# Patient Record
Sex: MALE | Race: WHITE | NOT HISPANIC OR LATINO | Employment: UNEMPLOYED | URBAN - METROPOLITAN AREA
[De-identification: names, ages, dates, MRNs, and addresses within clinical notes are randomized per-mention and may not be internally consistent; named-entity substitution may affect disease eponyms.]

---

## 2021-08-26 ENCOUNTER — OFFICE VISIT (OUTPATIENT)
Dept: FAMILY MEDICINE | Facility: CLINIC | Age: 56
End: 2021-08-26
Payer: COMMERCIAL

## 2021-08-26 ENCOUNTER — ANCILLARY PROCEDURE (OUTPATIENT)
Dept: GENERAL RADIOLOGY | Facility: CLINIC | Age: 56
End: 2021-08-26
Attending: STUDENT IN AN ORGANIZED HEALTH CARE EDUCATION/TRAINING PROGRAM
Payer: COMMERCIAL

## 2021-08-26 VITALS
DIASTOLIC BLOOD PRESSURE: 74 MMHG | HEIGHT: 69 IN | WEIGHT: 136.3 LBS | HEART RATE: 85 BPM | BODY MASS INDEX: 20.19 KG/M2 | SYSTOLIC BLOOD PRESSURE: 125 MMHG | OXYGEN SATURATION: 98 %

## 2021-08-26 DIAGNOSIS — F32.1 CURRENT MODERATE EPISODE OF MAJOR DEPRESSIVE DISORDER WITHOUT PRIOR EPISODE (H): ICD-10-CM

## 2021-08-26 DIAGNOSIS — L97.522 SKIN ULCER OF TOE OF LEFT FOOT WITH FAT LAYER EXPOSED (H): Primary | ICD-10-CM

## 2021-08-26 DIAGNOSIS — E11.9 TYPE 2 DIABETES MELLITUS WITHOUT COMPLICATION, WITH LONG-TERM CURRENT USE OF INSULIN (H): ICD-10-CM

## 2021-08-26 DIAGNOSIS — E11.42 DIABETIC POLYNEUROPATHY ASSOCIATED WITH TYPE 2 DIABETES MELLITUS (H): ICD-10-CM

## 2021-08-26 DIAGNOSIS — Z79.4 TYPE 2 DIABETES MELLITUS WITHOUT COMPLICATION, WITH LONG-TERM CURRENT USE OF INSULIN (H): ICD-10-CM

## 2021-08-26 DIAGNOSIS — L97.522 SKIN ULCER OF TOE OF LEFT FOOT WITH FAT LAYER EXPOSED (H): ICD-10-CM

## 2021-08-26 LAB
ALBUMIN SERPL-MCNC: 3.6 G/DL (ref 3.5–5)
ALP SERPL-CCNC: 98 U/L (ref 45–120)
ALT SERPL W P-5'-P-CCNC: 16 U/L (ref 0–45)
ANION GAP SERPL CALCULATED.3IONS-SCNC: 10 MMOL/L (ref 5–18)
AST SERPL W P-5'-P-CCNC: 15 U/L (ref 0–40)
BILIRUB SERPL-MCNC: 0.3 MG/DL (ref 0–1)
BUN SERPL-MCNC: 15 MG/DL (ref 8–22)
CALCIUM SERPL-MCNC: 8.9 MG/DL (ref 8.5–10.5)
CHLORIDE BLD-SCNC: 106 MMOL/L (ref 98–107)
CO2 SERPL-SCNC: 24 MMOL/L (ref 22–31)
CREAT SERPL-MCNC: 1.01 MG/DL (ref 0.7–1.3)
ERYTHROCYTE [DISTWIDTH] IN BLOOD BY AUTOMATED COUNT: 13.5 % (ref 10–15)
GFR SERPL CREATININE-BSD FRML MDRD: 82 ML/MIN/1.73M2
GLUCOSE BLD-MCNC: 189 MG/DL (ref 70–125)
HBA1C MFR BLD: 7.4 % (ref 0–5.6)
HCT VFR BLD AUTO: 28 % (ref 40–53)
HGB BLD-MCNC: 9.9 G/DL (ref 13.3–17.7)
MCH RBC QN AUTO: 30.3 PG (ref 26.5–33)
MCHC RBC AUTO-ENTMCNC: 35.4 G/DL (ref 31.5–36.5)
MCV RBC AUTO: 86 FL (ref 78–100)
PLATELET # BLD AUTO: 293 10E3/UL (ref 150–450)
POTASSIUM BLD-SCNC: 4 MMOL/L (ref 3.5–5)
PROT SERPL-MCNC: 6.6 G/DL (ref 6–8)
RBC # BLD AUTO: 3.27 10E6/UL (ref 4.4–5.9)
SODIUM SERPL-SCNC: 140 MMOL/L (ref 136–145)
WBC # BLD AUTO: 6.8 10E3/UL (ref 4–11)

## 2021-08-26 PROCEDURE — 85027 COMPLETE CBC AUTOMATED: CPT | Performed by: STUDENT IN AN ORGANIZED HEALTH CARE EDUCATION/TRAINING PROGRAM

## 2021-08-26 PROCEDURE — 80053 COMPREHEN METABOLIC PANEL: CPT | Performed by: STUDENT IN AN ORGANIZED HEALTH CARE EDUCATION/TRAINING PROGRAM

## 2021-08-26 PROCEDURE — 73660 X-RAY EXAM OF TOE(S): CPT | Mod: TC | Performed by: RADIOLOGY

## 2021-08-26 PROCEDURE — 99204 OFFICE O/P NEW MOD 45 MIN: CPT | Performed by: STUDENT IN AN ORGANIZED HEALTH CARE EDUCATION/TRAINING PROGRAM

## 2021-08-26 PROCEDURE — 36415 COLL VENOUS BLD VENIPUNCTURE: CPT | Performed by: STUDENT IN AN ORGANIZED HEALTH CARE EDUCATION/TRAINING PROGRAM

## 2021-08-26 PROCEDURE — 83036 HEMOGLOBIN GLYCOSYLATED A1C: CPT | Performed by: STUDENT IN AN ORGANIZED HEALTH CARE EDUCATION/TRAINING PROGRAM

## 2021-08-26 RX ORDER — SULFAMETHOXAZOLE/TRIMETHOPRIM 800-160 MG
1 TABLET ORAL 2 TIMES DAILY
Qty: 20 TABLET | Refills: 0 | Status: SHIPPED | OUTPATIENT
Start: 2021-08-26 | End: 2021-09-05

## 2021-08-26 RX ORDER — GLUCOSAMINE HCL/CHONDROITIN SU 500-400 MG
CAPSULE ORAL
Qty: 100 EACH | Refills: 3 | Status: SHIPPED | OUTPATIENT
Start: 2021-08-26

## 2021-08-26 RX ORDER — LANCETS
EACH MISCELLANEOUS
Qty: 100 EACH | Refills: 6 | Status: SHIPPED | OUTPATIENT
Start: 2021-08-26

## 2021-08-26 RX ORDER — INSULIN LISPRO 100 [IU]/ML
3 INJECTION, SOLUTION INTRAVENOUS; SUBCUTANEOUS
Qty: 3 ML | Refills: 1 | Status: SHIPPED | OUTPATIENT
Start: 2021-08-26 | End: 2023-01-18

## 2021-08-26 RX ORDER — CITALOPRAM HYDROBROMIDE 10 MG/1
10 TABLET ORAL DAILY
Qty: 30 TABLET | Refills: 1 | Status: ON HOLD | OUTPATIENT
Start: 2021-08-26 | End: 2023-02-17

## 2021-08-26 RX ORDER — GABAPENTIN 300 MG/1
300 CAPSULE ORAL 3 TIMES DAILY
Qty: 90 CAPSULE | Refills: 1 | Status: ON HOLD | OUTPATIENT
Start: 2021-08-26 | End: 2023-02-21

## 2021-08-26 ASSESSMENT — MIFFLIN-ST. JEOR: SCORE: 1420.75

## 2021-08-26 NOTE — PROGRESS NOTES
Assessment and Plan     56-year-old male with likely past medical history of type 2 diabetes on insulin, major depression, diabetic polyneuropathy who presents to establish care after prolonged [facility].  Complex as patient does not know which medications he was on her dosing and has not seen a physician in a significant amount of time.  Obtaining some labs to find baseline today but also will start him off on initial insulin dosing with Metformin.  Will give him some gabapentin to treat likely diabetic polyneuropathy in both his feet.  Check some associated labs including his kidney function with a CMP and CBC.  Wound on patient's left fourth toe today.  Will obtain an x-ray as an initial evaluation for osteomyelitis.  Also started him on Bactrim.  Likely does have major depressive disorder and will start on citalopram but discussed that he needs to return as soon as possible to further investigate this and confirm diagnosis.  Also will need follow-up on many of his other problems.  Nurse to schedule him as soon as possible with me.    1. Type 2 diabetes mellitus without complication, with long-term current use of insulin (H)  - metFORMIN (GLUCOPHAGE) 500 MG tablet; Take 1 tablet (500 mg) by mouth 2 times daily (with meals)  Dispense: 60 tablet; Refill: 1  - insulin glargine (LANTUS SOLOSTAR) 100 UNIT/ML pen; Inject 10 Units Subcutaneous At Bedtime  Dispense: 3 mL; Refill: 1  - insulin lispro (HUMALOG KWIKPEN) 100 UNIT/ML (1 unit dial) KWIKPEN; Inject 3 Units Subcutaneous 3 times daily (before meals)  Dispense: 3 mL; Refill: 1  - blood glucose monitoring (NO BRAND SPECIFIED) meter device kit; Use to test blood sugar 4 time daily or as directed. Preferred blood glucose meter OR supplies to accompany: Blood Glucose Monitor Brands: per insurance.  Dispense: 1 kit; Refill: 0  - blood glucose (NO BRAND SPECIFIED) test strip; Use to test blood sugar 4 times daily or as directed. To accompany: Blood Glucose Monitor  "Brands: per insurance.  Dispense: 100 strip; Refill: 6  - thin (NO BRAND SPECIFIED) lancets; Use with lanceting device. To accompany: Blood Glucose Monitor Brands: per insurance.  Dispense: 100 each; Refill: 6  - alcohol swab prep pads; Use to swab area of injection/ella as directed.  Dispense: 100 each; Refill: 3  - Hemoglobin A1c; Future  - Comprehensive metabolic panel (BMP + Alb, Alk Phos, ALT, AST, Total. Bili, TP); Future  - CBC with platelets; Future  - Hemoglobin A1c  - Comprehensive metabolic panel (BMP + Alb, Alk Phos, ALT, AST, Total. Bili, TP)  - CBC with platelets    2. Current moderate episode of major depressive disorder without prior episode (H)  - citalopram (CELEXA) 10 MG tablet; Take 1 tablet (10 mg) by mouth daily  Dispense: 30 tablet; Refill: 1    3. Skin ulcer of toe of left foot with fat layer exposed (H)  - XR Toe Left G/E 2 Views; Future  - sulfamethoxazole-trimethoprim (BACTRIM DS) 800-160 MG tablet; Take 1 tablet by mouth 2 times daily for 10 days  Dispense: 20 tablet; Refill: 0    4. Diabetic polyneuropathy associated with type 2 diabetes mellitus (H)  - gabapentin (NEURONTIN) 300 MG capsule; Take 1 capsule (300 mg) by mouth 3 times daily  Dispense: 90 capsule; Refill: 1    Follow up: ASAP  Options for treatment and follow-up care were reviewed with the patient and/or guardian. Abdulaziz Rausch and/or guardian engaged in the decision making process and verbalized understanding of the options discussed and agreed with the final plan.    Dr. Daryn Walton         HPI:   Abdulaziz Rausch is a 56 year old  male who presents for:    Chief Complaint   Patient presents with     Establish Care     Diabetes     feet      Depression      Diagnosed with diabetes 10-15 years ago. Up in [place]. Been prescribed medicine medicine but hasn't been using it. She just got out of assisted and sister has carline telling him he needs to see a doctor. Wife left him and son  and has been \"wild\" in the last few " "years. He was on medicine in the senior living including insulin and metformin.  Was apparently also receiving an antidepressant as well as gabapentin for neuropathic feet pain.  He cannot tell me the doses of any of these medications and does not know the name of some.    Today he is trying to establish care with myself and requesting that I restart many of his medications.  He states he is trying to be healthier on the request of his sister who stated he should be seen.  He does have a few concerns today including wound on his left fourth toe that he would like me to look at.  Tells me he was on antibiotics for this in senior living.  Also would like me to look at his left eye which he thinks may had something go in it as he was painting today and started to become irritated about 2 hours ago.         PMHX:   There is no problem list on file for this patient.      No current outpatient medications on file.       Social History     Tobacco Use     Smoking status: Not on file   Substance Use Topics     Alcohol use: Not on file     Drug use: Not on file       Social History     Social History Narrative     Not on file       No Known Allergies    No results found for this or any previous visit (from the past 24 hour(s)).         Review of Systems:    ROS: 10 point ROS neg other than the symptoms noted above in the HPI.         Physical Exam:     Vitals:    08/26/21 1426   BP: 125/74   Pulse: 85   SpO2: 98%   Weight: 61.8 kg (136 lb 4.8 oz)   Height: 1.74 m (5' 8.5\")     Body mass index is 20.42 kg/m .    General appearance: Alert, cooperative, no distress, appears stated age  Head: Normocephalic, atraumatic, without obvious abnormality  Eyes: Pupils equal round, reactive.  Red conjunctiva red, no foreign object seen  Nose: Nares normal, no drainage.  Throat: Lips, mucosa, tongue normal mucosa pink and moist  Neck: Supple, symmetric, trachea midline, no adenopathy.  No thyroid enlargement, tenderness or nodules.    Lungs: Clear to " auscultation bilaterally, no wheezing or crackles present.  Respirations unlabored  Heart: Regular rate and rhythm, normal S1 and S2, no murmur, rub or gallop.  Extremities: 4th toe ulcer on lateral aspect to adipose tissue

## 2021-08-27 NOTE — RESULT ENCOUNTER NOTE
I called the patient but no answer. Left message explaining recent clinic results and next steps. Advised to call clinic or send [employer] message with questions.    Dr. Daryn Walton

## 2021-08-27 NOTE — RESULT ENCOUNTER NOTE
I called the patient but no answer. Left message explaining recent clinic results and next steps. Advised to call clinic or send [employer] message with questions.    Dr. Daryn Walton

## 2021-08-31 DIAGNOSIS — Z79.4 TYPE 2 DIABETES MELLITUS WITHOUT COMPLICATION, WITH LONG-TERM CURRENT USE OF INSULIN (H): Primary | ICD-10-CM

## 2021-08-31 DIAGNOSIS — E11.9 TYPE 2 DIABETES MELLITUS WITHOUT COMPLICATION, WITH LONG-TERM CURRENT USE OF INSULIN (H): Primary | ICD-10-CM

## 2021-08-31 NOTE — TELEPHONE ENCOUNTER
Pharmacy called stating that they received a prescription for insulin but no pen needles. According to the pharmacist I have set up what he thought would be appropriate since he is new to this, 31g x 8mm size needles. Please review and advise.

## 2022-01-05 ENCOUNTER — TELEPHONE (OUTPATIENT)
Dept: NURSING | Facility: CLINIC | Age: 57
End: 2022-01-05
Payer: COMMERCIAL

## 2022-01-05 NOTE — TELEPHONE ENCOUNTER
Patient calling to be tested for COVID when he returns from [place] on 1/10/2021.    RN referred patient to [facility] to schedule appointment for testing.  Patient states he is able to make appointment. No further questions.    Kristy Riley RN  01/05/22 3:44 PM  [facility] Nurse Advisor     COVID-19 testing at [facility] is by appointment only. You'll need to schedule a time to get tested. If you have symptoms (signs) of COVID, please log in to [facility] to complete an e-visit (virtual visit). This is the first step to getting tested.    If you don't have COVID symptoms and want to get tested, you should also log in to [facility] for an e-visit. This includes people who:    have had close contact with a COVID-positive person    want to be tested before or after travel    have taken part in high-risk activities    have a school testing mandate, or     were told to get tested by their care team or the health department.     A [facility] e-visit is the fastest way for you to be seen by our care team. Please choose  Next available provider  to complete an e-visit. When you choose this option, the average response time is less than one hour.  After the e-visit, you'll be able to self-schedule your test at one of our testing locations. To learn more about our testing locations or for other details, please visit our COVID-19 Resource Hub.    [facility] is also the fastest way to get your test results. You'll get your results in [facility] within 3 days. If you don't use [facility], you'll get your results in the mail in 7 to 10 days. If your test is positive and you don't view your result in [facility] within 1 business day, you'll get a phone call with your result. A positive result means that you have COVID-19.    If you have an upcoming procedure at [facility], you'll need to be tested for COVID. The test needs to happen 2 to 4 days before your procedure. If you have an upcoming procedure, we will contact you to  schedule a COVID test.    If you don't have a [facility] account, please call [phone] to set up a virtual visit. You can also find community testing sites in [place] at [place]get[place][email or web address]/testing-locations. If you live in [place], please visit [email or web address]/community-[email or web address].

## 2023-01-18 ENCOUNTER — HOSPITAL ENCOUNTER (INPATIENT)
Facility: HOSPITAL | Age: 58
LOS: 4 days | Discharge: HOME | End: 2023-01-22
Attending: STUDENT IN AN ORGANIZED HEALTH CARE EDUCATION/TRAINING PROGRAM | Admitting: INTERNAL MEDICINE
Payer: COMMERCIAL

## 2023-01-18 ENCOUNTER — OFFICE VISIT (OUTPATIENT)
Dept: FAMILY MEDICINE | Facility: CLINIC | Age: 58
End: 2023-01-18
Payer: COMMERCIAL

## 2023-01-18 ENCOUNTER — APPOINTMENT (OUTPATIENT)
Dept: MRI IMAGING | Facility: HOSPITAL | Age: 58
End: 2023-01-18
Payer: COMMERCIAL

## 2023-01-18 ENCOUNTER — APPOINTMENT (OUTPATIENT)
Dept: ULTRASOUND IMAGING | Facility: HOSPITAL | Age: 58
End: 2023-01-18
Payer: COMMERCIAL

## 2023-01-18 ENCOUNTER — APPOINTMENT (OUTPATIENT)
Dept: RADIOLOGY | Facility: HOSPITAL | Age: 58
End: 2023-01-18
Attending: EMERGENCY MEDICINE
Payer: COMMERCIAL

## 2023-01-18 VITALS
WEIGHT: 137.57 LBS | TEMPERATURE: 97.7 F | BODY MASS INDEX: 19.69 KG/M2 | SYSTOLIC BLOOD PRESSURE: 130 MMHG | OXYGEN SATURATION: 99 % | HEIGHT: 70 IN | DIASTOLIC BLOOD PRESSURE: 70 MMHG | HEART RATE: 87 BPM

## 2023-01-18 DIAGNOSIS — E11.65 TYPE 2 DIABETES MELLITUS WITH HYPERGLYCEMIA, WITH LONG-TERM CURRENT USE OF INSULIN (H): ICD-10-CM

## 2023-01-18 DIAGNOSIS — E11.621 DIABETIC ULCER OF TOE OF RIGHT FOOT ASSOCIATED WITH TYPE 2 DIABETES MELLITUS, WITH FAT LAYER EXPOSED (H): Primary | ICD-10-CM

## 2023-01-18 DIAGNOSIS — R73.9 HYPERGLYCEMIA: ICD-10-CM

## 2023-01-18 DIAGNOSIS — L97.512 DIABETIC ULCER OF TOE OF RIGHT FOOT ASSOCIATED WITH TYPE 2 DIABETES MELLITUS, WITH FAT LAYER EXPOSED (H): Primary | ICD-10-CM

## 2023-01-18 DIAGNOSIS — Z79.4 TYPE 2 DIABETES MELLITUS WITHOUT COMPLICATION, WITH LONG-TERM CURRENT USE OF INSULIN (H): ICD-10-CM

## 2023-01-18 DIAGNOSIS — E11.621 DIABETIC ULCER OF RIGHT MIDFOOT ASSOCIATED WITH TYPE 2 DIABETES MELLITUS, UNSPECIFIED ULCER STAGE (H): ICD-10-CM

## 2023-01-18 DIAGNOSIS — L08.9 FOOT INFECTION: ICD-10-CM

## 2023-01-18 DIAGNOSIS — E11.9 TYPE 2 DIABETES MELLITUS WITHOUT COMPLICATION, WITH LONG-TERM CURRENT USE OF INSULIN (H): ICD-10-CM

## 2023-01-18 DIAGNOSIS — I10 HYPERTENSION, UNSPECIFIED TYPE: ICD-10-CM

## 2023-01-18 DIAGNOSIS — L97.419 DIABETIC ULCER OF RIGHT MIDFOOT ASSOCIATED WITH TYPE 2 DIABETES MELLITUS, UNSPECIFIED ULCER STAGE (H): ICD-10-CM

## 2023-01-18 DIAGNOSIS — T14.8XXA WOUND INFECTION: Primary | ICD-10-CM

## 2023-01-18 DIAGNOSIS — L08.9 WOUND INFECTION: Primary | ICD-10-CM

## 2023-01-18 DIAGNOSIS — Z79.4 TYPE 2 DIABETES MELLITUS WITH HYPERGLYCEMIA, WITH LONG-TERM CURRENT USE OF INSULIN (H): ICD-10-CM

## 2023-01-18 PROBLEM — E87.6 HYPOKALEMIA: Status: ACTIVE | Noted: 2023-01-18

## 2023-01-18 PROBLEM — E87.1 HYPONATREMIA: Status: ACTIVE | Noted: 2023-01-18

## 2023-01-18 LAB
ANION GAP SERPL CALCULATED.3IONS-SCNC: 7 MMOL/L (ref 7–15)
ANION GAP SERPL CALCULATED.3IONS-SCNC: 9 MMOL/L (ref 7–15)
B-OH-BUTYR SERPL-MCNC: <0.2 MMOL/L
BASOPHILS # BLD AUTO: 0 10E3/UL (ref 0–0.2)
BASOPHILS NFR BLD AUTO: 0 %
BUN SERPL-MCNC: 25.6 MG/DL (ref 8–23)
BUN SERPL-MCNC: 27.9 MG/DL (ref 8–23)
CALCIUM SERPL-MCNC: 8.4 MG/DL (ref 8.6–10)
CALCIUM SERPL-MCNC: 9.5 MG/DL (ref 8.6–10)
CHLORIDE SERPL-SCNC: 100 MMOL/L (ref 98–107)
CHLORIDE SERPL-SCNC: 91 MMOL/L (ref 98–107)
CREAT SERPL-MCNC: 1.02 MG/DL (ref 0.67–1.17)
CREAT SERPL-MCNC: 1.07 MG/DL (ref 0.67–1.17)
DEPRECATED HCO3 PLAS-SCNC: 23 MMOL/L (ref 22–29)
DEPRECATED HCO3 PLAS-SCNC: 28 MMOL/L (ref 22–29)
EOSINOPHIL # BLD AUTO: 0.2 10E3/UL (ref 0–0.7)
EOSINOPHIL NFR BLD AUTO: 3 %
ERYTHROCYTE [DISTWIDTH] IN BLOOD BY AUTOMATED COUNT: 12.2 % (ref 10–15)
ERYTHROCYTE [SEDIMENTATION RATE] IN BLOOD BY WESTERGREN METHOD: 79 MM/HR (ref 0–15)
GFR SERPL CREATININE-BSD FRML MDRD: 80 ML/MIN/1.73M2
GFR SERPL CREATININE-BSD FRML MDRD: 85 ML/MIN/1.73M2
GLUCOSE BLDC GLUCOMTR-MCNC: 521 MG/DL (ref 70–99)
GLUCOSE BLDC GLUCOMTR-MCNC: 542 MG/DL (ref 70–99)
GLUCOSE SERPL-MCNC: 514 MG/DL (ref 70–99)
GLUCOSE SERPL-MCNC: 792 MG/DL (ref 70–99)
HBA1C MFR BLD: 13.7 %
HCT VFR BLD AUTO: 31.9 % (ref 40–53)
HGB BLD-MCNC: 10.7 G/DL (ref 13.3–17.7)
HOLD SPECIMEN: NORMAL
IMM GRANULOCYTES # BLD: 0 10E3/UL
IMM GRANULOCYTES NFR BLD: 0 %
IRON SERPL-MCNC: 77 UG/DL (ref 61–157)
LACTATE SERPL-SCNC: 1.5 MMOL/L (ref 0.7–2)
LYMPHOCYTES # BLD AUTO: 1.3 10E3/UL (ref 0.8–5.3)
LYMPHOCYTES NFR BLD AUTO: 20 %
MAGNESIUM SERPL-MCNC: 1.9 MG/DL (ref 1.7–2.3)
MCH RBC QN AUTO: 28.8 PG (ref 26.5–33)
MCHC RBC AUTO-ENTMCNC: 33.5 G/DL (ref 31.5–36.5)
MCV RBC AUTO: 86 FL (ref 78–100)
MONOCYTES # BLD AUTO: 0.4 10E3/UL (ref 0–1.3)
MONOCYTES NFR BLD AUTO: 7 %
NEUTROPHILS # BLD AUTO: 4.6 10E3/UL (ref 1.6–8.3)
NEUTROPHILS NFR BLD AUTO: 70 %
NRBC # BLD AUTO: 0 10E3/UL
NRBC BLD AUTO-RTO: 0 /100
PLATELET # BLD AUTO: 363 10E3/UL (ref 150–450)
POTASSIUM SERPL-SCNC: 4.3 MMOL/L (ref 3.4–5.3)
POTASSIUM SERPL-SCNC: 5 MMOL/L (ref 3.4–5.3)
RBC # BLD AUTO: 3.71 10E6/UL (ref 4.4–5.9)
SODIUM SERPL-SCNC: 126 MMOL/L (ref 136–145)
SODIUM SERPL-SCNC: 132 MMOL/L (ref 136–145)
WBC # BLD AUTO: 6.5 10E3/UL (ref 4–11)

## 2023-01-18 PROCEDURE — 36415 COLL VENOUS BLD VENIPUNCTURE: CPT | Performed by: INTERNAL MEDICINE

## 2023-01-18 PROCEDURE — 93925 LOWER EXTREMITY STUDY: CPT

## 2023-01-18 PROCEDURE — 73720 MRI LWR EXTREMITY W/O&W/DYE: CPT | Mod: RT

## 2023-01-18 PROCEDURE — 82962 GLUCOSE BLOOD TEST: CPT

## 2023-01-18 PROCEDURE — 99214 OFFICE O/P EST MOD 30 MIN: CPT | Performed by: STUDENT IN AN ORGANIZED HEALTH CARE EDUCATION/TRAINING PROGRAM

## 2023-01-18 PROCEDURE — 82306 VITAMIN D 25 HYDROXY: CPT | Performed by: INTERNAL MEDICINE

## 2023-01-18 PROCEDURE — 99223 1ST HOSP IP/OBS HIGH 75: CPT | Performed by: INTERNAL MEDICINE

## 2023-01-18 PROCEDURE — 80048 BASIC METABOLIC PNL TOTAL CA: CPT | Performed by: EMERGENCY MEDICINE

## 2023-01-18 PROCEDURE — 82607 VITAMIN B-12: CPT | Performed by: INTERNAL MEDICINE

## 2023-01-18 PROCEDURE — 87040 BLOOD CULTURE FOR BACTERIA: CPT | Performed by: INTERNAL MEDICINE

## 2023-01-18 PROCEDURE — 85652 RBC SED RATE AUTOMATED: CPT

## 2023-01-18 PROCEDURE — 120N000001 HC R&B MED SURG/OB

## 2023-01-18 PROCEDURE — 73630 X-RAY EXAM OF FOOT: CPT | Mod: LT

## 2023-01-18 PROCEDURE — 85025 COMPLETE CBC W/AUTO DIFF WBC: CPT | Performed by: EMERGENCY MEDICINE

## 2023-01-18 PROCEDURE — 82010 KETONE BODYS QUAN: CPT | Performed by: INTERNAL MEDICINE

## 2023-01-18 PROCEDURE — 96365 THER/PROPH/DIAG IV INF INIT: CPT

## 2023-01-18 PROCEDURE — 93922 UPR/L XTREMITY ART 2 LEVELS: CPT

## 2023-01-18 PROCEDURE — 250N000011 HC RX IP 250 OP 636: Performed by: EMERGENCY MEDICINE

## 2023-01-18 PROCEDURE — 250N000011 HC RX IP 250 OP 636: Performed by: INTERNAL MEDICINE

## 2023-01-18 PROCEDURE — 83540 ASSAY OF IRON: CPT | Performed by: INTERNAL MEDICINE

## 2023-01-18 PROCEDURE — 36415 COLL VENOUS BLD VENIPUNCTURE: CPT | Performed by: EMERGENCY MEDICINE

## 2023-01-18 PROCEDURE — 80061 LIPID PANEL: CPT | Performed by: INTERNAL MEDICINE

## 2023-01-18 PROCEDURE — 258N000003 HC RX IP 258 OP 636: Performed by: STUDENT IN AN ORGANIZED HEALTH CARE EDUCATION/TRAINING PROGRAM

## 2023-01-18 PROCEDURE — 250N000013 HC RX MED GY IP 250 OP 250 PS 637: Performed by: INTERNAL MEDICINE

## 2023-01-18 PROCEDURE — 99285 EMERGENCY DEPT VISIT HI MDM: CPT | Mod: 25

## 2023-01-18 PROCEDURE — 83605 ASSAY OF LACTIC ACID: CPT | Performed by: EMERGENCY MEDICINE

## 2023-01-18 PROCEDURE — 96368 THER/DIAG CONCURRENT INF: CPT

## 2023-01-18 PROCEDURE — 258N000003 HC RX IP 258 OP 636

## 2023-01-18 PROCEDURE — 83735 ASSAY OF MAGNESIUM: CPT | Performed by: EMERGENCY MEDICINE

## 2023-01-18 PROCEDURE — 80048 BASIC METABOLIC PNL TOTAL CA: CPT | Performed by: INTERNAL MEDICINE

## 2023-01-18 PROCEDURE — A9585 GADOBUTROL INJECTION: HCPCS | Performed by: INTERNAL MEDICINE

## 2023-01-18 PROCEDURE — 255N000002 HC RX 255 OP 636: Performed by: INTERNAL MEDICINE

## 2023-01-18 PROCEDURE — 250N000012 HC RX MED GY IP 250 OP 636 PS 637: Performed by: INTERNAL MEDICINE

## 2023-01-18 PROCEDURE — 258N000003 HC RX IP 258 OP 636: Performed by: INTERNAL MEDICINE

## 2023-01-18 PROCEDURE — 83036 HEMOGLOBIN GLYCOSYLATED A1C: CPT | Performed by: INTERNAL MEDICINE

## 2023-01-18 RX ORDER — PIPERACILLIN SODIUM, TAZOBACTAM SODIUM 3; .375 G/15ML; G/15ML
3.38 INJECTION, POWDER, LYOPHILIZED, FOR SOLUTION INTRAVENOUS EVERY 8 HOURS
Status: DISCONTINUED | OUTPATIENT
Start: 2023-01-18 | End: 2023-01-22

## 2023-01-18 RX ORDER — ONDANSETRON 2 MG/ML
4 INJECTION INTRAMUSCULAR; INTRAVENOUS EVERY 6 HOURS PRN
Status: DISCONTINUED | OUTPATIENT
Start: 2023-01-18 | End: 2023-01-22 | Stop reason: HOSPADM

## 2023-01-18 RX ORDER — VANCOMYCIN HYDROCHLORIDE 1 G/200ML
1000 INJECTION, SOLUTION INTRAVENOUS ONCE
Status: COMPLETED | OUTPATIENT
Start: 2023-01-18 | End: 2023-01-18

## 2023-01-18 RX ORDER — MAGNESIUM OXIDE 400 MG/1
400 TABLET ORAL 2 TIMES DAILY
Status: COMPLETED | OUTPATIENT
Start: 2023-01-18 | End: 2023-01-20

## 2023-01-18 RX ORDER — AMOXICILLIN 250 MG
1 CAPSULE ORAL 2 TIMES DAILY PRN
Status: DISCONTINUED | OUTPATIENT
Start: 2023-01-18 | End: 2023-01-22 | Stop reason: HOSPADM

## 2023-01-18 RX ORDER — SODIUM CHLORIDE AND POTASSIUM CHLORIDE 150; 900 MG/100ML; MG/100ML
INJECTION, SOLUTION INTRAVENOUS CONTINUOUS
Status: DISPENSED | OUTPATIENT
Start: 2023-01-18 | End: 2023-01-19

## 2023-01-18 RX ORDER — PROCHLORPERAZINE 25 MG
25 SUPPOSITORY, RECTAL RECTAL EVERY 12 HOURS PRN
Status: DISCONTINUED | OUTPATIENT
Start: 2023-01-18 | End: 2023-01-22 | Stop reason: HOSPADM

## 2023-01-18 RX ORDER — PIPERACILLIN SODIUM, TAZOBACTAM SODIUM 3; .375 G/15ML; G/15ML
3.38 INJECTION, POWDER, LYOPHILIZED, FOR SOLUTION INTRAVENOUS ONCE
Status: COMPLETED | OUTPATIENT
Start: 2023-01-18 | End: 2023-01-18

## 2023-01-18 RX ORDER — GABAPENTIN 300 MG/1
300 CAPSULE ORAL 3 TIMES DAILY
Status: DISCONTINUED | OUTPATIENT
Start: 2023-01-18 | End: 2023-01-22 | Stop reason: HOSPADM

## 2023-01-18 RX ORDER — ACETAMINOPHEN 650 MG/1
650 SUPPOSITORY RECTAL EVERY 6 HOURS PRN
Status: DISCONTINUED | OUTPATIENT
Start: 2023-01-18 | End: 2023-01-22 | Stop reason: HOSPADM

## 2023-01-18 RX ORDER — INSULIN LISPRO 100 [IU]/ML
3 INJECTION, SOLUTION INTRAVENOUS; SUBCUTANEOUS
Qty: 3 ML | Refills: 1 | Status: SHIPPED | OUTPATIENT
Start: 2023-01-18 | End: 2023-02-13

## 2023-01-18 RX ORDER — AMOXICILLIN 250 MG
2 CAPSULE ORAL 2 TIMES DAILY PRN
Status: DISCONTINUED | OUTPATIENT
Start: 2023-01-18 | End: 2023-01-22 | Stop reason: HOSPADM

## 2023-01-18 RX ORDER — LISINOPRIL 5 MG/1
10 TABLET ORAL DAILY
Status: DISCONTINUED | OUTPATIENT
Start: 2023-01-18 | End: 2023-01-22 | Stop reason: HOSPADM

## 2023-01-18 RX ORDER — CEFAZOLIN SODIUM 1 G/50ML
1250 SOLUTION INTRAVENOUS EVERY 24 HOURS
Status: COMPLETED | OUTPATIENT
Start: 2023-01-19 | End: 2023-01-21

## 2023-01-18 RX ORDER — ONDANSETRON 4 MG/1
4 TABLET, ORALLY DISINTEGRATING ORAL EVERY 6 HOURS PRN
Status: DISCONTINUED | OUTPATIENT
Start: 2023-01-18 | End: 2023-01-22 | Stop reason: HOSPADM

## 2023-01-18 RX ORDER — LIDOCAINE 40 MG/G
CREAM TOPICAL
Status: DISCONTINUED | OUTPATIENT
Start: 2023-01-18 | End: 2023-01-22 | Stop reason: HOSPADM

## 2023-01-18 RX ORDER — DEXTROSE MONOHYDRATE 25 G/50ML
25-50 INJECTION, SOLUTION INTRAVENOUS
Status: DISCONTINUED | OUTPATIENT
Start: 2023-01-18 | End: 2023-01-22 | Stop reason: HOSPADM

## 2023-01-18 RX ORDER — NICOTINE POLACRILEX 4 MG
15-30 LOZENGE BUCCAL
Status: DISCONTINUED | OUTPATIENT
Start: 2023-01-18 | End: 2023-01-22 | Stop reason: HOSPADM

## 2023-01-18 RX ORDER — ACETAMINOPHEN 325 MG/1
650 TABLET ORAL EVERY 6 HOURS PRN
Status: DISCONTINUED | OUTPATIENT
Start: 2023-01-18 | End: 2023-01-22 | Stop reason: HOSPADM

## 2023-01-18 RX ORDER — GADOBUTROL 604.72 MG/ML
6 INJECTION INTRAVENOUS ONCE
Status: COMPLETED | OUTPATIENT
Start: 2023-01-18 | End: 2023-01-18

## 2023-01-18 RX ORDER — PROCHLORPERAZINE MALEATE 10 MG
10 TABLET ORAL EVERY 6 HOURS PRN
Status: DISCONTINUED | OUTPATIENT
Start: 2023-01-18 | End: 2023-01-22 | Stop reason: HOSPADM

## 2023-01-18 RX ORDER — INSULIN GLARGINE 100 [IU]/ML
10 INJECTION, SOLUTION SUBCUTANEOUS AT BEDTIME
Qty: 3 ML | Refills: 1 | Status: SHIPPED | OUTPATIENT
Start: 2023-01-18 | End: 2023-02-13

## 2023-01-18 RX ADMIN — GADOBUTROL 6 ML: 604.72 INJECTION INTRAVENOUS at 23:09

## 2023-01-18 RX ADMIN — SODIUM CHLORIDE 1000 ML: 9 INJECTION, SOLUTION INTRAVENOUS at 19:18

## 2023-01-18 RX ADMIN — VANCOMYCIN HYDROCHLORIDE 250 MG: 1 INJECTION, POWDER, LYOPHILIZED, FOR SOLUTION INTRAVENOUS at 20:52

## 2023-01-18 RX ADMIN — INSULIN GLARGINE 10 UNITS: 100 INJECTION, SOLUTION SUBCUTANEOUS at 19:20

## 2023-01-18 RX ADMIN — VANCOMYCIN HYDROCHLORIDE 1000 MG: 1 INJECTION, SOLUTION INTRAVENOUS at 15:04

## 2023-01-18 RX ADMIN — PIPERACILLIN AND TAZOBACTAM 3.38 G: 3; .375 INJECTION, POWDER, LYOPHILIZED, FOR SOLUTION INTRAVENOUS at 15:14

## 2023-01-18 RX ADMIN — SODIUM CHLORIDE 1000 ML: 9 INJECTION, SOLUTION INTRAVENOUS at 18:02

## 2023-01-18 RX ADMIN — MAGNESIUM OXIDE TAB 400 MG (241.3 MG ELEMENTAL MG) 400 MG: 400 (241.3 MG) TAB at 19:39

## 2023-01-18 RX ADMIN — GABAPENTIN 300 MG: 300 CAPSULE ORAL at 19:38

## 2023-01-18 RX ADMIN — POTASSIUM CHLORIDE AND SODIUM CHLORIDE: 900; 150 INJECTION, SOLUTION INTRAVENOUS at 20:53

## 2023-01-18 ASSESSMENT — PATIENT HEALTH QUESTIONNAIRE - PHQ9
SUM OF ALL RESPONSES TO PHQ QUESTIONS 1-9: 14
10. IF YOU CHECKED OFF ANY PROBLEMS, HOW DIFFICULT HAVE THESE PROBLEMS MADE IT FOR YOU TO DO YOUR WORK, TAKE CARE OF THINGS AT HOME, OR GET ALONG WITH OTHER PEOPLE: NOT DIFFICULT AT ALL
SUM OF ALL RESPONSES TO PHQ QUESTIONS 1-9: 14

## 2023-01-18 ASSESSMENT — ACTIVITIES OF DAILY LIVING (ADL)
ADLS_ACUITY_SCORE: 35

## 2023-01-18 ASSESSMENT — PAIN SCALES - GENERAL: PAINLEVEL: NO PAIN (0)

## 2023-01-18 NOTE — ED NOTES
PIT Note  Patient is a pleasant 57-year-old male who comes in today for evaluation of 2 and half weeks of worsening foot wounds.  He has some erythema and black toes on the right side and just some erythema on the left side.  He has poor sensation at baseline.  He has a known diabetic.  He has been having some chills at home but otherwise doing okay.  Hemodynamically stable on arrival.  Based on his feet wounds however I think he will likely need admission to the hospital for IV antibiotics and may end up needing significant imaging to evaluate for possible osteomyelitis as he appears to have some necrotic toes.                     Viridiana Martinez MD  01/18/23 1301       Viridiana Martinez MD  01/18/23 1303

## 2023-01-18 NOTE — H&P
[facility]    History and Physical - Hospitalist Service       Date of Admission:  1/18/2023    Assessment & Plan      Abdulaziz Rausch is a 57 year old male with PMH of poorly controlled diabetes, HTN, admitted on 1/18/2023.     #Diabetic foot ulcers patient is diabetic polyneuropathy.  X-ray bilateral feet negative for fractures, degenerative changes osteomyelitis.  ED provider contacted podiatry, who recommended for MRI and CHIQUITA.  The resting right ankle-brachial index measures 1.17. The resting left ankle-brachial index measures 1.20.  Both within normal limits.  Feet MRI performed, results pending.  -Follow-up on results  -Podiatry consult    #Poorly controlled diabetes mellitus type 2.  A1c 13.7.   on admission, with AG 7, lactic acid 1.5, negative serum ketones.  Today his PCP prescribed Lantus 10 units nightly, NovoLog 3 units 3 times daily with meals, and Glucophage 500 mg twice daily.  Patient admits to being diagnosed with diabetes nearly 15 years ago.  He does not take insulin or oral meds, for unknown reason.  He denies financial difficulty to afford medications.  -S/p 2 L NS bolus in ED  -Vancomycin/Zosyn started in ED, will continue  -Follow-up on blood cultures, which were obtained in ED, after dose of Zosyn given  -Continue with IVF  -Lantus 10 units at bedtime  -SSI NovoLog, prandial 5 units 3 times daily  -Holding Glucophage for now  -Diabetes educator consult  -Diabetic diet    Pseudohyponatremia of hyperglycemia.  Sodium correcting to normal, given BG on admission 792.  S/p IVF.  Abdomen glycemic control.  Monitor sodium.    Essential hypertension, started on 10 mg of lisinopril.  Monitor BP.    MDD, on Celexa.       Diet: Moderate Consistent Carb (60 g CHO per Meal) Diet    DVT Prophylaxis: Pneumatic Compression Devices  Fischer Catheter: Not present  Lines: None     Cardiac Monitoring: None  Code Status: Full Code      Clinically Significant Risk Factors Present on  Admission         # Hyponatremia: Lowest Na = 126 mmol/L in last 2 days, will monitor as appropriate                 Disposition Plan Anticipate hospitalization for more than 2 midnights, plan to return to previous living arrangements     Expected Discharge Date: 01/20/2023                  Emmie Jarvis MD  Hospitalist Service  [facility]  Securely message with [employer] (more info)  Text page via [employer] Paging/Directory   _____________________________________________________________________    Chief Complaint   Bilateral toes wounds    History is obtained from the patient, electronic health record and emergency department physician    History of Present Illness   Abdulaziz Rausch is a 57 year old male with PMH of uncontrolled diabetes, diabetic polyneuropathy, HTN, presented to ED for evaluation of 2.5 weeks of worsening foot wounds.  He was evaluated at [facility] ED 2 weeks ago, was diagnosed with frostbite.  2 erythema worsen, and developed black toes on the right.  He was seen by his PCP today, and was referred to ED for evaluation of osteomyelitis.  Patient denies fever, chills, purulent drainage from wounds, chest pain, cough, abdominal pain, nausea, dysuria.      Past Medical History    No past medical history on file.    Past Surgical History   No past surgical history on file.    Prior to Admission Medications   Prior to Admission Medications   Prescriptions Last Dose Informant Patient Reported? Taking?   alcohol swab prep pads Unknown  No Yes   Sig: Use to swab area of injection/ella as directed.   blood glucose (NO BRAND SPECIFIED) test strip Unknown  No Yes   Sig: Use to test blood sugar 4 times daily or as directed. To accompany: Blood Glucose Monitor Brands: per insurance.   blood glucose monitoring (NO BRAND SPECIFIED) meter device kit Unknown  No Yes   Sig: Use to test blood sugar 4 time daily or as directed. Preferred blood glucose meter OR supplies to accompany:  Blood Glucose Monitor Brands: per insurance.   citalopram (CELEXA) 10 MG tablet   No No   Sig: Take 1 tablet (10 mg) by mouth daily   Patient not taking: Reported on 1/18/2023   gabapentin (NEURONTIN) 300 MG capsule 1/18/2023  No Yes   Sig: Take 1 capsule (300 mg) by mouth 3 times daily   insulin glargine (LANTUS SOLOSTAR) 100 UNIT/ML pen 1/17/2023  No Yes   Sig: Inject 10 Units Subcutaneous At Bedtime   insulin lispro (HUMALOG KWIKPEN) 100 UNIT/ML (1 unit dial) KWIKPEN 1/18/2023  No Yes   Sig: Inject 3 Units Subcutaneous 3 times daily (before meals)   insulin pen needle (31G X 8 MM) 31G X 8 MM miscellaneous Unknown  No Yes   Sig: Use 1 pen needles daily or as directed.   metFORMIN (GLUCOPHAGE) 500 MG tablet 1/18/2023  No Yes   Sig: Take 1 tablet (500 mg) by mouth 2 times daily (with meals)   thin (NO BRAND SPECIFIED) lancets Unknown  No Yes   Sig: Use with lanceting device. To accompany: Blood Glucose Monitor Brands: per insurance.      Facility-Administered Medications: None        Review of Systems    The 10 point Review of Systems is negative other than noted in the HPI or here.     Social History   I have reviewed this patient's social history and updated it with pertinent information if needed.  Social History     Tobacco Use     Smoking status: Never     Smokeless tobacco: Never     Family History     Unable to obtain due to: Patient not aware of family medical history    Allergies   No Known Allergies     Physical Exam   Vital Signs: Temp: 97.2  F (36.2  C) Temp src: Oral BP: (!) 142/63 Pulse: 88   Resp: 18 SpO2: 98 % O2 Device: None (Room air)    Weight: 135 lbs 0 oz    General: Alert and oriented x 3. Not in obvious distress.  HEENT: NC, AT. Neck- supple, No JVP elevation, lymphadenopathy or thyromegaly. Trachea-central.  Chest: Clear to auscultation bilaterally.  Heart: S1S2 regular. No M/R/G.  Abdomen: Soft. NT, ND. No organomegaly. Bowel sounds- active.  Back: No spine tenderness. No CVA  tenderness.  Extremities: Bilateral feet edema, wounds with black eschar on right second and third toes, some wounds on left second and fourth toes.  Pedal pulses palpable on the left, dopplerable on the right.  Neuro: Cranial nerves 1-12 grossly normal. No focal neurological deficit    Medical Decision Making       75 MINUTES SPENT BY ME on the date of service doing chart review, history, exam, documentation & further activities per the note.      Data     I have personally reviewed the following data over the past 24 hrs:    6.5  \   10.7 (L)   / 363     132 (L) 100 25.6 (H) /  514 (HH)   4.3 23 1.02 \       TSH: N/A T4: N/A A1C: 13.7 (H)       Procal: N/A CRP: N/A Lactic Acid: 1.5         Imaging results reviewed over the past 24 hrs:   Recent Results (from the past 24 hour(s))   Foot XR, G/E 3 views, left    Narrative    EXAM: XR FOOT LEFT G/E 3 VIEWS  LOCATION: [facility]  DATE/TIME: 1/18/2023 1:57 PM    INDICATION: Foot pain. Wound.  COMPARISON: None.      Impression    IMPRESSION: No fracture. No degenerative changes. No osteomyelitis.   Foot  XR, G/E 3 views, right    Narrative    EXAM: XR FOOT RIGHT G/E 3 VIEWS  LOCATION: [facility]  DATE/TIME: 1/18/2023 1:57 PM    INDICATION: Foot pain. Soft tissue wound.  COMPARISON: None.      Impression    IMPRESSION: No fracture. No degenerative changes. No osteomyelitis.   US Lower Extremity Arterial Duplex Bilateral    Narrative    [facility]    DATE: 1/18/2023 8:51 PM    EXAM: RESTING ANKLE-BRACHIAL INDICES (ABIs) AND DUPLEX ARTERIAL ULTRASOUND OF THE LOWER EXTREMITIES BILATERALLY    INDICATION: Diabetic foot ulcers.    COMPARISON: None.    TECHNIQUE: Duplex imaging is performed utilizing gray-scale, two-dimensional images, and color-flow imaging. Doppler waveform analysis and spectral Doppler imaging is also performed.    CHIQUITA FINDINGS:   RIGHT (mmHg)  Ankle (PT): >254; Index NC  Ankle (DP): 230;  Index 1.17    LEFT (mmHg)  Brachial: 197  Ankle (PT): >254; Index NC  Ankle (DP): 237; Index 1.20    The resting right ankle-brachial index measures 1.17. The resting left ankle-brachial index measures 1.20.    DUPLEX ARTERIAL ULTRASOUND FINDINGS:   RIGHT (cm/s)  EIA: 141 T  CFA: 126 T  PFA: 90 B  SFA-Proximal: 162 T  SFA-Mid: 168 T  SFA-Distal: 177 T  Popliteal: 107 B  PTA: 140 M  FRANCESCO: 87 B  DPA: 99 M  (M=monophasic, B=biphasic, T=triphasic)    LEFT (cm/s)  EIA: 147 B  CFA: 86 B  PFA: 65 B  SFA-Proximal: 168 B  SFA-Mid: 155 T  SFA-Distal: 103 B  Popliteal: 133 B  PTA: 112 M  FRANCESCO: 89 B  DPA: 77 B  (M=monophasic, B=biphasic, T=triphasic)    Right lower extremity: Multiphasic waveforms throughout the right lower extremity with the exception of the dorsalis pedis artery which is monophasic.    Left lower extremity: Multiphasic waveforms throughout the left lower extremity.      Impression    IMPRESSION:  1. RIGHT LOWER EXTREMITY: Noncompressible arteries of the right lower extremity suggestive of calcific atherosclerotic involvement. The calculated ankle-brachial index is, however, within normal limits. Multiphasic waveforms throughout the right lower   extremity with the exception of the dorsalis pedis artery which is monophasic.    2. LEFT LOWER EXTREMITY: Noncompressible arteries of the left lower extremity suggestive of calcific atherosclerotic involvement. The calculated ankle-brachial index is, however, within normal limits. Multiphasic waveforms throughout the left lower   extremity.   US CHIQUITA Doppler No Exercise 1-2 Levels Bilateral    Narrative    [facility]    DATE: 1/18/2023 8:51 PM    EXAM: RESTING ANKLE-BRACHIAL INDICES (ABIs) AND DUPLEX ARTERIAL ULTRASOUND OF THE LOWER EXTREMITIES BILATERALLY    INDICATION: Diabetic foot ulcers.    COMPARISON: None.    TECHNIQUE: Duplex imaging is performed utilizing gray-scale, two-dimensional images, and color-flow imaging. Doppler waveform analysis  and spectral Doppler imaging is also performed.    CHIQUITA FINDINGS:   RIGHT (mmHg)  Ankle (PT): >254; Index NC  Ankle (DP): 230; Index 1.17    LEFT (mmHg)  Brachial: 197  Ankle (PT): >254; Index NC  Ankle (DP): 237; Index 1.20    The resting right ankle-brachial index measures 1.17. The resting left ankle-brachial index measures 1.20.    DUPLEX ARTERIAL ULTRASOUND FINDINGS:   RIGHT (cm/s)  EIA: 141 T  CFA: 126 T  PFA: 90 B  SFA-Proximal: 162 T  SFA-Mid: 168 T  SFA-Distal: 177 T  Popliteal: 107 B  PTA: 140 M  FRANCESCO: 87 B  DPA: 99 M  (M=monophasic, B=biphasic, T=triphasic)    LEFT (cm/s)  EIA: 147 B  CFA: 86 B  PFA: 65 B  SFA-Proximal: 168 B  SFA-Mid: 155 T  SFA-Distal: 103 B  Popliteal: 133 B  PTA: 112 M  FRANCESCO: 89 B  DPA: 77 B  (M=monophasic, B=biphasic, T=triphasic)    Right lower extremity: Multiphasic waveforms throughout the right lower extremity with the exception of the dorsalis pedis artery which is monophasic.    Left lower extremity: Multiphasic waveforms throughout the left lower extremity.      Impression    IMPRESSION:  1. RIGHT LOWER EXTREMITY: Noncompressible arteries of the right lower extremity suggestive of calcific atherosclerotic involvement. The calculated ankle-brachial index is, however, within normal limits. Multiphasic waveforms throughout the right lower   extremity with the exception of the dorsalis pedis artery which is monophasic.    2. LEFT LOWER EXTREMITY: Noncompressible arteries of the left lower extremity suggestive of calcific atherosclerotic involvement. The calculated ankle-brachial index is, however, within normal limits. Multiphasic waveforms throughout the left lower   extremity.

## 2023-01-18 NOTE — PROGRESS NOTES
Assessment and Plan     57-year-old male with past medical history of uncontrolled type 2 diabetes.  Patient has not been taking any of his medications and has been having high blood sugars.  He presents today to talk about toes that are red swollen with some black tissue.  He was evaluated in the ER for this 2 weeks ago and they felt it was a frostbite injury.  I think this is unlikely as he has not had any cold exposure.  Much more likely related to vascular issues from his out-of-control type 2 diabetes.  I am concerned about the potential for osteomyelitis.  He did have an x-ray in the ER 2 weeks ago that did not show this but would recommend an MRI for definitive diagnosis.  He may also need a short admission to get his diabetes under control given his blood sugars into the 600s.  He is vitally stable which is reassuring.  Called and discussed this with ER physician for signout.  Patient will go to [facility] by private vehicle.  I did place an order for his various medicines that he try to start him on a year ago in anticipation of his discharge.  Discussed with him that I would want to see him after couple of days from the ER or admission if he does get admitted.    1. Type 2 diabetes mellitus with hyperglycemia, with long-term current use of insulin (H)  2. Diabetic ulcer of toe of right foot associated with type 2 diabetes mellitus, with fat layer exposed (H)    3. Type 2 diabetes mellitus without complication, with long-term current use of insulin (H)  - insulin glargine (LANTUS SOLOSTAR) 100 UNIT/ML pen; Inject 10 Units Subcutaneous At Bedtime  Dispense: 3 mL; Refill: 1  - insulin lispro (HUMALOG KWIKPEN) 100 UNIT/ML (1 unit dial) KWIKPEN; Inject 3 Units Subcutaneous 3 times daily (before meals)  Dispense: 3 mL; Refill: 1  - metFORMIN (GLUCOPHAGE) 500 MG tablet; Take 1 tablet (500 mg) by mouth 2 times daily (with meals)  Dispense: 60 tablet; Refill: 1    Follow up: after ER/hosiptal  Options for  "treatment and follow-up care were reviewed with the patient and/or guardian. Abdulaziz Rausch and/or guardian engaged in the decision making process and verbalized understanding of the options discussed and agreed with the final plan.    Dr. Daryn Walton         HPI:   Abdulaziz Rausch is a 57 year old  male who presents for:    Chief Complaint   Patient presents with     Frostbite     Diabetes     Medication Request     Nees refills on all meds      Patient is here due to black-red swollen toes that have been going on for a couple of weeks.  He was seen in the ER 2 weeks ago through [facility] and they thought he had frostbite.  However patient has not had any cold exposure.  He is a type II diabetic and his blood sugars were in the 600s at that time.  He has not been taking any of his medications and I prescribed him when I last saw him approximately a year ago.  Asking about if he has felt sick lately he tells me he has an mitis had some subjective fevers though he has not actually checked his temp.  Here his vitals are stable with no fever.  Patient has known diabetic neuropathy.         PMHX:     Patient Active Problem List   Diagnosis     Type 2 diabetes mellitus without complication, with long-term current use of insulin (H)     Current moderate episode of major depressive disorder without prior episode (H)       Social History     Tobacco Use     Smoking status: Never     Smokeless tobacco: Never       Social History     Social History Narrative     Not on file       No Known Allergies    No results found for this or any previous visit (from the past 24 hour(s)).         Review of Systems:    ROS: 10 point ROS neg other than the symptoms noted above in the HPI.         Physical Exam:     Vitals:    01/18/23 0805   BP: 130/70   Pulse: 87   Temp: 97.7  F (36.5  C)   SpO2: 99%   Weight: 62.4 kg (137 lb 9.1 oz)   Height: 1.778 m (5' 10\")     Body mass index is 19.74 kg/m .    General appearance: Alert, " cooperative, no distress, appears stated age  Head: Normocephalic, atraumatic, without obvious abnormality  Eyes: Pupils equal round, reactive.  Conjunctiva clear.  Nose: Nares normal, no drainage.  Throat: Lips, mucosa, tongue normal mucosa pink and moist  Extremities: Several red swollen toes with a black necrotic appearing ulcer on his right second toe.

## 2023-01-18 NOTE — ED TRIAGE NOTES
DM pt with chills and wounds for past 2 wks. to Rt foot 2/3/4th toes and Lt foot 2nd/3rd toe. Pt reports no pain-numbness to bilat feet

## 2023-01-18 NOTE — PHARMACY-VANCOMYCIN DOSING SERVICE
"Pharmacy Vancomycin Initial Note  Date of Service 2023  Patient's  1965  57 year old, male    Indication: Skin and Soft Tissue Infection    Current estimated CrCl = CrCl cannot be calculated (Patient's most recent lab result is older than the maximum 30 days allowed.).    Creatinine for last 3 days  No results found for requested labs within last 72 hours.    Recent Vancomycin Level(s) for last 3 days  No results found for requested labs within last 72 hours.      Vancomycin IV Administrations (past 72 hours)      No vancomycin orders with administrations in past 72 hours.                Nephrotoxins and other renal medications (From now, onward)    Start     Dose/Rate Route Frequency Ordered Stop    23 1330  piperacillin-tazobactam (ZOSYN) 3.375 g vial to attach to  mL bag        Note to Pharmacy: For [facility], [facility] and [facility]: For Zosyn-naive patients, use the \"Zosyn initial dose + extended infusion\" order panel.    3.375 g  over 30 Minutes Intravenous ONCE 23 1302      23 1330  vancomycin (VANCOCIN) 1000 mg in dextrose 5% 200 mL PREMIX         1,000 mg  200 mL/hr over 1 Hours Intravenous ONCE 23 1306            Contrast Orders - past 72 hours (72h ago, onward)    None                Plan:  1. Start vancomycin  1000 mg IV once.   2. Please consult Pharmacy for further dosing.    Wilfrido Lara [facility]  "

## 2023-01-18 NOTE — PHARMACY-ADMISSION MEDICATION HISTORY
Pharmacy Note - Admission Medication History    Pertinent Provider Information:      ______________________________________________________________________    Prior To Admission (PTA) med list completed and updated in EMR.       PTA Med List   Medication Sig Last Dose     alcohol swab prep pads Use to swab area of injection/ella as directed. Unknown     blood glucose (NO BRAND SPECIFIED) test strip Use to test blood sugar 4 times daily or as directed. To accompany: Blood Glucose Monitor Brands: per insurance. Unknown     blood glucose monitoring (NO BRAND SPECIFIED) meter device kit Use to test blood sugar 4 time daily or as directed. Preferred blood glucose meter OR supplies to accompany: Blood Glucose Monitor Brands: per insurance. Unknown     gabapentin (NEURONTIN) 300 MG capsule Take 1 capsule (300 mg) by mouth 3 times daily 1/18/2023     insulin glargine (LANTUS SOLOSTAR) 100 UNIT/ML pen Inject 10 Units Subcutaneous At Bedtime 1/17/2023     insulin lispro (HUMALOG KWIKPEN) 100 UNIT/ML (1 unit dial) KWIKPEN Inject 3 Units Subcutaneous 3 times daily (before meals) 1/18/2023     insulin pen needle (31G X 8 MM) 31G X 8 MM miscellaneous Use 1 pen needles daily or as directed. Unknown     metFORMIN (GLUCOPHAGE) 500 MG tablet Take 1 tablet (500 mg) by mouth 2 times daily (with meals) 1/18/2023     thin (NO BRAND SPECIFIED) lancets Use with lanceting device. To accompany: Blood Glucose Monitor Brands: per insurance. Unknown       Information source(s): Patient and CareEverywhere/[facility]  Method of interview communication: in-person    Summary of Changes to PTA Med List  New:   Discontinued:   Changed:     Patient was asked about OTC/herbal products specifically.  PTA med list reflects this.    In the past week, patient estimated taking medication this percent of the time:  greater than 90%.    Medication Affordability:  Not including over the counter (OTC) medications, was there a time in the past 12 months when  you did not take your medications as prescribed because of cost?: No    Allergies were reviewed, assessed, and updated with the patient.      Patient did not bring any medications to the hospital and can't retrieve from home. No multi-dose medications are available for use during hospital stay.     The information provided in this note is only as accurate as the sources available at the time of the update(s).    Thank you for the opportunity to participate in the care of this patient.    Mounika Cain, PharmD. 1/18/2023 3:29 PM

## 2023-01-18 NOTE — ED PROVIDER NOTES
"Emergency Department Midlevel Supervisory Note     I personally saw the patient and performed a substantive portion of the visit including all aspects of the medical decision making.    ED Course:  2:27 PM Chris Albright MD, [facility] Resident staffed patient with me. I agree with their assessment and plan of management, and I will see the patient.  2:38 PM  I met with the patient to introduce myself, gather additional history, perform my initial exam, and discuss the plan.   4:10 PM Call placed to admitting service.  5:10 PM I spoke with the hospitalist, Dr. Jarvis. We discussed the patient's case and they agree to admit the patient.    Brief HPI:     Abdulaziz Rausch is a 57 year old male who presents for evaluation of a right foot wound.    Patient saw his PCP earlier today for evaluation of ulcers to the tips of his second and third digits on the right foot. These have been present for at least 2-3 weeks now. He was seen at [facility] with initial concern for frost bite given appearance of his feet. He then followed up with his PCP earlier today who recommended he come to the ER for further evaluation and likely admission.     Patient reports that he does have history of type 2 diabetes and that he has previously been on lantus and sliding scale correction short acting insulin along with 500 mg Metformin daily. He has not been taking any of these medications for at least the last 8 months now.    Brief Physical Exam: BP (!) 169/81 (BP Location: Left arm)   Pulse 89   Temp 97.2  F (36.2  C) (Oral)   Resp 18   Ht 1.753 m (5' 9\")   Wt 61.2 kg (135 lb)   SpO2 99%   BMI 19.94 kg/m    Constitutional:  Alert, in no acute distress  EYES: Conjunctivae clear  HENT:  Atraumatic, normocephalic  Respiratory:  Respirations even, unlabored, in no acute respiratory distress  Cardiovascular:  Regular rate and rhythm. Palpable DP pulses  Musculoskeletal:  Necrotic appearing wounds to toes " with some surrounding erythema, see picture below  Neurologic:  Alert & oriented, chronic decreased sensation to his feet  Psych: Normal mood and affect                       MDM:  58 y/o M who presents with foot wounds. H/o DM, hasn't been taking his medications. Is hyperglycemic but without findings of DKA, given fluids, some insulin ordered. He has necrotic appearing wounds to bilateral toes. Concern for diabetic foot wounds (no clear h/o of cold exposure?), dx would also include osteomyelitis. Does not appear septic. Antibiotics given. Xrays without acute findings.  Podiatry consulted - recommended MRI and ABIs which have been ordered. Admitted to hospitalist.        1. Wound infection    2. Hyperglycemia      Medical Decision Making    History:    Supplemental history from: Documented in chart, if applicable    External Record(s) reviewed: Documented in chart, if applicable.    Work Up:    Chart documentation includes differential considered and any EKGs or imaging independently interpreted by provider, where specified.    In additional to work up documented, I considered the following work up: Documented in chart, if applicable.    External consultation:    Discussion of management with another provider: Documented in chart, if applicable and Hospitalist and Other: podiatry    Complicating factors:    Care impacted by chronic illness: Diabetes    Care affected by social determinants of health: Medication Noncompliance    Disposition considerations: Admit.    Labs and Imaging:  Results for orders placed or performed during the hospital encounter of 01/18/23   Foot  XR, G/E 3 views, right    Impression    IMPRESSION: No fracture. No degenerative changes. No osteomyelitis.   Foot XR, G/E 3 views, left    Impression    IMPRESSION: No fracture. No degenerative changes. No osteomyelitis.   US CHIQUITA Doppler No Exercise 1-2 Levels Bilateral    Impression    IMPRESSION:  1. RIGHT LOWER EXTREMITY: Noncompressible arteries  of the right lower extremity suggestive of calcific atherosclerotic involvement. The calculated ankle-brachial index is, however, within normal limits. Multiphasic waveforms throughout the right lower   extremity with the exception of the dorsalis pedis artery which is monophasic.    2. LEFT LOWER EXTREMITY: Noncompressible arteries of the left lower extremity suggestive of calcific atherosclerotic involvement. The calculated ankle-brachial index is, however, within normal limits. Multiphasic waveforms throughout the left lower   extremity.   US Lower Extremity Arterial Duplex Bilateral    Impression    IMPRESSION:  1. RIGHT LOWER EXTREMITY: Noncompressible arteries of the right lower extremity suggestive of calcific atherosclerotic involvement. The calculated ankle-brachial index is, however, within normal limits. Multiphasic waveforms throughout the right lower   extremity with the exception of the dorsalis pedis artery which is monophasic.    2. LEFT LOWER EXTREMITY: Noncompressible arteries of the left lower extremity suggestive of calcific atherosclerotic involvement. The calculated ankle-brachial index is, however, within normal limits. Multiphasic waveforms throughout the left lower   extremity.   Basic metabolic panel   Result Value Ref Range    Sodium 126 (L) 136 - 145 mmol/L    Potassium 5.0 3.4 - 5.3 mmol/L    Chloride 91 (L) 98 - 107 mmol/L    Carbon Dioxide (CO2) 28 22 - 29 mmol/L    Anion Gap 7 7 - 15 mmol/L    Urea Nitrogen 27.9 (H) 8.0 - 23.0 mg/dL    Creatinine 1.07 0.67 - 1.17 mg/dL    Calcium 9.5 8.6 - 10.0 mg/dL    Glucose 792 (HH) 70 - 99 mg/dL    GFR Estimate 80 >60 mL/min/1.73m2   Lactic acid whole blood   Result Value Ref Range    Lactic Acid 1.5 0.7 - 2.0 mmol/L   Result Value Ref Range    Magnesium 1.9 1.7 - 2.3 mg/dL   CBC with platelets and differential   Result Value Ref Range    WBC Count 6.5 4.0 - 11.0 10e3/uL    RBC Count 3.71 (L) 4.40 - 5.90 10e6/uL    Hemoglobin 10.7 (L) 13.3 - 17.7  g/dL    Hematocrit 31.9 (L) 40.0 - 53.0 %    MCV 86 78 - 100 fL    MCH 28.8 26.5 - 33.0 pg    MCHC 33.5 31.5 - 36.5 g/dL    RDW 12.2 10.0 - 15.0 %    Platelet Count 363 150 - 450 10e3/uL    % Neutrophils 70 %    % Lymphocytes 20 %    % Monocytes 7 %    % Eosinophils 3 %    % Basophils 0 %    % Immature Granulocytes 0 %    NRBCs per 100 WBC 0 <1 /100    Absolute Neutrophils 4.6 1.6 - 8.3 10e3/uL    Absolute Lymphocytes 1.3 0.8 - 5.3 10e3/uL    Absolute Monocytes 0.4 0.0 - 1.3 10e3/uL    Absolute Eosinophils 0.2 0.0 - 0.7 10e3/uL    Absolute Basophils 0.0 0.0 - 0.2 10e3/uL    Absolute Immature Granulocytes 0.0 <=0.4 10e3/uL    Absolute NRBCs 0.0 10e3/uL   Erythrocyte sedimentation rate auto   Result Value Ref Range    Erythrocyte Sedimentation Rate 79 (H) 0 - 15 mm/hr   Extra Blood Culture Bottle   Result Value Ref Range    Hold Specimen [facility]    Basic metabolic panel   Result Value Ref Range    Sodium 132 (L) 136 - 145 mmol/L    Potassium 4.3 3.4 - 5.3 mmol/L    Chloride 100 98 - 107 mmol/L    Carbon Dioxide (CO2) 23 22 - 29 mmol/L    Anion Gap 9 7 - 15 mmol/L    Urea Nitrogen 25.6 (H) 8.0 - 23.0 mg/dL    Creatinine 1.02 0.67 - 1.17 mg/dL    Calcium 8.4 (L) 8.6 - 10.0 mg/dL    Glucose 514 (HH) 70 - 99 mg/dL    GFR Estimate 85 >60 mL/min/1.73m2   Result Value Ref Range    Hemoglobin A1C 13.7 (H) <5.7 %   Ketone Beta-Hydroxybutyrate Quantitative   Result Value Ref Range    Ketone (Beta-Hydroxybutyrate) Quantitative <0.20 <=0.30 mmol/L   Glucose by meter   Result Value Ref Range    GLUCOSE BY METER POCT 542 (HH) 70 - 99 mg/dL   Result Value Ref Range    Iron 77 61 - 157 ug/dL   Glucose by meter   Result Value Ref Range    GLUCOSE BY METER POCT 521 (HH) 70 - 99 mg/dL     I have reviewed the relevant laboratory and radiology studies    Procedures:  I was present for the key portions of this procedure: none      I, Andrae Real, am serving as a scribe to document services personally performed by Shani Burk MD based on  my observations and the provider's statements to me.  I, Shani Burk MD, attest that Andrae Real is acting in a scribe capacity, has observed my performance of the services and has documented them in accordance with my direction.      Shani Burk MD  [facility] EMERGENCY DEPARTMENT  [address] [place] [id number]  [phone]     Shani Burk MD  01/18/23 4043

## 2023-01-18 NOTE — ED NOTES
Expected Patient Referral to ED  9:20 AM    Referring Clinic/Provider:  [facility]    Reason for referral/Clinical facts:  58 yo with hx of DM2 uncontrolled - not taking his insulin or metformin, foot ulcer (right second toe), went to ER at [facility] 2 weeks ago and reportedly diagnosed with frost bite however per PCP no cold exposure, PCP thinks diabetic ulcer with infection and possible osteomyelitis and feels he needs MRI and likely admission for IV antibiotics. Saw burn clinic through [facility] yesterday and they placed him on doxycycline (not sure if taking).      Recommendations provided:  Send to ED for further evaluation    Caller was informed that this institution does possess the capabilities and/or resources to provide for patient and should be transferred to our facility.    Discussed that if direct admit is sought and any hurdles are encountered, this ED would be happy to see the patient and evaluate.    Informed caller that recommendations provided are recommendations based only on the facts provided and that they responsible to accept or reject the advice, or to seek a formal in person consultation as needed and that this ED will see/treat patient should they arrive.      Devi Fraser MD  [facility] EMERGENCY DEPARTMENT  [address] [place] [id number]  [phone]       Devi Fraser MD  01/18/23 6256

## 2023-01-18 NOTE — ED PROVIDER NOTES
EMERGENCY DEPARTMENT ENCOUNTER      NAME: Abdulaziz Rausch  AGE: 57 year old male  YOB: 1965  MRN: [id number]  EVALUATION DATE & TIME: 1/18/2023  2:03 PM    PCP: Daryn Pittman    ED PROVIDER: Chris Albright MD    Chief Complaint   Patient presents with     Wound Check     FINAL IMPRESSION:  No diagnosis found.    ED COURSE & MEDICAL DECISION MAKING:    Pertinent Labs & Imaging studies reviewed. (See chart for details)  57 year old male presents to the Emergency Department for wound check.  1435 I met patient for initial hx gathering and physical exam.  ESR ordered  1549 Called for critical lab value of . Will order 5u insulin w/ meals and give a bolus of NS. No DKA on BMP.   1550 Spoke w/ Podiatry regarding pt case. Will order MRI of the feet as well as CHIQUITA per their recs.      1610 Pt was signed out to Dr. Cavanaughum will take over care at this point.    MEDICATIONS GIVEN IN THE EMERGENCY:  Medications   piperacillin-tazobactam (ZOSYN) 3.375 g vial to attach to  mL bag (has no administration in time range)   vancomycin (VANCOCIN) 1000 mg in dextrose 5% 200 mL PREMIX (has no administration in time range)       NEW PRESCRIPTIONS STARTED AT TODAY'S ER VISIT  New Prescriptions    No medications on file     =================================================================    HPI    Patient information was obtained from: patient      Abdulaziz Rausch is a 57 year old male with a pertinent history of DM II who presents to this ED for wound check. Patient saw his PCP earlier today for evaluation of ulcers to the tips of his second and third digits on the R foot. These have been present for at least 2-3 weeks now. He was seen at [facility] w/ initial concern for frost bite given appearance of his feet. He then followed up w/ his PCP earlier today who recommended he come to the ER for further evaluation and likely admission. Patient reports that he does have hx of type 2 diabetes and that  he has previously been on lantus and sliding scale correction short acting insulin along w/ 500 mg Metformin daily. He has not been taking any of these medications for at least the last 8 months now. At baseline patient has diabetic neuropathy and cannot feel below his ankles. He has been seen for diabetic ulcers in the past and had these debrided but has never had any digits amputated. On arrival here he does have black ulcers over the tips the of the second and third toes on the R with surrounding redness. He also has developing ulcers on the same toes on the L, though these appear less severe and do not have surrounding redness. Pt denies any associated chills, fevers, cough, cold, vision changes, chest pain, SOB, abdominal pain, changes in bowel or bladder, or new leg swelling.     REVIEW OF SYSTEMS   Review of Systems   Skin: Positive for wounds, skin changes  Eyes: negative  Ears/Nose/Throat: negative  Respiratory: No shortness of breath, dyspnea on exertion, cough, or hemoptysis  Cardiovascular: negative  Gastrointestinal: negative  Genitourinary: negative  Musculoskeletal: negative  Neurologic: Positive for numbness in bilateral feet.  Psychiatric: negative  Hematologic/Lymphatic/Immunologic: negative  Endocrine: negative    PAST MEDICAL HISTORY:  No past medical history on file.    PAST SURGICAL HISTORY:  No past surgical history on file.    CURRENT MEDICATIONS:    alcohol swab prep pads  blood glucose (NO BRAND SPECIFIED) test strip  blood glucose monitoring (NO BRAND SPECIFIED) meter device kit  citalopram (CELEXA) 10 MG tablet  gabapentin (NEURONTIN) 300 MG capsule  insulin glargine (LANTUS SOLOSTAR) 100 UNIT/ML pen  insulin lispro (HUMALOG KWIKPEN) 100 UNIT/ML (1 unit dial) KWIKPEN  insulin pen needle (31G X 8 MM) 31G X 8 MM miscellaneous  metFORMIN (GLUCOPHAGE) 500 MG tablet  thin (NO BRAND SPECIFIED) lancets    ALLERGIES:  No Known Allergies    FAMILY HISTORY:  No family history on file.    SOCIAL  "HISTORY:   Social History     Socioeconomic History     Marital status:    Tobacco Use     Smoking status: Never     Smokeless tobacco: Never     VITALS:  BP (!) 142/63   Pulse 88   Temp 97.2  F (36.2  C) (Oral)   Resp 18   Ht 1.753 m (5' 9\")   Wt 61.2 kg (135 lb)   SpO2 98%   BMI 19.94 kg/m      PHYSICAL EXAM    Constitutional: Well developed, Well nourished, NAD, GCS 15   HENT: Normocephalic, Atraumatic, Bilateral external ears normal, Oropharynx normal, mucous membranes moist  Eyes: PERRL, EOMI, Conjunctiva normal, No discharge.   Respiratory: Normal breath sounds, No respiratory distress, No wheezing, Speaks full sentences easily.  Cardiovascular: Normal heart rate, Regular rhythm, No murmurs, No rubs, No gallops. Chest wall nontender.    GI: No excessive obesity. Bowel sounds normal, Soft, No tenderness, No masses, No flank tenderness. No rebound or guarding.   Musculoskeletal: 2+ DP pulses.  No cyanosis, No clubbing. Good range of motion in all major joints.   Foot exam: R foot w/ blackened ulcerations over the distal tips of the second and third digit. Erythema and edema surrounding this area and tracking to the dorsum of the foot. Pt is able to move all digits w/ full ROM. 3+ cap refill. Sensation is absent below the ankle bilaterally. L foot w/ similar though less severe findings and no erythema/edema to the dorsum. See ED Triage not for photos of these areas.  Neurologic: Alert & oriented x 3, Normal motor function, Normal sensory function, No focal deficits noted.   Psychiatric: Affect normal, Judgment normal, Mood normal. Cooperative.      LAB:  All pertinent labs reviewed and interpreted.  Results for orders placed or performed during the hospital encounter of 01/18/23   Foot  XR, G/E 3 views, right    Impression    IMPRESSION: No fracture. No degenerative changes. No osteomyelitis.   Foot XR, G/E 3 views, left    Impression    IMPRESSION: No fracture. No degenerative changes. No " osteomyelitis.     RADIOLOGY:  Reviewed all pertinent imaging. Please see official radiology report.  Foot  XR, G/E 3 views, right   Final Result   IMPRESSION: No fracture. No degenerative changes. No osteomyelitis.      Foot XR, G/E 3 views, left   Final Result   IMPRESSION: No fracture. No degenerative changes. No osteomyelitis.        Chris Albright MD  [facility] EMERGENCY DEPARTMENT  [address] [place] [id number]  [phone]     Chris Albright MD  01/18/23 3202

## 2023-01-19 ENCOUNTER — ANESTHESIA EVENT (OUTPATIENT)
Dept: SURGERY | Facility: HOSPITAL | Age: 58
End: 2023-01-19
Payer: COMMERCIAL

## 2023-01-19 LAB
ANION GAP SERPL CALCULATED.3IONS-SCNC: 6 MMOL/L (ref 7–15)
BASOPHILS # BLD AUTO: 0 10E3/UL (ref 0–0.2)
BASOPHILS NFR BLD AUTO: 0 %
BUN SERPL-MCNC: 22.8 MG/DL (ref 8–23)
CALCIUM SERPL-MCNC: 8.3 MG/DL (ref 8.6–10)
CHLORIDE SERPL-SCNC: 108 MMOL/L (ref 98–107)
CHOLEST SERPL-MCNC: 145 MG/DL
CREAT SERPL-MCNC: 1.11 MG/DL (ref 0.67–1.17)
DEPRECATED CALCIDIOL+CALCIFEROL SERPL-MC: 14 UG/L (ref 20–75)
DEPRECATED HCO3 PLAS-SCNC: 22 MMOL/L (ref 22–29)
EOSINOPHIL # BLD AUTO: 0.4 10E3/UL (ref 0–0.7)
EOSINOPHIL NFR BLD AUTO: 4 %
ERYTHROCYTE [DISTWIDTH] IN BLOOD BY AUTOMATED COUNT: 12.6 % (ref 10–15)
FOLATE SERPL-MCNC: 6.9 NG/ML (ref 4.6–34.8)
GFR SERPL CREATININE-BSD FRML MDRD: 76 ML/MIN/1.73M2
GLUCOSE BLDC GLUCOMTR-MCNC: 256 MG/DL (ref 70–99)
GLUCOSE BLDC GLUCOMTR-MCNC: 264 MG/DL (ref 70–99)
GLUCOSE BLDC GLUCOMTR-MCNC: 265 MG/DL (ref 70–99)
GLUCOSE BLDC GLUCOMTR-MCNC: 285 MG/DL (ref 70–99)
GLUCOSE BLDC GLUCOMTR-MCNC: 327 MG/DL (ref 70–99)
GLUCOSE BLDC GLUCOMTR-MCNC: 351 MG/DL (ref 70–99)
GLUCOSE BLDC GLUCOMTR-MCNC: 354 MG/DL (ref 70–99)
GLUCOSE SERPL-MCNC: 310 MG/DL (ref 70–99)
HCT VFR BLD AUTO: 28.2 % (ref 40–53)
HDLC SERPL-MCNC: 37 MG/DL
HGB BLD-MCNC: 9.7 G/DL (ref 13.3–17.7)
IMM GRANULOCYTES # BLD: 0.1 10E3/UL
IMM GRANULOCYTES NFR BLD: 1 %
LDLC SERPL CALC-MCNC: 63 MG/DL
LYMPHOCYTES # BLD AUTO: 1.9 10E3/UL (ref 0.8–5.3)
LYMPHOCYTES NFR BLD AUTO: 20 %
MAGNESIUM SERPL-MCNC: 1.9 MG/DL (ref 1.7–2.3)
MCH RBC QN AUTO: 29.3 PG (ref 26.5–33)
MCHC RBC AUTO-ENTMCNC: 34.4 G/DL (ref 31.5–36.5)
MCV RBC AUTO: 85 FL (ref 78–100)
MONOCYTES # BLD AUTO: 0.6 10E3/UL (ref 0–1.3)
MONOCYTES NFR BLD AUTO: 7 %
NEUTROPHILS # BLD AUTO: 6.6 10E3/UL (ref 1.6–8.3)
NEUTROPHILS NFR BLD AUTO: 68 %
NONHDLC SERPL-MCNC: 108 MG/DL
NRBC # BLD AUTO: 0 10E3/UL
NRBC BLD AUTO-RTO: 0 /100
PLATELET # BLD AUTO: 355 10E3/UL (ref 150–450)
POTASSIUM SERPL-SCNC: 4.7 MMOL/L (ref 3.4–5.3)
RBC # BLD AUTO: 3.31 10E6/UL (ref 4.4–5.9)
SODIUM SERPL-SCNC: 136 MMOL/L (ref 136–145)
TRIGL SERPL-MCNC: 226 MG/DL
VIT B12 SERPL-MCNC: 884 PG/ML (ref 232–1245)
WBC # BLD AUTO: 9.5 10E3/UL (ref 4–11)

## 2023-01-19 PROCEDURE — 83735 ASSAY OF MAGNESIUM: CPT | Performed by: INTERNAL MEDICINE

## 2023-01-19 PROCEDURE — 250N000013 HC RX MED GY IP 250 OP 250 PS 637: Performed by: INTERNAL MEDICINE

## 2023-01-19 PROCEDURE — 80048 BASIC METABOLIC PNL TOTAL CA: CPT | Performed by: INTERNAL MEDICINE

## 2023-01-19 PROCEDURE — 85025 COMPLETE CBC W/AUTO DIFF WBC: CPT | Performed by: INTERNAL MEDICINE

## 2023-01-19 PROCEDURE — 250N000012 HC RX MED GY IP 250 OP 636 PS 637

## 2023-01-19 PROCEDURE — 120N000001 HC R&B MED SURG/OB

## 2023-01-19 PROCEDURE — 36415 COLL VENOUS BLD VENIPUNCTURE: CPT | Performed by: INTERNAL MEDICINE

## 2023-01-19 PROCEDURE — 250N000011 HC RX IP 250 OP 636: Performed by: INTERNAL MEDICINE

## 2023-01-19 PROCEDURE — 99223 1ST HOSP IP/OBS HIGH 75: CPT | Performed by: PODIATRIST

## 2023-01-19 PROCEDURE — 82746 ASSAY OF FOLIC ACID SERUM: CPT | Performed by: INTERNAL MEDICINE

## 2023-01-19 PROCEDURE — G0463 HOSPITAL OUTPT CLINIC VISIT: HCPCS

## 2023-01-19 PROCEDURE — 82962 GLUCOSE BLOOD TEST: CPT

## 2023-01-19 PROCEDURE — 99232 SBSQ HOSP IP/OBS MODERATE 35: CPT | Performed by: INTERNAL MEDICINE

## 2023-01-19 PROCEDURE — 250N000012 HC RX MED GY IP 250 OP 636 PS 637: Performed by: INTERNAL MEDICINE

## 2023-01-19 PROCEDURE — 258N000003 HC RX IP 258 OP 636: Performed by: INTERNAL MEDICINE

## 2023-01-19 RX ADMIN — POTASSIUM CHLORIDE AND SODIUM CHLORIDE: 900; 150 INJECTION, SOLUTION INTRAVENOUS at 14:12

## 2023-01-19 RX ADMIN — GABAPENTIN 300 MG: 300 CAPSULE ORAL at 15:30

## 2023-01-19 RX ADMIN — POTASSIUM CHLORIDE AND SODIUM CHLORIDE: 900; 150 INJECTION, SOLUTION INTRAVENOUS at 06:52

## 2023-01-19 RX ADMIN — INSULIN ASPART 5 UNITS: 100 INJECTION, SOLUTION INTRAVENOUS; SUBCUTANEOUS at 12:53

## 2023-01-19 RX ADMIN — VANCOMYCIN HYDROCHLORIDE 1250 MG: 5 INJECTION, POWDER, LYOPHILIZED, FOR SOLUTION INTRAVENOUS at 16:03

## 2023-01-19 RX ADMIN — LISINOPRIL 10 MG: 5 TABLET ORAL at 09:08

## 2023-01-19 RX ADMIN — MAGNESIUM OXIDE TAB 400 MG (241.3 MG ELEMENTAL MG) 400 MG: 400 (241.3 MG) TAB at 22:25

## 2023-01-19 RX ADMIN — PIPERACILLIN AND TAZOBACTAM 3.38 G: 3; .375 INJECTION, POWDER, LYOPHILIZED, FOR SOLUTION INTRAVENOUS at 15:29

## 2023-01-19 RX ADMIN — PIPERACILLIN AND TAZOBACTAM 3.38 G: 3; .375 INJECTION, POWDER, LYOPHILIZED, FOR SOLUTION INTRAVENOUS at 09:06

## 2023-01-19 RX ADMIN — MAGNESIUM OXIDE TAB 400 MG (241.3 MG ELEMENTAL MG) 400 MG: 400 (241.3 MG) TAB at 09:08

## 2023-01-19 RX ADMIN — INSULIN ASPART 2 UNITS: 100 INJECTION, SOLUTION INTRAVENOUS; SUBCUTANEOUS at 12:53

## 2023-01-19 RX ADMIN — PIPERACILLIN AND TAZOBACTAM 3.38 G: 3; .375 INJECTION, POWDER, LYOPHILIZED, FOR SOLUTION INTRAVENOUS at 23:58

## 2023-01-19 RX ADMIN — INSULIN ASPART 5 UNITS: 100 INJECTION, SOLUTION INTRAVENOUS; SUBCUTANEOUS at 17:26

## 2023-01-19 RX ADMIN — PIPERACILLIN AND TAZOBACTAM 3.38 G: 3; .375 INJECTION, POWDER, LYOPHILIZED, FOR SOLUTION INTRAVENOUS at 00:06

## 2023-01-19 RX ADMIN — GABAPENTIN 300 MG: 300 CAPSULE ORAL at 09:08

## 2023-01-19 RX ADMIN — INSULIN ASPART 2 UNITS: 100 INJECTION, SOLUTION INTRAVENOUS; SUBCUTANEOUS at 09:09

## 2023-01-19 RX ADMIN — INSULIN ASPART 2 UNITS: 100 INJECTION, SOLUTION INTRAVENOUS; SUBCUTANEOUS at 17:26

## 2023-01-19 RX ADMIN — INSULIN ASPART 5 UNITS: 100 INJECTION, SOLUTION INTRAVENOUS; SUBCUTANEOUS at 09:09

## 2023-01-19 RX ADMIN — INSULIN GLARGINE 10 UNITS: 100 INJECTION, SOLUTION SUBCUTANEOUS at 22:41

## 2023-01-19 RX ADMIN — GABAPENTIN 300 MG: 300 CAPSULE ORAL at 22:25

## 2023-01-19 ASSESSMENT — ACTIVITIES OF DAILY LIVING (ADL)
ADLS_ACUITY_SCORE: 35
DRESSING/BATHING_DIFFICULTY: NO
ADLS_ACUITY_SCORE: 35
TOILETING_ISSUES: NO
DIFFICULTY_COMMUNICATING: NO
HEARING_DIFFICULTY_OR_DEAF: NO
ADLS_ACUITY_SCORE: 35
DIFFICULTY_EATING/SWALLOWING: NO
FALL_HISTORY_WITHIN_LAST_SIX_MONTHS: NO
WEAR_GLASSES_OR_BLIND: NO
CHANGE_IN_FUNCTIONAL_STATUS_SINCE_ONSET_OF_CURRENT_ILLNESS/INJURY: NO
DOING_ERRANDS_INDEPENDENTLY_DIFFICULTY: NO
CONCENTRATING,_REMEMBERING_OR_MAKING_DECISIONS_DIFFICULTY: NO
ADLS_ACUITY_SCORE: 35
ADLS_ACUITY_SCORE: 18
ADLS_ACUITY_SCORE: 18
WALKING_OR_CLIMBING_STAIRS_DIFFICULTY: NO
ADLS_ACUITY_SCORE: 35

## 2023-01-19 NOTE — H&P (VIEW-ONLY)
FOOT AND ANKLE SURGERY/PODIATRY CONSULT NOTE         ASSESSMENT:   Diabetic ulcer digits 2 and 3 right foot      TREATMENT:  Recommend debridement with irrigation of the affected digits to promote wound healing.  Surgery will be scheduled for tomorrow 1/20/2023.  Patient n.p.o. at midnight.  I informed the patient that the procedure will be performed under local anesthesia with IV sedation.  He is aware that amputation of the affected toes is a possibility if wounds do not heal.      HPI: I was asked to see Abdulaziz Rausch today to evaluate infected digits of the right foot.  The patient is a 57-year-old poorly controlled diabetic with hypertension.  He was admitted through the ED on 1/18/2023.  He noticed wounds on the right foot were worsening over the past 2 and half weeks.  [facility] was [facility]  The patient was subsequently evaluated by his PCP who recommended the patient be referred to ED for evaluation of possible osteomyelitis.  X-rays and MRIs were negative for osteomyelitis.  ABIs and toe pressures were normal and adequate for wound healing.  The patient has no pain secondary to neuropathy.      No past medical history on file.    Social History     Socioeconomic History     Marital status:      Spouse name: Not on file     Number of children: Not on file     Years of education: Not on file     Highest education level: Not on file   Occupational History     Not on file   Tobacco Use     Smoking status: Never     Smokeless tobacco: Never   Substance and Sexual Activity     Alcohol use: Not on file     Drug use: Not on file     Sexual activity: Not on file   Other Topics Concern     Not on file   Social History Narrative     Not on file     Social Determinants of Health     Financial Resource Strain: Not on file   Food Insecurity: Not on file   Transportation Needs: Not on file   Physical Activity: Not on file   Stress: Not on file   Social Connections: Not on file    Intimate Partner Violence: Not on file   Housing Stability: Not on file        No Known Allergies       Current Facility-Administered Medications:      0.9% sodium chloride + KCl 20 mEq/L infusion, , Intravenous, Continuous, Emmie Jarvis MD, Last Rate: 150 mL/hr at 01/19/23 0652, New Bag at 01/19/23 0652     acetaminophen (TYLENOL) tablet 650 mg, 650 mg, Oral, Q6H PRN **OR** acetaminophen (TYLENOL) Suppository 650 mg, 650 mg, Rectal, Q6H PRN, Emmie Jarvis MD     glucose gel 15-30 g, 15-30 g, Oral, Q15 Min PRN **OR** dextrose 50 % injection 25-50 mL, 25-50 mL, Intravenous, Q15 Min PRN **OR** glucagon injection 1 mg, 1 mg, Subcutaneous, Q15 Min PRN, Emmie Jarvis MD     gabapentin (NEURONTIN) capsule 300 mg, 300 mg, Oral, TID, Emmie Jarvis MD, 300 mg at 01/18/23 1938     insulin aspart (NovoLOG) injection (RAPID ACTING), 5 Units, Subcutaneous, TID w/meals, Chris Albright MD     insulin aspart (NovoLOG) injection (RAPID ACTING), 1-3 Units, Subcutaneous, TID AC, Emmie Jarvis MD     insulin aspart (NovoLOG) injection (RAPID ACTING), 1-3 Units, Subcutaneous, At Bedtime, Emmie Jarvis MD     insulin glargine (LANTUS PEN) injection 10 Units, 10 Units, Subcutaneous, Q24H, Emmie Jarvis MD, 10 Units at 01/18/23 1920     lidocaine (LMX4) cream, , Topical, Q1H PRN, Emmie Jarvis MD     lidocaine 1 % 0.1-1 mL, 0.1-1 mL, Other, Q1H PRN, Emmie Jarvis MD     lisinopril (ZESTRIL) tablet 10 mg, 10 mg, Oral, Daily, Emmie Jarvis MD     magnesium oxide (MAG-OX) tablet 400 mg, 400 mg, Oral, BID, Emmie Jarvis MD, 400 mg at 01/18/23 1939     melatonin tablet 1 mg, 1 mg, Oral, At Bedtime PRN, Emmie Jarvis MD     ondansetron (ZOFRAN ODT) ODT tab 4 mg, 4 mg, Oral, Q6H PRN **OR** ondansetron (ZOFRAN) injection 4 mg, 4 mg, Intravenous, Q6H PRN, Emmie Jarvis MD     piperacillin-tazobactam (ZOSYN) 3.375 g vial to attach to  mL bag, 3.375 g,  Intravenous, Q8H, Emmie Jarvis MD, 3.375 g at 01/19/23 0006     prochlorperazine (COMPAZINE) injection 10 mg, 10 mg, Intravenous, Q6H PRN **OR** prochlorperazine (COMPAZINE) tablet 10 mg, 10 mg, Oral, Q6H PRN **OR** prochlorperazine (COMPAZINE) suppository 25 mg, 25 mg, Rectal, Q12H PRN, Emmie Jarvis MD     senna-docusate (SENOKOT-S/PERICOLACE) 8.6-50 MG per tablet 1 tablet, 1 tablet, Oral, BID PRN **OR** senna-docusate (SENOKOT-S/PERICOLACE) 8.6-50 MG per tablet 2 tablet, 2 tablet, Oral, BID PRN, Emmie Jarvis MD     sodium chloride (PF) 0.9% PF flush 3 mL, 3 mL, Intracatheter, Q8H, Emmie Jarvis MD     sodium chloride (PF) 0.9% PF flush 3 mL, 3 mL, Intracatheter, q1 min prn, Emmie Jarvis MD     vancomycin (VANCOCIN) 1,250 mg in sodium chloride 0.9 % 250 mL intermittent infusion, 1,250 mg, Intravenous, Q24H, Emmie Jarvis MD    Current Outpatient Medications:      alcohol swab prep pads, Use to swab area of injection/ella as directed., Disp: 100 each, Rfl: 3     blood glucose (NO BRAND SPECIFIED) test strip, Use to test blood sugar 4 times daily or as directed. To accompany: Blood Glucose Monitor Brands: per insurance., Disp: 100 strip, Rfl: 6     blood glucose monitoring (NO BRAND SPECIFIED) meter device kit, Use to test blood sugar 4 time daily or as directed. Preferred blood glucose meter OR supplies to accompany: Blood Glucose Monitor Brands: per insurance., Disp: 1 kit, Rfl: 0     gabapentin (NEURONTIN) 300 MG capsule, Take 1 capsule (300 mg) by mouth 3 times daily, Disp: 90 capsule, Rfl: 1     insulin glargine (LANTUS SOLOSTAR) 100 UNIT/ML pen, Inject 10 Units Subcutaneous At Bedtime, Disp: 3 mL, Rfl: 1     insulin lispro (HUMALOG KWIKPEN) 100 UNIT/ML (1 unit dial) KWIKPEN, Inject 3 Units Subcutaneous 3 times daily (before meals), Disp: 3 mL, Rfl: 1     insulin pen needle (31G X 8 MM) 31G X 8 MM miscellaneous, Use 1 pen needles daily or as directed., Disp: 100 each,  "Rfl: 0     metFORMIN (GLUCOPHAGE) 500 MG tablet, Take 1 tablet (500 mg) by mouth 2 times daily (with meals), Disp: 60 tablet, Rfl: 1     thin (NO BRAND SPECIFIED) lancets, Use with lanceting device. To accompany: Blood Glucose Monitor Brands: per insurance., Disp: 100 each, Rfl: 6     citalopram (CELEXA) 10 MG tablet, Take 1 tablet (10 mg) by mouth daily (Patient not taking: Reported on 1/18/2023), Disp: 30 tablet, Rfl: 1     No family history on file.     Social History     Socioeconomic History     Marital status:      Spouse name: Not on file     Number of children: Not on file     Years of education: Not on file     Highest education level: Not on file   Occupational History     Not on file   Tobacco Use     Smoking status: Never     Smokeless tobacco: Never   Substance and Sexual Activity     Alcohol use: Not on file     Drug use: Not on file     Sexual activity: Not on file   Other Topics Concern     Not on file   Social History Narrative     Not on file     Social Determinants of Health     Financial Resource Strain: Not on file   Food Insecurity: Not on file   Transportation Needs: Not on file   Physical Activity: Not on file   Stress: Not on file   Social Connections: Not on file   Intimate Partner Violence: Not on file   Housing Stability: Not on file        Review of Systems - Patient denies fever, chills, rash, wound, stiffness, limping, numbness, weakness, heart burn, blood in stool, chest pain with activity, calf pain when walking, shortness of breath with activity, chronic cough, easy bleeding/bruising, swelling of ankles, excessive thirst, fatigue, depression, anxiety.  Patient admits to infected digits right foot.      OBJECTIVE:  Appearance: alert, well appearing, and in no distress and oriented to person, place, and time.    /65 (BP Location: Left arm)   Pulse 87   Temp 98.2  F (36.8  C) (Oral)   Resp 17   Ht 1.753 m (5' 9\")   Wt 61.2 kg (135 lb)   SpO2 99%   BMI 19.94 kg/m   "     Body mass index is 19.94 kg/m .     General appearance: Patient is alert and fully cooperative with history & exam.  No sign of distress is noted during the visit.  Psychiatric: Affect is pleasant & appropriate.  Patient appears motivated to improve health.  Respiratory: Breathing is regular & unlabored while sitting.  HEENT: Hearing is intact to spoken word.  Speech is clear.  No gross evidence of visual impairment that would impact ambulation.    Vascular: Dorsalis pedis and posterior tibial pulses are non-palpable. There is no pedal hair growth bilaterally.  CFT < 3 sec from anterior tibial surface to distal digits bilaterally. There is no appreciable edema noted.  Dermatologic: Black eschar noted distal plantar aspect digits 2 and 3 right foot.  Mild drainage noted second toe right foot.  No cellulitis noted.  No purulence noted.  Wounds do not probe deep to bone.  No odor.  Small necrotic wound second third toe left foot which appeared to be some stable and superficial.  Turgor and texture are within normal limits. No coloration or temperature changes. No other primary or secondary lesions noted.  Neurologic: All epicritic and proprioceptive sensations are grossly diminished bilaterally.    Musculoskeletal: All active and passive ankle, subtalar, midtarsal, and 1st MPJ range of motion are grossly intact without pain or crepitus, with the exception of none. Manual muscle strength is within normal limits bilaterally. All dorsiflexors, plantarflexors, invertors, evertors are intact bilaterally.  No tenderness present to digits 2 and 3 bilateral feet on palpation.  No tenderness to digits 2 and 3 bilateral feet with range of motion. Calf is soft/non-tender without warmth/induration    Imaging:       [unfilled]     MR Foot Left w/o & w Contrast    Result Date: 1/18/2023  EXAM: MR FOOT LEFT W/O and W CONTRAST LOCATION: [facility] DATE/TIME: 1/18/2023 11:36 PM INDICATION: 56 yo m w  [place]  II, med non compliance here w  bilateral diabetic foot ulcers to 2nd 3rd digits COMPARISON: None. TECHNIQUE: Routine. Additional postgadolinium T1 sequences were obtained. IV CONTRAST: 6ml Gadavist FINDINGS: JOINTS AND BONES: -No fracture, bone contusion, or findings suggestive osteomyelitis. Normal articular cartilage. No effusion. There are some mild degenerative changes seen most marked in the first MTP joint and scattered in the mid foot. TENDONS: -No tendon tear, tendinopathy, or tenosynovitis. LIGAMENTS: -Lisfranc ligament: Intact. No subluxation. MUSCLES AND SOFT TISSUES: -No muscle atrophy or edema. No soft tissue mass or fluid collection.     IMPRESSION: 1.  There is some loss of detail given discrepancy in the fat saturation technique involving some of the toes given the peripheral location. 2.  By history patient has foot ulcers. 3.  No findings seen to suggest osteomyelitis, myositis, or abscess.    MR Foot Right w/o & w Contrast    Result Date: 1/19/2023  EXAM: MR FOOT RIGHT W/O and W CONTRAST LOCATION: [facility] DATE/TIME: 1/18/2023 11:36 PM INDICATION: 56 yo male w  DM II and med non compliance here w  diabetic foot ulcers to 2nd 3rd digits of bilateral feet COMPARISON: None. TECHNIQUE: Routine. Additional postgadolinium T1 sequences were obtained. IV CONTRAST: 6ml Gadavist FINDINGS: JOINTS AND BONES: -No fracture or bone contusion. Normal articular cartilage. No effusion. There is abnormal signal seen which involves the second toe mid and distal phalanges and the third toe distal phalanx which would be worrisome for changes of osteomyelitis. No andrews  destructive changes are seen in the bony skeleton. TENDONS: -No tendon tear, tendinopathy, or tenosynovitis. LIGAMENTS: -Lisfranc ligament: Intact. No subluxation. MUSCLES AND SOFT TISSUES: -By history there are ulcers seen involving the second third toes. There is some edema seen involving the dorsal skin of the foot      IMPRESSION: 1.  There is some loss of detail on this study secondary to inhomogeneous fat saturation given the peripheral location of the toes. 2.  There is abnormal signal seen with no andrews destructive changes involving the second toe middle and distal phalanx and the third toe distal phalanx. Given patient's history changes can be seen with osteomyelitis. 3.  No evidence for an abscess.    US Lower Extremity Arterial Duplex Bilateral    Result Date: 1/18/2023  [facility] H[facility] DATE: 1/18/2023 8:51 PM EXAM: RESTING ANKLE-BRACHIAL INDICES (ABIs) AND DUPLEX ARTERIAL ULTRASOUND OF THE LOWER EXTREMITIES BILATERALLY INDICATION: Diabetic foot ulcers. COMPARISON: None. TECHNIQUE: Duplex imaging is performed utilizing gray-scale, two-dimensional images, and color-flow imaging. Doppler waveform analysis and spectral Doppler imaging is also performed. CHIQUITA FINDINGS: RIGHT (mmHg) Ankle (PT): >254; Index NC Ankle (DP): 230; Index 1.17 LEFT (mmHg) Brachial: 197 Ankle (PT): >254; Index NC Ankle (DP): 237; Index 1.20 The resting right ankle-brachial index measures 1.17. The resting left ankle-brachial index measures 1.20. DUPLEX ARTERIAL ULTRASOUND FINDINGS: RIGHT (cm/s) EIA: 141 T CFA: 126 T PFA: 90 B SFA-Proximal: 162 T SFA-Mid: 168 T SFA-Distal: 177 T Popliteal: 107 B PTA: 140 M FRANCESCO: 87 B DPA: 99 M (M=monophasic, B=biphasic, T=triphasic) LEFT (cm/s) EIA: 147 B CFA: 86 B PFA: 65 B SFA-Proximal: 168 B SFA-Mid: 155 T SFA-Distal: 103 B Popliteal: 133 B PTA: 112 M FRANCESCO: 89 B DPA: 77 B (M=monophasic, B=biphasic, T=triphasic) Right lower extremity: Multiphasic waveforms throughout the right lower extremity with the exception of the dorsalis pedis artery which is monophasic. Left lower extremity: Multiphasic waveforms throughout the left lower extremity.     IMPRESSION: 1. RIGHT LOWER EXTREMITY: Noncompressible arteries of the right lower extremity suggestive of calcific atherosclerotic involvement. The calculated  ankle-brachial index is, however, within normal limits. Multiphasic waveforms throughout the right lower extremity with the exception of the dorsalis pedis artery which is monophasic. 2. LEFT LOWER EXTREMITY: Noncompressible arteries of the left lower extremity suggestive of calcific atherosclerotic involvement. The calculated ankle-brachial index is, however, within normal limits. Multiphasic waveforms throughout the left lower extremity.    US CHIQUITA Doppler No Exercise 1-2 Levels Bilateral    Result Date: 1/18/2023  [facility] HOS DATE: 1/18/2023 8:51 PM EXAM: RESTING ANKLE-BRACHIAL INDICES (ABIs) AND DUPLEX ARTERIAL ULTRASOUND OF THE LOWER EXTREMITIES BILATERALLY INDICATION: Diabetic foot ulcers. COMPARISON: None. TECHNIQUE: Duplex imaging is performed utilizing gray-scale, two-dimensional images, and color-flow imaging. Doppler waveform analysis and spectral Doppler imaging is also performed. CHIQUITA FINDINGS: RIGHT (mmHg) Ankle (PT): >254; Index NC Ankle (DP): 230; Index 1.17 LEFT (mmHg) Brachial: 197 Ankle (PT): >254; Index NC Ankle (DP): 237; Index 1.20 The resting right ankle-brachial index measures 1.17. The resting left ankle-brachial index measures 1.20. DUPLEX ARTERIAL ULTRASOUND FINDINGS: RIGHT (cm/s) EIA: 141 T CFA: 126 T PFA: 90 B SFA-Proximal: 162 T SFA-Mid: 168 T SFA-Distal: 177 T Popliteal: 107 B PTA: 140 M FRANCESCO: 87 B DPA: 99 M (M=monophasic, B=biphasic, T=triphasic) LEFT (cm/s) EIA: 147 B CFA: 86 B PFA: 65 B SFA-Proximal: 168 B SFA-Mid: 155 T SFA-Distal: 103 B Popliteal: 133 B PTA: 112 M FRANCESCO: 89 B DPA: 77 B (M=monophasic, B=biphasic, T=triphasic) Right lower extremity: Multiphasic waveforms throughout the right lower extremity with the exception of the dorsalis pedis artery which is monophasic. Left lower extremity: Multiphasic waveforms throughout the left lower extremity.     IMPRESSION: 1. RIGHT LOWER EXTREMITY: Noncompressible arteries of the right lower extremity suggestive of  calcific atherosclerotic involvement. The calculated ankle-brachial index is, however, within normal limits. Multiphasic waveforms throughout the right lower extremity with the exception of the dorsalis pedis artery which is monophasic. 2. LEFT LOWER EXTREMITY: Noncompressible arteries of the left lower extremity suggestive of calcific atherosclerotic involvement. The calculated ankle-brachial index is, however, within normal limits. Multiphasic waveforms throughout the left lower extremity.    Foot XR, G/E 3 views, left    Result Date: 1/18/2023  EXAM: XR FOOT LEFT G/E 3 VIEWS LOCATION: [facility] DATE/TIME: 1/18/2023 1:57 PM INDICATION: Foot pain. Wound. COMPARISON: None.     IMPRESSION: No fracture. No degenerative changes. No osteomyelitis.    Foot  XR, G/E 3 views, right    Result Date: 1/18/2023  EXAM: XR FOOT RIGHT G/E 3 VIEWS LOCATION: [facility] DATE/TIME: 1/18/2023 1:57 PM INDICATION: Foot pain. Soft tissue wound. COMPARISON: None.     IMPRESSION: No fracture. No degenerative changes. No osteomyelitis.     MR Foot Left w/o & w Contrast    Result Date: 1/18/2023  EXAM: MR FOOT LEFT W/O and W CONTRAST LOCATION: [facility] DATE/TIME: 1/18/2023 11:36 PM INDICATION: 58 yo m w  DM II, med non compliance here w  bilateral diabetic foot ulcers to 2nd 3rd digits COMPARISON: None. TECHNIQUE: Routine. Additional postgadolinium T1 sequences were obtained. IV CONTRAST: 6ml Gadavist FINDINGS: JOINTS AND BONES: -No fracture, bone contusion, or findings suggestive osteomyelitis. Normal articular cartilage. No effusion. There are some mild degenerative changes seen most marked in the first MTP joint and scattered in the mid foot. TENDONS: -No tendon tear, tendinopathy, or tenosynovitis. LIGAMENTS: -Lisfranc ligament: Intact. No subluxation. MUSCLES AND SOFT TISSUES: -No muscle atrophy or edema. No soft tissue mass or fluid collection.      IMPRESSION: 1.  There is some loss of detail given discrepancy in the fat saturation technique involving some of the toes given the peripheral location. 2.  By history patient has foot ulcers. 3.  No findings seen to suggest osteomyelitis, myositis, or abscess.    MR Foot Right w/o & w Contrast    Result Date: 1/19/2023  EXAM: MR FOOT RIGHT W/O and W CONTRAST LOCATION: [facility] DATE/TIME: 1/18/2023 11:36 PM INDICATION: 56 yo male w  DM II and med non compliance here w  diabetic foot ulcers to 2nd 3rd digits of bilateral feet COMPARISON: None. TECHNIQUE: Routine. Additional postgadolinium T1 sequences were obtained. IV CONTRAST: 6ml Gadavist FINDINGS: JOINTS AND BONES: -No fracture or bone contusion. Normal articular cartilage. No effusion. There is abnormal signal seen which involves the second toe mid and distal phalanges and the third toe distal phalanx which would be worrisome for changes of osteomyelitis. No andrews  destructive changes are seen in the bony skeleton. TENDONS: -No tendon tear, tendinopathy, or tenosynovitis. LIGAMENTS: -Lisfranc ligament: Intact. No subluxation. MUSCLES AND SOFT TISSUES: -By history there are ulcers seen involving the second third toes. There is some edema seen involving the dorsal skin of the foot     IMPRESSION: 1.  There is some loss of detail on this study secondary to inhomogeneous fat saturation given the peripheral location of the toes. 2.  There is abnormal signal seen with no andrews destructive changes involving the second toe middle and distal phalanx and the third toe distal phalanx. Given patient's history changes can be seen with osteomyelitis. 3.  No evidence for an abscess.    US Lower Extremity Arterial Duplex Bilateral    Result Date: 1/18/2023  [facility] HOS DATE: 1/18/2023 8:51 PM EXAM: RESTING ANKLE-BRACHIAL INDICES (ABIs) AND DUPLEX ARTERIAL ULTRASOUND OF THE LOWER EXTREMITIES BILATERALLY INDICATION: Diabetic foot  ulcers. COMPARISON: None. TECHNIQUE: Duplex imaging is performed utilizing gray-scale, two-dimensional images, and color-flow imaging. Doppler waveform analysis and spectral Doppler imaging is also performed. CHIQUITA FINDINGS: RIGHT (mmHg) Ankle (PT): >254; Index NC Ankle (DP): 230; Index 1.17 LEFT (mmHg) Brachial: 197 Ankle (PT): >254; Index NC Ankle (DP): 237; Index 1.20 The resting right ankle-brachial index measures 1.17. The resting left ankle-brachial index measures 1.20. DUPLEX ARTERIAL ULTRASOUND FINDINGS: RIGHT (cm/s) EIA: 141 T CFA: 126 T PFA: 90 B SFA-Proximal: 162 T SFA-Mid: 168 T SFA-Distal: 177 T Popliteal: 107 B PTA: 140 M FRANCESCO: 87 B DPA: 99 M (M=monophasic, B=biphasic, T=triphasic) LEFT (cm/s) EIA: 147 B CFA: 86 B PFA: 65 B SFA-Proximal: 168 B SFA-Mid: 155 T SFA-Distal: 103 B Popliteal: 133 B PTA: 112 M FRANCESCO: 89 B DPA: 77 B (M=monophasic, B=biphasic, T=triphasic) Right lower extremity: Multiphasic waveforms throughout the right lower extremity with the exception of the dorsalis pedis artery which is monophasic. Left lower extremity: Multiphasic waveforms throughout the left lower extremity.     IMPRESSION: 1. RIGHT LOWER EXTREMITY: Noncompressible arteries of the right lower extremity suggestive of calcific atherosclerotic involvement. The calculated ankle-brachial index is, however, within normal limits. Multiphasic waveforms throughout the right lower extremity with the exception of the dorsalis pedis artery which is monophasic. 2. LEFT LOWER EXTREMITY: Noncompressible arteries of the left lower extremity suggestive of calcific atherosclerotic involvement. The calculated ankle-brachial index is, however, within normal limits. Multiphasic waveforms throughout the left lower extremity.    US CHIQUITA Doppler No Exercise 1-2 Levels Bilateral    Result Date: 1/18/2023  [facility] HOS DATE: 1/18/2023 8:51 PM EXAM: RESTING ANKLE-BRACHIAL INDICES (ABIs) AND DUPLEX ARTERIAL ULTRASOUND OF THE LOWER  EXTREMITIES BILATERALLY INDICATION: Diabetic foot ulcers. COMPARISON: None. TECHNIQUE: Duplex imaging is performed utilizing gray-scale, two-dimensional images, and color-flow imaging. Doppler waveform analysis and spectral Doppler imaging is also performed. CHIQUITA FINDINGS: RIGHT (mmHg) Ankle (PT): >254; Index NC Ankle (DP): 230; Index 1.17 LEFT (mmHg) Brachial: 197 Ankle (PT): >254; Index NC Ankle (DP): 237; Index 1.20 The resting right ankle-brachial index measures 1.17. The resting left ankle-brachial index measures 1.20. DUPLEX ARTERIAL ULTRASOUND FINDINGS: RIGHT (cm/s) EIA: 141 T CFA: 126 T PFA: 90 B SFA-Proximal: 162 T SFA-Mid: 168 T SFA-Distal: 177 T Popliteal: 107 B PTA: 140 M FRANCESCO: 87 B DPA: 99 M (M=monophasic, B=biphasic, T=triphasic) LEFT (cm/s) EIA: 147 B CFA: 86 B PFA: 65 B SFA-Proximal: 168 B SFA-Mid: 155 T SFA-Distal: 103 B Popliteal: 133 B PTA: 112 M FRANCESCO: 89 B DPA: 77 B (M=monophasic, B=biphasic, T=triphasic) Right lower extremity: Multiphasic waveforms throughout the right lower extremity with the exception of the dorsalis pedis artery which is monophasic. Left lower extremity: Multiphasic waveforms throughout the left lower extremity.     IMPRESSION: 1. RIGHT LOWER EXTREMITY: Noncompressible arteries of the right lower extremity suggestive of calcific atherosclerotic involvement. The calculated ankle-brachial index is, however, within normal limits. Multiphasic waveforms throughout the right lower extremity with the exception of the dorsalis pedis artery which is monophasic. 2. LEFT LOWER EXTREMITY: Noncompressible arteries of the left lower extremity suggestive of calcific atherosclerotic involvement. The calculated ankle-brachial index is, however, within normal limits. Multiphasic waveforms throughout the left lower extremity.    Foot XR, G/E 3 views, left    Result Date: 1/18/2023  EXAM: XR FOOT LEFT G/E 3 VIEWS LOCATION: [facility] DATE/TIME: 1/18/2023 1:57 PM INDICATION:  Foot pain. Wound. COMPARISON: None.     IMPRESSION: No fracture. No degenerative changes. No osteomyelitis.    Foot  XR, G/E 3 views, right    Result Date: 1/18/2023  EXAM: XR FOOT RIGHT G/E 3 VIEWS LOCATION: [facility] DATE/TIME: 1/18/2023 1:57 PM INDICATION: Foot pain. Soft tissue wound. COMPARISON: None.     IMPRESSION: No fracture. No degenerative changes. No osteomyelitis.         Jf Awad DPM  [facility] Ankle Surgery/Podiatry

## 2023-01-19 NOTE — PROGRESS NOTES
Care Management Chart Review    Length of Stay (days): 1    Expected Discharge Date: 01/20/2023       Concerns to be Addressed:   Podiatry notes indicate plan for debridement with irrigation of the affected digits (toes) to promote wound healing.  Surgery will be scheduled for tomorrow 1/20/2023.  Patient n.p.o. at midnight. Surgeon informed the patient that amputation of the affected toes is a possibility if wounds do not heal. Currently receiving IV Vancomycin, IV Zosyn and IV fluid support.   Patient plan of care discussed at interdisciplinary rounds: Yes    Anticipated Discharge Disposition:  Discharge goal is pending response to treatment, medical needs and mobility closer to discharge.      Anticipated Discharge Services:  To be determined by destination, patient/family preferences, medical needs and mobility status closer to the time of discharge.  Anticipated Discharge DME:  Per therapy (if indicated).    Patient/family educated on Medicare website which has current facility and service quality ratings:   NA  Education Provided on the Discharge Plan:   Per team  Patient/Family in Agreement with the Plan:   Yes    Referrals Placed by CM/SW:   None  Private pay costs discussed: Not applicable at this time.     Additional Information:  Chart reviewed. Patient lives in a house and appears to be independent at baseline. He has a history of non-compliance with his medications. CM will continue to monitor progression of care, review team recommendations and provide discharge planning assist as needed.      Carmen Oliver RN

## 2023-01-19 NOTE — CONSULTS
[facility]  WOC Nurse Inpatient Assessment     Consulted for: RT FOOT - podiatry taking patient to surgery 1/20    Patient History (according to provider note(s):      Abdulaziz Rausch is a 57 year old male with PMH of poorly controlled diabetes, HTN, admitted on 1/18/2023.      #Diabetic foot ulcers patient is diabetic polyneuropathy.  X-ray bilateral feet negative for fractures, degenerative changes osteomyelitis.  ED provider contacted podiatry, who recommended for MRI and CHIQUITA.  The resting right ankle-brachial index measures 1.17. The resting left ankle-brachial index measures 1.20.  Both within normal limits.  Feet MRI performed, results pending.  -Follow-up on results  -Podiatry consult    Areas Assessed:      Areas visualized during today's visit: Focused: and Lower extremities     Wound Location:  Right foot 2nd 3rd and 4th toe with wounds most concerning draining purulent drainage is the second toe on the medial side     Date of last photo 1/19  Wound History: chronic diabetic foot ulcers with neuropathy  Wound Base: 98/2 % dry eschar and subcutaneous tissue exposed medially     Palpation of the wound bed: firm      Drainage: moderate     Description of drainage: cloudy and tan     Measurements (length x width x depth, in cm)   Second toe 1.5  x  2 x 0.5 cm fluctuant eschar   3rd toe 1x0.6 x utd stable eschar   4h toe 0.cm x 0.3cm x utd stable eschar      Periwound skin: hyperkeratosis      Color: pale      Temperature: normal   Odor: none  Pain: unable to assess due to  sleeping, none    Treatment Plan:     Toe wounds: Daily    Cleanse wounds and in between toes with normal saline and  Apply betadine to black scabs   weave gauze between toes   Do not wrap     Orders: Written    RECOMMEND PRIMARY TEAM ORDER: None, at this time  Education provided: plan of care  Discussed plan of care with: Nurse  WOC nurse follow-up plan: weekly  Notify WOC if wound(s) deteriorate.  Nursing to  notify the Provider(s) and re-consult the C Nurse if new skin concern.    DATA:     Current support surface: Standard  ED cart  Containment of urine/stool: Continent of bladder and Continent of bowel  BMI: Body mass index is 19.94 kg/m .   Active diet order: Orders Placed This Encounter      Moderate Consistent Carb (60 g CHO per Meal) Diet      NPO per Anesthesia Guidelines for Procedure/Surgery Except for: Meds     Output: I/O last 3 completed shifts:  In: 3161 [P.O.:60; I.V.:3101]  Out: 1200 [Urine:1200]     Labs: Recent Labs   Lab 01/19/23  0652 01/18/23  1448   HGB 9.7* 10.7*   WBC 9.5 6.5   A1C  --  13.7*     Pressure injury risk assessment:   Sensory Perception: 3-->slightly limited  Moisture: 4-->rarely moist  Activity: 3-->walks occasionally  Mobility: 3-->slightly limited  Nutrition: 3-->adequate  Friction and Shear: 3-->no apparent problem  Lisandro Score: 19    Ness Munoz RN BS CWOCN

## 2023-01-19 NOTE — PHARMACY-VANCOMYCIN DOSING SERVICE
"Pharmacy Vancomycin Initial Note  Date of Service 2023  Patient's  1965  57 year old, male    Indication: Skin and Soft Tissue Infection    Current estimated CrCl = Estimated Creatinine Clearance: 64.3 mL/min (based on SCr of 1.07 mg/dL).    Creatinine for last 3 days  2023:  2:48 PM Creatinine 1.07 mg/dL    Recent Vancomycin Level(s) for last 3 days  No results found for requested labs within last 72 hours.      Vancomycin IV Administrations (past 72 hours)                   vancomycin (VANCOCIN) 1000 mg in dextrose 5% 200 mL PREMIX (mg) 1,000 mg New Bag 23 1504                Nephrotoxins and other renal medications (From now, onward)    Start     Dose/Rate Route Frequency Ordered Stop    23 1500  vancomycin (VANCOCIN) 1,250 mg in sodium chloride 0.9 % 250 mL intermittent infusion         1,250 mg  over 90 Minutes Intravenous EVERY 24 HOURS 23 1758      23 2100  piperacillin-tazobactam (ZOSYN) 3.375 g vial to attach to  mL bag        Note to Pharmacy: For [facility], [facility] and [facility]: For Zosyn-naive patients, use the \"Zosyn initial dose + extended infusion\" order panel.    3.375 g  over 240 Minutes Intravenous EVERY 8 HOURS 23 1750      23 1900  vancomycin (VANCOCIN) 250 mg in sodium chloride 0.9 % 100 mL intermittent infusion         250 mg  over 1 Hours Intravenous ONCE 23 1801            Contrast Orders - past 72 hours (72h ago, onward)    None          [employer] Prediction of Planned Initial Vancomycin Regimen  Regimen: 1250 mg IV every 24 hours.  Start time: 15:00 on 2023  Exposure target: AUC24 (range)400-600 mg/L.hr   AUC24,ss: 451 mg/L.hr  Probability of AUC24 > 400: 64 %  Ctrough,ss: 12.3 mg/L  Probability of Ctrough,ss > 20: 14 %  Probability of nephrotoxicity (Lodise MARINA ): 8 %          Plan:  1. Start vancomycin 1250 mg IV q24h. The initial vancomycin dose was decided on without height and weight for patient-specific PK " calculations. An extra vancomycin 250 mg IV dose will be given tonight to make a total of 1250 mg IV today.   2. Vancomycin monitoring method: AUC  3. Vancomycin therapeutic monitoring goal: 400-600 mg*h/L  4. Pharmacy will check vancomycin levels as appropriate in 1-3 Days.    5. Serum creatinine levels will be ordered daily for the first week of therapy and at least twice weekly for subsequent weeks.      Mounika Cain, KadyD.

## 2023-01-19 NOTE — PLAN OF CARE
Problem: Plan of Care - These are the overarching goals to be used throughout the patient stay.    Goal: Plan of Care Review  Outcome: Progressing     Problem: Hyperglycemia  Goal: Blood Glucose Level Within Targeted Range  Outcome: Progressing     Pt has been hyperglycemic; BG trending down after multiple doses of novolog.  Pt has had minimal PO intake.  Ultrasound and MRI completed.  IV fluids and abx given.

## 2023-01-19 NOTE — DISCHARGE INSTRUCTIONS
DIABETES REMINDERS:  1) Check your blood sugar before all meals.  Goals are in #2  Always bring your blood sugar log and meter to your diabetes-related appointments.  2) Your blood sugar goals:  80-130mg/dL before eating  and  mg/dL 2 hours after eating (or per your doctor).  3) Always be prepared to treat a low blood sugar should it happen. Keep a sugar-containing beverage or food nearby.  4) When to call your clinic:   Blood sugar over 400 mg/dL.   If you have 2 to 3 low blood sugars (under 70mg/dL) in a row,   Low reading the same time of day several days in a row,  Blood sugars elevated and you can not get them down with your usual diabetes regimen,  You are ill and can't keep blood sugars controlled.   5) When to call 911:  If your blood glucose does not get better with treatment, or if you/someone else is unable to give you treatment.  6) Talk to your primary care doctor about an appointment with a Certified Diabetes Educator to assist you with your BG management.  7) Follow insulin regimen on discharge orders until able to see provider where doses may be adjusted based on blood sugar patterns.  8.  Handouts in packet for Continuous Glucose monitors that are covered under your insurance. Work with outpatient diabetes educator or MD to get ordered.          Wound Care: Right 2nd toe dress daily with medi honey and 1/2 Mepilex  and Band-Aid /tape for secure. Right 3rd toe dress daily with medi honey and Band-Aid.  Use Post OP boot to right foot anytime walking. Keep toes warm. For left foot toe sores watch theses and keep warm and offloaded.  Follow up with Dr. Huerta 1 week.  [phone]

## 2023-01-19 NOTE — PROGRESS NOTES
[facility]    Medicine Progress Note - Hospitalist Service    Date of Admission:  1/18/2023    Assessment & Plan      Abdulaziz Rausch is a 57 year old male with PMH of poorly controlled diabetes, HTN, admitted on 1/18/2023.     #Diabetic foot ulcers patient is diabetic polyneuropathy.  X-ray bilateral feet negative for fractures, degenerative changes osteomyelitis.  ED provider contacted podiatry, who recommended for MRI and CHIQUITA.  The resting right ankle-brachial index measures 1.17. The resting left ankle-brachial index measures 1.20.  Both within normal limits.  Feet MRI performed, no OM  -Follow-up on results  -Podiatry consult, OR 1/20    #Poorly controlled diabetes mellitus type 2.    -A1c 13.7.   on admission, with AG 7, lactic acid 1.5, negative serum ketones.  Today his PCP prescribed Lantus 10 units nightly, NovoLog 3 units 3 times daily with meals, and Glucophage 500 mg twice daily.Patient admits to being diagnosed with diabetes nearly 15 years ago.  He does not take insulin or oral meds, for unknown reason.  He denies financial difficulty to afford medications.  -S/p 2 L NS bolus in ED  -Vancomycin/Zosyn started in ED, will continue  -Follow-up on blood cultures, which were obtained in ED, after dose of Zosyn given  -Continue with IVF  -Lantus 10 units at bedtime  -SSI NovoLog, prandial 5 units 3 times daily  -Holding Glucophage for now  -Diabetes educator consult  -Diabetic diet    #Pseudohyponatremia of hyperglycemia.    -Sodium correcting to normal, given BG on admission 792.  S/p IVF.  Abdomen glycemic control.  Monitor sodium.    Essential hypertension  -started on 10 mg of lisinopril.  -Monitor BP.    MDD  -on Celexa.         Diet: Moderate Consistent Carb (60 g CHO per Meal) Diet    DVT Prophylaxis: Pneumatic Compression Devices  Fischer Catheter: Not present  Lines: None     Cardiac Monitoring: None  Code Status: Full Code      Clinically Significant Risk Factors  Present on Admission         # Hyponatremia: Lowest Na = 126 mmol/L in last 2 days, will monitor as appropriate              # DMII: A1C = 13.7 % (Ref range: <5.7 %) within past 3 months            Disposition Plan      Expected Discharge Date: 01/20/2023                  Lia Maldonado MD  Hospitalist Service  [facility]  Securely message with [employer] (more info)  Text page via [employer] Paging/Directory   ______________________________________________________________________    Interval History   Patient in no acute distress this morning.  Denies any chest pain/sob/NVD.      Physical Exam   Vital Signs: Temp: 98.2  F (36.8  C) Temp src: Oral BP: 115/65 Pulse: 87   Resp: 17 SpO2: 99 % O2 Device: None (Room air)    Weight: 135 lbs 0 oz  General: No acute distress, breathing comfortably on room air  Neuro: EOMI, PERRLA. Facial muscles symmetric, strength/sensation grossly intact.  HEENT: Anicteric sclera. Oropharynx is clear. No lymphadenopathy.  Chest/Lungs: No accessory respiratory muscle use. Adequate air movement throughout. CTAB.  CV: Normal rate, regular rhythm. nl S1/S2. No m/r/g. Cap refill &lt;2s.  Abd: BS+. Soft, NTND.  Ext: Warm, well-perfused. Strong distal pulses.    Medical Decision Making       35 MINUTES SPENT BY ME on the date of service doing chart review, history, exam, documentation & further activities per the note.      Data     I have personally reviewed the following data over the past 24 hrs:    9.5  \   9.7 (L)   / 355     136 108 (H) 22.8 /  310 (H)   4.7 22 1.11 \       TSH: N/A T4: N/A A1C: 13.7 (H)       Procal: N/A CRP: N/A Lactic Acid: 1.5         Recent Labs   Lab 01/19/23  0652 01/19/23  0418 01/19/23  0215 01/18/23  2355 01/18/23  2137 01/18/23  1852 01/18/23  1448   WBC 9.5  --   --   --   --   --  6.5   HGB 9.7*  --   --   --   --   --  10.7*   MCV 85  --   --   --   --   --  86     --   --   --   --   --  363     --   --   --  132*  --  126*    POTASSIUM 4.7  --   --   --  4.3  --  5.0   CHLORIDE 108*  --   --   --  100  --  91*   CO2 22  --   --   --  23  --  28   BUN 22.8  --   --   --  25.6*  --  27.9*   CR 1.11  --   --   --  1.02  --  1.07   ANIONGAP 6*  --   --   --  9  --  7   AMIRAH 8.3*  --   --   --  8.4*  --  9.5   * 256* 351*   < > 514*   < > 792*    < > = values in this interval not displayed.

## 2023-01-19 NOTE — CONSULTS
DIABETES CARE    Situation:  Consulted by Provider for Diabetes Education.  57 year old male with type 2 Diabetes. Patient was admitted for Hyperglycemia, diabetic foot ulcer, pseudohyponatremia of hyperglycemia and hypertension.     Background:  Related Co-morbidities include: HTN, diabetes polyneuropathy  PCP: Daryn Pittman MD  Social: Lives in a home    Diabetes History:   History of type 2 diabetes using insulin and metformin.     Meds for BG Management PTA:  Humalog 3 units three times a day  Lantus Solostar 10 units at bedtime  Metformin 500 mg 1 tab two times a day.     Current Inpatient Meds for BG Management:  Novolog 5 units at meals three times daily  Novolog low insulin correction scale 1 drops 100 mg/ dl over 140  Lantus 10 units once daily at 1900    Labs:  Hemoglobin A1C: 13.7  Hgb: 10.7 g/dL  SCr: 1.11 mg/dL   GFR: 76 mL/min/1.73m^2    Blood Glucose POC:    Latest Reference Range & Units 08/26/21 15:08 01/18/23 18:52 01/18/23 20:49 01/18/23 23:55 01/19/23 02:15 01/19/23 04:18 01/19/23 08:56   Glucose 70 - 125 mg/dL 189 (H)         GLUCOSE BY METER POCT 70 - 99 mg/dL  542 (HH) 521 (HH) 354 (H) 351 (H) 256 (H) 327 (H)       Diet Order: 60 gram CHO  Intake: NA   Weight: 61.2 kg    BMI: 19.94 kg/m^2    DM EDUCATION/COUNSELING:  Barriers to Learning and/or DM Self-Management: None known.  Patient was somnolent during consult however. If he needs further help, let me know.   Previous DM Education: Yes  Current education and/or visit with patient and/or caregiver:  Educated/reviewed diabetes basics: pathophysiology, hyperglycemia, long term complications, treatment.    Explained normal/goals of blood sugar control, A1C, when to call provider.    Written handouts given on all education provided.   Left handouts about CGM and he has coverage for the ones that I left for him if desired.      Assessment:  Patient states he has given insulin injections previously and knows how to do.  He also states he has a  "meter and has checked blood sugars previously.  He then nodded off to sleep again and could not get him awake.       Recommendations:  1. Increase Basal insulin  2. Increase meal insulin if needed for blood glucose control.   3. Increase correction scale, probably not needed at discharge however.     Refer to \"Guidelines for Insulin Initiation and Care in Hospitalized Adults\" link in Diabetes Management Order set for dosing guidelines.  Hospital goals for blood glucose levels are < 180 mg/dL for improved health outcomes.        DISCHARGE NEEDS:  He was prescribed all medication yesterday at appointment and must have picked up.    Thank you,   Keyla Sofia RN, [facility] [place][note 2]/ Pager [phone]      "

## 2023-01-19 NOTE — CONSULTS
FOOT AND ANKLE SURGERY/PODIATRY CONSULT NOTE         ASSESSMENT:   Diabetic ulcer digits 2 and 3 right foot      TREATMENT:  Recommend debridement with irrigation of the affected digits to promote wound healing.  Surgery will be scheduled for tomorrow 1/20/2023.  Patient n.p.o. at midnight.  I informed the patient that the procedure will be performed under local anesthesia with IV sedation.  He is aware that amputation of the affected toes is a possibility if wounds do not heal.      HPI: I was asked to see Abdulaziz Rausch today to evaluate infected digits of the right foot.  The patient is a 57-year-old poorly controlled diabetic with hypertension.  He was admitted through the ED on 1/18/2023.  He noticed wounds on the right foot were worsening over the past 2 and half weeks.  [facility] was [facility]  The patient was subsequently evaluated by his PCP who recommended the patient be referred to ED for evaluation of possible osteomyelitis.  X-rays and MRIs were negative for osteomyelitis.  ABIs and toe pressures were normal and adequate for wound healing.  The patient has no pain secondary to neuropathy.      No past medical history on file.    Social History     Socioeconomic History     Marital status:      Spouse name: Not on file     Number of children: Not on file     Years of education: Not on file     Highest education level: Not on file   Occupational History     Not on file   Tobacco Use     Smoking status: Never     Smokeless tobacco: Never   Substance and Sexual Activity     Alcohol use: Not on file     Drug use: Not on file     Sexual activity: Not on file   Other Topics Concern     Not on file   Social History Narrative     Not on file     Social Determinants of Health     Financial Resource Strain: Not on file   Food Insecurity: Not on file   Transportation Needs: Not on file   Physical Activity: Not on file   Stress: Not on file   Social Connections: Not on file    Intimate Partner Violence: Not on file   Housing Stability: Not on file        No Known Allergies       Current Facility-Administered Medications:      0.9% sodium chloride + KCl 20 mEq/L infusion, , Intravenous, Continuous, Emmie Jarvis MD, Last Rate: 150 mL/hr at 01/19/23 0652, New Bag at 01/19/23 0652     acetaminophen (TYLENOL) tablet 650 mg, 650 mg, Oral, Q6H PRN **OR** acetaminophen (TYLENOL) Suppository 650 mg, 650 mg, Rectal, Q6H PRN, Emmie Jarvis MD     glucose gel 15-30 g, 15-30 g, Oral, Q15 Min PRN **OR** dextrose 50 % injection 25-50 mL, 25-50 mL, Intravenous, Q15 Min PRN **OR** glucagon injection 1 mg, 1 mg, Subcutaneous, Q15 Min PRN, Emmie Jarvis MD     gabapentin (NEURONTIN) capsule 300 mg, 300 mg, Oral, TID, Emmie Jarvis MD, 300 mg at 01/18/23 1938     insulin aspart (NovoLOG) injection (RAPID ACTING), 5 Units, Subcutaneous, TID w/meals, Chris Albright MD     insulin aspart (NovoLOG) injection (RAPID ACTING), 1-3 Units, Subcutaneous, TID AC, Emmie Jarvis MD     insulin aspart (NovoLOG) injection (RAPID ACTING), 1-3 Units, Subcutaneous, At Bedtime, Emmie Jarvis MD     insulin glargine (LANTUS PEN) injection 10 Units, 10 Units, Subcutaneous, Q24H, Emmie Jarvis MD, 10 Units at 01/18/23 1920     lidocaine (LMX4) cream, , Topical, Q1H PRN, Emmie Jarvis MD     lidocaine 1 % 0.1-1 mL, 0.1-1 mL, Other, Q1H PRN, Emmie Jarvis MD     lisinopril (ZESTRIL) tablet 10 mg, 10 mg, Oral, Daily, Emmie Jarvis MD     magnesium oxide (MAG-OX) tablet 400 mg, 400 mg, Oral, BID, Emmie Jarvis MD, 400 mg at 01/18/23 1939     melatonin tablet 1 mg, 1 mg, Oral, At Bedtime PRN, Emmie Jarvis MD     ondansetron (ZOFRAN ODT) ODT tab 4 mg, 4 mg, Oral, Q6H PRN **OR** ondansetron (ZOFRAN) injection 4 mg, 4 mg, Intravenous, Q6H PRN, Emmie Jarvis MD     piperacillin-tazobactam (ZOSYN) 3.375 g vial to attach to  mL bag, 3.375 g,  Intravenous, Q8H, Emmie Jarvis MD, 3.375 g at 01/19/23 0006     prochlorperazine (COMPAZINE) injection 10 mg, 10 mg, Intravenous, Q6H PRN **OR** prochlorperazine (COMPAZINE) tablet 10 mg, 10 mg, Oral, Q6H PRN **OR** prochlorperazine (COMPAZINE) suppository 25 mg, 25 mg, Rectal, Q12H PRN, Emmie Jarvis MD     senna-docusate (SENOKOT-S/PERICOLACE) 8.6-50 MG per tablet 1 tablet, 1 tablet, Oral, BID PRN **OR** senna-docusate (SENOKOT-S/PERICOLACE) 8.6-50 MG per tablet 2 tablet, 2 tablet, Oral, BID PRN, Emmie Jarvis MD     sodium chloride (PF) 0.9% PF flush 3 mL, 3 mL, Intracatheter, Q8H, Emmie Jarvis MD     sodium chloride (PF) 0.9% PF flush 3 mL, 3 mL, Intracatheter, q1 min prn, Emmie Jarvis MD     vancomycin (VANCOCIN) 1,250 mg in sodium chloride 0.9 % 250 mL intermittent infusion, 1,250 mg, Intravenous, Q24H, Emmie Jarvis MD    Current Outpatient Medications:      alcohol swab prep pads, Use to swab area of injection/ella as directed., Disp: 100 each, Rfl: 3     blood glucose (NO BRAND SPECIFIED) test strip, Use to test blood sugar 4 times daily or as directed. To accompany: Blood Glucose Monitor Brands: per insurance., Disp: 100 strip, Rfl: 6     blood glucose monitoring (NO BRAND SPECIFIED) meter device kit, Use to test blood sugar 4 time daily or as directed. Preferred blood glucose meter OR supplies to accompany: Blood Glucose Monitor Brands: per insurance., Disp: 1 kit, Rfl: 0     gabapentin (NEURONTIN) 300 MG capsule, Take 1 capsule (300 mg) by mouth 3 times daily, Disp: 90 capsule, Rfl: 1     insulin glargine (LANTUS SOLOSTAR) 100 UNIT/ML pen, Inject 10 Units Subcutaneous At Bedtime, Disp: 3 mL, Rfl: 1     insulin lispro (HUMALOG KWIKPEN) 100 UNIT/ML (1 unit dial) KWIKPEN, Inject 3 Units Subcutaneous 3 times daily (before meals), Disp: 3 mL, Rfl: 1     insulin pen needle (31G X 8 MM) 31G X 8 MM miscellaneous, Use 1 pen needles daily or as directed., Disp: 100 each,  "Rfl: 0     metFORMIN (GLUCOPHAGE) 500 MG tablet, Take 1 tablet (500 mg) by mouth 2 times daily (with meals), Disp: 60 tablet, Rfl: 1     thin (NO BRAND SPECIFIED) lancets, Use with lanceting device. To accompany: Blood Glucose Monitor Brands: per insurance., Disp: 100 each, Rfl: 6     citalopram (CELEXA) 10 MG tablet, Take 1 tablet (10 mg) by mouth daily (Patient not taking: Reported on 1/18/2023), Disp: 30 tablet, Rfl: 1     No family history on file.     Social History     Socioeconomic History     Marital status:      Spouse name: Not on file     Number of children: Not on file     Years of education: Not on file     Highest education level: Not on file   Occupational History     Not on file   Tobacco Use     Smoking status: Never     Smokeless tobacco: Never   Substance and Sexual Activity     Alcohol use: Not on file     Drug use: Not on file     Sexual activity: Not on file   Other Topics Concern     Not on file   Social History Narrative     Not on file     Social Determinants of Health     Financial Resource Strain: Not on file   Food Insecurity: Not on file   Transportation Needs: Not on file   Physical Activity: Not on file   Stress: Not on file   Social Connections: Not on file   Intimate Partner Violence: Not on file   Housing Stability: Not on file        Review of Systems - Patient denies fever, chills, rash, wound, stiffness, limping, numbness, weakness, heart burn, blood in stool, chest pain with activity, calf pain when walking, shortness of breath with activity, chronic cough, easy bleeding/bruising, swelling of ankles, excessive thirst, fatigue, depression, anxiety.  Patient admits to infected digits right foot.      OBJECTIVE:  Appearance: alert, well appearing, and in no distress and oriented to person, place, and time.    /65 (BP Location: Left arm)   Pulse 87   Temp 98.2  F (36.8  C) (Oral)   Resp 17   Ht 1.753 m (5' 9\")   Wt 61.2 kg (135 lb)   SpO2 99%   BMI 19.94 kg/m   "     Body mass index is 19.94 kg/m .     General appearance: Patient is alert and fully cooperative with history & exam.  No sign of distress is noted during the visit.  Psychiatric: Affect is pleasant & appropriate.  Patient appears motivated to improve health.  Respiratory: Breathing is regular & unlabored while sitting.  HEENT: Hearing is intact to spoken word.  Speech is clear.  No gross evidence of visual impairment that would impact ambulation.    Vascular: Dorsalis pedis and posterior tibial pulses are non-palpable. There is no pedal hair growth bilaterally.  CFT < 3 sec from anterior tibial surface to distal digits bilaterally. There is no appreciable edema noted.  Dermatologic: Black eschar noted distal plantar aspect digits 2 and 3 right foot.  Mild drainage noted second toe right foot.  No cellulitis noted.  No purulence noted.  Wounds do not probe deep to bone.  No odor.  Small necrotic wound second third toe left foot which appeared to be some stable and superficial.  Turgor and texture are within normal limits. No coloration or temperature changes. No other primary or secondary lesions noted.  Neurologic: All epicritic and proprioceptive sensations are grossly diminished bilaterally.    Musculoskeletal: All active and passive ankle, subtalar, midtarsal, and 1st MPJ range of motion are grossly intact without pain or crepitus, with the exception of none. Manual muscle strength is within normal limits bilaterally. All dorsiflexors, plantarflexors, invertors, evertors are intact bilaterally.  No tenderness present to digits 2 and 3 bilateral feet on palpation.  No tenderness to digits 2 and 3 bilateral feet with range of motion. Calf is soft/non-tender without warmth/induration    Imaging:       [unfilled]     MR Foot Left w/o & w Contrast    Result Date: 1/18/2023  EXAM: MR FOOT LEFT W/O and W CONTRAST LOCATION: [facility] DATE/TIME: 1/18/2023 11:36 PM INDICATION: 58 yo m w  [place]  II, med non compliance here w  bilateral diabetic foot ulcers to 2nd 3rd digits COMPARISON: None. TECHNIQUE: Routine. Additional postgadolinium T1 sequences were obtained. IV CONTRAST: 6ml Gadavist FINDINGS: JOINTS AND BONES: -No fracture, bone contusion, or findings suggestive osteomyelitis. Normal articular cartilage. No effusion. There are some mild degenerative changes seen most marked in the first MTP joint and scattered in the mid foot. TENDONS: -No tendon tear, tendinopathy, or tenosynovitis. LIGAMENTS: -Lisfranc ligament: Intact. No subluxation. MUSCLES AND SOFT TISSUES: -No muscle atrophy or edema. No soft tissue mass or fluid collection.     IMPRESSION: 1.  There is some loss of detail given discrepancy in the fat saturation technique involving some of the toes given the peripheral location. 2.  By history patient has foot ulcers. 3.  No findings seen to suggest osteomyelitis, myositis, or abscess.    MR Foot Right w/o & w Contrast    Result Date: 1/19/2023  EXAM: MR FOOT RIGHT W/O and W CONTRAST LOCATION: [facility] DATE/TIME: 1/18/2023 11:36 PM INDICATION: 56 yo male w  DM II and med non compliance here w  diabetic foot ulcers to 2nd 3rd digits of bilateral feet COMPARISON: None. TECHNIQUE: Routine. Additional postgadolinium T1 sequences were obtained. IV CONTRAST: 6ml Gadavist FINDINGS: JOINTS AND BONES: -No fracture or bone contusion. Normal articular cartilage. No effusion. There is abnormal signal seen which involves the second toe mid and distal phalanges and the third toe distal phalanx which would be worrisome for changes of osteomyelitis. No andrews  destructive changes are seen in the bony skeleton. TENDONS: -No tendon tear, tendinopathy, or tenosynovitis. LIGAMENTS: -Lisfranc ligament: Intact. No subluxation. MUSCLES AND SOFT TISSUES: -By history there are ulcers seen involving the second third toes. There is some edema seen involving the dorsal skin of the foot      IMPRESSION: 1.  There is some loss of detail on this study secondary to inhomogeneous fat saturation given the peripheral location of the toes. 2.  There is abnormal signal seen with no andrews destructive changes involving the second toe middle and distal phalanx and the third toe distal phalanx. Given patient's history changes can be seen with osteomyelitis. 3.  No evidence for an abscess.    US Lower Extremity Arterial Duplex Bilateral    Result Date: 1/18/2023  [facility] HOS DATE: 1/18/2023 8:51 PM EXAM: RESTING ANKLE-BRACHIAL INDICES (ABIs) AND DUPLEX ARTERIAL ULTRASOUND OF THE LOWER EXTREMITIES BILATERALLY INDICATION: Diabetic foot ulcers. COMPARISON: None. TECHNIQUE: Duplex imaging is performed utilizing gray-scale, two-dimensional images, and color-flow imaging. Doppler waveform analysis and spectral Doppler imaging is also performed. CHIQUITA FINDINGS: RIGHT (mmHg) Ankle (PT): >254; Index NC Ankle (DP): 230; Index 1.17 LEFT (mmHg) Brachial: 197 Ankle (PT): >254; Index NC Ankle (DP): 237; Index 1.20 The resting right ankle-brachial index measures 1.17. The resting left ankle-brachial index measures 1.20. DUPLEX ARTERIAL ULTRASOUND FINDINGS: RIGHT (cm/s) EIA: 141 T CFA: 126 T PFA: 90 B SFA-Proximal: 162 T SFA-Mid: 168 T SFA-Distal: 177 T Popliteal: 107 B PTA: 140 M FRANCESCO: 87 B DPA: 99 M (M=monophasic, B=biphasic, T=triphasic) LEFT (cm/s) EIA: 147 B CFA: 86 B PFA: 65 B SFA-Proximal: 168 B SFA-Mid: 155 T SFA-Distal: 103 B Popliteal: 133 B PTA: 112 M FRANCESCO: 89 B DPA: 77 B (M=monophasic, B=biphasic, T=triphasic) Right lower extremity: Multiphasic waveforms throughout the right lower extremity with the exception of the dorsalis pedis artery which is monophasic. Left lower extremity: Multiphasic waveforms throughout the left lower extremity.     IMPRESSION: 1. RIGHT LOWER EXTREMITY: Noncompressible arteries of the right lower extremity suggestive of calcific atherosclerotic involvement. The calculated  ankle-brachial index is, however, within normal limits. Multiphasic waveforms throughout the right lower extremity with the exception of the dorsalis pedis artery which is monophasic. 2. LEFT LOWER EXTREMITY: Noncompressible arteries of the left lower extremity suggestive of calcific atherosclerotic involvement. The calculated ankle-brachial index is, however, within normal limits. Multiphasic waveforms throughout the left lower extremity.    US CHIQUITA Doppler No Exercise 1-2 Levels Bilateral    Result Date: 1/18/2023  [facility] HOS DATE: 1/18/2023 8:51 PM EXAM: RESTING ANKLE-BRACHIAL INDICES (ABIs) AND DUPLEX ARTERIAL ULTRASOUND OF THE LOWER EXTREMITIES BILATERALLY INDICATION: Diabetic foot ulcers. COMPARISON: None. TECHNIQUE: Duplex imaging is performed utilizing gray-scale, two-dimensional images, and color-flow imaging. Doppler waveform analysis and spectral Doppler imaging is also performed. CHIQUITA FINDINGS: RIGHT (mmHg) Ankle (PT): >254; Index NC Ankle (DP): 230; Index 1.17 LEFT (mmHg) Brachial: 197 Ankle (PT): >254; Index NC Ankle (DP): 237; Index 1.20 The resting right ankle-brachial index measures 1.17. The resting left ankle-brachial index measures 1.20. DUPLEX ARTERIAL ULTRASOUND FINDINGS: RIGHT (cm/s) EIA: 141 T CFA: 126 T PFA: 90 B SFA-Proximal: 162 T SFA-Mid: 168 T SFA-Distal: 177 T Popliteal: 107 B PTA: 140 M FRANCESCO: 87 B DPA: 99 M (M=monophasic, B=biphasic, T=triphasic) LEFT (cm/s) EIA: 147 B CFA: 86 B PFA: 65 B SFA-Proximal: 168 B SFA-Mid: 155 T SFA-Distal: 103 B Popliteal: 133 B PTA: 112 M FRANCESCO: 89 B DPA: 77 B (M=monophasic, B=biphasic, T=triphasic) Right lower extremity: Multiphasic waveforms throughout the right lower extremity with the exception of the dorsalis pedis artery which is monophasic. Left lower extremity: Multiphasic waveforms throughout the left lower extremity.     IMPRESSION: 1. RIGHT LOWER EXTREMITY: Noncompressible arteries of the right lower extremity suggestive of  calcific atherosclerotic involvement. The calculated ankle-brachial index is, however, within normal limits. Multiphasic waveforms throughout the right lower extremity with the exception of the dorsalis pedis artery which is monophasic. 2. LEFT LOWER EXTREMITY: Noncompressible arteries of the left lower extremity suggestive of calcific atherosclerotic involvement. The calculated ankle-brachial index is, however, within normal limits. Multiphasic waveforms throughout the left lower extremity.    Foot XR, G/E 3 views, left    Result Date: 1/18/2023  EXAM: XR FOOT LEFT G/E 3 VIEWS LOCATION: [facility] DATE/TIME: 1/18/2023 1:57 PM INDICATION: Foot pain. Wound. COMPARISON: None.     IMPRESSION: No fracture. No degenerative changes. No osteomyelitis.    Foot  XR, G/E 3 views, right    Result Date: 1/18/2023  EXAM: XR FOOT RIGHT G/E 3 VIEWS LOCATION: [facility] DATE/TIME: 1/18/2023 1:57 PM INDICATION: Foot pain. Soft tissue wound. COMPARISON: None.     IMPRESSION: No fracture. No degenerative changes. No osteomyelitis.     MR Foot Left w/o & w Contrast    Result Date: 1/18/2023  EXAM: MR FOOT LEFT W/O and W CONTRAST LOCATION: [facility] DATE/TIME: 1/18/2023 11:36 PM INDICATION: 58 yo m w  DM II, med non compliance here w  bilateral diabetic foot ulcers to 2nd 3rd digits COMPARISON: None. TECHNIQUE: Routine. Additional postgadolinium T1 sequences were obtained. IV CONTRAST: 6ml Gadavist FINDINGS: JOINTS AND BONES: -No fracture, bone contusion, or findings suggestive osteomyelitis. Normal articular cartilage. No effusion. There are some mild degenerative changes seen most marked in the first MTP joint and scattered in the mid foot. TENDONS: -No tendon tear, tendinopathy, or tenosynovitis. LIGAMENTS: -Lisfranc ligament: Intact. No subluxation. MUSCLES AND SOFT TISSUES: -No muscle atrophy or edema. No soft tissue mass or fluid collection.      IMPRESSION: 1.  There is some loss of detail given discrepancy in the fat saturation technique involving some of the toes given the peripheral location. 2.  By history patient has foot ulcers. 3.  No findings seen to suggest osteomyelitis, myositis, or abscess.    MR Foot Right w/o & w Contrast    Result Date: 1/19/2023  EXAM: MR FOOT RIGHT W/O and W CONTRAST LOCATION: [facility] DATE/TIME: 1/18/2023 11:36 PM INDICATION: 56 yo male w  DM II and med non compliance here w  diabetic foot ulcers to 2nd 3rd digits of bilateral feet COMPARISON: None. TECHNIQUE: Routine. Additional postgadolinium T1 sequences were obtained. IV CONTRAST: 6ml Gadavist FINDINGS: JOINTS AND BONES: -No fracture or bone contusion. Normal articular cartilage. No effusion. There is abnormal signal seen which involves the second toe mid and distal phalanges and the third toe distal phalanx which would be worrisome for changes of osteomyelitis. No andrews  destructive changes are seen in the bony skeleton. TENDONS: -No tendon tear, tendinopathy, or tenosynovitis. LIGAMENTS: -Lisfranc ligament: Intact. No subluxation. MUSCLES AND SOFT TISSUES: -By history there are ulcers seen involving the second third toes. There is some edema seen involving the dorsal skin of the foot     IMPRESSION: 1.  There is some loss of detail on this study secondary to inhomogeneous fat saturation given the peripheral location of the toes. 2.  There is abnormal signal seen with no andrews destructive changes involving the second toe middle and distal phalanx and the third toe distal phalanx. Given patient's history changes can be seen with osteomyelitis. 3.  No evidence for an abscess.    US Lower Extremity Arterial Duplex Bilateral    Result Date: 1/18/2023  [facility] HOS DATE: 1/18/2023 8:51 PM EXAM: RESTING ANKLE-BRACHIAL INDICES (ABIs) AND DUPLEX ARTERIAL ULTRASOUND OF THE LOWER EXTREMITIES BILATERALLY INDICATION: Diabetic foot  ulcers. COMPARISON: None. TECHNIQUE: Duplex imaging is performed utilizing gray-scale, two-dimensional images, and color-flow imaging. Doppler waveform analysis and spectral Doppler imaging is also performed. CHIQUITA FINDINGS: RIGHT (mmHg) Ankle (PT): >254; Index NC Ankle (DP): 230; Index 1.17 LEFT (mmHg) Brachial: 197 Ankle (PT): >254; Index NC Ankle (DP): 237; Index 1.20 The resting right ankle-brachial index measures 1.17. The resting left ankle-brachial index measures 1.20. DUPLEX ARTERIAL ULTRASOUND FINDINGS: RIGHT (cm/s) EIA: 141 T CFA: 126 T PFA: 90 B SFA-Proximal: 162 T SFA-Mid: 168 T SFA-Distal: 177 T Popliteal: 107 B PTA: 140 M FRANCESCO: 87 B DPA: 99 M (M=monophasic, B=biphasic, T=triphasic) LEFT (cm/s) EIA: 147 B CFA: 86 B PFA: 65 B SFA-Proximal: 168 B SFA-Mid: 155 T SFA-Distal: 103 B Popliteal: 133 B PTA: 112 M FRANCESCO: 89 B DPA: 77 B (M=monophasic, B=biphasic, T=triphasic) Right lower extremity: Multiphasic waveforms throughout the right lower extremity with the exception of the dorsalis pedis artery which is monophasic. Left lower extremity: Multiphasic waveforms throughout the left lower extremity.     IMPRESSION: 1. RIGHT LOWER EXTREMITY: Noncompressible arteries of the right lower extremity suggestive of calcific atherosclerotic involvement. The calculated ankle-brachial index is, however, within normal limits. Multiphasic waveforms throughout the right lower extremity with the exception of the dorsalis pedis artery which is monophasic. 2. LEFT LOWER EXTREMITY: Noncompressible arteries of the left lower extremity suggestive of calcific atherosclerotic involvement. The calculated ankle-brachial index is, however, within normal limits. Multiphasic waveforms throughout the left lower extremity.    US CHIQUITA Doppler No Exercise 1-2 Levels Bilateral    Result Date: 1/18/2023  [facility] HOS DATE: 1/18/2023 8:51 PM EXAM: RESTING ANKLE-BRACHIAL INDICES (ABIs) AND DUPLEX ARTERIAL ULTRASOUND OF THE LOWER  EXTREMITIES BILATERALLY INDICATION: Diabetic foot ulcers. COMPARISON: None. TECHNIQUE: Duplex imaging is performed utilizing gray-scale, two-dimensional images, and color-flow imaging. Doppler waveform analysis and spectral Doppler imaging is also performed. CHIQUITA FINDINGS: RIGHT (mmHg) Ankle (PT): >254; Index NC Ankle (DP): 230; Index 1.17 LEFT (mmHg) Brachial: 197 Ankle (PT): >254; Index NC Ankle (DP): 237; Index 1.20 The resting right ankle-brachial index measures 1.17. The resting left ankle-brachial index measures 1.20. DUPLEX ARTERIAL ULTRASOUND FINDINGS: RIGHT (cm/s) EIA: 141 T CFA: 126 T PFA: 90 B SFA-Proximal: 162 T SFA-Mid: 168 T SFA-Distal: 177 T Popliteal: 107 B PTA: 140 M FRANCESCO: 87 B DPA: 99 M (M=monophasic, B=biphasic, T=triphasic) LEFT (cm/s) EIA: 147 B CFA: 86 B PFA: 65 B SFA-Proximal: 168 B SFA-Mid: 155 T SFA-Distal: 103 B Popliteal: 133 B PTA: 112 M FRANCESCO: 89 B DPA: 77 B (M=monophasic, B=biphasic, T=triphasic) Right lower extremity: Multiphasic waveforms throughout the right lower extremity with the exception of the dorsalis pedis artery which is monophasic. Left lower extremity: Multiphasic waveforms throughout the left lower extremity.     IMPRESSION: 1. RIGHT LOWER EXTREMITY: Noncompressible arteries of the right lower extremity suggestive of calcific atherosclerotic involvement. The calculated ankle-brachial index is, however, within normal limits. Multiphasic waveforms throughout the right lower extremity with the exception of the dorsalis pedis artery which is monophasic. 2. LEFT LOWER EXTREMITY: Noncompressible arteries of the left lower extremity suggestive of calcific atherosclerotic involvement. The calculated ankle-brachial index is, however, within normal limits. Multiphasic waveforms throughout the left lower extremity.    Foot XR, G/E 3 views, left    Result Date: 1/18/2023  EXAM: XR FOOT LEFT G/E 3 VIEWS LOCATION: [facility] DATE/TIME: 1/18/2023 1:57 PM INDICATION:  Foot pain. Wound. COMPARISON: None.     IMPRESSION: No fracture. No degenerative changes. No osteomyelitis.    Foot  XR, G/E 3 views, right    Result Date: 1/18/2023  EXAM: XR FOOT RIGHT G/E 3 VIEWS LOCATION: [facility] DATE/TIME: 1/18/2023 1:57 PM INDICATION: Foot pain. Soft tissue wound. COMPARISON: None.     IMPRESSION: No fracture. No degenerative changes. No osteomyelitis.         Jf Awad DPM  [facility] Foot & Ankle Surgery/Podiatry

## 2023-01-20 ENCOUNTER — ANESTHESIA (OUTPATIENT)
Dept: SURGERY | Facility: HOSPITAL | Age: 58
End: 2023-01-20
Payer: COMMERCIAL

## 2023-01-20 LAB
GLUCOSE BLDC GLUCOMTR-MCNC: 223 MG/DL (ref 70–99)
GLUCOSE BLDC GLUCOMTR-MCNC: 262 MG/DL (ref 70–99)
GLUCOSE BLDC GLUCOMTR-MCNC: 273 MG/DL (ref 70–99)
GLUCOSE BLDC GLUCOMTR-MCNC: 301 MG/DL (ref 70–99)
GLUCOSE BLDC GLUCOMTR-MCNC: 341 MG/DL (ref 70–99)
GRAM STAIN RESULT: ABNORMAL
GRAM STAIN RESULT: ABNORMAL
MAGNESIUM SERPL-MCNC: 1.9 MG/DL (ref 1.7–2.3)
POTASSIUM SERPL-SCNC: 3.8 MMOL/L (ref 3.4–5.3)

## 2023-01-20 PROCEDURE — 120N000001 HC R&B MED SURG/OB

## 2023-01-20 PROCEDURE — 250N000011 HC RX IP 250 OP 636: Performed by: NURSE ANESTHETIST, CERTIFIED REGISTERED

## 2023-01-20 PROCEDURE — 0HBMXZZ EXCISION OF RIGHT FOOT SKIN, EXTERNAL APPROACH: ICD-10-PCS | Performed by: PODIATRIST

## 2023-01-20 PROCEDURE — 11042 DBRDMT SUBQ TIS 1ST 20SQCM/<: CPT | Performed by: PODIATRIST

## 2023-01-20 PROCEDURE — 250N000011 HC RX IP 250 OP 636: Performed by: INTERNAL MEDICINE

## 2023-01-20 PROCEDURE — 258N000003 HC RX IP 258 OP 636: Performed by: PODIATRIST

## 2023-01-20 PROCEDURE — 84132 ASSAY OF SERUM POTASSIUM: CPT | Performed by: INTERNAL MEDICINE

## 2023-01-20 PROCEDURE — 99232 SBSQ HOSP IP/OBS MODERATE 35: CPT | Performed by: INTERNAL MEDICINE

## 2023-01-20 PROCEDURE — 87070 CULTURE OTHR SPECIMN AEROBIC: CPT | Performed by: PODIATRIST

## 2023-01-20 PROCEDURE — 250N000011 HC RX IP 250 OP 636: Performed by: PODIATRIST

## 2023-01-20 PROCEDURE — 250N000013 HC RX MED GY IP 250 OP 250 PS 637: Performed by: PODIATRIST

## 2023-01-20 PROCEDURE — 258N000003 HC RX IP 258 OP 636: Performed by: ANESTHESIOLOGY

## 2023-01-20 PROCEDURE — 250N000013 HC RX MED GY IP 250 OP 250 PS 637: Performed by: INTERNAL MEDICINE

## 2023-01-20 PROCEDURE — 87077 CULTURE AEROBIC IDENTIFY: CPT | Performed by: PODIATRIST

## 2023-01-20 PROCEDURE — 999N000141 HC STATISTIC PRE-PROCEDURE NURSING ASSESSMENT: Performed by: PODIATRIST

## 2023-01-20 PROCEDURE — 360N000075 HC SURGERY LEVEL 2, PER MIN: Performed by: PODIATRIST

## 2023-01-20 PROCEDURE — 36415 COLL VENOUS BLD VENIPUNCTURE: CPT | Performed by: INTERNAL MEDICINE

## 2023-01-20 PROCEDURE — 250N000009 HC RX 250: Performed by: PODIATRIST

## 2023-01-20 PROCEDURE — 258N000003 HC RX IP 258 OP 636: Performed by: NURSE ANESTHETIST, CERTIFIED REGISTERED

## 2023-01-20 PROCEDURE — 370N000017 HC ANESTHESIA TECHNICAL FEE, PER MIN: Performed by: PODIATRIST

## 2023-01-20 PROCEDURE — 272N000001 HC OR GENERAL SUPPLY STERILE: Performed by: PODIATRIST

## 2023-01-20 PROCEDURE — 250N000009 HC RX 250: Performed by: NURSE ANESTHETIST, CERTIFIED REGISTERED

## 2023-01-20 PROCEDURE — 87205 SMEAR GRAM STAIN: CPT | Performed by: PODIATRIST

## 2023-01-20 PROCEDURE — 83735 ASSAY OF MAGNESIUM: CPT | Performed by: INTERNAL MEDICINE

## 2023-01-20 RX ORDER — MAGNESIUM OXIDE 400 MG/1
400 TABLET ORAL EVERY 4 HOURS
Status: COMPLETED | OUTPATIENT
Start: 2023-01-20 | End: 2023-01-20

## 2023-01-20 RX ORDER — MAGNESIUM HYDROXIDE 1200 MG/15ML
LIQUID ORAL PRN
Status: DISCONTINUED | OUTPATIENT
Start: 2023-01-20 | End: 2023-01-20 | Stop reason: HOSPADM

## 2023-01-20 RX ORDER — SODIUM CHLORIDE 9 MG/ML
INJECTION, SOLUTION INTRAVENOUS CONTINUOUS
Status: DISCONTINUED | OUTPATIENT
Start: 2023-01-20 | End: 2023-01-20 | Stop reason: HOSPADM

## 2023-01-20 RX ORDER — BUPIVACAINE HYDROCHLORIDE 5 MG/ML
INJECTION, SOLUTION PERINEURAL PRN
Status: DISCONTINUED | OUTPATIENT
Start: 2023-01-20 | End: 2023-01-20 | Stop reason: HOSPADM

## 2023-01-20 RX ORDER — GINSENG 100 MG
CAPSULE ORAL PRN
Status: DISCONTINUED | OUTPATIENT
Start: 2023-01-20 | End: 2023-01-20 | Stop reason: HOSPADM

## 2023-01-20 RX ORDER — PROPOFOL 10 MG/ML
INJECTION, EMULSION INTRAVENOUS CONTINUOUS PRN
Status: DISCONTINUED | OUTPATIENT
Start: 2023-01-20 | End: 2023-01-20

## 2023-01-20 RX ORDER — LIDOCAINE 40 MG/G
CREAM TOPICAL
Status: DISCONTINUED | OUTPATIENT
Start: 2023-01-20 | End: 2023-01-22 | Stop reason: HOSPADM

## 2023-01-20 RX ORDER — FENTANYL CITRATE 50 UG/ML
INJECTION, SOLUTION INTRAMUSCULAR; INTRAVENOUS PRN
Status: DISCONTINUED | OUTPATIENT
Start: 2023-01-20 | End: 2023-01-20

## 2023-01-20 RX ORDER — SODIUM CHLORIDE, SODIUM LACTATE, POTASSIUM CHLORIDE, CALCIUM CHLORIDE 600; 310; 30; 20 MG/100ML; MG/100ML; MG/100ML; MG/100ML
INJECTION, SOLUTION INTRAVENOUS CONTINUOUS
Status: DISCONTINUED | OUTPATIENT
Start: 2023-01-20 | End: 2023-01-20

## 2023-01-20 RX ORDER — POTASSIUM CHLORIDE 1500 MG/1
20 TABLET, EXTENDED RELEASE ORAL ONCE
Status: COMPLETED | OUTPATIENT
Start: 2023-01-20 | End: 2023-01-20

## 2023-01-20 RX ORDER — LIDOCAINE HYDROCHLORIDE 10 MG/ML
INJECTION, SOLUTION INFILTRATION; PERINEURAL PRN
Status: DISCONTINUED | OUTPATIENT
Start: 2023-01-20 | End: 2023-01-20

## 2023-01-20 RX ADMIN — SODIUM CHLORIDE: 9 INJECTION, SOLUTION INTRAVENOUS at 08:58

## 2023-01-20 RX ADMIN — GABAPENTIN 300 MG: 300 CAPSULE ORAL at 14:44

## 2023-01-20 RX ADMIN — GABAPENTIN 300 MG: 300 CAPSULE ORAL at 21:34

## 2023-01-20 RX ADMIN — FENTANYL CITRATE 50 MCG: 50 INJECTION, SOLUTION INTRAMUSCULAR; INTRAVENOUS at 09:08

## 2023-01-20 RX ADMIN — FENTANYL CITRATE 50 MCG: 50 INJECTION, SOLUTION INTRAMUSCULAR; INTRAVENOUS at 09:05

## 2023-01-20 RX ADMIN — MIDAZOLAM 1 MG: 1 INJECTION INTRAMUSCULAR; INTRAVENOUS at 09:01

## 2023-01-20 RX ADMIN — INSULIN ASPART 5 UNITS: 100 INJECTION, SOLUTION INTRAVENOUS; SUBCUTANEOUS at 13:11

## 2023-01-20 RX ADMIN — INSULIN ASPART 1 UNITS: 100 INJECTION, SOLUTION INTRAVENOUS; SUBCUTANEOUS at 13:10

## 2023-01-20 RX ADMIN — INSULIN ASPART 5 UNITS: 100 INJECTION, SOLUTION INTRAVENOUS; SUBCUTANEOUS at 18:06

## 2023-01-20 RX ADMIN — LIDOCAINE HYDROCHLORIDE 3 ML: 10 INJECTION, SOLUTION INFILTRATION; PERINEURAL at 09:04

## 2023-01-20 RX ADMIN — GABAPENTIN 300 MG: 300 CAPSULE ORAL at 07:57

## 2023-01-20 RX ADMIN — PHENYLEPHRINE HYDROCHLORIDE 100 MCG: 10 INJECTION INTRAVENOUS at 09:14

## 2023-01-20 RX ADMIN — MIDAZOLAM 1 MG: 1 INJECTION INTRAMUSCULAR; INTRAVENOUS at 09:05

## 2023-01-20 RX ADMIN — SODIUM CHLORIDE, POTASSIUM CHLORIDE, SODIUM LACTATE AND CALCIUM CHLORIDE: 600; 310; 30; 20 INJECTION, SOLUTION INTRAVENOUS at 05:31

## 2023-01-20 RX ADMIN — POTASSIUM CHLORIDE 20 MEQ: 1500 TABLET, EXTENDED RELEASE ORAL at 12:14

## 2023-01-20 RX ADMIN — MAGNESIUM OXIDE TAB 400 MG (241.3 MG ELEMENTAL MG) 400 MG: 400 (241.3 MG) TAB at 14:44

## 2023-01-20 RX ADMIN — INSULIN ASPART 2 UNITS: 100 INJECTION, SOLUTION INTRAVENOUS; SUBCUTANEOUS at 18:05

## 2023-01-20 RX ADMIN — PIPERACILLIN AND TAZOBACTAM 3.38 G: 3; .375 INJECTION, POWDER, LYOPHILIZED, FOR SOLUTION INTRAVENOUS at 18:12

## 2023-01-20 RX ADMIN — MAGNESIUM OXIDE TAB 400 MG (241.3 MG ELEMENTAL MG) 400 MG: 400 (241.3 MG) TAB at 07:57

## 2023-01-20 RX ADMIN — PIPERACILLIN AND TAZOBACTAM 3.38 G: 3; .375 INJECTION, POWDER, LYOPHILIZED, FOR SOLUTION INTRAVENOUS at 07:56

## 2023-01-20 RX ADMIN — PROPOFOL 125 MCG/KG/MIN: 10 INJECTION, EMULSION INTRAVENOUS at 09:06

## 2023-01-20 RX ADMIN — VANCOMYCIN HYDROCHLORIDE 1250 MG: 5 INJECTION, POWDER, LYOPHILIZED, FOR SOLUTION INTRAVENOUS at 14:44

## 2023-01-20 RX ADMIN — PHENYLEPHRINE HYDROCHLORIDE 100 MCG: 10 INJECTION INTRAVENOUS at 09:16

## 2023-01-20 RX ADMIN — MAGNESIUM OXIDE TAB 400 MG (241.3 MG ELEMENTAL MG) 400 MG: 400 (241.3 MG) TAB at 12:15

## 2023-01-20 ASSESSMENT — ACTIVITIES OF DAILY LIVING (ADL)
ADLS_ACUITY_SCORE: 18

## 2023-01-20 NOTE — PROGRESS NOTES
[facility]    Medicine Progress Note - Hospitalist Service    Date of Admission:  1/18/2023    Assessment & Plan      Abdulaziz Rausch is a 57 year old male with PMH of poorly controlled diabetes, HTN, admitted on 1/18/2023.     #Diabetic foot ulcers patient is diabetic polyneuropathy  X-ray bilateral feet negative for fractures, degenerative changes osteomyelitis.  ED provider contacted podiatry, who recommended for MRI and CHIQUITA.  The resting right ankle-brachial index measures 1.17. The resting left ankle-brachial index measures 1.20.  Both within normal limits.  -- Feet MRI performed, no OM  -- Patient underwent podiatric procedure today (1/20)    #Poorly controlled diabetes mellitus type 2  -- A1c 13.7.   on admission, with AG 7, lactic acid 1.5, negative serum ketones.  Today his PCP prescribed Lantus 10 units nightly, NovoLog 3 units 3 times daily with meals, and Glucophage 500 mg twice daily.Patient admits to being diagnosed with diabetes nearly 15 years ago.  He does not take insulin or oral meds, for unknown reason.  He denies financial difficulty to afford medications.  -- Status post 2 L NS bolus in ED  -- Vancomycin/Zosyn started in ED, will continue  -- Follow-up on blood cultures, which were obtained in ED, after dose of Zosyn given  -- Continue with IVF  -- Serum glucose suboptimally controlled  -- Increase to Lantus 18 units at bedtime  -- Correction NovoLog, prandial 5 units 3 times daily  -- Holding Glucophage for now  -- Diabetes educator consult  -- Diabetic diet    #Pseudohyponatremia of hyperglycemia  -- Sodium correcting to normal, given BG on admission 792.  S/p IVF.  -- Abdomen glycemic control.  Monitor sodium.    #Essential hypertension  -- Started on 10 mg of lisinopril.  -- Monitor BP.    #MDD  -- On Celexa.       Diet: Moderate Consistent Carb (60 g CHO per Meal) Diet    DVT Prophylaxis: Pneumatic Compression Devices  Fischer Catheter: Not present  Lines:  None     Cardiac Monitoring: None  Code Status: Full Code      Clinically Significant Risk Factors                       # DMII: A1C = 13.7 % (Ref range: <5.7 %) within past 3 months, PRESENT ON ADMISSION          Disposition Plan      Expected Discharge Date: 01/23/2023    Discharge Delays: Procedure Pending (enter procedure & time in comments)  Destination: home            Jean Carlos Jules DO, MHS  Hospitalist Service  [facility]  Securely message with [employer] (more info)  Text page via [employer] Paging/Directory   ______________________________________________________________________    Interval History   Patient is sleeping comfortably upon entering room.  Per nursing staff no acute events overnight.  Patient underwent podiatric procedure today (1/20).    Physical Exam   Vital Signs: Temp: 97.4  F (36.3  C) Temp src: Oral BP: 133/62 Pulse: 77   Resp: 16 SpO2: 97 % O2 Device: None (Room air)    Weight: 130 lbs 1.14 oz    GENERAL: Alert and oriented x 3; no acute distress; well-nourished.  HEENT: Normocephalic; atraumatic; PERRLA; MMM.  CV: RRR; normal S1, S2; no rubs, murmurs, or gallops.  RESP: Lung fields clear to aucultation B/L; no wheezing or crepitations.  GI: Abdomen is soft, nontender, nondistended; no organomegaly; normal bowel sounds.  : Deferred genital examination.   MSK: Right toes wrapped.  DERM: Skin is intact; no rash, lesions, or skin breakdown.  NEURO: No focal deficits appreciated; strength & sensorium are grossly intact.  PSYCH: No active hallucinations; affect, insight appear within normal limits.    Medical Decision Making       35 MINUTES SPENT BY ME on the date of service doing chart review, history, exam, documentation & further activities per the note.    Data     I have personally reviewed the following data over the past 24 hrs:    N/A  \   N/A   / N/A     N/A N/A N/A /  223 (H)   3.8 N/A N/A \       Imaging results reviewed over the past 24 hrs:   No results found for  this or any previous visit (from the past 24 hour(s)).

## 2023-01-20 NOTE — ANESTHESIA CARE TRANSFER NOTE
Patient: Abdulaziz Rausch    Procedure: Procedure(s):  IRRIGATION AND DEBRIDEMENT digits 2 and 3 right foot       Diagnosis: Wound infection [T14.8XXA, L08.9]  Foot infection [L08.9]  Diagnosis Additional Information: No value filed.    Anesthesia Type:   MAC     Note:    Oropharynx: oropharynx clear of all foreign objects  Level of Consciousness: awake  Oxygen Supplementation: room air    Independent Airway: airway patency satisfactory and stable  Dentition: dentition unchanged  Vital Signs Stable: post-procedure vital signs reviewed and stable  Report to RN Given: handoff report given  Patient transferred to: Medical/Surgical Unit    Handoff Report: Identifed the Patient, Identified the Reponsible Provider, Reviewed the pertinent medical history, Discussed the surgical course, Reviewed Intra-OP anesthesia mangement and issues during anesthesia, Set expectations for post-procedure period and Allowed opportunity for questions and acknowledgement of understanding      Vitals:  Vitals Value Taken Time   /58    Temp 98.4    Pulse 65    Resp 14    SpO2 98        Electronically Signed By: AMANDA Parr CRNA  January 20, 2023  9:40 AM

## 2023-01-20 NOTE — ANESTHESIA PREPROCEDURE EVALUATION
Anesthesia Pre-Procedure Evaluation    Patient: Abdulaziz Rausch   MRN: [id number] : 1965        Procedure : Procedure(s):  IRRIGATION AND DEBRIDEMENT digits 2 and 3 right foot          No past medical history on file.   No past surgical history on file.   No Known Allergies   Social History     Tobacco Use     Smoking status: Never     Smokeless tobacco: Never   Substance Use Topics     Alcohol use: Not on file      Wt Readings from Last 1 Encounters:   23 59 kg (130 lb 1.1 oz)        Anesthesia Evaluation   Pt has had prior anesthetic.     No history of anesthetic complications       ROS/MED HX  ENT/Pulmonary:    (-) recent URI   Neurologic:       Cardiovascular:     (+) Dyslipidemia hypertension-----    METS/Exercise Tolerance:     Hematologic:       Musculoskeletal:       GI/Hepatic:    (-) GERD   Renal/Genitourinary:    (-) renal disease   Endo:     (+) type II DM (uncontrolled), Last HgA1c: 13.6, Diabetic complications: neuropathy.     Psychiatric/Substance Use:     (+) psychiatric history depression     Infectious Disease:       Malignancy:       Other:            Physical Exam    Airway        Mallampati: II   TM distance: > 3 FB   Neck ROM: full     Respiratory Devices and Support         Dental       (+) Multiple visibly decayed, broken teeth      Cardiovascular          Rhythm and rate: regular     Pulmonary           breath sounds clear to auscultation           OUTSIDE LABS:  CBC:   Lab Results   Component Value Date    WBC 9.5 2023    WBC 6.5 2023    HGB 9.7 (L) 2023    HGB 10.7 (L) 2023    HCT 28.2 (L) 2023    HCT 31.9 (L) 2023     2023     2023     BMP:   Lab Results   Component Value Date     2023     (L) 2023    POTASSIUM 4.7 2023    POTASSIUM 4.3 2023    CHLORIDE 108 (H) 2023    CHLORIDE 100 2023    CO2 22 2023    CO2 23 2023    BUN 22.8 2023    BUN 25.6 (H)  01/18/2023    CR 1.11 01/19/2023    CR 1.02 01/18/2023     (H) 01/20/2023     (H) 01/20/2023     COAGS: No results found for: PTT, INR, FIBR  POC: No results found for: BGM, HCG, HCGS  HEPATIC:   Lab Results   Component Value Date    ALBUMIN 3.6 08/26/2021    PROTTOTAL 6.6 08/26/2021    ALT 16 08/26/2021    AST 15 08/26/2021    ALKPHOS 98 08/26/2021    BILITOTAL 0.3 08/26/2021     OTHER:   Lab Results   Component Value Date    LACT 1.5 01/18/2023    A1C 13.7 (H) 01/18/2023    AMIRAH 8.3 (L) 01/19/2023    MAG 1.9 01/19/2023    SED 79 (H) 01/18/2023       Anesthesia Plan    ASA Status:  3   NPO Status:  NPO Appropriate    Anesthesia Type: MAC.              Consents    Anesthesia Plan(s) and associated risks, benefits, and realistic alternatives discussed. Questions answered and patient/representative(s) expressed understanding.     - Discussed: Risks, Benefits and Alternatives for BOTH SEDATION and the PROCEDURE were discussed     - Discussed with:  Patient         Postoperative Care            Comments:                Elenita Delacruz MD

## 2023-01-20 NOTE — PLAN OF CARE
Problem: Plan of Care - These are the overarching goals to be used throughout the patient stay.    Goal: Optimal Comfort and Wellbeing  Outcome: Progressing     Problem: Hyperglycemia  Goal: Blood Glucose Level Within Targeted Range  Outcome: Progressing     Problem: Wound Healing Progression  Goal: Optimal Wound Healing  Outcome: Progressing  Intervention: Promote Wound Healing  Recent Flowsheet Documentation  Taken 1/20/2023 0729 by Connie Wong, RN  Activity Management: activity adjusted per tolerance     Problem: Surgery Nonspecified  Goal: Absence of Infection Signs and Symptoms  Outcome: Progressing  Goal: Optimal Pain Control and Function  Outcome: Progressing   Goal Outcome Evaluation:       Patient alert and orientated this morning. BG 2652, held insulin for surgical procedure /63 held lisinopril for parameters. Patient went down to surgery at 8 AM this morning and returned at 10:00 BP 87/51, alert but drowsy from sedation. Right foot wrapped. WBAT with post op boot, stores called to have available for patient. Diet to resume.

## 2023-01-20 NOTE — OR NURSING
Dr SID Delacruz was informed of 262 accu check rersult that was completed here in [facility]. No new orders.

## 2023-01-20 NOTE — PLAN OF CARE
"  Problem: Plan of Care - These are the overarching goals to be used throughout the patient stay.    Goal: Plan of Care Review  Description: The Plan of Care Review/Shift note should be completed every shift.  The Outcome Evaluation is a brief statement about your assessment that the patient is improving, declining, or no change.  This information will be displayed automatically on your shift note.  Outcome: Progressing  Goal: Patient-Specific Goal (Individualized)  Description: You can add care plan individualizations to a care plan. Examples of Individualization might be:  \"Parent requests to be called daily at 9am for status\", \"I have a hard time hearing out of my right ear\", or \"Do not touch me to wake me up as it startles me\".  Outcome: Progressing  Goal: Absence of Hospital-Acquired Illness or Injury  Outcome: Progressing  Intervention: Identify and Manage Fall Risk  Recent Flowsheet Documentation  Taken 1/20/2023 0500 by Skylar Bryant RN  Safety Promotion/Fall Prevention:   activity supervised   nonskid shoes/slippers when out of bed   clutter free environment maintained  Intervention: Prevent Skin Injury  Recent Flowsheet Documentation  Taken 1/20/2023 0500 by Skylar Bryant RN  Body Position: position changed independently  Goal: Optimal Comfort and Wellbeing  Outcome: Progressing  Goal: Readiness for Transition of Care  Outcome: Progressing     Problem: Hyperglycemia  Goal: Blood Glucose Level Within Targeted Range  Outcome: Progressing     Problem: Wound Healing Progression  Goal: Optimal Wound Healing  Outcome: Progressing  Intervention: Promote Wound Healing  Recent Flowsheet Documentation  Taken 1/20/2023 0500 by Skylar Bryatn, RN  Activity Management: activity adjusted per tolerance   Goal Outcome Evaluation:  Pt slept well overnight. Independent in room. Pt has LR running at 100cc/hr and is due to have I/D this am. Pt has been npo since midnight. Pt denied pain                       "

## 2023-01-20 NOTE — OP NOTE
Date of surgery: 1/20/2023    Surgeon: Jf Huerta D.P.M.    Preoperative diagnosis: Diabetic ulcer digits 2 and 3 right foot    Postoperative diagnosis: Same    Procedure: Debridement with irrigation ulceration digits 2 and 3 right foot.      Anesthesia: MAC    Hemostasis: Pneumatic ankle tourniquet 250 mmHg    Blood loss: None    Specimen: Aerobic and anaerobic wound cultures    Complications: None    Description: The patient was taken to the operating room and placed on the table in supine position.  Under local anesthesia of 0.5% Marcaine plain and 2% lidocaine plain in a one-to-one mixture along with IV sedation the right foot was prepped and draped in usual aseptic manner.  Following exsanguination of the right foot with a Lv's bandage the tourniquet was applied and the following procedures were performed.    Attention was directed to the plantar aspect of the distal tuft of digits 2 and 3 of the right foot.  Black necrotic eschar was not noted.  Utilizing a #10 blade sharp excisional debridement was performed on the plantar aspect of each digit and the eschar was completely removed.  Debridement was continued around the wound margins and to good bleeding tissue was noted.  The size of the wounds following debridement were 2.5 cm x 2.5 cm x 1.0 cm.  Intraoperative wound cultures were then obtained.  The wounds were then irrigated with saline.  A sterile dressing was then applied comprising of bacitracin ointment, Telfa, 4 x 4 gauze, 3 inch Edita, Kerlix and an Ace bandage.  The tourniquet was then deflated and normal color returned to all the digits of the right foot.  The patient appeared to tolerate the procedures and anesthesia well and was transported from the operating room to the recovery room with vital signs stable and neurovascular status intact.

## 2023-01-20 NOTE — PLAN OF CARE
"  Problem: Plan of Care - These are the overarching goals to be used throughout the patient stay.    Goal: Plan of Care Review  Description: The Plan of Care Review/Shift note should be completed every shift.  The Outcome Evaluation is a brief statement about your assessment that the patient is improving, declining, or no change.  This information will be displayed automatically on your shift note.  Outcome: Progressing  Flowsheets (Taken 1/19/2023 2039)  Plan of Care Reviewed With: patient  Goal: Patient-Specific Goal (Individualized)  Description: You can add care plan individualizations to a care plan. Examples of Individualization might be:  \"Parent requests to be called daily at 9am for status\", \"I have a hard time hearing out of my right ear\", or \"Do not touch me to wake me up as it startles me\".  Outcome: Progressing  Goal: Absence of Hospital-Acquired Illness or Injury  Outcome: Progressing  Intervention: Identify and Manage Fall Risk  Recent Flowsheet Documentation  Taken 1/19/2023 2034 by Chandan Peña, RN  Safety Promotion/Fall Prevention:   activity supervised   nonskid shoes/slippers when out of bed   clutter free environment maintained  Goal: Optimal Comfort and Wellbeing  Outcome: Progressing  Goal: Readiness for Transition of Care  Outcome: Progressing  Intervention: Mutually Develop Transition Plan  Recent Flowsheet Documentation  Taken 1/19/2023 2035 by Chandan Peña, RN  Equipment Currently Used at Home: none     Problem: Hyperglycemia  Goal: Blood Glucose Level Within Targeted Range  Outcome: Progressing     Problem: Wound Healing Progression  Goal: Optimal Wound Healing  Outcome: Progressing   Goal Outcome Evaluation:      Plan of Care Reviewed With: patient                 "

## 2023-01-21 PROBLEM — I10 ESSENTIAL HYPERTENSION: Status: ACTIVE | Noted: 2023-01-21

## 2023-01-21 PROBLEM — F32.1 CURRENT MODERATE EPISODE OF MAJOR DEPRESSIVE DISORDER WITHOUT PRIOR EPISODE (H): Status: ACTIVE | Noted: 2021-08-26

## 2023-01-21 PROBLEM — L97.419 DIABETIC ULCER OF RIGHT MIDFOOT ASSOCIATED WITH TYPE 2 DIABETES MELLITUS (H): Status: ACTIVE | Noted: 2023-01-21

## 2023-01-21 PROBLEM — E11.621 DIABETIC ULCER OF RIGHT MIDFOOT ASSOCIATED WITH TYPE 2 DIABETES MELLITUS (H): Status: ACTIVE | Noted: 2023-01-21

## 2023-01-21 PROBLEM — D64.9 CHRONIC ANEMIA: Status: ACTIVE | Noted: 2023-01-21

## 2023-01-21 LAB
ALBUMIN SERPL BCG-MCNC: 2.7 G/DL (ref 3.5–5.2)
ALP SERPL-CCNC: 340 U/L (ref 40–129)
ALT SERPL W P-5'-P-CCNC: 36 U/L (ref 10–50)
ANION GAP SERPL CALCULATED.3IONS-SCNC: 6 MMOL/L (ref 7–15)
AST SERPL W P-5'-P-CCNC: 24 U/L (ref 10–50)
BASOPHILS # BLD AUTO: 0 10E3/UL (ref 0–0.2)
BASOPHILS NFR BLD AUTO: 0 %
BILIRUB SERPL-MCNC: 0.3 MG/DL
BUN SERPL-MCNC: 20.5 MG/DL (ref 8–23)
CALCIUM SERPL-MCNC: 8.4 MG/DL (ref 8.6–10)
CHLORIDE SERPL-SCNC: 107 MMOL/L (ref 98–107)
CREAT SERPL-MCNC: 1.06 MG/DL (ref 0.67–1.17)
DEPRECATED HCO3 PLAS-SCNC: 25 MMOL/L (ref 22–29)
EOSINOPHIL # BLD AUTO: 0.2 10E3/UL (ref 0–0.7)
EOSINOPHIL NFR BLD AUTO: 3 %
ERYTHROCYTE [DISTWIDTH] IN BLOOD BY AUTOMATED COUNT: 12.5 % (ref 10–15)
GFR SERPL CREATININE-BSD FRML MDRD: 81 ML/MIN/1.73M2
GLUCOSE BLDC GLUCOMTR-MCNC: 144 MG/DL (ref 70–99)
GLUCOSE BLDC GLUCOMTR-MCNC: 181 MG/DL (ref 70–99)
GLUCOSE BLDC GLUCOMTR-MCNC: 241 MG/DL (ref 70–99)
GLUCOSE BLDC GLUCOMTR-MCNC: 315 MG/DL (ref 70–99)
GLUCOSE BLDC GLUCOMTR-MCNC: 98 MG/DL (ref 70–99)
GLUCOSE SERPL-MCNC: 169 MG/DL (ref 70–99)
HCT VFR BLD AUTO: 29.3 % (ref 40–53)
HGB BLD-MCNC: 9.9 G/DL (ref 13.3–17.7)
IMM GRANULOCYTES # BLD: 0 10E3/UL
IMM GRANULOCYTES NFR BLD: 0 %
LYMPHOCYTES # BLD AUTO: 1.8 10E3/UL (ref 0.8–5.3)
LYMPHOCYTES NFR BLD AUTO: 26 %
MAGNESIUM SERPL-MCNC: 2.2 MG/DL (ref 1.7–2.3)
MCH RBC QN AUTO: 29.4 PG (ref 26.5–33)
MCHC RBC AUTO-ENTMCNC: 33.8 G/DL (ref 31.5–36.5)
MCV RBC AUTO: 87 FL (ref 78–100)
MONOCYTES # BLD AUTO: 0.5 10E3/UL (ref 0–1.3)
MONOCYTES NFR BLD AUTO: 8 %
NEUTROPHILS # BLD AUTO: 4.2 10E3/UL (ref 1.6–8.3)
NEUTROPHILS NFR BLD AUTO: 63 %
NRBC # BLD AUTO: 0 10E3/UL
NRBC BLD AUTO-RTO: 0 /100
PLATELET # BLD AUTO: 367 10E3/UL (ref 150–450)
POTASSIUM SERPL-SCNC: 4.1 MMOL/L (ref 3.4–5.3)
PROT SERPL-MCNC: 5.7 G/DL (ref 6.4–8.3)
RBC # BLD AUTO: 3.37 10E6/UL (ref 4.4–5.9)
SODIUM SERPL-SCNC: 138 MMOL/L (ref 136–145)
VANCOMYCIN SERPL-MCNC: 7.6 UG/ML
WBC # BLD AUTO: 6.7 10E3/UL (ref 4–11)

## 2023-01-21 PROCEDURE — 99232 SBSQ HOSP IP/OBS MODERATE 35: CPT | Performed by: PODIATRIST

## 2023-01-21 PROCEDURE — 258N000003 HC RX IP 258 OP 636: Performed by: PODIATRIST

## 2023-01-21 PROCEDURE — 83735 ASSAY OF MAGNESIUM: CPT | Performed by: INTERNAL MEDICINE

## 2023-01-21 PROCEDURE — 99233 SBSQ HOSP IP/OBS HIGH 50: CPT | Performed by: INTERNAL MEDICINE

## 2023-01-21 PROCEDURE — 80053 COMPREHEN METABOLIC PANEL: CPT | Performed by: INTERNAL MEDICINE

## 2023-01-21 PROCEDURE — 85004 AUTOMATED DIFF WBC COUNT: CPT | Performed by: INTERNAL MEDICINE

## 2023-01-21 PROCEDURE — 250N000013 HC RX MED GY IP 250 OP 250 PS 637: Performed by: PODIATRIST

## 2023-01-21 PROCEDURE — 36415 COLL VENOUS BLD VENIPUNCTURE: CPT | Performed by: INTERNAL MEDICINE

## 2023-01-21 PROCEDURE — 120N000001 HC R&B MED SURG/OB

## 2023-01-21 PROCEDURE — 250N000011 HC RX IP 250 OP 636: Performed by: PODIATRIST

## 2023-01-21 PROCEDURE — 80202 ASSAY OF VANCOMYCIN: CPT | Performed by: INTERNAL MEDICINE

## 2023-01-21 RX ADMIN — PIPERACILLIN AND TAZOBACTAM 3.38 G: 3; .375 INJECTION, POWDER, LYOPHILIZED, FOR SOLUTION INTRAVENOUS at 08:59

## 2023-01-21 RX ADMIN — PIPERACILLIN AND TAZOBACTAM 3.38 G: 3; .375 INJECTION, POWDER, LYOPHILIZED, FOR SOLUTION INTRAVENOUS at 01:28

## 2023-01-21 RX ADMIN — GABAPENTIN 300 MG: 300 CAPSULE ORAL at 20:49

## 2023-01-21 RX ADMIN — INSULIN ASPART 2 UNITS: 100 INJECTION, SOLUTION INTRAVENOUS; SUBCUTANEOUS at 17:38

## 2023-01-21 RX ADMIN — INSULIN ASPART 1 UNITS: 100 INJECTION, SOLUTION INTRAVENOUS; SUBCUTANEOUS at 08:59

## 2023-01-21 RX ADMIN — INSULIN ASPART 5 UNITS: 100 INJECTION, SOLUTION INTRAVENOUS; SUBCUTANEOUS at 09:00

## 2023-01-21 RX ADMIN — GABAPENTIN 300 MG: 300 CAPSULE ORAL at 08:59

## 2023-01-21 RX ADMIN — PIPERACILLIN AND TAZOBACTAM 3.38 G: 3; .375 INJECTION, POWDER, LYOPHILIZED, FOR SOLUTION INTRAVENOUS at 16:22

## 2023-01-21 RX ADMIN — GABAPENTIN 300 MG: 300 CAPSULE ORAL at 13:53

## 2023-01-21 RX ADMIN — INSULIN ASPART 5 UNITS: 100 INJECTION, SOLUTION INTRAVENOUS; SUBCUTANEOUS at 13:53

## 2023-01-21 RX ADMIN — VANCOMYCIN HYDROCHLORIDE 1250 MG: 5 INJECTION, POWDER, LYOPHILIZED, FOR SOLUTION INTRAVENOUS at 14:41

## 2023-01-21 RX ADMIN — INSULIN ASPART 5 UNITS: 100 INJECTION, SOLUTION INTRAVENOUS; SUBCUTANEOUS at 17:38

## 2023-01-21 ASSESSMENT — ACTIVITIES OF DAILY LIVING (ADL)
ADLS_ACUITY_SCORE: 19
ADLS_ACUITY_SCORE: 18
ADLS_ACUITY_SCORE: 19
ADLS_ACUITY_SCORE: 18

## 2023-01-21 NOTE — PLAN OF CARE
Problem: Plan of Care - These are the overarching goals to be used throughout the patient stay.    Goal: Absence of Hospital-Acquired Illness or Injury  Outcome: Progressing     Problem: Hyperglycemia  Goal: Blood Glucose Level Within Targeted Range  Outcome: Progressing     Problem: Wound Healing Progression  Goal: Optimal Wound Healing  Outcome: Progressing  Intervention: Promote Wound Healing  Recent Flowsheet Documentation  Taken 1/20/2023 2234 by Nati Mast, RN  Activity Management: activity adjusted per tolerance     Problem: Surgery Nonspecified  Goal: Blood Glucose Level Within Targeted Range  Outcome: Progressing   Goal Outcome Evaluation:Pt alert and oriented, ate 100% of dinner, the charge nurse discontinued the  LR as patient is eating and drinking okay, , Insulin given,patient sleeping off and on.

## 2023-01-21 NOTE — PROGRESS NOTES
"Subjective:    Pt is status post day 1 I&D right second and third toe.  Patient also has wounds on left toes.  He has had no postop pain.  He has peripheral neuropathy with loss of protective sensation.  Denies fever or chills or calf pain.  Has no other new complaints.    ROS:  See above       No Known Allergies    No current outpatient medications on file.       Patient Active Problem List   Diagnosis     Type 2 diabetes mellitus without complication, with long-term current use of insulin (H)     Current moderate episode of major depressive disorder without prior episode (H)     Foot infection     Hypokalemia     Hyponatremia       No past medical history on file.    No past surgical history on file.    No family history on file.    Social History     Tobacco Use     Smoking status: Never     Smokeless tobacco: Never   Substance Use Topics     Alcohol use: Not on file         Exam:    Vitals: /61   Pulse 69   Temp 98.4  F (36.9  C) (Oral)   Resp 18   Ht 1.753 m (5' 9.02\")   Wt 59 kg (130 lb 1.1 oz)   SpO2 96%   BMI 19.20 kg/m    BMI: Body mass index is 19.2 kg/m .  Height: 5' 9.016\"    Constitutional/ general:  Pt is in no apparent distress, appears well-nourished.  Cooperative with history and physical exam.     Psych:  The patient answered questions appropriately.  Normal affect.  Seems to have reasonable expectations, in terms of treatment.     Musculoskeletal:   Dry eschars left foot ends of lesser digits.  There is no erythema edema or drainage here.  Dressing clean dry and intact on right foot.  With removal small dry eschar end of right fourth toe.  Wound end of right third toe is 90% granulation tissue.  No sinus tracts purulence or odor.  No erythema or edema suggestive of infection.  Right second toe has a wound distal and medial toe.  Base 90% granulation tissue.  No purulence or odor.  No erythema suggestive of infection.    Intraoperative culture results pending      A:  Diabetes mellitus " with peripheral neuropathy and LOPS  Postop day 1 I&D right digital ulcers  Left toe eschars    P: Discussed with patient left eschars dry and not infected.  He will watch these and keep these warm and offloaded.  For right fourth toe he will just watch this and no dressing needed.  Right third toe we will dress daily with Medihoney and a Band-Aid.  Right second toe we will dress daily with Medihoney and Mepilex a Band-Aid.  Instructed on how to do this.  Patient has postop shoe in room.  Anytime walking he will use this.  Instructed to keep toes warm.  Clinically these wounds do not appear to be infected.  Would be okay for patient to be discharged and followed up with Dr. Huerta next week.      Burt Gilbert DPM, FACFAS

## 2023-01-21 NOTE — PHARMACY-VANCOMYCIN DOSING SERVICE
Pharmacy Vancomycin Note  Date of Service 2023  Patient's  1965   57 year old, male    Indication: Skin and Soft Tissue Infection  Day of Therapy: 4  Current vancomycin regimen:  1250 mg IV q24h  Current vancomycin monitoring method: AUC  Current vancomycin therapeutic monitoring goal: 400-600 mg*h/L    Insight[employer] Prediction of Current Vancomycin Regimen  Loading dose: N/A  Regimen: 1250 mg IV every 24 hours.  Start time: 16:05 on 2023  Exposure target: AUC24 (range)400-600 mg/L.hr   AUC24,ss: 392 mg/L.hr  Probability of AUC24 > 400: 46 %  Ctrough,ss: 9.3 mg/L  Probability of Ctrough,ss > 20: 1 %  Probability of nephrotoxicity (Lodise MARINA ): 5 %    Current estimated CrCl = Estimated Creatinine Clearance: 62.6 mL/min (based on SCr of 1.06 mg/dL).    Creatinine for last 3 days  2023:  9:37 PM Creatinine 1.02 mg/dL  2023:  6:52 AM Creatinine 1.11 mg/dL  2023:  6:47 AM Creatinine 1.06 mg/dL    Recent Vancomycin Levels (past 3 days)  2023:  1:54 PM Vancomycin 7.6 ug/mL    Vancomycin IV Administrations (past 72 hours)                   vancomycin (VANCOCIN) 1,250 mg in sodium chloride 0.9 % 250 mL intermittent infusion (mg) 1,250 mg New Bag 23 1441     1,250 mg New Bag 23 1444     1,250 mg New Bag 23 1603    vancomycin (VANCOCIN) 250 mg in sodium chloride 0.9 % 100 mL intermittent infusion (mg) 250 mg New Bag 23 205                Nephrotoxins and other renal medications (From now, onward)    Start     Dose/Rate Route Frequency Ordered Stop    23 1500  vancomycin (VANCOCIN) 1,250 mg in sodium chloride 0.9 % 250 mL intermittent infusion         1,250 mg  over 90 Minutes Intravenous EVERY 24 HOURS 23 1758      23 2300  lisinopril (ZESTRIL) tablet 10 mg         10 mg Oral DAILY 23 2248      23 2100  piperacillin-tazobactam (ZOSYN) 3.375 g vial to attach to  mL bag        Note to Pharmacy: For SIDDHARTH, LAVONNE and FACUNDO: For  "Zosyn-naive patients, use the \"Zosyn initial dose + extended infusion\" order panel.    3.375 g  over 240 Minutes Intravenous EVERY 8 HOURS 01/18/23 1750               Contrast Orders - past 72 hours (72h ago, onward)    Start     Dose/Rate Route Frequency Stop    01/18/23 2230  gadobutrol (GADAVIST) injection 6 mL         6 mL Intravenous ONCE 01/18/23 2309          Interpretation of levels and current regimen:  Vancomycin level is reflective of AUC less than 400    Has serum creatinine changed greater than 50% in last 72 hours: No    Urine output:  good urine output    Renal Function: Stable    InsightRX Prediction of Planned New Vancomycin Regimen  Loading dose: N/A  Regimen: 750 mg IV every 12 hours.  Start time: 16:05 on 01/21/2023  Exposure target: AUC24 (range)400-600 mg/L.hr   AUC24,ss: 461 mg/L.hr  Probability of AUC24 > 400: 77 %  Ctrough,ss: 13.9 mg/L  Probability of Ctrough,ss > 20: 6 %  Probability of nephrotoxicity (Lodise MARINA 2009): 9 %      Plan:  1. Increase Dose to 750 mg IV q12h  2. Vancomycin monitoring method: AUC  3. Vancomycin therapeutic monitoring goal: 400-600 mg*h/L  4. Pharmacy will check vancomycin levels as appropriate in 1-3 Days.  5. Serum creatinine levels will be ordered daily for the first week of therapy and at least twice weekly for subsequent weeks.    Devora Hackett, PharmD, BCPS 01/21/23 3:07 PM           "

## 2023-01-21 NOTE — PROGRESS NOTES
[facility]    Medicine Progress Note - Hospitalist Service    Date of Admission:  1/18/2023    Assessment & Plan     Abdulaziz Rausch is a 57 year old male with PMH of poorly controlled diabetes, HTN, admitted on 1/18/2023 for poorly controlled diabetic foot ulcer.      Uncontrolled diabetic foot ulcers: with diabetic polyneuropathy. X-rays and MRIs were negative for osteomyelitis.  ABIs and toe pressures were normal and adequate for wound healing. Received debridement with irrigation of the affected digits of right foot on 1/20/23.   - Follow up wound culture: strep agalactiae and strep aureus  - Continue Vancomycin and Zosyn. Change to oral tomorrow per culture sensitivity  - Podiatry input appreciated: followed up with Dr. Huerta next week.    - Use postop shoe when walking    Diabetes mellitus type 2 with hyperglycemia: Poorly controlled with A1c 13.7.   on admission, negative serum ketones. Patient admits being diagnosed with diabetes nearly 15 years ago.  He does not take insulin or oral meds for unknown reason.  He denies financial difficulty to afford medications.  PCP started Lantus 10 units at bedtime, Humalog 3 units tidac and metformin 500 mg bid on the day of admission.   - Blood sugar reviewed today and it is controlled. Continue current regimen of Lantus 18 units at bedtime, Novolog 5 units tidac and sliding scale.   - Diabetes educator consulted and input appreciated  - Hold PTA metformin     Pseudohyponatremia of hyperglycemia: sodium 126 on admission, which is normal after correcting to elevated blood glucose.  Monitor sodium.     Essential hypertension: Started lisinopril 10 mg daily. BP controlled. Monitor BP.     Depression: On Celexa.    Chronic anemia: Hb10.7 on admission, at his baseline of 9.9 one year ago. Monitor. .        Diet: Moderate Consistent Carb (60 g CHO per Meal) Diet    DVT Prophylaxis: Pneumatic Compression Devices  Fischer Catheter: Not  present  Lines: None     Cardiac Monitoring: None  Code Status: Full Code      Clinically Significant Risk Factors              # Hypoalbuminemia: Lowest albumin = 2.7 g/dL at 1/21/2023  6:47 AM, will monitor as appropriate          # DMII: A1C = 13.7 % (Ref range: <5.7 %) within past 3 months, PRESENT ON ADMISSION          Disposition Plan     Expected Discharge Date: 01/23/2023    Discharge Delays: Procedure Pending (enter procedure & time in comments)  Destination: home            Chuy Cobb MD  Hospitalist Service  [facility]  Securely message with [employer] (more info)  Text page via [facility] Paging/Directory   ______________________________________________________________________    Interval History   Patient reports feeling well. No pain from his right foot. He reports feeling comfortable doing insulin injection.     Physical Exam   Vital Signs: Temp: 98.4  F (36.9  C) Temp src: Oral BP: 105/61 Pulse: 69   Resp: 18 SpO2: 96 % O2 Device: None (Room air)    Weight: 130 lbs 1.14 oz    General appearance: not in acute distress  HEENT: PERRL, EOMI  Lungs: Clear breath sounds in bilateral lung fields  Cardiovascular: Regular rate and rhythm, normal S1-S2  Abdomen: Soft, non tender, no distension  Musculoskeletal: No joint swelling. Right foot in surgical dressing. Toes have good blood flow.    Skin: No rash and no edema  Neurology: AAO ×3.  Cranial nerves II - XII normal.  Normal muscle strength in all four extremities.      Medical Decision Making       45 MINUTES SPENT BY ME on the date of service doing chart review, history, exam, documentation & further activities per the note.      Data     I have personally reviewed the following data over the past 24 hrs:    6.7  \   9.9 (L)   / 367     138 107 20.5 /  98   4.1 25 1.06 \       ALT: 36 AST: 24 AP: 340 (H) TBILI: 0.3   ALB: 2.7 (L) TOT PROTEIN: 5.7 (L) LIPASE: N/A       Imaging results reviewed over the past 24 hrs:   No results found for  this or any previous visit (from the past 24 hour(s)).

## 2023-01-21 NOTE — PLAN OF CARE
Problem: Plan of Care - These are the overarching goals to be used throughout the patient stay.    Goal: Optimal Comfort and Wellbeing  Outcome: Progressing     Problem: Hyperglycemia  Goal: Blood Glucose Level Within Targeted Range  Outcome: Progressing     Problem: Surgery Nonspecified  Goal: Blood Glucose Level Within Targeted Range  Outcome: Progressing  Goal: Absence of Infection Signs and Symptoms  Outcome: Progressing  Goal: Effective Urinary Elimination  Outcome: Progressing   Goal Outcome Evaluation:       Patient denies any pain this morning.  this morning. Right foot wrapped in dressing from surgery intact. Right PTP 2+. Continues on IV antibiotics. Wonders about discharge. Sister called and updated. Ate well for breakfast, wanting more food. [place] to get up and walk with staff after lunch.

## 2023-01-21 NOTE — PLAN OF CARE
Problem: Surgery Nonspecified  Goal: Blood Glucose Level Within Targeted Range  Outcome: Progressing     Problem: Surgery Nonspecified  Goal: Absence of Infection Signs and Symptoms  Outcome: Progressing     Problem: Surgery Nonspecified  Goal: Effective Oxygenation and Ventilation  Intervention: Optimize Oxygenation and Ventilation  Recent Flowsheet Documentation  Taken 1/21/2023 0134 by Sujatha Godoy RN  Head of Bed (HOB) Positioning: HOB at 20 degrees   Goal Outcome Evaluation:  Pt has had an uneventful shift.  Slept well.  Rousable to voice. Answers questions appropriately though makes minimal conversation. Voiding in large amounts.  Denies pain.  Right foot wrapped with ace wrap.  No drainage noted on dressing.  Reports numbness in bilateral L/E's.  Zosyn infusing this shift without incident.  Blood glucose this shift 181.  VSS.

## 2023-01-22 VITALS
BODY MASS INDEX: 19.27 KG/M2 | HEIGHT: 69 IN | HEART RATE: 69 BPM | SYSTOLIC BLOOD PRESSURE: 105 MMHG | DIASTOLIC BLOOD PRESSURE: 60 MMHG | OXYGEN SATURATION: 97 % | WEIGHT: 130.07 LBS | TEMPERATURE: 97.6 F | RESPIRATION RATE: 16 BRPM

## 2023-01-22 LAB
BACTERIA WND CULT: ABNORMAL
GLUCOSE BLDC GLUCOMTR-MCNC: 101 MG/DL (ref 70–99)
GLUCOSE BLDC GLUCOMTR-MCNC: 196 MG/DL (ref 70–99)
GLUCOSE BLDC GLUCOMTR-MCNC: 200 MG/DL (ref 70–99)

## 2023-01-22 PROCEDURE — 250N000011 HC RX IP 250 OP 636: Performed by: PODIATRIST

## 2023-01-22 PROCEDURE — 250N000011 HC RX IP 250 OP 636: Performed by: INTERNAL MEDICINE

## 2023-01-22 PROCEDURE — G0008 ADMIN INFLUENZA VIRUS VAC: HCPCS | Performed by: INTERNAL MEDICINE

## 2023-01-22 PROCEDURE — 99239 HOSP IP/OBS DSCHRG MGMT >30: CPT | Performed by: INTERNAL MEDICINE

## 2023-01-22 PROCEDURE — 258N000003 HC RX IP 258 OP 636: Performed by: INTERNAL MEDICINE

## 2023-01-22 PROCEDURE — 250N000013 HC RX MED GY IP 250 OP 250 PS 637: Performed by: PODIATRIST

## 2023-01-22 PROCEDURE — 90682 RIV4 VACC RECOMBINANT DNA IM: CPT | Performed by: INTERNAL MEDICINE

## 2023-01-22 PROCEDURE — 250N000013 HC RX MED GY IP 250 OP 250 PS 637: Performed by: INTERNAL MEDICINE

## 2023-01-22 RX ORDER — LISINOPRIL 10 MG/1
10 TABLET ORAL DAILY
Qty: 30 TABLET | Refills: 0 | Status: ON HOLD | OUTPATIENT
Start: 2023-01-23 | End: 2023-05-21

## 2023-01-22 RX ORDER — HYDROCODONE BITARTRATE AND ACETAMINOPHEN 5; 325 MG/1; MG/1
1 TABLET ORAL EVERY 6 HOURS PRN
Qty: 10 TABLET | Refills: 0 | Status: SHIPPED | OUTPATIENT
Start: 2023-01-22 | End: 2023-01-25

## 2023-01-22 RX ORDER — ACETAMINOPHEN 500 MG
500-1000 TABLET ORAL EVERY 6 HOURS PRN
Status: ON HOLD | COMMUNITY
Start: 2023-01-22 | End: 2023-02-17

## 2023-01-22 RX ADMIN — VANCOMYCIN HYDROCHLORIDE 750 MG: 5 INJECTION, POWDER, LYOPHILIZED, FOR SOLUTION INTRAVENOUS at 02:25

## 2023-01-22 RX ADMIN — GABAPENTIN 300 MG: 300 CAPSULE ORAL at 08:58

## 2023-01-22 RX ADMIN — INSULIN ASPART 1 UNITS: 100 INJECTION, SOLUTION INTRAVENOUS; SUBCUTANEOUS at 11:36

## 2023-01-22 RX ADMIN — INSULIN ASPART 5 UNITS: 100 INJECTION, SOLUTION INTRAVENOUS; SUBCUTANEOUS at 11:37

## 2023-01-22 RX ADMIN — INSULIN ASPART 5 UNITS: 100 INJECTION, SOLUTION INTRAVENOUS; SUBCUTANEOUS at 08:57

## 2023-01-22 RX ADMIN — AMOXICILLIN AND CLAVULANATE POTASSIUM 1 TABLET: 875; 125 TABLET, FILM COATED ORAL at 11:37

## 2023-01-22 RX ADMIN — INFLUENZA A VIRUS A/WISCONSIN/588/2019 (H1N1) RECOMBINANT HEMAGGLUTININ ANTIGEN, INFLUENZA A VIRUS A/DARWIN/6/2021 (H3N2) RECOMBINANT HEMAGGLUTININ ANTIGEN, INFLUENZA B VIRUS B/AUSTRIA/1359417/2021 RECOMBINANT HEMAGGLUTININ ANTIGEN, AND INFLUENZA B VIRUS B/PHUKET/3073/2013 RECOMBINANT HEMAGGLUTININ ANTIGEN 0.5 ML: 45; 45; 45; 45 INJECTION INTRAMUSCULAR at 11:35

## 2023-01-22 RX ADMIN — PIPERACILLIN AND TAZOBACTAM 3.38 G: 3; .375 INJECTION, POWDER, LYOPHILIZED, FOR SOLUTION INTRAVENOUS at 00:36

## 2023-01-22 ASSESSMENT — ACTIVITIES OF DAILY LIVING (ADL)
ADLS_ACUITY_SCORE: 19

## 2023-01-22 NOTE — DISCHARGE SUMMARY
[facility]  Hospitalist Discharge Summary      Date of Admission:  1/18/2023  Date of Discharge:  1/22/2023  Discharging Provider: Jean Carlos Jules DO  Discharge Service: Hospitalist Service    Discharge Diagnoses   Uncontrolled diabetic foot ulcers  Type 2 diabetes mellitus with hyperglycemia  Pseudohyponatremia of hyperglycemia  Essential hypertension  Depression  Chronic anemia    Follow-ups Needed After Discharge   Follow-up Appointments     Follow-up and recommended labs and tests       Please follow up with primary care provider at patient's earliest   convenience.  Please follow-up with podiatry as scheduled.  Continue prescribed antibiotic regimen until podiatric follow-up.          Podiatry, please assess need for ongoing antibiotic regimen.    Unresulted Labs Ordered in the Past 30 Days of this Admission     Date and Time Order Name Status Description    1/20/2023  9:29 AM Wound Aerobic Bacterial Culture Routine Preliminary     1/20/2023  9:29 AM Anaerobic Bacterial Culture Routine Preliminary     1/18/2023  5:51 PM Blood Culture Peripheral Blood Preliminary     1/18/2023  5:51 PM Blood Culture Peripheral Blood Preliminary       These results will be followed up by primary care provider.    Discharge Disposition   Discharged to home  Condition at discharge: Stable    Hospital Course   Abdulaziz Rausch is a 57 year old male with PMH of poorly controlled diabetes, HTN, admitted on 1/18/2023 for poorly controlled diabetic foot ulcer.      Uncontrolled diabetic foot ulcers: with diabetic polyneuropathy. X-rays and MRIs were negative for osteomyelitis.  ABIs and toe pressures were normal and adequate for wound healing. Received debridement with irrigation of the affected digits of right foot on 1/20/23.   - Follow up wound culture: strep agalactiae and strep aureus  - Continue Vancomycin and Zosyn. Change to oral tomorrow per culture sensitivity  - Podiatry input appreciated: followed up  with Dr. Huerta next week.    - Use postop shoe when walking    Diabetes mellitus type 2 with hyperglycemia: Poorly controlled with A1c 13.7.   on admission, negative serum ketones. Patient admits being diagnosed with diabetes nearly 15 years ago.  He does not take insulin or oral meds for unknown reason.  He denies financial difficulty to afford medications.  PCP started Lantus 10 units at bedtime, Humalog 3 units tidac and metformin 500 mg bid on the day of admission.   - Blood sugar reviewed today and it is controlled. Continue current regimen of Lantus 18 units at bedtime, Novolog 5 units tidac and sliding scale.   - Diabetes educator consulted and input appreciated  - Hold PTA metformin     Pseudohyponatremia of hyperglycemia: sodium 126 on admission, which is normal after correcting to elevated blood glucose.  Monitor sodium.     Essential hypertension: Started lisinopril 10 mg daily. BP controlled. Monitor BP.     Depression: On Celexa.    Chronic anemia: Hb10.7 on admission, at his baseline of 9.9 one year ago. Monitor. .     Consultations This Hospital Stay   PHARMACY TO DOSE VANCO  WOUND OSTOMY CONTINENCE NURSE  IP CONSULT  DIABETES EDUCATION IP CONSULT  PHARMACY TO DOSE VANCO    Code Status   Full Code    Time Spent on this Encounter   I, Jean Carlos Jules DO, personally saw the patient today and spent greater than 30 minutes discharging this patient.       Jean Carlos Jules DO, [address] [address]  Phone: [phone]  Fax: [phone]  ______________________________________________________________________    Primary Care Physician   Daryn Pittman    Discharge Orders      Reason for your hospital stay    Patient was admitted to the hospital for evaluation and treatment of diabetic foot infection.     Follow-up and recommended labs and tests     Please follow up with primary care provider at patient's earliest convenience.  Please follow-up  with podiatry as scheduled.  Continue prescribed antibiotic regimen until podiatric follow-up.     Activity    Your activity upon discharge: activity as tolerated     Diet    Follow this diet upon discharge: Orders Placed This Encounter      Moderate Consistent Carb (60 g CHO per Meal) Diet       Significant Results and Procedures   Results for orders placed or performed during the hospital encounter of 01/18/23   Foot  XR, G/E 3 views, right    Narrative    EXAM: XR FOOT RIGHT G/E 3 VIEWS  LOCATION: [facility]  DATE/TIME: 1/18/2023 1:57 PM    INDICATION: Foot pain. Soft tissue wound.  COMPARISON: None.      Impression    IMPRESSION: No fracture. No degenerative changes. No osteomyelitis.   Foot XR, G/E 3 views, left    Narrative    EXAM: XR FOOT LEFT G/E 3 VIEWS  LOCATION: [facility]  DATE/TIME: 1/18/2023 1:57 PM    INDICATION: Foot pain. Wound.  COMPARISON: None.      Impression    IMPRESSION: No fracture. No degenerative changes. No osteomyelitis.   US CHIQUITA Doppler No Exercise 1-2 Levels Bilateral    Narrative    [facility]    DATE: 1/18/2023 8:51 PM    EXAM: RESTING ANKLE-BRACHIAL INDICES (ABIs) AND DUPLEX ARTERIAL ULTRASOUND OF THE LOWER EXTREMITIES BILATERALLY    INDICATION: Diabetic foot ulcers.    COMPARISON: None.    TECHNIQUE: Duplex imaging is performed utilizing gray-scale, two-dimensional images, and color-flow imaging. Doppler waveform analysis and spectral Doppler imaging is also performed.    CHIQUITA FINDINGS:   RIGHT (mmHg)  Ankle (PT): >254; Index NC  Ankle (DP): 230; Index 1.17    LEFT (mmHg)  Brachial: 197  Ankle (PT): >254; Index NC  Ankle (DP): 237; Index 1.20    The resting right ankle-brachial index measures 1.17. The resting left ankle-brachial index measures 1.20.    DUPLEX ARTERIAL ULTRASOUND FINDINGS:   RIGHT (cm/s)  EIA: 141 T  CFA: 126 T  PFA: 90 B  SFA-Proximal: 162 T  SFA-Mid: 168 T  SFA-Distal: 177 T  Popliteal: 107  B  PTA: 140 M  FRANCESCO: 87 B  DPA: 99 M  (M=monophasic, B=biphasic, T=triphasic)    LEFT (cm/s)  EIA: 147 B  CFA: 86 B  PFA: 65 B  SFA-Proximal: 168 B  SFA-Mid: 155 T  SFA-Distal: 103 B  Popliteal: 133 B  PTA: 112 M  FRANCESCO: 89 B  DPA: 77 B  (M=monophasic, B=biphasic, T=triphasic)    Right lower extremity: Multiphasic waveforms throughout the right lower extremity with the exception of the dorsalis pedis artery which is monophasic.    Left lower extremity: Multiphasic waveforms throughout the left lower extremity.      Impression    IMPRESSION:  1. RIGHT LOWER EXTREMITY: Noncompressible arteries of the right lower extremity suggestive of calcific atherosclerotic involvement. The calculated ankle-brachial index is, however, within normal limits. Multiphasic waveforms throughout the right lower   extremity with the exception of the dorsalis pedis artery which is monophasic.    2. LEFT LOWER EXTREMITY: Noncompressible arteries of the left lower extremity suggestive of calcific atherosclerotic involvement. The calculated ankle-brachial index is, however, within normal limits. Multiphasic waveforms throughout the left lower   extremity.   US Lower Extremity Arterial Duplex Bilateral    [facility]    DATE: 1/18/2023 8:51 PM    EXAM: RESTING ANKLE-BRACHIAL INDICES (ABIs) AND DUPLEX ARTERIAL ULTRASOUND OF THE LOWER EXTREMITIES BILATERALLY    INDICATION: Diabetic foot ulcers.    COMPARISON: None.    TECHNIQUE: Duplex imaging is performed utilizing gray-scale, two-dimensional images, and color-flow imaging. Doppler waveform analysis and spectral Doppler imaging is also performed.    CHIQUITA FINDINGS:   RIGHT (mmHg)  Ankle (PT): >254; Index NC  Ankle (DP): 230; Index 1.17    LEFT (mmHg)  Brachial: 197  Ankle (PT): >254; Index NC  Ankle (DP): 237; Index 1.20    The resting right ankle-brachial index measures 1.17. The resting left ankle-brachial index measures 1.20.    DUPLEX ARTERIAL ULTRASOUND FINDINGS:    RIGHT (cm/s)  EIA: 141 T  CFA: 126 T  PFA: 90 B  SFA-Proximal: 162 T  SFA-Mid: 168 T  SFA-Distal: 177 T  Popliteal: 107 B  PTA: 140 M  FRANCESCO: 87 B  DPA: 99 M  (M=monophasic, B=biphasic, T=triphasic)    LEFT (cm/s)  EIA: 147 B  CFA: 86 B  PFA: 65 B  SFA-Proximal: 168 B  SFA-Mid: 155 T  SFA-Distal: 103 B  Popliteal: 133 B  PTA: 112 M  FRANCESCO: 89 B  DPA: 77 B  (M=monophasic, B=biphasic, T=triphasic)    Right lower extremity: Multiphasic waveforms throughout the right lower extremity with the exception of the dorsalis pedis artery which is monophasic.    Left lower extremity: Multiphasic waveforms throughout the left lower extremity.      Impression    IMPRESSION:  1. RIGHT LOWER EXTREMITY: Noncompressible arteries of the right lower extremity suggestive of calcific atherosclerotic involvement. The calculated ankle-brachial index is, however, within normal limits. Multiphasic waveforms throughout the right lower   extremity with the exception of the dorsalis pedis artery which is monophasic.    2. LEFT LOWER EXTREMITY: Noncompressible arteries of the left lower extremity suggestive of calcific atherosclerotic involvement. The calculated ankle-brachial index is, however, within normal limits. Multiphasic waveforms throughout the left lower   extremity.   MR Foot Right w/o & w Contrast    Narrative    EXAM: MR FOOT RIGHT W/O and W CONTRAST  LOCATION: [facility]  DATE/TIME: 1/18/2023 11:36 PM    INDICATION: 58 yo male w  DM II and med non compliance here w  diabetic foot ulcers to 2nd 3rd digits of bilateral feet  COMPARISON: None.  TECHNIQUE: Routine. Additional postgadolinium T1 sequences were obtained.  IV CONTRAST: 6ml Gadavist    FINDINGS:     JOINTS AND BONES:   -No fracture or bone contusion. Normal articular cartilage. No effusion. There is abnormal signal seen which involves the second toe mid and distal phalanges and the third toe distal phalanx which would be worrisome for changes of  osteomyelitis. No andrews   destructive changes are seen in the bony skeleton.    TENDONS:   -No tendon tear, tendinopathy, or tenosynovitis.     LIGAMENTS:   -Lisfranc ligament: Intact. No subluxation.    MUSCLES AND SOFT TISSUES:   -By history there are ulcers seen involving the second third toes. There is some edema seen involving the dorsal skin of the foot      Impression    IMPRESSION:  1.  There is some loss of detail on this study secondary to inhomogeneous fat saturation given the peripheral location of the toes.    2.  There is abnormal signal seen with no andrews destructive changes involving the second toe middle and distal phalanx and the third toe distal phalanx. Given patient's history changes can be seen with osteomyelitis.    3.  No evidence for an abscess.   MR Foot Left w/o & w Contrast    Narrative    EXAM: MR FOOT LEFT W/O and W CONTRAST  LOCATION: [facility]  DATE/TIME: 1/18/2023 11:36 PM    INDICATION: 58 yo m w  DM II, med non compliance here w  bilateral diabetic foot ulcers to 2nd 3rd digits  COMPARISON: None.  TECHNIQUE: Routine. Additional postgadolinium T1 sequences were obtained.  IV CONTRAST: 6ml Gadavist    FINDINGS:     JOINTS AND BONES:   -No fracture, bone contusion, or findings suggestive osteomyelitis. Normal articular cartilage. No effusion. There are some mild degenerative changes seen most marked in the first MTP joint and scattered in the mid foot.    TENDONS:   -No tendon tear, tendinopathy, or tenosynovitis.     LIGAMENTS:   -Lisfranc ligament: Intact. No subluxation.    MUSCLES AND SOFT TISSUES:   -No muscle atrophy or edema. No soft tissue mass or fluid collection.      Impression    IMPRESSION:  1.  There is some loss of detail given discrepancy in the fat saturation technique involving some of the toes given the peripheral location.    2.  By history patient has foot ulcers.    3.  No findings seen to suggest osteomyelitis, myositis, or abscess.        Discharge Medications   Current Discharge Medication List      START taking these medications    Details   acetaminophen (TYLENOL) 500 MG tablet Take 1-2 tablets (500-1,000 mg) by mouth every 6 hours as needed for mild pain    Associated Diagnoses: Diabetic ulcer of right midfoot associated with type 2 diabetes mellitus, unspecified ulcer stage (H)      amoxicillin-clavulanate (AUGMENTIN) 875-125 MG tablet Take 1 tablet by mouth every 12 hours for 14 days  Qty: 28 tablet, Refills: 0    Associated Diagnoses: Diabetic ulcer of right midfoot associated with type 2 diabetes mellitus, unspecified ulcer stage (H)      HYDROcodone-acetaminophen (NORCO) 5-325 MG tablet Take 1 tablet by mouth every 6 hours as needed for severe pain (7-10)  Qty: 10 tablet, Refills: 0    Associated Diagnoses: Diabetic ulcer of right midfoot associated with type 2 diabetes mellitus, unspecified ulcer stage (H)      lisinopril (ZESTRIL) 10 MG tablet Take 1 tablet (10 mg) by mouth daily for 30 days  Qty: 30 tablet, Refills: 0    Associated Diagnoses: Hypertension, unspecified type         CONTINUE these medications which have NOT CHANGED    Details   alcohol swab prep pads Use to swab area of injection/ella as directed.  Qty: 100 each, Refills: 3    Associated Diagnoses: Type 2 diabetes mellitus without complication, with long-term current use of insulin (H)      blood glucose (NO BRAND SPECIFIED) test strip Use to test blood sugar 4 times daily or as directed. To accompany: Blood Glucose Monitor Brands: per insurance.  Qty: 100 strip, Refills: 6    Associated Diagnoses: Type 2 diabetes mellitus without complication, with long-term current use of insulin (H)      blood glucose monitoring (NO BRAND SPECIFIED) meter device kit Use to test blood sugar 4 time daily or as directed. Preferred blood glucose meter OR supplies to accompany: Blood Glucose Monitor Brands: per insurance.  Qty: 1 kit, Refills: 0    Associated Diagnoses: Type 2 diabetes  mellitus without complication, with long-term current use of insulin (H)      gabapentin (NEURONTIN) 300 MG capsule Take 1 capsule (300 mg) by mouth 3 times daily  Qty: 90 capsule, Refills: 1    Associated Diagnoses: Diabetic polyneuropathy associated with type 2 diabetes mellitus (H)      insulin glargine (LANTUS SOLOSTAR) 100 UNIT/ML pen Inject 10 Units Subcutaneous At Bedtime  Qty: 3 mL, Refills: 1    Comments: If Lantus is not covered by insurance, may substitute Basaglar or Semglee or other insulin glargine product per insurance preference at same dose and frequency.    Associated Diagnoses: Type 2 diabetes mellitus without complication, with long-term current use of insulin (H)      insulin lispro (HUMALOG KWIKPEN) 100 UNIT/ML (1 unit dial) KWIKPEN Inject 3 Units Subcutaneous 3 times daily (before meals)  Qty: 3 mL, Refills: 1    Associated Diagnoses: Type 2 diabetes mellitus without complication, with long-term current use of insulin (H)      insulin pen needle (31G X 8 MM) 31G X 8 MM miscellaneous Use 1 pen needles daily or as directed.  Qty: 100 each, Refills: 0    Associated Diagnoses: Type 2 diabetes mellitus without complication, with long-term current use of insulin (H)      metFORMIN (GLUCOPHAGE) 500 MG tablet Take 1 tablet (500 mg) by mouth 2 times daily (with meals)  Qty: 60 tablet, Refills: 1    Associated Diagnoses: Type 2 diabetes mellitus without complication, with long-term current use of insulin (H)      thin (NO BRAND SPECIFIED) lancets Use with lanceting device. To accompany: Blood Glucose Monitor Brands: per insurance.  Qty: 100 each, Refills: 6    Associated Diagnoses: Type 2 diabetes mellitus without complication, with long-term current use of insulin (H)      citalopram (CELEXA) 10 MG tablet Take 1 tablet (10 mg) by mouth daily  Qty: 30 tablet, Refills: 1    Associated Diagnoses: Current moderate episode of major depressive disorder without prior episode (H)           Allergies   No Known  Allergies

## 2023-01-22 NOTE — PLAN OF CARE
Problem: Plan of Care - These are the overarching goals to be used throughout the patient stay.    Goal: Plan of Care Review  Description: The Plan of Care Review/Shift note should be completed every shift.  The Outcome Evaluation is a brief statement about your assessment that the patient is improving, declining, or no change.  This information will be displayed automatically on your shift note.  Outcome: Adequate for Care Transition  Flowsheets (Taken 1/22/2023 1220)  Plan of Care Reviewed With: patient   Goal Outcome Evaluation:      Plan of Care Reviewed With: patient      Patient denies any pain.  and 200 today (had an early lunch), discharging to home today. Dressing changed to right 2nd and 3rd toes today. Added dressing instructions to discharge paper work and follow up with Dr. Huerta in 1 week per podiatry notes from 1-21-23. Ambulated in hallway with staff tolerated well. MD witnessed ambulation.   Discussed diabetes and insulin and diet with patient. Encourage to follow regime.

## 2023-01-22 NOTE — PLAN OF CARE
Problem: Plan of Care - These are the overarching goals to be used throughout the patient stay.    Goal: Absence of Hospital-Acquired Illness or Injury  Outcome: Progressing     Problem: Plan of Care - These are the overarching goals to be used throughout the patient stay.    Goal: Optimal Comfort and Wellbeing  Outcome: Progressing     Problem: Wound Healing Progression  Goal: Optimal Wound Healing  Outcome: Progressing     Problem: Surgery Nonspecified  Goal: Effective Urinary Elimination  Outcome: Progressing     Goal Outcome Evaluation:    Patient is alert and oriented x 4 and able to make needs known. Patient denies pain. Patient ate 100% dinner. Glucoses at 241 and 315, insulin given per sliding scale. Patient had one brown, soft bowel movement. Antibiotics were ran and tolerated well. Patient is curious about discharge.

## 2023-01-22 NOTE — PLAN OF CARE
Problem: Plan of Care - These are the overarching goals to be used throughout the patient stay.    Goal: Optimal Comfort and Wellbeing  Outcome: Progressing     Problem: Hyperglycemia  Goal: Blood Glucose Level Within Targeted Range  Outcome: Progressing   Goal Outcome Evaluation:  No new events overnight.  Denied pain.  VSS.  Voided in urinal in adequate amounts.  Zosyn and vanco infused overnight.  New iv started in right arm.  Dressing to right foot intact.  Blood glucose mid shift was 196.

## 2023-01-23 LAB — BACTERIA BLD CULT: NO GROWTH

## 2023-01-23 NOTE — RESULT ENCOUNTER NOTE
Called and discussed MRSA results with patient. Called in prescription to [facility] at [address] [place] [address]. Doxycycline 100 mg PO BID. Patient has podiatry follow-up scheduled.

## 2023-01-24 ENCOUNTER — PATIENT OUTREACH (OUTPATIENT)
Dept: CARE COORDINATION | Facility: CLINIC | Age: 58
End: 2023-01-24
Payer: COMMERCIAL

## 2023-01-24 LAB — BACTERIA BLD CULT: NO GROWTH

## 2023-01-24 NOTE — PROGRESS NOTES
"Clinic Care Coordination Contact  [facility]: Post-Discharge Note  SITUATION                                                      Admission:    Admission Date: 01/18/23   Reason for Admission: Diabetic foot ulcer and hyperglycemia  Discharge:   Discharge Date: 01/22/23  Discharge Diagnosis: Diabetic foot ulcer    BACKGROUND                                                      Per hospital discharge summary and inpatient provider notes:    Abdulaziz Rausch is a 57 year old male with a pertinent history of DM II who presents to this ED for wound check. Patient saw his PCP earlier today for evaluation of ulcers to the tips of his second and third digits on the R foot. These have been present for at least 2-3 weeks now. He was seen at [facility] w/ initial concern for frost bite given appearance of his feet. He then followed up w/ his PCP earlier today who recommended he come to the ER for further evaluation and likely admission. Patient reports that he does have hx of type 2 diabetes and that he has previously been on lantus and sliding scale correction short acting insulin along w/ 500 mg Metformin daily. He has not been taking any of these medications for at least the last 8 months now. At baseline patient has diabetic neuropathy and cannot feel below his ankles. He has been seen for diabetic ulcers in the past and had these debrided but has never had any digits amputated. On arrival here he does have black ulcers over the tips the of the second and third toes on the R with surrounding redness. He also has developing ulcers on the same toes on the L, though these appear less severe and do not have surrounding redness. Pt denies any associated chills, fevers, cough, cold, vision changes, chest pain, SOB, abdominal pain, changes in bowel or bladder, or new leg swelling.     ASSESSMENT           Discharge Assessment  How are you doing now that you are home?: \"I am doing really good. I am getting around the " "house just fine and I made an appointment for Thursday.\"  How are your symptoms? (Red Flag symptoms escalate to triage hotline per guidelines): Improved  Do you feel your condition is stable enough to be safe at home until your provider visit?: Yes  Does the patient have their discharge instructions? : Yes  Does the patient have questions regarding their discharge instructions? : No  Were you started on any new medications or were there changes to any of your previous medications? : Yes  Does the patient have all of their medications?: Yes  Do you have questions regarding any of your medications? : No  Do you have all of your needed medical supplies or equipment (DME)?  (i.e. oxygen tank, CPAP, cane, etc.): Yes  Discharge follow-up appointment scheduled within 14 calendar days? : Yes  Discharge Follow Up Appointment Date: 01/26/23  Discharge Follow Up Appointment Scheduled with?: Specialty Care Provider         Post-op (Clinicians Only)  Did the patient have surgery or a procedure: Yes  Incision: healing  Drainage: No  Fever: No  Chills: No  Redness: No  Warmth: No  Swelling: No  Incision site pain: No  Closure: none  Eating & Drinking: eating and drinking without complaints/concerns  PO Intake: regular diet  Bowel Function: normal  Date of last BM: 01/23/23  Urinary Status: voiding without complaint/concerns        PLAN                                                      Outpatient Plan:      Follow-up Appointments     Follow-up and recommended labs and tests       Please follow up with primary care provider at patient's earliest   convenience.  Please follow-up with podiatry as scheduled.  Continue prescribed antibiotic regimen until podiatric follow-up.       Future Appointments   Date Time Provider Department Center   1/26/2023 11:20 AM Jf Huerta DPM [facility]         For any urgent concerns, please contact our 24 hour nurse triage line: [phone] ([phone])         Joslyn Rizo, " RN

## 2023-01-25 ENCOUNTER — TELEPHONE (OUTPATIENT)
Dept: PODIATRY | Facility: CLINIC | Age: 58
End: 2023-01-25
Payer: COMMERCIAL

## 2023-01-25 DIAGNOSIS — Z79.4 TYPE 2 DIABETES MELLITUS WITHOUT COMPLICATION, WITH LONG-TERM CURRENT USE OF INSULIN (H): ICD-10-CM

## 2023-01-25 DIAGNOSIS — E11.9 TYPE 2 DIABETES MELLITUS WITHOUT COMPLICATION, WITH LONG-TERM CURRENT USE OF INSULIN (H): ICD-10-CM

## 2023-01-25 NOTE — TELEPHONE ENCOUNTER
Caller: Abdulaziz    Provider: Dr. Huerta    Detailed reason for call: Patient has an appointment on 1/26 and he is requesting a detailed write up/medical records that he can bring with him explaining what was done so that he can bring it with him when he is out of state.  He state he will be leaving for an extended vacation and would like his recent history available to him in case he needs to be seen at an out of state clinic.  No call back needed unless there are questions to discuss.    Best phone number to contact: [phone]    Best time to contact: any    Ok to leave a detailed message: disconnected before I could ask    Ok to speak to authorized person if needed: disconnected before I could ask      (Noted to patient if reason is related to wound or incision, to please send a photo via email or [employer].)

## 2023-01-26 ENCOUNTER — OFFICE VISIT (OUTPATIENT)
Dept: PODIATRY | Facility: CLINIC | Age: 58
End: 2023-01-26
Payer: COMMERCIAL

## 2023-01-26 VITALS — HEIGHT: 69 IN | WEIGHT: 130 LBS | HEART RATE: 98 BPM | OXYGEN SATURATION: 97 % | BODY MASS INDEX: 19.26 KG/M2

## 2023-01-26 DIAGNOSIS — E11.621 DIABETIC ULCER OF TOE OF RIGHT FOOT ASSOCIATED WITH TYPE 2 DIABETES MELLITUS, WITH FAT LAYER EXPOSED (H): Primary | ICD-10-CM

## 2023-01-26 DIAGNOSIS — L97.512 DIABETIC ULCER OF TOE OF RIGHT FOOT ASSOCIATED WITH TYPE 2 DIABETES MELLITUS, WITH FAT LAYER EXPOSED (H): Primary | ICD-10-CM

## 2023-01-26 PROCEDURE — 99213 OFFICE O/P EST LOW 20 MIN: CPT | Performed by: PODIATRIST

## 2023-01-26 NOTE — TELEPHONE ENCOUNTER
"Routing refill request to provider for review/approval because:  A break in medication    Last Written Prescription Date:  9/1/21  Last Fill Quantity: 100,  # refills: 0   Last office visit provider:  1/18/23     Requested Prescriptions   Pending Prescriptions Disp Refills     insulin pen needle (31G X 8 MM) 31G X 8 MM miscellaneous 100 each 0     Sig: Use 1 pen needles daily or as directed.       Diabetic Supplies Protocol Passed - 1/26/2023  2:22 PM        Passed - Medication is active on med list        Passed - Patient is 18 years of age or older        Passed - Recent (6 mo) or future (30 days) visit within the authorizing provider's specialty     Patient had office visit in the last 6 months or has a visit in the next 30 days with authorizing provider.  See \"Patient Info\" tab in inbasket, or \"Choose Columns\" in Meds & Orders section of the refill encounter.                 Silvano Kaminski RN 01/26/23 2:23 PM  "

## 2023-01-26 NOTE — LETTER
[address]  [facility] [address]  [phone]    2023    Re: Abdulaziz Rausch  [address] [place] [address]  [phone] (home)     : 1965      To Whom It May Concern:      Abdulaziz Rausch was seen in clinic 2023 due to surgery. Restrictions no travel until patient sees Vascular.       Sincerely,            Jf Huerta DPM

## 2023-01-26 NOTE — LETTER
1/26/2023         RE: Abdulaziz Rausch  [address]  [place] [address]        Dear Colleague,    Thank you for referring your patient, Abdulaziz Rausch, to the [facility]. Please see a copy of my visit note below.    Subjective findings: The patient return to the clinic today for postop visit #1, 1 week status post debridement with irrigation ulceration of digits 2 and 3 right foot.  The patient is a good spirits and he had no complaints.    Objective findings: The dressings were removed and wound margins are granulating well.  There is good granular tissue at the base of these ulcerations.  There is no active drainage or bleeding noted.  No edema or erythema noted.  No odor noted.  Neurovascular status is intact.    Assessment: Diabetic ulcer digits 2 and 3 right foot    Plan: Applied dressings.  The patient was referred to vascular for continued local wound care.  He is to return to the clinic as needed.      Again, thank you for allowing me to participate in the care of your patient.        Sincerely,        Jf Huerta DPM

## 2023-01-26 NOTE — PROGRESS NOTES
Subjective findings: The patient return to the clinic today for postop visit #1, 1 week status post debridement with irrigation ulceration of digits 2 and 3 right foot.  The patient is a good spirits and he had no complaints.    Objective findings: The dressings were removed and wound margins are granulating well.  There is good granular tissue at the base of these ulcerations.  There is no active drainage or bleeding noted.  No edema or erythema noted.  No odor noted.  Neurovascular status is intact.    Assessment: Diabetic ulcer digits 2 and 3 right foot    Plan: Applied dressings.  The patient was referred to vascular for continued local wound care.  He is to return to the clinic as needed.

## 2023-01-26 NOTE — PATIENT INSTRUCTIONS
Apply a dry gauze dressing to the woundand cover with roll gauze and change every day until you see        What is a post surgical shoe?    A post surgical shoe is a medical shoe used to protect the foot and toes after an injury or surgery. It is also called a postop shoe, rigid sole shoe, or hard sole shoe. It looks like on oversized shoe with a flat, hard sole, fabric or mesh sides, and adjustable straps. The shoe is open in the front, where your toes go. The shoe helps change how your foot carries weight. This can help decrease pain and increase movement after an injury or surgery so you can heal.                How do I put on the post surgical shoe?  Sit down and place your foot comfortably in the shoe.  Close the fabric or mesh sides over the top of your foot.  Tighten the straps of the shoe so they are snug but not too tight. The shoe should limit movement but not cut off your blood flow.  Stand up and take a few steps to practice walking.    What else do I need to know?  Check your foot and toes often. Check your foot and toes for redness and swelling. If your toes are red, swollen, numb, or tingly, loosen your straps. Over time, the swelling from the injury or surgery will decrease. When this happens, you may need to tighten the straps.  Be careful when you walk on wet surfaces. The shoe may be slippery.  Ask about removing the shoe to bathe. Your provider may want you to leave the shoe on when you bathe. Cover it with a plastic bag and tape the bag closed around your leg.  When should I call my doctor?  You have pain or discomfort that does not go away   Driving a Car  You cannot drive while you are wearing a cast or walker boot on the foot that you use to drive. Your surgeon or physiotherapist will tell you when the cast or boot is no longer needed.     Make sure to wear the shoe for the prescribed time or until the doctor tells you to discontinue it s use.

## 2023-01-27 ENCOUNTER — OFFICE VISIT (OUTPATIENT)
Dept: VASCULAR SURGERY | Facility: CLINIC | Age: 58
End: 2023-01-27
Attending: PODIATRIST
Payer: COMMERCIAL

## 2023-01-27 VITALS — WEIGHT: 130 LBS | HEIGHT: 69 IN | BODY MASS INDEX: 19.26 KG/M2

## 2023-01-27 DIAGNOSIS — E08.621 DIABETIC ULCER OF TOE OF LEFT FOOT ASSOCIATED WITH DIABETES MELLITUS DUE TO UNDERLYING CONDITION, LIMITED TO BREAKDOWN OF SKIN (H): ICD-10-CM

## 2023-01-27 DIAGNOSIS — I73.9 PAD (PERIPHERAL ARTERY DISEASE) (H): ICD-10-CM

## 2023-01-27 DIAGNOSIS — L97.521 DIABETIC ULCER OF TOE OF LEFT FOOT ASSOCIATED WITH DIABETES MELLITUS DUE TO UNDERLYING CONDITION, LIMITED TO BREAKDOWN OF SKIN (H): ICD-10-CM

## 2023-01-27 DIAGNOSIS — L97.513 DIABETIC ULCER OF TOE OF RIGHT FOOT ASSOCIATED WITH DIABETES MELLITUS DUE TO UNDERLYING CONDITION, WITH NECROSIS OF MUSCLE (H): Primary | ICD-10-CM

## 2023-01-27 DIAGNOSIS — M86.9 OSTEOMYELITIS OF ANKLE AND FOOT (H): ICD-10-CM

## 2023-01-27 DIAGNOSIS — E08.621 DIABETIC ULCER OF TOE OF RIGHT FOOT ASSOCIATED WITH DIABETES MELLITUS DUE TO UNDERLYING CONDITION, WITH NECROSIS OF MUSCLE (H): Primary | ICD-10-CM

## 2023-01-27 LAB
BACTERIA WND CULT: ABNORMAL
BACTERIA WND CULT: ABNORMAL

## 2023-01-27 PROCEDURE — 11043 DBRDMT MUSC&/FSCA 1ST 20/<: CPT | Performed by: PODIATRIST

## 2023-01-27 PROCEDURE — 97597 DBRDMT OPN WND 1ST 20 CM/<: CPT | Mod: XS | Performed by: PODIATRIST

## 2023-01-27 PROCEDURE — G0463 HOSPITAL OUTPT CLINIC VISIT: HCPCS | Mod: 25 | Performed by: PODIATRIST

## 2023-01-27 PROCEDURE — 99214 OFFICE O/P EST MOD 30 MIN: CPT | Mod: 25 | Performed by: PODIATRIST

## 2023-01-27 RX ORDER — DOXYCYCLINE 100 MG/1
100 CAPSULE ORAL 2 TIMES DAILY
Qty: 20 CAPSULE | Refills: 0 | Status: SHIPPED | OUTPATIENT
Start: 2023-01-27 | End: 2023-02-10

## 2023-01-27 ASSESSMENT — PAIN SCALES - GENERAL: PAINLEVEL: MILD PAIN (3)

## 2023-01-27 NOTE — PROGRESS NOTES
FOOT AND ANKLE SURGERY/PODIATRY CONSULT NOTE        ASSESSMENT:   Osteomyelitis digits 2,3 right foot  Diabetic Ulceration digits 2,3 right foot and 3rd digit left foot      TREATMENT:  MRI right foot: 1.  There is some loss of detail on this study secondary to inhomogeneous fat saturation given the peripheral location of the toes.     2.  There is abnormal signal seen with no andrews destructive changes involving the second toe middle and distal phalanx and the third toe distal phalanx. Given patient's history changes can be seen with osteomyelitis.     3.  No evidence for an abscess.    -In the presence of toe ulcers on digits 2,3 right foot, I discussed with the patient that the right foot MRI is likely indicating the presence of osteomyelitis.      -Due to the presence of osteomyelitis, I reviewed the treatment options to include either 6 weeks IV antibiotics with Infectious Disease/surgical debridement with removal of bone/use of wound vac/non-weight bearing vs amputation of the involved bone.     -However, recent HbA1c was 13.7 which is too high for surgical intervention or wound healing. I have referred him to endocrinology and diabetic educator.     -I will start him on doxycycline.     -Referred for updated CHIQUITA's with toe pressures.     -We discussed that if he is able to organize a plan to lower his blood sugars over the next 2-3 weeks, we can try to delay surgical treatment (either attempted salvage or amputation) until he is seen by endocrinology. However, if he is unable to develop a reasonable plan to maintain good blood sugars, I would be concerned about delaying treatment for osteomyelitis and recommend surgical treatment followed by hospitalization and referral to TCU. He will consider attempted salvage vs amputation.     -After discussion of risk factors and consent obtained 2% Lidocaine HCL jelly was applied, under clean conditions, the 2nd and 3rd digit right foot ulceration(s) were debrided  using .into the muscle/ fascia.  Devitalized and nonviable tissue, along with any fibrin and slough, was removed to improve granulation tissue formation, stimulate wound healing, decrease overall bacteria load, disrupt biofilm formation and decrease edge senescence. Wound drainage was scant No. Total excisional debridement was 4 sq cm into the muscle/fascia with a depth of 0.4 cm.   Ulcers were improved afterwards and .  Measures were as noted on the flow sheet. A gauze dressing was applied. He will continue to apply a gauze dressing qday.    -After discussion of risk factors and consent obtained 2% Lidocaine HCL jelly was applied, under clean conditions, the 3rd digit left foot ulceration(s) were debrided using .from the epidermis/dermis area.  Devitalized and nonviable tissue, along with any fibrin and slough, was removed to improve granulation tissue formation, stimulate wound healing, decrease overall bacteria load, disrupt biofilm formation and decrease edge senescence. Wound drainage was scant No. Total excisional debridement was 0.9 sq cm from the epidermis/dermis area with a depth of 0.1 cm.   Ulcers were improved afterwards and .  Measures were as noted on the flow sheet. A gauze dressing was applied. He will continue to apply a gauze dressing qday.    -He will follow-up in 2 weeks.    Ulises Jimenez DPM  [facility]      HPI: Abdulaziz CORAZON Hernandezcliff was seen today at the request of Dr. Huerta for bilateral foot ulcers. During a recent hospitalization the patient underwent I&D of digits 2,3 on the right foot with Dr. Huerta on 1/20. Recent MRI suggestive for osteomyelitis in digits 2,3 right foot. Patient states he lives in a single family home. PMH significant for poorly controlled DM2.     History reviewed. No pertinent past medical history.    Past Surgical History:   Procedure Laterality Date     IRRIGATION AND DEBRIDEMENT TOE, COMBINED Right 1/20/2023    Procedure:  IRRIGATION AND DEBRIDEMENT digits 2 and 3 right foot;  Surgeon: Jf Huerta DPM;  Location: [facility]        No Known Allergies      Current Outpatient Medications:      acetaminophen (TYLENOL) 500 MG tablet, Take 1-2 tablets (500-1,000 mg) by mouth every 6 hours as needed for mild pain, Disp: , Rfl:      alcohol swab prep pads, Use to swab area of injection/ella as directed., Disp: 100 each, Rfl: 3     amoxicillin-clavulanate (AUGMENTIN) 875-125 MG tablet, Take 1 tablet by mouth every 12 hours for 14 days, Disp: 28 tablet, Rfl: 0     blood glucose (NO BRAND SPECIFIED) test strip, Use to test blood sugar 4 times daily or as directed. To accompany: Blood Glucose Monitor Brands: per insurance., Disp: 100 strip, Rfl: 6     blood glucose monitoring (NO BRAND SPECIFIED) meter device kit, Use to test blood sugar 4 time daily or as directed. Preferred blood glucose meter OR supplies to accompany: Blood Glucose Monitor Brands: per insurance., Disp: 1 kit, Rfl: 0     citalopram (CELEXA) 10 MG tablet, Take 1 tablet (10 mg) by mouth daily, Disp: 30 tablet, Rfl: 1     gabapentin (NEURONTIN) 300 MG capsule, Take 1 capsule (300 mg) by mouth 3 times daily, Disp: 90 capsule, Rfl: 1     insulin glargine (LANTUS SOLOSTAR) 100 UNIT/ML pen, Inject 10 Units Subcutaneous At Bedtime, Disp: 3 mL, Rfl: 1     insulin lispro (HUMALOG KWIKPEN) 100 UNIT/ML (1 unit dial) KWIKPEN, Inject 3 Units Subcutaneous 3 times daily (before meals), Disp: 3 mL, Rfl: 1     insulin pen needle (31G X 8 MM) 31G X 8 MM miscellaneous, Use 1 pen needles daily or as directed., Disp: 100 each, Rfl: 1     lisinopril (ZESTRIL) 10 MG tablet, Take 1 tablet (10 mg) by mouth daily for 30 days, Disp: 30 tablet, Rfl: 0     metFORMIN (GLUCOPHAGE) 500 MG tablet, Take 1 tablet (500 mg) by mouth 2 times daily (with meals), Disp: 60 tablet, Rfl: 1     thin (NO BRAND SPECIFIED) lancets, Use with lanceting device. To accompany: Blood Glucose Monitor Brands: per  "insurance., Disp: 100 each, Rfl: 6    Social History     Social History Narrative     Not on file       History reviewed. No pertinent family history.    Review of Systems - 10 point Review of Systems is negative except for foot ulcers which is noted in HPI.      OBJECTIVE:  Appearance: alert, well appearing, and in no distress.    Ht 5' 9\" (1.753 m)   Wt 130 lb (59 kg)   BMI 19.20 kg/m      BMI= Body mass index is 19.2 kg/m .    General appearance: Patient is alert and fully cooperative with history & exam.  No sign of distress is noted during the visit.     Psychiatric: Affect is pleasant & appropriate.  Patient appears motivated to improve health.     Respiratory: Breathing is regular & unlabored while sitting.     HEENT: Hearing is intact to spoken word.  Speech is clear.  No gross evidence of visual impairment that would impact ambulation.    Vascular: Dorsalis pedis non-palpableBilateral.  Dermatologic:   Wound Toe (Comment  which one) Ulceration (Active)   Wound Bed Black;Red 01/20/23 0729   Drainage Amount None 01/20/23 0729   Dressing Open to air 01/22/23 0900       Wound Toe (Comment  which one) Ulceration (Active)   Wound Bed Granulation tissue 01/22/23 0900   Clara-wound Assessment Erythema 01/20/23 0729   Drainage Amount UTV 01/20/23 1700   Dressing Dry gauze;Open to air 01/20/23 0729   Dressing Status Clean, dry, intact 01/20/23 1700       VASC Wound Right 2nd (Active)   Pre Size Length 2.5 01/27/23 0800   Pre Size Width 1 01/27/23 0800   Pre Size Depth 0.4 01/27/23 0800   Pre Total Sq cm 2.5 01/27/23 0800       VASC Wound Right 3rd (Active)   Pre Size Length 1 01/27/23 0800   Pre Size Width 1.5 01/27/23 0800   Pre Size Depth 0.4 01/27/23 0800   Pre Total Sq cm 1.5 01/27/23 0800       VASC Wound Left 2nd toe (Active)   Pre Size Length 1 01/27/23 0800   Pre Size Width 0.9 01/27/23 0800   Pre Size Depth 0.1 01/27/23 0800   Pre Total Sq cm 0.9 01/27/23 0800       Incision/Surgical Site 01/20/23 Right " Foot (Active)   Incision Assessment Drainage;Erythema;Moist 01/22/23 0900   Clara-Incision Assessment Erythema;Pale 01/22/23 0900   Incision Drainage Amount Scant 01/22/23 0900   Drainage Description Sanguinous 01/22/23 0900   Incision Care Other (Comment) 01/22/23 0900   Dressing Intervention New dressing applied 01/22/23 0900   Non-viable tissue along ulcerations digits 2,3 right foot with increased depth. Granular tissue 3rd digit left foot ulceration. No erythema bilateral.   Neurologic: Diminished to light touch Bilateral.  Musculoskeletal: Contracted digits noted Bilateral.    Imaging:     MR Foot Left w/o & w Contrast    Result Date: 1/18/2023  EXAM: MR FOOT LEFT W/O and W CONTRAST LOCATION: [facility] DATE/TIME: 1/18/2023 11:36 PM INDICATION: 58 yo m w  DM II, med non compliance here w  bilateral diabetic foot ulcers to 2nd 3rd digits COMPARISON: None. TECHNIQUE: Routine. Additional postgadolinium T1 sequences were obtained. IV CONTRAST: 6ml Gadavist FINDINGS: JOINTS AND BONES: -No fracture, bone contusion, or findings suggestive osteomyelitis. Normal articular cartilage. No effusion. There are some mild degenerative changes seen most marked in the first MTP joint and scattered in the mid foot. TENDONS: -No tendon tear, tendinopathy, or tenosynovitis. LIGAMENTS: -Lisfranc ligament: Intact. No subluxation. MUSCLES AND SOFT TISSUES: -No muscle atrophy or edema. No soft tissue mass or fluid collection.     IMPRESSION: 1.  There is some loss of detail given discrepancy in the fat saturation technique involving some of the toes given the peripheral location. 2.  By history patient has foot ulcers. 3.  No findings seen to suggest osteomyelitis, myositis, or abscess.    MR Foot Right w/o & w Contrast    Result Date: 1/19/2023  EXAM: MR FOOT RIGHT W/O and W CONTRAST LOCATION: [facility] DATE/TIME: 1/18/2023 11:36 PM INDICATION: 58 yo male w  DM II and med non  compliance here w  diabetic foot ulcers to 2nd 3rd digits of bilateral feet COMPARISON: None. TECHNIQUE: Routine. Additional postgadolinium T1 sequences were obtained. IV CONTRAST: 6ml Gadavist FINDINGS: JOINTS AND BONES: -No fracture or bone contusion. Normal articular cartilage. No effusion. There is abnormal signal seen which involves the second toe mid and distal phalanges and the third toe distal phalanx which would be worrisome for changes of osteomyelitis. No andrews  destructive changes are seen in the bony skeleton. TENDONS: -No tendon tear, tendinopathy, or tenosynovitis. LIGAMENTS: -Lisfranc ligament: Intact. No subluxation. MUSCLES AND SOFT TISSUES: -By history there are ulcers seen involving the second third toes. There is some edema seen involving the dorsal skin of the foot     IMPRESSION: 1.  There is some loss of detail on this study secondary to inhomogeneous fat saturation given the peripheral location of the toes. 2.  There is abnormal signal seen with no andrews destructive changes involving the second toe middle and distal phalanx and the third toe distal phalanx. Given patient's history changes can be seen with osteomyelitis. 3.  No evidence for an abscess.    US Lower Extremity Arterial Duplex Bilateral    Result Date: 1/18/2023  [facility] DATE: 1/18/2023 8:51 PM EXAM: RESTING ANKLE-BRACHIAL INDICES (ABIs) AND DUPLEX ARTERIAL ULTRASOUND OF THE LOWER EXTREMITIES BILATERALLY INDICATION: Diabetic foot ulcers. COMPARISON: None. TECHNIQUE: Duplex imaging is performed utilizing gray-scale, two-dimensional images, and color-flow imaging. Doppler waveform analysis and spectral Doppler imaging is also performed. CHIQUITA FINDINGS: RIGHT (mmHg) Ankle (PT): >254; Index NC Ankle (DP): 230; Index 1.17 LEFT (mmHg) Brachial: 197 Ankle (PT): >254; Index NC Ankle (DP): 237; Index 1.20 The resting right ankle-brachial index measures 1.17. The resting left ankle-brachial index measures 1.20. DUPLEX  ARTERIAL ULTRASOUND FINDINGS: RIGHT (cm/s) EIA: 141 T CFA: 126 T PFA: 90 B SFA-Proximal: 162 T SFA-Mid: 168 T SFA-Distal: 177 T Popliteal: 107 B PTA: 140 M FRANCESCO: 87 B DPA: 99 M (M=monophasic, B=biphasic, T=triphasic) LEFT (cm/s) EIA: 147 B CFA: 86 B PFA: 65 B SFA-Proximal: 168 B SFA-Mid: 155 T SFA-Distal: 103 B Popliteal: 133 B PTA: 112 M FRANCESCO: 89 B DPA: 77 B (M=monophasic, B=biphasic, T=triphasic) Right lower extremity: Multiphasic waveforms throughout the right lower extremity with the exception of the dorsalis pedis artery which is monophasic. Left lower extremity: Multiphasic waveforms throughout the left lower extremity.     IMPRESSION: 1. RIGHT LOWER EXTREMITY: Noncompressible arteries of the right lower extremity suggestive of calcific atherosclerotic involvement. The calculated ankle-brachial index is, however, within normal limits. Multiphasic waveforms throughout the right lower extremity with the exception of the dorsalis pedis artery which is monophasic. 2. LEFT LOWER EXTREMITY: Noncompressible arteries of the left lower extremity suggestive of calcific atherosclerotic involvement. The calculated ankle-brachial index is, however, within normal limits. Multiphasic waveforms throughout the left lower extremity.    US CHIQUITA Doppler No Exercise 1-2 Levels Bilateral    Result Date: 1/18/2023  [facility] DATE: 1/18/2023 8:51 PM EXAM: RESTING ANKLE-BRACHIAL INDICES (ABIs) AND DUPLEX ARTERIAL ULTRASOUND OF THE LOWER EXTREMITIES BILATERALLY INDICATION: Diabetic foot ulcers. COMPARISON: None. TECHNIQUE: Duplex imaging is performed utilizing gray-scale, two-dimensional images, and color-flow imaging. Doppler waveform analysis and spectral Doppler imaging is also performed. CHIQUITA FINDINGS: RIGHT (mmHg) Ankle (PT): >254; Index NC Ankle (DP): 230; Index 1.17 LEFT (mmHg) Brachial: 197 Ankle (PT): >254; Index NC Ankle (DP): 237; Index 1.20 The resting right ankle-brachial index measures 1.17. The resting  left ankle-brachial index measures 1.20. DUPLEX ARTERIAL ULTRASOUND FINDINGS: RIGHT (cm/s) EIA: 141 T CFA: 126 T PFA: 90 B SFA-Proximal: 162 T SFA-Mid: 168 T SFA-Distal: 177 T Popliteal: 107 B PTA: 140 M FRANCESCO: 87 B DPA: 99 M (M=monophasic, B=biphasic, T=triphasic) LEFT (cm/s) EIA: 147 B CFA: 86 B PFA: 65 B SFA-Proximal: 168 B SFA-Mid: 155 T SFA-Distal: 103 B Popliteal: 133 B PTA: 112 M FRANCESCO: 89 B DPA: 77 B (M=monophasic, B=biphasic, T=triphasic) Right lower extremity: Multiphasic waveforms throughout the right lower extremity with the exception of the dorsalis pedis artery which is monophasic. Left lower extremity: Multiphasic waveforms throughout the left lower extremity.     IMPRESSION: 1. RIGHT LOWER EXTREMITY: Noncompressible arteries of the right lower extremity suggestive of calcific atherosclerotic involvement. The calculated ankle-brachial index is, however, within normal limits. Multiphasic waveforms throughout the right lower extremity with the exception of the dorsalis pedis artery which is monophasic. 2. LEFT LOWER EXTREMITY: Noncompressible arteries of the left lower extremity suggestive of calcific atherosclerotic involvement. The calculated ankle-brachial index is, however, within normal limits. Multiphasic waveforms throughout the left lower extremity.    Foot XR, G/E 3 views, left    Result Date: 1/18/2023  EXAM: XR FOOT LEFT G/E 3 VIEWS LOCATION: [facility] DATE/TIME: 1/18/2023 1:57 PM INDICATION: Foot pain. Wound. COMPARISON: None.     IMPRESSION: No fracture. No degenerative changes. No osteomyelitis.    Foot  XR, G/E 3 views, right    Result Date: 1/18/2023  EXAM: XR FOOT RIGHT G/E 3 VIEWS LOCATION: [facility] DATE/TIME: 1/18/2023 1:57 PM INDICATION: Foot pain. Soft tissue wound. COMPARISON: None.     IMPRESSION: No fracture. No degenerative changes. No osteomyelitis.

## 2023-01-27 NOTE — TELEPHONE ENCOUNTER
RECORDS RECEIVED FROM: internal /ce   DATE RECEIVED: 2.13.23    NOTES (FOR ALL VISITS) STATUS DETAILS   OFFICE NOTES from referring provider internal  Ulises Jimenez DPM     MEDICATION LIST internal     IMAGING        ULTRASOUND (HEAD/NECK) internal     LABS     DIABETES: HBGA1C, CREATININE, FASTING LIPIDS, MICROALBUMIN URINE, POTASSIUM, TSH, T4    THYROID: TSH, T4, CBC, THYRODLONULIN, TOTAL T3, FREE T4, CALCITONIN, CEA internal /ce Glucose by meter -1.22.23  CMP- 1.21.23  Cbc- 1.21.23  Potasium 1.20.23  BMP- 1.19.23  Vitamin D- 1/18/23  Lipid- 1.18.23

## 2023-01-27 NOTE — PATIENT INSTRUCTIONS
Apply dry gauze to both feet. Change every other day and as needed.   Wear your post op shoe on your right foot anytime you are up and out of bed.      Take your antibiotics!

## 2023-02-10 ENCOUNTER — ANCILLARY PROCEDURE (OUTPATIENT)
Dept: VASCULAR ULTRASOUND | Facility: CLINIC | Age: 58
End: 2023-02-10
Attending: PODIATRIST
Payer: COMMERCIAL

## 2023-02-10 ENCOUNTER — OFFICE VISIT (OUTPATIENT)
Dept: VASCULAR SURGERY | Facility: CLINIC | Age: 58
End: 2023-02-10
Attending: PODIATRIST
Payer: COMMERCIAL

## 2023-02-10 ENCOUNTER — PREP FOR PROCEDURE (OUTPATIENT)
Dept: VASCULAR SURGERY | Facility: CLINIC | Age: 58
End: 2023-02-10
Payer: COMMERCIAL

## 2023-02-10 ENCOUNTER — DOCUMENTATION ONLY (OUTPATIENT)
Dept: VASCULAR SURGERY | Facility: CLINIC | Age: 58
End: 2023-02-10

## 2023-02-10 ENCOUNTER — TELEPHONE (OUTPATIENT)
Dept: ENDOCRINOLOGY | Facility: CLINIC | Age: 58
End: 2023-02-10

## 2023-02-10 VITALS — OXYGEN SATURATION: 99 % | BODY MASS INDEX: 19.26 KG/M2 | HEIGHT: 69 IN | WEIGHT: 130 LBS | HEART RATE: 95 BPM

## 2023-02-10 DIAGNOSIS — E08.621 DIABETIC ULCER OF TOE OF RIGHT FOOT ASSOCIATED WITH DIABETES MELLITUS DUE TO UNDERLYING CONDITION, WITH NECROSIS OF MUSCLE (H): Primary | ICD-10-CM

## 2023-02-10 DIAGNOSIS — M86.9 OSTEOMYELITIS OF ANKLE AND FOOT (H): ICD-10-CM

## 2023-02-10 DIAGNOSIS — I73.9 PAD (PERIPHERAL ARTERY DISEASE) (H): ICD-10-CM

## 2023-02-10 DIAGNOSIS — L97.513 DIABETIC ULCER OF TOE OF RIGHT FOOT ASSOCIATED WITH DIABETES MELLITUS DUE TO UNDERLYING CONDITION, WITH NECROSIS OF MUSCLE (H): Primary | ICD-10-CM

## 2023-02-10 DIAGNOSIS — M86.9 OSTEOMYELITIS OF ANKLE AND FOOT (H): Primary | ICD-10-CM

## 2023-02-10 DIAGNOSIS — L97.521 DIABETIC ULCER OF TOE OF LEFT FOOT ASSOCIATED WITH DIABETES MELLITUS DUE TO UNDERLYING CONDITION, LIMITED TO BREAKDOWN OF SKIN (H): ICD-10-CM

## 2023-02-10 DIAGNOSIS — E08.621 DIABETIC ULCER OF TOE OF LEFT FOOT ASSOCIATED WITH DIABETES MELLITUS DUE TO UNDERLYING CONDITION, LIMITED TO BREAKDOWN OF SKIN (H): ICD-10-CM

## 2023-02-10 PROCEDURE — 99214 OFFICE O/P EST MOD 30 MIN: CPT | Mod: 25 | Performed by: PODIATRIST

## 2023-02-10 PROCEDURE — 11043 DBRDMT MUSC&/FSCA 1ST 20/<: CPT | Performed by: PODIATRIST

## 2023-02-10 PROCEDURE — G0463 HOSPITAL OUTPT CLINIC VISIT: HCPCS | Mod: 25 | Performed by: PODIATRIST

## 2023-02-10 PROCEDURE — 93923 UPR/LXTR ART STDY 3+ LVLS: CPT | Mod: 26 | Performed by: SURGERY

## 2023-02-10 PROCEDURE — 11042 DBRDMT SUBQ TIS 1ST 20SQCM/<: CPT | Performed by: PODIATRIST

## 2023-02-10 PROCEDURE — 93923 UPR/LXTR ART STDY 3+ LVLS: CPT

## 2023-02-10 RX ORDER — BENZONATATE 100 MG/1
1 CAPSULE ORAL 3 TIMES DAILY PRN
COMMUNITY
Start: 2023-02-05 | End: 2023-02-10

## 2023-02-10 RX ORDER — CEFAZOLIN SODIUM/WATER 2 G/20 ML
2 SYRINGE (ML) INTRAVENOUS
Status: CANCELLED | OUTPATIENT
Start: 2023-02-17

## 2023-02-10 RX ORDER — DOXYCYCLINE 100 MG/1
1 CAPSULE ORAL 2 TIMES DAILY
COMMUNITY
Start: 2023-01-27 | End: 2023-02-16

## 2023-02-10 RX ORDER — CETIRIZINE HYDROCHLORIDE 10 MG/1
1 TABLET ORAL DAILY PRN
COMMUNITY
Start: 2023-02-05 | End: 2023-03-07

## 2023-02-10 RX ORDER — HYDROCODONE BITARTRATE AND ACETAMINOPHEN 5; 325 MG/1; MG/1
1 TABLET ORAL EVERY 6 HOURS PRN
Status: ON HOLD | COMMUNITY
Start: 2023-01-23 | End: 2023-02-21

## 2023-02-10 RX ORDER — CEFAZOLIN SODIUM/WATER 2 G/20 ML
2 SYRINGE (ML) INTRAVENOUS SEE ADMIN INSTRUCTIONS
Status: CANCELLED | OUTPATIENT
Start: 2023-02-17

## 2023-02-10 RX ORDER — DOXYCYCLINE 100 MG/1
100 CAPSULE ORAL 2 TIMES DAILY
Qty: 20 CAPSULE | Refills: 0 | Status: ON HOLD | OUTPATIENT
Start: 2023-02-10 | End: 2023-02-21

## 2023-02-10 RX ORDER — GABAPENTIN 300 MG/1
1 CAPSULE ORAL 3 TIMES DAILY
COMMUNITY
Start: 2021-08-26 | End: 2023-02-16

## 2023-02-10 ASSESSMENT — PAIN SCALES - GENERAL: PAINLEVEL: SEVERE PAIN (7)

## 2023-02-10 NOTE — PATIENT INSTRUCTIONS
New Diabetes Patient Info  Welcome to the Diabetes Optimization Program at the Endocrinology and Diabetes Clinic at [facility] [place]!    Our Diabetes Optimization Program is here to provide you with a team-based, collaborative approach to optimize your diabetes management. The team is made up of Endocrinologists and Physician Assistants here at the Clinic, your Primary Care Physician, Certified Diabetes Educators, Registered Nurses, Medical Assistants, Emergency Medical Technicians and Visit Facilitators.     During your care with us, we promise to:   Work with you and your Primary Care Provider Daryn Pittman  to optimize your diabetes management.   Provide you with the tools and support you need to achieve a healthier lifestyle  Help you to control your diabetes by offering new treatments, education, and support to improve your diabetes management  Help you graduate back to your primary care provider for ongoing care once your diabetes is under control      Endocrinology Clinics Phone Number: [phone]    [facility] Address:   [place] Address:     [address] [address]   [address] [address] [place], [place] [address] [place] [address]

## 2023-02-10 NOTE — TELEPHONE ENCOUNTER
Called patient in regards to obtaining blood sugar data for appointment scheduled 2/13/2023. Patient has only been testing for a few days. Data obtained via phone and located below:    2/6  AM- 240  Afternoon- 237  PM- 210    2/7  AM- 195  Afternoon- 210  PM- 222    2/8  AM- 210  Afternoon- 208  PM- 225    2/9  AM- 201    Devi Aguila LPN 02/10/23 12:29 PM

## 2023-02-10 NOTE — PROGRESS NOTES
Outcome for 02/09/23 12:30 PM: [employer] message sent  Devi Aguila LPN   Outcome for 02/10/23 12:30 PM: Data obtained via phone and located below; pt only started checking a few days ago  Devi Aguila LPN     2/6  AM- 240  Afternoon- 237  PM- 210     2/7  AM- 195  Afternoon- 210  PM- 222     2/8  AM- 210  Afternoon- 208  PM- 225     2/9  AM- 201

## 2023-02-10 NOTE — PROGRESS NOTES
Surgery Scheduled    Confirmed surgery date with pt. Pt has preop Wed 2/15/23.    Surgery/Procedure: AMPUTATION, digits two and three right foot    CPT: 37559     Equipment: pulse lavage     **Patient to be admitted to the hospital post-op**    Location: [facility]:  [address] [place] [address] (phone: [phone], Fax: [phone])    Date: 2/17/23    Time: 430PM    Admission Type: Inpatient    Surgeon: Dr. Jimenez    OR Confirmed & :  Yes with Jyotsna on 2/10/2023    Post Op: see appt desk    Wound Vac Needed: tbd    Home Care Needed: tbd    Blood Thinners Addressed: N/A

## 2023-02-10 NOTE — PATIENT INSTRUCTIONS
Apply dry gauze to both feet. Change every other day and as needed.   Wear your post op shoe on your right foot anytime you are up and out of bed.          Abdulaziz,    Your surgery with [facility] has been scheduled. Please read thoroughly and follow instructions.     Procedure:   Amputation of digits 2 and 3 of right foot  Procedure Date :   2/16/2023  *A surgery nurse will call you 1-2 days before surgery to inform you of the time of arrival for surgery.  Surgeon:   Dr. Ulises Jimenez  Admission Type:   Inpatient  Procedure Location:   [facility]:  [facility] [place] [address] (phone: [phone], Fax: [phone])    Home Rapid COVID Test: To be done 1-2 days before surgery if done at [facility]. See below.      Post Operative Appointment: SEE APPOINTMENTS       Preparation Instructions to complete:    []  You will need a Pre-op Physical within 30 days before surgery with your primary care provider. This exam is required by the anesthesiologist to ensure a safe surgical experience.    Failure  to obtain your pre-op physical will lead to cancellation of your surgery  Call them right away to schedule this. Please ensure your Preoperative Physical is faxed to the Hospital (numbers listed above)    [] Preoperative Medication Instructions  We would like you to stop your anticoagulation medications 3-5 days before surgery HOWEVER contact your prescribing provider for instructions before discontinuing any medications. (Examples: Coumadin, Plavix, Xarelto, Eliquis, Pradaxa, Effient, Brilinta) If you are on Coumadin, we would like the goal INR ? 1.2.  IT IS OK TO STAY ON ASPIRIN PRIOR TO SURGERY.   Hold Ibuprofen, Herbal Supplements and Vitamin E 7 days before  Stop Cialis, Levitra and Viagra 2-3 days before surgery    [] Fasting Requirements  Nothing to eat for 8 hours before surgery unless instructed differently by the surgery nurse.  You may  have clear liquids up to two hours before your arrival time (coffee, tea, water, or Gatorade. No cream or milk)  If your primary care provider has instructed you to continue oral medications, you may take them on the morning of surgery with a small sip of water.    No alcohol or smoking after 12:00am the day of surgery    []  Home Rapid COVID-19 test is required only at [facility]  You will need a home rapid test done 1-2 days before surgery. Please take a photo of result on your phone and show to staff.   If you are experiencing COVID symptoms or have tested positive for COVID-19 within 14 days of procedure date, reach out to the care team to reschedule please.   If your surgery is located at the hospital, you will not need a COVID test.     [] Contact your insurance regarding coverage  If you would like a [employer] [place] Estimate for your upcoming procedure at [facility], contact Cost of Care Estimates   Advocates are available Monday through Friday 8am - 5pm   [phone]  You may also submit a request online through your [employer] account.  For all self-pay, estimate, or anesthesia billing questions at [facility], the contact information is below:  Who to contact: Yudi MURILLO  [facility] number: [phone]  Prep[employer] number: [phone]    [] DO NOT BRING FMLA WITH TO SURGERY.  These should be sent to the provider's office by fax to [phone].     [] Day of Surgery  Medications - Take as indicated with sips of water.   Wear comfortable loose fitting clothes. Wear your glasses-Not contacts. Do not wear jewelry and remove body piercing's. Surgery may be cancelled if they are not removed.   You may have 1 family member wait in the lobby during your surgery. Visitor restrictions are subject to change. Please verify with the surgery nurse when they call.   If same day surgery-Have a someone come with you to surgery that can help you understand the surgeon's  instructions, drive you home and stay with you overnight the first night.    [] If the community sees a new COVID-19 surge, your procedure may need to be postponed. We will contact you if this happens.    [] You will receive a call from a surgery nurse 1-3 days prior to surgery. They will go over more details with you.             ** AFTER SURGERY INSTRUCTIONS **      [] You are to remain NON WEIGHT BEARING on your right foot NON WEIGHT BEARING MEANS NO PRESSURE ON YOUR FOOT OR HEEL AT ANY TIME FOR ANY REASON!    [] Prior to surgery you will be given a PRAFO BOOT which you will need to WEAR THIS AT ALL TIMES EVEN WHILE IN BED Please bring this with you on the day of surgery.    [] You will be given a A WHEELCHAIR to help you ambulate after surgery. Please also bring this with you on the day of surgery.    [] During surgery Dr. Jimenez will apply a gauze dressing to your foot. This will remain intact until you are seen in clinic for follow up    [] It is NOT OKAY to get your surgical site wet while bathing, you will need to purchase a cast cover to protect your foot from getting wet. You can purchase this at [facility] or your local pharmacy.    [] It is important that you elevate your affected foot after surgery. Proper elevation is raising your foot above your waist. The fluid in your lower extremities needs to get back to your heart so it can get pumped to your kidneys and expelled through urination. So if you notice you have to go to the bathroom more frequently when you are elevating your leg this is a good sign that it is working.     [] It is important that you increase your protein intake after surgery. Protein is essential for wound healing. We recommend you take a protein supplement twice per day. This is in addition to your normal diet. Examples of protein supplements are Ensure, Boost, Glucerna (if you are diabetic), or protein powder. You can purchase these at your local retailer or grocery  store.       Notify our office right away, if you have any changes in your health status, or if you develop a cold, flu, diarrhea, infection, fever or sore throat before your scheduled surgery date. We can be reached at [phone]   Monday-Friday 8 am-4:30 pm if you have any questions.     Thank you for trusting us with your care!

## 2023-02-10 NOTE — PROGRESS NOTES
FOOT AND ANKLE SURGERY/PODIATRY Progress Note      ASSESSMENT:   Osteomyelitis digits 2,3 right foot  Diabetic Ulceration digits 2,3 right foot and 3rd digit left foot      TREATMENT:  MRI right foot: 1.  There is some loss of detail on this study secondary to inhomogeneous fat saturation given the peripheral location of the toes.     2.  There is abnormal signal seen with no andrews destructive changes involving the second toe middle and distal phalanx and the third toe distal phalanx. Given patient's history changes can be seen with osteomyelitis.     3.  No evidence for an abscess.      -Due to the presence of osteomyelitis, I again reviewed the treatment options to include either 6 weeks IV antibiotics with Infectious Disease/surgical debridement with removal of bone/use of wound vac/non-weight bearing vs amputation of the involved bone. He would like to proceed with amputation for a more definitive procedure.     -Based on the above and patient's inability to connect with the diabetic educator, addressing the right foot osteomyelitis as an outpatient is not likely to be successful. I recommend hospital admission post-op with transfer to TCU.       -Reviewed risks of surgery including non-healing wound and need for additional foot amputation/loss of limb in the presence of recent HbA1c at 13.7.      -I will prescribe additional 10 days of doxycycline.      -I reviewed CHIQUITA's which are adequate for wound healing.      -After discussion of risk factors and consent obtained 2% Lidocaine HCL jelly was applied, under clean conditions, the 2nd and 3rd digit right foot ulceration(s) were debrided using .into the muscle/ fascia.  Devitalized and nonviable tissue, along with any fibrin and slough, was removed to improve granulation tissue formation, stimulate wound healing, decrease overall bacteria load, disrupt biofilm formation and decrease edge senescence. Wound drainage was scant No. Total excisional debridement was 4  sq cm into the muscle/fascia with a depth of 0.4 cm.   Ulcers were improved afterwards and .  Measures were as noted on the flow sheet. A gauze dressing was applied. He will continue to apply a gauze dressing qday.     -After discussion of risk factors and consent obtained 2% Lidocaine HCL jelly was applied, under clean conditions, the 3rd digit left foot ulceration(s) were debrided using .from the epidermis/dermis area.  Devitalized and nonviable tissue, along with any fibrin and slough, was removed to improve granulation tissue formation, stimulate wound healing, decrease overall bacteria load, disrupt biofilm formation and decrease edge senescence. Wound drainage was scant No. Total excisional debridement was 0.9 sq cm from the epidermis/dermis area with a depth of 0.1 cm.   Ulcers were improved afterwards and .  Measures were as noted on the flow sheet. A gauze dressing was applied. He will continue to apply a gauze dressing qday.     -I will ask my office to coordinate surgery at [facility] for next week.     Ulises Jimenez DPM  [facility] [place]      HPI: Abdulaziz Rausch was seen again today for bilateral foot ulcers. He was not able to connect with the diabetic educator.       No past medical history on file.    Past Surgical History:   Procedure Laterality Date     IRRIGATION AND DEBRIDEMENT TOE, COMBINED Right 1/20/2023    Procedure: IRRIGATION AND DEBRIDEMENT digits 2 and 3 right foot;  Surgeon: Jf Huerta DPM;  Location: [facility] OR       No Known Allergies      Current Outpatient Medications:      acetaminophen (TYLENOL) 500 MG tablet, Take 1-2 tablets (500-1,000 mg) by mouth every 6 hours as needed for mild pain, Disp: , Rfl:      alcohol swab prep pads, Use to swab area of injection/ella as directed., Disp: 100 each, Rfl: 3     benzonatate (TESSALON) 100 MG capsule, Take 1 capsule by mouth 3 times daily as needed, Disp: , Rfl:      blood glucose (NO  BRAND SPECIFIED) test strip, Use to test blood sugar 4 times daily or as directed. To accompany: Blood Glucose Monitor Brands: per insurance., Disp: 100 strip, Rfl: 6     blood glucose monitoring (NO BRAND SPECIFIED) meter device kit, Use to test blood sugar 4 time daily or as directed. Preferred blood glucose meter OR supplies to accompany: Blood Glucose Monitor Brands: per insurance., Disp: 1 kit, Rfl: 0     cetirizine (ZYRTEC) 10 MG tablet, Take 1 tablet by mouth daily as needed, Disp: , Rfl:      citalopram (CELEXA) 10 MG tablet, Take 1 tablet (10 mg) by mouth daily, Disp: 30 tablet, Rfl: 1     doxycycline monohydrate (MONODOX) 100 MG capsule, Take 1 capsule by mouth 2 times daily, Disp: , Rfl:      doxycycline monohydrate (MONODOX) 100 MG capsule, Take 1 capsule (100 mg) by mouth 2 times daily, Disp: 20 capsule, Rfl: 0     gabapentin (NEURONTIN) 300 MG capsule, Take 1 capsule by mouth 3 times daily, Disp: , Rfl:      gabapentin (NEURONTIN) 300 MG capsule, Take 1 capsule (300 mg) by mouth 3 times daily, Disp: 90 capsule, Rfl: 1     HYDROcodone-acetaminophen (NORCO) 5-325 MG tablet, Take 1 tablet by mouth every 6 hours as needed, Disp: , Rfl:      insulin glargine (LANTUS SOLOSTAR) 100 UNIT/ML pen, Inject 10 Units Subcutaneous At Bedtime, Disp: 3 mL, Rfl: 1     insulin lispro (HUMALOG KWIKPEN) 100 UNIT/ML (1 unit dial) KWIKPEN, Inject 3 Units Subcutaneous 3 times daily (before meals), Disp: 3 mL, Rfl: 1     insulin pen needle (31G X 8 MM) 31G X 8 MM miscellaneous, Use 1 pen needles daily or as directed., Disp: 100 each, Rfl: 1     lisinopril (ZESTRIL) 10 MG tablet, Take 1 tablet (10 mg) by mouth daily for 30 days, Disp: 30 tablet, Rfl: 0     metFORMIN (GLUCOPHAGE) 500 MG tablet, Take 1 tablet (500 mg) by mouth 2 times daily (with meals), Disp: 60 tablet, Rfl: 1     thin (NO BRAND SPECIFIED) lancets, Use with lanceting device. To accompany: Blood Glucose Monitor Brands: per insurance., Disp: 100 each, Rfl:  "6    Review of Systems - 10 point Review of Systems is negative except for foot ulcers which is noted in HPI.      OBJECTIVE:  Pulse 95   Ht 5' 9\" (1.753 m)   Wt 130 lb (59 kg)   SpO2 99%   BMI 19.20 kg/m    General appearance: Patient is alert and fully cooperative with history & exam.  No sign of distress is noted during the visit.    Vascular: Dorsalis pedis palpableBilateral.  Dermatologic:    Wound Toe (Comment  which one) Ulceration (Active)   Wound Bed Black;Red 01/20/23 0729   Drainage Amount None 01/20/23 0729   Dressing Open to air 01/22/23 0900       Wound Toe (Comment  which one) Ulceration (Active)   Wound Bed Granulation tissue 01/22/23 0900   Clara-wound Assessment Erythema 01/20/23 0729   Drainage Amount UTV 01/20/23 1700   Dressing Dry gauze;Open to air 01/20/23 0729   Dressing Status Clean, dry, intact 01/20/23 1700       VASC Wound Right 2nd (Active)   Pre Size Length 1.8 02/10/23 0800   Pre Size Width 1 02/10/23 0800   Pre Size Depth 0.4 02/10/23 0800   Pre Total Sq cm 1.8 02/10/23 0800       VASC Wound Right 3rd (Active)   Pre Size Length 0.5 02/10/23 0800   Pre Size Width 1.4 02/10/23 0800   Pre Size Depth 0.3 02/10/23 0800   Pre Total Sq cm 0.7 02/10/23 0800       VASC Wound Left 2nd toe (Active)   Pre Size Length 0.8 02/10/23 0800   Pre Size Width 0.5 02/10/23 0800   Pre Size Depth 0.2 02/10/23 0800   Pre Total Sq cm 0.4 02/10/23 0800       Incision/Surgical Site 01/20/23 Right Foot (Active)   Incision Assessment Drainage;Erythema;Moist 01/22/23 0900   Clara-Incision Assessment Erythema;Pale 01/22/23 0900   Incision Drainage Amount Scant 01/22/23 0900   Drainage Description Sanguinous 01/22/23 0900   Incision Care Other (Comment) 01/22/23 0900   Dressing Intervention New dressing applied 01/22/23 0900   Granular base ulceration 3rd digit left foot. Granular with non-viable tissue along digits 2,3 right foot. No erythema bilateral.   Neurologic: Diminished to light touch " Bilateral.  Musculoskeletal: Contracted digits noted Bilateral.    Imaging:     MR Foot Left w/o & w Contrast    Result Date: 1/18/2023  EXAM: MR FOOT LEFT W/O and W CONTRAST LOCATION: [facility] DATE/TIME: 1/18/2023 11:36 PM INDICATION: 58 yo m w  DM II, med non compliance here w  bilateral diabetic foot ulcers to 2nd 3rd digits COMPARISON: None. TECHNIQUE: Routine. Additional postgadolinium T1 sequences were obtained. IV CONTRAST: 6ml Gadavist FINDINGS: JOINTS AND BONES: -No fracture, bone contusion, or findings suggestive osteomyelitis. Normal articular cartilage. No effusion. There are some mild degenerative changes seen most marked in the first MTP joint and scattered in the mid foot. TENDONS: -No tendon tear, tendinopathy, or tenosynovitis. LIGAMENTS: -Lisfranc ligament: Intact. No subluxation. MUSCLES AND SOFT TISSUES: -No muscle atrophy or edema. No soft tissue mass or fluid collection.     IMPRESSION: 1.  There is some loss of detail given discrepancy in the fat saturation technique involving some of the toes given the peripheral location. 2.  By history patient has foot ulcers. 3.  No findings seen to suggest osteomyelitis, myositis, or abscess.    MR Foot Right w/o & w Contrast    Result Date: 1/19/2023  EXAM: MR FOOT RIGHT W/O and W CONTRAST LOCATION: [facility] DATE/TIME: 1/18/2023 11:36 PM INDICATION: 58 yo male w  DM II and med non compliance here w  diabetic foot ulcers to 2nd 3rd digits of bilateral feet COMPARISON: None. TECHNIQUE: Routine. Additional postgadolinium T1 sequences were obtained. IV CONTRAST: 6ml Gadavist FINDINGS: JOINTS AND BONES: -No fracture or bone contusion. Normal articular cartilage. No effusion. There is abnormal signal seen which involves the second toe mid and distal phalanges and the third toe distal phalanx which would be worrisome for changes of osteomyelitis. No andrews  destructive changes are seen in the bony skeleton.  TENDONS: -No tendon tear, tendinopathy, or tenosynovitis. LIGAMENTS: -Lisfranc ligament: Intact. No subluxation. MUSCLES AND SOFT TISSUES: -By history there are ulcers seen involving the second third toes. There is some edema seen involving the dorsal skin of the foot     IMPRESSION: 1.  There is some loss of detail on this study secondary to inhomogeneous fat saturation given the peripheral location of the toes. 2.  There is abnormal signal seen with no andrews destructive changes involving the second toe middle and distal phalanx and the third toe distal phalanx. Given patient's history changes can be seen with osteomyelitis. 3.  No evidence for an abscess.    US Lower Extremity Arterial Duplex Bilateral    Result Date: 1/18/2023  [facility] DATE: 1/18/2023 8:51 PM EXAM: RESTING ANKLE-BRACHIAL INDICES (ABIs) AND DUPLEX ARTERIAL ULTRASOUND OF THE LOWER EXTREMITIES BILATERALLY INDICATION: Diabetic foot ulcers. COMPARISON: None. TECHNIQUE: Duplex imaging is performed utilizing gray-scale, two-dimensional images, and color-flow imaging. Doppler waveform analysis and spectral Doppler imaging is also performed. CHIQUITA FINDINGS: RIGHT (mmHg) Ankle (PT): >254; Index NC Ankle (DP): 230; Index 1.17 LEFT (mmHg) Brachial: 197 Ankle (PT): >254; Index NC Ankle (DP): 237; Index 1.20 The resting right ankle-brachial index measures 1.17. The resting left ankle-brachial index measures 1.20. DUPLEX ARTERIAL ULTRASOUND FINDINGS: RIGHT (cm/s) EIA: 141 T CFA: 126 T PFA: 90 B SFA-Proximal: 162 T SFA-Mid: 168 T SFA-Distal: 177 T Popliteal: 107 B PTA: 140 M FRANCESCO: 87 B DPA: 99 M (M=monophasic, B=biphasic, T=triphasic) LEFT (cm/s) EIA: 147 B CFA: 86 B PFA: 65 B SFA-Proximal: 168 B SFA-Mid: 155 T SFA-Distal: 103 B Popliteal: 133 B PTA: 112 M FRANCESCO: 89 B DPA: 77 B (M=monophasic, B=biphasic, T=triphasic) Right lower extremity: Multiphasic waveforms throughout the right lower extremity with the exception of the dorsalis pedis artery  which is monophasic. Left lower extremity: Multiphasic waveforms throughout the left lower extremity.     IMPRESSION: 1. RIGHT LOWER EXTREMITY: Noncompressible arteries of the right lower extremity suggestive of calcific atherosclerotic involvement. The calculated ankle-brachial index is, however, within normal limits. Multiphasic waveforms throughout the right lower extremity with the exception of the dorsalis pedis artery which is monophasic. 2. LEFT LOWER EXTREMITY: Noncompressible arteries of the left lower extremity suggestive of calcific atherosclerotic involvement. The calculated ankle-brachial index is, however, within normal limits. Multiphasic waveforms throughout the left lower extremity.    US CHIQUITA Doppler No Exercise 1-2 Levels Bilateral    Result Date: 1/18/2023  [facility]OS DATE: 1/18/2023 8:51 PM EXAM: RESTING ANKLE-BRACHIAL INDICES (ABIs) AND DUPLEX ARTERIAL ULTRASOUND OF THE LOWER EXTREMITIES BILATERALLY INDICATION: Diabetic foot ulcers. COMPARISON: None. TECHNIQUE: Duplex imaging is performed utilizing gray-scale, two-dimensional images, and color-flow imaging. Doppler waveform analysis and spectral Doppler imaging is also performed. CHIQUITA FINDINGS: RIGHT (mmHg) Ankle (PT): >254; Index NC Ankle (DP): 230; Index 1.17 LEFT (mmHg) Brachial: 197 Ankle (PT): >254; Index NC Ankle (DP): 237; Index 1.20 The resting right ankle-brachial index measures 1.17. The resting left ankle-brachial index measures 1.20. DUPLEX ARTERIAL ULTRASOUND FINDINGS: RIGHT (cm/s) EIA: 141 T CFA: 126 T PFA: 90 B SFA-Proximal: 162 T SFA-Mid: 168 T SFA-Distal: 177 T Popliteal: 107 B PTA: 140 M FRANCESCO: 87 B DPA: 99 M (M=monophasic, B=biphasic, T=triphasic) LEFT (cm/s) EIA: 147 B CFA: 86 B PFA: 65 B SFA-Proximal: 168 B SFA-Mid: 155 T SFA-Distal: 103 B Popliteal: 133 B PTA: 112 M FRANCESCO: 89 B DPA: 77 B (M=monophasic, B=biphasic, T=triphasic) Right lower extremity: Multiphasic waveforms throughout the right lower extremity  with the exception of the dorsalis pedis artery which is monophasic. Left lower extremity: Multiphasic waveforms throughout the left lower extremity.     IMPRESSION: 1. RIGHT LOWER EXTREMITY: Noncompressible arteries of the right lower extremity suggestive of calcific atherosclerotic involvement. The calculated ankle-brachial index is, however, within normal limits. Multiphasic waveforms throughout the right lower extremity with the exception of the dorsalis pedis artery which is monophasic. 2. LEFT LOWER EXTREMITY: Noncompressible arteries of the left lower extremity suggestive of calcific atherosclerotic involvement. The calculated ankle-brachial index is, however, within normal limits. Multiphasic waveforms throughout the left lower extremity.    Foot XR, G/E 3 views, left    Result Date: 1/18/2023  EXAM: XR FOOT LEFT G/E 3 VIEWS LOCATION: [facility] DATE/TIME: 1/18/2023 1:57 PM INDICATION: Foot pain. Wound. COMPARISON: None.     IMPRESSION: No fracture. No degenerative changes. No osteomyelitis.    Foot  XR, G/E 3 views, right    Result Date: 1/18/2023  EXAM: XR FOOT RIGHT G/E 3 VIEWS LOCATION: [facility] DATE/TIME: 1/18/2023 1:57 PM INDICATION: Foot pain. Soft tissue wound. COMPARISON: None.     IMPRESSION: No fracture. No degenerative changes. No osteomyelitis.         Picture:

## 2023-02-11 ENCOUNTER — HEALTH MAINTENANCE LETTER (OUTPATIENT)
Age: 58
End: 2023-02-11

## 2023-02-13 ENCOUNTER — PRE VISIT (OUTPATIENT)
Dept: ENDOCRINOLOGY | Facility: CLINIC | Age: 58
End: 2023-02-13

## 2023-02-13 ENCOUNTER — VIRTUAL VISIT (OUTPATIENT)
Dept: ENDOCRINOLOGY | Facility: CLINIC | Age: 58
End: 2023-02-13
Attending: PODIATRIST
Payer: COMMERCIAL

## 2023-02-13 DIAGNOSIS — E11.59 TYPE 2 DIABETES MELLITUS WITH OTHER CIRCULATORY COMPLICATION, WITH LONG-TERM CURRENT USE OF INSULIN (H): ICD-10-CM

## 2023-02-13 DIAGNOSIS — M86.9 OSTEOMYELITIS OF ANKLE AND FOOT (H): ICD-10-CM

## 2023-02-13 DIAGNOSIS — Z79.4 TYPE 2 DIABETES MELLITUS WITHOUT COMPLICATION, WITH LONG-TERM CURRENT USE OF INSULIN (H): ICD-10-CM

## 2023-02-13 DIAGNOSIS — E11.9 TYPE 2 DIABETES MELLITUS WITHOUT COMPLICATION, WITH LONG-TERM CURRENT USE OF INSULIN (H): ICD-10-CM

## 2023-02-13 DIAGNOSIS — L97.513 DIABETIC ULCER OF TOE OF RIGHT FOOT ASSOCIATED WITH DIABETES MELLITUS DUE TO UNDERLYING CONDITION, WITH NECROSIS OF MUSCLE (H): ICD-10-CM

## 2023-02-13 DIAGNOSIS — E08.621 DIABETIC ULCER OF TOE OF RIGHT FOOT ASSOCIATED WITH DIABETES MELLITUS DUE TO UNDERLYING CONDITION, WITH NECROSIS OF MUSCLE (H): ICD-10-CM

## 2023-02-13 DIAGNOSIS — I73.9 PAD (PERIPHERAL ARTERY DISEASE) (H): ICD-10-CM

## 2023-02-13 DIAGNOSIS — Z79.4 TYPE 2 DIABETES MELLITUS WITH OTHER CIRCULATORY COMPLICATION, WITH LONG-TERM CURRENT USE OF INSULIN (H): ICD-10-CM

## 2023-02-13 PROCEDURE — 99204 OFFICE O/P NEW MOD 45 MIN: CPT | Mod: VID | Performed by: INTERNAL MEDICINE

## 2023-02-13 RX ORDER — INSULIN GLARGINE 100 [IU]/ML
INJECTION, SOLUTION SUBCUTANEOUS
Qty: 30 ML | Refills: 4 | Status: ON HOLD | OUTPATIENT
Start: 2023-02-13 | End: 2023-02-21

## 2023-02-13 RX ORDER — INSULIN LISPRO 100 [IU]/ML
3 INJECTION, SOLUTION INTRAVENOUS; SUBCUTANEOUS
Qty: 15 ML | Refills: 4 | Status: ON HOLD | OUTPATIENT
Start: 2023-02-13 | End: 2023-02-21

## 2023-02-13 NOTE — NURSING NOTE
Is the patient currently in the state of [place]? YES    Visit mode:VIDEO    If the visit is dropped, the patient can be reconnected by: VIDEO VISIT: Text to cell phone: [phone]    Will anyone else be joining the visit? NO      How would you like to obtain your AVS? MyChart    Are changes needed to the allergy or medication list? NO    Comments or concerns regarding today's visit:

## 2023-02-13 NOTE — PROGRESS NOTES
ophEndocrine Consult Video visit note    Attending Assessment/Plan :        Diabetic ulcer of toe of right foot associated with diabetes mellitus due to underlying condition, with necrosis of muscle (H)  Osteomyelitis of ankle and foot (H)  PAD (peripheral artery disease) (H)  Type 2 diabetes mellitus with other circulatory complication, with long-term current use of insulin (H)  Type 2 diabetes mellitus without complication, with long-term current use of insulin (H)    Assessment:  -unclear if t1 or t2 given insulin dependence, clinical factors, and low insulin dose requirements.  Warrants screening for t1 but already treated with basal/bolus   -goal a1c is <7.5% without hypos  -needs diabetic eye exam    Plan:  -increase lantus to 11 units and start self adjustment plan of 1 unit increase every 2 days until FBG <180 without hypos  -increase humalog to 5 units with meals  -continue metformin  -will order labs for prior to next visit - a1c, ROSA, RADHA, c-peptide  -ophthalmology referral    I have independently reviewed and interpreted labs, imaging as indicated.     RTC 3 months      Chief complaint:  Abdulaziz is a 57 year old male seen for type 2 diabetes    HISTORY OF PRESENT ILLNESS    Diagnosed - 20 years ago.  States he has always been on insulin.    Has PAD with amputation scheduled for later this week.     Complications - PAD     Current regimen:  Lantus - 10 units    Humalog - 4 units TID with meals   Metformin - 500 BID     Retinopathy screening - needs referral     Glucometer review:  2/6  AM- 240  Afternoon- 237  PM- 210     2/7  AM- 195  Afternoon- 210  PM- 222     2/8  AM- 210  Afternoon- 208  PM- 225     2/9  AM- 201    Denies hypoglycemia     Prior to hospitalization states he was on sliding scale for insulin coverage    Endocrine relevant labs and imaging are as follows:  Component      Latest Ref Rng & Units 1/18/2023   Cholesterol      <200 mg/dL 145   Triglycerides      <150 mg/dL 226 (H)   HDL  Cholesterol      >=40 mg/dL 37 (L)   LDL Cholesterol Calculated      <=100 mg/dL 63   Non HDL Cholesterol      <130 mg/dL 108   Hemoglobin A1C      <5.7 % 13.7 (H)       REVIEW OF SYSTEMS    10 system ROS otherwise as per the HPI or negative    Past Medical History  No past medical history on file.    Medications  Current Outpatient Medications   Medication Sig Dispense Refill     acetaminophen (TYLENOL) 500 MG tablet Take 1-2 tablets (500-1,000 mg) by mouth every 6 hours as needed for mild pain       alcohol swab prep pads Use to swab area of injection/ella as directed. 100 each 3     blood glucose (NO BRAND SPECIFIED) test strip Use to test blood sugar 4 times daily or as directed. To accompany: Blood Glucose Monitor Brands: per insurance. 100 strip 6     blood glucose monitoring (NO BRAND SPECIFIED) meter device kit Use to test blood sugar 4 time daily or as directed. Preferred blood glucose meter OR supplies to accompany: Blood Glucose Monitor Brands: per insurance. 1 kit 0     cetirizine (ZYRTEC) 10 MG tablet Take 1 tablet by mouth daily as needed       citalopram (CELEXA) 10 MG tablet Take 1 tablet (10 mg) by mouth daily 30 tablet 1     doxycycline monohydrate (MONODOX) 100 MG capsule Take 1 capsule by mouth 2 times daily       doxycycline monohydrate (MONODOX) 100 MG capsule Take 1 capsule (100 mg) by mouth 2 times daily 20 capsule 0     gabapentin (NEURONTIN) 300 MG capsule Take 1 capsule by mouth 3 times daily       gabapentin (NEURONTIN) 300 MG capsule Take 1 capsule (300 mg) by mouth 3 times daily 90 capsule 1     HYDROcodone-acetaminophen (NORCO) 5-325 MG tablet Take 1 tablet by mouth every 6 hours as needed       insulin glargine (LANTUS SOLOSTAR) 100 UNIT/ML pen Inject 10 Units Subcutaneous At Bedtime 3 mL 1     insulin lispro (HUMALOG KWIKPEN) 100 UNIT/ML (1 unit dial) KWIKPEN Inject 3 Units Subcutaneous 3 times daily (before meals) 3 mL 1     insulin pen needle (31G X 8 MM) 31G X 8 MM miscellaneous  Use 1 pen needles daily or as directed. 100 each 1     lisinopril (ZESTRIL) 10 MG tablet Take 1 tablet (10 mg) by mouth daily for 30 days 30 tablet 0     metFORMIN (GLUCOPHAGE) 500 MG tablet Take 1 tablet (500 mg) by mouth 2 times daily (with meals) 60 tablet 1     thin (NO BRAND SPECIFIED) lancets Use with lanceting device. To accompany: Blood Glucose Monitor Brands: per insurance. 100 each 6       Allergies  No Known Allergies    Family History  family history is not on file.    Social History  Social History     Tobacco Use     Smoking status: Never     Smokeless tobacco: Never       Physical Exam  There is no height or weight on file to calculate BMI.  GENERAL: no distress  SKIN: Visible skin clear. No significant rash, abnormal pigmentation or lesions.  EYES: Eyes grossly normal to inspection.  No discharge or erythema, or obvious scleral/conjunctival abnormalities.  NECK: visible goiter is not present; no visible neck masses  RESP: No audible wheeze, cough, or visible cyanosis.  No visible retractions or increased work of breathing.    NEURO: Awake, alert, responds appropriately to questions.  Mentation and speech fluent.  PSYCH:affect normal and appearance well-groomed.    Video-Visit Details    Type of service:  Video Visit    Video Start Time (time video started): 1500    Video End Time (time video stopped): 1525    Originating Location (pt. Location): Home    Distant Location (provider location):  On-site    Mode of Communication:  [facility]    Physician has received verbal consent for a Video Visit from the patient? Yes    I spent a total of 46 minutes on the day of this new patient visit on chart review, lab review, coordinating care, exam and counseling of patient    Miguel Ryan MD

## 2023-02-13 NOTE — LETTER
2/13/2023       RE: Abdulaziz Rausch  [address]  [place] [address]     Dear Colleague,    Thank you for referring your patient, Abdulaziz Rausch, to the [facility] [place] [facility]. Please see a copy of my visit note below.    Outcome for 02/09/23 12:30 PM: Prezma message sent  Devi Aguila LPN   Outcome for 02/10/23 12:30 PM: Data obtained via phone and located below; pt only started checking a few days ago  Devi Aguila LPN     2/6  AM- 240  Afternoon- 237  PM- 210     2/7  AM- 195  Afternoon- 210  PM- 222     2/8  AM- 210  Afternoon- 208  PM- 225     2/9  AM- 201            ophEndocrine Consult Video visit note    Attending Assessment/Plan :        Diabetic ulcer of toe of right foot associated with diabetes mellitus due to underlying condition, with necrosis of muscle (H)  Osteomyelitis of ankle and foot (H)  PAD (peripheral artery disease) (H)  Type 2 diabetes mellitus with other circulatory complication, with long-term current use of insulin (H)  Type 2 diabetes mellitus without complication, with long-term current use of insulin (H)    Assessment:  -unclear if t1 or t2 given insulin dependence, clinical factors, and low insulin dose requirements.  Warrants screening for t1 but already treated with basal/bolus   -goal a1c is <7.5% without hypos  -needs diabetic eye exam    Plan:  -increase lantus to 11 units and start self adjustment plan of 1 unit increase every 2 days until FBG <180 without hypos  -increase humalog to 5 units with meals  -continue metformin  -will order labs for prior to next visit - a1c, ROSA, RADHA, c-peptide  -ophthalmology referral    I have independently reviewed and interpreted labs, imaging as indicated.     RTC 3 months      Chief complaint:  Abdulaziz is a 57 year old male seen for type 2 diabetes    HISTORY OF PRESENT ILLNESS    Diagnosed - 20 years ago.  States he has always been on  insulin.    Has PAD with amputation scheduled for later this week.     Complications - PAD     Current regimen:  Lantus - 10 units    Humalog - 4 units TID with meals   Metformin - 500 BID     Retinopathy screening - needs referral     Glucometer review:  2/6  AM- 240  Afternoon- 237  PM- 210     2/7  AM- 195  Afternoon- 210  PM- 222     2/8  AM- 210  Afternoon- 208  PM- 225     2/9  AM- 201    Denies hypoglycemia     Prior to hospitalization states he was on sliding scale for insulin coverage    Endocrine relevant labs and imaging are as follows:  Component      Latest Ref Rng & Units 1/18/2023   Cholesterol      <200 mg/dL 145   Triglycerides      <150 mg/dL 226 (H)   HDL Cholesterol      >=40 mg/dL 37 (L)   LDL Cholesterol Calculated      <=100 mg/dL 63   Non HDL Cholesterol      <130 mg/dL 108   Hemoglobin A1C      <5.7 % 13.7 (H)       REVIEW OF SYSTEMS    10 system ROS otherwise as per the HPI or negative    Past Medical History  No past medical history on file.    Medications  Current Outpatient Medications   Medication Sig Dispense Refill     acetaminophen (TYLENOL) 500 MG tablet Take 1-2 tablets (500-1,000 mg) by mouth every 6 hours as needed for mild pain       alcohol swab prep pads Use to swab area of injection/ella as directed. 100 each 3     blood glucose (NO BRAND SPECIFIED) test strip Use to test blood sugar 4 times daily or as directed. To accompany: Blood Glucose Monitor Brands: per insurance. 100 strip 6     blood glucose monitoring (NO BRAND SPECIFIED) meter device kit Use to test blood sugar 4 time daily or as directed. Preferred blood glucose meter OR supplies to accompany: Blood Glucose Monitor Brands: per insurance. 1 kit 0     cetirizine (ZYRTEC) 10 MG tablet Take 1 tablet by mouth daily as needed       citalopram (CELEXA) 10 MG tablet Take 1 tablet (10 mg) by mouth daily 30 tablet 1     doxycycline monohydrate (MONODOX) 100 MG capsule Take 1 capsule by mouth 2 times daily       doxycycline  monohydrate (MONODOX) 100 MG capsule Take 1 capsule (100 mg) by mouth 2 times daily 20 capsule 0     gabapentin (NEURONTIN) 300 MG capsule Take 1 capsule by mouth 3 times daily       gabapentin (NEURONTIN) 300 MG capsule Take 1 capsule (300 mg) by mouth 3 times daily 90 capsule 1     HYDROcodone-acetaminophen (NORCO) 5-325 MG tablet Take 1 tablet by mouth every 6 hours as needed       insulin glargine (LANTUS SOLOSTAR) 100 UNIT/ML pen Inject 10 Units Subcutaneous At Bedtime 3 mL 1     insulin lispro (HUMALOG KWIKPEN) 100 UNIT/ML (1 unit dial) KWIKPEN Inject 3 Units Subcutaneous 3 times daily (before meals) 3 mL 1     insulin pen needle (31G X 8 MM) 31G X 8 MM miscellaneous Use 1 pen needles daily or as directed. 100 each 1     lisinopril (ZESTRIL) 10 MG tablet Take 1 tablet (10 mg) by mouth daily for 30 days 30 tablet 0     metFORMIN (GLUCOPHAGE) 500 MG tablet Take 1 tablet (500 mg) by mouth 2 times daily (with meals) 60 tablet 1     thin (NO BRAND SPECIFIED) lancets Use with lanceting device. To accompany: Blood Glucose Monitor Brands: per insurance. 100 each 6       Allergies  No Known Allergies    Family History  family history is not on file.    Social History  Social History     Tobacco Use     Smoking status: Never     Smokeless tobacco: Never       Physical Exam  There is no height or weight on file to calculate BMI.  GENERAL: no distress  SKIN: Visible skin clear. No significant rash, abnormal pigmentation or lesions.  EYES: Eyes grossly normal to inspection.  No discharge or erythema, or obvious scleral/conjunctival abnormalities.  NECK: visible goiter is not present; no visible neck masses  RESP: No audible wheeze, cough, or visible cyanosis.  No visible retractions or increased work of breathing.    NEURO: Awake, alert, responds appropriately to questions.  Mentation and speech fluent.  PSYCH:affect normal and appearance well-groomed.    Video-Visit Details    Type of service:  Video Visit    Video Start  Time (time video started): 1500    Video End Time (time video stopped): 1525    Originating Location (pt. Location): Home    Distant Location (provider location):  On-site    Mode of Communication:  [facility]    Physician has received verbal consent for a Video Visit from the patient? Yes    I spent a total of 46 minutes on the day of this new patient visit on chart review, lab review, coordinating care, exam and counseling of patient    Miguel Ryan MD

## 2023-02-15 ENCOUNTER — OFFICE VISIT (OUTPATIENT)
Dept: FAMILY MEDICINE | Facility: CLINIC | Age: 58
End: 2023-02-15
Payer: COMMERCIAL

## 2023-02-15 VITALS
DIASTOLIC BLOOD PRESSURE: 70 MMHG | OXYGEN SATURATION: 98 % | BODY MASS INDEX: 20.23 KG/M2 | WEIGHT: 141.31 LBS | TEMPERATURE: 97.1 F | HEIGHT: 70 IN | HEART RATE: 92 BPM | SYSTOLIC BLOOD PRESSURE: 140 MMHG

## 2023-02-15 DIAGNOSIS — Z01.818 PREOP GENERAL PHYSICAL EXAM: Primary | ICD-10-CM

## 2023-02-15 DIAGNOSIS — M86.9 OSTEOMYELITIS OF ANKLE AND FOOT (H): ICD-10-CM

## 2023-02-15 DIAGNOSIS — I10 ESSENTIAL HYPERTENSION: ICD-10-CM

## 2023-02-15 DIAGNOSIS — Z79.4 TYPE 2 DIABETES MELLITUS WITH OTHER CIRCULATORY COMPLICATION, WITH LONG-TERM CURRENT USE OF INSULIN (H): ICD-10-CM

## 2023-02-15 DIAGNOSIS — L97.513 DIABETIC ULCER OF TOE OF RIGHT FOOT ASSOCIATED WITH DIABETES MELLITUS DUE TO UNDERLYING CONDITION, WITH NECROSIS OF MUSCLE (H): ICD-10-CM

## 2023-02-15 DIAGNOSIS — E11.59 TYPE 2 DIABETES MELLITUS WITH OTHER CIRCULATORY COMPLICATION, WITH LONG-TERM CURRENT USE OF INSULIN (H): ICD-10-CM

## 2023-02-15 DIAGNOSIS — E08.621 DIABETIC ULCER OF TOE OF RIGHT FOOT ASSOCIATED WITH DIABETES MELLITUS DUE TO UNDERLYING CONDITION, WITH NECROSIS OF MUSCLE (H): ICD-10-CM

## 2023-02-15 DIAGNOSIS — D64.9 CHRONIC ANEMIA: ICD-10-CM

## 2023-02-15 DIAGNOSIS — Z79.4 TYPE 2 DIABETES MELLITUS WITH HYPERGLYCEMIA, WITH LONG-TERM CURRENT USE OF INSULIN (H): ICD-10-CM

## 2023-02-15 DIAGNOSIS — E11.65 TYPE 2 DIABETES MELLITUS WITH HYPERGLYCEMIA, WITH LONG-TERM CURRENT USE OF INSULIN (H): ICD-10-CM

## 2023-02-15 PROBLEM — L97.419 DIABETIC ULCER OF RIGHT MIDFOOT ASSOCIATED WITH TYPE 2 DIABETES MELLITUS (H): Status: RESOLVED | Noted: 2023-01-21 | Resolved: 2023-02-15

## 2023-02-15 PROBLEM — L97.521 DIABETIC ULCER OF TOE OF LEFT FOOT ASSOCIATED WITH DIABETES MELLITUS DUE TO UNDERLYING CONDITION, LIMITED TO BREAKDOWN OF SKIN (H): Status: RESOLVED | Noted: 2023-01-27 | Resolved: 2023-02-15

## 2023-02-15 PROBLEM — L08.9 FOOT INFECTION: Status: RESOLVED | Noted: 2023-01-18 | Resolved: 2023-02-15

## 2023-02-15 PROBLEM — E11.621 DIABETIC ULCER OF RIGHT MIDFOOT ASSOCIATED WITH TYPE 2 DIABETES MELLITUS (H): Status: RESOLVED | Noted: 2023-01-21 | Resolved: 2023-02-15

## 2023-02-15 LAB
ANION GAP SERPL CALCULATED.3IONS-SCNC: 12 MMOL/L (ref 7–15)
BUN SERPL-MCNC: 32.3 MG/DL (ref 8–23)
CALCIUM SERPL-MCNC: 9.4 MG/DL (ref 8.6–10)
CHLORIDE SERPL-SCNC: 104 MMOL/L (ref 98–107)
CREAT SERPL-MCNC: 1.14 MG/DL (ref 0.67–1.17)
DEPRECATED HCO3 PLAS-SCNC: 23 MMOL/L (ref 22–29)
ERYTHROCYTE [DISTWIDTH] IN BLOOD BY AUTOMATED COUNT: 13 % (ref 10–15)
GFR SERPL CREATININE-BSD FRML MDRD: 74 ML/MIN/1.73M2
GLUCOSE SERPL-MCNC: 352 MG/DL (ref 70–99)
HBA1C MFR BLD: 11.2 % (ref 0–5.6)
HCT VFR BLD AUTO: 32 % (ref 40–53)
HGB BLD-MCNC: 11.2 G/DL (ref 13.3–17.7)
MCH RBC QN AUTO: 29.6 PG (ref 26.5–33)
MCHC RBC AUTO-ENTMCNC: 35 G/DL (ref 31.5–36.5)
MCV RBC AUTO: 84 FL (ref 78–100)
PLATELET # BLD AUTO: 245 10E3/UL (ref 150–450)
POTASSIUM SERPL-SCNC: 4.5 MMOL/L (ref 3.4–5.3)
RBC # BLD AUTO: 3.79 10E6/UL (ref 4.4–5.9)
SODIUM SERPL-SCNC: 139 MMOL/L (ref 136–145)
WBC # BLD AUTO: 5.6 10E3/UL (ref 4–11)

## 2023-02-15 PROCEDURE — 84681 ASSAY OF C-PEPTIDE: CPT | Performed by: STUDENT IN AN ORGANIZED HEALTH CARE EDUCATION/TRAINING PROGRAM

## 2023-02-15 PROCEDURE — 86341 ISLET CELL ANTIBODY: CPT | Mod: 90 | Performed by: STUDENT IN AN ORGANIZED HEALTH CARE EDUCATION/TRAINING PROGRAM

## 2023-02-15 PROCEDURE — 36415 COLL VENOUS BLD VENIPUNCTURE: CPT | Performed by: STUDENT IN AN ORGANIZED HEALTH CARE EDUCATION/TRAINING PROGRAM

## 2023-02-15 PROCEDURE — 99000 SPECIMEN HANDLING OFFICE-LAB: CPT | Performed by: STUDENT IN AN ORGANIZED HEALTH CARE EDUCATION/TRAINING PROGRAM

## 2023-02-15 PROCEDURE — 80048 BASIC METABOLIC PNL TOTAL CA: CPT | Performed by: STUDENT IN AN ORGANIZED HEALTH CARE EDUCATION/TRAINING PROGRAM

## 2023-02-15 PROCEDURE — 82570 ASSAY OF URINE CREATININE: CPT | Performed by: STUDENT IN AN ORGANIZED HEALTH CARE EDUCATION/TRAINING PROGRAM

## 2023-02-15 PROCEDURE — 99214 OFFICE O/P EST MOD 30 MIN: CPT | Performed by: STUDENT IN AN ORGANIZED HEALTH CARE EDUCATION/TRAINING PROGRAM

## 2023-02-15 PROCEDURE — 82043 UR ALBUMIN QUANTITATIVE: CPT | Performed by: STUDENT IN AN ORGANIZED HEALTH CARE EDUCATION/TRAINING PROGRAM

## 2023-02-15 PROCEDURE — 93010 ELECTROCARDIOGRAM REPORT: CPT | Performed by: INTERNAL MEDICINE

## 2023-02-15 PROCEDURE — 83036 HEMOGLOBIN GLYCOSYLATED A1C: CPT | Performed by: STUDENT IN AN ORGANIZED HEALTH CARE EDUCATION/TRAINING PROGRAM

## 2023-02-15 PROCEDURE — 85027 COMPLETE CBC AUTOMATED: CPT | Performed by: STUDENT IN AN ORGANIZED HEALTH CARE EDUCATION/TRAINING PROGRAM

## 2023-02-15 PROCEDURE — 93005 ELECTROCARDIOGRAM TRACING: CPT | Performed by: STUDENT IN AN ORGANIZED HEALTH CARE EDUCATION/TRAINING PROGRAM

## 2023-02-15 ASSESSMENT — PAIN SCALES - GENERAL: PAINLEVEL: NO PAIN (0)

## 2023-02-15 NOTE — H&P (VIEW-ONLY)
[facility]  [address]  [facility] [address]  Phone: [phone]  Fax: [phone]  Primary Provider: Bre Pittman  Pre-op Performing Provider: BRE PITTMAN    :[id number]}  PREOPERATIVE EVALUATION:  Today's date: 2/15/2023    Abdulaziz Rausch is a 57 year old male who presents for a preoperative evaluation.    Surgical Information:  Surgery/Procedure: Amputation 2nd and 3rd toe right foot  Surgery Location: [facility]  Surgeon: Dr. Gandhi  Surgery Date: 2/17/23  Time of Surgery: 4 pm  Where patient plans to recover: Other: hospital  Fax number for surgical facility: Note does not need to be faxed, will be available electronically in Epic.    Assessment & Plan     57-year-old male with past medical history of type 2 diabetes uncontrolled now on insulin, benign essential hypertension, major depressive disorder who presents for preop evaluation to have second and third toe amputation due to diabetic ulcer and osteomyelitis.  Surgery is necessary for definitive treatment and thus even though patient is uncontrolled with his type 2 diabetes I recommended proceeding.  His blood sugars are improved from when he was initially seen approximately month ago.  Down into the low 200s to high 100s fasting.  He is continue to work with endocrinology to increase his insulin and get this under control.  His other medical problems are stable.  I recommended a BMP checking his kidney function and glucose as well as a CBC as he has been anemic with a hemoglobin of 9.9 on last check a month ago.  Also recommended an EKG given risk though not having any active chest symptoms currently.  Patient approved to proceed with surgery as long as no critical values.    1. Type 2 diabetes mellitus with hyperglycemia, with long-term current use of insulin (H)  - Basic metabolic panel  (Ca, Cl, CO2, Creat, Gluc, K, Na, BUN); Future  - EKG 12-lead, tracing only    2. Diabetic ulcer of toe of right foot  associated with diabetes mellitus due to underlying condition, with necrosis of muscle (H)    3. Essential hypertension  - Basic metabolic panel  (Ca, Cl, CO2, Creat, Gluc, K, Na, BUN); Future    4. Osteomyelitis of ankle and foot (H)    5. Chronic anemia  - CBC with platelets; Future    6. Preop general physical exam    The proposed surgical procedure is considered INTERMEDIATE risk.    Medication Instructions:  take insulin, lisinopril day of and hold other medication    RECOMMENDATION:  APPROVAL GIVEN to proceed with proposed procedure pending review of diagnostic evaluation.    Subjective     HPI related to upcoming procedure:     Recent Vascular Surgery Note:    Osteomyelitis digits 2,3 right foot  Diabetic Ulceration digits 2,3 right foot and 3rd digit left foot        TREATMENT:  MRI right foot: 1.  There is some loss of detail on this study secondary to inhomogeneous fat saturation given the peripheral location of the toes.     2.  There is abnormal signal seen with no andrews destructive changes involving the second toe middle and distal phalanx and the third toe distal phalanx. Given patient's history changes can be seen with osteomyelitis.     3.  No evidence for an abscess.      -Due to the presence of osteomyelitis, I again reviewed the treatment options to include either 6 weeks IV antibiotics with Infectious Disease/surgical debridement with removal of bone/use of wound vac/non-weight bearing vs amputation of the involved bone. He would like to proceed with amputation for a more definitive procedure.      -Based on the above and patient's inability to connect with the diabetic educator, addressing the right foot osteomyelitis as an outpatient is not likely to be successful. I recommend hospital admission post-op with transfer to TCU.       -Reviewed risks of surgery including non-healing wound and need for additional foot amputation/loss of limb in the presence of recent HbA1c at 13.7.      -I will  prescribe additional 10 days of doxycycline.       Preop Questions 2/15/2023   1. Have you ever had a heart attack or stroke? No   2. Have you ever had surgery on your heart or blood vessels, such as a stent placement, a coronary artery bypass, or surgery on an artery in your head, neck, heart, or legs? No   3. Do you have chest pain with activity? No   4. Do you have a history of  heart failure? No   5. Do you currently have a cold, bronchitis or symptoms of other infection? No   6. Do you have a cough, shortness of breath, or wheezing? YES - some coughing lately   7. Do you or anyone in your family have previous history of blood clots? No   8. Do you or does anyone in your family have a serious bleeding problem such as prolonged bleeding following surgeries or cuts? No   9. Have you ever had problems with anemia or been told to take iron pills? No   10. Have you had any abnormal blood loss such as black, tarry or bloody stools? No   11. Have you ever had a blood transfusion? No   12. Are you willing to have a blood transfusion if it is medically needed before, during, or after your surgery? Yes   13. Have you or any of your relatives ever had problems with anesthesia? No   14. Do you have sleep apnea, excessive snoring or daytime drowsiness? No   15. Do you have any artifical heart valves or other implanted medical devices like a pacemaker, defibrillator, or continuous glucose monitor? No   16. Do you have artificial joints? No   17. Are you allergic to latex? No     Health Care Directive:  Patient does not have a Health Care Directive or Living Will: Full COde    Preoperative Review of :   reviewed - controlled substances reflected in medication list.    Status of Chronic Conditions:  See problem list for active medical problems.  Problems all longstanding and stable, except as noted/documented.  See ROS for pertinent symptoms related to these conditions.      Review of Systems  Complete ROS is negative  except as noted in HPI    Patient Active Problem List    Diagnosis Date Noted     Diabetic ulcer of toe of right foot associated with diabetes mellitus due to underlying condition, with necrosis of muscle (H) 01/27/2023     Priority: Medium     Osteomyelitis of ankle and foot (H) 01/27/2023     Priority: Medium     Diabetic ulcer of toe of left foot associated with diabetes mellitus due to underlying condition, limited to breakdown of skin (H) 01/27/2023     Priority: Medium     Essential hypertension 01/21/2023     Priority: Medium     Diabetic ulcer of right midfoot associated with type 2 diabetes mellitus (H) 01/21/2023     Priority: Medium     Chronic anemia 01/21/2023     Priority: Medium     Foot infection 01/18/2023     Priority: Medium     Hypokalemia 01/18/2023     Priority: Medium     Hyponatremia 01/18/2023     Priority: Medium     Type 2 diabetes mellitus without complication, with long-term current use of insulin (H) 08/26/2021     Priority: Medium     Current moderate episode of major depressive disorder without prior episode (H) 08/26/2021     Priority: Medium         Past Surgical History:   Procedure Laterality Date     IRRIGATION AND DEBRIDEMENT TOE, COMBINED Right 1/20/2023    Procedure: IRRIGATION AND DEBRIDEMENT digits 2 and 3 right foot;  Surgeon: Jf Huerta DPM;  Location: [facility] [place] [facility]       Current Outpatient Medications   Medication     acetaminophen (TYLENOL) 500 MG tablet     alcohol swab prep pads     blood glucose (NO BRAND SPECIFIED) test strip     blood glucose monitoring (NO BRAND SPECIFIED) meter device kit     cetirizine (ZYRTEC) 10 MG tablet     citalopram (CELEXA) 10 MG tablet     doxycycline monohydrate (MONODOX) 100 MG capsule     doxycycline monohydrate (MONODOX) 100 MG capsule     gabapentin (NEURONTIN) 300 MG capsule     gabapentin (NEURONTIN) 300 MG capsule     HYDROcodone-acetaminophen (NORCO) 5-325 MG tablet     insulin glargine (LANTUS SOLOSTAR) 100 UNIT/ML pen  "    insulin lispro (HUMALOG KWIKPEN) 100 UNIT/ML (1 unit dial) KWIKPEN     insulin pen needle (31G X 8 MM) 31G X 8 MM miscellaneous     lisinopril (ZESTRIL) 10 MG tablet     metFORMIN (GLUCOPHAGE) 500 MG tablet     thin (NO BRAND SPECIFIED) lancets     No current facility-administered medications for this visit.        No Known Allergies     Social History     Socioeconomic History     Marital status:      Spouse name: Not on file     Number of children: Not on file     Years of education: Not on file     Highest education level: Not on file   Occupational History     Not on file   Tobacco Use     Smoking status: Never     Smokeless tobacco: Never   Substance and Sexual Activity     Alcohol use: Not on file     Drug use: Not on file     Sexual activity: Not on file   Other Topics Concern     Not on file   Social History Narrative     Not on file     Social Determinants of Health     Financial Resource Strain: Not on file   Food Insecurity: Not on file   Transportation Needs: Not on file   Physical Activity: Not on file   Stress: Not on file   Social Connections: Not on file   Intimate Partner Violence: Not on file   Housing Stability: Not on file       No family history on file.      Objective   BP (!) 140/70   Pulse 92   Temp 97.1  F (36.2  C)   Ht 1.778 m (5' 10\")   Wt 64.1 kg (141 lb 5 oz)   SpO2 98%   BMI 20.28 kg/m      General appearance: Alert, cooperative, no distress, appears stated age  Head: Normocephalic, atraumatic, without obvious abnormality  Eyes: Pupils equal round, reactive.  Conjunctiva clear.  Nose: Nares normal, no drainage.  Throat: Lips, mucosa, tongue normal mucosa pink and moist  Neck: Supple, symmetric, trachea midline  Lungs: Clear to auscultation bilaterally, no wheezing or crackles present.  Respirations unlabored  Heart: Regular rate and rhythm, normal S1 and S2, no murmur, rub or gallop.  Extremities: Wounds over patient's second and third toes of right " foot    Diagnostics:  Labs pending at this time.  Results will be reviewed when available.     Revised Cardiac Risk Index (RCRI):  The patient has the following serious cardiovascular risks for perioperative complications:   - Diabetes Mellitus (on Insulin) = 1 point     RCRI Interpretation: 1 point: Class II (low risk - 0.9% complication rate)      Signed Electronically by: Daryn Pittman MD  Copy of this evaluation report is provided to requesting physician.}

## 2023-02-15 NOTE — PROGRESS NOTES
[facility]  [address]  [facility] [address]  Phone: [phone]  Fax: [phone]  Primary Provider: Bre Ptitman  Pre-op Performing Provider: BRE PITTMAN    :[id number]}  PREOPERATIVE EVALUATION:  Today's date: 2/15/2023    Abdulaziz Rausch is a 57 year old male who presents for a preoperative evaluation.    Surgical Information:  Surgery/Procedure: Amputation 2nd and 3rd toe right foot  Surgery Location: [facility]  Surgeon: Dr. Gandhi  Surgery Date: 2/17/23  Time of Surgery: 4 pm  Where patient plans to recover: Other: hospital  Fax number for surgical facility: Note does not need to be faxed, will be available electronically in Epic.    Assessment & Plan     57-year-old male with past medical history of type 2 diabetes uncontrolled now on insulin, benign essential hypertension, major depressive disorder who presents for preop evaluation to have second and third toe amputation due to diabetic ulcer and osteomyelitis.  Surgery is necessary for definitive treatment and thus even though patient is uncontrolled with his type 2 diabetes I recommended proceeding.  His blood sugars are improved from when he was initially seen approximately month ago.  Down into the low 200s to high 100s fasting.  He is continue to work with endocrinology to increase his insulin and get this under control.  His other medical problems are stable.  I recommended a BMP checking his kidney function and glucose as well as a CBC as he has been anemic with a hemoglobin of 9.9 on last check a month ago.  Also recommended an EKG given risk though not having any active chest symptoms currently.  Patient approved to proceed with surgery as long as no critical values.    1. Type 2 diabetes mellitus with hyperglycemia, with long-term current use of insulin (H)  - Basic metabolic panel  (Ca, Cl, CO2, Creat, Gluc, K, Na, BUN); Future  - EKG 12-lead, tracing only    2. Diabetic ulcer of toe of right foot  associated with diabetes mellitus due to underlying condition, with necrosis of muscle (H)    3. Essential hypertension  - Basic metabolic panel  (Ca, Cl, CO2, Creat, Gluc, K, Na, BUN); Future    4. Osteomyelitis of ankle and foot (H)    5. Chronic anemia  - CBC with platelets; Future    6. Preop general physical exam    The proposed surgical procedure is considered INTERMEDIATE risk.    Medication Instructions:  take insulin, lisinopril day of and hold other medication    RECOMMENDATION:  APPROVAL GIVEN to proceed with proposed procedure pending review of diagnostic evaluation.    Subjective     HPI related to upcoming procedure:     Recent Vascular Surgery Note:    Osteomyelitis digits 2,3 right foot  Diabetic Ulceration digits 2,3 right foot and 3rd digit left foot        TREATMENT:  MRI right foot: 1.  There is some loss of detail on this study secondary to inhomogeneous fat saturation given the peripheral location of the toes.     2.  There is abnormal signal seen with no andrews destructive changes involving the second toe middle and distal phalanx and the third toe distal phalanx. Given patient's history changes can be seen with osteomyelitis.     3.  No evidence for an abscess.      -Due to the presence of osteomyelitis, I again reviewed the treatment options to include either 6 weeks IV antibiotics with Infectious Disease/surgical debridement with removal of bone/use of wound vac/non-weight bearing vs amputation of the involved bone. He would like to proceed with amputation for a more definitive procedure.      -Based on the above and patient's inability to connect with the diabetic educator, addressing the right foot osteomyelitis as an outpatient is not likely to be successful. I recommend hospital admission post-op with transfer to TCU.       -Reviewed risks of surgery including non-healing wound and need for additional foot amputation/loss of limb in the presence of recent HbA1c at 13.7.      -I will  prescribe additional 10 days of doxycycline.       Preop Questions 2/15/2023   1. Have you ever had a heart attack or stroke? No   2. Have you ever had surgery on your heart or blood vessels, such as a stent placement, a coronary artery bypass, or surgery on an artery in your head, neck, heart, or legs? No   3. Do you have chest pain with activity? No   4. Do you have a history of  heart failure? No   5. Do you currently have a cold, bronchitis or symptoms of other infection? No   6. Do you have a cough, shortness of breath, or wheezing? YES - some coughing lately   7. Do you or anyone in your family have previous history of blood clots? No   8. Do you or does anyone in your family have a serious bleeding problem such as prolonged bleeding following surgeries or cuts? No   9. Have you ever had problems with anemia or been told to take iron pills? No   10. Have you had any abnormal blood loss such as black, tarry or bloody stools? No   11. Have you ever had a blood transfusion? No   12. Are you willing to have a blood transfusion if it is medically needed before, during, or after your surgery? Yes   13. Have you or any of your relatives ever had problems with anesthesia? No   14. Do you have sleep apnea, excessive snoring or daytime drowsiness? No   15. Do you have any artifical heart valves or other implanted medical devices like a pacemaker, defibrillator, or continuous glucose monitor? No   16. Do you have artificial joints? No   17. Are you allergic to latex? No     Health Care Directive:  Patient does not have a Health Care Directive or Living Will: Full COde    Preoperative Review of :   reviewed - controlled substances reflected in medication list.    Status of Chronic Conditions:  See problem list for active medical problems.  Problems all longstanding and stable, except as noted/documented.  See ROS for pertinent symptoms related to these conditions.      Review of Systems  Complete ROS is negative  except as noted in HPI    Patient Active Problem List    Diagnosis Date Noted     Diabetic ulcer of toe of right foot associated with diabetes mellitus due to underlying condition, with necrosis of muscle (H) 01/27/2023     Priority: Medium     Osteomyelitis of ankle and foot (H) 01/27/2023     Priority: Medium     Diabetic ulcer of toe of left foot associated with diabetes mellitus due to underlying condition, limited to breakdown of skin (H) 01/27/2023     Priority: Medium     Essential hypertension 01/21/2023     Priority: Medium     Diabetic ulcer of right midfoot associated with type 2 diabetes mellitus (H) 01/21/2023     Priority: Medium     Chronic anemia 01/21/2023     Priority: Medium     Foot infection 01/18/2023     Priority: Medium     Hypokalemia 01/18/2023     Priority: Medium     Hyponatremia 01/18/2023     Priority: Medium     Type 2 diabetes mellitus without complication, with long-term current use of insulin (H) 08/26/2021     Priority: Medium     Current moderate episode of major depressive disorder without prior episode (H) 08/26/2021     Priority: Medium         Past Surgical History:   Procedure Laterality Date     IRRIGATION AND DEBRIDEMENT TOE, COMBINED Right 1/20/2023    Procedure: IRRIGATION AND DEBRIDEMENT digits 2 and 3 right foot;  Surgeon: Jf Huerta DPM;  Location: [facility] [place] OR       Current Outpatient Medications   Medication     acetaminophen (TYLENOL) 500 MG tablet     alcohol swab prep pads     blood glucose (NO BRAND SPECIFIED) test strip     blood glucose monitoring (NO BRAND SPECIFIED) meter device kit     cetirizine (ZYRTEC) 10 MG tablet     citalopram (CELEXA) 10 MG tablet     doxycycline monohydrate (MONODOX) 100 MG capsule     doxycycline monohydrate (MONODOX) 100 MG capsule     gabapentin (NEURONTIN) 300 MG capsule     gabapentin (NEURONTIN) 300 MG capsule     HYDROcodone-acetaminophen (NORCO) 5-325 MG tablet     insulin glargine (LANTUS SOLOSTAR) 100 UNIT/ML pen  "    insulin lispro (HUMALOG KWIKPEN) 100 UNIT/ML (1 unit dial) KWIKPEN     insulin pen needle (31G X 8 MM) 31G X 8 MM miscellaneous     lisinopril (ZESTRIL) 10 MG tablet     metFORMIN (GLUCOPHAGE) 500 MG tablet     thin (NO BRAND SPECIFIED) lancets     No current facility-administered medications for this visit.        No Known Allergies     Social History     Socioeconomic History     Marital status:      Spouse name: Not on file     Number of children: Not on file     Years of education: Not on file     Highest education level: Not on file   Occupational History     Not on file   Tobacco Use     Smoking status: Never     Smokeless tobacco: Never   Substance and Sexual Activity     Alcohol use: Not on file     Drug use: Not on file     Sexual activity: Not on file   Other Topics Concern     Not on file   Social History Narrative     Not on file     Social Determinants of Health     Financial Resource Strain: Not on file   Food Insecurity: Not on file   Transportation Needs: Not on file   Physical Activity: Not on file   Stress: Not on file   Social Connections: Not on file   Intimate Partner Violence: Not on file   Housing Stability: Not on file       No family history on file.      Objective   BP (!) 140/70   Pulse 92   Temp 97.1  F (36.2  C)   Ht 1.778 m (5' 10\")   Wt 64.1 kg (141 lb 5 oz)   SpO2 98%   BMI 20.28 kg/m      General appearance: Alert, cooperative, no distress, appears stated age  Head: Normocephalic, atraumatic, without obvious abnormality  Eyes: Pupils equal round, reactive.  Conjunctiva clear.  Nose: Nares normal, no drainage.  Throat: Lips, mucosa, tongue normal mucosa pink and moist  Neck: Supple, symmetric, trachea midline  Lungs: Clear to auscultation bilaterally, no wheezing or crackles present.  Respirations unlabored  Heart: Regular rate and rhythm, normal S1 and S2, no murmur, rub or gallop.  Extremities: Wounds over patient's second and third toes of right " foot    Diagnostics:  Labs pending at this time.  Results will be reviewed when available.     Revised Cardiac Risk Index (RCRI):  The patient has the following serious cardiovascular risks for perioperative complications:   - Diabetes Mellitus (on Insulin) = 1 point     RCRI Interpretation: 1 point: Class II (low risk - 0.9% complication rate)      Signed Electronically by: Daryn Pittman MD  Copy of this evaluation report is provided to requesting physician.}

## 2023-02-16 LAB
ATRIAL RATE - MUSE: 89 BPM
C PEPTIDE SERPL-MCNC: 6.2 NG/ML (ref 0.9–6.9)
CREAT UR-MCNC: 65.6 MG/DL
DIASTOLIC BLOOD PRESSURE - MUSE: NORMAL MMHG
INTERPRETATION ECG - MUSE: NORMAL
MICROALBUMIN UR-MCNC: 210 MG/L
MICROALBUMIN/CREAT UR: 320.12 MG/G CR (ref 0–17)
P AXIS - MUSE: 56 DEGREES
PR INTERVAL - MUSE: 128 MS
QRS DURATION - MUSE: 76 MS
QT - MUSE: 352 MS
QTC - MUSE: 428 MS
R AXIS - MUSE: 19 DEGREES
SYSTOLIC BLOOD PRESSURE - MUSE: NORMAL MMHG
T AXIS - MUSE: 57 DEGREES
VENTRICULAR RATE- MUSE: 89 BPM

## 2023-02-17 ENCOUNTER — HOSPITAL ENCOUNTER (EMERGENCY)
Facility: HOSPITAL | Age: 58
End: 2023-02-17
Payer: COMMERCIAL

## 2023-02-17 ENCOUNTER — HOSPITAL ENCOUNTER (INPATIENT)
Facility: HOSPITAL | Age: 58
LOS: 4 days | Discharge: HOME HEALTH | End: 2023-02-21
Attending: PODIATRIST | Admitting: PODIATRIST
Payer: COMMERCIAL

## 2023-02-17 ENCOUNTER — ANESTHESIA EVENT (OUTPATIENT)
Dept: SURGERY | Facility: HOSPITAL | Age: 58
End: 2023-02-17
Payer: COMMERCIAL

## 2023-02-17 ENCOUNTER — ANESTHESIA (OUTPATIENT)
Dept: SURGERY | Facility: HOSPITAL | Age: 58
End: 2023-02-17
Payer: COMMERCIAL

## 2023-02-17 DIAGNOSIS — E11.59 TYPE 2 DIABETES MELLITUS WITH OTHER CIRCULATORY COMPLICATION, WITH LONG-TERM CURRENT USE OF INSULIN (H): ICD-10-CM

## 2023-02-17 DIAGNOSIS — E08.621 DIABETIC ULCER OF TOE OF RIGHT FOOT ASSOCIATED WITH DIABETES MELLITUS DUE TO UNDERLYING CONDITION, WITH NECROSIS OF MUSCLE (H): Primary | ICD-10-CM

## 2023-02-17 DIAGNOSIS — Z79.4 TYPE 2 DIABETES MELLITUS WITH OTHER CIRCULATORY COMPLICATION, WITH LONG-TERM CURRENT USE OF INSULIN (H): ICD-10-CM

## 2023-02-17 DIAGNOSIS — L97.513 DIABETIC ULCER OF TOE OF RIGHT FOOT ASSOCIATED WITH DIABETES MELLITUS DUE TO UNDERLYING CONDITION, WITH NECROSIS OF MUSCLE (H): Primary | ICD-10-CM

## 2023-02-17 DIAGNOSIS — M86.9 OSTEOMYELITIS OF ANKLE AND FOOT (H): ICD-10-CM

## 2023-02-17 LAB
GLUCOSE BLDC GLUCOMTR-MCNC: 117 MG/DL (ref 70–99)
GLUCOSE BLDC GLUCOMTR-MCNC: 153 MG/DL (ref 70–99)
GLUCOSE BLDC GLUCOMTR-MCNC: 154 MG/DL (ref 70–99)
GLUCOSE BLDC GLUCOMTR-MCNC: 69 MG/DL (ref 70–99)
GRAM STAIN RESULT: ABNORMAL

## 2023-02-17 PROCEDURE — 88311 DECALCIFY TISSUE: CPT | Mod: 26 | Performed by: PATHOLOGY

## 2023-02-17 PROCEDURE — 999N000141 HC STATISTIC PRE-PROCEDURE NURSING ASSESSMENT: Performed by: PODIATRIST

## 2023-02-17 PROCEDURE — 258N000003 HC RX IP 258 OP 636: Performed by: PODIATRIST

## 2023-02-17 PROCEDURE — 258N000001 HC RX 258: Performed by: ANESTHESIOLOGY

## 2023-02-17 PROCEDURE — 250N000011 HC RX IP 250 OP 636: Performed by: ANESTHESIOLOGY

## 2023-02-17 PROCEDURE — 250N000013 HC RX MED GY IP 250 OP 250 PS 637: Performed by: INTERNAL MEDICINE

## 2023-02-17 PROCEDURE — 0Y6R0Z0 DETACHMENT AT RIGHT 2ND TOE, COMPLETE, OPEN APPROACH: ICD-10-PCS | Performed by: PODIATRIST

## 2023-02-17 PROCEDURE — 250N000011 HC RX IP 250 OP 636: Performed by: NURSE ANESTHETIST, CERTIFIED REGISTERED

## 2023-02-17 PROCEDURE — 87205 SMEAR GRAM STAIN: CPT | Performed by: PODIATRIST

## 2023-02-17 PROCEDURE — 272N000001 HC OR GENERAL SUPPLY STERILE: Performed by: PODIATRIST

## 2023-02-17 PROCEDURE — 360N000075 HC SURGERY LEVEL 2, PER MIN: Performed by: PODIATRIST

## 2023-02-17 PROCEDURE — 82962 GLUCOSE BLOOD TEST: CPT

## 2023-02-17 PROCEDURE — 87075 CULTR BACTERIA EXCEPT BLOOD: CPT | Performed by: PODIATRIST

## 2023-02-17 PROCEDURE — 87077 CULTURE AEROBIC IDENTIFY: CPT | Performed by: PODIATRIST

## 2023-02-17 PROCEDURE — 250N000011 HC RX IP 250 OP 636: Performed by: PODIATRIST

## 2023-02-17 PROCEDURE — 120N000001 HC R&B MED SURG/OB

## 2023-02-17 PROCEDURE — 99222 1ST HOSP IP/OBS MODERATE 55: CPT | Performed by: INTERNAL MEDICINE

## 2023-02-17 PROCEDURE — 250N000009 HC RX 250: Performed by: ANESTHESIOLOGY

## 2023-02-17 PROCEDURE — 370N000017 HC ANESTHESIA TECHNICAL FEE, PER MIN: Performed by: PODIATRIST

## 2023-02-17 PROCEDURE — 88311 DECALCIFY TISSUE: CPT | Mod: TC | Performed by: PODIATRIST

## 2023-02-17 PROCEDURE — 88305 TISSUE EXAM BY PATHOLOGIST: CPT | Mod: 26 | Performed by: PATHOLOGY

## 2023-02-17 PROCEDURE — 0Y6T0Z0 DETACHMENT AT RIGHT 3RD TOE, COMPLETE, OPEN APPROACH: ICD-10-PCS | Performed by: PODIATRIST

## 2023-02-17 PROCEDURE — 28820 AMPUTATION OF TOE: CPT | Mod: 51 | Performed by: PODIATRIST

## 2023-02-17 PROCEDURE — 258N000003 HC RX IP 258 OP 636: Performed by: ANESTHESIOLOGY

## 2023-02-17 RX ORDER — SODIUM CHLORIDE, SODIUM LACTATE, POTASSIUM CHLORIDE, CALCIUM CHLORIDE 600; 310; 30; 20 MG/100ML; MG/100ML; MG/100ML; MG/100ML
INJECTION, SOLUTION INTRAVENOUS CONTINUOUS
Status: DISCONTINUED | OUTPATIENT
Start: 2023-02-17 | End: 2023-02-17 | Stop reason: HOSPADM

## 2023-02-17 RX ORDER — ONDANSETRON 2 MG/ML
4 INJECTION INTRAMUSCULAR; INTRAVENOUS EVERY 30 MIN PRN
Status: DISCONTINUED | OUTPATIENT
Start: 2023-02-17 | End: 2023-02-17 | Stop reason: HOSPADM

## 2023-02-17 RX ORDER — CEFAZOLIN SODIUM/WATER 2 G/20 ML
2 SYRINGE (ML) INTRAVENOUS
Status: COMPLETED | OUTPATIENT
Start: 2023-02-17 | End: 2023-02-17

## 2023-02-17 RX ORDER — FENTANYL CITRATE 50 UG/ML
25-100 INJECTION, SOLUTION INTRAMUSCULAR; INTRAVENOUS
Status: DISCONTINUED | OUTPATIENT
Start: 2023-02-17 | End: 2023-02-17 | Stop reason: HOSPADM

## 2023-02-17 RX ORDER — PIPERACILLIN SODIUM, TAZOBACTAM SODIUM 3; .375 G/15ML; G/15ML
3.38 INJECTION, POWDER, LYOPHILIZED, FOR SOLUTION INTRAVENOUS EVERY 8 HOURS
Status: DISCONTINUED | OUTPATIENT
Start: 2023-02-18 | End: 2023-02-21 | Stop reason: HOSPADM

## 2023-02-17 RX ORDER — NICOTINE POLACRILEX 4 MG
15-30 LOZENGE BUCCAL
Status: DISCONTINUED | OUTPATIENT
Start: 2023-02-17 | End: 2023-02-21

## 2023-02-17 RX ORDER — DEXTROSE MONOHYDRATE 25 G/50ML
25-50 INJECTION, SOLUTION INTRAVENOUS
Status: DISCONTINUED | OUTPATIENT
Start: 2023-02-17 | End: 2023-02-17

## 2023-02-17 RX ORDER — ONDANSETRON 2 MG/ML
INJECTION INTRAMUSCULAR; INTRAVENOUS PRN
Status: DISCONTINUED | OUTPATIENT
Start: 2023-02-17 | End: 2023-02-17

## 2023-02-17 RX ORDER — LIDOCAINE 40 MG/G
CREAM TOPICAL
Status: DISCONTINUED | OUTPATIENT
Start: 2023-02-17 | End: 2023-02-21 | Stop reason: HOSPADM

## 2023-02-17 RX ORDER — ONDANSETRON 4 MG/1
4 TABLET, ORALLY DISINTEGRATING ORAL EVERY 30 MIN PRN
Status: DISCONTINUED | OUTPATIENT
Start: 2023-02-17 | End: 2023-02-17 | Stop reason: HOSPADM

## 2023-02-17 RX ORDER — NALOXONE HYDROCHLORIDE 1 MG/ML
0.4 INJECTION INTRAMUSCULAR; INTRAVENOUS; SUBCUTANEOUS
Status: DISCONTINUED | OUTPATIENT
Start: 2023-02-17 | End: 2023-02-21 | Stop reason: HOSPADM

## 2023-02-17 RX ORDER — NALOXONE HYDROCHLORIDE 1 MG/ML
0.2 INJECTION INTRAMUSCULAR; INTRAVENOUS; SUBCUTANEOUS
Status: DISCONTINUED | OUTPATIENT
Start: 2023-02-17 | End: 2023-02-21 | Stop reason: HOSPADM

## 2023-02-17 RX ORDER — PIPERACILLIN SODIUM, TAZOBACTAM SODIUM 3; .375 G/15ML; G/15ML
3.38 INJECTION, POWDER, LYOPHILIZED, FOR SOLUTION INTRAVENOUS EVERY 8 HOURS
Status: DISCONTINUED | OUTPATIENT
Start: 2023-02-17 | End: 2023-02-17

## 2023-02-17 RX ORDER — LIDOCAINE 40 MG/G
CREAM TOPICAL
Status: DISCONTINUED | OUTPATIENT
Start: 2023-02-17 | End: 2023-02-17 | Stop reason: HOSPADM

## 2023-02-17 RX ORDER — BUPIVACAINE HYDROCHLORIDE 5 MG/ML
INJECTION, SOLUTION EPIDURAL; INTRACAUDAL PRN
Status: DISCONTINUED | OUTPATIENT
Start: 2023-02-17 | End: 2023-02-17

## 2023-02-17 RX ORDER — LISINOPRIL 5 MG/1
10 TABLET ORAL DAILY
Status: DISCONTINUED | OUTPATIENT
Start: 2023-02-17 | End: 2023-02-21 | Stop reason: HOSPADM

## 2023-02-17 RX ORDER — PIPERACILLIN SODIUM, TAZOBACTAM SODIUM 3; .375 G/15ML; G/15ML
3.38 INJECTION, POWDER, LYOPHILIZED, FOR SOLUTION INTRAVENOUS ONCE
Status: COMPLETED | OUTPATIENT
Start: 2023-02-18 | End: 2023-02-18

## 2023-02-17 RX ORDER — DEXTROSE MONOHYDRATE 25 G/50ML
25-50 INJECTION, SOLUTION INTRAVENOUS
Status: DISCONTINUED | OUTPATIENT
Start: 2023-02-17 | End: 2023-02-21

## 2023-02-17 RX ORDER — HALOPERIDOL 5 MG/ML
1 INJECTION INTRAMUSCULAR
Status: DISCONTINUED | OUTPATIENT
Start: 2023-02-17 | End: 2023-02-17 | Stop reason: HOSPADM

## 2023-02-17 RX ORDER — CEFAZOLIN SODIUM 1 G/50ML
1250 SOLUTION INTRAVENOUS ONCE
Status: COMPLETED | OUTPATIENT
Start: 2023-02-18 | End: 2023-02-18

## 2023-02-17 RX ORDER — VANCOMYCIN HYDROCHLORIDE 1 G/200ML
1000 INJECTION, SOLUTION INTRAVENOUS EVERY 12 HOURS
Status: DISCONTINUED | OUTPATIENT
Start: 2023-02-17 | End: 2023-02-17 | Stop reason: DRUGHIGH

## 2023-02-17 RX ORDER — FENTANYL CITRATE 50 UG/ML
25 INJECTION, SOLUTION INTRAMUSCULAR; INTRAVENOUS EVERY 5 MIN PRN
Status: DISCONTINUED | OUTPATIENT
Start: 2023-02-17 | End: 2023-02-17 | Stop reason: HOSPADM

## 2023-02-17 RX ORDER — SODIUM CHLORIDE, SODIUM LACTATE, POTASSIUM CHLORIDE, AND CALCIUM CHLORIDE .6; .31; .03; .02 G/100ML; G/100ML; G/100ML; G/100ML
IRRIGANT IRRIGATION PRN
Status: DISCONTINUED | OUTPATIENT
Start: 2023-02-17 | End: 2023-02-17 | Stop reason: HOSPADM

## 2023-02-17 RX ORDER — PROPOFOL 10 MG/ML
INJECTION, EMULSION INTRAVENOUS CONTINUOUS PRN
Status: DISCONTINUED | OUTPATIENT
Start: 2023-02-17 | End: 2023-02-17

## 2023-02-17 RX ORDER — NICOTINE POLACRILEX 4 MG
15-30 LOZENGE BUCCAL
Status: DISCONTINUED | OUTPATIENT
Start: 2023-02-17 | End: 2023-02-17

## 2023-02-17 RX ORDER — PROPOFOL 10 MG/ML
INJECTION, EMULSION INTRAVENOUS PRN
Status: DISCONTINUED | OUTPATIENT
Start: 2023-02-17 | End: 2023-02-17

## 2023-02-17 RX ORDER — GABAPENTIN 300 MG/1
300 CAPSULE ORAL 2 TIMES DAILY
Status: DISCONTINUED | OUTPATIENT
Start: 2023-02-17 | End: 2023-02-21 | Stop reason: HOSPADM

## 2023-02-17 RX ORDER — CEFAZOLIN SODIUM/WATER 2 G/20 ML
2 SYRINGE (ML) INTRAVENOUS SEE ADMIN INSTRUCTIONS
Status: DISCONTINUED | OUTPATIENT
Start: 2023-02-17 | End: 2023-02-17 | Stop reason: HOSPADM

## 2023-02-17 RX ADMIN — ONDANSETRON 4 MG: 2 INJECTION INTRAMUSCULAR; INTRAVENOUS at 17:33

## 2023-02-17 RX ADMIN — BUPIVACAINE HYDROCHLORIDE 20 ML: 5 INJECTION, SOLUTION EPIDURAL; INTRACAUDAL; PERINEURAL at 17:04

## 2023-02-17 RX ADMIN — DEXTROSE MONOHYDRATE 25 ML: 25 INJECTION, SOLUTION INTRAVENOUS at 16:05

## 2023-02-17 RX ADMIN — LISINOPRIL 10 MG: 5 TABLET ORAL at 21:06

## 2023-02-17 RX ADMIN — GABAPENTIN 300 MG: 300 CAPSULE ORAL at 21:06

## 2023-02-17 RX ADMIN — Medication 2 G: at 17:13

## 2023-02-17 RX ADMIN — MIDAZOLAM HYDROCHLORIDE 2 MG: 1 INJECTION, SOLUTION INTRAMUSCULAR; INTRAVENOUS at 16:59

## 2023-02-17 RX ADMIN — FENTANYL CITRATE 50 MCG: 50 INJECTION, SOLUTION INTRAMUSCULAR; INTRAVENOUS at 17:00

## 2023-02-17 RX ADMIN — SODIUM CHLORIDE, POTASSIUM CHLORIDE, SODIUM LACTATE AND CALCIUM CHLORIDE: 600; 310; 30; 20 INJECTION, SOLUTION INTRAVENOUS at 16:07

## 2023-02-17 RX ADMIN — PROPOFOL 150 MCG/KG/MIN: 10 INJECTION, EMULSION INTRAVENOUS at 17:15

## 2023-02-17 RX ADMIN — PROPOFOL 20 MG: 10 INJECTION, EMULSION INTRAVENOUS at 17:15

## 2023-02-17 ASSESSMENT — ACTIVITIES OF DAILY LIVING (ADL)
ADLS_ACUITY_SCORE: 18
ADLS_ACUITY_SCORE: 20
ADLS_ACUITY_SCORE: 20
ADLS_ACUITY_SCORE: 18

## 2023-02-17 NOTE — INTERVAL H&P NOTE
I have reviewed the surgical (or preoperative) H&P that is linked to this encounter, and examined the patient. There are no significant changes    Clinical Conditions Present on Arrival:  Clinically Significant Risk Factors Present on Admission           # Hyponatremia: Lowest Na = 132 mmol/L in last 30 days, will monitor as appropriate      # Hypoalbuminemia: Lowest albumin = 2.7 g/dL in the past 30 days , will monitor as appropriate     # DMII: A1C = 11.2 % (Ref range: 0.0 - 5.6 %) within past 3 months

## 2023-02-17 NOTE — ANESTHESIA PROCEDURE NOTES
"Adductor canal Procedure Note    Pre-Procedure   Staff -        Anesthesiologist:  Ary Hoover MD       Performed By: anesthesiologist       Location: pre-op       Procedure Start/Stop Times: 2/17/2023 5:03 PM and 2/17/2023 5:04 PM       Pre-Anesthestic Checklist: patient identified, IV checked, site marked, risks and benefits discussed, informed consent, monitors and equipment checked, pre-op evaluation, at physician/surgeon's request and post-op pain management  Timeout:       Correct Patient: Yes        Correct Procedure: Yes        Correct Site: Yes        Correct Position: Yes        Correct Laterality: Yes        Site Marked: Yes  Procedure Documentation  Procedure: Adductor canal       Laterality: right       Patient Position: supine       Patient Prep/Sterile Barriers: sterile gloves, mask       Skin prep: Chloraprep       Needle Type: short bevel       Needle Gauge: 20.        Needle Length (Inches): 4        Ultrasound guided       1. Ultrasound was used to identify targeted nerve, plexus, vascular marker, or fascial plane and place a needle adjacent to it in real-time.       2. Ultrasound was used to visualize the spread of anesthetic in close proximity to the above referenced structure.       3. A permanent image is entered into the patient's record.       4. The visualized anatomic structures appeared normal.       5. There were no apparent abnormal pathologic findings.    Assessment/Narrative         The placement was negative for: blood aspirated and painful injection       Paresthesias: No.       Bolus given via needle..        Secured via.        Insertion/Infusion Method: Single Shot       Complications: none    Medication(s) Administered   Medication Administration Time: 2/17/2023 5:03 PM      FOR [facility] ([facility] [place], [place]) ONLY:   Pain Team Contact information: please page the Pain Team Via [employer]. Search \"Pain\". During daytime hours, please page the attending first. At night please page " the resident first.

## 2023-02-17 NOTE — OP NOTE
Date: 2/17/23    Surgeon: JENNIFER Jimenez DPM    Preoperative diagnosis:   1. Osteomyelitis 2nd digit right foot  2. Osteomyelitis 3rd digit right foot    Postoperative diagnosis: Same    Procedure:   1. Amputation 2nd digit right foot  2. Amputation 3rd digit right foot    Anesthesia: Popliteal block with IV-sedation    Hemostasis: Pneumatic ankle tourniquet 250 mmHg    Pathology: 2nd and 3rd digits, Aerobic/anaerobic culture     Injectables: None    Materials: 3-0 Vicryl, 3-0 Nylon    Complications: None    Blood loss: 4 cc      Findings: Patient presents for operative intervention for osteomyelitis of the 2nd and 3rd digits on the right foot. I reviewed today's procedure to include amputation of digits 2,3 right foot. Risks reviewed including dehiscence of the incision requiring use of a wound vac and need for additional foot surgery. He consents to surgery. Patient questions invited and answered, including appropriate risk, benefits and complications. No guarantees given or implied. Patient has been NPO.    Description: Patient was brought to the operating room and placed on the table in supine position. IV-sedation and popliteal block was administered by the anesthesia department. The foot was then prepped and draped in usual aseptic manner. The extremity was elevated and exsanguinated. Well-padded ankle pneumatic tourniquet was inflated to 250mmHg and the following procedure was then performed: Attention was directed to the 2nd and 3rd digits on the right foot where two semi-elliptical incisions were made at the base of the digits. The incisions were carried full thickness into the 2nd and 3rd MPJ's where the digits were disarticulated. The digits were removed from the surgical site in toto and sent to pathology. Deep aerobic and anaerobic cultures taken. Next, a 1000cc pulse lavage was used to irrigate the surgical site. The subcutaneous tissue was reapproximated with 3-0 vicryl in a simple fashion and the skin  was closed with 3-0 nylon in a simple suture fashion.     Dressings consisted of adaptic, 4x4's, kerlix/sanket roll and an ace wrap. The pneumatic tourniquet was released and a hyperemic response was noted to the remaining digits on the right foot.     The patient appeared to tolerate all the procedures and anesthesia well without apparent complications. Patient was transported from the operating room to the recovery room with vital signs stable and neurovascular status as it was pre-operatively to the right foot. Patient to be admitted to [facility]. Consult placed to hospitalist service for medical management of diabetes. ID consult for antibiotic input. Care management to assist with discharge to TCU. Surgical dressing to remain intact. Non-weight bearing right foot at all times. Keep right foot elevated above his waist with Prafo boot.

## 2023-02-17 NOTE — RESULT ENCOUNTER NOTE
Juarez,  Your results from your recent clinic visit show:  Your Hemoglobin is improved to 11 from 9  Your blood sugar was in the 300's  You may proceed with surgery    If you have more questions please call the clinic at [phone] or send me a [employer] message    Dr. Daryn Walton

## 2023-02-17 NOTE — ANESTHESIA PROCEDURE NOTES
"Popliteal Procedure Note    Pre-Procedure   Staff -        Anesthesiologist:  Ary Hoover MD       Performed By: anesthesiologist       Location: pre-op       Procedure Start/Stop Times: 2/17/2023 5:00 PM and 2/17/2023 5:03 PM       Pre-Anesthestic Checklist: patient identified, IV checked, site marked, risks and benefits discussed, informed consent, monitors and equipment checked, pre-op evaluation, at physician/surgeon's request and post-op pain management  Timeout:       Correct Patient: Yes        Correct Procedure: Yes        Correct Site: Yes        Correct Position: Yes        Correct Laterality: Yes        Site Marked: Yes  Procedure Documentation  Procedure: Popliteal       Laterality: right       Patient Position: supine       Patient Prep/Sterile Barriers: sterile gloves, mask       Skin prep: Chloraprep       Needle Type: short bevel       Needle Gauge: 20.        Needle Length (Inches): 4        Ultrasound guided       1. Ultrasound was used to identify targeted nerve, plexus, vascular marker, or fascial plane and place a needle adjacent to it in real-time.       2. Ultrasound was used to visualize the spread of anesthetic in close proximity to the above referenced structure.       3. A permanent image is entered into the patient's record.       4. The visualized anatomic structures appeared normal.       5. There were no apparent abnormal pathologic findings.    Assessment/Narrative         The placement was negative for: blood aspirated and painful injection       Paresthesias: Resolved.       Bolus given via needle..        Secured via.        Insertion/Infusion Method: Single Shot       Complications: none    Medication(s) Administered   Medication Administration Time: 2/17/2023 5:00 PM      FOR [facility] ([facility]) ONLY:   Pain Team Contact information: please page the Pain Team Via [employer]. Search \"Pain\". During daytime hours, please page the attending first. At night please page the "  first.

## 2023-02-17 NOTE — ANESTHESIA PREPROCEDURE EVALUATION
Anesthesia Pre-Procedure Evaluation    Patient: Abdulaziz Rausch   MRN: [id number] : 1965        Procedure : Procedure(s):  IRRIGATION AND DEBRIDEMENT digits 2 and 3 right foot          Past Medical History:   Diagnosis Date     Anemia      Depression      Diabetes mellitus, type 2 (H)      Diabetic foot ulcer (H)      Hypertension      Osteomyelitis (H)       Past Surgical History:   Procedure Laterality Date     IRRIGATION AND DEBRIDEMENT TOE, COMBINED Right 2023    Procedure: IRRIGATION AND DEBRIDEMENT digits 2 and 3 right foot;  Surgeon: Jf Huerta DPM;  Location: [facility] [place]      No Known Allergies   Social History     Tobacco Use     Smoking status: Never     Smokeless tobacco: Never   Substance Use Topics     Alcohol use: Not on file      Wt Readings from Last 1 Encounters:   23 64.3 kg (141 lb 11.2 oz)        Anesthesia Evaluation   Pt has had prior anesthetic.     No history of anesthetic complications       ROS/MED HX  ENT/Pulmonary:    (-) recent URI   Neurologic:       Cardiovascular:     (+) Dyslipidemia hypertension-----    METS/Exercise Tolerance:     Hematologic:       Musculoskeletal:       GI/Hepatic:    (-) GERD   Renal/Genitourinary:    (-) renal disease   Endo:     (+) type II DM (uncontrolled), Last HgA1c: 13.6, Diabetic complications: neuropathy.     Psychiatric/Substance Use:     (+) psychiatric history depression     Infectious Disease:       Malignancy:       Other:            Physical Exam    Airway        Mallampati: II   TM distance: > 3 FB   Neck ROM: full     Respiratory Devices and Support         Dental       (+) Multiple visibly decayed, broken teeth      Cardiovascular          Rhythm and rate: regular     Pulmonary           breath sounds clear to auscultation           OUTSIDE LABS:  CBC:   Lab Results   Component Value Date    WBC 5.6 02/15/2023    WBC 6.7 2023    HGB 11.2 (L) 02/15/2023    HGB 9.9 (L) 2023    HCT 32.0 (L) 02/15/2023     HCT 29.3 (L) 01/21/2023     02/15/2023     01/21/2023     BMP:   Lab Results   Component Value Date     02/15/2023     01/21/2023    POTASSIUM 4.5 02/15/2023    POTASSIUM 4.1 01/21/2023    CHLORIDE 104 02/15/2023    CHLORIDE 107 01/21/2023    CO2 23 02/15/2023    CO2 25 01/21/2023    BUN 32.3 (H) 02/15/2023    BUN 20.5 01/21/2023    CR 1.14 02/15/2023    CR 1.06 01/21/2023    GLC 69 (L) 02/17/2023     (H) 02/15/2023     COAGS: No results found for: PTT, INR, FIBR  POC: No results found for: BGM, HCG, HCGS  HEPATIC:   Lab Results   Component Value Date    ALBUMIN 2.7 (L) 01/21/2023    PROTTOTAL 5.7 (L) 01/21/2023    ALT 36 01/21/2023    AST 24 01/21/2023    ALKPHOS 340 (H) 01/21/2023    BILITOTAL 0.3 01/21/2023     OTHER:   Lab Results   Component Value Date    LACT 1.5 01/18/2023    A1C 11.2 (H) 02/15/2023    AMIRAH 9.4 02/15/2023    MAG 2.2 01/21/2023    SED 79 (H) 01/18/2023       Anesthesia Plan    ASA Status:  3   NPO Status:  NPO Appropriate    Anesthesia Type: General.     - Airway: Mask Only      Maintenance: TIVA.        Consents    Anesthesia Plan(s) and associated risks, benefits, and realistic alternatives discussed. Questions answered and patient/representative(s) expressed understanding.     - Discussed: Risks, Benefits and Alternatives for BOTH SEDATION and the PROCEDURE were discussed     - Discussed with:  Patient         Postoperative Care       PONV prophylaxis: Ondansetron (or other 5HT-3)     Comments:                    Reji Oneil MD

## 2023-02-17 NOTE — ANESTHESIA CARE TRANSFER NOTE
Patient: Abdulaziz Rausch    Procedure: Procedure(s):  AMPUTATION, digits two and three right foot       Diagnosis: Osteomyelitis of ankle and foot (H) [M86.9]  Diagnosis Additional Information: No value filed.    Anesthesia Type:   General     Note:    Oropharynx: oropharynx clear of all foreign objects and spontaneously breathing  Level of Consciousness: awake  Oxygen Supplementation: room air    Independent Airway: airway patency satisfactory and stable  Dentition: dentition unchanged  Vital Signs Stable: post-procedure vital signs reviewed and stable  Report to RN Given: handoff report given  Patient transferred to: Medical/Surgical Unit    Handoff Report: Identifed the Patient, Identified the Reponsible Provider, Reviewed the pertinent medical history, Discussed the surgical course, Reviewed Intra-OP anesthesia mangement and issues during anesthesia, Set expectations for post-procedure period and Allowed opportunity for questions and acknowledgement of understanding      Vitals:  Vitals Value Taken Time   BP 98/56 02/17/23 1745   Temp     Pulse 76 02/17/23 1745   Resp 16 02/17/23 1745   SpO2 98 % 02/17/23 1745     Report called to RN on floor. All questions answered. Pt brought to PACU where staff will transport patient to [facility]. VSS.    Electronically Signed By: AMANDA Cai CRNA  February 17, 2023  5:46 PM

## 2023-02-17 NOTE — PHARMACY-ADMISSION MEDICATION HISTORY
Pharmacy Note - Admission Medication History    Pertinent Provider Information: n/a     ______________________________________________________________________    Prior To Admission (PTA) med list completed and updated in EMR.       PTA Med List   Medication Sig Note Last Dose     cetirizine (ZYRTEC) 10 MG tablet Take 1 tablet by mouth daily as needed 2/17/2023: Not started Unknown at PRN     doxycycline monohydrate (MONODOX) 100 MG capsule Take 1 capsule (100 mg) by mouth 2 times daily 2/17/2023: Prescribed 2/10/23 x 10 days 2/16/2023 at PM     gabapentin (NEURONTIN) 300 MG capsule Take 1 capsule (300 mg) by mouth 3 times daily (Patient taking differently: Take 300 mg by mouth 2 times daily) 2/17/2023: No pharmacy fill history available but states taking 2/16/2023 at PM     HYDROcodone-acetaminophen (NORCO) 5-325 MG tablet Take 1 tablet by mouth every 6 hours as needed  Past Week at PRN     insulin glargine (LANTUS SOLOSTAR) 100 UNIT/ML pen Inject 10 units daily at bedtime and adjust according to instruction.  Max TDD 20 units (Patient taking differently: Inject 12 Units Subcutaneous At Bedtime Inject 10 units daily at bedtime and adjust according to instruction.  Max TDD 20 units)  2/16/2023 at HS     insulin lispro (HUMALOG KWIKPEN) 100 UNIT/ML (1 unit dial) KWIKPEN Inject 3 Units Subcutaneous 3 times daily (before meals) (Patient taking differently: Inject 5 Units Subcutaneous 3 times daily (before meals)) 2/17/2023: Took 5 units today with no food 2/17/2023 at 1300     lisinopril (ZESTRIL) 10 MG tablet Take 1 tablet (10 mg) by mouth daily for 30 days  2/16/2023 at AM     metFORMIN (GLUCOPHAGE) 500 MG tablet Take 1 tablet (500 mg) by mouth 2 times daily (with meals)  2/16/2023 at PM       Information source(s): Patient and CareEverywhere/SureScripts  Method of interview communication: in-person with surgical mask and vandana    Summary of Changes to PTA Med List  New: none  Discontinued: Tylenol,  Citalopram  Changed: Lantus 10 to 12 units, Gabapentin TID to BID, Humalog 3 to 5 units with meals    Patient was asked about OTC/herbal products specifically.  PTA med list reflects this.    Medication Affordability:  Not including over the counter (OTC) medications, was there a time in the past 12 months when you did not take your medications as prescribed because of cost?: No    Allergies were reviewed, assessed, and updated with the patient.      Patient does not use any multi-dose medications prior to admission.    The information provided in this note is only as accurate as the sources available at the time of the update(s).    Thank you for the opportunity to participate in the care of this patient.    Nilam Zimmerman, PharmD  2/17/2023 5:22 PM

## 2023-02-18 ENCOUNTER — APPOINTMENT (OUTPATIENT)
Dept: PHYSICAL THERAPY | Facility: HOSPITAL | Age: 58
End: 2023-02-18
Attending: PODIATRIST
Payer: COMMERCIAL

## 2023-02-18 LAB
BASOPHILS # BLD AUTO: 0 10E3/UL (ref 0–0.2)
BASOPHILS NFR BLD AUTO: 0 %
CREAT SERPL-MCNC: 1.15 MG/DL (ref 0.67–1.17)
EOSINOPHIL # BLD AUTO: 0.4 10E3/UL (ref 0–0.7)
EOSINOPHIL NFR BLD AUTO: 4 %
ERYTHROCYTE [DISTWIDTH] IN BLOOD BY AUTOMATED COUNT: 12.8 % (ref 10–15)
GFR SERPL CREATININE-BSD FRML MDRD: 73 ML/MIN/1.73M2
GLUCOSE BLDC GLUCOMTR-MCNC: 163 MG/DL (ref 70–99)
GLUCOSE BLDC GLUCOMTR-MCNC: 168 MG/DL (ref 70–99)
GLUCOSE BLDC GLUCOMTR-MCNC: 195 MG/DL (ref 70–99)
GLUCOSE BLDC GLUCOMTR-MCNC: 250 MG/DL (ref 70–99)
HCT VFR BLD AUTO: 30.8 % (ref 40–53)
HGB BLD-MCNC: 10.3 G/DL (ref 13.3–17.7)
HOLD SPECIMEN: NORMAL
IMM GRANULOCYTES # BLD: 0 10E3/UL
IMM GRANULOCYTES NFR BLD: 0 %
LYMPHOCYTES # BLD AUTO: 1.2 10E3/UL (ref 0.8–5.3)
LYMPHOCYTES NFR BLD AUTO: 14 %
MCH RBC QN AUTO: 28.9 PG (ref 26.5–33)
MCHC RBC AUTO-ENTMCNC: 33.4 G/DL (ref 31.5–36.5)
MCV RBC AUTO: 87 FL (ref 78–100)
MONOCYTES # BLD AUTO: 0.7 10E3/UL (ref 0–1.3)
MONOCYTES NFR BLD AUTO: 7 %
NEUTROPHILS # BLD AUTO: 6.7 10E3/UL (ref 1.6–8.3)
NEUTROPHILS NFR BLD AUTO: 75 %
NRBC # BLD AUTO: 0 10E3/UL
NRBC BLD AUTO-RTO: 0 /100
PLATELET # BLD AUTO: 273 10E3/UL (ref 150–450)
RBC # BLD AUTO: 3.56 10E6/UL (ref 4.4–5.9)
WBC # BLD AUTO: 9 10E3/UL (ref 4–11)

## 2023-02-18 PROCEDURE — 85004 AUTOMATED DIFF WBC COUNT: CPT | Performed by: STUDENT IN AN ORGANIZED HEALTH CARE EDUCATION/TRAINING PROGRAM

## 2023-02-18 PROCEDURE — 120N000001 HC R&B MED SURG/OB

## 2023-02-18 PROCEDURE — 99223 1ST HOSP IP/OBS HIGH 75: CPT | Performed by: INTERNAL MEDICINE

## 2023-02-18 PROCEDURE — 250N000013 HC RX MED GY IP 250 OP 250 PS 637: Performed by: INTERNAL MEDICINE

## 2023-02-18 PROCEDURE — 250N000012 HC RX MED GY IP 250 OP 636 PS 637: Performed by: INTERNAL MEDICINE

## 2023-02-18 PROCEDURE — 250N000013 HC RX MED GY IP 250 OP 250 PS 637: Performed by: STUDENT IN AN ORGANIZED HEALTH CARE EDUCATION/TRAINING PROGRAM

## 2023-02-18 PROCEDURE — 36415 COLL VENOUS BLD VENIPUNCTURE: CPT | Performed by: INTERNAL MEDICINE

## 2023-02-18 PROCEDURE — 82565 ASSAY OF CREATININE: CPT | Performed by: INTERNAL MEDICINE

## 2023-02-18 PROCEDURE — 258N000003 HC RX IP 258 OP 636: Performed by: INTERNAL MEDICINE

## 2023-02-18 PROCEDURE — 250N000011 HC RX IP 250 OP 636: Performed by: STUDENT IN AN ORGANIZED HEALTH CARE EDUCATION/TRAINING PROGRAM

## 2023-02-18 PROCEDURE — 97530 THERAPEUTIC ACTIVITIES: CPT | Mod: GP

## 2023-02-18 PROCEDURE — 99232 SBSQ HOSP IP/OBS MODERATE 35: CPT | Performed by: STUDENT IN AN ORGANIZED HEALTH CARE EDUCATION/TRAINING PROGRAM

## 2023-02-18 PROCEDURE — 250N000011 HC RX IP 250 OP 636: Performed by: INTERNAL MEDICINE

## 2023-02-18 PROCEDURE — 97162 PT EVAL MOD COMPLEX 30 MIN: CPT | Mod: GP

## 2023-02-18 RX ORDER — HEPARIN SODIUM 5000 [USP'U]/.5ML
5000 INJECTION, SOLUTION INTRAVENOUS; SUBCUTANEOUS EVERY 8 HOURS SCHEDULED
Status: DISCONTINUED | OUTPATIENT
Start: 2023-02-18 | End: 2023-02-21 | Stop reason: HOSPADM

## 2023-02-18 RX ORDER — OXYCODONE AND ACETAMINOPHEN 5; 325 MG/1; MG/1
1 TABLET ORAL EVERY 4 HOURS PRN
Status: DISCONTINUED | OUTPATIENT
Start: 2023-02-18 | End: 2023-02-21 | Stop reason: HOSPADM

## 2023-02-18 RX ORDER — ACETAMINOPHEN 325 MG/1
325 TABLET ORAL EVERY 6 HOURS PRN
Status: DISCONTINUED | OUTPATIENT
Start: 2023-02-18 | End: 2023-02-21 | Stop reason: HOSPADM

## 2023-02-18 RX ADMIN — VANCOMYCIN HYDROCHLORIDE 750 MG: 5 INJECTION, POWDER, LYOPHILIZED, FOR SOLUTION INTRAVENOUS at 07:36

## 2023-02-18 RX ADMIN — GABAPENTIN 300 MG: 300 CAPSULE ORAL at 10:15

## 2023-02-18 RX ADMIN — PIPERACILLIN AND TAZOBACTAM 3.38 G: 3; .375 INJECTION, POWDER, LYOPHILIZED, FOR SOLUTION INTRAVENOUS at 00:30

## 2023-02-18 RX ADMIN — GABAPENTIN 300 MG: 300 CAPSULE ORAL at 21:43

## 2023-02-18 RX ADMIN — VANCOMYCIN HYDROCHLORIDE 1250 MG: 5 INJECTION, POWDER, LYOPHILIZED, FOR SOLUTION INTRAVENOUS at 00:29

## 2023-02-18 RX ADMIN — OXYCODONE HYDROCHLORIDE AND ACETAMINOPHEN 1 TABLET: 5; 325 TABLET ORAL at 12:51

## 2023-02-18 RX ADMIN — PIPERACILLIN AND TAZOBACTAM 3.38 G: 3; .375 INJECTION, POWDER, LYOPHILIZED, FOR SOLUTION INTRAVENOUS at 21:43

## 2023-02-18 RX ADMIN — PIPERACILLIN AND TAZOBACTAM 3.38 G: 3; .375 INJECTION, POWDER, LYOPHILIZED, FOR SOLUTION INTRAVENOUS at 06:08

## 2023-02-18 RX ADMIN — INSULIN GLARGINE 12 UNITS: 100 INJECTION, SOLUTION SUBCUTANEOUS at 21:43

## 2023-02-18 RX ADMIN — PIPERACILLIN AND TAZOBACTAM 3.38 G: 3; .375 INJECTION, POWDER, LYOPHILIZED, FOR SOLUTION INTRAVENOUS at 14:04

## 2023-02-18 RX ADMIN — VANCOMYCIN HYDROCHLORIDE 750 MG: 5 INJECTION, POWDER, LYOPHILIZED, FOR SOLUTION INTRAVENOUS at 20:32

## 2023-02-18 RX ADMIN — HEPARIN SODIUM 5000 UNITS: 5000 INJECTION, SOLUTION INTRAVENOUS; SUBCUTANEOUS at 21:43

## 2023-02-18 RX ADMIN — OXYCODONE HYDROCHLORIDE AND ACETAMINOPHEN 1 TABLET: 5; 325 TABLET ORAL at 18:04

## 2023-02-18 ASSESSMENT — ACTIVITIES OF DAILY LIVING (ADL)
ADLS_ACUITY_SCORE: 22
ADLS_ACUITY_SCORE: 20
ADLS_ACUITY_SCORE: 22
ADLS_ACUITY_SCORE: 22
ADLS_ACUITY_SCORE: 20
ADLS_ACUITY_SCORE: 22
ADLS_ACUITY_SCORE: 20
ADLS_ACUITY_SCORE: 22
ADLS_ACUITY_SCORE: 22

## 2023-02-18 NOTE — PROGRESS NOTES
02/18/23 0930   Appointment Info   Signing Clinician's Name / Credentials (PT) Elenita Zhou DPT   Living Environment   People in Home friend(s)   Current Living Arrangements house   Home Accessibility stairs to enter home   Number of Stairs, Main Entrance 3;4   Stair Railings, Main Entrance railings safe and in good condition   Transportation Anticipated family or friend will provide;health plan transportation   Self-Care   Usual Activity Tolerance moderate   Current Activity Tolerance fair   Equipment Currently Used at Home none   General Information   Onset of Illness/Injury or Date of Surgery 02/17/23   Referring Physician Ulises Jimenez DPM   Patient/Family Therapy Goals Statement (PT) none   Pertinent History of Current Problem (include personal factors and/or comorbidities that impact the POC) s/p AMPUTATION, digits two and three right foot   Existing Precautions/Restrictions fall;weight bearing   Weight-Bearing Status - RLE (S)  nonweight-bearing  (R foot)   Strength (Manual Muscle Testing)   Strength (Manual Muscle Testing) strength is WFL   Bed Mobility   Bed Mobility supine-sit;sit-supine   Supine-Sit [place] (Bed Mobility) supervision;verbal cues   Sit-Supine [place] (Bed Mobility) supervision;verbal cues   Transfers   Transfers sit-stand transfer   Maintains Weight-bearing Status (Transfers) able to maintain   Sit-Stand Transfer   Sit-Stand [place] (Transfers) minimum assist (75% patient effort)   Assistive Device (Sit-Stand Transfers) walker, front-wheeled   Gait/Stairs (Locomotion)   [place] Level (Gait) contact guard;verbal cues   Assistive Device (Gait) walker, front-wheeled   Distance in Feet 5'  (forwards/backwards along bedside)   Pattern (Gait) step-to   Deviations/Abnormal Patterns (Gait) gait speed decreased   Maintains Weight-bearing Status (Gait) able to maintain   Clinical Impression   Criteria for Skilled Therapeutic Intervention Yes, treatment indicated    PT Diagnosis (PT) Impaired functional mobility   Influenced by the following impairments Post-op restrictions   Functional limitations due to impairments Impaired transfers, gait   Clinical Presentation (PT Evaluation Complexity) Stable/Uncomplicated   Clinical Presentation Rationale Presents as diagnosed   Clinical Decision Making (Complexity) moderate complexity   Planned Therapy Interventions (PT) balance training;bed mobility training;gait training;home exercise program;stair training;transfer training   Anticipated Equipment Needs at Discharge (PT) walker, rolling;other (see comments)  (knee walker)   Risk & Benefits of therapy have been explained patient   PT Total Evaluation Time   PT Eval, Moderate Complexity Minutes (96447) 10   Physical Therapy Goals   PT Frequency Daily   PT Predicted Duration/Target Date for Goal Attainment 02/25/23   PT Goals Bed Mobility;Transfers;Gait   PT: Bed Mobility Independent;Supine to/from sit;Within precautions   PT: Transfers Modified independent;Sit to/from stand;Bed to/from chair;Assistive device;Within precautions   PT: Gait Supervision/stand-by assist;150 feet  (with knee walker)   PT Discharge Planning   PT Plan progress transfers with FWW, amb with knee scooter   PT Discharge Recommendation (DC Rec) home with assist;home with home care physical therapy   PT Rationale for DC Rec Pt moving well maintaining NWB R LE. Recommend assist at home with mobiltiy and ADLs. If pt does not have help, TCU may be best option at d/c.   PT Brief overview of current status Sit <> stand with FWW, CGA. 10 side steps aloing bedside, CGA with FWW. Does maintain NWB R LE.   Total Session Time   Total Session Time (sum of timed and untimed services) 10       Elenita Zhou, PT, DPT  2/18/2023

## 2023-02-18 NOTE — PROGRESS NOTES
S: Pt was fit at   With Right Pressure Relief Ankle Foot Orthosis (PRAFO) for the lower extremity as ordered by Dr. Jimenez    O/g: braces have been recommended to help reduce foot drop and off-weight heel from pressure.      A: The patient's knee was flexed to relax the gastroc muscle.  Once the foot was positioned, the soft liner was closed over the top of foot and checked that surface is smooth and consistent.  The middle Velcro strap was secured next.  The positioning of posterior heel was re-evaluated then all dorsal straps and calf strap was secured.  The foot position was re-evaluated so that the sole of foot met the plantar surface of the orthosis, including calcaneus and that the heel clearance was visible through the posterior opening.  The dorsi/plantar flexion bar was adjusted by hand to achieve desired degree.  The PRAFO toe extension/protection was adjusted by positioning extension plate under toes to desired length.     P: patient/nursing staff instructed to contact our office with any problems or questions.  CARLIN Comer.

## 2023-02-18 NOTE — CONSULTS
[facility]    Infectious Disease Consultation     Date of Admission:  2/17/2023  Date of Consult (When I saw the patient): 02/18/23    Assessment & Plan   Abdulaziz Rausch is a 57 year old male who was admitted on 2/17/2023.     Impression:  1. Right foot osteomyelitis, diabetic foot infection  2. Status post amputation-second digit right foot, third digit right foot  3. Intraoperative cultures pending.  Previous cultures with Streptococcus, MRSA    Recommendations  1. Zosyn, vancomycin while inpatient  2. Anticipate discharge on Augmentin and doxycycline for 10 days  3. Podiatry following, appreciate input  4. Patient hoping to be discharged in 1 to 2 days, as brother is terminally ill and enrolled in hospice  5. Thank you for consulting infectious disease.  Please call with questions    Brooklynn Loera MD  [facility]  Answering Service: [phone]  On-Call ID provider: [phone], option: 9    Photos from 2/10/2023:              Reason for Consult   Reason for consult: I was asked to evaluate this patient for osteomyelitis right foot    Primary Care Physician   Daryn Pittman    Chief Complaint   Toe infection    History is obtained from the patient and medical records    History of Present Illness   Abdulaziz Rausch is a 57 year old male who presents with second and third digit infection.  Patient was admitted postoperatively, status post amputation  Intraoperative cultures in process  Previous history of MRSA infection while inpatient  Underwent amputation, operative note reviewed and discussed with podiatry  Patient doing better dressing in place  Patient is hoping to be discharged in 1 to 2 days as terminally ill brother, enrolled in hospice and patient would like to see him      Past Medical History   I have reviewed this patient's medical history and updated it with pertinent information if needed.   Past Medical History:   Diagnosis Date     Anemia       Depression      Diabetes mellitus, type 2 (H)      Diabetic foot ulcer (H)      Hypertension      Osteomyelitis (H)        Past Surgical History   I have reviewed this patient's surgical history and updated it with pertinent information if needed.  Past Surgical History:   Procedure Laterality Date     IRRIGATION AND DEBRIDEMENT TOE, COMBINED Right 1/20/2023    Procedure: IRRIGATION AND DEBRIDEMENT digits 2 and 3 right foot;  Surgeon: Jf Huerta DPM;  Location: [facility] [place]       Prior to Admission Medications   Prior to Admission Medications   Prescriptions Last Dose Informant Patient Reported? Taking?   HYDROcodone-acetaminophen (NORCO) 5-325 MG tablet Past Week at PRN  Yes Yes   Sig: Take 1 tablet by mouth every 6 hours as needed   alcohol swab prep pads   No No   Sig: Use to swab area of injection/ella as directed.   blood glucose (NO BRAND SPECIFIED) test strip   No No   Sig: Use to test blood sugar 4 times daily or as directed. To accompany: Blood Glucose Monitor Brands: per insurance.   blood glucose monitoring (NO BRAND SPECIFIED) meter device kit   No No   Sig: Use to test blood sugar 4 time daily or as directed. Preferred blood glucose meter OR supplies to accompany: Blood Glucose Monitor Brands: per insurance.   cetirizine (ZYRTEC) 10 MG tablet Unknown at PRN  Yes Yes   Sig: Take 1 tablet by mouth daily as needed   doxycycline monohydrate (MONODOX) 100 MG capsule 2/16/2023 at PM  No Yes   Sig: Take 1 capsule (100 mg) by mouth 2 times daily   gabapentin (NEURONTIN) 300 MG capsule 2/16/2023 at PM  No Yes   Sig: Take 1 capsule (300 mg) by mouth 3 times daily   Patient taking differently: Take 300 mg by mouth 2 times daily   insulin glargine (LANTUS SOLOSTAR) 100 UNIT/ML pen 2/16/2023 at HS  No Yes   Sig: Inject 10 units daily at bedtime and adjust according to instruction.  Max TDD 20 units   Patient taking differently: Inject 12 Units Subcutaneous At Bedtime Inject 10 units daily at bedtime  and adjust according to instruction.  Max TDD 20 units   insulin lispro (HUMALOG KWIKPEN) 100 UNIT/ML (1 unit dial) KWIKPEN 2/17/2023 at 1300  No Yes   Sig: Inject 3 Units Subcutaneous 3 times daily (before meals)   Patient taking differently: Inject 5 Units Subcutaneous 3 times daily (before meals)   insulin pen needle (31G X 8 MM) 31G X 8 MM miscellaneous   No No   Sig: Use 4 pen needles daily or as directed.   lisinopril (ZESTRIL) 10 MG tablet 2/16/2023 at AM  No Yes   Sig: Take 1 tablet (10 mg) by mouth daily for 30 days   metFORMIN (GLUCOPHAGE) 500 MG tablet 2/16/2023 at PM  No Yes   Sig: Take 1 tablet (500 mg) by mouth 2 times daily (with meals)   thin (NO BRAND SPECIFIED) lancets   No No   Sig: Use with lanceting device. To accompany: Blood Glucose Monitor Brands: per insurance.      Facility-Administered Medications: None     Allergies   No Known Allergies    Immunization History   Immunization History   Administered Date(s) Administered     COVID-19 Vaccine (Jacobo) 06/16/2021     COVID-19 Vaccine 18+ (Moderna) 11/07/2021     Influenza Vaccine 50-64 or 18-64 w/egg allergy (Flublok) 01/22/2023     Influenza Vaccine >6 months (Alfuria,Fluzone) 10/08/2018     TDAP Vaccine (Adacel) 07/05/2018       Social History   I have reviewed this patient's social history and updated it with pertinent information if needed. Abdulaziz Rausch  reports that he has never smoked. He has never used smokeless tobacco. He reports that he does not drink alcohol and does not use drugs.    Family History   I have reviewed this patient's family history and updated it with pertinent information if needed.   Family History   Problem Relation Age of Onset     Diabetes Father        Review of Systems   The 10 point Review of Systems is negative other than noted in the HPI or here.     Physical Exam   Temp: 97.7  F (36.5  C) Temp src: Oral BP: 93/54 Pulse: 88   Resp: 18 SpO2: 96 % O2 Device: None (Room air) Oxygen Delivery: 5 LPM  Vital  Signs with Ranges  Temp:  [97.4  F (36.3  C)-97.9  F (36.6  C)] 97.7  F (36.5  C)  Pulse:  [76-90] 88  Resp:  [12-18] 18  BP: ()/() 93/54  SpO2:  [96 %-100 %] 96 %  134 lbs 7.69 oz  Body mass index is 19.3 kg/m .    GENERAL APPEARANCE:  awake, NAD  EYES: Eyes grossly normal to inspection, conjunctivae and sclerae normal  HENT: mouth without ulcers or lesions  NECK: supple  RESP: normal breathing pattern  CV: regular rates and rhythm  LYMPHATICS: no swelling  ABDOMEN: soft, nontender, nondistended  MS: extremities normal- no gross deformities noted  SKIN: no paris dressing on foot  NEURO: coherent      LABORATORY DATA:  Reviewed    CBC RESULTS:   Recent Labs   Lab Test 02/18/23  0635   WBC 9.0   RBC 3.56*   HGB 10.3*   HCT 30.8*   MCV 87   MCH 28.9   MCHC 33.4   RDW 12.8           Last Comprehensive Metabolic Panel:  Sodium   Date Value Ref Range Status   02/15/2023 139 136 - 145 mmol/L Final     Potassium   Date Value Ref Range Status   02/15/2023 4.5 3.4 - 5.3 mmol/L Final   08/26/2021 4.0 3.5 - 5.0 mmol/L Final     Chloride   Date Value Ref Range Status   02/15/2023 104 98 - 107 mmol/L Final   08/26/2021 106 98 - 107 mmol/L Final     Carbon Dioxide (CO2)   Date Value Ref Range Status   02/15/2023 23 22 - 29 mmol/L Final   08/26/2021 24 22 - 31 mmol/L Final     Anion Gap   Date Value Ref Range Status   02/15/2023 12 7 - 15 mmol/L Final   08/26/2021 10 5 - 18 mmol/L Final     Glucose   Date Value Ref Range Status   08/26/2021 189 (H) 70 - 125 mg/dL Final     GLUCOSE BY METER POCT   Date Value Ref Range Status   02/18/2023 168 (H) 70 - 99 mg/dL Final     Urea Nitrogen   Date Value Ref Range Status   02/15/2023 32.3 (H) 8.0 - 23.0 mg/dL Final   08/26/2021 15 8 - 22 mg/dL Final     Creatinine   Date Value Ref Range Status   02/18/2023 1.15 0.67 - 1.17 mg/dL Final     GFR Estimate   Date Value Ref Range Status   02/18/2023 73 >60 mL/min/1.73m2 Final     Comment:     eGFR calculated using 2021 CKD-EPI  equation.     Calcium   Date Value Ref Range Status   02/15/2023 9.4 8.6 - 10.0 mg/dL Final     Bilirubin Total   Date Value Ref Range Status   01/21/2023 0.3 <=1.2 mg/dL Final     Alkaline Phosphatase   Date Value Ref Range Status   01/21/2023 340 (H) 40 - 129 U/L Final     ALT   Date Value Ref Range Status   01/21/2023 36 10 - 50 U/L Final     AST   Date Value Ref Range Status   01/21/2023 24 10 - 50 U/L Final             No results found for: CRP         MICROBIOLOGY:    Reviewed    Blood cultures  Other Micro:  7-Day Micro Results     Collected Updated Procedure Result Status      02/17/2023 1731 02/17/2023 1806 Anaerobic Bacterial Culture Routine [[id number]]   Tissue from Foot, Right    In process Component Value   No component results               02/17/2023 1731 02/17/2023 2338 Gram Stain [[id number]]   (Abnormal)   Tissue from Foot, Right    Final result Component Value   Gram Stain Result 1+ Gram positive cocci   Gram Stain Result 2+ Gram positive bacilli   Gram Stain Result 2+ WBC seen   Gram Stain Result 4+ Red blood cells seen            02/17/2023 1731 02/18/2023 1440 Tissue Aerobic Bacterial Culture Routine [[id number]]    Tissue from Foot, Right    Preliminary result Component Value   Culture Culture in progress  [P]                    RADIOLOGY:    US Low Ext Arterial Dop Seg Pres w/o Exercise    Result Date: 2/10/2023  Table formatting from the original result was not included. BILATERAL RESTING ANKLE-BRACHIAL INDICES (CHIQUITA'S) (Date: 02/10/23) Indication: Bilateral 2-3 toe ulcers nonhealing Previous: 01/18/2023 History: Hypertension, Diabetic, PAD and Vascular Ulcers  Resting CHIQUITA's          Right: mmHg Index     Brachial: 167  Ankle-(PT): 253 1.51 Ankle-(DP): 105 1.22          Digit: 157 0.93               Left: mmHg Index     Brachial: 168  Ankle-(PT): 240 1.43 Ankle-(DP): 191 1.14          Digit: 152 0.90 Resting ankle-brachial index of 1.51 on the right. Toe Pressures of 157 mmHg and TBI  "of 0.93 Resting ankle-brachial index of 1.43 on the left. Toe Pressures of 152 mmHg and TBI of 0.90  VPR WAVEFORMS: The right volume plethysmography waveforms are mildly abnormal at the lower thigh level, mildly abnormal at the upper calf level and mildly abnormal at the ankle. The left volume plethysmography waveforms are mildly abnormal at the lower thigh level, mildly abnormal at the upper calf level and mildly abnormal at the ankle. Impression:  1. RIGHT LOWER EXTREMITY: CHIQUITA is Normal with multiphasic waveforms with an CHIQUITA of 1.51 even though it could be falsely elevated. Toe Pressures are Normal and adequate for wound healing with toe pressures of 157 mmHg. 2. LEFT LOWER EXTREMITY: CHIQUITA is Normal with multiphasic waveforms with an CHIQUITA of 1.4. Toe Pressures are Normal and adequate for wound healing with toe pressures of 152 mmHg. Reference: Wound classification Grade CHIQUITA Ankle Systolic Pressure Toe Pressures 0 > 0.80 > 100 mmHg > 60 mmHg 1 0.6 - 0.79 70 - 100 mmHg 40 - 59 mmHg 2 0.4 - 0.59 50-70 mmHg 30 - 39 mmHg 3 < 0.39 < 50 mmHg < 30 mmHg Digit Pressures DBI Disease Category > 0.70 Normal < 0.70 Abnormal > 30 mmHg Potential wound healing < 30 mmHg Impaired wound healing Ankle Brachial Pressures CHIQUITA Disease Category > 1.3  Likely vessel calcification with monophasic waveforms, non-diagnostic 0.95-1.30 Normal with multiphasic waveforms 0.50-0.95 Single level disease 0.30-0.50 Multilevel disease < 0.30 Critical limb ischema Volume Plethysmography Recording (VPR) at all levels Normal Sharp systolic peak, fast upstroke, prominent dicrotic notch in wave Mild Sharp systolic peak, fast upstroke, absent dicrotic notch in wave Moderate Flattened systolic peak, slowed upstroke, absent dicrotic notch inwave Severe amplitude wave with = upslope and down slope Occluded Flat Line     POC US Guidance Needle Placement    Result Date: 2/17/2023  Ultrasound was performed as guidance to an anesthesia procedure.  Click \"PACS " "images\" hyperlink below to view any stored images.  For specific procedure details, view procedure note authored by anesthesia.         "

## 2023-02-18 NOTE — PLAN OF CARE
Problem: Extremity Amputation  Goal: Optimal Functional Ability  Outcome: Progressing  Intervention: Optimize Functional Ability  Recent Flowsheet Documentation  Taken 2/18/2023 0024 by Mark Hidalgo, RN  Activity Management: activity adjusted per tolerance  Activity Assistance Provided: assistance, 1 person   Goal Outcome Evaluation:       Pt alert & oriented x4. Denies pain. Numbness present on bilateral toes which pt states baseline for him. On RA. Vital signs stable. Uneventful night. Pt is able to make needs known. Call light is within reach.

## 2023-02-18 NOTE — CONSULTS
[facility]  Consult Note - Hospitalist Service  Date of Admission:  2/17/2023  Consult Requested by: Ulises Jimenez  Reason for Consult: Postop medical management    Assessment & Plan   Abdulaziz Rausch is a 57 year old male admitted on 2/17/2023 after right toe amputation for diabetes foot ulcer.    Diabetes foot ulcer, with necrosis of muscle, osteomyelitis: s/p amputation of 2nd and 3rd digit right foot. Last wound culture on 1/20/23 showed strep agalactiae , staph simulans and MRSA  -Received cefazolin preop.  Will continue with vancomycin and Zosyn for now.   - Follow-up wound stain, wound culture and pathology  - ID was consulted for further antibiotics.  - Postop care per podiatry service  - Pain control  - PT and OT when appropriate    Diabetes mellitus type 2 with hyperglycemia: Poorly controlled with A1c 11.2.  During his recent admission, he was started on Lantus 12 units at bedtime, Novolog 3 units tidac and metformin.   - Resume Lantus at 5 units tonight.  NovoLog sliding scale.  - Monitor blood sugar and adjust insulin dose    Essential hypertension: Resume lisinopril 10 mg daily.      Chronic anemia: Hb11.2 on admission. Monitor.        Clinically Significant Risk Factors Present on Admission                  # Hypertension: home medication list includes antihypertensive(s)     # DMII: A1C = 11.2 % (Ref range: 0.0 - 5.6 %) within past 3 months            Chuy Cobb MD  Hospitalist Service  Securely message with [employer] (more info)  Text page via [facility] Paging/Directory   ______________________________________________________________________    Chief Complaint   Post op medical management    History of Present Illness   Abdulaziz Rausch is a 57 year old male with history of diabetes, HTN, admitted by podiatry service after right 2nd and 3rd toe amputation for diabetes foot ulcer.  [facility] [place].  Patient was seen after he arrived to the floor.  He  "reports that the postop pain is controlled.    Past Medical History    Past Medical History:   Diagnosis Date     Anemia      Depression      Diabetes mellitus, type 2 (H)      Diabetic foot ulcer (H)      Hypertension      Osteomyelitis (H)        Past Surgical History   Past Surgical History:   Procedure Laterality Date     IRRIGATION AND DEBRIDEMENT TOE, COMBINED Right 1/20/2023    Procedure: IRRIGATION AND DEBRIDEMENT digits 2 and 3 right foot;  Surgeon: Jf Huerta DPM;  Location: [facility] [place] OR       Medications   I have reviewed this patient's current medications       Review of Systems    The 10 point Review of Systems is negative other than noted in the HPI or here.      Physical Exam   Vital Signs: Temp: 97.4  F (36.3  C) Temp src: Oral BP: (!) 188/94 Pulse: 83   Resp: 12 SpO2: 100 % O2 Device: None (Room air) Oxygen Delivery: 5 LPM  Weight: 134 lbs 7.69 oz    General appearance: not in acute distress  HEENT: PERRL, EOMI  Lungs: Clear breath sounds in bilateral lung fields  Cardiovascular: Regular rate and rhythm, normal S1-S2  Abdomen: Soft, non tender, no distension.   Musculoskeletal: No joint swelling.  Right foot in surgical dressing.  Exposed big toe is pink and warm to touch  Skin: No rash and no edema  Neurology: AAO ×3.  Cranial nerves II - XII normal.  Normal muscle strength in all four extremities.    Medical Decision Making       45 MINUTES SPENT BY ME on the date of service doing chart review, history, exam, documentation & further activities per the note.      Data         Imaging results reviewed over the past 24 hrs:   Recent Results (from the past 24 hour(s))   POC US Guidance Needle Placement    Narrative    Ultrasound was performed as guidance to an anesthesia procedure.  Click   \"PACS images\" hyperlink below to view any stored images.  For specific   procedure details, view procedure note authored by anesthesia.     "

## 2023-02-18 NOTE — PROGRESS NOTES
Patient arrived to unit shortly after 1800.  Patient lethargic upon arrival to unit, but arouses to voice.  Oriented to unit/plan of care.  Denies pain, RLE elevated on 2 pillows - surgical dressing clean, dry and intact.  Blood sugar 117.  Offered clear liquids, for which patient declined for the time being- LR infusing @100cc/hr.      Jie Hjai RN

## 2023-02-18 NOTE — PROGRESS NOTES
[facility]    Medicine Progress Note - Hospitalist Service    Date of Admission:  2/17/2023    Assessment & Plan   Abdulaziz Rausch is a 57 year old male admitted on 2/17/2023 after right toe amputation for diabetes foot ulcer.     Diabetes foot ulcer, with necrosis of muscle, osteomyelitis:   - s/p amputation of 2nd and 3rd digit right foot. Last wound culture on 1/20/23 showed strep agalactiae , staph simulans and MRSA  - Gram stain (02/17): gram +ve cocci and bacilli. Cultures, pathology pending  - Received cefazolin preop.    - ID consult appreciated- Zosyn and vancomycin while inpatient. Anticipate discharge on Augmentin and doxycycline for 10 days.  - Postop care per podiatry service  - Pain control  - PT and OT when appropriate     Diabetes mellitus type 2 with hyperglycemia:   - Poorly controlled with A1c 11.2.  During his recent admission, he was started on Lantus 12 units at bedtime, Novolog 3 units tidac and metformin.   - c/w Lantus 12 units qhs.  NovoLog sliding scale. I:C  - Monitor blood sugar and adjust insulin dose     Essential hypertension:   - c/w PTA lisinopril 10 mg daily.      Chronic anemia:   - Hb11.2 on admission. Monitor.        Diet: Moderate Consistent Carb (60 g CHO per Meal) Diet    DVT Prophylaxis: Heparin SQ  Fischer Catheter: Not present  Lines: None     Cardiac Monitoring: None  Code Status: No CPR- Do NOT Intubate      Clinically Significant Risk Factors Present on Admission                  # Hypertension: home medication list includes antihypertensive(s)     # DMII: A1C = 11.2 % (Ref range: 0.0 - 5.6 %) within past 3 months            Disposition Plan     Expected Discharge Date: 02/19/2023                  Qian Correia MD  Hospitalist Service  [facility]  Securely message with [employer] (more info)  Text page via [employer] Paging/Directory   ______________________________________________________________________    Interval History    Patient seen and examined at bedside. Patient denied shortness of breath, chest pain, fever, nausea, vomiting.    Physical Exam   Vital Signs: Temp: 97.7  F (36.5  C) Temp src: Oral BP: 93/54 Pulse: 88   Resp: 18 SpO2: 96 % O2 Device: None (Room air) Oxygen Delivery: 5 LPM  Weight: 134 lbs 7.69 oz    GEN: Alert and oriented. Not in acute distress.  HEENT: Atraumatic, mucous membrane- moist and pink.  Chest: Bilateral air entry.  CVS: S1S2 regular. No murmurs, rubs or gallops.  Abdomen: Soft. Non-tender, non-distended. No organomegaly. No guarding or rigidity. Bowel sounds active.   Extremities: Right foot- surgical dressing.    CNS: No focal neurologic deficit. No involuntary movements.  Skin: No skin rash, no cyanosis or clubbing.       Medical Decision Making             Data     I have personally reviewed the following data over the past 24 hrs:    9.0  \   10.3 (L)   / 273     N/A N/A N/A /  250 (H)   N/A N/A 1.15 \

## 2023-02-18 NOTE — PROGRESS NOTES
POD# 1 amputation of second and third toes, right foot    The patient was seen at bedside in no apparent distress.  The patient relates a good appetite with no nausea or vomiting.  The patient denies any fever or chills.  The patient relates the pain is under control.  The patient relates being able to go the the bathroom.     The patient's vitals are stable and is affebrile    Operative Cultures:     Gram stain:  1+ Gram positive cocci      2+ Gram positive bacilli      2+ WBC seen      4+ Red blood cells seen       Aerobic: no growth  Anaerobic: no growth    Labs:   Component Ref Range & Units  6:35 AM   (2/18/23) 3 d ago   (2/15/23) 4 wk ago   (1/21/23) 1 mo ago   (1/19/23) 1 mo ago   (1/18/23) 1 yr ago   (8/26/21)    WBC Count 4.0 - 11.0 10e3/uL 9.0  5.6  6.7  9.5  6.5  6.8     RBC Count 4.40 - 5.90 10e6/uL 3.56 Low   3.79 Low   3.37 Low   3.31 Low   3.71 Low   3.27 Low      Hemoglobin 13.3 - 17.7 g/dL 10.3 Low   11.2 Low   9.9 Low   9.7 Low   10.7 Low   9.9 Low      Hematocrit 40.0 - 53.0 % 30.8 Low   32.0 Low   29.3 Low   28.2 Low   31.9 Low   28.0 Low         Exam:  Neurovascular status remains intact with positive epicritic sensation and capillary filling to the digits on the right.  Motor is intact to the right.       The dressing appears clean, dry and intact with no strike through noted.    Assessment: Osteomyelitis of second and third toes, right foot status post 1 day amputation of the second and third toes right foot.    Plan:     1. Wound: Keep dressings, clean, dry and intact.  2. Infection: cultures and sensitivities pending ID recommendations.  3. Disposition: Continue IV antibiotic therapy.  Will plan on transfer to a TCU when antibiotic selection is finalized.  Continue with non weight bearing on the right foot.    I appreciate the assistance in the care of this patient from the Hospitalist Service.      JOSE Sanders, ENEIDA, FACFAS

## 2023-02-18 NOTE — ANESTHESIA POSTPROCEDURE EVALUATION
Patient: Abdulaziz Rausch    Procedure: Procedure(s):  AMPUTATION, digits two and three right foot       Anesthesia Type:  General    Note:     Postop Pain Control: Uneventful            Sign Out: Well controlled pain   PONV: No   Neuro/Psych: Uneventful            Sign Out: Acceptable/Baseline neuro status   Airway/Respiratory: Uneventful            Sign Out: Acceptable/Baseline resp. status   CV/Hemodynamics: Uneventful            Sign Out: Acceptable CV status; No obvious hypovolemia; No obvious fluid overload   Other NRE: NONE   DID A NON-ROUTINE EVENT OCCUR? No           Last vitals:  Vitals:    02/17/23 1700 02/17/23 1705 02/17/23 1745   BP: (!) 167/89 (!) 166/92 98/56   Pulse: 85 85 76   Resp: 14 17 16   Temp:      SpO2: 100% 100% 98%       Electronically Signed By: Ary Hoover MD  February 17, 2023  6:02 PM

## 2023-02-18 NOTE — PHARMACY-VANCOMYCIN DOSING SERVICE
Pharmacy Vancomycin Initial Note  Date of Service 2023  Patient's  1965  57 year old, male    Indication: Osteomyelitis, Skin and Soft Tissue Infection and diabetic foot infection    Current estimated CrCl = Estimated Creatinine Clearance: 60.2 mL/min (based on SCr of 1.14 mg/dL).    Creatinine for last 3 days  2/15/2023:  2:01 PM Creatinine 1.14 mg/dL    Recent Vancomycin Level(s) for last 3 days  No results found for requested labs within last 72 hours.      Vancomycin IV Administrations (past 72 hours)                   vancomycin (VANCOCIN) 1,250 mg in sodium chloride 0.9 % 250 mL intermittent infusion (mg) 1,250 mg New Bag 23 0029                Nephrotoxins and other renal medications (From now, onward)    Start     Dose/Rate Route Frequency Ordered Stop    23 0600  piperacillin-tazobactam (ZOSYN) 3.375 g vial to attach to  mL bag        Note to Pharmacy: Extended infusion dosing to start 6 hours after initial infusion.   [facility].    3.375 g  over 240 Minutes Intravenous EVERY 8 HOURS 23 1900  lisinopril (ZESTRIL) tablet 10 mg        Note to Pharmacy: PTA Sig:Take 1 tablet (10 mg) by mouth daily for 30 days      10 mg Oral DAILY 23 1846            Contrast Orders - past 72 hours (72h ago, onward)    None          InsightRX Prediction of Planned Initial Vancomycin Regimen  Loading dose: 1250 mg on 23 at 00:29  Regimen: 750 mg IV every 12 hours.  Start time: 08:00 on 2023  Exposure target: AUC24 (range)400-600 mg/L.hr   AUC24,ss: 560 mg/L.hr  Probability of AUC24 > 400: 84 %  Ctrough,ss: 18.6 mg/L  Probability of Ctrough,ss > 20: 43 %  Probability of nephrotoxicity (Lodise MARINA ): 15 %          Plan:  1. Start vancomycin  750 mg IV q12h. This regimen is timed to start at 08:00, ~7.5 hours after the loading dose. This timing will minimize sub-therapeutic intervals.  2. Vancomycin monitoring  method: AUC  3. Vancomycin therapeutic monitoring goal: 400-600 mg*h/L  4. Pharmacy will check vancomycin levels as appropriate in 1-3 Days.    5. Serum creatinine levels will be ordered a minimum of twice weekly.      Lydia Escobedo RP

## 2023-02-19 ENCOUNTER — APPOINTMENT (OUTPATIENT)
Dept: PHYSICAL THERAPY | Facility: HOSPITAL | Age: 58
End: 2023-02-19
Attending: PODIATRIST
Payer: COMMERCIAL

## 2023-02-19 LAB
ANION GAP SERPL CALCULATED.3IONS-SCNC: 6 MMOL/L (ref 7–15)
BASOPHILS # BLD AUTO: 0 10E3/UL (ref 0–0.2)
BASOPHILS NFR BLD AUTO: 1 %
BUN SERPL-MCNC: 29.7 MG/DL (ref 8–23)
CALCIUM SERPL-MCNC: 8.3 MG/DL (ref 8.6–10)
CHLORIDE SERPL-SCNC: 102 MMOL/L (ref 98–107)
CREAT SERPL-MCNC: 1.31 MG/DL (ref 0.67–1.17)
DEPRECATED HCO3 PLAS-SCNC: 23 MMOL/L (ref 22–29)
EOSINOPHIL # BLD AUTO: 0.4 10E3/UL (ref 0–0.7)
EOSINOPHIL NFR BLD AUTO: 6 %
ERYTHROCYTE [DISTWIDTH] IN BLOOD BY AUTOMATED COUNT: 12.9 % (ref 10–15)
GAD65 AB SER IA-ACNC: <5 IU/ML
GFR SERPL CREATININE-BSD FRML MDRD: 63 ML/MIN/1.73M2
GLUCOSE BLDC GLUCOMTR-MCNC: 160 MG/DL (ref 70–99)
GLUCOSE BLDC GLUCOMTR-MCNC: 220 MG/DL (ref 70–99)
GLUCOSE BLDC GLUCOMTR-MCNC: 230 MG/DL (ref 70–99)
GLUCOSE BLDC GLUCOMTR-MCNC: 230 MG/DL (ref 70–99)
GLUCOSE BLDC GLUCOMTR-MCNC: 255 MG/DL (ref 70–99)
GLUCOSE BLDC GLUCOMTR-MCNC: 303 MG/DL (ref 70–99)
GLUCOSE BLDC GLUCOMTR-MCNC: 348 MG/DL (ref 70–99)
GLUCOSE SERPL-MCNC: 239 MG/DL (ref 70–99)
HCT VFR BLD AUTO: 28.1 % (ref 40–53)
HGB BLD-MCNC: 9.2 G/DL (ref 13.3–17.7)
IMM GRANULOCYTES # BLD: 0 10E3/UL
IMM GRANULOCYTES NFR BLD: 0 %
LYMPHOCYTES # BLD AUTO: 1.3 10E3/UL (ref 0.8–5.3)
LYMPHOCYTES NFR BLD AUTO: 18 %
MAGNESIUM SERPL-MCNC: 2 MG/DL (ref 1.7–2.3)
MCH RBC QN AUTO: 28.7 PG (ref 26.5–33)
MCHC RBC AUTO-ENTMCNC: 32.7 G/DL (ref 31.5–36.5)
MCV RBC AUTO: 88 FL (ref 78–100)
MONOCYTES # BLD AUTO: 0.6 10E3/UL (ref 0–1.3)
MONOCYTES NFR BLD AUTO: 8 %
NEUTROPHILS # BLD AUTO: 4.8 10E3/UL (ref 1.6–8.3)
NEUTROPHILS NFR BLD AUTO: 67 %
NRBC # BLD AUTO: 0 10E3/UL
NRBC BLD AUTO-RTO: 0 /100
PLATELET # BLD AUTO: 226 10E3/UL (ref 150–450)
POTASSIUM SERPL-SCNC: 4.1 MMOL/L (ref 3.4–5.3)
RBC # BLD AUTO: 3.21 10E6/UL (ref 4.4–5.9)
SODIUM SERPL-SCNC: 131 MMOL/L (ref 136–145)
VANCOMYCIN SERPL-MCNC: 14.2 UG/ML
WBC # BLD AUTO: 7.2 10E3/UL (ref 4–11)

## 2023-02-19 PROCEDURE — 258N000003 HC RX IP 258 OP 636: Performed by: INTERNAL MEDICINE

## 2023-02-19 PROCEDURE — 120N000001 HC R&B MED SURG/OB

## 2023-02-19 PROCEDURE — 250N000011 HC RX IP 250 OP 636: Performed by: STUDENT IN AN ORGANIZED HEALTH CARE EDUCATION/TRAINING PROGRAM

## 2023-02-19 PROCEDURE — 250N000013 HC RX MED GY IP 250 OP 250 PS 637: Performed by: STUDENT IN AN ORGANIZED HEALTH CARE EDUCATION/TRAINING PROGRAM

## 2023-02-19 PROCEDURE — 83735 ASSAY OF MAGNESIUM: CPT | Performed by: INTERNAL MEDICINE

## 2023-02-19 PROCEDURE — 85004 AUTOMATED DIFF WBC COUNT: CPT | Performed by: STUDENT IN AN ORGANIZED HEALTH CARE EDUCATION/TRAINING PROGRAM

## 2023-02-19 PROCEDURE — 80202 ASSAY OF VANCOMYCIN: CPT | Performed by: STUDENT IN AN ORGANIZED HEALTH CARE EDUCATION/TRAINING PROGRAM

## 2023-02-19 PROCEDURE — 99233 SBSQ HOSP IP/OBS HIGH 50: CPT | Performed by: INTERNAL MEDICINE

## 2023-02-19 PROCEDURE — 250N000012 HC RX MED GY IP 250 OP 636 PS 637: Performed by: INTERNAL MEDICINE

## 2023-02-19 PROCEDURE — 250N000011 HC RX IP 250 OP 636: Performed by: INTERNAL MEDICINE

## 2023-02-19 PROCEDURE — 36415 COLL VENOUS BLD VENIPUNCTURE: CPT | Performed by: STUDENT IN AN ORGANIZED HEALTH CARE EDUCATION/TRAINING PROGRAM

## 2023-02-19 PROCEDURE — 80048 BASIC METABOLIC PNL TOTAL CA: CPT | Performed by: STUDENT IN AN ORGANIZED HEALTH CARE EDUCATION/TRAINING PROGRAM

## 2023-02-19 PROCEDURE — 250N000013 HC RX MED GY IP 250 OP 250 PS 637: Performed by: INTERNAL MEDICINE

## 2023-02-19 PROCEDURE — 97116 GAIT TRAINING THERAPY: CPT | Mod: GP

## 2023-02-19 PROCEDURE — 99232 SBSQ HOSP IP/OBS MODERATE 35: CPT | Performed by: INTERNAL MEDICINE

## 2023-02-19 PROCEDURE — 97530 THERAPEUTIC ACTIVITIES: CPT | Mod: GP

## 2023-02-19 RX ORDER — SODIUM CHLORIDE 9 MG/ML
INJECTION, SOLUTION INTRAVENOUS CONTINUOUS
Status: DISCONTINUED | OUTPATIENT
Start: 2023-02-19 | End: 2023-02-21 | Stop reason: HOSPADM

## 2023-02-19 RX ADMIN — HEPARIN SODIUM 5000 UNITS: 5000 INJECTION, SOLUTION INTRAVENOUS; SUBCUTANEOUS at 21:13

## 2023-02-19 RX ADMIN — INSULIN ASPART 5 UNITS: 100 INJECTION, SOLUTION INTRAVENOUS; SUBCUTANEOUS at 22:09

## 2023-02-19 RX ADMIN — VANCOMYCIN HYDROCHLORIDE 750 MG: 5 INJECTION, POWDER, LYOPHILIZED, FOR SOLUTION INTRAVENOUS at 21:12

## 2023-02-19 RX ADMIN — PIPERACILLIN AND TAZOBACTAM 3.38 G: 3; .375 INJECTION, POWDER, LYOPHILIZED, FOR SOLUTION INTRAVENOUS at 05:47

## 2023-02-19 RX ADMIN — SODIUM CHLORIDE: 9 INJECTION, SOLUTION INTRAVENOUS at 08:49

## 2023-02-19 RX ADMIN — VANCOMYCIN HYDROCHLORIDE 750 MG: 5 INJECTION, POWDER, LYOPHILIZED, FOR SOLUTION INTRAVENOUS at 09:00

## 2023-02-19 RX ADMIN — OXYCODONE HYDROCHLORIDE AND ACETAMINOPHEN 1 TABLET: 5; 325 TABLET ORAL at 14:58

## 2023-02-19 RX ADMIN — GABAPENTIN 300 MG: 300 CAPSULE ORAL at 21:13

## 2023-02-19 RX ADMIN — HEPARIN SODIUM 5000 UNITS: 5000 INJECTION, SOLUTION INTRAVENOUS; SUBCUTANEOUS at 13:14

## 2023-02-19 RX ADMIN — PIPERACILLIN AND TAZOBACTAM 3.38 G: 3; .375 INJECTION, POWDER, LYOPHILIZED, FOR SOLUTION INTRAVENOUS at 21:12

## 2023-02-19 RX ADMIN — PIPERACILLIN AND TAZOBACTAM 3.38 G: 3; .375 INJECTION, POWDER, LYOPHILIZED, FOR SOLUTION INTRAVENOUS at 14:17

## 2023-02-19 RX ADMIN — GABAPENTIN 300 MG: 300 CAPSULE ORAL at 09:34

## 2023-02-19 RX ADMIN — OXYCODONE HYDROCHLORIDE AND ACETAMINOPHEN 1 TABLET: 5; 325 TABLET ORAL at 09:35

## 2023-02-19 RX ADMIN — HEPARIN SODIUM 5000 UNITS: 5000 INJECTION, SOLUTION INTRAVENOUS; SUBCUTANEOUS at 05:49

## 2023-02-19 ASSESSMENT — ACTIVITIES OF DAILY LIVING (ADL)
ADLS_ACUITY_SCORE: 22

## 2023-02-19 NOTE — PLAN OF CARE
Problem: Diabetes Comorbidity  Goal: Blood Glucose Level Within Targeted Range  Outcome: Progressing     Problem: Extremity Amputation  Goal: Optimal Coping with Amputation  Outcome: Progressing  Goal: Absence of Bleeding  Outcome: Progressing  Goal: Optimal Functional Ability  Intervention: Optimize Functional Ability  Recent Flowsheet Documentation  Taken 2/19/2023 0050 by Rose Santana RN  Activity Management: activity encouraged  Activity Assistance Provided: assistance, 1 person  Goal: Absence of Infection Signs and Symptoms  Outcome: Progressing  Goal: Anesthesia/Sedation Recovery  Intervention: Optimize Anesthesia Recovery  Recent Flowsheet Documentation  Taken 2/19/2023 0050 by Rose Santana, RN  Safety Promotion/Fall Prevention:    activity supervised    assistive device/personal items within reach    bed alarm on    fall prevention program maintained    nonskid shoes/slippers when out of bed    patient and family education    safety round/check completed    supervised activity  Administration (IS): self-administered     Problem: Extremity Amputation  Goal: Absence of Bleeding  Outcome: Progressing     Problem: Extremity Amputation  Goal: Absence of Infection Signs and Symptoms  Outcome: Progressing   Goal Outcome Evaluation:      Pt is alert and oriented x4. Comfortable denies pain. Glucose at 02AM is 230. VS WNL. No bleeding to rt foot , dressing on amputated  toes are  CDI. Slept good.

## 2023-02-19 NOTE — PROGRESS NOTES
[facility]    Medicine Progress Note - Hospitalist Service    Date of Admission:  2/17/2023    Assessment & Plan     Abdulaziz Rausch is a 57 year old male, with hx of uncontrolled dm, htn, who was  admitted on 2/17/2023 after right toe amputation for diabetic foot ulcer.     1.Diabetes foot ulcer, with necrosis of muscle, osteomyelitis:   - s/p amputation of 2nd and 3rd digit right foot. Last wound culture on 1/20/23 showed strep agalactiae , staph simulans and MRSA  - Gram stain (02/17): gram + cocci and bacilli. Cultures, pathology pending, prelim now shows   1+  Enterococcus faecalis   - ID consult appreciated- Zosyn and vancomycin while inpatient. Anticipate discharge on Augmentin and doxycycline for 10 days.  - Postop care per podiatry service  - Pain control     2.Diabetes mellitus type 2 with hyperglycemia:   - Poorly controlled with A1c 11.2.  During his recent admission, he was started on Lantus 12 units at bedtime, Novolog 3 units tidac and metformin.   - c/w Lantus, increase to 14 units qhs.  NovoLog sliding scale. I:C  - Monitor blood sugar and adjust insulin dose     3.Essential hypertension:   - c/w PTA lisinopril 10 mg daily.      4.Chronic anemia:   - Hb11.2 on admission. Monitor.     5.Hyponatremia-mild  -today --corrected for hyperglycemia is actually 134    6.CHRISTY  -creat today 1.3, up from his baseline  -ivf now  -avoid nsaids  -likely poor intake and infection           Diet: Moderate Consistent Carb (60 g CHO per Meal) Diet    DVT Prophylaxis: Heparin SQ  Fischer Catheter: Not present  Lines: None     Cardiac Monitoring: None  Code Status: No CPR- Do NOT Intubate           Diet: Moderate Consistent Carb (60 g CHO per Meal) Diet    DVT Prophylaxis: Heparin SQ  Fischer Catheter: Not present  Lines: None     Cardiac Monitoring: None  Code Status: No CPR- Do NOT Intubate      Clinically Significant Risk Factors                       # DMII: A1C = 11.2 % (Ref range: 0.0 -  5.6 %) within past 3 months, PRESENT ON ADMISSION          Disposition Plan     Expected Discharge Date: 02/20/2023      Destination: home with family;home with help/services            Sarah Cordero MD  Hospitalist Service  [facility]  Securely message with [email or web address] (more info)  Text page via [employer] Paging/Directory   ______________________________________________________________________    Interval History   He feels ok  Still some pain issues at foot site  Eating and passing gas  He told me he has no regular doctor-I stressed need for this  We also talked about need for likely home care and need for close follow up with DM for PCP    Physical Exam   Vital Signs: Temp: 98.4  F (36.9  C) Temp src: Oral BP: 113/64 Pulse: 87   Resp: 16 SpO2: 96 % O2 Device: None (Room air)    Weight: 134 lbs 7.69 oz    Constitutional: awake, alert, cooperative and no apparent distress  Eyes: sclera clear  Respiratory: no increased work of breathing, good air exchange, no retractions and clear to auscultation  Cardiovascular: Normal apical impulse, regular rate and rhythm, normal S1 and S2, no S3 or S4, and no murmur noted  GI: normal bowel sounds, soft, non-distended and non-tender  Skin: left foot in dressing  Musculoskeletal: no lower extremity pitting edema present  Neurologic: Mental Status Exam:  Level of Alertness:   awake  Neuropsychiatric: General: normal, calm and normal eye contact    Medical Decision Making       35 MINUTES SPENT BY ME on the date of service doing chart review, history, exam, documentation & further activities per the note.      Data     I have personally reviewed the following data over the past 24 hrs:    7.2  \   9.2 (L)   / 226     131 (L) 102 29.7 (H) /  160 (H)   4.1 23 1.31 (H) \       Imaging results reviewed over the past 24 hrs:   No results found for this or any previous visit (from the past 24 hour(s)).

## 2023-02-19 NOTE — PROGRESS NOTES
Met with patient to discuss discharge plan and PCP. Patient has seen Dr Pittman recently and says he would like to continue seeing him for follow up and would like him to be his PCP.     Patient is agreeable to home care services. No agency preference. Referrals sent.

## 2023-02-19 NOTE — PROGRESS NOTES
Podiatry service chart review    Osteomyelitis of second and third toes, right foot s/p 2 days amputation of second and third toes right foot.     Recent notes were reviewed.    Vital signs appear stable    Labs: 2/19/23   WBC: 7.2   RBC: 3.21   Hgb: 9.2   Hematocrit: 28.1   Glucose: 230        Recommendations:  Wound: keep dressings clean, dry and intact  Infection: ID recommendations: Zosyn and vancomycin while inpatient. Anticipate discharge on Augmentin and doxycycline for 10 days.  Disposition: Good, the patient is to remain non weight bearing with assistive devices per Physical Therapy.  The patient is to follow up with Dr. Jimenez in clinic one week after discharge.      Appreciate medical management per Hospitalist service.  Appreciate antibiotic recommendations per Infectious Disease service.     JOSE Sanders DPM

## 2023-02-19 NOTE — CONSULTS
Care Management Initial Consult    General Information  Assessment completed with: Abdulaziz Barker  Type of CM/SW Visit: Initial Assessment    Primary Care Provider verified and updated as needed: Yes   Readmission within the last 30 days: previous discharge plan unsuccessful   Return Category: Exacerbation of disease  Reason for Consult: discharge planning  Advance Care Planning: Advance Care Planning Reviewed: other (see comments) (pt does not have HCD but would like info)          Communication Assessment  Patient's communication style: spoken language (English or Bilingual)    Hearing Difficulty or Deaf: no   Wear Glasses or Blind: no    Cognitive  Cognitive/Neuro/Behavioral: WDL  Level of Consciousness: alert  Arousal Level: opens eyes spontaneously  Orientation: oriented x 4  Mood/Behavior: calm, cooperative     Speech: clear    Living Environment:   People in home: friend(s)  Friend Patrick  Current living Arrangements: mobile home      Able to return to prior arrangements: yes       Family/Social Support:  Care provided by: self  Provides care for: no one  Marital Status:   Sibling(s), Other (specify) (friends)          Description of Support System: Supportive, Involved    Support Assessment: Adequate family and caregiver support, Adequate social supports    Current Resources:   Patient receiving home care services:       Community Resources:    Equipment currently used at home: none  Supplies currently used at home:      Employment/Financial:  Employment Status: retired        Financial Concerns:             Lifestyle & Psychosocial Needs:  Social Determinants of Health     Tobacco Use: Low Risk      Smoking Tobacco Use: Never     Smokeless Tobacco Use: Never     Passive Exposure: Not on file   Alcohol Use: Not on file   Financial Resource Strain: Not on file   Food Insecurity: Not on file   Transportation Needs: Not on file   Physical Activity: Not on file   Stress: Not on file   Social  Connections: Not on file   Intimate Partner Violence: Not on file   Depression: At risk     PHQ-2 Score: 6   Housing Stability: Not on file       Functional Status:  Prior to admission patient needed assistance:              Mental Health Status:          Chemical Dependency Status:                Values/Beliefs:  Spiritual, Cultural Beliefs, Orthodox Practices, Values that affect care:                 Additional Information:    SW met with pt to complete assessment and discuss discharge planning.  Pt has friend Patrick here from [place] that will be staying with him for as long as pt needs him.  Pt has sister Lottie in [place], as well as two brothers in [place] and [place].  Pt has two children - one in [place] and the other in [place], [place].  Pt reports that one of his brothers is going on hospice and may not make it past a couple of days.  Pt hoping to discharge sooner than later so can go see his brother.  Pt independent at baseline.  Pt agreeable to home care services if therapy recommends.  Pt anticipates friend/family will transport at discharge.      MATT on 2/19 please provide pt with [employer] HCD forms and information.          YANE Diaz, SAMAN 02/18/23 6:11 PM

## 2023-02-19 NOTE — PHARMACY-VANCOMYCIN DOSING SERVICE
Pharmacy Vancomycin Note  Date of Service 2023  Patient's  1965   57 year old, male    Indication: Osteomyelitis and Skin and Soft Tissue Infection  Day of Therapy: 2  Current vancomycin regimen:  750 mg IV q12h  Current vancomycin monitoring method: AUC  Current vancomycin therapeutic monitoring goal: 400-600 mg*h/L    InsightRX Prediction of Current Vancomycin Regimen  Loading dose: N/A  Regimen: 750 mg IV every 12 hours.  Start time: 08:32 on 2023  Exposure target: AUC24 (range)400-600 mg/L.hr   AUC24,ss: 588 mg/L.hr  Probability of AUC24 > 400: 97 %  Ctrough,ss: 20 mg/L  Probability of Ctrough,ss > 20: 50 %  Probability of nephrotoxicity (Lodise MARINA ): 17 %      Current estimated CrCl = Estimated Creatinine Clearance: 52.4 mL/min (A) (based on SCr of 1.31 mg/dL (H)).    Creatinine for last 3 days  2023:  6:35 AM Creatinine 1.15 mg/dL  2023:  6:17 AM Creatinine 1.31 mg/dL    Recent Vancomycin Levels (past 3 days)  2023:  6:17 AM Vancomycin 14.2 ug/mL    Vancomycin IV Administrations (past 72 hours)                   vancomycin (VANCOCIN) 750 mg in 0.9% NaCl 250 mL intermittent infusion (mg) 750 mg Given 23     750 mg Given  0736    vancomycin (VANCOCIN) 1,250 mg in sodium chloride 0.9 % 250 mL intermittent infusion (mg) 1,250 mg New Bag 23 0029                Nephrotoxins and other renal medications (From now, onward)    Start     Dose/Rate Route Frequency Ordered Stop    23 0800  vancomycin (VANCOCIN) 750 mg in 0.9% NaCl 250 mL intermittent infusion         750 mg  over 60 Minutes Intravenous EVERY 12 HOURS 23 0203      23 0600  piperacillin-tazobactam (ZOSYN) 3.375 g vial to attach to  mL bag        Note to Pharmacy: Extended infusion dosing to start 6 hours after initial infusion.   See [facility] for full Linked Orders Report.    3.375 g  over 240 Minutes Intravenous EVERY 8 HOURS 23 23223 1900  [Held by  provider]  lisinopril (ZESTRIL) tablet 10 mg        (Held by provider since Sun 2/19/2023 at 0717 by Sarah Cordero MD.Hold Reason: Other)   Note to Pharmacy: PTA Sig:Take 1 tablet (10 mg) by mouth daily for 30 days      10 mg Oral DAILY 02/17/23 1846               Contrast Orders - past 72 hours (72h ago, onward)    None          Interpretation of levels and current regimen:  Vancomycin level is reflective of -600    Has serum creatinine changed greater than 50% in last 72 hours: No    Urine output:  good urine output    Renal Function: slightly worsening?       Plan:  1. Continue Current Dose. Will watch Scr and InsightRX closely   2. Vancomycin monitoring method: AUC  3. Vancomycin therapeutic monitoring goal: 400-600 mg*h/L  4. Pharmacy will check vancomycin levels as appropriate in 1-3 Days.  5. Serum creatinine levels will be ordered daily for the first week of therapy and at least twice weekly for subsequent weeks.    Devora Gustafson, [facility]

## 2023-02-19 NOTE — PROGRESS NOTES
[facility]    Infectious Disease Progress Note    Date of Service : 02/19/2023     Assessment & Plan   Abdulaziz Rausch is a 57 year old male who was admitted on 2/17/2023.     ASSESSMENT:  1. Right foot osteomyelitis, diabetic infection  2. Status post amputation second digit, third digit right foot  3. Intraoperative cultures, Enterococcus, Streptococcus  4. Previous cultures with Streptococcus and MRSA        RECOMMENDATIONS:    1. Antibiotics-Zosyn and vancomycin while inpatient  2. Anticipate discharge on Augmentin and doxycycline for 10 days  3. Follow culture results   4. Monitor CBC, CMP  5. Anticipate discharge in 1 to 2 days as patient progressed terminally ill and involved in hospice and patient would like to be discharged home  6. Podiatry note reviewed  7. ID will sign off please call with questions      Brooklynn Loera MD  [facility]  [phone]      Interval History   Resting comfortably  Chart reviewed    Physical Exam   Temp: 98.4  F (36.9  C) Temp src: Oral BP: 113/64 Pulse: 87   Resp: 16 SpO2: 96 % O2 Device: None (Room air)    Vitals:    02/17/23 1514 02/17/23 1805   Weight: 64.3 kg (141 lb 11.2 oz) 61 kg (134 lb 7.7 oz)     Vital Signs with Ranges  Temp:  [98.1  F (36.7  C)-98.4  F (36.9  C)] 98.4  F (36.9  C)  Pulse:  [] 87  Resp:  [16-20] 16  BP: (111-132)/(60-72) 113/64  SpO2:  [94 %-96 %] 96 %    Constitutional: Resting  Lungs: normal breathing pattern, no crackles or wheezing  Cardiovascular: Regular rate and rhythm  Abdomen: non distended  Skin: Dressing on foot  Neuro: deconditioned  Psych: able to answer questions    Medications     sodium chloride 50 mL/hr at 02/19/23 0849       gabapentin  300 mg Oral BID     heparin ANTICOAGULANT  5,000 Units Subcutaneous Q8H MARKUS     insulin aspart   Subcutaneous TID AC     insulin aspart  1-10 Units Subcutaneous TID AC     insulin aspart  1-7 Units Subcutaneous At Bedtime     insulin  glargine  14 Units Subcutaneous At Bedtime     [Held by provider] lisinopril  10 mg Oral Daily     piperacillin-tazobactam  3.375 g Intravenous Q8H     sodium chloride (PF)  3 mL Intracatheter Q8H     vancomycin  750 mg Intravenous Q12H       Data   All microbiology laboratory data reviewed.  Recent Labs   Lab Test 02/19/23  0617 02/18/23  0635 02/15/23  1401   WBC 7.2 9.0 5.6   HGB 9.2* 10.3* 11.2*   HCT 28.1* 30.8* 32.0*   MCV 88 87 84    273 245     Recent Labs   Lab Test 02/19/23  0617 02/18/23  0635 02/15/23  1401   CR 1.31* 1.15 1.14     Recent Labs   Lab Test 01/18/23  1448   SED 79*     No lab results found.    Invalid input(s):     MICROBIOLOGY:    Reviewed    7-Day Micro Results     Collected Updated Procedure Result Status      02/17/2023 1731 02/18/2023 2317 Anaerobic Bacterial Culture Routine [[id number]]    Tissue from Foot, Right    Preliminary result Component Value   Culture No anaerobic organisms isolated after 1 day  [P]                02/17/2023 1731 02/17/2023 2338 Gram Stain [[id number]]   (Abnormal)   Tissue from Foot, Right    Final result Component Value   Gram Stain Result 1+ Gram positive cocci   Gram Stain Result 2+ Gram positive bacilli   Gram Stain Result 2+ WBC seen   Gram Stain Result 4+ Red blood cells seen            02/17/2023 1731 02/19/2023 1248 Tissue Aerobic Bacterial Culture Routine [[id number]]    (Abnormal)   Tissue from Foot, Right    Preliminary result Component Value   Culture Culture in progress  [P]     1+ Enterococcus faecalis  [P]     1+ Normal nabila  [P]                       RADIOLOGY:    Reviewed  US Low Ext Arterial Dop Seg Pres w/o Exercise    Result Date: 2/10/2023  Table formatting from the original result was not included. BILATERAL RESTING ANKLE-BRACHIAL INDICES (CHIQUITA'S) (Date: 02/10/23) Indication: Bilateral 2-3 toe ulcers nonhealing Previous: 01/18/2023 History: Hypertension, Diabetic, PAD and Vascular Ulcers  Resting CHIQUITA's          Right:  mmHg Index     Brachial: 167  Ankle-(PT): 253 1.51 Ankle-(DP): 105 1.22          Digit: 157 0.93               Left: mmHg Index     Brachial: 168  Ankle-(PT): 240 1.43 Ankle-(DP): 191 1.14          Digit: 152 0.90 Resting ankle-brachial index of 1.51 on the right. Toe Pressures of 157 mmHg and TBI of 0.93 Resting ankle-brachial index of 1.43 on the left. Toe Pressures of 152 mmHg and TBI of 0.90  VPR WAVEFORMS: The right volume plethysmography waveforms are mildly abnormal at the lower thigh level, mildly abnormal at the upper calf level and mildly abnormal at the ankle. The left volume plethysmography waveforms are mildly abnormal at the lower thigh level, mildly abnormal at the upper calf level and mildly abnormal at the ankle. Impression:  1. RIGHT LOWER EXTREMITY: CHIQUITA is Normal with multiphasic waveforms with an CHIQUITA of 1.51 even though it could be falsely elevated. Toe Pressures are Normal and adequate for wound healing with toe pressures of 157 mmHg. 2. LEFT LOWER EXTREMITY: CHIQUITA is Normal with multiphasic waveforms with an CHIQUITA of 1.4. Toe Pressures are Normal and adequate for wound healing with toe pressures of 152 mmHg. Reference: Wound classification Grade CHIQUITA Ankle Systolic Pressure Toe Pressures 0 > 0.80 > 100 mmHg > 60 mmHg 1 0.6 - 0.79 70 - 100 mmHg 40 - 59 mmHg 2 0.4 - 0.59 50-70 mmHg 30 - 39 mmHg 3 < 0.39 < 50 mmHg < 30 mmHg Digit Pressures DBI Disease Category > 0.70 Normal < 0.70 Abnormal > 30 mmHg Potential wound healing < 30 mmHg Impaired wound healing Ankle Brachial Pressures CHIQUITA Disease Category > 1.3  Likely vessel calcification with monophasic waveforms, non-diagnostic 0.95-1.30 Normal with multiphasic waveforms 0.50-0.95 Single level disease 0.30-0.50 Multilevel disease < 0.30 Critical limb ischema Volume Plethysmography Recording (VPR) at all levels Normal Sharp systolic peak, fast upstroke, prominent dicrotic notch in wave Mild Sharp systolic peak, fast upstroke, absent dicrotic notch in wave  "Moderate Flattened systolic peak, slowed upstroke, absent dicrotic notch inwave Severe amplitude wave with = upslope and down slope Occluded Flat Line     POC US Guidance Needle Placement    Result Date: 2/17/2023  Ultrasound was performed as guidance to an anesthesia procedure.  Click \"PACS images\" hyperlink below to view any stored images.  For specific procedure details, view procedure note authored by anesthesia.         "

## 2023-02-19 NOTE — PLAN OF CARE
Problem: Diabetes Comorbidity  Goal: Blood Glucose Level Within Targeted Range  2/19/2023 0438 by Rose Santana, RN  Outcome: Progressing  2/19/2023 0215 by Rose Santana, RN  Outcome: Progressing     Problem: Extremity Amputation  Goal: Absence of Infection Signs and Symptoms  Outcome: Progressing     Problem: Extremity Amputation  Goal: Acceptable Pain Control  Outcome: Progressing   Goal Outcome Evaluation:  Pt is alert and oriented x3. Dressing to rt foot CDI. Denies pain. Used bathroom, ambulated to toilet A1, maintaining Rt foot NWB. Glucose level at 0600AM is 220.

## 2023-02-19 NOTE — PLAN OF CARE
Problem: Extremity Amputation  Goal: Acceptable Pain Control  Outcome: Progressing  Patient reports right foot throbbing pain 5/10.  Pain relieved after PRN Percocet given.  Extremity elevated on pillows.    Problem: Extremity Amputation  Goal: Absence of Infection Signs and Symptoms  Outcome: Progressing  Vital signs stable.  Surgical dressing clean, dry and intact.  IV abx given as ordered, ID following.    Jie Haji RN

## 2023-02-20 ENCOUNTER — APPOINTMENT (OUTPATIENT)
Dept: PHYSICAL THERAPY | Facility: HOSPITAL | Age: 58
End: 2023-02-20
Attending: PODIATRIST
Payer: COMMERCIAL

## 2023-02-20 LAB
ANION GAP SERPL CALCULATED.3IONS-SCNC: 7 MMOL/L (ref 7–15)
BACTERIA TISS BX CULT: ABNORMAL
BUN SERPL-MCNC: 23.5 MG/DL (ref 8–23)
CALCIUM SERPL-MCNC: 8.3 MG/DL (ref 8.6–10)
CHLORIDE SERPL-SCNC: 106 MMOL/L (ref 98–107)
CREAT SERPL-MCNC: 1.27 MG/DL (ref 0.67–1.17)
DEPRECATED HCO3 PLAS-SCNC: 23 MMOL/L (ref 22–29)
ERYTHROCYTE [DISTWIDTH] IN BLOOD BY AUTOMATED COUNT: 12.9 % (ref 10–15)
GFR SERPL CREATININE-BSD FRML MDRD: 65 ML/MIN/1.73M2
GLUCOSE BLDC GLUCOMTR-MCNC: 172 MG/DL (ref 70–99)
GLUCOSE BLDC GLUCOMTR-MCNC: 212 MG/DL (ref 70–99)
GLUCOSE BLDC GLUCOMTR-MCNC: 213 MG/DL (ref 70–99)
GLUCOSE BLDC GLUCOMTR-MCNC: 213 MG/DL (ref 70–99)
GLUCOSE BLDC GLUCOMTR-MCNC: 259 MG/DL (ref 70–99)
GLUCOSE BLDC GLUCOMTR-MCNC: 284 MG/DL (ref 70–99)
GLUCOSE SERPL-MCNC: 212 MG/DL (ref 70–99)
HCT VFR BLD AUTO: 28.4 % (ref 40–53)
HGB BLD-MCNC: 9.4 G/DL (ref 13.3–17.7)
HOLD SPECIMEN: NORMAL
MCH RBC QN AUTO: 29.2 PG (ref 26.5–33)
MCHC RBC AUTO-ENTMCNC: 33.1 G/DL (ref 31.5–36.5)
MCV RBC AUTO: 88 FL (ref 78–100)
PLATELET # BLD AUTO: 229 10E3/UL (ref 150–450)
POTASSIUM SERPL-SCNC: 4.6 MMOL/L (ref 3.4–5.3)
RBC # BLD AUTO: 3.22 10E6/UL (ref 4.4–5.9)
SODIUM SERPL-SCNC: 136 MMOL/L (ref 136–145)
WBC # BLD AUTO: 9.2 10E3/UL (ref 4–11)

## 2023-02-20 PROCEDURE — 258N000003 HC RX IP 258 OP 636: Performed by: INTERNAL MEDICINE

## 2023-02-20 PROCEDURE — 97530 THERAPEUTIC ACTIVITIES: CPT | Mod: GP

## 2023-02-20 PROCEDURE — 36415 COLL VENOUS BLD VENIPUNCTURE: CPT | Performed by: INTERNAL MEDICINE

## 2023-02-20 PROCEDURE — 250N000011 HC RX IP 250 OP 636: Performed by: STUDENT IN AN ORGANIZED HEALTH CARE EDUCATION/TRAINING PROGRAM

## 2023-02-20 PROCEDURE — 99232 SBSQ HOSP IP/OBS MODERATE 35: CPT | Performed by: INTERNAL MEDICINE

## 2023-02-20 PROCEDURE — 250N000011 HC RX IP 250 OP 636: Performed by: INTERNAL MEDICINE

## 2023-02-20 PROCEDURE — 250N000013 HC RX MED GY IP 250 OP 250 PS 637: Performed by: STUDENT IN AN ORGANIZED HEALTH CARE EDUCATION/TRAINING PROGRAM

## 2023-02-20 PROCEDURE — 80048 BASIC METABOLIC PNL TOTAL CA: CPT | Performed by: INTERNAL MEDICINE

## 2023-02-20 PROCEDURE — 120N000001 HC R&B MED SURG/OB

## 2023-02-20 PROCEDURE — 97116 GAIT TRAINING THERAPY: CPT | Mod: GP

## 2023-02-20 PROCEDURE — 85027 COMPLETE CBC AUTOMATED: CPT | Performed by: INTERNAL MEDICINE

## 2023-02-20 PROCEDURE — 99231 SBSQ HOSP IP/OBS SF/LOW 25: CPT | Performed by: INTERNAL MEDICINE

## 2023-02-20 PROCEDURE — 250N000013 HC RX MED GY IP 250 OP 250 PS 637: Performed by: INTERNAL MEDICINE

## 2023-02-20 RX ADMIN — GABAPENTIN 300 MG: 300 CAPSULE ORAL at 08:33

## 2023-02-20 RX ADMIN — GABAPENTIN 300 MG: 300 CAPSULE ORAL at 20:13

## 2023-02-20 RX ADMIN — HEPARIN SODIUM 5000 UNITS: 5000 INJECTION, SOLUTION INTRAVENOUS; SUBCUTANEOUS at 21:32

## 2023-02-20 RX ADMIN — HEPARIN SODIUM 5000 UNITS: 5000 INJECTION, SOLUTION INTRAVENOUS; SUBCUTANEOUS at 05:09

## 2023-02-20 RX ADMIN — PIPERACILLIN AND TAZOBACTAM 3.38 G: 3; .375 INJECTION, POWDER, LYOPHILIZED, FOR SOLUTION INTRAVENOUS at 13:04

## 2023-02-20 RX ADMIN — PIPERACILLIN AND TAZOBACTAM 3.38 G: 3; .375 INJECTION, POWDER, LYOPHILIZED, FOR SOLUTION INTRAVENOUS at 05:03

## 2023-02-20 RX ADMIN — OXYCODONE HYDROCHLORIDE AND ACETAMINOPHEN 1 TABLET: 5; 325 TABLET ORAL at 08:48

## 2023-02-20 RX ADMIN — PIPERACILLIN AND TAZOBACTAM 3.38 G: 3; .375 INJECTION, POWDER, LYOPHILIZED, FOR SOLUTION INTRAVENOUS at 21:32

## 2023-02-20 RX ADMIN — OXYCODONE HYDROCHLORIDE AND ACETAMINOPHEN 1 TABLET: 5; 325 TABLET ORAL at 20:13

## 2023-02-20 RX ADMIN — HEPARIN SODIUM 5000 UNITS: 5000 INJECTION, SOLUTION INTRAVENOUS; SUBCUTANEOUS at 13:04

## 2023-02-20 RX ADMIN — INSULIN ASPART 3 UNITS: 100 INJECTION, SOLUTION INTRAVENOUS; SUBCUTANEOUS at 21:10

## 2023-02-20 RX ADMIN — VANCOMYCIN HYDROCHLORIDE 750 MG: 5 INJECTION, POWDER, LYOPHILIZED, FOR SOLUTION INTRAVENOUS at 08:49

## 2023-02-20 RX ADMIN — VANCOMYCIN HYDROCHLORIDE 750 MG: 5 INJECTION, POWDER, LYOPHILIZED, FOR SOLUTION INTRAVENOUS at 20:14

## 2023-02-20 ASSESSMENT — ACTIVITIES OF DAILY LIVING (ADL)
ADLS_ACUITY_SCORE: 22

## 2023-02-20 NOTE — PROGRESS NOTES
Podiatry chart check. S/p amputation digits 2,3 right foot. Continue non-weight bearing right foot. Surgical dressing to remain intact. Medical management per hospitalist. Antibiotics per ID. Patient is okay for discharge to TCU. I would like to see him for follow-up in one week.

## 2023-02-20 NOTE — PLAN OF CARE
Problem: Extremity Amputation  Goal: Acceptable Pain Control  Outcome: Progressing  Pain tolerable with current regimen.  Extremity elevated on pillows.    Problem: Extremity Amputation  Goal: Fluid and Electrolyte Balance  Outcome: Progressing  NS infusing @ 50cc/hr.  PO fluids encouraged.    Problem: Extremity Amputation  Goal: Optimal Functional Ability  Outcome: Progressing  NWB RLE - tolerating transfers well with assistance of staff.    Jie Haji RN

## 2023-02-20 NOTE — PLAN OF CARE
Problem: Extremity Amputation  Goal: Optimal Coping with Amputation  Outcome: Progressing     Problem: Extremity Amputation  Goal: Absence of Bleeding  Outcome: Progressing     Problem: Extremity Amputation  Goal: Absence of Infection Signs and Symptoms  Outcome: Progressing     Problem: Extremity Amputation  Goal: Acceptable Pain Control  Outcome: Progressing   Goal Outcome Evaluation:  Pt is alert and oriented. No c/o pain. VS WNL. Rt foot dressing is CDI, maintained NWB to rt foot, rt foot brace on too . Slept good.

## 2023-02-20 NOTE — PROGRESS NOTES
Encounter Date: 2/10/2023    SCRIBE #1 NOTE: I, Devin Vega, am scribing for, and in the presence of,  Blake James MD. I have scribed the following portions of the note - Other sections scribed: HPI, ROS, PE.     History     Chief Complaint   Patient presents with    Shortness of Breath     Pt comes to the er via pov with c/o chest pain, shortness of breath, and abdominal pains that started around 2130. Pt was newly diagnosed with MS. Pt stated that she has been taking methylprednisolone for 3 days.      Zeny Charles is a 18 y.o. female, with a PMHx of Factor 5 Leiden, who presents to the ED accompanied by her sister with SOB onset nine hours PTA. Patient reports recent diagnosis of MS with prescribed steroids for treatment. Patient notes she was administered IV steroid (x2 days) and oral steroid (x3 days). She further notes clouded thoughts, chest tightness, and less frequent urination as accompanying symptoms. Patient denies EtOH and mariajuana usage. No other exacerbating or alleviating factors. Denies headache, nausea, arm weakness, difficulty walking, dysuria, fever, black stools or other associated symptoms.      The history is provided by the patient and a relative. No  was used.   Review of patient's allergies indicates:  No Known Allergies  Past Medical History:   Diagnosis Date    Factor 5 Leiden mutation, heterozygous 9/13/2016    Seizures     1 at the age of 6     Past Surgical History:   Procedure Laterality Date    TONSILLECTOMY  2 years old     Family History   Problem Relation Age of Onset    Hypertension Mother     Irregular menses Mother     Factor V Leiden deficiency Mother     Interstitial cystitis Mother     No Known Problems Father     Factor V Leiden deficiency Sister     Interstitial cystitis Maternal Aunt     Factor V Leiden deficiency Maternal Grandmother     Thyroid disease Maternal Grandmother         hypothyroidism    Mitral valve prolapse Maternal  [facility]    Medicine Progress Note - Hospitalist Service    Date of Admission:  2/17/2023    Assessment & Plan     Abdulaziz Rausch is a 57 year old male, with hx of uncontrolled dm, htn, who was  admitted on 2/17/2023 after right toe amputation for diabetic foot ulcer.     1.Diabetes foot ulcer, with necrosis of muscle, osteomyelitis:   - s/p amputation of 2nd and 3rd digit right foot. Last wound culture on 1/20/23 showed strep agalactiae , staph simulans and MRSA  - Gram stain (02/17): gram + cocci and bacilli. Cultures, pathology pending, prelim now shows   1+  Enterococcus faecalis as well as a strep agalactiae    Enterococcus faecalis       SUJATA     Ampicillin <=2 ug/mL Susceptible     Gentamicin Synergy Susceptible... Susceptible 1     Penicillin 4 ug/mL Susceptible     Vancomycin 2 ug/mL Susceptible        - ID consult appreciated- Zosyn and vancomycin while inpatient. Anticipate discharge on Augmentin and doxycycline for 10 days.  - Postop care per podiatry service  - Pain control     2.Diabetes mellitus type 2 with hyperglycemia:   - Poorly controlled with A1c 11.2.  During his recent admission, he was started on Lantus 12 units at bedtime, Novolog 3 units tidac and metformin.   - c/w Lantus, increase to 16 units qhs.  NovoLog sliding scale. I:C--increase to 1:10  - Monitor blood sugar and adjust insulin dose     3.Essential hypertension:   - c/w PTA lisinopril 10 mg daily.      4.Chronic anemia:   - Hb11.2 on admission. Monitor.      5.Hyponatremia-mild  -today --corrected for hyperglycemia is actually 134     6.CHRISTY  -creat today 1.27, still up from his baseline  -ivf now  -avoid nsaids, holding ace-i  -likely poor intake and infection     Social -needs knee walker, home care RN, PT.OT set up possible discharge tomorrow         Diet: Moderate Consistent Carb (60 g CHO per Meal) Diet    DVT Prophylaxis: Heparin SQ  Fischer Catheter: Not present  Lines: None     Cardiac  Grandfather     Congenital heart disease Maternal Grandfather         valve    Crohn's disease Paternal Grandmother     Breast cancer Neg Hx     Colon cancer Neg Hx     Ovarian cancer Neg Hx     Arrhythmia Neg Hx     Early death Neg Hx     Heart attacks under age 50 Neg Hx     Pacemaker/defibrilator Neg Hx      Social History     Tobacco Use    Smoking status: Never    Smokeless tobacco: Never   Substance Use Topics    Alcohol use: No    Drug use: No     Review of Systems   Constitutional:  Negative for chills and fever.   HENT:  Negative for sore throat.    Eyes:  Negative for visual disturbance.   Respiratory:  Positive for chest tightness and shortness of breath. Negative for cough.    Cardiovascular:  Negative for chest pain.   Gastrointestinal:  Negative for blood in stool and nausea.        (+): Abdominal Rigidity   Genitourinary:  Positive for decreased urine volume. Negative for dysuria.   Musculoskeletal:  Negative for back pain.   Skin:  Negative for rash.   Neurological:  Negative for speech difficulty, weakness and headaches.        (+): Clouded Thoughts, Delayed    Psychiatric/Behavioral:  Negative for confusion.      Physical Exam     Initial Vitals [02/10/23 2303]   BP Pulse Resp Temp SpO2   (!) 140/105 98 18 97.5 °F (36.4 °C) 98 %      MAP       --         Physical Exam    Nursing note and vitals reviewed.  Constitutional: She appears well-developed and well-nourished. She does not appear ill. No distress.   HENT:   Head: Normocephalic and atraumatic.   Mouth/Throat: Oropharynx is clear and moist.   No dysarthria   Eyes: Conjunctivae and EOM are normal.   Pupils 2+ Reactive   Neck:   Normal range of motion.  Cardiovascular:  Regular rhythm and normal heart sounds.   Tachycardia present.         No murmur heard.  Pulmonary/Chest: Breath sounds normal. No respiratory distress. She has no wheezes.   Lungs Clear   Abdominal: Abdomen is soft. There is no abdominal tenderness.   Musculoskeletal:          Monitoring: None  Code Status: No CPR- Do NOT Intubate      Clinically Significant Risk Factors                       # DMII: A1C = 11.2 % (Ref range: 0.0 - 5.6 %) within past 3 months, PRESENT ON ADMISSION          Disposition Plan      Expected Discharge Date: 02/20/2023      Destination: home with family;home with help/services  Discharge Comments: waitng for final cx  CHRISTY  anticipate home with home care          Sarah Cordero MD  Hospitalist Service  [facility]  Securely message with [employer] (more info)  Text page via [employer] Paging/Directory   ______________________________________________________________________    Interval History   He feels better  Eating and stooling ok  Liked knee walker  Will plan on staying at partner's house--less stairs--told SW      Physical Exam   Vital Signs: Temp: 98.1  F (36.7  C) Temp src: Oral BP: 131/65 Pulse: 93   Resp: 18 SpO2: 95 % O2 Device: None (Room air)    Weight: 134 lbs 7.69 oz    Constitutional: awake, fatigued, alert, cooperative and no apparent distress  Eyes: sclera clear  Respiratory: No increased work of breathing, good air exchange, clear to auscultation bilaterally, no crackles or wheezing  Cardiovascular: Normal apical impulse, regular rate and rhythm, normal S1 and S2, no S3 or S4, and no murmur noted  GI: normal bowel sounds, soft, non-distended and non-tender  Skin: foot in dressing  Musculoskeletal: no lower extremity pitting edema present  Neurologic: Mental Status Exam:  Level of Alertness:   awake  Neuropsychiatric: General: normal, calm and normal eye contact    Medical Decision Making       35 MINUTES SPENT BY ME on the date of service doing chart review, history, exam, documentation & further activities per the note.      Data     I have personally reviewed the following data over the past 24 hrs:    9.2  \   9.4 (L)   / 229     136 106 23.5 (H) /  212 (H)   4.6 23 1.27 (H) \       Imaging results reviewed over the past 24  General: No edema.      Cervical back: Normal range of motion.      Comments: No adrift upper extremities, no adrift lower extremities       Neurological: She is alert and oriented to person, place, and time. GCS score is 15.   No Facial Asymmetry, Finger nose intact bilaterally, Heel Shin Intact Bilaterally,    Skin: Skin is warm.   Psychiatric: She has a normal mood and affect.       ED Course   Procedures  Labs Reviewed   CBC W/ AUTO DIFFERENTIAL - Abnormal; Notable for the following components:       Result Value    WBC 19.22 (*)     MCH 31.7 (*)     Gran # (ANC) 16.7 (*)     Immature Grans (Abs) 0.09 (*)     Mono # 1.2 (*)     Gran % 86.7 (*)     Lymph % 6.4 (*)     All other components within normal limits   COMPREHENSIVE METABOLIC PANEL - Abnormal; Notable for the following components:    Potassium 3.4 (*)     Glucose 143 (*)     Alkaline Phosphatase 51 (*)     All other components within normal limits   TROPONIN I   B-TYPE NATRIURETIC PEPTIDE   D DIMER, QUANTITATIVE   POCT URINE PREGNANCY     EKG Readings: (Independently Interpreted)   EKGs independently interpreted by Dr.John Jacob MD reads: see ED course. External documents reviewed for previous EKG comparison: see ED course.       Imaging Results              X-Ray Chest 1 View (Final result)  Result time 02/11/23 02:09:34      Final result by Carlos Nuñez MD (02/11/23 02:09:34)                   Impression:      No radiographic evidence of acute intrathoracic process on this single view..      Electronically signed by: Carlos Nuñez MD  Date:    02/11/2023  Time:    02:09               Narrative:    EXAMINATION:  XR CHEST 1 VIEW    CLINICAL HISTORY:  Palpitations    TECHNIQUE:  Single frontal view of the chest was performed.    COMPARISON:  None    FINDINGS:  Cardiac monitoring leads overlie the chest.  Cardiac silhouette appears within normal limits.  Lungs are symmetrically expanded without evidence of confluent airspace consolidation.  No  hrs:   No results found for this or any previous visit (from the past 24 hour(s)).   significant volume of pleural fluid or pneumothorax identified.  Visualized osseous structures appear intact.                                       CT Head Without Contrast (Final result)  Result time 02/11/23 01:41:23      Final result by Dee Galo MD (02/11/23 01:41:23)                   Impression:      No acute intracranial abnormalities identified.    See recent MRI brain report from 02/07/2023 for additional details.      Electronically signed by: Dee Galo MD  Date:    02/11/2023  Time:    01:41               Narrative:    EXAMINATION:  CT HEAD WITHOUT CONTRAST    CLINICAL HISTORY:  confusion;    TECHNIQUE:  Low dose axial images were obtained through the head.  Coronal and sagittal reformations were also performed. Contrast was not administered.    COMPARISON:  Recent MRI brain from 02/07/2023.    FINDINGS:  No evidence of acute/recent major vascular distribution cerebral infarction, intraparenchymal hemorrhage, or intra-axial space occupying lesion. The ventricular system is normal in size and configuration with no evidence of hydrocephalus. No effacement of the skull-base cisterns. No abnormal extra-axial fluid collections or blood products. Visualized paranasal sinuses and mastoid air cells are clear. The calvarium shows no significant abnormality.                                       Medications   sodium chloride 0.9% bolus 500 mL 500 mL (has no administration in time range)     Medical Decision Making:   History:   Old Medical Records: I decided to obtain old medical records.  Initial Assessment:   18-year-old female presents with sensation of chest palpitations, dyspnea, confusion.  Patient recently admitted for what is suspected to be a neuron demyelinating disease with differential including multiple sclerosis.  Patient was admitted for 3 days receiving high-dose steroids.  Patient discharged on a g of prednisone for the next 5 days.  Patient is on day 3 of 5.  Patient's symptoms  improve throughout the ER stay.  No focal neurological deficit noted.  D-dimer negative.  Plan was to consult Neurology however the patient no longer wanted to wait in given that she felt better would prefer to follow up outpatient.    Clinical Tests:   Lab Tests: Ordered and Reviewed  Radiological Study: Ordered and Reviewed  Medical Tests: Ordered and Reviewed  ED Management:    Problems considered includes pulmonary emboli, palpitation, dyspnea, multiple sclerosis (Signs & symptoms, differentials)  Chronic problems impacting care includes neuron demyelinating disease.  Please see workup section for emergency physician independent ECG interpretation.  Imaging independently reviewed.  Agree with radiologist's interpretation. Imaging includes no infiltrates noted on chest x-ray  All labs reviewed and considered in the patient's differential diagnosis. Discussion of labs includes no electrolyte abnormalities.  D-dimer within normal limits..  Prior records were reviewed and considered in the differential diagnosis. This includes recent hospitalization for evaluation of demyelinating disease  Additional history obtained from independent historians. Details includes sister at bedside who provided history as patient was crying.    The patient's home medications were reviewed in decision making. This includes Solu-Medrol.  Patient is to continue her medications.    Medications/ prescriptions considered but not given includes anxiolytics however patient's symptoms improved.    Plan was discussed with the patient.  This includes labs, imaging, plan for follow up.    All questions or concerns have been addressed.          Scribe Attestation:   Scribe #1: I performed the above scribed service and the documentation accurately describes the services I performed. I attest to the accuracy of the note.      ED Course as of 02/11/23 0535   Sat Feb 11, 2023   0130 D-Dimer: 0.31 [JM]   0325 Waiting on neurology consult.     The  patient reports that she feels better.  Patient no longer has any confusion at this time.  Mother at bedside. [JM]   0408 The patient's mother states that they no longer want to wait on Neurology as they have been waiting for too long.  She has follow up with primary care within the next 2 days.  She states he feels improved as time.  Discussed to continue her current regimen medications and to return to the emergency room if symptoms return. [JM]   0411 EKG 12-lead  Time 11:04 p.m.     Rate 91, sinus, regular rhythm, normal axis.   QRS 88 QTC of 474.  No ST elevation or depression.  No Q-waves present.     Normal sinus rhythm with artifact present. [JM]   0432 ALT: 23 [JM]      ED Course User Index  [JM] Blake James MD                 Clinical Impression:   Final diagnoses:  [R00.2] Palpitations  [R06.00] Dyspnea, unspecified type (Primary)       I, Blake James, personally performed the services described in this documentation. All medical record entries made by the scribe were at my direction and in my presence. I have reviewed the chart and agree that the record reflects my personal performance and is accurate and complete.    ED Disposition Condition    Discharge Stable          ED Prescriptions    None       Follow-up Information       Follow up With Specialties Details Why Contact Info    Chris Rendon MD Family Medicine Schedule an appointment as soon as possible for a visit in 2 days Primary care [address] [facility] [address]  [phone]      [facility] Emergency Dept Emergency Medicine  If symptoms worsen [address]  [facility] [address] [phone]             Blake James MD  02/11/23 6678

## 2023-02-20 NOTE — PLAN OF CARE
Problem: Extremity Amputation  Goal: Optimal Functional Ability  Intervention: Optimize Functional Ability  Recent Flowsheet Documentation  Taken 2/20/2023 0833 by Brit Brown RN  Assistive Device Utilized: (scooter) gait belt  Activity Management:    activity adjusted per tolerance    activity encouraged  Activity Assistance Provided: assistance, 1 person     Problem: Extremity Amputation  Goal: Absence of Infection Signs and Symptoms  Intervention: Prevent or Manage Infection  Recent Flowsheet Documentation  Taken 2/20/2023 0833 by Brit Brown RN  Infection Prevention:    hand hygiene promoted    environmental surveillance performed    rest/sleep promoted     Problem: Extremity Amputation  Goal: Acceptable Pain Control  Intervention: Prevent or Manage Pain  Recent Flowsheet Documentation  Taken 2/20/2023 0945 by Brit Brown, RN  Pain Management Interventions:    pillow support provided    quiet environment facilitated    rest  Taken 2/20/2023 0848 by Brit Brown RN  Pain Management Interventions: medication (see MAR)   Goal Outcome Evaluation:  Patient alert and oriented x4. Moving with assist of 1 using knee scooter. NWB to right lower extremity. Vitals stable on room air. 60 carb diet with adequate oral intake. Blood sugars stable. NS running at 50 mL/hr. IV antibiotics infusing. Pain managed using PRN percocet. Contact precautions maintained.

## 2023-02-20 NOTE — PROGRESS NOTES
[facility]    Infectious Disease Progress Note    Date of Service : 02/20/2023     Assessment & Plan   Abdulaziz Rausch is a 57 year old male who was admitted on 2/17/2023.     ASSESSMENT:  1. Right foot osteomyelitis, diabetic infection  2. Status post amputation second digit, third digit right foot  3. Intraoperative cultures, Enterococcus, Streptococcus  4. Previous cultures with Streptococcus and MRSA        RECOMMENDATIONS:    1. Antibiotics-Zosyn and vancomycin while inpatient  2. Anticipate discharge on Augmentin and doxycycline for 10 days  3. Follow culture results   4. Monitor CBC, CMP  5. Anticipate discharge in 1 to 2 days as patient progressed terminally ill and involved in hospice and patient would like to be discharged home  6. Podiatry note reviewed  7. ID will sign off please call with questions      Brooklynn Loera MD  [facility]  [phone]      Interval History   Resting comfortably  Chart reviewed    Physical Exam   Temp: 98.3  F (36.8  C) Temp src: Oral BP: (!) 152/74 Pulse: 93   Resp: 18 SpO2: 92 % O2 Device: None (Room air)    Vitals:    02/17/23 1514 02/17/23 1805   Weight: 64.3 kg (141 lb 11.2 oz) 61 kg (134 lb 7.7 oz)     Vital Signs with Ranges  Temp:  [97.5  F (36.4  C)-98.3  F (36.8  C)] 98.3  F (36.8  C)  Pulse:  [71-94] 93  Resp:  [17-18] 18  BP: (126-152)/(60-74) 152/74  SpO2:  [92 %-100 %] 92 %    Constitutional: Resting  Lungs: normal breathing pattern, no crackles or wheezing  Cardiovascular: Regular rate and rhythm  Abdomen: non distended  Skin: Dressing on foot  Neuro: deconditioned  Psych: able to answer questions    Medications     sodium chloride 50 mL/hr at 02/19/23 1545       gabapentin  300 mg Oral BID     heparin ANTICOAGULANT  5,000 Units Subcutaneous Q8H MARKUS     insulin aspart   Subcutaneous TID AC     insulin aspart  1-10 Units Subcutaneous TID AC     insulin aspart  1-7 Units Subcutaneous At Bedtime     insulin  glargine  16 Units Subcutaneous At Bedtime     [Held by provider] lisinopril  10 mg Oral Daily     piperacillin-tazobactam  3.375 g Intravenous Q8H     sodium chloride (PF)  3 mL Intracatheter Q8H     vancomycin  750 mg Intravenous Q12H       Data   All microbiology laboratory data reviewed.  Recent Labs   Lab Test 02/20/23  0617 02/19/23  0617 02/18/23  0635   WBC 9.2 7.2 9.0   HGB 9.4* 9.2* 10.3*   HCT 28.4* 28.1* 30.8*   MCV 88 88 87    226 273     Recent Labs   Lab Test 02/20/23  0617 02/19/23  0617 02/18/23  0635   CR 1.27* 1.31* 1.15     Recent Labs   Lab Test 01/18/23  1448   SED 79*     No lab results found.    Invalid input(s):     MICROBIOLOGY:    Reviewed    7-Day Micro Results     Collected Updated Procedure Result Status      02/17/2023 1731 02/19/2023 2316 Anaerobic Bacterial Culture Routine [[id number]]    Tissue from Foot, Right    Preliminary result Component Value   Culture No anaerobic organisms isolated after 2 days  [P]                02/17/2023 1731 02/17/2023 2338 Gram Stain [[id number]]   (Abnormal)   Tissue from Foot, Right    Final result Component Value   Gram Stain Result 1+ Gram positive cocci   Gram Stain Result 2+ Gram positive bacilli   Gram Stain Result 2+ WBC seen   Gram Stain Result 4+ Red blood cells seen            02/17/2023 1731 02/20/2023 0819 Tissue Aerobic Bacterial Culture Routine [[id number]]     (Abnormal)   Tissue from Foot, Right    Final result Component Value   Culture 1+ Enterococcus faecalis    1+ Streptococcus agalactiae (Group B Streptococcus)    This organism is susceptible to ampicillin, penicillin, vancomycin and the cephalosporins. If treatment is required and your patient is allergic to penicillin, contact the microbiology lab within 5 days to request susceptibility testing.    2+ Normal nabila        Susceptibility      Enterococcus faecalis      SUJATA      Ampicillin <=2 ug/mL Susceptible      Gentamicin Synergy Susceptible ug/mL Susceptible   [1]       Penicillin 4 ug/mL Susceptible      Vancomycin 2 ug/mL Susceptible                   [1]  No high level gentamicin resistance found - therefore combination therapy with an aminoglycoside may be indicated for serious enterococcal infections such as bacteremia and endocarditis.                        RADIOLOGY:    Reviewed  US Low Ext Arterial Dop Seg Pres w/o Exercise    Result Date: 2/10/2023  Table formatting from the original result was not included. BILATERAL RESTING ANKLE-BRACHIAL INDICES (CHIQUITA'S) (Date: 02/10/23) Indication: Bilateral 2-3 toe ulcers nonhealing Previous: 01/18/2023 History: Hypertension, Diabetic, PAD and Vascular Ulcers  Resting CHIQUITA's          Right: mmHg Index     Brachial: 167  Ankle-(PT): 253 1.51 Ankle-(DP): 105 1.22          Digit: 157 0.93               Left: mmHg Index     Brachial: 168  Ankle-(PT): 240 1.43 Ankle-(DP): 191 1.14          Digit: 152 0.90 Resting ankle-brachial index of 1.51 on the right. Toe Pressures of 157 mmHg and TBI of 0.93 Resting ankle-brachial index of 1.43 on the left. Toe Pressures of 152 mmHg and TBI of 0.90  VPR WAVEFORMS: The right volume plethysmography waveforms are mildly abnormal at the lower thigh level, mildly abnormal at the upper calf level and mildly abnormal at the ankle. The left volume plethysmography waveforms are mildly abnormal at the lower thigh level, mildly abnormal at the upper calf level and mildly abnormal at the ankle. Impression:  1. RIGHT LOWER EXTREMITY: CHIQUITA is Normal with multiphasic waveforms with an CHIQUITA of 1.51 even though it could be falsely elevated. Toe Pressures are Normal and adequate for wound healing with toe pressures of 157 mmHg. 2. LEFT LOWER EXTREMITY: CHIQUITA is Normal with multiphasic waveforms with an CHIQUITA of 1.4. Toe Pressures are Normal and adequate for wound healing with toe pressures of 152 mmHg. Reference: Wound classification Grade CHIQUITA Ankle Systolic Pressure Toe Pressures 0 > 0.80 > 100 mmHg > 60 mmHg 1 0.6  "- 0.79 70 - 100 mmHg 40 - 59 mmHg 2 0.4 - 0.59 50-70 mmHg 30 - 39 mmHg 3 < 0.39 < 50 mmHg < 30 mmHg Digit Pressures DBI Disease Category > 0.70 Normal < 0.70 Abnormal > 30 mmHg Potential wound healing < 30 mmHg Impaired wound healing Ankle Brachial Pressures CHIQUITA Disease Category > 1.3  Likely vessel calcification with monophasic waveforms, non-diagnostic 0.95-1.30 Normal with multiphasic waveforms 0.50-0.95 Single level disease 0.30-0.50 Multilevel disease < 0.30 Critical limb ischema Volume Plethysmography Recording (VPR) at all levels Normal Sharp systolic peak, fast upstroke, prominent dicrotic notch in wave Mild Sharp systolic peak, fast upstroke, absent dicrotic notch in wave Moderate Flattened systolic peak, slowed upstroke, absent dicrotic notch inwave Severe amplitude wave with = upslope and down slope Occluded Flat Line     POC US Guidance Needle Placement    Result Date: 2/17/2023  Ultrasound was performed as guidance to an anesthesia procedure.  Click \"PACS images\" hyperlink below to view any stored images.  For specific procedure details, view procedure note authored by anesthesia.         "

## 2023-02-21 ENCOUNTER — TELEPHONE (OUTPATIENT)
Dept: FAMILY MEDICINE | Facility: CLINIC | Age: 58
End: 2023-02-21
Payer: COMMERCIAL

## 2023-02-21 VITALS
BODY MASS INDEX: 19.25 KG/M2 | TEMPERATURE: 98 F | RESPIRATION RATE: 18 BRPM | HEART RATE: 79 BPM | DIASTOLIC BLOOD PRESSURE: 65 MMHG | WEIGHT: 134.48 LBS | HEIGHT: 70 IN | SYSTOLIC BLOOD PRESSURE: 117 MMHG | OXYGEN SATURATION: 95 %

## 2023-02-21 LAB
ANION GAP SERPL CALCULATED.3IONS-SCNC: 9 MMOL/L (ref 7–15)
BUN SERPL-MCNC: 18.8 MG/DL (ref 8–23)
CALCIUM SERPL-MCNC: 8.4 MG/DL (ref 8.6–10)
CHLORIDE SERPL-SCNC: 108 MMOL/L (ref 98–107)
CREAT SERPL-MCNC: 1.17 MG/DL (ref 0.67–1.17)
DEPRECATED HCO3 PLAS-SCNC: 22 MMOL/L (ref 22–29)
GFR SERPL CREATININE-BSD FRML MDRD: 72 ML/MIN/1.73M2
GLUCOSE BLDC GLUCOMTR-MCNC: 122 MG/DL (ref 70–99)
GLUCOSE BLDC GLUCOMTR-MCNC: 150 MG/DL (ref 70–99)
GLUCOSE BLDC GLUCOMTR-MCNC: 235 MG/DL (ref 70–99)
GLUCOSE SERPL-MCNC: 215 MG/DL (ref 70–99)
PLATELET # BLD AUTO: 228 10E3/UL (ref 150–450)
POTASSIUM SERPL-SCNC: 4.2 MMOL/L (ref 3.4–5.3)
SODIUM SERPL-SCNC: 139 MMOL/L (ref 136–145)

## 2023-02-21 PROCEDURE — 80048 BASIC METABOLIC PNL TOTAL CA: CPT | Performed by: INTERNAL MEDICINE

## 2023-02-21 PROCEDURE — 250N000013 HC RX MED GY IP 250 OP 250 PS 637: Performed by: INTERNAL MEDICINE

## 2023-02-21 PROCEDURE — 250N000011 HC RX IP 250 OP 636: Performed by: STUDENT IN AN ORGANIZED HEALTH CARE EDUCATION/TRAINING PROGRAM

## 2023-02-21 PROCEDURE — 99231 SBSQ HOSP IP/OBS SF/LOW 25: CPT | Performed by: INTERNAL MEDICINE

## 2023-02-21 PROCEDURE — 250N000011 HC RX IP 250 OP 636: Performed by: INTERNAL MEDICINE

## 2023-02-21 PROCEDURE — 36415 COLL VENOUS BLD VENIPUNCTURE: CPT | Performed by: INTERNAL MEDICINE

## 2023-02-21 PROCEDURE — 258N000003 HC RX IP 258 OP 636: Performed by: INTERNAL MEDICINE

## 2023-02-21 PROCEDURE — 85049 AUTOMATED PLATELET COUNT: CPT | Performed by: STUDENT IN AN ORGANIZED HEALTH CARE EDUCATION/TRAINING PROGRAM

## 2023-02-21 PROCEDURE — 250N000013 HC RX MED GY IP 250 OP 250 PS 637: Performed by: STUDENT IN AN ORGANIZED HEALTH CARE EDUCATION/TRAINING PROGRAM

## 2023-02-21 PROCEDURE — 99239 HOSP IP/OBS DSCHRG MGMT >30: CPT | Performed by: INTERNAL MEDICINE

## 2023-02-21 RX ORDER — DEXTROSE MONOHYDRATE 25 G/50ML
25-50 INJECTION, SOLUTION INTRAVENOUS
Status: DISCONTINUED | OUTPATIENT
Start: 2023-02-21 | End: 2023-02-21 | Stop reason: HOSPADM

## 2023-02-21 RX ORDER — INSULIN LISPRO 100 [IU]/ML
4 INJECTION, SOLUTION INTRAVENOUS; SUBCUTANEOUS
Qty: 15 ML | Refills: 4 | Status: SHIPPED | OUTPATIENT
Start: 2023-02-21 | End: 2023-04-17

## 2023-02-21 RX ORDER — OXYCODONE AND ACETAMINOPHEN 5; 325 MG/1; MG/1
1 TABLET ORAL EVERY 6 HOURS PRN
Qty: 12 TABLET | Refills: 0 | Status: ON HOLD | OUTPATIENT
Start: 2023-02-21 | End: 2023-07-01

## 2023-02-21 RX ORDER — GABAPENTIN 300 MG/1
300 CAPSULE ORAL 2 TIMES DAILY
Qty: 60 CAPSULE | Refills: 0 | Status: ON HOLD | OUTPATIENT
Start: 2023-02-21 | End: 2023-05-21

## 2023-02-21 RX ORDER — INSULIN GLARGINE 100 [IU]/ML
16 INJECTION, SOLUTION SUBCUTANEOUS AT BEDTIME
Qty: 15 ML | Status: ON HOLD
Start: 2023-02-21 | End: 2023-05-26

## 2023-02-21 RX ORDER — DOXYCYCLINE 100 MG/1
100 CAPSULE ORAL 2 TIMES DAILY
Qty: 20 CAPSULE | Refills: 0 | Status: SHIPPED | OUTPATIENT
Start: 2023-02-21 | End: 2023-03-03

## 2023-02-21 RX ORDER — NICOTINE POLACRILEX 4 MG
15-30 LOZENGE BUCCAL
Status: DISCONTINUED | OUTPATIENT
Start: 2023-02-21 | End: 2023-02-21 | Stop reason: HOSPADM

## 2023-02-21 RX ADMIN — PIPERACILLIN AND TAZOBACTAM 3.38 G: 3; .375 INJECTION, POWDER, LYOPHILIZED, FOR SOLUTION INTRAVENOUS at 13:56

## 2023-02-21 RX ADMIN — GABAPENTIN 300 MG: 300 CAPSULE ORAL at 09:02

## 2023-02-21 RX ADMIN — METFORMIN HYDROCHLORIDE 500 MG: 500 TABLET ORAL at 10:16

## 2023-02-21 RX ADMIN — VANCOMYCIN HYDROCHLORIDE 750 MG: 5 INJECTION, POWDER, LYOPHILIZED, FOR SOLUTION INTRAVENOUS at 09:02

## 2023-02-21 RX ADMIN — LISINOPRIL 10 MG: 5 TABLET ORAL at 09:02

## 2023-02-21 RX ADMIN — PIPERACILLIN AND TAZOBACTAM 3.38 G: 3; .375 INJECTION, POWDER, LYOPHILIZED, FOR SOLUTION INTRAVENOUS at 05:30

## 2023-02-21 RX ADMIN — HEPARIN SODIUM 5000 UNITS: 5000 INJECTION, SOLUTION INTRAVENOUS; SUBCUTANEOUS at 13:56

## 2023-02-21 RX ADMIN — OXYCODONE HYDROCHLORIDE AND ACETAMINOPHEN 1 TABLET: 5; 325 TABLET ORAL at 10:21

## 2023-02-21 RX ADMIN — HEPARIN SODIUM 5000 UNITS: 5000 INJECTION, SOLUTION INTRAVENOUS; SUBCUTANEOUS at 05:30

## 2023-02-21 ASSESSMENT — ACTIVITIES OF DAILY LIVING (ADL)
ADLS_ACUITY_SCORE: 22
ADLS_ACUITY_SCORE: 23
ADLS_ACUITY_SCORE: 27
ADLS_ACUITY_SCORE: 22
ADLS_ACUITY_SCORE: 27
ADLS_ACUITY_SCORE: 22

## 2023-02-21 NOTE — PLAN OF CARE
Problem: Extremity Amputation  Goal: Fluid and Electrolyte Balance  Outcome: Progressing  Goal: Acceptable Pain Control  Outcome: Progressing  Goal: Nausea and Vomiting Relief  Outcome: Progressing   Goal Outcome Evaluation:         POD 4: Amputation 2nd & 3rd digit right foot. No issues overnight. Slept well. Continuous IVF NS running at 50 ml/hr. Scheduled IV Zosyn this am. No c/o pain or discomfort. Remained in bed. Urinal at bedside.

## 2023-02-21 NOTE — PROGRESS NOTES
[facility]    Medicine Progress Note - Hospitalist Service    Date of Admission:  2/17/2023    Assessment & Plan     Abdulaziz Rausch is a 57 year old male, with hx of uncontrolled dm, htn, who was  admitted on 2/17/2023 after right toe amputation for diabetic foot ulcer.     1.Diabetes foot ulcer, with necrosis of muscle, osteomyelitis:   - s/p amputation of 2nd and 3rd digit right foot. Last wound culture on 1/20/23 showed strep agalactiae , staph simulans and MRSA  - Gram stain (02/17): gram + cocci and bacilli. Cultures, pathology pending, prelim now shows   1+  Enterococcus faecalis as well as a strep agalactiae            Enterococcus faecalis           SUJATA       Ampicillin <=2 ug/mL Susceptible       Gentamicin Synergy Susceptible... Susceptible 1       Penicillin 4 ug/mL Susceptible       Vancomycin 2 ug/mL Susceptible           - ID consult appreciated- Zosyn and vancomycin while inpatient. Anticipate discharge on Augmentin and doxycycline for 10 days--ordered.  - Postop care per podiatry service  - Pain control  -knee walker ordered  -needs home care and cannot leave without it set up     2.Diabetes mellitus type 2 with hyperglycemia:   - Poorly controlled with A1c 11.2.  During his recent admission, he was started on Lantus 12 units at bedtime, Novolog 3 units tidac and metformin.   - c/w Lantus, increase to 16 units qhs.  NovoLog sliding scale. I:C--increase to 1:10--for home does set so start set novolog 4 units  -will restart metformin at discharge     3.Essential hypertension:   - c/w PTA lisinopril 10 mg daily--restart.      4.Chronic anemia:   - Hb11.2 on admission. Monitor.      5.Hyponatremia-mild  -improved     6.CHRISTY  -creat today 1.17, improved with ivf  -avoid nsaids, restart ace-i  -likely poor intake and infection     Social -needs knee walker, home care RN, PT.OT set up possible discharge today hopefully                   Diet: Moderate Consistent Carb (60 g CHO per  Meal) Diet  Diet    DVT Prophylaxis: Heparin SQ  Fischer Catheter: Not present  Lines: None     Cardiac Monitoring: None  Code Status: No CPR- Do NOT Intubate      Clinically Significant Risk Factors                       # DMII: A1C = 11.2 % (Ref range: 0.0 - 5.6 %) within past 3 months, PRESENT ON ADMISSION          Disposition Plan      Expected Discharge Date: 02/21/2023      Destination: home with family;home with help/services  Discharge Comments: waitng for final cx  CHRISTY  anticipate home with home care          Sarah Cordero MD  Hospitalist Service  [facility]  Securely message with [employer] (more info)  Text page via "[employer] Paging/Directory   ______________________________________________________________________    Interval History   He feels ok  No new complaints  Still needs oxy for pain in foot  Eating ok  Tells me walker to be delivered to home    Physical Exam   Vital Signs: Temp: 99  F (37.2  C) Temp src: Oral BP: 121/63 Pulse: 79   Resp: 17 SpO2: 92 % O2 Device: None (Room air)    Weight: 134 lbs 7.69 oz    Constitutional: awake, alert, cooperative and no apparent distress  Eyes: sclera clear  Respiratory: no increased work of breathing, good air exchange, no retractions and clear to auscultation  Cardiovascular: Normal apical impulse, regular rate and rhythm, normal S1 and S2, no S3 or S4, and no murmur noted  GI: normal bowel sounds, soft, non-distended and non-tender  Skin: dressing on foot  Musculoskeletal: no lower extremity pitting edema present  Neurologic: Mental Status Exam:  Level of Alertness:   awake  Neuropsychiatric: General: normal, calm and normal eye contact    Medical Decision Making         Data     I have personally reviewed the following data over the past 24 hrs:    N/A  \   N/A   / 228     139 108 (H) 18.8 /  215 (H)   4.2 22 1.17 \       Imaging results reviewed over the past 24 hrs:   No results found for this or any previous visit (from the past 24  hour(s)).

## 2023-02-21 NOTE — PLAN OF CARE
Goal Outcome Evaluation: Progressing.     Patient alert and oriented x4.   Calm and cooperative.   Able to make needs known. Calls appropriately.   Not OOB this shift.   Pain managed with PRN percocet.   IVF infusing per orders.   IV abx infusing per orders.   Consistent carb diet.   Urinal voiding.   Discharge plans pending.     Kristin Guzman RN

## 2023-02-21 NOTE — TELEPHONE ENCOUNTER
[place] care manager calling to see if patient has a clinic care coordination contact listed in chart, they need help in finding home care, name and number giver for last care coordinator

## 2023-02-21 NOTE — PROGRESS NOTES
[facility]    Infectious Disease Progress Note    Date of Service : 02/21/2023     Assessment & Plan   Abdulaziz Rausch is a 57 year old male who was admitted on 2/17/2023.     ASSESSMENT:  1. Right foot osteomyelitis, diabetic infection  2. Status post amputation second digit, third digit right foot  3. Intraoperative cultures, Enterococcus, Streptococcus  4. Previous cultures with Streptococcus and MRSA        RECOMMENDATIONS:    1. Antibiotics-Zosyn and vancomycin while inpatient  2. Anticipate discharge on Augmentin and doxycycline for 10 days  3. Follow culture results   4. Monitor CBC, CMP  5. Anticipate discharge in 1 to 2 days as patient progressed terminally ill and involved in hospice and patient would like to be discharged home  6. Podiatry note reviewed  7. ID will sign off please call with questions      Brooklynn Loera MD  [facility]  [phone]      Interval History   Resting comfortably  Chart reviewed    Physical Exam   Temp: 99  F (37.2  C) Temp src: Oral BP: 121/63 Pulse: 79   Resp: 17 SpO2: 92 % O2 Device: None (Room air)    Vitals:    02/17/23 1514 02/17/23 1805   Weight: 64.3 kg (141 lb 11.2 oz) 61 kg (134 lb 7.7 oz)     Vital Signs with Ranges  Temp:  [98.3  F (36.8  C)-99.4  F (37.4  C)] 99  F (37.2  C)  Pulse:  [79-93] 79  Resp:  [16-18] 17  BP: (121-152)/(63-74) 121/63  SpO2:  [92 %-93 %] 92 %    Constitutional: Resting  Lungs: normal breathing pattern, no crackles or wheezing  Cardiovascular: Regular rate and rhythm  Abdomen: non distended  Skin: Dressing on foot  Neuro: deconditioned  Psych: able to answer questions    Medications     sodium chloride 50 mL/hr at 02/19/23 1545       gabapentin  300 mg Oral BID     heparin ANTICOAGULANT  5,000 Units Subcutaneous Q8H MARKUS     insulin aspart  4 Units Subcutaneous TID w/meals     insulin aspart  1-10 Units Subcutaneous TID AC     insulin glargine  16 Units Subcutaneous At Bedtime      lisinopril  10 mg Oral Daily     metFORMIN  500 mg Oral BID w/meals     piperacillin-tazobactam  3.375 g Intravenous Q8H     sodium chloride (PF)  3 mL Intracatheter Q8H     vancomycin  750 mg Intravenous Q12H       Data   All microbiology laboratory data reviewed.  Recent Labs   Lab Test 02/21/23  0514 02/20/23  0617 02/19/23  0617 02/18/23  0635   WBC  --  9.2 7.2 9.0   HGB  --  9.4* 9.2* 10.3*   HCT  --  28.4* 28.1* 30.8*   MCV  --  88 88 87    229 226 273     Recent Labs   Lab Test 02/21/23  0514 02/20/23  0617 02/19/23  0617   CR 1.17 1.27* 1.31*     Recent Labs   Lab Test 01/18/23  1448   SED 79*     No lab results found.    Invalid input(s):     MICROBIOLOGY:    Reviewed    7-Day Micro Results     Collected Updated Procedure Result Status      02/17/2023 1731 02/20/2023 2316 Anaerobic Bacterial Culture Routine [[id number]]    Tissue from Foot, Right    Preliminary result Component Value   Culture No anaerobic organisms isolated after 3 days  [P]                02/17/2023 1731 02/17/2023 2338 Gram Stain [[id number]]   (Abnormal)   Tissue from Foot, Right    Final result Component Value   Gram Stain Result 1+ Gram positive cocci   Gram Stain Result 2+ Gram positive bacilli   Gram Stain Result 2+ WBC seen   Gram Stain Result 4+ Red blood cells seen            02/17/2023 1731 02/20/2023 0819 Tissue Aerobic Bacterial Culture Routine [[id number]]     (Abnormal)   Tissue from Foot, Right    Final result Component Value   Culture 1+ Enterococcus faecalis    1+ Streptococcus agalactiae (Group B Streptococcus)    This organism is susceptible to ampicillin, penicillin, vancomycin and the cephalosporins. If treatment is required and your patient is allergic to penicillin, contact the microbiology lab within 5 days to request susceptibility testing.    2+ Normal nabila        Susceptibility      Enterococcus faecalis      SUJATA      Ampicillin <=2 ug/mL Susceptible      Gentamicin Synergy Susceptible ug/mL  Susceptible  [1]       Penicillin 4 ug/mL Susceptible      Vancomycin 2 ug/mL Susceptible                   [1]  No high level gentamicin resistance found - therefore combination therapy with an aminoglycoside may be indicated for serious enterococcal infections such as bacteremia and endocarditis.                        RADIOLOGY:    Reviewed  US Low Ext Arterial Dop Seg Pres w/o Exercise    Result Date: 2/10/2023  Table formatting from the original result was not included. BILATERAL RESTING ANKLE-BRACHIAL INDICES (CHIQUITA'S) (Date: 02/10/23) Indication: Bilateral 2-3 toe ulcers nonhealing Previous: 01/18/2023 History: Hypertension, Diabetic, PAD and Vascular Ulcers  Resting CHIQUITA's          Right: mmHg Index     Brachial: 167  Ankle-(PT): 253 1.51 Ankle-(DP): 105 1.22          Digit: 157 0.93               Left: mmHg Index     Brachial: 168  Ankle-(PT): 240 1.43 Ankle-(DP): 191 1.14          Digit: 152 0.90 Resting ankle-brachial index of 1.51 on the right. Toe Pressures of 157 mmHg and TBI of 0.93 Resting ankle-brachial index of 1.43 on the left. Toe Pressures of 152 mmHg and TBI of 0.90  VPR WAVEFORMS: The right volume plethysmography waveforms are mildly abnormal at the lower thigh level, mildly abnormal at the upper calf level and mildly abnormal at the ankle. The left volume plethysmography waveforms are mildly abnormal at the lower thigh level, mildly abnormal at the upper calf level and mildly abnormal at the ankle. Impression:  1. RIGHT LOWER EXTREMITY: CHIQUITA is Normal with multiphasic waveforms with an CHIQUITA of 1.51 even though it could be falsely elevated. Toe Pressures are Normal and adequate for wound healing with toe pressures of 157 mmHg. 2. LEFT LOWER EXTREMITY: CHIQUITA is Normal with multiphasic waveforms with an CHIQUITA of 1.4. Toe Pressures are Normal and adequate for wound healing with toe pressures of 152 mmHg. Reference: Wound classification Grade CHIQUITA Ankle Systolic Pressure Toe Pressures 0 > 0.80 > 100 mmHg >  "60 mmHg 1 0.6 - 0.79 70 - 100 mmHg 40 - 59 mmHg 2 0.4 - 0.59 50-70 mmHg 30 - 39 mmHg 3 < 0.39 < 50 mmHg < 30 mmHg Digit Pressures DBI Disease Category > 0.70 Normal < 0.70 Abnormal > 30 mmHg Potential wound healing < 30 mmHg Impaired wound healing Ankle Brachial Pressures CHIQUITA Disease Category > 1.3  Likely vessel calcification with monophasic waveforms, non-diagnostic 0.95-1.30 Normal with multiphasic waveforms 0.50-0.95 Single level disease 0.30-0.50 Multilevel disease < 0.30 Critical limb ischema Volume Plethysmography Recording (VPR) at all levels Normal Sharp systolic peak, fast upstroke, prominent dicrotic notch in wave Mild Sharp systolic peak, fast upstroke, absent dicrotic notch in wave Moderate Flattened systolic peak, slowed upstroke, absent dicrotic notch inwave Severe amplitude wave with = upslope and down slope Occluded Flat Line     POC US Guidance Needle Placement    Result Date: 2/17/2023  Ultrasound was performed as guidance to an anesthesia procedure.  Click \"PACS images\" hyperlink below to view any stored images.  For specific procedure details, view procedure note authored by anesthesia.         "

## 2023-02-21 NOTE — PLAN OF CARE
"Assumed care 1500 to 1930. A&O x 4. Assist x 1 with roller. C/O RLE pain at amputation site. Declined offer of pain medication. \"I want to see if gets any worse before I take any meds.\" Urinal at bedside. Call light within reach, able to make needs known. Bed alarm on for safety.    Problem: Extremity Amputation  Goal: Acceptable Pain Control  Intervention: Prevent or Manage Pain  Recent Flowsheet Documentation  Taken 2/20/2023 1755 by RICHY GUNDERSON  Pain Management Interventions:   pillow support provided   quiet environment facilitated   rest       "

## 2023-02-21 NOTE — PLAN OF CARE
Problem: Diabetes Comorbidity  Goal: Blood Glucose Level Within Targeted Range  Outcome: Progressing     Problem: Extremity Amputation  Goal: Optimal Functional Ability  Intervention: Optimize Functional Ability  Recent Flowsheet Documentation  Taken 2/21/2023 0800 by Keegan Ayoub, RN  Activity Management:    activity adjusted per tolerance    activity encouraged  Activity Assistance Provided: assistance, 1 person   Goal Outcome Evaluation:  Patient received PRN percocet for leg pain which was effective, alert oriented x 4. Discharge pending based on four wheel scooter for home. Patient will discharge home at 1700 with belongings, family will transport. Paperwork discussed with patient.

## 2023-02-21 NOTE — PROGRESS NOTES
Pt declined for home care services with [facility].  They will work on finding alternative options.  SW received message from Helga Us at [facility] - unable to help pt out.  SW received message from Carmen tubbs [facility] - payor [facility] not contracted with.        YANE Diaz, [facility] 02/21/23 5:44 PM

## 2023-02-21 NOTE — DISCHARGE SUMMARY
[facility] MEDICINE  DISCHARGE SUMMARY     Primary Care Physician: Daryn Pittman  Admission Date: 2/17/2023   Discharge Provider: Sarah Cordero MD Discharge Date: 2/21/2023   Diet:   Active Diet and Nourishment Order   Procedures     Moderate Consistent Carb (60 g CHO per Meal) Diet     Diet       Code Status: No CPR- Do NOT Intubate   Activity:non weight bearing surgical foot, may use knee walker        Condition at Discharge: Stable     REASON FOR PRESENTATION(See Admission Note for Details)   Foot infection    PRINCIPAL & ACTIVE DISCHARGE DIAGNOSES     Principal Problem:    Osteomyelitis of ankle and foot (H)      PENDING LABS     Unresulted Labs Ordered in the Past 30 Days of this Admission     Date and Time Order Name Status Description    2/17/2023  5:34 PM Surgical Pathology Exam In process     2/17/2023  5:32 PM Anaerobic Bacterial Culture Routine Preliminary             PROCEDURES ( this hospitalization only)      Procedure(s):  AMPUTATION, digits two and three right foot    RECOMMENDATIONS TO OUTPATIENT PROVIDER FOR F/U VISIT     Follow-up Appointments     Follow-up and recommended labs and tests       Follow up with primary care provider, Daryn Pittman, within 3-4, for   hospital follow- up. The following labs/tests are recommended: bmp, cbc.   Follow up with Dr. Jimenez 3-4 days ,podiatry                 DISPOSITION     Home with home care    SUMMARY OF HOSPITAL COURSE:      Abdulaziz Rausch is a 57 year old male, with hx of uncontrolled dm, htn, who was  admitted on 2/17/2023 after right toe amputation for diabetic foot ulcer.     1.Diabetes foot ulcer, with necrosis of muscle, osteomyelitis:   - s/p amputation of 2nd and 3rd digit right foot. Last wound culture on 1/20/23 showed strep agalactiae , staph simulans and MRSA  - Gram stain (02/17): gram + cocci and bacilli. Cultures, pathology pending, prelim now shows   1+  Enterococcus faecalis as well as a  strep agalactiae                 Enterococcus faecalis           SUJATA       Ampicillin <=2 ug/mL Susceptible       Gentamicin Synergy Susceptible... Susceptible 1       Penicillin 4 ug/mL Susceptible       Vancomycin 2 ug/mL Susceptible           - ID consult appreciated- Zosyn and vancomycin while inpatient. Anticipate discharge on Augmentin and doxycycline for 10 days--ordered.  - Postop care per podiatry service  - Pain control  -knee walker ordered  -needs home care and cannot leave without it set up     2.Diabetes mellitus type 2 with hyperglycemia:   - Poorly controlled with A1c 11.2.  During his recent admission, he was started on Lantus 12 units at bedtime, Novolog 3 units tidac and metformin.   - c/w Lantus, increase to 16 units qhs.  NovoLog sliding scale. I:C--increase to 1:10--for home does set so start set novolog 4 units  -will restart metformin at discharge     3.Essential hypertension:   - c/w PTA lisinopril 10 mg daily--restart.      4.Chronic anemia:   - Hb11.2 on admission. Monitor.      5.Hyponatremia-mild  -improved     6.CHRISTY  -creat today 1.17, improved with ivf  -avoid nsaids, restart ace-i  -likely poor intake and infection     Social -needs knee walker, home care RN, PT.OT set up possible discharge today hopefully        Addendum at 1627  Knee walker being brought to room now prior to discharge  Care management has had difficulty setting up home care,so an outside component will take that task on per admin        Discharge Medications with Med changes:     Current Discharge Medication List      START taking these medications    Details   amoxicillin-clavulanate (AUGMENTIN) 875-125 MG tablet Take 1 tablet by mouth 2 times daily for 10 days  Qty: 20 tablet, Refills: 0    Associated Diagnoses: Diabetic ulcer of toe of right foot associated with diabetes mellitus due to underlying condition, with necrosis of muscle (H)      doxycycline hyclate (VIBRAMYCIN) 100 MG capsule Take 1 capsule (100 mg)  by mouth 2 times daily for 10 days  Qty: 20 capsule, Refills: 0    Associated Diagnoses: Diabetic ulcer of toe of right foot associated with diabetes mellitus due to underlying condition, with necrosis of muscle (H)      oxyCODONE-acetaminophen (PERCOCET) 5-325 MG tablet Take 1 tablet by mouth every 6 hours as needed for pain  Qty: 12 tablet, Refills: 0    Associated Diagnoses: Diabetic ulcer of toe of right foot associated with diabetes mellitus due to underlying condition, with necrosis of muscle (H)         CONTINUE these medications which have CHANGED    Details   gabapentin (NEURONTIN) 300 MG capsule Take 1 capsule (300 mg) by mouth 2 times daily for 30 days  Qty: 60 capsule, Refills: 0    Associated Diagnoses: Diabetic ulcer of toe of right foot associated with diabetes mellitus due to underlying condition, with necrosis of muscle (H)      insulin glargine (LANTUS SOLOSTAR) 100 UNIT/ML pen Inject 16 Units Subcutaneous At Bedtime  Qty: 15 mL    Comments: If Lantus is not covered by insurance, may substitute Basaglar or Semglee or other insulin glargine product per insurance preference at same dose and frequency.    Associated Diagnoses: Type 2 diabetes mellitus with other circulatory complication, with long-term current use of insulin (H)      insulin lispro (HUMALOG KWIKPEN) 100 UNIT/ML (1 unit dial) KWIKPEN Inject 4 Units Subcutaneous 3 times daily (before meals)  Qty: 15 mL, Refills: 4    Associated Diagnoses: Type 2 diabetes mellitus with other circulatory complication, with long-term current use of insulin (H)         CONTINUE these medications which have NOT CHANGED    Details   cetirizine (ZYRTEC) 10 MG tablet Take 1 tablet by mouth daily as needed      lisinopril (ZESTRIL) 10 MG tablet Take 1 tablet (10 mg) by mouth daily for 30 days  Qty: 30 tablet, Refills: 0    Associated Diagnoses: Hypertension, unspecified type      metFORMIN (GLUCOPHAGE) 500 MG tablet Take 1 tablet (500 mg) by mouth 2 times daily  (with meals)  Qty: 180 tablet, Refills: 3    Associated Diagnoses: Type 2 diabetes mellitus with other circulatory complication, with long-term current use of insulin (H)      alcohol swab prep pads Use to swab area of injection/ella as directed.  Qty: 100 each, Refills: 3    Associated Diagnoses: Type 2 diabetes mellitus without complication, with long-term current use of insulin (H)      blood glucose (NO BRAND SPECIFIED) test strip Use to test blood sugar 4 times daily or as directed. To accompany: Blood Glucose Monitor Brands: per insurance.  Qty: 100 strip, Refills: 6    Associated Diagnoses: Type 2 diabetes mellitus without complication, with long-term current use of insulin (H)      blood glucose monitoring (NO BRAND SPECIFIED) meter device kit Use to test blood sugar 4 time daily or as directed. Preferred blood glucose meter OR supplies to accompany: Blood Glucose Monitor Brands: per insurance.  Qty: 1 kit, Refills: 0    Associated Diagnoses: Type 2 diabetes mellitus without complication, with long-term current use of insulin (H)      insulin pen needle (31G X 8 MM) 31G X 8 MM miscellaneous Use 4 pen needles daily or as directed.  Qty: 400 each, Refills: 3    Associated Diagnoses: Type 2 diabetes mellitus with other circulatory complication, with long-term current use of insulin (H)      thin (NO BRAND SPECIFIED) lancets Use with lanceting device. To accompany: Blood Glucose Monitor Brands: per insurance.  Qty: 100 each, Refills: 6    Associated Diagnoses: Type 2 diabetes mellitus without complication, with long-term current use of insulin (H)         STOP taking these medications       doxycycline monohydrate (MONODOX) 100 MG capsule Comments:   Reason for Stopping:         HYDROcodone-acetaminophen (NORCO) 5-325 MG tablet Comments:   Reason for Stopping:                     Rationale for medication changes:      augmentin x 10 days per ID  Doxy x 10 days per ID  Percocet #12        Consults        HOSPITALIST IP CONSULT  INFECTIOUS DISEASES IP CONSULT  CARE MANAGEMENT / SOCIAL WORK IP CONSULT  ORTHOSIS EXTREMITY LOWER REFERRAL IP CONSULT  PHYSICAL THERAPY ADULT IP CONSULT  PHARMACY TO DOSE VANCO    Immunizations given this encounter     Most Recent Immunizations   Administered Date(s) Administered     COVID-19 Vaccine (Jacobo) 06/16/2021     COVID-19 Vaccine 18+ (Moderna) 11/07/2021     Influenza Vaccine 50-64 or 18-64 w/egg allergy (Flublok) 01/22/2023     Influenza Vaccine >6 months (Alfuria,Fluzone) 10/08/2018     TDAP Vaccine (Adacel) 07/05/2018           Anticoagulation Information      Recent INR results: No results for input(s): INR in the last 168 hours.  Warfarin doses (if applicable) or name of other anticoagulant: none      SIGNIFICANT IMAGING FINDINGS     No results found for this visit on 02/17/23.    SIGNIFICANT LABORATORY FINDINGS     Most Recent 3 CBC's:Recent Labs   Lab Test 02/21/23  0514 02/20/23  0617 02/19/23  0617 02/18/23  0635   WBC  --  9.2 7.2 9.0   HGB  --  9.4* 9.2* 10.3*   MCV  --  88 88 87    229 226 273     Most Recent 3 BMP's:Recent Labs   Lab Test 02/21/23  0514 02/20/23  2042 02/20/23  1741 02/20/23  0724 02/20/23  0617 02/19/23  0820 02/19/23  0617     --   --   --  136  --  131*   POTASSIUM 4.2  --   --   --  4.6  --  4.1   CHLORIDE 108*  --   --   --  106  --  102   CO2 22  --   --   --  23  --  23   BUN 18.8  --   --   --  23.5*  --  29.7*   CR 1.17  --   --   --  1.27*  --  1.31*   ANIONGAP 9  --   --   --  7  --  6*   AMIRAH 8.4*  --   --   --  8.3*  --  8.3*   * 259* 213*   < > 212*   < > 239*    < > = values in this interval not displayed.     Most Recent 2 LFT's:Recent Labs   Lab Test 01/21/23  0647 08/26/21  1508   AST 24 15   ALT 36 16   ALKPHOS 340* 98   BILITOTAL 0.3 0.3       US Low Ext Arterial Dop Seg Pres w/o Exercise    Result Date: 2/10/2023  Table formatting from the original result was not included. BILATERAL RESTING ANKLE-BRACHIAL  INDICES (CHIQUITA'S) (Date: 02/10/23) Indication: Bilateral 2-3 toe ulcers nonhealing Previous: 01/18/2023 History: Hypertension, Diabetic, PAD and Vascular Ulcers  Resting CHIQUITA's          Right: mmHg Index     Brachial: 167  Ankle-(PT): 253 1.51 Ankle-(DP): 105 1.22          Digit: 157 0.93               Left: mmHg Index     Brachial: 168  Ankle-(PT): 240 1.43 Ankle-(DP): 191 1.14          Digit: 152 0.90 Resting ankle-brachial index of 1.51 on the right. Toe Pressures of 157 mmHg and TBI of 0.93 Resting ankle-brachial index of 1.43 on the left. Toe Pressures of 152 mmHg and TBI of 0.90  VPR WAVEFORMS: The right volume plethysmography waveforms are mildly abnormal at the lower thigh level, mildly abnormal at the upper calf level and mildly abnormal at the ankle. The left volume plethysmography waveforms are mildly abnormal at the lower thigh level, mildly abnormal at the upper calf level and mildly abnormal at the ankle. Impression:  1. RIGHT LOWER EXTREMITY: CHIQUITA is Normal with multiphasic waveforms with an CHIQUITA of 1.51 even though it could be falsely elevated. Toe Pressures are Normal and adequate for wound healing with toe pressures of 157 mmHg. 2. LEFT LOWER EXTREMITY: CHIQUITA is Normal with multiphasic waveforms with an CHIQUITA of 1.4. Toe Pressures are Normal and adequate for wound healing with toe pressures of 152 mmHg. Reference: Wound classification Grade CHIQUITA Ankle Systolic Pressure Toe Pressures 0 > 0.80 > 100 mmHg > 60 mmHg 1 0.6 - 0.79 70 - 100 mmHg 40 - 59 mmHg 2 0.4 - 0.59 50-70 mmHg 30 - 39 mmHg 3 < 0.39 < 50 mmHg < 30 mmHg Digit Pressures DBI Disease Category > 0.70 Normal < 0.70 Abnormal > 30 mmHg Potential wound healing < 30 mmHg Impaired wound healing Ankle Brachial Pressures CHIQUITA Disease Category > 1.3  Likely vessel calcification with monophasic waveforms, non-diagnostic 0.95-1.30 Normal with multiphasic waveforms 0.50-0.95 Single level disease 0.30-0.50 Multilevel disease < 0.30 Critical limb ischema Volume  "Plethysmography Recording (VPR) at all levels Normal Sharp systolic peak, fast upstroke, prominent dicrotic notch in wave Mild Sharp systolic peak, fast upstroke, absent dicrotic notch in wave Moderate Flattened systolic peak, slowed upstroke, absent dicrotic notch inwave Severe amplitude wave with = upslope and down slope Occluded Flat Line     POC US Guidance Needle Placement    Result Date: 2/17/2023  Ultrasound was performed as guidance to an anesthesia procedure.  Click \"PACS images\" hyperlink below to view any stored images.  For specific procedure details, view procedure note authored by anesthesia.      Discharge Orders        Home Care Referral      Reason for your hospital stay    Foot infection     Follow-up and recommended labs and tests     Follow up with primary care provider, Daryn Pittman, within 3-4, for hospital follow- up. The following labs/tests are recommended: bmp, cbc.   Follow up with Dr. Jimenez 3-4 days ,podiatry     Activity    Your activity upon discharge: no weight bearing on surgical foot     Rolling Knee Walker/Scooter Order    DME Documentation:   Describe the reason for need to support medical necessity: NWB status of R NILE BRITO, the undersigned, certify that the above prescribed supplies are medically necessary for this patient and is both reasonable and necessary in reference to accepted standards of medical and necessary in reference to accepted standards of medical practice in the treatment of this patient's condition and is not prescribed as a convenience.     Walker Order    I, the undersigned, certify that the above prescribed supplies are medically necessary for this patient and is both reasonable and necessary in reference to accepted standards of medical and necessary in reference to accepted standards of medical practice in the treatment of this patient's condition and is not prescribed as a convenience.      Diet    Follow this diet upon discharge: Orders Placed This " Encounter      Moderate Consistent Carb (60 g CHO per Meal) Diet       Examination   Physical Exam   Temp:  [98.3  F (36.8  C)-99.4  F (37.4  C)] 99  F (37.2  C)  Pulse:  [79-93] 79  Resp:  [16-18] 17  BP: (121-152)/(63-74) 121/63  SpO2:  [92 %-93 %] 92 %  Wt Readings from Last 1 Encounters:   02/17/23 61 kg (134 lb 7.7 oz)       See today's note    Please see EMR for more detailed significant labs, imaging, consultant notes etc.    I, Sarah Cordero MD, personally saw the patient today and spent greater than 30 minutes discharging this patient.    Sarah Cordero MD  [facility]    CC:Pittman, Luke W

## 2023-02-22 ENCOUNTER — PATIENT OUTREACH (OUTPATIENT)
Dept: CARE COORDINATION | Facility: CLINIC | Age: 58
End: 2023-02-22
Payer: COMMERCIAL

## 2023-02-22 NOTE — PROGRESS NOTES
Clinic Care Coordination Contact  [facility]: Post-Discharge Note  SITUATION                                                      Admission:    Admission Date: 02/17/23   Reason for Admission: Osteomyelitis of ankle and foot (H)  Discharge:   Discharge Date: 02/21/23  Discharge Diagnosis: Osteomyelitis of ankle and foot (H)    BACKGROUND                                                      Per hospital discharge summary and inpatient provider notes:  Abdulaziz Rausch is a 57 year old male, with hx of uncontrolled dm, htn, who was  admitted on 2/17/2023 after right toe amputation for diabetic foot ulcer.     1.Diabetes foot ulcer, with necrosis of muscle, osteomyelitis:   - s/p amputation of 2nd and 3rd digit right foot. Last wound culture on 1/20/23 showed strep agalactiae , staph simulans and MRSA  - Gram stain (02/17): gram + cocci and bacilli. Cultures, pathology pending, prelim now shows   1+  Enterococcus faecalis as well as a strep agalactiae                 Enterococcus faecalis           SUJATA       Ampicillin <=2 ug/mL Susceptible       Gentamicin Synergy Susceptible... Susceptible 1       Penicillin 4 ug/mL Susceptible       Vancomycin 2 ug/mL Susceptible           - ID consult appreciated- Zosyn and vancomycin while inpatient. Anticipate discharge on Augmentin and doxycycline for 10 days--ordered.  - Postop care per podiatry service  - Pain control  -knee walker ordered  -needs home care and cannot leave without it set up     2.Diabetes mellitus type 2 with hyperglycemia:   - Poorly controlled with A1c 11.2.  During his recent admission, he was started on Lantus 12 units at bedtime, Novolog 3 units tidac and metformin.   - c/w Lantus, increase to 16 units qhs.  NovoLog sliding scale. I:C--increase to 1:10--for home does set so start set novolog 4 units  -will restart metformin at discharge     3.Essential hypertension:   - c/w PTA lisinopril 10 mg daily--restart.      4.Chronic anemia:   - Hb11.2 on  admission. Monitor.      5.Hyponatremia-mild  -improved     6.CHRISTY  -creat today 1.17, improved with ivf  -avoid nsaids, restart ace-i  -likely poor intake and infection     Social -needs knee walker, home care RN, PT.OT set up possible discharge today hopefully        Addendum at 1627  Knee walker being brought to room now prior to discharge  Care management has had difficulty setting up home care,so an outside component will take that task on per admin        ASSESSMENT           Discharge Assessment  How are you doing now that you are home?: Caller spoke with Pt who reports he is doing okay. Caller reccomended and explained CCC for Pt through the [facility]. Pt declines at this time but says he will see his PCP this week for further advice.  How are your symptoms? (Red Flag symptoms escalate to triage hotline per guidelines): Improved  Do you feel your condition is stable enough to be safe at home until your provider visit?: Yes  Does the patient have their discharge instructions? : Yes  Does the patient have questions regarding their discharge instructions? : No  Were you started on any new medications or were there changes to any of your previous medications? : Yes  Does the patient have all of their medications?: Yes  Do you have questions regarding any of your medications? : No  Do you have all of your needed medical supplies or equipment (DME)?  (i.e. oxygen tank, CPAP, cane, etc.): Yes  Discharge follow-up appointment scheduled within 14 calendar days? : Yes  Discharge Follow Up Appointment Date: 02/24/23  Discharge Follow Up Appointment Scheduled with?: Specialty Care Provider              PLAN                                                      Outpatient Plan:     Follow up with primary care provider, Daryn Pittman, within 3-4, for hospital follow- up. The following labs/tests are recommended: bmp, cbc.   Follow up with Dr. Jimenez 3-4 days ,podiatry         Future Appointments   Date Time Provider  Department Center   2/24/2023  9:20 AM Ulises Jimenez DPM [facility]   3/9/2023  3:00 PM Cindy Serrano RD [facility]         For any urgent concerns, please contact our 24 hour nurse triage line: [phone] ([phone])         JACQUI Mccollum  Connected Care Resource [facility]     *Connected Care Resource Team does NOT follow patient ongoing. Referrals are identified based on internal discharge reports and the outreach is to ensure patient has an understanding of their discharge instructions.

## 2023-02-23 LAB
PATH REPORT.COMMENTS IMP SPEC: NORMAL
PATH REPORT.COMMENTS IMP SPEC: NORMAL
PATH REPORT.FINAL DX SPEC: NORMAL
PATH REPORT.GROSS SPEC: NORMAL
PATH REPORT.RELEVANT HX SPEC: NORMAL
PHOTO IMAGE: NORMAL

## 2023-02-24 ENCOUNTER — OFFICE VISIT (OUTPATIENT)
Dept: VASCULAR SURGERY | Facility: CLINIC | Age: 58
End: 2023-02-24
Attending: PODIATRIST
Payer: COMMERCIAL

## 2023-02-24 VITALS — RESPIRATION RATE: 16 BRPM | HEART RATE: 64 BPM | SYSTOLIC BLOOD PRESSURE: 130 MMHG | DIASTOLIC BLOOD PRESSURE: 70 MMHG

## 2023-02-24 DIAGNOSIS — M86.9 OSTEOMYELITIS OF ANKLE AND FOOT (H): Primary | ICD-10-CM

## 2023-02-24 LAB — BACTERIA TISS BX CULT: NORMAL

## 2023-02-24 PROCEDURE — 99212 OFFICE O/P EST SF 10 MIN: CPT | Performed by: PODIATRIST

## 2023-02-24 PROCEDURE — G0463 HOSPITAL OUTPT CLINIC VISIT: HCPCS | Performed by: PODIATRIST

## 2023-02-24 ASSESSMENT — PAIN SCALES - GENERAL: PAINLEVEL: SEVERE PAIN (6)

## 2023-02-24 NOTE — PATIENT INSTRUCTIONS
Important lnstructions        WEIGHT BEARING STATUS: You are to remain NON WEIGHT BEARING on your right foot. NON WEIGHT BEARING MEANS NO PRESSURE ON YOUR FOOT OR HEEL AT ANY TIME FOR ANY REASON!    2. OFFLOADING DEVICE: Must use a A WHEELCHAIR at all times! (do not use affected foot to push wheelchair)    3. STABILIZATION DEVICE: Use a PRAFO BOOT . You will need to WEAR THIS AT ALL TIMES EVEN WHILE IN BED.     4. ELEVATE: Elevating your leg means laying with your head on a pillow and your foot ABOVE YOUR WAIST.     5. DO NOT MOVE YOUR FOOT.  There is a risk of worsening the wound or incision. To give yourself a higher chance of healing, please DO NOT swing foot back and forth and wiggle foot/toes especially when inside a stabilization device.        Dressing Change lnstructions          LEAVE DRESSINGS INTACT UNTIL YOUR NEXT APPOINTMENT    It IS NOT ok to get your wound wet in the bath or shower    SEEK MEDICAL CARE IF:  You have an increase in swelling, pain, or redness around the wound.  You have an increase in the amount of pus coming from the wound.  There is a bad smell coming from the wound.  The wound appears to be worsening/enlarging  You have a fever greater than 101.5 F      It is ok to continue current wound care treatment/products for the next 2-3 days until new wound care supplies are ordered and arrive. If longer than this please contact our office at [phone].        We want to hear from you!   In the next few weeks, you should receive a call or email to complete a survey about your visit at [facility]. Please help us improve your appointment experience by letting us know how we did today. We strive to make your experience good and value any ways in which we could do better.      We value your input and suggestions.    Thank you for choosing the [facility]!

## 2023-02-24 NOTE — PROGRESS NOTES
Podiatry Progress Note        ASSESSMENT: S/P amputation digits 2, 3 right foot      TREATMENT:  -I discussed with the patient that the surgical sites on the right foot show evidence of superficial gapping along the central incision.  This is consistent with his history of weightbearing on the right foot.  Clean proximal margin per pathology report.     -I discussed with the patient risks associated with continued walking on the right foot including dehiscence of the surgical site, need for additional surgical intervention including partial foot amputation.  Highly recommend he remain nonweightbearing at all times with use of PRAFO boot.    -Antibiotics per ID.    -Gauze dressing applied today which he will keep intact. Continue non-weight bearing on the right foot.     -He will follow-up with me in 2 weeks for suture removal.     Ulises Jimenez DPM  [facility]      HPI: Abdulaziz Rausch was seen again today s/p amputation digits 2, 3 right foot.  Patient admits to walking on his right foot consistently and not using the proper boot as directed.    Past Medical History:   Diagnosis Date     Anemia      Depression      Diabetes mellitus, type 2 (H)      Diabetic foot ulcer (H)      Hypertension      Osteomyelitis (H)        No Known Allergies      Current Outpatient Medications:      alcohol swab prep pads, Use to swab area of injection/ella as directed., Disp: 100 each, Rfl: 3     amoxicillin-clavulanate (AUGMENTIN) 875-125 MG tablet, Take 1 tablet by mouth 2 times daily for 10 days, Disp: 20 tablet, Rfl: 0     blood glucose (NO BRAND SPECIFIED) test strip, Use to test blood sugar 4 times daily or as directed. To accompany: Blood Glucose Monitor Brands: per insurance., Disp: 100 strip, Rfl: 6     blood glucose monitoring (NO BRAND SPECIFIED) meter device kit, Use to test blood sugar 4 time daily or as directed. Preferred blood glucose meter OR supplies to accompany: Blood  Glucose Monitor Brands: per insurance., Disp: 1 kit, Rfl: 0     cetirizine (ZYRTEC) 10 MG tablet, Take 1 tablet by mouth daily as needed, Disp: , Rfl:      doxycycline hyclate (VIBRAMYCIN) 100 MG capsule, Take 1 capsule (100 mg) by mouth 2 times daily for 10 days, Disp: 20 capsule, Rfl: 0     gabapentin (NEURONTIN) 300 MG capsule, Take 1 capsule (300 mg) by mouth 2 times daily for 30 days, Disp: 60 capsule, Rfl: 0     insulin glargine (LANTUS SOLOSTAR) 100 UNIT/ML pen, Inject 16 Units Subcutaneous At Bedtime, Disp: 15 mL, Rfl:      insulin lispro (HUMALOG KWIKPEN) 100 UNIT/ML (1 unit dial) KWIKPEN, Inject 4 Units Subcutaneous 3 times daily (before meals), Disp: 15 mL, Rfl: 4     insulin pen needle (31G X 8 MM) 31G X 8 MM miscellaneous, Use 4 pen needles daily or as directed., Disp: 400 each, Rfl: 3     metFORMIN (GLUCOPHAGE) 500 MG tablet, Take 1 tablet (500 mg) by mouth 2 times daily (with meals), Disp: 180 tablet, Rfl: 3     oxyCODONE-acetaminophen (PERCOCET) 5-325 MG tablet, Take 1 tablet by mouth every 6 hours as needed for pain, Disp: 12 tablet, Rfl: 0     thin (NO BRAND SPECIFIED) lancets, Use with lanceting device. To accompany: Blood Glucose Monitor Brands: per insurance., Disp: 100 each, Rfl: 6     lisinopril (ZESTRIL) 10 MG tablet, Take 1 tablet (10 mg) by mouth daily for 30 days, Disp: 30 tablet, Rfl: 0    Review of Systems - Negative       OBJECTIVE:  Appearance: alert, well appearing, and in no distress.    /70   Pulse 64   Resp 16     @LDAVASC(10,16,17)@           Surgical site on the right foot has intact sutures with skin edges well approximated, superficial gapping noted at the central incision. No erythema right foot. Neurovascular status unchanged right foot.

## 2023-03-16 ENCOUNTER — OFFICE VISIT (OUTPATIENT)
Dept: VASCULAR SURGERY | Facility: CLINIC | Age: 58
End: 2023-03-16
Attending: PODIATRIST
Payer: COMMERCIAL

## 2023-03-16 VITALS
HEART RATE: 96 BPM | SYSTOLIC BLOOD PRESSURE: 158 MMHG | RESPIRATION RATE: 16 BRPM | DIASTOLIC BLOOD PRESSURE: 90 MMHG | TEMPERATURE: 98.1 F

## 2023-03-16 DIAGNOSIS — L02.619 CELLULITIS AND ABSCESS OF FOOT EXCLUDING TOE: Primary | ICD-10-CM

## 2023-03-16 DIAGNOSIS — L97.521 DIABETIC ULCER OF OTHER PART OF LEFT FOOT ASSOCIATED WITH DIABETES MELLITUS DUE TO UNDERLYING CONDITION, LIMITED TO BREAKDOWN OF SKIN (H): ICD-10-CM

## 2023-03-16 DIAGNOSIS — E08.621 DIABETIC ULCER OF OTHER PART OF LEFT FOOT ASSOCIATED WITH DIABETES MELLITUS DUE TO UNDERLYING CONDITION, LIMITED TO BREAKDOWN OF SKIN (H): ICD-10-CM

## 2023-03-16 DIAGNOSIS — L97.513 ULCER OF RIGHT FOOT WITH NECROSIS OF MUSCLE (H): ICD-10-CM

## 2023-03-16 DIAGNOSIS — L03.119 CELLULITIS AND ABSCESS OF FOOT EXCLUDING TOE: Primary | ICD-10-CM

## 2023-03-16 PROCEDURE — G0463 HOSPITAL OUTPT CLINIC VISIT: HCPCS | Mod: 25 | Performed by: PODIATRIST

## 2023-03-16 PROCEDURE — 87077 CULTURE AEROBIC IDENTIFY: CPT | Performed by: PODIATRIST

## 2023-03-16 PROCEDURE — 11042 DBRDMT SUBQ TIS 1ST 20SQCM/<: CPT | Performed by: PODIATRIST

## 2023-03-16 PROCEDURE — 11043 DBRDMT MUSC&/FSCA 1ST 20/<: CPT | Performed by: PODIATRIST

## 2023-03-16 PROCEDURE — 99213 OFFICE O/P EST LOW 20 MIN: CPT | Mod: 25 | Performed by: PODIATRIST

## 2023-03-16 RX ORDER — DOXYCYCLINE 100 MG/1
100 CAPSULE ORAL 2 TIMES DAILY
Qty: 20 CAPSULE | Refills: 0 | Status: SHIPPED | OUTPATIENT
Start: 2023-03-16 | End: 2023-04-17

## 2023-03-16 ASSESSMENT — PAIN SCALES - GENERAL: PAINLEVEL: EXTREME PAIN (8)

## 2023-03-16 NOTE — PROGRESS NOTES
FOOT AND ANKLE SURGERY/PODIATRY Progress Note      ASSESSMENT:   Cellulitis right foot  Ulceration right foot  Diabetic ulceration left foot  S/P amputation digits 2, 3 right foot    A new wound was identified today: yes,  it is located Bilateral feet.    TREATMENT:  -I discussed with the patient that there is full-thickness dehiscence of the surgical site on the right foot resulting in a ulceration with increased depth.  Reviewed that this was due to noncompliance in the form of continued walking on the right foot also soaking of the right foot.    -Reviewed risks of continued noncompliance including but not limited to need for additional surgical intervention, skin grafting, transmetatarsal amputation or loss of limb.    -Due to the increased depth on the right foot ulceration I recommend use of a wound VAC at this time.    -There is localized erythema to the surgical site on the right foot and I will start him on doxycycline at this time.  Wound culture obtained today.    -New ulceration of the left foot appears stable, no signs of infection.    -Reviewed importance of nonweightbearing at all times.  Patient relates that his mobile home does not allow for use of a knee walker or wheelchair.  I have referred him for crutches and a cam boot.    -After discussion of risk factors and consent obtained 2% Lidocaine HCL jelly was applied, under clean conditions, the right foot ulceration(s) were debrided using #15 blade scalpel.  Devitalized and nonviable tissue, along with any fibrin and slough, was removed to improve granulation tissue formation, stimulate wound healing, decrease overall bacteria load, disrupt biofilm formation and decrease edge senescence. Wound drainage was scant No. Total excisional debridement was 3 sq cm into the muscle/fascia with a depth of 0.8 cm.   Ulcers were improved afterwards and .  Measures were as noted on the flow sheet. A gauze dressing was applied.     -After discussion of  risk factors and consent obtained 2% Lidocaine HCL jelly was applied, under clean conditions, the left foot ulceration(s) were debrided using #15 blade scalpel.  Devitalized and nonviable tissue, along with any fibrin and slough, was removed to improve granulation tissue formation, stimulate wound healing, decrease overall bacteria load, disrupt biofilm formation and decrease edge senescence. Wound drainage was scant No. Total excisional debridement was 0.8 sq cm into the subcutaneous tissue with a depth of 0.2 cm.   Ulcers were improved afterwards and .  Measures were as noted on the flow sheet. Collagen with a gauze dresssing was applied. He will continue to apply Collagen with a gauze dresssing as directed.    -He will follow-up with me in 1 week.    Ulises Jimenez DPM  [facility]      HPI: Abdulaziz Rausch was seen again today status post amputation digits 2, 3 right foot.  Patient admits weightbearing on the right foot consistently since the surgical date and also admits soaking the right foot.  He noticed a new sore on the left foot over the past week and denies any known trauma.    Past Medical History:   Diagnosis Date     Anemia      Depression      Diabetes mellitus, type 2 (H)      Diabetic foot ulcer (H)      Hypertension      Osteomyelitis (H)        Past Surgical History:   Procedure Laterality Date     AMPUTATE TOE(S) Right 2/17/2023    Procedure: AMPUTATION, digits two and three right foot;  Surgeon: Ulises Jimenez DPM;  Location: [facility] [place]     IRRIGATION AND DEBRIDEMENT TOE, COMBINED Right 1/20/2023    Procedure: IRRIGATION AND DEBRIDEMENT digits 2 and 3 right foot;  Surgeon: Jf Huerta DPM;  Location: [facility] [place] OR       No Known Allergies      Current Outpatient Medications:      doxycycline monohydrate (MONODOX) 100 MG capsule, Take 1 capsule (100 mg) by mouth 2 times daily, Disp: 20 capsule, Rfl: 0     gabapentin (NEURONTIN) 300 MG capsule,  Take 1 capsule (300 mg) by mouth 2 times daily for 30 days, Disp: 60 capsule, Rfl: 0     insulin glargine (LANTUS SOLOSTAR) 100 UNIT/ML pen, Inject 16 Units Subcutaneous At Bedtime, Disp: 15 mL, Rfl:      insulin lispro (HUMALOG KWIKPEN) 100 UNIT/ML (1 unit dial) KWIKPEN, Inject 4 Units Subcutaneous 3 times daily (before meals), Disp: 15 mL, Rfl: 4     insulin pen needle (31G X 8 MM) 31G X 8 MM miscellaneous, Use 4 pen needles daily or as directed., Disp: 400 each, Rfl: 3     metFORMIN (GLUCOPHAGE) 500 MG tablet, Take 1 tablet (500 mg) by mouth 2 times daily (with meals), Disp: 180 tablet, Rfl: 3     oxyCODONE-acetaminophen (PERCOCET) 5-325 MG tablet, Take 1 tablet by mouth every 6 hours as needed for pain, Disp: 12 tablet, Rfl: 0     alcohol swab prep pads, Use to swab area of injection/ella as directed., Disp: 100 each, Rfl: 3     blood glucose (NO BRAND SPECIFIED) test strip, Use to test blood sugar 4 times daily or as directed. To accompany: Blood Glucose Monitor Brands: per insurance., Disp: 100 strip, Rfl: 6     blood glucose monitoring (NO BRAND SPECIFIED) meter device kit, Use to test blood sugar 4 time daily or as directed. Preferred blood glucose meter OR supplies to accompany: Blood Glucose Monitor Brands: per insurance., Disp: 1 kit, Rfl: 0     lisinopril (ZESTRIL) 10 MG tablet, Take 1 tablet (10 mg) by mouth daily for 30 days, Disp: 30 tablet, Rfl: 0     thin (NO BRAND SPECIFIED) lancets, Use with lanceting device. To accompany: Blood Glucose Monitor Brands: per insurance., Disp: 100 each, Rfl: 6    Review of Systems - 10 point Review of Systems is negative except for bilateral foot ulcers which is noted in HPI.      OBJECTIVE:  BP (!) 158/90   Pulse 96   Temp 98.1  F (36.7  C)   Resp 16   General appearance: Patient is alert and fully cooperative with history & exam.  No sign of distress is noted during the visit.    Vascular: Dorsalis pedis non-palpableBilateral.  Dermatologic:    VASC Wound left  "plantar lateral (Active)   Pre Size Length 1 03/16/23 1200   Pre Size Width 0.8 03/16/23 1200   Pre Size Depth 0.2 03/16/23 1200   Pre Total Sq cm 0.8 03/16/23 1200       VASC Wound Right foot (Active)       VASC Wound Right foot (Active)       VASC Wound Right foot (Active)   Post Size Length 3 03/16/23 1200   Post Size Width 1 03/16/23 1200   Post Size Depth 0.8 03/16/23 1200   Post Total Sq cm 3 03/16/23 1200       Incision/Surgical Site 02/17/23 Right Foot (Active)   Incision Assessment UTV 02/21/23 0800   Clara-Incision Assessment UTV 02/21/23 0800   Closure GOOD 02/21/23 0800   Incision Drainage Amount UTV 02/21/23 0800   Drainage Description UTV 02/21/23 0100   Dressing Intervention Clean, dry, intact 02/21/23 0100   Full-thickness dehiscence surgical site right foot sutures noted, significant maceration with increased depth.  Localized erythema just proximal to the surgical site right foot.  Granular tissue plantar fifth MPJ ulceration left foot.  Neurologic: Diminished to light touch Bilateral.  Musculoskeletal: Amputation digits 2, 3 right foot.    Imaging:     POC US Guidance Needle Placement    Result Date: 2/17/2023  Ultrasound was performed as guidance to an anesthesia procedure.  Click \"PACS images\" hyperlink below to view any stored images.  For specific procedure details, view procedure note authored by anesthesia.         Picture:                 "

## 2023-03-16 NOTE — PATIENT INSTRUCTIONS
Important lnstructions    - I am ordering a wound vac [employer] will be calling you in a few days to set up delivery.  -I have ordered you Home care. Until home care can start, you will need to come into clinic to have your wound vac changed 3 times a week.   -Please call us if home care has not started once you receive your wound vac and we will place your Vac [phone].       WEIGHT BEARING STATUS: You are to remain NON WEIGHT BEARING on your right foot. NON WEIGHT BEARING MEANS NO PRESSURE ON YOUR FOOT OR HEEL AT ANY TIME FOR ANY REASON!    2. OFFLOADING DEVICE: Must use a CRUTCHES at all times! (do not use affected foot to push wheelchair)    3. STABILIZATION DEVICE: Use a PRAFO BOOT . You will need to WEAR THIS AT ALL TIMES EVEN WHILE IN BED.       4. ELEVATE: Elevating your leg means laying with your head on a pillow and your foot ABOVE YOUR WAIST.     5. DO NOT MOVE YOUR FOOT.  There is a risk of worsening the wound or incision. To give yourself a higher chance of healing, please DO NOT swing foot back and forth and wiggle foot/toes especially when inside a stabilization device.        Dressing Change lnstructions        Left foot:         - Dressing Application of  Endoform    1. Endoform should be cut to the size of the wound.  It should touch the edges of the ulcer. It is okay if it overlap the edges a small amount.  Then, moisten the Endoform with saline.(If it is easier for you, you can moisten it before laying it in the wound)    2. Cover the wound with Endoform, followed by dry gauze, and secure with roll gauze if needed.     3. Endoform is naturally used by the body over time so you may not find it in the wound when you change your dressing.  If you do find some, gently cleanse the wound with saline. Do not remove the remaining Endoform, which may appear as an off-white to south gel, just add Endoform on top.  Usually, more Endoform will need to be added every 5-7 days.     4. Change your top dressing  every 1-2 days or as needed depending on drainage.    -Endoform is a collagen dressing created from ovine (sheep) fore-stomach tissue. The collagen extracellular matrix transforms into a soft conforming sheet, which is naturally incorporated into the wound over time.  Collagen dressings are used to stimulate wound healing.   ,       It IS NOT ok to get your wound wet in the bath or shower      RIGHT FOOT:     Until Would vac Arrives, pack your wound with dry packing gauze and change daily.     - Wound Vac Instructions    1. 3x weekly and as needed cleanse the area with NS    2. Pat dry    3. Apply Cavilon no sting barrier wipe to the skin surrounding the wound to protect from drainage/maceration    4. Apply drape around incision. Cut strip of drape and apply to skin if bridging is needed; plan this area in advance; should not be over bony prominence     5. Cut the foam to fit the area of the incision    6. Cut narrow strip of foam if bridging    7. Cover foam with drape to obtain air tight seal    8. Cut opening the size of a quarter for where the suction pad will be applied    9. Apply Suction pad    10. 125mmHG suction continuous    [employer] Contact Center can be reached at [phone], 24 hours a day 7 days a week     - If you do not have a back up plan in place:     If the negative pressure wound therapy malfunctions or unable to maintain seal: dressing must be removed and reapplied within 2 hours of the incident. If unable to reapply negative pressure wound dressing, place Normal Saline moistened gauze in wound bed and cover with appropriate dressing to keep wound bed moist.  Change wet-to-dry dressing two times a day until healthcare staff can re-implement negative pressure therapy. Change canister at least weekly.  [employer] Contact Center can be reached at [phone], 24 hours a day 7 days a week    Information on Vacuum-Assisted Closure of a Wound  Vacuum-assisted closure (VAC) of a wound is a type of treatment  to help wounds heal. It s also known as negative pressure wound therapy. During the treatment, a device lowers air pressure on the wound. This can help the wound heal more quickly.  Understanding the wound VAC system  A wound VAC system has several parts. A foam or gauze dressing is put directly on the wound. The dressing is changed every 24 to 72 hours. An adhesive film covers and seals the dressing and wound. A drainage tube leads from under the adhesive film and connects to a portable vacuum pump. This pump removes air pressure over the wound. It may do this constantly. Or it may do it in cycles. During the treatment, you ll need to carry the portable pump everywhere you go.  Why wound VAC is used  You might need this therapy for a recent traumatic wound. Or you may need it for a chronic wound. This is a wound that does not heal the way it should over time. This can happen with wounds in people who have diabetes. You may need a wound VAC if you ve had a recent skin graft. And you may need a wound VAC for a large wound. Large wounds can take a longer time to heal.  A wound vacuum system may help your wound heal more quickly by:  Draining extra fluid from the wound  Reducing swelling  Reducing bacteria in the wound  Keeping your wound moist and warm  Helping draw together wound edges  Increasing blood flow to your wound  Decreasing inflammation  Wound VAC offers some other advantages over other types of wound care. It may decrease your overall discomfort. The dressings usually need to be changed less often. And they may be easier to keep in position.  Risks of wound VAC  Wound VAC has some rare risks, such as:  Bleeding (which may be severe)  Wound infection  An abnormal connection between the intestinal tract and the skin (enteric fistula)  Proper training in dressing changes can help reduce the risk for these complications. Also, your doctor will carefully evaluate you to make sure you are a good candidate for  the therapy. Certain problems can increase your risk for complications. These include:  Exposed organs or blood vessels  High risk of bleeding from another medical problem  Wound infection  Nearby bone infection  Dead wound tissue  Cancer tissue  Fragile skin, such as from aging or longtime use of topical steroids  Allergy to adhesive  Very poor blood flow to your wound  Wounds close to joints that may reopen because of movement  Your doctor will discuss the risks that apply to you. Make sure to talk with him or her about all of your questions and concerns.  Getting ready for wound VAC  You likely won t need to do much to get ready for wound VAC. In some cases, you may need to wait a while before having this therapy. For example, your doctor may first need to treat an infection in your wound. Dead or damaged tissue may also need to be removed from your wound.  You or a caregiver may need training on how to use the wound VAC device. This is done if you will be able to have your wound vacuum therapy at home. In other cases, you may need to have your wound vacuum therapy in a health care facility.  On the day of your procedure  A health care provider will cover your wound with foam or gauze wound dressing. An adhesive film will be put over the dressing and wound. This seals the wound. The foam connects to a drainage tube, which leads to a vacuum pump. This pump is portable. When the pump is turned on, it draws fluid through the foam and out the drainage tubing. The pump may run constantly, or it may cycle off and on. Your exact setup will depend on the specific type of wound vacuum system that you use.  Managing your wound  You may need the dressing changed about once a day. You may need it changed more or less often, depending on your wound. You or your caregiver may be trained to do this at home. Or it may be done by a visiting health care provider. Your doctor may prescribe a pain medicine. This is to prevent or  reduce pain during the dressing change.  You will likely need to use the wound VAC system for several weeks or months. During this time, you ll carry the portable pump everywhere you go.  Nutrition for wound healing  During this time, make sure you follow a healthy diet. This is needed so the wound can heal and to prevent infection. Your doctor can tell you more about what to include in your diet during this time.  follow up with your doctor if you have a medical condition that led to your wound, such as diabetes. He or she can help you prevent future wounds.  Follow-up care  Your doctor will carefully keep track of your healing. Make sure to keep all follow-up appointments.  When to call your health care provider  Call your health care provider right away if you have any of these:  Fever of 100.4 F (38.0 C) or higher  Increased redness, swelling, or warmth around wound  Increased pain  Bright red blood or blood clots in tubing or the collection chamber of the vacuum        SEEK MEDICAL CARE IF:  You have an increase in swelling, pain, or redness around the wound.  You have an increase in the amount of pus coming from the wound.  There is a bad smell coming from the wound.  The wound appears to be worsening/enlarging  You have a fever greater than 101.5 F      It is ok to continue current wound care treatment/products for the next 2-3 days until new wound care supplies are ordered and arrive. If longer than this please contact our office at [phone].        We want to hear from you!   In the next few weeks, you should receive a call or email to complete a survey about your visit at [facility]. Please help us improve your appointment experience by letting us know how we did today. We strive to make your experience good and value any ways in which we could do better.      We value your input and suggestions.    Thank you for choosing the [facility]!

## 2023-03-16 NOTE — PROGRESS NOTES
[facility]        Patient is here for a 2 week follow up  to discuss s/p amputation toes on right foot. New sore on left plantar foot opened up about 4-5 days ago.  He has been soaking his feet.     Pt is currently taking       The provider has been notified that the patient see above.     Questions patient would like addressed today are: N/A.    Refills are needed: N/A    Has homecare services and agency name:  Keren Morales RN

## 2023-03-16 NOTE — LETTER
3/16/2023             [facility]             Wound Dressing Rx and Order Form             Order Status:New Order             Verbal: Kitty Childs  [facility]   Account # [id number]  Fax: [phone]  Customer Service: [phone]          Patient Info:  Name: Abdulaziz Rausch  : 1965  Address:   [address] [place] [address]  Phone: [phone]      Insurance Info:  PRIMARY INSURANCE: Payor: [facility] / Plan: [facility] [employer] [place] / Product Type: *No Product type* /   Primary Policy ID#: [id number]  SECONDARY INSURANCE:  N/A   Secondary Policy ID#: N/A    Physician Info:   Name:  Ulises Jimenez DPM   Dept Address/Phones:   [address] [id number] [phone]  Dept: [phone]  Fax: [phone]      Wound info:  Encounter Diagnoses   Name Primary?     Cellulitis and abscess of foot excluding toe Yes     Ulcer of right foot with necrosis of muscle (H)      Diabetic ulcer of other part of left foot associated with diabetes mellitus due to underlying condition, limited to breakdown of skin (H)      VASC Wound left plantar lateral (Active)   Pre Size Length 1 23 1200   Pre Size Width 0.8 23 1200   Pre Size Depth 0.2 23 1200   Pre Total Sq cm 0.8 23 1200       VASC Wound Right foot (Active)       VASC Wound Right foot (Active)       VASC Wound Right foot (Active)   Post Size Length 3 23 1200   Post Size Width 1 23 1200   Post Size Depth 0.8 23 1200   Post Total Sq cm 3 23 1200       Incision/Surgical Site 23 Right Foot (Active)   Incision Assessment UTV 23 0800   Clara-Incision Assessment UTV 23 0800   Closure GOOD 23 0800   Incision Drainage Amount UTV 23 0800   Drainage Description UTV 23 0100   Dressing Intervention Clean, dry, intact 23 0100      Drainage: moderate  Thickness:  Full  Duration of Need: 30  Days Supply: 30  Start Date: 3/16/2023    Starter Kit: Ancillary Kit (saline, gloves, gauze)  Qualifying wound/Debridement: Yes     Dressing Type Brand Size Days Supply  15/30 Quantity  changes/week   Primary Endoform   2''x2'' 30 Weekly   Secondary Square gauze   4''x4'' 30 Daily    Sof form roll gauze   4''x75'' 30 Daily   Tape Papertape  1in 30 Daily             OK to forward to covered supplier.    Electronically Signed Physician: Ulises Jimenez DPM Date: 3/16/2023

## 2023-03-17 ENCOUNTER — TELEPHONE (OUTPATIENT)
Dept: VASCULAR SURGERY | Facility: CLINIC | Age: 58
End: 2023-03-17
Payer: COMMERCIAL

## 2023-03-17 NOTE — TELEPHONE ENCOUNTER
"Spoke with Ade from [facility].  They will submit prior auth and will contact clinic with decision.  Confirmed with Ade to not release wound vac until approved. They will stephen status as \"auth pending\" for now.   "

## 2023-03-17 NOTE — TELEPHONE ENCOUNTER
Caller: Ade    Provider: MD Ulises Jimenez    Detailed reason for call: Ade from [facility], she verified the insurance however [facility] is out of network. But if the wound vac PA has been approved then they would be considered in network,  Please call her back with an update    Best phone number to contact: [phone]    Best time to contact: till 5 central time    Ok to leave a detailed message: Yes    Ok to speak to authorized person if needed: Yes: any member can take the message      (Noted to patient if reason is related to wound or incision, to please send a photo via email or Moka.)

## 2023-03-19 DIAGNOSIS — L03.119 CELLULITIS AND ABSCESS OF FOOT EXCLUDING TOE: Primary | ICD-10-CM

## 2023-03-19 DIAGNOSIS — L02.619 CELLULITIS AND ABSCESS OF FOOT EXCLUDING TOE: Primary | ICD-10-CM

## 2023-03-19 LAB
BACTERIA WND CULT: ABNORMAL

## 2023-03-19 RX ORDER — SULFAMETHOXAZOLE/TRIMETHOPRIM 800-160 MG
1 TABLET ORAL 2 TIMES DAILY
Qty: 20 TABLET | Refills: 0 | Status: SHIPPED | OUTPATIENT
Start: 2023-03-19 | End: 2023-04-17

## 2023-03-19 NOTE — PROGRESS NOTES
I talked with the patient this morning informing him that I would like him to stop taking doxycycline, and begin taking Bactrim per recent culture report.  Overall he reports doing fine.  He is scheduled to follow-up with me this coming week.

## 2023-03-20 ENCOUNTER — TELEPHONE (OUTPATIENT)
Dept: VASCULAR SURGERY | Facility: CLINIC | Age: 58
End: 2023-03-20
Payer: COMMERCIAL

## 2023-03-20 NOTE — TELEPHONE ENCOUNTER
Caller: Juarez    Provider: MD Ulises Jimenez    Detailed reason for call: He was advised at his 03/16/23 office visit to follow up in 1 week. Dr. Jimenez is already double booked this week and next. Please advise a time to work him into the schedule. He is fine with either location any time after 9:00am.    Best phone number to contact: [phone]    Best time to contact: any    Ok to leave a detailed message: Yes

## 2023-03-23 ENCOUNTER — OFFICE VISIT (OUTPATIENT)
Dept: VASCULAR SURGERY | Facility: CLINIC | Age: 58
End: 2023-03-23
Attending: PODIATRIST
Payer: COMMERCIAL

## 2023-03-23 VITALS — HEART RATE: 106 BPM | SYSTOLIC BLOOD PRESSURE: 178 MMHG | DIASTOLIC BLOOD PRESSURE: 100 MMHG

## 2023-03-23 DIAGNOSIS — L97.521 DIABETIC ULCER OF OTHER PART OF LEFT FOOT ASSOCIATED WITH DIABETES MELLITUS DUE TO UNDERLYING CONDITION, LIMITED TO BREAKDOWN OF SKIN (H): ICD-10-CM

## 2023-03-23 DIAGNOSIS — E08.621 DIABETIC ULCER OF OTHER PART OF LEFT FOOT ASSOCIATED WITH DIABETES MELLITUS DUE TO UNDERLYING CONDITION, LIMITED TO BREAKDOWN OF SKIN (H): ICD-10-CM

## 2023-03-23 DIAGNOSIS — L97.513 ULCER OF RIGHT FOOT WITH NECROSIS OF MUSCLE (H): Primary | ICD-10-CM

## 2023-03-23 PROCEDURE — 11043 DBRDMT MUSC&/FSCA 1ST 20/<: CPT | Performed by: PODIATRIST

## 2023-03-23 PROCEDURE — 11042 DBRDMT SUBQ TIS 1ST 20SQCM/<: CPT | Mod: XS | Performed by: PODIATRIST

## 2023-03-23 ASSESSMENT — PAIN SCALES - GENERAL: PAINLEVEL: SEVERE PAIN (7)

## 2023-03-23 NOTE — PROGRESS NOTES
FOOT AND ANKLE SURGERY/PODIATRY Progress Note      ASSESSMENT:   Ulceration right foot  Diabetic ulceration left foot        TREATMENT:  -I discussed with the patient that the ulceration on the right foot has slightly worsened to the previous visit now with increased fibrotic tissue, continues to have increased depth.  We discussed the importance of obtaining a wound VAC at this location.  Also reviewed that any additional development of nonviable tissue requiring surgical debridement with use of wound VAC postop, possible partial foot amputation.  I will asked my office to continue to work with the patient to get the wound VAC applied to the right foot.    -Reviewed importance of nonweightbearing at all times.  He has not obtained a knee walker or wheelchair as previously recommended.  Reviewed risks of continued walking including nonhealing wound, need for additional surgical invention and partial foot amputation.    -After discussion of risk factors and consent obtained 2% Lidocaine HCL jelly was applied, under clean conditions, the right foot ulceration(s) were debrided using #15 blade scalpel.  Devitalized and nonviable tissue, along with any fibrin and slough, was removed to improve granulation tissue formation, stimulate wound healing, decrease overall bacteria load, disrupt biofilm formation and decrease edge senescence. Wound drainage was scant No. Total excisional debridement was 3 sq cm into the muscle/fascia with a depth of 0.8 cm.   Ulcers were improved afterwards and .  Measures were as noted on the flow sheet. A gauze dressing was applied.      -After discussion of risk factors and consent obtained 2% Lidocaine HCL jelly was applied, under clean conditions, the left foot ulceration(s) were debrided using #15 blade scalpel.  Devitalized and nonviable tissue, along with any fibrin and slough, was removed to improve granulation tissue formation, stimulate wound healing, decrease overall bacteria  load, disrupt biofilm formation and decrease edge senescence. Wound drainage was scant No. Total excisional debridement was 0.8 sq cm into the subcutaneous tissue with a depth of 0.2 cm.   Ulcers were improved afterwards and .  Measures were as noted on the flow sheet. Collagen with a gauze dresssing was applied. He will continue to apply Collagen with a gauze dresssing as directed.    -I have asked him to follow-up with me in 2 weeks.    Ulises Jimenez DPM  [facility]      HPI: Abdulaziz Rausch was seen again today for bilateral foot ulcerations.  Since his last visit he has not obtained any walker or wheelchair as directed and also has not obtained a wound VAC as directed.  He continues to walk on his feet and is using crutches intermittently.    Past Medical History:   Diagnosis Date     Anemia      Depression      Diabetes mellitus, type 2 (H)      Diabetic foot ulcer (H)      Hypertension      Osteomyelitis (H)        Past Surgical History:   Procedure Laterality Date     AMPUTATE TOE(S) Right 2/17/2023    Procedure: AMPUTATION, digits two and three right foot;  Surgeon: Ulises Jimenez DPM;  Location: [facility] OR     IRRIGATION AND DEBRIDEMENT TOE, COMBINED Right 1/20/2023    Procedure: IRRIGATION AND DEBRIDEMENT digits 2 and 3 right foot;  Surgeon: Jf Huerta DPM;  Location: [facility] OR       No Known Allergies      Current Outpatient Medications:      alcohol swab prep pads, Use to swab area of injection/ella as directed., Disp: 100 each, Rfl: 3     blood glucose (NO BRAND SPECIFIED) test strip, Use to test blood sugar 4 times daily or as directed. To accompany: Blood Glucose Monitor Brands: per insurance., Disp: 100 strip, Rfl: 6     blood glucose monitoring (NO BRAND SPECIFIED) meter device kit, Use to test blood sugar 4 time daily or as directed. Preferred blood glucose meter OR supplies to accompany: Blood Glucose Monitor Brands: per insurance.,  Disp: 1 kit, Rfl: 0     doxycycline monohydrate (MONODOX) 100 MG capsule, Take 1 capsule (100 mg) by mouth 2 times daily, Disp: 20 capsule, Rfl: 0     gabapentin (NEURONTIN) 300 MG capsule, Take 1 capsule (300 mg) by mouth 2 times daily for 30 days, Disp: 60 capsule, Rfl: 0     insulin glargine (LANTUS SOLOSTAR) 100 UNIT/ML pen, Inject 16 Units Subcutaneous At Bedtime, Disp: 15 mL, Rfl:      insulin lispro (HUMALOG KWIKPEN) 100 UNIT/ML (1 unit dial) KWIKPEN, Inject 4 Units Subcutaneous 3 times daily (before meals), Disp: 15 mL, Rfl: 4     insulin pen needle (31G X 8 MM) 31G X 8 MM miscellaneous, Use 4 pen needles daily or as directed., Disp: 400 each, Rfl: 3     metFORMIN (GLUCOPHAGE) 500 MG tablet, Take 1 tablet (500 mg) by mouth 2 times daily (with meals), Disp: 180 tablet, Rfl: 3     oxyCODONE-acetaminophen (PERCOCET) 5-325 MG tablet, Take 1 tablet by mouth every 6 hours as needed for pain, Disp: 12 tablet, Rfl: 0     sulfamethoxazole-trimethoprim (BACTRIM DS) 800-160 MG tablet, Take 1 tablet by mouth 2 times daily, Disp: 20 tablet, Rfl: 0     thin (NO BRAND SPECIFIED) lancets, Use with lanceting device. To accompany: Blood Glucose Monitor Brands: per insurance., Disp: 100 each, Rfl: 6     lisinopril (ZESTRIL) 10 MG tablet, Take 1 tablet (10 mg) by mouth daily for 30 days, Disp: 30 tablet, Rfl: 0    Review of Systems - 10 point Review of Systems is negative except for bilateral foot ulcers which is noted in HPI.      OBJECTIVE:  BP (!) 178/100   Pulse 106   General appearance: Patient is alert and fully cooperative with history & exam.  No sign of distress is noted during the visit.    Vascular: Dorsalis pedis non-palpableBilateral.  Dermatologic:    VASC Wound left plantar lateral (Active)   Pre Size Length 1 03/23/23 1500   Pre Size Width 0.6 03/23/23 1500   Pre Size Depth 0.2 03/23/23 1500   Pre Total Sq cm 0.6 03/23/23 1500       VASC Wound Right foot (Active)       VASC Wound Right foot (Active)       VASC  Wound Right foot (Active)   Post Size Length 1.5 03/23/23 1500   Post Size Width 3 03/23/23 1500   Post Size Depth 1 03/23/23 1500   Post Total Sq cm 4.5 03/23/23 1500       Incision/Surgical Site 02/17/23 Right Foot (Active)   Incision Assessment UTV 02/21/23 0800   Clara-Incision Assessment UTV 02/21/23 0800   Closure GOOD 02/21/23 0800   Incision Drainage Amount UTV 02/21/23 0800   Drainage Description UTV 02/21/23 0100   Dressing Intervention Clean, dry, intact 02/21/23 0100   Developing nonviable tissue along distal right foot ulceration with increased depth.  Mixed granular/fibrotic tissue plantar fifth MPJ ulceration left foot.  No erythema bilateral feet.  Neurologic: Diminished to light touch Bilateral.  Musculoskeletal: Contracted digits noted Bilateral.    Imaging:     No results found.       Picture:

## 2023-03-23 NOTE — PATIENT INSTRUCTIONS
"Important lnstructions    - We are checking on the status of the prior authorization for your wound vac.  - We have ordered you Home care. Until home care can start, you will need to come into clinic to have your wound vac changed 3 times a week.   -Please call us if home care has not started once you receive your wound vac and we will place your Vac [phone].       WEIGHT BEARING STATUS: You are to remain NON WEIGHT BEARING on your right foot. NON WEIGHT BEARING MEANS NO PRESSURE ON YOUR FOOT OR HEEL AT ANY TIME FOR ANY REASON!    2. OFFLOADING DEVICE: Must use a rolling knee walker or wheelchair at all times! (do not use affected foot to push wheelchair)    3. STABILIZATION DEVICE: Use a PRAFO BOOT . You will need to WEAR THIS AT ALL TIMES EVEN WHILE IN BED.       4. ELEVATE: Elevating your leg means laying with your head on a pillow and your foot ABOVE YOUR WAIST.     5. DO NOT MOVE YOUR FOOT.  There is a risk of worsening the wound or incision. To give yourself a higher chance of healing, please DO NOT swing foot back and forth and wiggle foot/toes especially when inside a stabilization device.        Dressing Change lnstructions        Left foot:                   EVERY OTHER DAY and as needed, Cleanse your left foot wound(s) with Normal saline.    Pat Dry with non-sterile gauze    Primary Dressing: Apply Medihoney or Manuka honey into/onto the wounds    Secondary dressing: Cover with dry gauze    Secure with non-sterile roll gauze (4\" x 75\" roll) and tape (1\" roll tape) as needed; avoid adhesive directly on the skin    It IS NOT ok to get your wound wet in the bath or shower    SEEK MEDICAL CARE IF:  You have an increase in swelling, pain, or redness around the wound.  You have an increase in the amount of pus coming from the wound.  There is a bad smell coming from the wound.  The wound appears to be worsening/enlarging  You have a fever greater than 101.5 F      It is ok to continue current wound care " treatment/products for the next 2-3 days until new wound care supplies are ordered and arrive. If longer than this please contact our office at [phone].        We want to hear from you!   In the next few weeks, you should receive a call or email to complete a survey about your visit at [facility]. Please help us improve your appointment experience by letting us know how we did today. We strive to make your experience good and value any ways in which we could do better.      We value your input and suggestions.    Thank you for choosing the [facility]!      It IS NOT ok to get your wound wet in the bath or shower      RIGHT FOOT:     Until Would vac Arrives, pack your wound with dry packing gauze and change daily.     - Wound Vac Instructions    1. 3x weekly and as needed cleanse the area with NS    2. Pat dry    3. Apply Cavilon no sting barrier wipe to the skin surrounding the wound to protect from drainage/maceration    4. Apply drape around incision. Cut strip of drape and apply to skin if bridging is needed; plan this area in advance; should not be over bony prominence     5. Cut the foam to fit the area of the incision    6. Cut narrow strip of foam if bridging    7. Cover foam with drape to obtain air tight seal    8. Cut opening the size of a quarter for where the suction pad will be applied    9. Apply Suction pad    10. 125mmHG suction continuous    [employer] Contact Center can be reached at [phone], 24 hours a day 7 days a week     - If you do not have a back up plan in place:     If the negative pressure wound therapy malfunctions or unable to maintain seal: dressing must be removed and reapplied within 2 hours of the incident. If unable to reapply negative pressure wound dressing, place Normal Saline moistened gauze in wound bed and cover with appropriate dressing to keep wound bed moist.  Change wet-to-dry dressing two times a day until healthcare staff can  re-implement negative pressure therapy. Change canister at least weekly.  [facility] Contact Center can be reached at [phone], 24 hours a day 7 days a week    Information on Vacuum-Assisted Closure of a Wound  Vacuum-assisted closure (VAC) of a wound is a type of treatment to help wounds heal. It s also known as negative pressure wound therapy. During the treatment, a device lowers air pressure on the wound. This can help the wound heal more quickly.  Understanding the wound VAC system  A wound VAC system has several parts. A foam or gauze dressing is put directly on the wound. The dressing is changed every 24 to 72 hours. An adhesive film covers and seals the dressing and wound. A drainage tube leads from under the adhesive film and connects to a portable vacuum pump. This pump removes air pressure over the wound. It may do this constantly. Or it may do it in cycles. During the treatment, you ll need to carry the portable pump everywhere you go.  Why wound VAC is used  You might need this therapy for a recent traumatic wound. Or you may need it for a chronic wound. This is a wound that does not heal the way it should over time. This can happen with wounds in people who have diabetes. You may need a wound VAC if you ve had a recent skin graft. And you may need a wound VAC for a large wound. Large wounds can take a longer time to heal.  A wound vacuum system may help your wound heal more quickly by:  Draining extra fluid from the wound  Reducing swelling  Reducing bacteria in the wound  Keeping your wound moist and warm  Helping draw together wound edges  Increasing blood flow to your wound  Decreasing inflammation  Wound VAC offers some other advantages over other types of wound care. It may decrease your overall discomfort. The dressings usually need to be changed less often. And they may be easier to keep in position.  Risks of wound VAC  Wound VAC has some rare risks, such as:  Bleeding (which may be  severe)  Wound infection  An abnormal connection between the intestinal tract and the skin (enteric fistula)  Proper training in dressing changes can help reduce the risk for these complications. Also, your doctor will carefully evaluate you to make sure you are a good candidate for the therapy. Certain problems can increase your risk for complications. These include:  Exposed organs or blood vessels  High risk of bleeding from another medical problem  Wound infection  Nearby bone infection  Dead wound tissue  Cancer tissue  Fragile skin, such as from aging or longtime use of topical steroids  Allergy to adhesive  Very poor blood flow to your wound  Wounds close to joints that may reopen because of movement  Your doctor will discuss the risks that apply to you. Make sure to talk with him or her about all of your questions and concerns.  Getting ready for wound VAC  You likely won t need to do much to get ready for wound VAC. In some cases, you may need to wait a while before having this therapy. For example, your doctor may first need to treat an infection in your wound. Dead or damaged tissue may also need to be removed from your wound.  You or a caregiver may need training on how to use the wound VAC device. This is done if you will be able to have your wound vacuum therapy at home. In other cases, you may need to have your wound vacuum therapy in a health care facility.  On the day of your procedure  A health care provider will cover your wound with foam or gauze wound dressing. An adhesive film will be put over the dressing and wound. This seals the wound. The foam connects to a drainage tube, which leads to a vacuum pump. This pump is portable. When the pump is turned on, it draws fluid through the foam and out the drainage tubing. The pump may run constantly, or it may cycle off and on. Your exact setup will depend on the specific type of wound vacuum system that you use.  Managing your wound  You may need the  dressing changed about once a day. You may need it changed more or less often, depending on your wound. You or your caregiver may be trained to do this at home. Or it may be done by a visiting health care provider. Your doctor may prescribe a pain medicine. This is to prevent or reduce pain during the dressing change.  You will likely need to use the wound VAC system for several weeks or months. During this time, you ll carry the portable pump everywhere you go.  Nutrition for wound healing  During this time, make sure you follow a healthy diet. This is needed so the wound can heal and to prevent infection. Your doctor can tell you more about what to include in your diet during this time.  follow up with your doctor if you have a medical condition that led to your wound, such as diabetes. He or she can help you prevent future wounds.  Follow-up care  Your doctor will carefully keep track of your healing. Make sure to keep all follow-up appointments.  When to call your health care provider  Call your health care provider right away if you have any of these:  Fever of 100.4 F (38.0 C) or higher  Increased redness, swelling, or warmth around wound  Increased pain  Bright red blood or blood clots in tubing or the collection chamber of the vacuum        SEEK MEDICAL CARE IF:  You have an increase in swelling, pain, or redness around the wound.  You have an increase in the amount of pus coming from the wound.  There is a bad smell coming from the wound.  The wound appears to be worsening/enlarging  You have a fever greater than 101.5 F      It is ok to continue current wound care treatment/products for the next 2-3 days until new wound care supplies are ordered and arrive. If longer than this please contact our office at [phone].        We want to hear from you!   In the next few weeks, you should receive a call or email to complete a survey about your visit at [facility]. Please help us improve your  appointment experience by letting us know how we did today. We strive to make your experience good and value any ways in which we could do better.      We value your input and suggestions.    Thank you for choosing the [facility]!

## 2023-03-24 NOTE — TELEPHONE ENCOUNTER
Ade from [facility] calling back.  They sent the prior authorization to the wrong fax number, but have now sent it to the correct number and are waiting for a response.  She will contact clinic when they receive the response.

## 2023-03-29 NOTE — TELEPHONE ENCOUNTER
Spoke with Ade from [facility], wound vac has been approved through 5/17/23.  They will reach out to patient to arrange for delivery.

## 2023-03-29 NOTE — TELEPHONE ENCOUNTER
Home care was ordered. Will ask laverne to follow up. Routed to  to call to schedule nurse only visit.     Kitty Childs LPN on 3/29/2023 at 3:54 PM

## 2023-04-03 ENCOUNTER — TELEPHONE (OUTPATIENT)
Dept: VASCULAR SURGERY | Facility: CLINIC | Age: 58
End: 2023-04-03
Payer: COMMERCIAL

## 2023-04-03 NOTE — TELEPHONE ENCOUNTER
Caller: Bonnie at [facility]    Provider: MD Ulises Jimenez    Detailed reason for call: [facility] has been unable to reach patient to schedule delivery of wound vac. Patient's voice mailbox is full. She was given the phone number for patient's sister to relay a message. Writer will also sent a message to patient via [employer] to call her back.    Best phone number to contact: [phone]. Option 2, then extension [phone]    Best time to contact: M-F 8-4    Ok to leave a detailed message: Yes       Pt C/O pain, swelling and purulent drainage to right 4th digit. PT saw PCP 1week ago for Symptoms started on PO keflex; had no improvement went to PCP today was advised to come to ED for I&D. Clean bandage applied in triage. Pt appears comfortable.

## 2023-04-03 NOTE — TELEPHONE ENCOUNTER
[facility]MTCB #3 to schedule nurse visit to apply wound vac.  Patient is scheduled to see Dr. Jimenez next week. [phone]

## 2023-04-05 NOTE — TELEPHONE ENCOUNTER
Attempted to call patient, unable to leave voicemail. Sent email to patient asking him to call the clinic.

## 2023-04-17 ENCOUNTER — OFFICE VISIT (OUTPATIENT)
Dept: FAMILY MEDICINE | Facility: CLINIC | Age: 58
End: 2023-04-17
Payer: COMMERCIAL

## 2023-04-17 VITALS
HEIGHT: 70 IN | HEART RATE: 91 BPM | SYSTOLIC BLOOD PRESSURE: 130 MMHG | WEIGHT: 139.13 LBS | OXYGEN SATURATION: 98 % | TEMPERATURE: 97.8 F | DIASTOLIC BLOOD PRESSURE: 80 MMHG | BODY MASS INDEX: 19.92 KG/M2

## 2023-04-17 DIAGNOSIS — L02.619 CELLULITIS AND ABSCESS OF FOOT EXCLUDING TOE: ICD-10-CM

## 2023-04-17 DIAGNOSIS — L03.119 CELLULITIS AND ABSCESS OF FOOT EXCLUDING TOE: ICD-10-CM

## 2023-04-17 DIAGNOSIS — Z79.4 TYPE 2 DIABETES MELLITUS WITH HYPERGLYCEMIA, WITH LONG-TERM CURRENT USE OF INSULIN (H): ICD-10-CM

## 2023-04-17 DIAGNOSIS — E11.65 TYPE 2 DIABETES MELLITUS WITH HYPERGLYCEMIA, WITH LONG-TERM CURRENT USE OF INSULIN (H): ICD-10-CM

## 2023-04-17 DIAGNOSIS — Z79.4 TYPE 2 DIABETES MELLITUS WITH OTHER CIRCULATORY COMPLICATION, WITH LONG-TERM CURRENT USE OF INSULIN (H): ICD-10-CM

## 2023-04-17 DIAGNOSIS — E11.59 TYPE 2 DIABETES MELLITUS WITH OTHER CIRCULATORY COMPLICATION, WITH LONG-TERM CURRENT USE OF INSULIN (H): ICD-10-CM

## 2023-04-17 DIAGNOSIS — Z11.59 NEED FOR HEPATITIS C SCREENING TEST: ICD-10-CM

## 2023-04-17 DIAGNOSIS — Z11.4 SCREENING FOR HIV (HUMAN IMMUNODEFICIENCY VIRUS): ICD-10-CM

## 2023-04-17 LAB — HBA1C MFR BLD: 11.3 % (ref 0–5.6)

## 2023-04-17 PROCEDURE — 90677 PCV20 VACCINE IM: CPT | Performed by: STUDENT IN AN ORGANIZED HEALTH CARE EDUCATION/TRAINING PROGRAM

## 2023-04-17 PROCEDURE — 90471 IMMUNIZATION ADMIN: CPT | Performed by: STUDENT IN AN ORGANIZED HEALTH CARE EDUCATION/TRAINING PROGRAM

## 2023-04-17 PROCEDURE — 0124A COVID-19 VACCINE BIVALENT BOOSTER 12+ (PFIZER): CPT | Performed by: STUDENT IN AN ORGANIZED HEALTH CARE EDUCATION/TRAINING PROGRAM

## 2023-04-17 PROCEDURE — 36415 COLL VENOUS BLD VENIPUNCTURE: CPT | Performed by: STUDENT IN AN ORGANIZED HEALTH CARE EDUCATION/TRAINING PROGRAM

## 2023-04-17 PROCEDURE — 99214 OFFICE O/P EST MOD 30 MIN: CPT | Mod: 25 | Performed by: STUDENT IN AN ORGANIZED HEALTH CARE EDUCATION/TRAINING PROGRAM

## 2023-04-17 PROCEDURE — 83036 HEMOGLOBIN GLYCOSYLATED A1C: CPT | Performed by: STUDENT IN AN ORGANIZED HEALTH CARE EDUCATION/TRAINING PROGRAM

## 2023-04-17 PROCEDURE — 91312 COVID-19 VACCINE BIVALENT BOOSTER 12+ (PFIZER): CPT | Performed by: STUDENT IN AN ORGANIZED HEALTH CARE EDUCATION/TRAINING PROGRAM

## 2023-04-17 RX ORDER — INSULIN LISPRO 100 [IU]/ML
6 INJECTION, SOLUTION INTRAVENOUS; SUBCUTANEOUS
Qty: 15 ML | Refills: 4 | COMMUNITY
Start: 2023-04-17

## 2023-04-17 RX ORDER — SULFAMETHOXAZOLE/TRIMETHOPRIM 800-160 MG
1 TABLET ORAL 2 TIMES DAILY
Qty: 28 TABLET | Refills: 0 | Status: ON HOLD | OUTPATIENT
Start: 2023-04-17 | End: 2023-05-21

## 2023-04-17 ASSESSMENT — PAIN SCALES - GENERAL: PAINLEVEL: NO PAIN (0)

## 2023-04-17 ASSESSMENT — PATIENT HEALTH QUESTIONNAIRE - PHQ9
SUM OF ALL RESPONSES TO PHQ QUESTIONS 1-9: 18
10. IF YOU CHECKED OFF ANY PROBLEMS, HOW DIFFICULT HAVE THESE PROBLEMS MADE IT FOR YOU TO DO YOUR WORK, TAKE CARE OF THINGS AT HOME, OR GET ALONG WITH OTHER PEOPLE: EXTREMELY DIFFICULT
SUM OF ALL RESPONSES TO PHQ QUESTIONS 1-9: 18

## 2023-04-17 NOTE — PROGRESS NOTES
Hospital Follow-up Visit:    Assessment and Plan   57-year-old male with possible history of uncontrolled type 2 diabetes, hypertension, diabetic wound on his right toe/osteomyelitis surgical post amputation in February 2023.  Presents in follow-up regarding this.  Patient continues to work with vascular regarding his wounds.  He unfortunately missed his last visit and no showed.  It appears his next visit has been canceled as well.  I asked him to call and reschedule.  He tells me his wound is healing with Dr. Jimenez placed him on instead of doxycycline.  We will continue this.  As far as his diabetes he remains uncontrolled.  He is still getting blood sugars in the high 200s to low 200s.  He saw endocrinology after his hospitalization who raised his insulin some and contemplated doing testing for type 1 diabetes but did not pursue this.  He has no appointment scheduled with them after.  I will plan to take over management of this.  We will recheck his A1c today but I recommended to continue on his current doses of insulin as below but increase his metformin to 1000 mg twice daily.  I think he would benefit from a continuous glucose monitor to know more information about his blood sugars during the day and placed order for this.  Also he has never seen a diabetic educator and placed a referral.  He is already had a referral but this has been canceled several times.  Patient has several other items for his diabetes in the future but will address these on his follow-up.  I will see him in a month regarding diabetes.    3. Type 2 diabetes mellitus with other circulatory complication, with long-term current use of insulin (H)  - insulin lispro (HUMALOG KWIKPEN) 100 UNIT/ML (1 unit dial) KWIKPEN; Inject 6 Units Subcutaneous 3 times daily (before meals)  Dispense: 15 mL; Refill: 4  - Hemoglobin A1c; Future  - metFORMIN (GLUCOPHAGE) 500 MG tablet; Take 2 tablets (1,000 mg) by mouth 2 times daily (with meals)  Dispense:  360 tablet; Refill: 3  - Continuous Blood Gluc Sensor (FREESTYLE NOAH 2 SENSOR) MISC; Change every 14 days.  Dispense: 2 each; Refill: 5  - Continuous Blood Gluc  (FREESTYLE NOAH 2 READER) CHUCK; Use to read blood sugars as per 's instructions.  Dispense: 1 each; Refill: 0  - AMB Adult Diabetes Educator Referral; Future    4. Cellulitis and abscess of foot excluding toe  - sulfamethoxazole-trimethoprim (BACTRIM DS) 800-160 MG tablet; Take 1 tablet by mouth 2 times daily  Dispense: 28 tablet; Refill: 0    Daryn Walton MD    [facility]  Date of Admission: 2/17/23  Date of Discharge: 2/21/23  Reason(s) for Admission: osteomyelitis    Post Discharge Medication Reconciliation: completed by nurse and myself    Summary of hospitalization:  [facility] discharge summary reviewed and copied below       Abdulaziz Rausch is a 57 year old male, with hx of uncontrolled dm, htn, who was  admitted on 2/17/2023 after right toe amputation for diabetic foot ulcer.     1.Diabetes foot ulcer, with necrosis of muscle, osteomyelitis:   - s/p amputation of 2nd and 3rd digit right foot. Last wound culture on 1/20/23 showed strep agalactiae , staph simulans and MRSA  - Gram stain (02/17): gram + cocci and bacilli. Cultures, pathology pending, prelim now shows   1+  Enterococcus faecalis as well as a strep agalactiae                 Enterococcus faecalis           SUJATA       Ampicillin <=2 ug/mL Susceptible       Gentamicin Synergy Susceptible... Susceptible 1       Penicillin 4 ug/mL Susceptible       Vancomycin 2 ug/mL Susceptible           - ID consult appreciated- Zosyn and vancomycin while inpatient. Anticipate discharge on Augmentin and doxycycline for 10 days--ordered.  - Postop care per podiatry service  - Pain control  -knee walker ordered  -needs home care and cannot leave without it set up     2.Diabetes mellitus  type 2 with hyperglycemia:   - Poorly controlled with A1c 11.2.  During his recent admission, he was started on Lantus 12 units at bedtime, Novolog 3 units tidac and metformin.   - c/w Lantus, increase to 16 units qhs.  NovoLog sliding scale. I:C--increase to 1:10--for home does set so start set novolog 4 units  -will restart metformin at discharge     3.Essential hypertension:   - c/w PTA lisinopril 10 mg daily--restart.      4.Chronic anemia:   - Hb11.2 on admission. Monitor.      5.Hyponatremia-mild  -improved     6.CHRISTY  -creat today 1.17, improved with ivf  -avoid nsaids, restart ace-i  -likely poor intake and infection     Social -needs knee walker, home care RN, PT.OT set up possible discharge today hopefully        Addendum at 1627  Knee walker being brought to room now prior to discharge  Care management has had difficulty setting up home care,so an outside component will take that task on per admin      Diagnostic Tests/Treatments reviewed:  EXAM: MR FOOT RIGHT W/O and W CONTRAST  LOCATION: [facility]  DATE/TIME: 1/18/2023 11:36 PM     INDICATION: 58 yo male w  DM II and med non compliance here w  diabetic foot ulcers to 2nd 3rd digits of bilateral feet  COMPARISON: None.  TECHNIQUE: Routine. Additional postgadolinium T1 sequences were obtained.  IV CONTRAST: 6ml Gadavist     FINDINGS:      JOINTS AND BONES:   -No fracture or bone contusion. Normal articular cartilage. No effusion. There is abnormal signal seen which involves the second toe mid and distal phalanges and the third toe distal phalanx which would be worrisome for changes of osteomyelitis. No andrews   destructive changes are seen in the bony skeleton.     TENDONS:   -No tendon tear, tendinopathy, or tenosynovitis.      LIGAMENTS:   -Lisfranc ligament: Intact. No subluxation.     MUSCLES AND SOFT TISSUES:   -By history there are ulcers seen involving the second third toes. There is some edema seen involving the dorsal skin  of the foot                                                                      IMPRESSION:  1.  There is some loss of detail on this study secondary to inhomogeneous fat saturation given the peripheral location of the toes.     2.  There is abnormal signal seen with no andrews destructive changes involving the second toe middle and distal phalanx and the third toe distal phalanx. Given patient's history changes can be seen with osteomyelitis.     3.  No evidence for an abscess.    Other Healthcare Providers Involved in Patient's Care:  Vascular Dr. Jimenez     3/23/23::  ASSESSMENT:   Ulceration right foot  Diabetic ulceration left foot     TREATMENT:  -I discussed with the patient that the ulceration on the right foot has slightly worsened to the previous visit now with increased fibrotic tissue, continues to have increased depth.  We discussed the importance of obtaining a wound VAC at this location.  Also reviewed that any additional development of nonviable tissue requiring surgical debridement with use of wound VAC postop, possible partial foot amputation.  I will asked my office to continue to work with the patient to get the wound VAC applied to the right foot.    Update since discharge: stable.    DM Overview:  Diagnosed in 2021    Most recent HgbA1c:  Lab Results   Component Value Date    A1C 11.2 02/15/2023    A1C 13.7 01/18/2023    A1C 7.4 08/26/2021     Current medication regimen: glargine 16 units nightly, lispro 6 units with meals, metformin 500 BID    ASA: no  Statin: no    Exams:  - Retinopathy: Last ophthalmology appointment unkown  - Neuropathy: foot exam known neuropathy    Complications:  - CKD? No  Last Comprehensive Metabolic Panel:  Lab Results   Component Value Date     02/21/2023    POTASSIUM 4.2 02/21/2023    CHLORIDE 108 (H) 02/21/2023    CO2 22 02/21/2023    ANIONGAP 9 02/21/2023     (H) 02/21/2023    BUN 18.8 02/21/2023    CR 1.17 02/21/2023    GFRESTIMATED 72 02/21/2023     "AMIRAH 8.4 (L) 02/21/2023     - CAD? No  - CHF No           Review of Systems:   Complete ROS negative except as noted in the HPI            Physical Exam:   /80   Pulse 91   Temp 97.8  F (36.6  C)   Ht 1.778 m (5' 10\")   Wt 63.1 kg (139 lb 2 oz)   SpO2 98%   BMI 19.96 kg/m      General appearance: Alert, cooperative, no distress, appears stated age  Head: Normocephalic, atraumatic, without obvious abnormality  Eyes: Pupils equal round, reactive.  Conjunctiva clear.  Nose: Nares normal, no drainage.  Throat: Lips, mucosa, tongue normal mucosa pink and moist  Neck: Supple, symmetric, trachea midline, no adenopathy.  No thyroid enlargement, tenderness or nodules.                "

## 2023-04-18 DIAGNOSIS — Z79.4 TYPE 2 DIABETES MELLITUS WITH HYPERGLYCEMIA, WITH LONG-TERM CURRENT USE OF INSULIN (H): Primary | ICD-10-CM

## 2023-04-18 DIAGNOSIS — N18.2 CKD (CHRONIC KIDNEY DISEASE) STAGE 2, GFR 60-89 ML/MIN: ICD-10-CM

## 2023-04-18 DIAGNOSIS — E11.65 TYPE 2 DIABETES MELLITUS WITH HYPERGLYCEMIA, WITH LONG-TERM CURRENT USE OF INSULIN (H): Primary | ICD-10-CM

## 2023-04-18 NOTE — RESULT ENCOUNTER NOTE
I called and spoke with the patient about their recent clinic visit results. I answered any questions they had.  We will add on Jardiance 25 mg daily    Dr. Daryn Walton

## 2023-05-20 ENCOUNTER — HOSPITAL ENCOUNTER (INPATIENT)
Facility: HOSPITAL | Age: 58
LOS: 5 days | Discharge: NURSING, REHAB OR LONG-TERM CARE | End: 2023-05-26
Attending: EMERGENCY MEDICINE | Admitting: INTERNAL MEDICINE
Payer: COMMERCIAL

## 2023-05-20 DIAGNOSIS — L97.521 DIABETIC ULCER OF OTHER PART OF LEFT FOOT ASSOCIATED WITH DIABETES MELLITUS DUE TO UNDERLYING CONDITION, LIMITED TO BREAKDOWN OF SKIN (H): ICD-10-CM

## 2023-05-20 DIAGNOSIS — E08.621 DIABETIC ULCER OF OTHER PART OF LEFT FOOT ASSOCIATED WITH DIABETES MELLITUS DUE TO UNDERLYING CONDITION, LIMITED TO BREAKDOWN OF SKIN (H): ICD-10-CM

## 2023-05-20 DIAGNOSIS — L02.619 CELLULITIS AND ABSCESS OF FOOT EXCLUDING TOE: Primary | ICD-10-CM

## 2023-05-20 DIAGNOSIS — A41.9 SEPSIS, DUE TO UNSPECIFIED ORGANISM, UNSPECIFIED WHETHER ACUTE ORGAN DYSFUNCTION PRESENT (H): ICD-10-CM

## 2023-05-20 DIAGNOSIS — L97.513 ULCER OF RIGHT FOOT WITH NECROSIS OF MUSCLE (H): ICD-10-CM

## 2023-05-20 DIAGNOSIS — M86.9 OSTEOMYELITIS OF ANKLE AND FOOT (H): ICD-10-CM

## 2023-05-20 DIAGNOSIS — L03.116 CELLULITIS OF LEFT FOOT: ICD-10-CM

## 2023-05-20 DIAGNOSIS — R73.9 HYPERGLYCEMIA: ICD-10-CM

## 2023-05-20 DIAGNOSIS — L03.119 CELLULITIS AND ABSCESS OF FOOT EXCLUDING TOE: Primary | ICD-10-CM

## 2023-05-20 DIAGNOSIS — E11.59 TYPE 2 DIABETES MELLITUS WITH OTHER CIRCULATORY COMPLICATION, WITH LONG-TERM CURRENT USE OF INSULIN (H): ICD-10-CM

## 2023-05-20 DIAGNOSIS — Z79.4 TYPE 2 DIABETES MELLITUS WITH OTHER CIRCULATORY COMPLICATION, WITH LONG-TERM CURRENT USE OF INSULIN (H): ICD-10-CM

## 2023-05-20 PROBLEM — E10.9 TYPE 1 DIABETES MELLITUS WITHOUT COMPLICATION (H): Status: ACTIVE | Noted: 2022-12-30

## 2023-05-20 LAB
ERYTHROCYTE [DISTWIDTH] IN BLOOD BY AUTOMATED COUNT: 12.2 % (ref 10–15)
HCT VFR BLD AUTO: 30.6 % (ref 40–53)
HGB BLD-MCNC: 10.7 G/DL (ref 13.3–17.7)
LACTATE SERPL-SCNC: 2.6 MMOL/L (ref 0.7–2)
MCH RBC QN AUTO: 29.5 PG (ref 26.5–33)
MCHC RBC AUTO-ENTMCNC: 35 G/DL (ref 31.5–36.5)
MCV RBC AUTO: 84 FL (ref 78–100)
PLATELET # BLD AUTO: 373 10E3/UL (ref 150–450)
RBC # BLD AUTO: 3.63 10E6/UL (ref 4.4–5.9)
WBC # BLD AUTO: 6.9 10E3/UL (ref 4–11)

## 2023-05-20 PROCEDURE — 99291 CRITICAL CARE FIRST HOUR: CPT

## 2023-05-20 PROCEDURE — 36415 COLL VENOUS BLD VENIPUNCTURE: CPT | Performed by: EMERGENCY MEDICINE

## 2023-05-20 PROCEDURE — 87040 BLOOD CULTURE FOR BACTERIA: CPT | Performed by: EMERGENCY MEDICINE

## 2023-05-20 PROCEDURE — 83605 ASSAY OF LACTIC ACID: CPT | Performed by: EMERGENCY MEDICINE

## 2023-05-20 PROCEDURE — 80048 BASIC METABOLIC PNL TOTAL CA: CPT | Performed by: EMERGENCY MEDICINE

## 2023-05-20 PROCEDURE — 87077 CULTURE AEROBIC IDENTIFY: CPT | Performed by: EMERGENCY MEDICINE

## 2023-05-20 PROCEDURE — 87205 SMEAR GRAM STAIN: CPT | Performed by: EMERGENCY MEDICINE

## 2023-05-20 PROCEDURE — 85027 COMPLETE CBC AUTOMATED: CPT | Performed by: EMERGENCY MEDICINE

## 2023-05-20 RX ORDER — CEFAZOLIN SODIUM 1 G/50ML
20 SOLUTION INTRAVENOUS ONCE
Status: COMPLETED | OUTPATIENT
Start: 2023-05-21 | End: 2023-05-21

## 2023-05-20 RX ORDER — PIPERACILLIN SODIUM, TAZOBACTAM SODIUM 3; .375 G/15ML; G/15ML
3.38 INJECTION, POWDER, LYOPHILIZED, FOR SOLUTION INTRAVENOUS ONCE
Status: COMPLETED | OUTPATIENT
Start: 2023-05-21 | End: 2023-05-21

## 2023-05-20 ASSESSMENT — ENCOUNTER SYMPTOMS
WOUND: 1
FEVER: 0
CHILLS: 0

## 2023-05-21 ENCOUNTER — APPOINTMENT (OUTPATIENT)
Dept: ULTRASOUND IMAGING | Facility: HOSPITAL | Age: 58
End: 2023-05-21
Attending: INTERNAL MEDICINE
Payer: COMMERCIAL

## 2023-05-21 ENCOUNTER — APPOINTMENT (OUTPATIENT)
Dept: MRI IMAGING | Facility: HOSPITAL | Age: 58
End: 2023-05-21
Attending: INTERNAL MEDICINE
Payer: COMMERCIAL

## 2023-05-21 ENCOUNTER — APPOINTMENT (OUTPATIENT)
Dept: RADIOLOGY | Facility: HOSPITAL | Age: 58
End: 2023-05-21
Attending: EMERGENCY MEDICINE
Payer: COMMERCIAL

## 2023-05-21 PROBLEM — A41.9 SEPSIS, DUE TO UNSPECIFIED ORGANISM, UNSPECIFIED WHETHER ACUTE ORGAN DYSFUNCTION PRESENT (H): Status: ACTIVE | Noted: 2023-05-21

## 2023-05-21 PROBLEM — L03.116 CELLULITIS OF LEFT FOOT: Status: ACTIVE | Noted: 2023-05-21

## 2023-05-21 PROBLEM — R73.9 HYPERGLYCEMIA: Status: ACTIVE | Noted: 2023-05-21

## 2023-05-21 LAB
ANION GAP SERPL CALCULATED.3IONS-SCNC: 10 MMOL/L (ref 7–15)
ANION GAP SERPL CALCULATED.3IONS-SCNC: 13 MMOL/L (ref 7–15)
BASOPHILS # BLD AUTO: 0 10E3/UL (ref 0–0.2)
BASOPHILS NFR BLD AUTO: 0 %
BUN SERPL-MCNC: 28.7 MG/DL (ref 8–23)
BUN SERPL-MCNC: 30.4 MG/DL (ref 8–23)
CALCIUM SERPL-MCNC: 8.4 MG/DL (ref 8.6–10)
CALCIUM SERPL-MCNC: 9 MG/DL (ref 8.6–10)
CHLORIDE SERPL-SCNC: 106 MMOL/L (ref 98–107)
CHLORIDE SERPL-SCNC: 96 MMOL/L (ref 98–107)
CREAT SERPL-MCNC: 1.34 MG/DL (ref 0.67–1.17)
CREAT SERPL-MCNC: 1.59 MG/DL (ref 0.67–1.17)
CRP SERPL-MCNC: 43.5 MG/L
DEPRECATED HCO3 PLAS-SCNC: 23 MMOL/L (ref 22–29)
DEPRECATED HCO3 PLAS-SCNC: 23 MMOL/L (ref 22–29)
EOSINOPHIL # BLD AUTO: 0.2 10E3/UL (ref 0–0.7)
EOSINOPHIL NFR BLD AUTO: 3 %
ERYTHROCYTE [DISTWIDTH] IN BLOOD BY AUTOMATED COUNT: 12.5 % (ref 10–15)
ERYTHROCYTE [SEDIMENTATION RATE] IN BLOOD BY WESTERGREN METHOD: 74 MM/HR (ref 0–20)
GFR SERPL CREATININE-BSD FRML MDRD: 50 ML/MIN/1.73M2
GFR SERPL CREATININE-BSD FRML MDRD: 61 ML/MIN/1.73M2
GLUCOSE BLDC GLUCOMTR-MCNC: 174 MG/DL (ref 70–99)
GLUCOSE BLDC GLUCOMTR-MCNC: 205 MG/DL (ref 70–99)
GLUCOSE BLDC GLUCOMTR-MCNC: 231 MG/DL (ref 70–99)
GLUCOSE BLDC GLUCOMTR-MCNC: 233 MG/DL (ref 70–99)
GLUCOSE BLDC GLUCOMTR-MCNC: 277 MG/DL (ref 70–99)
GLUCOSE BLDC GLUCOMTR-MCNC: 425 MG/DL (ref 70–99)
GLUCOSE SERPL-MCNC: 178 MG/DL (ref 70–99)
GLUCOSE SERPL-MCNC: 590 MG/DL (ref 70–99)
HCT VFR BLD AUTO: 28.3 % (ref 40–53)
HGB BLD-MCNC: 9.5 G/DL (ref 13.3–17.7)
IMM GRANULOCYTES # BLD: 0 10E3/UL
IMM GRANULOCYTES NFR BLD: 0 %
LACTATE SERPL-SCNC: 2.3 MMOL/L (ref 0.7–2)
LYMPHOCYTES # BLD AUTO: 0.6 10E3/UL (ref 0.8–5.3)
LYMPHOCYTES NFR BLD AUTO: 8 %
MCH RBC QN AUTO: 28.7 PG (ref 26.5–33)
MCHC RBC AUTO-ENTMCNC: 33.6 G/DL (ref 31.5–36.5)
MCV RBC AUTO: 86 FL (ref 78–100)
MONOCYTES # BLD AUTO: 0.2 10E3/UL (ref 0–1.3)
MONOCYTES NFR BLD AUTO: 2 %
NEUTROPHILS # BLD AUTO: 6.8 10E3/UL (ref 1.6–8.3)
NEUTROPHILS NFR BLD AUTO: 87 %
NRBC # BLD AUTO: 0 10E3/UL
NRBC BLD AUTO-RTO: 0 /100
PLATELET # BLD AUTO: 300 10E3/UL (ref 150–450)
POTASSIUM SERPL-SCNC: 4 MMOL/L (ref 3.4–5.3)
POTASSIUM SERPL-SCNC: 4.3 MMOL/L (ref 3.4–5.3)
RBC # BLD AUTO: 3.31 10E6/UL (ref 4.4–5.9)
SODIUM SERPL-SCNC: 132 MMOL/L (ref 136–145)
SODIUM SERPL-SCNC: 139 MMOL/L (ref 136–145)
WBC # BLD AUTO: 7.8 10E3/UL (ref 4–11)

## 2023-05-21 PROCEDURE — 250N000011 HC RX IP 250 OP 636: Performed by: INTERNAL MEDICINE

## 2023-05-21 PROCEDURE — 36415 COLL VENOUS BLD VENIPUNCTURE: CPT | Performed by: INTERNAL MEDICINE

## 2023-05-21 PROCEDURE — 96375 TX/PRO/DX INJ NEW DRUG ADDON: CPT

## 2023-05-21 PROCEDURE — 250N000013 HC RX MED GY IP 250 OP 250 PS 637: Performed by: STUDENT IN AN ORGANIZED HEALTH CARE EDUCATION/TRAINING PROGRAM

## 2023-05-21 PROCEDURE — 96368 THER/DIAG CONCURRENT INF: CPT

## 2023-05-21 PROCEDURE — 73630 X-RAY EXAM OF FOOT: CPT | Mod: LT

## 2023-05-21 PROCEDURE — 250N000013 HC RX MED GY IP 250 OP 250 PS 637: Performed by: INTERNAL MEDICINE

## 2023-05-21 PROCEDURE — 83605 ASSAY OF LACTIC ACID: CPT | Performed by: EMERGENCY MEDICINE

## 2023-05-21 PROCEDURE — 85025 COMPLETE CBC W/AUTO DIFF WBC: CPT | Performed by: INTERNAL MEDICINE

## 2023-05-21 PROCEDURE — 120N000001 HC R&B MED SURG/OB

## 2023-05-21 PROCEDURE — 258N000003 HC RX IP 258 OP 636: Performed by: EMERGENCY MEDICINE

## 2023-05-21 PROCEDURE — 250N000011 HC RX IP 250 OP 636: Performed by: EMERGENCY MEDICINE

## 2023-05-21 PROCEDURE — 93922 UPR/L XTREMITY ART 2 LEVELS: CPT

## 2023-05-21 PROCEDURE — 93925 LOWER EXTREMITY STUDY: CPT

## 2023-05-21 PROCEDURE — A9585 GADOBUTROL INJECTION: HCPCS | Performed by: STUDENT IN AN ORGANIZED HEALTH CARE EDUCATION/TRAINING PROGRAM

## 2023-05-21 PROCEDURE — 255N000002 HC RX 255 OP 636: Performed by: STUDENT IN AN ORGANIZED HEALTH CARE EDUCATION/TRAINING PROGRAM

## 2023-05-21 PROCEDURE — 85652 RBC SED RATE AUTOMATED: CPT | Performed by: INTERNAL MEDICINE

## 2023-05-21 PROCEDURE — 87040 BLOOD CULTURE FOR BACTERIA: CPT | Performed by: EMERGENCY MEDICINE

## 2023-05-21 PROCEDURE — 73720 MRI LWR EXTREMITY W/O&W/DYE: CPT | Mod: LT

## 2023-05-21 PROCEDURE — 82962 GLUCOSE BLOOD TEST: CPT

## 2023-05-21 PROCEDURE — 250N000012 HC RX MED GY IP 250 OP 636 PS 637: Performed by: INTERNAL MEDICINE

## 2023-05-21 PROCEDURE — 250N000012 HC RX MED GY IP 250 OP 636 PS 637: Performed by: STUDENT IN AN ORGANIZED HEALTH CARE EDUCATION/TRAINING PROGRAM

## 2023-05-21 PROCEDURE — 99223 1ST HOSP IP/OBS HIGH 75: CPT | Performed by: PODIATRIST

## 2023-05-21 PROCEDURE — 96365 THER/PROPH/DIAG IV INF INIT: CPT

## 2023-05-21 PROCEDURE — 86140 C-REACTIVE PROTEIN: CPT | Performed by: INTERNAL MEDICINE

## 2023-05-21 PROCEDURE — 99223 1ST HOSP IP/OBS HIGH 75: CPT | Performed by: INTERNAL MEDICINE

## 2023-05-21 PROCEDURE — 36415 COLL VENOUS BLD VENIPUNCTURE: CPT | Performed by: EMERGENCY MEDICINE

## 2023-05-21 PROCEDURE — 258N000003 HC RX IP 258 OP 636: Performed by: INTERNAL MEDICINE

## 2023-05-21 PROCEDURE — 250N000012 HC RX MED GY IP 250 OP 636 PS 637: Performed by: EMERGENCY MEDICINE

## 2023-05-21 PROCEDURE — 80048 BASIC METABOLIC PNL TOTAL CA: CPT | Performed by: INTERNAL MEDICINE

## 2023-05-21 PROCEDURE — 250N000013 HC RX MED GY IP 250 OP 250 PS 637: Performed by: EMERGENCY MEDICINE

## 2023-05-21 RX ORDER — OXYCODONE HYDROCHLORIDE 5 MG/1
5 TABLET ORAL EVERY 4 HOURS PRN
Status: DISCONTINUED | OUTPATIENT
Start: 2023-05-21 | End: 2023-05-26 | Stop reason: HOSPADM

## 2023-05-21 RX ORDER — HYDROMORPHONE HYDROCHLORIDE 1 MG/ML
0.5 INJECTION, SOLUTION INTRAMUSCULAR; INTRAVENOUS; SUBCUTANEOUS
Status: DISCONTINUED | OUTPATIENT
Start: 2023-05-21 | End: 2023-05-26 | Stop reason: HOSPADM

## 2023-05-21 RX ORDER — ACETAMINOPHEN 650 MG/1
650 SUPPOSITORY RECTAL EVERY 6 HOURS PRN
Status: DISCONTINUED | OUTPATIENT
Start: 2023-05-21 | End: 2023-05-26 | Stop reason: HOSPADM

## 2023-05-21 RX ORDER — NALOXONE HYDROCHLORIDE 0.4 MG/ML
0.2 INJECTION, SOLUTION INTRAMUSCULAR; INTRAVENOUS; SUBCUTANEOUS
Status: DISCONTINUED | OUTPATIENT
Start: 2023-05-21 | End: 2023-05-26 | Stop reason: HOSPADM

## 2023-05-21 RX ORDER — LIDOCAINE 40 MG/G
CREAM TOPICAL
Status: DISCONTINUED | OUTPATIENT
Start: 2023-05-21 | End: 2023-05-24

## 2023-05-21 RX ORDER — ACETAMINOPHEN 325 MG/1
975 TABLET ORAL ONCE
Status: COMPLETED | OUTPATIENT
Start: 2023-05-21 | End: 2023-05-21

## 2023-05-21 RX ORDER — ENOXAPARIN SODIUM 100 MG/ML
40 INJECTION SUBCUTANEOUS EVERY 24 HOURS
Status: DISCONTINUED | OUTPATIENT
Start: 2023-05-21 | End: 2023-05-26 | Stop reason: HOSPADM

## 2023-05-21 RX ORDER — GABAPENTIN 300 MG/1
300 CAPSULE ORAL 2 TIMES DAILY
Status: DISCONTINUED | OUTPATIENT
Start: 2023-05-21 | End: 2023-05-26 | Stop reason: HOSPADM

## 2023-05-21 RX ORDER — SODIUM CHLORIDE 9 MG/ML
INJECTION, SOLUTION INTRAVENOUS ONCE
Status: COMPLETED | OUTPATIENT
Start: 2023-05-21 | End: 2023-05-21

## 2023-05-21 RX ORDER — GADOBUTROL 604.72 MG/ML
6 INJECTION INTRAVENOUS ONCE
Status: COMPLETED | OUTPATIENT
Start: 2023-05-21 | End: 2023-05-21

## 2023-05-21 RX ORDER — NALOXONE HYDROCHLORIDE 0.4 MG/ML
0.4 INJECTION, SOLUTION INTRAMUSCULAR; INTRAVENOUS; SUBCUTANEOUS
Status: DISCONTINUED | OUTPATIENT
Start: 2023-05-21 | End: 2023-05-26 | Stop reason: HOSPADM

## 2023-05-21 RX ORDER — VANCOMYCIN HYDROCHLORIDE 1 G/200ML
1000 INJECTION, SOLUTION INTRAVENOUS EVERY 24 HOURS
Status: DISCONTINUED | OUTPATIENT
Start: 2023-05-22 | End: 2023-05-22

## 2023-05-21 RX ORDER — ACETAMINOPHEN 325 MG/1
650 TABLET ORAL EVERY 6 HOURS PRN
Status: DISCONTINUED | OUTPATIENT
Start: 2023-05-21 | End: 2023-05-26 | Stop reason: HOSPADM

## 2023-05-21 RX ORDER — DOCUSATE SODIUM 100 MG/1
100 CAPSULE, LIQUID FILLED ORAL 2 TIMES DAILY
Status: DISCONTINUED | OUTPATIENT
Start: 2023-05-21 | End: 2023-05-26 | Stop reason: HOSPADM

## 2023-05-21 RX ORDER — DEXTROSE MONOHYDRATE 25 G/50ML
25-50 INJECTION, SOLUTION INTRAVENOUS
Status: DISCONTINUED | OUTPATIENT
Start: 2023-05-21 | End: 2023-05-26 | Stop reason: HOSPADM

## 2023-05-21 RX ORDER — ONDANSETRON 2 MG/ML
4 INJECTION INTRAMUSCULAR; INTRAVENOUS EVERY 6 HOURS PRN
Status: DISCONTINUED | OUTPATIENT
Start: 2023-05-21 | End: 2023-05-26 | Stop reason: HOSPADM

## 2023-05-21 RX ORDER — NICOTINE POLACRILEX 4 MG
15-30 LOZENGE BUCCAL
Status: DISCONTINUED | OUTPATIENT
Start: 2023-05-21 | End: 2023-05-26 | Stop reason: HOSPADM

## 2023-05-21 RX ORDER — KETOROLAC TROMETHAMINE 15 MG/ML
15 INJECTION, SOLUTION INTRAMUSCULAR; INTRAVENOUS ONCE
Status: COMPLETED | OUTPATIENT
Start: 2023-05-21 | End: 2023-05-21

## 2023-05-21 RX ORDER — GABAPENTIN 300 MG/1
300 CAPSULE ORAL 2 TIMES DAILY
Status: ON HOLD | COMMUNITY
End: 2024-08-23

## 2023-05-21 RX ORDER — ONDANSETRON 4 MG/1
4 TABLET, ORALLY DISINTEGRATING ORAL EVERY 6 HOURS PRN
Status: DISCONTINUED | OUTPATIENT
Start: 2023-05-21 | End: 2023-05-26 | Stop reason: HOSPADM

## 2023-05-21 RX ORDER — SODIUM CHLORIDE 9 MG/ML
INJECTION, SOLUTION INTRAVENOUS CONTINUOUS
Status: DISCONTINUED | OUTPATIENT
Start: 2023-05-21 | End: 2023-05-22

## 2023-05-21 RX ORDER — PIPERACILLIN SODIUM, TAZOBACTAM SODIUM 3; .375 G/15ML; G/15ML
3.38 INJECTION, POWDER, LYOPHILIZED, FOR SOLUTION INTRAVENOUS EVERY 8 HOURS
Status: DISCONTINUED | OUTPATIENT
Start: 2023-05-21 | End: 2023-05-22

## 2023-05-21 RX ADMIN — KETOROLAC TROMETHAMINE 15 MG: 15 INJECTION, SOLUTION INTRAMUSCULAR; INTRAVENOUS at 00:35

## 2023-05-21 RX ADMIN — SODIUM CHLORIDE: 9 INJECTION, SOLUTION INTRAVENOUS at 01:17

## 2023-05-21 RX ADMIN — INSULIN ASPART 4 UNITS: 100 INJECTION, SOLUTION INTRAVENOUS; SUBCUTANEOUS at 18:06

## 2023-05-21 RX ADMIN — ENOXAPARIN SODIUM 40 MG: 40 INJECTION SUBCUTANEOUS at 08:09

## 2023-05-21 RX ADMIN — VANCOMYCIN HYDROCHLORIDE 1250 MG: 5 INJECTION, POWDER, LYOPHILIZED, FOR SOLUTION INTRAVENOUS at 00:16

## 2023-05-21 RX ADMIN — SODIUM CHLORIDE: 9 INJECTION, SOLUTION INTRAVENOUS at 10:08

## 2023-05-21 RX ADMIN — INSULIN ASPART 2 UNITS: 100 INJECTION, SOLUTION INTRAVENOUS; SUBCUTANEOUS at 08:06

## 2023-05-21 RX ADMIN — SODIUM CHLORIDE 8 UNITS: 9 INJECTION, SOLUTION INTRAVENOUS at 00:35

## 2023-05-21 RX ADMIN — PIPERACILLIN AND TAZOBACTAM 3.38 G: 3; .375 INJECTION, POWDER, LYOPHILIZED, FOR SOLUTION INTRAVENOUS at 14:01

## 2023-05-21 RX ADMIN — OXYCODONE HYDROCHLORIDE 5 MG: 5 TABLET ORAL at 13:23

## 2023-05-21 RX ADMIN — INSULIN ASPART 6 UNITS: 100 INJECTION, SOLUTION INTRAVENOUS; SUBCUTANEOUS at 13:23

## 2023-05-21 RX ADMIN — ACETAMINOPHEN 975 MG: 325 TABLET ORAL at 00:35

## 2023-05-21 RX ADMIN — SODIUM CHLORIDE: 9 INJECTION, SOLUTION INTRAVENOUS at 21:15

## 2023-05-21 RX ADMIN — GADOBUTROL 6 ML: 604.72 INJECTION INTRAVENOUS at 12:22

## 2023-05-21 RX ADMIN — PIPERACILLIN AND TAZOBACTAM 3.38 G: 3; .375 INJECTION, POWDER, LYOPHILIZED, FOR SOLUTION INTRAVENOUS at 21:13

## 2023-05-21 RX ADMIN — EMPAGLIFLOZIN 25 MG: 25 TABLET, FILM COATED ORAL at 14:01

## 2023-05-21 RX ADMIN — INSULIN GLARGINE 16 UNITS: 100 INJECTION, SOLUTION SUBCUTANEOUS at 21:41

## 2023-05-21 RX ADMIN — GABAPENTIN 300 MG: 300 CAPSULE ORAL at 14:01

## 2023-05-21 RX ADMIN — SODIUM CHLORIDE 1000 ML: 9 INJECTION, SOLUTION INTRAVENOUS at 00:01

## 2023-05-21 RX ADMIN — DOCUSATE SODIUM 100 MG: 100 CAPSULE, LIQUID FILLED ORAL at 21:11

## 2023-05-21 RX ADMIN — GABAPENTIN 300 MG: 300 CAPSULE ORAL at 21:11

## 2023-05-21 RX ADMIN — PIPERACILLIN AND TAZOBACTAM 3.38 G: 3; .375 INJECTION, POWDER, LYOPHILIZED, FOR SOLUTION INTRAVENOUS at 06:37

## 2023-05-21 RX ADMIN — OXYCODONE HYDROCHLORIDE 5 MG: 5 TABLET ORAL at 06:43

## 2023-05-21 RX ADMIN — PIPERACILLIN AND TAZOBACTAM 3.38 G: 3; .375 INJECTION, POWDER, LYOPHILIZED, FOR SOLUTION INTRAVENOUS at 00:01

## 2023-05-21 ASSESSMENT — ACTIVITIES OF DAILY LIVING (ADL)
DEPENDENT_IADLS:: INDEPENDENT
TRANSFERRING: 0-->INDEPENDENT
WEAR_GLASSES_OR_BLIND: YES
ADLS_ACUITY_SCORE: 24
CHANGE_IN_FUNCTIONAL_STATUS_SINCE_ONSET_OF_CURRENT_ILLNESS/INJURY: NO
ADLS_ACUITY_SCORE: 22
CONCENTRATING,_REMEMBERING_OR_MAKING_DECISIONS_DIFFICULTY: NO
ADLS_ACUITY_SCORE: 31
ADLS_ACUITY_SCORE: 22
FALL_HISTORY_WITHIN_LAST_SIX_MONTHS: NO
TRANSFERRING: 0-->INDEPENDENT
DOING_ERRANDS_INDEPENDENTLY_DIFFICULTY: NO
ADLS_ACUITY_SCORE: 24
WALKING_OR_CLIMBING_STAIRS_DIFFICULTY: YES
TOILETING_ISSUES: NO
HEARING_DIFFICULTY_OR_DEAF: NO
FALL_HISTORY_WITHIN_LAST_SIX_MONTHS: NO
ADLS_ACUITY_SCORE: 22
DOING_ERRANDS_INDEPENDENTLY_DIFFICULTY: NO
DIFFICULTY_EATING/SWALLOWING: NO
WEAR_GLASSES_OR_BLIND: YES
DEPENDENT_IADLS:: INDEPENDENT
ADLS_ACUITY_SCORE: 24
TRANSFERRING: 0-->INDEPENDENT
DIFFICULTY_EATING/SWALLOWING: NO
TOILETING_ISSUES: NO
DRESSING/BATHING_DIFFICULTY: NO
TRANSFERRING: 0-->INDEPENDENT
DIFFICULTY_COMMUNICATING: NO
ADLS_ACUITY_SCORE: 22
CHANGE_IN_FUNCTIONAL_STATUS_SINCE_ONSET_OF_CURRENT_ILLNESS/INJURY: NO
WALKING_OR_CLIMBING_STAIRS: STAIR CLIMBING DIFFICULTY, REQUIRES EQUIPMENT
WALKING_OR_CLIMBING_STAIRS_DIFFICULTY: YES
ADLS_ACUITY_SCORE: 21
ADLS_ACUITY_SCORE: 22
ADLS_ACUITY_SCORE: 22
DRESSING/BATHING_DIFFICULTY: NO
WALKING_OR_CLIMBING_STAIRS: STAIR CLIMBING DIFFICULTY, REQUIRES EQUIPMENT
ADLS_ACUITY_SCORE: 35
CONCENTRATING,_REMEMBERING_OR_MAKING_DECISIONS_DIFFICULTY: NO

## 2023-05-21 NOTE — PHARMACY-VANCOMYCIN DOSING SERVICE
"Pharmacy Vancomycin Initial Note  Date of Service May 21, 2023  Patient's  1965  57 year old, male  Indication: Osteomyelitis, Sepsis and Skin and Soft Tissue Infection  Current estimated CrCl = Estimated Creatinine Clearance: 43.9 mL/min (A) (based on SCr of 1.59 mg/dL (H)).  Creatinine for last 3 days  2023: 11:44 PM Creatinine 1.59 mg/dL    Vancomycin IV Administrations (past 72 hours)                   vancomycin (VANCOCIN) 1,250 mg in sodium chloride 0.9 % 250 mL intermittent infusion (mg) 1,250 mg New Bag 23 0016              Nephrotoxins and other renal medications (From now, onward)    Start     Dose/Rate Route Frequency Ordered Stop    23 0600  piperacillin-tazobactam (ZOSYN) 3.375 g vial to attach to  mL bag        Note to Pharmacy: For [facility], [facility] and [facility]: For Zosyn-naive patients, use the \"Zosyn initial dose + extended infusion\" order panel.    3.375 g  over 240 Minutes Intravenous EVERY 8 HOURS 23 0513                Plan:  1. Vancomycin  Intermittent pending am labs  2. Vancomycin monitoring method: AUC or traditional trough  3. Vancomycin therapeutic monitoring goal: 400-600 mg*h/L  4. Pharmacy will check vancomycin levels as appropriate in 1-3 Days.    5. Serum creatinine levels will be ordered daily for the first week of therapy and at least twice weekly for subsequent weeks.      Tanna Nair, [facility]    "

## 2023-05-21 NOTE — PLAN OF CARE
"  Problem: Wound Healing Progression  Goal: Optimal Wound Healing  Outcome: Not Progressing     Problem: Skin or Soft Tissue Infection  Goal: Absence of Infection Signs and Symptoms  Outcome: Not Progressing  Intervention: Minimize and Manage Infection Progression     Problem: Diabetes Comorbidity  Goal: Blood Glucose Level Within Targeted Range  Outcome: Progressing     Patient A&Ox4, ambulatory with assistance, wounds are INGE. Educated patient on the purpose of contact precautions for history of MRSA, and the importance of covering his wounds. Patient refused dressing for foot. Provider notified via [employer]io paging.   Patient came up from the ER with NS and vancomycin infusing. NS is running at 125 mL/hr.    after 8 units of insulin in ER. Presented with BG of 590, which he said \"is pretty normal for me.\"   Requested orders from provider: no new orders as of 4:41AM, but the attending is currently in the room with the patient.     BP (!) 141/77 (BP Location: Right arm)   Pulse 84   Temp 97.4  F (36.3  C) (Oral)   Resp 16   Ht 1.753 m (5' 9\")   Wt 62 kg (136 lb 11 oz)   SpO2 100%   BMI 20.18 kg/m          "

## 2023-05-21 NOTE — PROGRESS NOTES
Brief hospitalist note  57-year-old male patient admitted for management of left foot cellulitis with sepsis.  Podiatry consult pending.  Patient on broad-spectrum IV antibiotics.  Patient was examined and he states pain is reasonably controlled.  Admission medication reconciliation is done.     Plan  Continue management as per admitting hospitalist  Juan Pablo Conner MD

## 2023-05-21 NOTE — PROVIDER NOTIFICATION
"Paged the patient's attending (who is also cross cover) to request orders for patient. Also informed her that the patient's foot wounds are INGE and he was verbalizing intent to refuse future dressings.    Response: Attending said she would \"see him shortly.\"      "

## 2023-05-21 NOTE — CONSULTS
Care Management Initial Consult    General Information  Assessment completed with: Abdulaziz Barker  Type of CM/SW Visit: Initial Assessment    Primary Care Provider verified and updated as needed:     Readmission within the last 30 days:        Reason for Consult: discharge planning  Advance Care Planning:            Communication Assessment  Patient's communication style: spoken language (English or Bilingual)    Hearing Difficulty or Deaf: no   Wear Glasses or Blind: yes    Cognitive  Cognitive/Neuro/Behavioral: WDL                      Living Environment:   People in home: friend(s)  Patrick  Current living Arrangements: mobile home      Able to return to prior arrangements:         Family/Social Support:  Care provided by: self  Provides care for: no one  Marital Status:   Sibling(s), Other (specify) (Friend)          Description of Support System: Supportive, Involved         Current Resources:   Patient receiving home care services: No     Community Resources: None  Equipment currently used at home: none  Supplies currently used at home: None    Employment/Financial:  Employment Status: retired        Financial Concerns:             Does the patient's insurance plan have a 3 day qualifying hospital stay waiver?  No    Lifestyle & Psychosocial Needs:  Social Determinants of Health     Tobacco Use: Low Risk  (5/20/2023)    Patient History      Smoking Tobacco Use: Never      Smokeless Tobacco Use: Never      Passive Exposure: Not on file   Alcohol Use: Not on file   Financial Resource Strain: Not on file   Food Insecurity: Not on file   Transportation Needs: Not on file   Physical Activity: Not on file   Stress: Not on file   Social Connections: Not on file   Intimate Partner Violence: Not on file   Depression: At risk (4/17/2023)    PHQ-2      PHQ-2 Score: 3   Housing Stability: Not on file       Functional Status:  Prior to admission patient needed assistance:   Dependent ADLs:: Independent  Dependent  IADLs:: Independent       Mental Health Status:          Chemical Dependency Status:                Values/Beliefs:  Spiritual, Cultural Beliefs, [employer] Practices, Values that affect care:                 Additional Information:  RNCM met with patient at bedside per request. Role explained, initial assessment completed.     Patient lives with friend, Patrick, in a trailer home. Independent at baseline. Patient states no concerns, is requesting follow up with [facility] for further benefits/ assistance. RNCM placed referral to [facility] - [phone].     Patient's goal home.    CM will continue to follow care progression and aide in discharge planning as needed.     Elvira Olivo RN

## 2023-05-21 NOTE — ED TRIAGE NOTES
"Diabetic patient with left outside foot pain of 9 which he describes as shooting pain. He reports an ulcer on left foot small toe and sole of foot. He states he has lost several toes due to diabetes. Pain for 1 day. He states he put \"metahoney\" on it.     Triage Assessment     Row Name 05/20/23 0479       Triage Assessment (Adult)    Airway WDL WDL       Respiratory WDL    Respiratory WDL WDL       Skin Circulation/Temperature WDL    Skin Circulation/Temperature WDL WDL       Cardiac WDL    Cardiac WDL WDL       Peripheral/Neurovascular WDL    Peripheral Neurovascular WDL X  left foot pain       Cognitive/Neuro/Behavioral WDL    Cognitive/Neuro/Behavioral WDL WDL              "

## 2023-05-21 NOTE — CONSULTS
Subjective:    Pt is seen today for left fifth MTPJ cellulitis and ulcer.  Patient states he has had this for about 4 days.  Had an increasing erythema pain and swelling.  Was admitted to the hospital on 5/21/2023.  Has been on antibiotics and he states his foot is feeling better.  Has slight fever and chills.  He has poorly controlled diabetes mellitus with peripheral neuropathy.  History right second and third toe amputation January 2023.  Patient states he is has a scab here.  He has been seeing Dr. Jimenez but failed his last appointment.  He has never smoked.  He has a family history of diabetes.    ROS:  A 10-point review of systems was performed and is positive for that noted in the HPI and as seen below.  All other areas are negative.        No Known Allergies    No current outpatient medications on file.       Patient Active Problem List   Diagnosis     Type 2 diabetes mellitus with hyperglycemia, with long-term current use of insulin (H)     Current moderate episode of major depressive disorder without prior episode (H)     Hypokalemia     Hyponatremia     Essential hypertension     Chronic anemia     Diabetic ulcer of toe of right foot associated with diabetes mellitus due to underlying condition, with necrosis of muscle (H)     Osteomyelitis of ankle and foot (H)     Diabetic ulcer of other part of left foot associated with diabetes mellitus due to underlying condition, limited to breakdown of skin (H)     Ulcer of right foot with necrosis of muscle (H)     Cellulitis and abscess of foot excluding toe     Type 1 diabetes mellitus without complication (H)     Hyperglycemia     Cellulitis of left foot     Sepsis, due to unspecified organism, unspecified whether acute organ dysfunction present (H)       Past Medical History:   Diagnosis Date     Anemia      Depression      Diabetes mellitus, type 2 (H)      Diabetic foot ulcer (H)      Hypertension      Osteomyelitis (H)        Past Surgical History:  "  Procedure Laterality Date     AMPUTATE TOE(S) Right 2/17/2023    Procedure: AMPUTATION, digits two and three right foot;  Surgeon: Ulises Jimenez DPM;  Location: [facility] OR     IRRIGATION AND DEBRIDEMENT TOE, COMBINED Right 1/20/2023    Procedure: IRRIGATION AND DEBRIDEMENT digits 2 and 3 right foot;  Surgeon: Jf Huerta DPM;  Location: [facility] OR       Family History   Problem Relation Age of Onset     Diabetes Father        Social History     Tobacco Use     Smoking status: Never     Smokeless tobacco: Never   Vaping Use     Vaping status: Never Used   Substance Use Topics     Alcohol use: Never         Exam:    Vitals: BP (!) 140/68 (BP Location: Right arm)   Pulse 80   Temp 97.3  F (36.3  C) (Oral)   Resp 16   Ht 1.753 m (5' 9\")   Wt 62 kg (136 lb 11 oz)   SpO2 100%   BMI 20.18 kg/m    BMI: Body mass index is 20.18 kg/m .  Height: 5' 9\"    Constitutional/ general:  Pt is in no apparent distress, appears well-nourished.  Cooperative with history and physical exam.     Psych:  The patient answered questions appropriately.  Normal affect.  Seems to have reasonable expectations, in terms of treatment.     Eyes:  Visual scanning/ tracking without deficit.     Ears:  Response to auditory stimuli is normal.  negative hearing aid devices.  Auricles in proper alignment.     Lymphatic:  Popliteal lymph nodes not enlarged.     Lungs:  Non labored breathing, non labored speech. No cough.  No audible wheezing. Even, quiet breathing.       Vascular: Palpable dorsalis pedis pulse.  Narrative & Impression   [facility]  LOCATION: [facility]  DATE: 5/21/2023     1. EXAM: RESTING ANKLE-BRACHIAL INDICES (ABIs)   2. DUPLEX ARTERIAL ULTRASOUND OF THE LOWER EXTREMITIES BILATERAL     INDICATION: PAD. Diabetic wounds.  TECHNIQUE: Duplex imaging is performed utilizing gray-scale, two-dimensional images and color-flow imaging. Doppler waveform analysis and spectral Doppler " imaging is also performed.  COMPARISON: None.     FINDINGS:   SEGMENTAL BP  RIGHT (mmHg)  Brachial: 125  Ankle (PT): 183; Index 1.46  Ankle (DP): 151; Index 1.21  Digit: 131; Index 1.05     LEFT (mmHg)  Brachial: --  Ankle (PT): 178; Index 1.42  Ankle (DP): 151; Index 1.21  Digit: 86; Index 0.69     Resting ABIs are 1.5 on the right. Right digital index 1.0.     Resting ABIs are 1.4 on the left. Left digital index 0.69.     WAVEFORMS: Pedal waveforms are multiphasic bilaterally.      DUPLEX ARTERIAL ULTRASOUND FINDINGS:   LOWER EXTREMITY ARTERIAL DUPLEX EXAM   RIGHT (cm/s)  EIA: 154  CFA: 89  PFA: 58  SFA-Proximal: 102  SFA-Mid: 139  SFA-Distal: 101  Popliteal: 107  PTA: 107  FRANCESCO: 62  DPA: 71     LEFT (cm/s)  EIA: 116  CFA: 120  PFA: 66  SFA-Proximal: 118  SFA-Mid: 165  SFA-Distal: 131  Popliteal: 110  PTA: 95  FRANCESCO: 80  DPA: 106     RIGHT: Multiphasic waveforms throughout the right lower extremity. No significant arterial stenosis identified.     LEFT: Multiphasic waveforms throughout the left lower extremity. No significant arterial stenosis identified. 3.3 x 1.9 x 0.8 cm left groin lymph node. Normal-appearing fatty hilum.                                                                      IMPRESSION:  1.  RIGHT LOWER EXTREMITY: Right CHIQUITA is elevated. Findings consistent with vascular calcification. Right digital index is normal. Multiphasic waveforms throughout the right lower extremity with no significant arterial stenosis identified.  2.  LEFT LOWER EXTREMITY: Left CHIQUITA is elevated. Findings consistent with vascular calcification. Left digital index is normal. Multiphasic waveforms throughout the left lower extremity with no significant arterial stenosis identified.  3.  Left groin lymphadenopathy. I suspect this is a reactive node.       Neuro:  Alert and oriented x 3.  Light touch absent to ankles bilaterally.  Muscle compartments intact.    Derm: Somewhat thin and shiny with scant hair growth  noted.    Musculoskeletal:    Right foot dry eschar noted at amputation site of right second and third toes.  No drainage.  No erythema or edema.  No breakdown anywhere else on this foot.  Patient's left foot there is a dry ulcer plantar fifth metatarsal head.  There is blistering dorsal medial left fifth MTPJ.  There is surrounding erythema and edema.  There is no odor.  Slight amount of purulence noted.    Radiographic Exam:    Narrative & Impression   EXAM: MR FOOT LEFT W/O and W CONTRAST  LOCATION: [facility]  DATE/TIME: 5/21/2023 12:45 PM CDT     INDICATION: Left foot cellulitis. Lateral forefoot ulcer.  COMPARISON: Foot radiographic exam 5/21/2023. Foot MRI 1/18/2023.  TECHNIQUE: Routine. Additional postgadolinium T1 sequences were obtained.  IV CONTRAST: 6 mL Gadavist     FINDINGS:      JOINTS AND BONES:   -No evidence for left foot fracture, metatarsal stress fracture, or definitive evidence for osteomyelitis. Specifically, there is no confluent T1 hypointense signal in the 5th proximal phalangeal base or 5th metatarsal head or neck in the area of the   ulceration in the lateral forefoot dorsally. Mild left 1st MTP and metatarsal sesamoid chondromalacia. Navicular-medial cuneiform chondromalacia with subchondral marrow edema medial navicular distally. Mild synovitis at the 1st MTP joint. Nothing for   septic arthritis at the 5th MTP joint.     TENDONS:   -No acute tendon injury. Distal anterior tibialis tendon intact. Distal peroneus longus tendon intact. No forefoot tenosynovitis.      LIGAMENTS:   -Lisfranc ligament: Intact. No subluxation.     MUSCLES AND SOFT TISSUES:   -Mild intrinsic foot muscle atrophy. Patchy muscle edema without significant myositis. Dorsal foot soft tissue swelling. Soft tissue ulceration along the dorsal and lateral aspect of the forefoot at the level of the 5th MTP joint. Deep area of nonviable   soft tissue with adjacent granulation tissue. Mild dorsal  foot cellulitis.                                                                      IMPRESSION:  1.  Soft tissue wound/ulcer in the dorsal and lateral aspect of the forefoot at the level of the 5th MTP joint. Small deep area of nonviable subjacent soft tissue with adjacent cellulitis.  2.  No convincing evidence for acute osteomyelitis of the 5th toe or distal 5th metatarsal.  3.  Mild dorsal foot soft tissue swelling with cellulitis.  4.  Mild first MTP and metatarsal sesamoid chondromalacia. Mild navicular-medial cuneiform chondromalacia.  5.  Intact Lisfranc ligament. No midfoot subluxation.  6.  No forefoot flexor or extensor tenosynovitis.      Latest Reference Range & Units Most Recent   WBC 4.0 - 11.0 10e3/uL 7.8  5/21/23 06:27      Latest Reference Range & Units Most Recent   Sed Rate 0 - 20 mm/hr 74 (H)  5/21/23 06:27   (H): Data is abnormally high     Latest Reference Range & Units Most Recent   CRP Inflammation <5.00 mg/L 43.50 (H)  5/21/23 06:27   (H): Data is abnormally high     Latest Reference Range & Units Most Recent   Hemoglobin A1C 0.0 - 5.6 % 11.3 (H)  4/17/23 12:19   (H): Data is abnormally high     Collected 5/20/2023 11:46 PM      Status: Preliminary result      Visible to patient: No (not released)     Specimen Information: Foot, Left; Wound    0 Result Notes  Gram Stain Result   Abnormal   2+ Gram positive cocci      1+ WBC seen              Resulting Agency: [facility]           Specimen Collected: 05/20/23 11:46 PM Last Resulted: 05/21/23 11:33 AM               A:  Diabetes mellitus poorly controlled Peripheral neuropathy and LOPS  Status post right second and third toe amputation with eschar  Left fifth MTPJ ulcer with cellulitis    P: Discussed with patient right foot shows no signs of infection at this time.  There is no erythema and edema and a dry eschar here.  Patient will continue protecting this and keeping eschar offloaded.  Discussed cause of left fifth MTPJ cellulitis.  Discussed  with patient MRI shows no underlying osteomyelitis.  Cultures are growing out gram-positive cocci.  Discussed with patient he will need I&D of this foot sometime in the near future.  May stop n.p.o. discussed with patient importance of eating a good diet to help heal wounds.  Discussed importance of good control of blood sugars to help heal wounds and to fight infection.  Thank you for allowing me participate in the care of this patient.        Burt Gilbert, ENEIDA, FACFAS

## 2023-05-21 NOTE — H&P
[facility]    History and Physical - Hospitalist Service       Date of Admission:  5/20/2023    Assessment & Plan      Abdulaziz Rausch is a 57 year old male with medical history of poorly controlled type 2 diabetes mellitus, HTN, PAD status post right second and third toe amputations, hypertension, admitted on 5/20/2023 with nontraumatic worsening wound with pain, swelling and redness of the left foot for 1 day.  ED work-up showed marked hyperglycemia with blood sugar of 590, acute kidney injury, lactic acidosis and mild anemia.  Left foot x-ray showed a prominent ulcer at the level of the fifth metatarsal head with no evidence of osteomyelitis or displaced fracture.    #Left foot cellulitis with sepsis:  - Wound and blood cultures.  - Broad-spectrum IV antibiotics to include coverage for MRSA.  - Podiatry consult for possible I&D.  - MRI of the left foot to further evaluate for osteomyelitis.    #T2DM with hyperglycemia and left foot ulcer:  - Insulin sliding scale plus basal insulin.  - IV NS infusion.  - Lower extremity arterial duplex studies.  - Local wound care.  - Review compliance issues.    #CHRISTY:  - IV fluids.  - Limit potentially nephrotoxic agents.     Diet: Combination Diet Regular Diet Adult; Low Consistent Carb (45 g CHO per Meal) Diet    DVT Prophylaxis: Enoxaparin (Lovenox) SQ  Fischer Catheter: Not present  Lines: None     Cardiac Monitoring: None  Code Status: No CPR- Do NOT Intubate      Clinically Significant Risk Factors Present on Admission                  # Hypertension: Noted on problem list     # DMII: A1C = 11.3 % (Ref range: 0.0 - 5.6 %) within past 6 months             Disposition Plan Home     Expected Discharge Date: 05/27/2023                  Mar Florez MD  Hospitalist Service  [facility]  Securely message with [employer] (more info)  Text page via [employer] Paging/Directory      ______________________________________________________________________    Chief Complaint   Left foot pain x1 day    History is obtained from the patient    History of Present Illness   Abdulaziz Rausch is a 57 year old male who presented to the ED with the complaint of worsening left foot pain, swelling and redness for 1 day, preceded by a nontraumatic blister that eventually ulcerated on the plantar aspect of the affected foot proximal to the fifth toe. Foot pain radiates up the left leg.  No fever, chills, nausea, vomiting, anorexia, abdominal pain, diarrhea or urinary symptoms.  Reports recently finishing a course of oral antibiotics.      Past Medical History    Past Medical History:   Diagnosis Date     Anemia      Depression      Diabetes mellitus, type 2 (H)      Diabetic foot ulcer (H)      Hypertension      Osteomyelitis (H)        Past Surgical History   Past Surgical History:   Procedure Laterality Date     AMPUTATE TOE(S) Right 2/17/2023    Procedure: AMPUTATION, digits two and three right foot;  Surgeon: Ulises Jimenez DPM;  Location: [facility] OR     IRRIGATION AND DEBRIDEMENT TOE, COMBINED Right 1/20/2023    Procedure: IRRIGATION AND DEBRIDEMENT digits 2 and 3 right foot;  Surgeon: Jf Huerta DPM;  Location: [facility] OR       Prior to Admission Medications   Prior to Admission Medications   Prescriptions Last Dose Informant Patient Reported? Taking?   Continuous Blood Gluc  (FREESTYLE NOAH 2 READER) CHUCK   No No   Sig: Use to read blood sugars as per 's instructions.   Continuous Blood Gluc Sensor (FREESTYLE NOAH 2 SENSOR) MISC   No No   Sig: Change every 14 days.   alcohol swab prep pads   No No   Sig: Use to swab area of injection/ella as directed.   blood glucose (NO BRAND SPECIFIED) test strip   No No   Sig: Use to test blood sugar 4 times daily or as directed. To accompany: Blood Glucose Monitor Brands: per insurance.   blood glucose monitoring  (NO BRAND SPECIFIED) meter device kit   No No   Sig: Use to test blood sugar 4 time daily or as directed. Preferred blood glucose meter OR supplies to accompany: Blood Glucose Monitor Brands: per insurance.   empagliflozin (JARDIANCE) 25 MG TABS tablet   No No   Sig: Take 1 tablet (25 mg) by mouth daily   gabapentin (NEURONTIN) 300 MG capsule   No No   Sig: Take 1 capsule (300 mg) by mouth 2 times daily for 30 days   insulin glargine (LANTUS SOLOSTAR) 100 UNIT/ML pen   No No   Sig: Inject 16 Units Subcutaneous At Bedtime   insulin lispro (HUMALOG KWIKPEN) 100 UNIT/ML (1 unit dial) KWIKPEN   Yes No   Sig: Inject 6 Units Subcutaneous 3 times daily (before meals)   insulin pen needle (31G X 8 MM) 31G X 8 MM miscellaneous   No No   Sig: Use 4 pen needles daily or as directed.   lisinopril (ZESTRIL) 10 MG tablet   No No   Sig: Take 1 tablet (10 mg) by mouth daily for 30 days   metFORMIN (GLUCOPHAGE) 500 MG tablet   No No   Sig: Take 2 tablets (1,000 mg) by mouth 2 times daily (with meals)   oxyCODONE-acetaminophen (PERCOCET) 5-325 MG tablet   No No   Sig: Take 1 tablet by mouth every 6 hours as needed for pain   sulfamethoxazole-trimethoprim (BACTRIM DS) 800-160 MG tablet   No No   Sig: Take 1 tablet by mouth 2 times daily   thin (NO BRAND SPECIFIED) lancets   No No   Sig: Use with lanceting device. To accompany: Blood Glucose Monitor Brands: per insurance.      Facility-Administered Medications: None        Review of Systems    The 10 point Review of Systems is negative other than noted in the HPI.      Physical Exam   Vital Signs: Temp: 97.4  F (36.3  C) Temp src: Oral BP: (!) 141/77 (RN aware) Pulse: 84   Resp: 16 SpO2: 100 % O2 Device: None (Room air)    Weight: 136 lbs 10.96 oz    General Appearance: Well-nourished, well-developed, in no acute distress.  Eyes: PERRLA.  EOMI.  Clear conjunctivae.  Anicteric sclera.  HEENT: Normocephalic and atraumatic.  Ears, nose and pharynx normal.  Neck supple.  No  JVD.  Respiratory: Clear to auscultation with good air entry bilaterally.  Cardiovascular: Regular rate and rhythm.  S1 and S2 normal.  No murmurs, gallops or rubs.  GI: Soft, nontender, not distended.  Bowel sounds present.  No organomegaly.  Lymph/Hematologic: No palpable lymph nodes.  Genitourinary: No CVA tenderness.  Skin: Circular ulcer on the plantar aspect of the lateral distal foot overlying the fifth metatarsal and extending to the fascia/muscle layer.  Maceration noted of the left fifth toe.  Left foot swollen, warm and tender to touch.  Musculoskeletal:  F ROM x4.  Amputated right second and third toes.  Pedal pulses palpable.  Neurologic: Alert, oriented x3.  Sensation grossly intact.  No motor signs.  Psychiatric: Normal affect.  No hallucinations or delusions.    Medical Decision Making     77 MINUTES SPENT BY ME on the date of service doing chart review, history, exam, documentation & further activities per the note.      Data     I have personally reviewed the following data over the past 24 hrs:    6.9  \   10.7 (L)   / 373     132 (L) 96 (L) 30.4 (H) /  231 (H)   4.3 23 1.59 (H) \       Procal: N/A CRP: N/A Lactic Acid: 2.3 (H)         Imaging results reviewed over the past 24 hrs:   Recent Results (from the past 24 hour(s))   Foot XR, G/E 3 views, left    Narrative    EXAM: XR FOOT LEFT G/E 3 VIEWS  LOCATION: [facility]  DATE/TIME: 5/21/2023 12:44 AM CDT    INDICATION: Foot pain. Evaluate for osteomyelitis at distal fifth metatarsal.  COMPARISON: 01/18/2023      Impression    IMPRESSION: Prominent ulcer noted at the level of the fifth metatarsal head. No underlying radiographic evidence of osteomyelitis. No displaced fracture.

## 2023-05-21 NOTE — ED PROVIDER NOTES
EMERGENCY DEPARTMENT ENCOUNTER      NAME: Abdulaziz Rausch  AGE: 57 year old male  YOB: 1965  MRN: [id number]  EVALUATION DATE & TIME: 5/20/2023 11:22 PM    PCP: Daryn Pittman    ED PROVIDER: Melo Rod M.D.      Chief Complaint   Patient presents with     Foot Pain         FINAL IMPRESSION:  1. Cellulitis of left foot    2. Hyperglycemia    3. Sepsis, due to unspecified organism, unspecified whether acute organ dysfunction present (H)          ED COURSE & MEDICAL DECISION MAKING:    Pertinent Labs & Imaging studies reviewed below.  All EKGs below represent my independent interpretation.   ED Course as of 05/21/23 0104   Sat May 20, 2023   2335 Patient is a 57-year-old gentleman with history of insulin-dependent diabetes, chronic left foot ulcer, previous right toe amputations, here with 2 days of progressive pain, swelling, redness around his left foot ulcer.  He is hypertensive on arrival with normal temperature, heart rate in the 90s.  He is not feeling systemically ill, I do not suspect the suffering from sepsis, however given the rapid spreading of his cellulitis in the left foot I am concerned there could be deeper underlying infection such as osteomyelitis.  Wound culture will be obtained, broad-spectrum antibiotics will be started.  X-ray ordered, MRI may be needed as well.  Pictures added to the chart and note, see below.   2352 Lactic Acid(!): 2.6  1 L NS orderd   2353 WBC: 6.9   Sun May 21, 2023   0012 Glucose of 590   0013 Glucose(!!): 590  Hyperglycemia without DKI (normal bicarb, normal Agap)   0024 Glucose(!!): 590  8 units IV insulin ordered   0052 GLUCOSE BY METER POCT(!): 425   0053 Foot XR, G/E 3 views, left  Prominent ulcer noted at the level of the fifth metatarsal head. No underlying radiographic evidence of osteomyelitis. No displaced fracture.     Patient was given a liter and half of fluid, repeat lactate pending.  Broad-spectrum antibiotics have been ordered and  almost complete.  Patient was admitted to the hospitalist for cellulitis, rule out osteomyelitis.    Additional ED Course Timestamps:  11:26 PM I met with the patient, obtained history, performed an initial exam, and discussed options and plan for diagnostics and treatment here in the ED. PPE worn including gloves.    Medical Decision Making    History:    Supplemental history from: Documented in chart, if applicable    External Record(s) reviewed: Documented in chart, if applicable.    Work Up:    Chart documentation includes differential considered and any EKGs or imaging independently interpreted by provider, where specified.    In additional to work up documented, I considered the following work up: Documented in chart, if applicable.    External consultation:    Discussion of management with another provider: Documented in chart, if applicable    Complicating factors:    Care impacted by chronic illness: Diabetes and Hypertension    Care affected by social determinants of health: N/A    Disposition considerations: Admit.        At the conclusion of the encounter I discussed the results of all of the tests and the disposition. The questions were answered. The patient or family acknowledged understanding and was agreeable with the care plan.       30 minutes of critical care time for sepsis with rapidly growing cellulitis, threatened toe/foot requiring prompt antibiotics, admission. This excludes time spent performing interventions or procedures.    MEDICATIONS GIVEN IN THE EMERGENCY:  Medications   vancomycin (VANCOCIN) 1,250 mg in sodium chloride 0.9 % 250 mL intermittent infusion (1,250 mg Intravenous $New Bag 5/21/23 0016)   sodium chloride 0.9% infusion (has no administration in time range)   piperacillin-tazobactam (ZOSYN) 3.375 g vial to attach to  mL bag (3.375 g Intravenous $New Bag 5/21/23 0001)   0.9% sodium chloride BOLUS (1,000 mLs Intravenous $New Bag 5/21/23 0001)   insulin regular 1 unit/mL  "injection 8 Units (8 Units Intravenous $Given 5/21/23 0035)   acetaminophen (TYLENOL) tablet 975 mg (975 mg Oral $Given 5/21/23 0035)   ketorolac (TORADOL) injection 15 mg (15 mg Intravenous $Given 5/21/23 0035)         NEW PRESCRIPTIONS STARTED AT TODAY'S ER VISIT  New Prescriptions    No medications on file          =================================================================    HPI    Patient information was obtained from: Patient    Use of : N/A        Abdulaziz Rausch is a 57 year old male with a pertinent history of diabetes mellitus type 2, osteomyelitis, s/p right toe amputations (02/17/23), hypertension who presents to this ED for evaluation of foot wound. Within the past 24 hours, patient states he started to develop a wound to the bottom of his left foot which shoots up into his leg, just below his pinky toe. He also reports associated swelling and pain to the bottom of his foot. Patient has associated redness to the top of his foot.  .  No loss of appetite, nausea, fever, chills. No other reported complaints at this time. Patient has a history of diabetes and states his sugars have been high recently. Patient states he follows up with Dr. Jimenez, his podiatrist. Patient notes he was previously on antibiotics.    Per chart review, the patient had Doxycycline 100 mg BID prescribed on 03/16/23 and was discontinued on 04/17/23.             REVIEW OF SYSTEMS   Review of Systems   Constitutional: Negative for chills and fever.   Musculoskeletal:        Positive for left foot pain, left foot swelling.   Skin: Positive for rash (left foot) and wound (bottom of left foot).   All other systems reviewed and are negative.       VITALS:  BP (!) 147/70   Pulse 83   Temp 97.6  F (36.4  C) (Temporal)   Resp 17   Ht 1.753 m (5' 9\")   Wt 65.8 kg (145 lb)   SpO2 100%   BMI 21.41 kg/m      PHYSICAL EXAM    Constitutional: Well developed, well nourished. Comfortable appearing.  HENT: Normocephalic, " atraumatic, mucous membranes moist, nose normal. Neck- Supple, gross ROM intact.   Eyes: Pupils mid-range, conjunctiva without injection, no discharge.   Respiratory: Clear to auscultation bilaterally, no respiratory distress, no wheezing, speaks full sentences easily. No cough.  Cardiovascular: Normal heart rate, regular rhythm, no murmurs.   Musculoskeletal: Moving all 4 extremities intentionally and without pain. No obvious deformity. 2+ DP pulses. Left foot chronic with ulcer to base of the fifth metatarsal. overlying erythema, tenderness and warmth to the dorsal aspect of the foot. See pictures below.  Skin: Warm, dry, no rash.  Neurologic: Alert & oriented x 3, cranial nerves grossly intact.  Psychiatric: Affect normal, cooperative.                PROCEDURES:   None       I, Roosevelt Boyer am serving as a scribe to document services personally performed by Dr. Melo Rod based on my observation and the provider's statements to me. I, Melo Rod MD attest that Roosevelt Boyer is acting in a scribe capacity, has observed my performance of the services and has documented them in accordance with my direction.    Melo Rod M.D.  Emergency Medicine  [facility] EMERGENCY DEPARTMENT  [address] [place] [id number] [phone]  Dept: [phone]     Melo Rod MD  05/21/23 0102

## 2023-05-21 NOTE — PHARMACY-VANCOMYCIN DOSING SERVICE
"Pharmacy Vancomycin Initial Note  Date of Service May 21, 2023  Patient's  1965  57 year old, male    Indication: Osteomyelitis    Current estimated CrCl = Estimated Creatinine Clearance: 52.1 mL/min (A) (based on SCr of 1.34 mg/dL (H)).    Creatinine for last 3 days  2023: 11:44 PM Creatinine 1.59 mg/dL  2023:  6:27 AM Creatinine 1.34 mg/dL    Recent Vancomycin Level(s) for last 3 days  No results found for requested labs within last 3 days.      Vancomycin IV Administrations (past 72 hours)                   vancomycin (VANCOCIN) 1,250 mg in sodium chloride 0.9 % 250 mL intermittent infusion (mg) 1,250 mg New Bag 23 0016                Nephrotoxins and other renal medications (From now, onward)    Start     Dose/Rate Route Frequency Ordered Stop    23 0000  vancomycin (VANCOCIN) 1000 mg in dextrose 5% 200 mL PREMIX         1,000 mg  200 mL/hr over 1 Hours Intravenous EVERY 24 HOURS 23 0834      23 0600  piperacillin-tazobactam (ZOSYN) 3.375 g vial to attach to  mL bag        Note to Pharmacy: For [facility], [facility] and [facility]: For Zosyn-naive patients, use the \"Zosyn initial dose + extended infusion\" order panel.    3.375 g  over 240 Minutes Intravenous EVERY 8 HOURS 23 0513            Contrast Orders - past 72 hours (72h ago, onward)    None          [employer] Prediction of Planned Initial Vancomycin Regimen  Loading dose: 1250 mg IV bolus 00:16 on 2023  Regimen: 1000 mg IV every 24 hours.  Start time: 00:00 on 2023  Exposure target: AUC24 (range)400-600 mg/L.hr   AUC24,ss: 420 mg/L.hr  Probability of AUC24 > 400: 56 %  Ctrough,ss: 12.2 mg/L  Probability of Ctrough,ss > 20: 11 %  Probability of nephrotoxicity (Lodise MARINA ): 7 %          Plan:  1. Start vancomycin  1000 mg IV q24h.   2. Vancomycin monitoring method: AUC  3. Vancomycin therapeutic monitoring goal: 400-600 mg*h/L  4. Pharmacy will check vancomycin levels as appropriate in 1-3 Days.    5. Serum " creatinine levels will be ordered daily for the first week of therapy and at least twice weekly for subsequent weeks.      Mounika Cain, KadyD.

## 2023-05-21 NOTE — PHARMACY-VANCOMYCIN DOSING SERVICE
Pharmacy Vancomycin Initial Note  Date of Service May 20, 2023  Patient's  1965  57 year old, male  Indication: Osteomyelitis and Skin and Soft Tissue Infection  Current estimated CrCl = NA    Nephrotoxins and other renal medications (From now, onward)    Start     Dose/Rate Route Frequency Ordered Stop    23 0000  piperacillin-tazobactam (ZOSYN) 3.375 g vial to attach to  mL bag         3.375 g  over 30 Minutes Intravenous ONCE 23 0536          Plan:  Give vancomycin  1250 mg IV once.   Reconsult vanco per rx if continued IP.      Tanna Nair, [facility]

## 2023-05-21 NOTE — ED NOTES
Called report to YUDY Dorsey nurse at this time. P1 RN made aware that patient is ambulatory with assistance and fluids are running at this time.

## 2023-05-21 NOTE — PHARMACY-ADMISSION MEDICATION HISTORY
Pharmacist Admission Medication History    Admission medication history is complete. The information provided in this note is only as accurate as the sources available at the time of the update.    Medication reconciliation/reorder completed by provider prior to medication history? No    Information Source(s): Patient and CareEverywhere/SureScripts via in-person    Pertinent Information: Patient states he never picked up previous bactrim prescription     Changes made to PTA medication list:    Added: None    Deleted: bactrim, lisinopril    Changed: None    Allergies reviewed with patient and updates made in EHR: yes    Medication History Completed By: LIZ SALDANA RP 5/21/2023 9:39 AM    Prior to Admission medications    Medication Sig Last Dose Taking? Auth Provider Long Term End Date   empagliflozin (JARDIANCE) 25 MG TABS tablet Take 1 tablet (25 mg) by mouth daily 5/20/2023 at AM Yes Daryn Pittman MD     gabapentin (NEURONTIN) 300 MG capsule Take 300 mg by mouth 2 times daily 05/20/2023 at AM Yes Unknown, Entered By History No    insulin glargine (LANTUS SOLOSTAR) 100 UNIT/ML pen Inject 16 Units Subcutaneous At Bedtime 5/19/2023 at Bedtime Yes Sarah Cordero MD Yes    insulin lispro (HUMALOG KWIKPEN) 100 UNIT/ML (1 unit dial) KWIKPEN Inject 6 Units Subcutaneous 3 times daily (before meals) 5/19/2023 at PM Yes Daryn Pittman MD Yes    metFORMIN (GLUCOPHAGE) 500 MG tablet Take 2 tablets (1,000 mg) by mouth 2 times daily (with meals) 5/20/2023 at AM Yes Daryn Pittman MD Yes    oxyCODONE-acetaminophen (PERCOCET) 5-325 MG tablet Take 1 tablet by mouth every 6 hours as needed for pain Past Month at PRN Yes Sarah Cordero MD     alcohol swab prep pads Use to swab area of injection/ella as directed.   Daryn Pittman MD     blood glucose (NO BRAND SPECIFIED) test strip Use to test blood sugar 4 times daily or as directed. To accompany: Blood Glucose Monitor Brands: per insurance.   Daryn Pittman  MD     blood glucose monitoring (NO BRAND SPECIFIED) meter device kit Use to test blood sugar 4 time daily or as directed. Preferred blood glucose meter OR supplies to accompany: Blood Glucose Monitor Brands: per insurance.   Daryn Pittman MD     Continuous Blood Gluc  (FREESTYLE NOAH 2 READER) CHUCK Use to read blood sugars as per 's instructions.   Daryn Pittman MD     Continuous Blood Gluc Sensor (FREESTYLE NOAH 2 SENSOR) MISC Change every 14 days.   Daryn Pittman MD     insulin pen needle (31G X 8 MM) 31G X 8 MM miscellaneous Use 4 pen needles daily or as directed.   Miguel Ryan MD     thin (NO BRAND SPECIFIED) lancets Use with lanceting device. To accompany: Blood Glucose Monitor Brands: per insurance.   Daryn Pittman MD

## 2023-05-21 NOTE — PLAN OF CARE
Goal Outcome Evaluation:         Problem: Plan of Care - These are the overarching goals to be used throughout the patient stay.    Goal: Optimal Comfort and Wellbeing  Outcome: Progressing  Intervention: Monitor Pain and Promote Comfort  Recent Flowsheet Documentation  Taken 5/21/2023 0800 by Lydia Barlow, RN  Pain Management Interventions:   declines   emotional support     Pt alert and oriented X4, pleasant and cooperative.  Pt rating left foot pain #5, taking Oxycodone PRN pain.  Pt ambulating in room independently, gait steady.  Pt to radiology today for MRI and arterial US left foot.  Pt states numbness present in bilateral LE, foam dressing applied to LLE.   VSS, IV antibiotic given per order.        Lydia Barlow, RN

## 2023-05-21 NOTE — H&P (VIEW-ONLY)
Subjective:    Pt is seen today for left fifth MTPJ cellulitis and ulcer.  Patient states he has had this for about 4 days.  Had an increasing erythema pain and swelling.  Was admitted to the hospital on 5/21/2023.  Has been on antibiotics and he states his foot is feeling better.  Has slight fever and chills.  He has poorly controlled diabetes mellitus with peripheral neuropathy.  History right second and third toe amputation January 2023.  Patient states he is has a scab here.  He has been seeing Dr. Jimenez but failed his last appointment.  He has never smoked.  He has a family history of diabetes.    ROS:  A 10-point review of systems was performed and is positive for that noted in the HPI and as seen below.  All other areas are negative.        No Known Allergies    No current outpatient medications on file.       Patient Active Problem List   Diagnosis     Type 2 diabetes mellitus with hyperglycemia, with long-term current use of insulin (H)     Current moderate episode of major depressive disorder without prior episode (H)     Hypokalemia     Hyponatremia     Essential hypertension     Chronic anemia     Diabetic ulcer of toe of right foot associated with diabetes mellitus due to underlying condition, with necrosis of muscle (H)     Osteomyelitis of ankle and foot (H)     Diabetic ulcer of other part of left foot associated with diabetes mellitus due to underlying condition, limited to breakdown of skin (H)     Ulcer of right foot with necrosis of muscle (H)     Cellulitis and abscess of foot excluding toe     Type 1 diabetes mellitus without complication (H)     Hyperglycemia     Cellulitis of left foot     Sepsis, due to unspecified organism, unspecified whether acute organ dysfunction present (H)       Past Medical History:   Diagnosis Date     Anemia      Depression      Diabetes mellitus, type 2 (H)      Diabetic foot ulcer (H)      Hypertension      Osteomyelitis (H)        Past Surgical History:  "  Procedure Laterality Date     AMPUTATE TOE(S) Right 2/17/2023    Procedure: AMPUTATION, digits two and three right foot;  Surgeon: Ulises Jimenez DPM;  Location: [facility] OR     IRRIGATION AND DEBRIDEMENT TOE, COMBINED Right 1/20/2023    Procedure: IRRIGATION AND DEBRIDEMENT digits 2 and 3 right foot;  Surgeon: Jf Huerta DPM;  Location: [facility] OR       Family History   Problem Relation Age of Onset     Diabetes Father        Social History     Tobacco Use     Smoking status: Never     Smokeless tobacco: Never   Vaping Use     Vaping status: Never Used   Substance Use Topics     Alcohol use: Never         Exam:    Vitals: BP (!) 140/68 (BP Location: Right arm)   Pulse 80   Temp 97.3  F (36.3  C) (Oral)   Resp 16   Ht 1.753 m (5' 9\")   Wt 62 kg (136 lb 11 oz)   SpO2 100%   BMI 20.18 kg/m    BMI: Body mass index is 20.18 kg/m .  Height: 5' 9\"    Constitutional/ general:  Pt is in no apparent distress, appears well-nourished.  Cooperative with history and physical exam.     Psych:  The patient answered questions appropriately.  Normal affect.  Seems to have reasonable expectations, in terms of treatment.     Eyes:  Visual scanning/ tracking without deficit.     Ears:  Response to auditory stimuli is normal.  negative hearing aid devices.  Auricles in proper alignment.     Lymphatic:  Popliteal lymph nodes not enlarged.     Lungs:  Non labored breathing, non labored speech. No cough.  No audible wheezing. Even, quiet breathing.       Vascular: Palpable dorsalis pedis pulse.  Narrative & Impression   [facility]  LOCATION: [facility]  DATE: 5/21/2023     1. EXAM: RESTING ANKLE-BRACHIAL INDICES (ABIs)   2. DUPLEX ARTERIAL ULTRASOUND OF THE LOWER EXTREMITIES BILATERAL     INDICATION: PAD. Diabetic wounds.  TECHNIQUE: Duplex imaging is performed utilizing gray-scale, two-dimensional images and color-flow imaging. Doppler waveform analysis and spectral Doppler " imaging is also performed.  COMPARISON: None.     FINDINGS:   SEGMENTAL BP  RIGHT (mmHg)  Brachial: 125  Ankle (PT): 183; Index 1.46  Ankle (DP): 151; Index 1.21  Digit: 131; Index 1.05     LEFT (mmHg)  Brachial: --  Ankle (PT): 178; Index 1.42  Ankle (DP): 151; Index 1.21  Digit: 86; Index 0.69     Resting ABIs are 1.5 on the right. Right digital index 1.0.     Resting ABIs are 1.4 on the left. Left digital index 0.69.     WAVEFORMS: Pedal waveforms are multiphasic bilaterally.      DUPLEX ARTERIAL ULTRASOUND FINDINGS:   LOWER EXTREMITY ARTERIAL DUPLEX EXAM   RIGHT (cm/s)  EIA: 154  CFA: 89  PFA: 58  SFA-Proximal: 102  SFA-Mid: 139  SFA-Distal: 101  Popliteal: 107  PTA: 107  FRANCESCO: 62  DPA: 71     LEFT (cm/s)  EIA: 116  CFA: 120  PFA: 66  SFA-Proximal: 118  SFA-Mid: 165  SFA-Distal: 131  Popliteal: 110  PTA: 95  FRANCESCO: 80  DPA: 106     RIGHT: Multiphasic waveforms throughout the right lower extremity. No significant arterial stenosis identified.     LEFT: Multiphasic waveforms throughout the left lower extremity. No significant arterial stenosis identified. 3.3 x 1.9 x 0.8 cm left groin lymph node. Normal-appearing fatty hilum.                                                                      IMPRESSION:  1.  RIGHT LOWER EXTREMITY: Right CHIQUITA is elevated. Findings consistent with vascular calcification. Right digital index is normal. Multiphasic waveforms throughout the right lower extremity with no significant arterial stenosis identified.  2.  LEFT LOWER EXTREMITY: Left CHIQUITA is elevated. Findings consistent with vascular calcification. Left digital index is normal. Multiphasic waveforms throughout the left lower extremity with no significant arterial stenosis identified.  3.  Left groin lymphadenopathy. I suspect this is a reactive node.       Neuro:  Alert and oriented x 3.  Light touch absent to ankles bilaterally.  Muscle compartments intact.    Derm: Somewhat thin and shiny with scant hair growth  noted.    Musculoskeletal:    Right foot dry eschar noted at amputation site of right second and third toes.  No drainage.  No erythema or edema.  No breakdown anywhere else on this foot.  Patient's left foot there is a dry ulcer plantar fifth metatarsal head.  There is blistering dorsal medial left fifth MTPJ.  There is surrounding erythema and edema.  There is no odor.  Slight amount of purulence noted.    Radiographic Exam:    Narrative & Impression   EXAM: MR FOOT LEFT W/O and W CONTRAST  LOCATION: [facility]  DATE/TIME: 5/21/2023 12:45 PM CDT     INDICATION: Left foot cellulitis. Lateral forefoot ulcer.  COMPARISON: Foot radiographic exam 5/21/2023. Foot MRI 1/18/2023.  TECHNIQUE: Routine. Additional postgadolinium T1 sequences were obtained.  IV CONTRAST: 6 mL Gadavist     FINDINGS:      JOINTS AND BONES:   -No evidence for left foot fracture, metatarsal stress fracture, or definitive evidence for osteomyelitis. Specifically, there is no confluent T1 hypointense signal in the 5th proximal phalangeal base or 5th metatarsal head or neck in the area of the   ulceration in the lateral forefoot dorsally. Mild left 1st MTP and metatarsal sesamoid chondromalacia. Navicular-medial cuneiform chondromalacia with subchondral marrow edema medial navicular distally. Mild synovitis at the 1st MTP joint. Nothing for   septic arthritis at the 5th MTP joint.     TENDONS:   -No acute tendon injury. Distal anterior tibialis tendon intact. Distal peroneus longus tendon intact. No forefoot tenosynovitis.      LIGAMENTS:   -Lisfranc ligament: Intact. No subluxation.     MUSCLES AND SOFT TISSUES:   -Mild intrinsic foot muscle atrophy. Patchy muscle edema without significant myositis. Dorsal foot soft tissue swelling. Soft tissue ulceration along the dorsal and lateral aspect of the forefoot at the level of the 5th MTP joint. Deep area of nonviable   soft tissue with adjacent granulation tissue. Mild dorsal  foot cellulitis.                                                                      IMPRESSION:  1.  Soft tissue wound/ulcer in the dorsal and lateral aspect of the forefoot at the level of the 5th MTP joint. Small deep area of nonviable subjacent soft tissue with adjacent cellulitis.  2.  No convincing evidence for acute osteomyelitis of the 5th toe or distal 5th metatarsal.  3.  Mild dorsal foot soft tissue swelling with cellulitis.  4.  Mild first MTP and metatarsal sesamoid chondromalacia. Mild navicular-medial cuneiform chondromalacia.  5.  Intact Lisfranc ligament. No midfoot subluxation.  6.  No forefoot flexor or extensor tenosynovitis.      Latest Reference Range & Units Most Recent   WBC 4.0 - 11.0 10e3/uL 7.8  5/21/23 06:27      Latest Reference Range & Units Most Recent   Sed Rate 0 - 20 mm/hr 74 (H)  5/21/23 06:27   (H): Data is abnormally high     Latest Reference Range & Units Most Recent   CRP Inflammation <5.00 mg/L 43.50 (H)  5/21/23 06:27   (H): Data is abnormally high     Latest Reference Range & Units Most Recent   Hemoglobin A1C 0.0 - 5.6 % 11.3 (H)  4/17/23 12:19   (H): Data is abnormally high     Collected 5/20/2023 11:46 PM      Status: Preliminary result      Visible to patient: No (not released)     Specimen Information: Foot, Left; Wound    0 Result Notes  Gram Stain Result   Abnormal   2+ Gram positive cocci      1+ WBC seen              Resulting Agency: [facility]           Specimen Collected: 05/20/23 11:46 PM Last Resulted: 05/21/23 11:33 AM               A:  Diabetes mellitus poorly controlled Peripheral neuropathy and LOPS  Status post right second and third toe amputation with eschar  Left fifth MTPJ ulcer with cellulitis    P: Discussed with patient right foot shows no signs of infection at this time.  There is no erythema and edema and a dry eschar here.  Patient will continue protecting this and keeping eschar offloaded.  Discussed cause of left fifth MTPJ cellulitis.  Discussed  with patient MRI shows no underlying osteomyelitis.  Cultures are growing out gram-positive cocci.  Discussed with patient he will need I&D of this foot sometime in the near future.  May stop n.p.o. discussed with patient importance of eating a good diet to help heal wounds.  Discussed importance of good control of blood sugars to help heal wounds and to fight infection.  Thank you for allowing me participate in the care of this patient.        Burt Gilbert, ENEIDA, FACFAS

## 2023-05-22 ENCOUNTER — ANESTHESIA EVENT (OUTPATIENT)
Dept: SURGERY | Facility: HOSPITAL | Age: 58
End: 2023-05-22
Payer: COMMERCIAL

## 2023-05-22 LAB
ANION GAP SERPL CALCULATED.3IONS-SCNC: 10 MMOL/L (ref 7–15)
BASOPHILS # BLD AUTO: 0 10E3/UL (ref 0–0.2)
BASOPHILS NFR BLD AUTO: 1 %
BUN SERPL-MCNC: 22.4 MG/DL (ref 8–23)
CALCIUM SERPL-MCNC: 8.4 MG/DL (ref 8.6–10)
CHLORIDE SERPL-SCNC: 107 MMOL/L (ref 98–107)
CREAT SERPL-MCNC: 1.3 MG/DL (ref 0.67–1.17)
DEPRECATED HCO3 PLAS-SCNC: 22 MMOL/L (ref 22–29)
EOSINOPHIL # BLD AUTO: 0.4 10E3/UL (ref 0–0.7)
EOSINOPHIL NFR BLD AUTO: 6 %
ERYTHROCYTE [DISTWIDTH] IN BLOOD BY AUTOMATED COUNT: 12.5 % (ref 10–15)
GFR SERPL CREATININE-BSD FRML MDRD: 63 ML/MIN/1.73M2
GLUCOSE BLDC GLUCOMTR-MCNC: 104 MG/DL (ref 70–99)
GLUCOSE BLDC GLUCOMTR-MCNC: 153 MG/DL (ref 70–99)
GLUCOSE BLDC GLUCOMTR-MCNC: 169 MG/DL (ref 70–99)
GLUCOSE BLDC GLUCOMTR-MCNC: 247 MG/DL (ref 70–99)
GLUCOSE SERPL-MCNC: 154 MG/DL (ref 70–99)
HCT VFR BLD AUTO: 29 % (ref 40–53)
HGB BLD-MCNC: 9.7 G/DL (ref 13.3–17.7)
IMM GRANULOCYTES # BLD: 0 10E3/UL
IMM GRANULOCYTES NFR BLD: 0 %
LYMPHOCYTES # BLD AUTO: 1.4 10E3/UL (ref 0.8–5.3)
LYMPHOCYTES NFR BLD AUTO: 20 %
MCH RBC QN AUTO: 28.9 PG (ref 26.5–33)
MCHC RBC AUTO-ENTMCNC: 33.4 G/DL (ref 31.5–36.5)
MCV RBC AUTO: 86 FL (ref 78–100)
MONOCYTES # BLD AUTO: 0.7 10E3/UL (ref 0–1.3)
MONOCYTES NFR BLD AUTO: 10 %
NEUTROPHILS # BLD AUTO: 4.2 10E3/UL (ref 1.6–8.3)
NEUTROPHILS NFR BLD AUTO: 63 %
NRBC # BLD AUTO: 0 10E3/UL
NRBC BLD AUTO-RTO: 0 /100
PLATELET # BLD AUTO: 299 10E3/UL (ref 150–450)
POTASSIUM SERPL-SCNC: 4.1 MMOL/L (ref 3.4–5.3)
RBC # BLD AUTO: 3.36 10E6/UL (ref 4.4–5.9)
SODIUM SERPL-SCNC: 139 MMOL/L (ref 136–145)
WBC # BLD AUTO: 6.7 10E3/UL (ref 4–11)

## 2023-05-22 PROCEDURE — 250N000013 HC RX MED GY IP 250 OP 250 PS 637: Performed by: STUDENT IN AN ORGANIZED HEALTH CARE EDUCATION/TRAINING PROGRAM

## 2023-05-22 PROCEDURE — 250N000011 HC RX IP 250 OP 636: Performed by: INTERNAL MEDICINE

## 2023-05-22 PROCEDURE — 258N000003 HC RX IP 258 OP 636: Performed by: INTERNAL MEDICINE

## 2023-05-22 PROCEDURE — 36415 COLL VENOUS BLD VENIPUNCTURE: CPT | Performed by: INTERNAL MEDICINE

## 2023-05-22 PROCEDURE — 99233 SBSQ HOSP IP/OBS HIGH 50: CPT | Performed by: PODIATRIST

## 2023-05-22 PROCEDURE — 80048 BASIC METABOLIC PNL TOTAL CA: CPT | Performed by: INTERNAL MEDICINE

## 2023-05-22 PROCEDURE — 85025 COMPLETE CBC W/AUTO DIFF WBC: CPT | Performed by: INTERNAL MEDICINE

## 2023-05-22 PROCEDURE — 250N000013 HC RX MED GY IP 250 OP 250 PS 637: Performed by: INTERNAL MEDICINE

## 2023-05-22 PROCEDURE — 250N000011 HC RX IP 250 OP 636: Performed by: STUDENT IN AN ORGANIZED HEALTH CARE EDUCATION/TRAINING PROGRAM

## 2023-05-22 PROCEDURE — 258N000003 HC RX IP 258 OP 636: Performed by: STUDENT IN AN ORGANIZED HEALTH CARE EDUCATION/TRAINING PROGRAM

## 2023-05-22 PROCEDURE — 99232 SBSQ HOSP IP/OBS MODERATE 35: CPT | Performed by: STUDENT IN AN ORGANIZED HEALTH CARE EDUCATION/TRAINING PROGRAM

## 2023-05-22 PROCEDURE — 120N000001 HC R&B MED SURG/OB

## 2023-05-22 RX ORDER — PIPERACILLIN SODIUM, TAZOBACTAM SODIUM 3; .375 G/15ML; G/15ML
3.38 INJECTION, POWDER, LYOPHILIZED, FOR SOLUTION INTRAVENOUS EVERY 8 HOURS
Status: DISCONTINUED | OUTPATIENT
Start: 2023-05-22 | End: 2023-05-24

## 2023-05-22 RX ORDER — SULFAMETHOXAZOLE/TRIMETHOPRIM 800-160 MG
1 TABLET ORAL 2 TIMES DAILY
Qty: 10 TABLET | Refills: 0 | Status: SHIPPED | OUTPATIENT
Start: 2023-05-22 | End: 2023-05-22

## 2023-05-22 RX ORDER — SULFAMETHOXAZOLE/TRIMETHOPRIM 800-160 MG
1 TABLET ORAL 2 TIMES DAILY
Status: DISCONTINUED | OUTPATIENT
Start: 2023-05-22 | End: 2023-05-22

## 2023-05-22 RX ORDER — CEFAZOLIN SODIUM 1 G/50ML
1250 SOLUTION INTRAVENOUS EVERY 24 HOURS
Status: DISCONTINUED | OUTPATIENT
Start: 2023-05-22 | End: 2023-05-26 | Stop reason: HOSPADM

## 2023-05-22 RX ADMIN — INSULIN ASPART 5 UNITS: 100 INJECTION, SOLUTION INTRAVENOUS; SUBCUTANEOUS at 17:31

## 2023-05-22 RX ADMIN — SULFAMETHOXAZOLE AND TRIMETHOPRIM 1 TABLET: 800; 160 TABLET ORAL at 11:41

## 2023-05-22 RX ADMIN — GABAPENTIN 300 MG: 300 CAPSULE ORAL at 21:26

## 2023-05-22 RX ADMIN — EMPAGLIFLOZIN 25 MG: 25 TABLET, FILM COATED ORAL at 08:53

## 2023-05-22 RX ADMIN — PIPERACILLIN AND TAZOBACTAM 3.38 G: 3; .375 INJECTION, POWDER, LYOPHILIZED, FOR SOLUTION INTRAVENOUS at 17:32

## 2023-05-22 RX ADMIN — PIPERACILLIN AND TAZOBACTAM 3.38 G: 3; .375 INJECTION, POWDER, LYOPHILIZED, FOR SOLUTION INTRAVENOUS at 05:14

## 2023-05-22 RX ADMIN — OXYCODONE HYDROCHLORIDE 5 MG: 5 TABLET ORAL at 17:49

## 2023-05-22 RX ADMIN — SODIUM CHLORIDE: 9 INJECTION, SOLUTION INTRAVENOUS at 08:01

## 2023-05-22 RX ADMIN — ENOXAPARIN SODIUM 40 MG: 40 INJECTION SUBCUTANEOUS at 08:52

## 2023-05-22 RX ADMIN — VANCOMYCIN HYDROCHLORIDE 1250 MG: 5 INJECTION, POWDER, LYOPHILIZED, FOR SOLUTION INTRAVENOUS at 22:17

## 2023-05-22 RX ADMIN — INSULIN ASPART 2 UNITS: 100 INJECTION, SOLUTION INTRAVENOUS; SUBCUTANEOUS at 11:40

## 2023-05-22 RX ADMIN — VANCOMYCIN HYDROCHLORIDE 1000 MG: 1 INJECTION, SOLUTION INTRAVENOUS at 00:57

## 2023-05-22 RX ADMIN — GABAPENTIN 300 MG: 300 CAPSULE ORAL at 08:53

## 2023-05-22 ASSESSMENT — ACTIVITIES OF DAILY LIVING (ADL)
ADLS_ACUITY_SCORE: 24
ADLS_ACUITY_SCORE: 22
ADLS_ACUITY_SCORE: 23
ADLS_ACUITY_SCORE: 24
ADLS_ACUITY_SCORE: 24
ADLS_ACUITY_SCORE: 23
ADLS_ACUITY_SCORE: 23
ADLS_ACUITY_SCORE: 24

## 2023-05-22 NOTE — PLAN OF CARE
Problem: Diabetes Comorbidity  Goal: Blood Glucose Level Within Targeted Range  Outcome: Progressing     Problem: Infection  Goal: Absence of Infection Signs and Symptoms  Outcome: Progressing   Goal Outcome Evaluation:       Pt is A/O X4 but forgetful, denies pain all night, foot dressing was changed after falling off, pt ambulated to bathroom with assist of 1, NS running at 125 ml/hr, zosyn running.

## 2023-05-22 NOTE — PLAN OF CARE
Problem: Wound Healing Progression  Goal: Optimal Wound Healing  Outcome: Progressing  Intervention: Promote Wound Healing  Recent Flowsheet Documentation  Taken 5/21/2023 1645 by Mirian Kaur, RN  Activity Management: activity adjusted per tolerance     Problem: Plan of Care - These are the overarching goals to be used throughout the patient stay.    Goal: Optimal Comfort and Wellbeing  Outcome: Progressing     Pt remains alert and pleasant this shift. VSS.  Pt is able to make his needs known. Denies pain. Independent to bathroom and voiding adequately. Pt ate 100% of his dinner. Left foot wound covered with mepilex is intact. Will cont to monitor.

## 2023-05-22 NOTE — PLAN OF CARE
Goal Outcome Evaluation:         Problem: Plan of Care - These are the overarching goals to be used throughout the patient stay.    Goal: Plan of Care Review  Description: The Plan of Care Review/Shift note should be completed every shift.  The Outcome Evaluation is a brief statement about your assessment that the patient is improving, declining, or no change.  This information will be displayed automatically on your shift note.  Outcome: Progressing     Problem: Plan of Care - These are the overarching goals to be used throughout the patient stay.    Goal: Absence of Hospital-Acquired Illness or Injury  Intervention: Prevent Skin Injury  Recent Flowsheet Documentation  Taken 5/22/2023 0846 by Sujatha Wilson RN  Body Position: position changed independently     Problem: Wound Healing Progression  Goal: Optimal Wound Healing  Outcome: Progressing  Intervention: Promote Wound Healing  Recent Flowsheet Documentation  Taken 5/22/2023 0846 by Sujatha Wilson RN  Activity Management: ambulated to bathroom     Problem: Infection  Goal: Absence of Infection Signs and Symptoms  Outcome: Progressing         Pt ready to discharge home, MD hasn't entered orders yet.  Pt up independently in the room.  Left foot has mepilex on , will change before he goes home, small to moderate amount of drainage on it.  Blood sugars were 104 and 169, insulin given as ordered.  IV saline locked.  Pt changed over to PO antibiotics.  Denies pain, pt has been resting most of the day.  Sujatha Wilson RN

## 2023-05-22 NOTE — PROGRESS NOTES
FOOT AND ANKLE SURGERY/PODIATRY Progress Note        ASSESSMENT:   Abscess fifth metatarsal phalangeal joint left foot  Diabetic ulcer fifth metatarsal phalangeal joint left foot    TREATMENT:   I recommend incision and drainage of abscess along with debridement and irrigation of wound left fifth metatarsal phalangeal joint.  The patient was informed this will be performed under local anesthesia with IV sedation.  He was told that the wound would remain open and would need to be treated with a wound VAC following surgical debridement.  The patient understood and agreed with this plan.  Surgical procedure will be scheduled for 5/23/2023     HPI: Abdulaziz Rausch was seen again today at bedside resting comfortably.  The patient is a 57-year-old diabetic male who was admitted on 5/21/2023.  The patient indicated that he has had a wound on his left foot which had been treated with antibiotics.  He stated he felt some improvement.  He also has a history of amputation of the second and third digit of the right foot performed January of this year.  He has no complaints of pain.       Past Medical History:   Diagnosis Date     Anemia      Depression      Diabetes mellitus, type 2 (H)      Diabetic foot ulcer (H)      Hypertension      Osteomyelitis (H)         Past Surgical History:   Procedure Laterality Date     AMPUTATE TOE(S) Right 2/17/2023    Procedure: AMPUTATION, digits two and three right foot;  Surgeon: Ulises Jimenez DPM;  Location: [facility] OR     IRRIGATION AND DEBRIDEMENT TOE, COMBINED Right 1/20/2023    Procedure: IRRIGATION AND DEBRIDEMENT digits 2 and 3 right foot;  Surgeon: Jf Huerta DPM;  Location: [facility] OR       No Known Allergies      Current Facility-Administered Medications:      acetaminophen (TYLENOL) tablet 650 mg, 650 mg, Oral, Q6H PRN **OR** acetaminophen (TYLENOL) Suppository 650 mg, 650 mg, Rectal, Q6H PRN, Mar Florez MD     glucose gel 15-30 g, 15-30 g, Oral,  Q15 Min PRN **OR** dextrose 50 % injection 25-50 mL, 25-50 mL, Intravenous, Q15 Min PRN **OR** glucagon injection 1 mg, 1 mg, Subcutaneous, Q15 Min PRN, Mar Florez MD     docusate sodium (COLACE) capsule 100 mg, 100 mg, Oral, BID, Mar Florez MD, 100 mg at 05/21/23 2111     empagliflozin (JARDIANCE) tablet 25 mg, 25 mg, Oral, Daily, Juan Pablo Conner MD, 25 mg at 05/22/23 0853     enoxaparin ANTICOAGULANT (LOVENOX) injection 40 mg, 40 mg, Subcutaneous, Q24H, Mar Florez MD, 40 mg at 05/22/23 0852     gabapentin (NEURONTIN) capsule 300 mg, 300 mg, Oral, BID, Juan Pablo Conner MD, 300 mg at 05/22/23 0853     HYDROmorphone (PF) (DILAUDID) injection 0.5 mg, 0.5 mg, Intravenous, Q2H PRN, Mar Florez MD     insulin aspart (NovoLOG) injection (RAPID ACTING), 1-10 Units, Subcutaneous, TID AC, Mar Florez MD, 2 Units at 05/22/23 1140     insulin aspart (NovoLOG) injection (RAPID ACTING), 1-7 Units, Subcutaneous, At Bedtime, Mar Florez MD, 1 Units at 05/21/23 2140     insulin glargine (LANTUS PEN) injection 16 Units, 16 Units, Subcutaneous, At Bedtime, Juan Pablo Conner MD, 16 Units at 05/21/23 2141     lidocaine (LMX4) cream, , Topical, Q1H PRN, Mar Florez MD     lidocaine 1 % 0.1-1 mL, 0.1-1 mL, Other, Q1H PRN, Mar Florez MD     magnesium hydroxide (MILK OF MAGNESIA) suspension 30 mL, 30 mL, Oral, Daily PRN, Mar Florez MD     melatonin tablet 1 mg, 1 mg, Oral, At Bedtime PRN, Mar Florez MD     naloxone (NARCAN) injection 0.2 mg, 0.2 mg, Intravenous, Q2 Min PRN **OR** naloxone (NARCAN) injection 0.4 mg, 0.4 mg, Intravenous, Q2 Min PRN **OR** naloxone (NARCAN) injection 0.2 mg, 0.2 mg, Intramuscular, Q2 Min PRN **OR** naloxone (NARCAN) injection 0.4 mg, 0.4 mg, Intramuscular, Q2 Min PRN, Mar Florez MD     ondansetron (ZOFRAN ODT) ODT tab 4 mg, 4 mg, Oral, Q6H PRN **OR** ondansetron (ZOFRAN) injection 4 mg, 4 mg, Intravenous, Q6H PRN, Mar Florez MD      "oxyCODONE (ROXICODONE) tablet 5 mg, 5 mg, Oral, Q4H PRN, Mar Florez MD, 5 mg at 05/21/23 1323     sodium chloride (PF) 0.9% PF flush 3 mL, 3 mL, Intracatheter, Q8H, Mar Florez MD, 3 mL at 05/22/23 1030     sodium chloride (PF) 0.9% PF flush 3 mL, 3 mL, Intracatheter, q1 min prn, Mar Florez MD     sulfamethoxazole-trimethoprim (BACTRIM DS) 800-160 MG per tablet 1 tablet, 1 tablet, Oral, BID, Juan Palbo Conner MD, 1 tablet at 05/22/23 1141    Family History   Problem Relation Age of Onset     Diabetes Father        Social History     Socioeconomic History     Marital status:      Spouse name: Not on file     Number of children: Not on file     Years of education: Not on file     Highest education level: Not on file   Occupational History     Not on file   Tobacco Use     Smoking status: Never     Smokeless tobacco: Never   Vaping Use     Vaping status: Never Used   Substance and Sexual Activity     Alcohol use: Never     Drug use: Never     Sexual activity: Not on file   Other Topics Concern     Not on file   Social History Narrative     Not on file     Social Determinants of Health     Financial Resource Strain: Not on file   Food Insecurity: Not on file   Transportation Needs: Not on file   Physical Activity: Not on file   Stress: Not on file   Social Connections: Not on file   Intimate Partner Violence: Not on file   Housing Stability: Not on file       10 point Review of Systems is negative except as mentioned above.      /71 (BP Location: Right arm)   Pulse 83   Temp 97.9  F (36.6  C) (Oral)   Resp 18   Ht 1.753 m (5' 9\")   Wt 62 kg (136 lb 11 oz)   SpO2 95%   BMI 20.18 kg/m      BMI= Body mass index is 20.18 kg/m .    OBJECTIVE:  General appearance: Patient is alert and fully cooperative with history & exam.  No sign of distress is noted during the visit.  Vascular: Dorsalis pedis and posterior tibial pulses are palpable. There is no pedal hair growth bilaterally.  CFT < " 3 sec from anterior tibial surface to distal digits bilaterally. There is no appreciable edema noted.  Dermatologic: Large area of necrotic tissue on the dorsal lateral aspect of the fifth metatarsal phalangeal joint left foot.  Serous drainage noted.  No odor noted.  Mild fluctuance noted. Turgor and texture are within normal limits. No coloration or temperature changes. No other primary or secondary lesions noted.  Neurologic: All epicritic and proprioceptive sensations are grossly intact bilaterally.  Musculoskeletal: All active and passive ankle, subtalar, midtarsal, and 1st MPJ range of motion are grossly intact without pain or crepitus, with the exception of none. Manual muscle strength is within normal limits bilaterally. All dorsiflexors, plantarflexors, invertors, evertors are intact bilaterally.  Moderate tenderness present to the left foot on palpation.  No tenderness to the left foot with range of motion. Calf is soft/non-tender without warmth/induration    Imaging:         US Lower Extremity Arterial Duplex Bilateral    Result Date: 5/21/2023  [facility] LOCATION: [facility] DATE: 5/21/2023 1. EXAM: RESTING ANKLE-BRACHIAL INDICES (ABIs) 2. DUPLEX ARTERIAL ULTRASOUND OF THE LOWER EXTREMITIES BILATERAL INDICATION: PAD. Diabetic wounds. TECHNIQUE: Duplex imaging is performed utilizing gray-scale, two-dimensional images and color-flow imaging. Doppler waveform analysis and spectral Doppler imaging is also performed. COMPARISON: None. FINDINGS: SEGMENTAL BP RIGHT (mmHg) Brachial: 125 Ankle (PT): 183; Index 1.46 Ankle (DP): 151; Index 1.21 Digit: 131; Index 1.05 LEFT (mmHg) Brachial: -- Ankle (PT): 178; Index 1.42 Ankle (DP): 151; Index 1.21 Digit: 86; Index 0.69 Resting ABIs are 1.5 on the right. Right digital index 1.0. Resting ABIs are 1.4 on the left. Left digital index 0.69. WAVEFORMS: Pedal waveforms are multiphasic bilaterally. DUPLEX ARTERIAL ULTRASOUND FINDINGS: LOWER  EXTREMITY ARTERIAL DUPLEX EXAM RIGHT (cm/s) EIA: 154 CFA: 89 PFA: 58 SFA-Proximal: 102 SFA-Mid: 139 SFA-Distal: 101 Popliteal: 107 PTA: 107 FRANCESCO: 62 DPA: 71 LEFT (cm/s) EIA: 116 CFA: 120 PFA: 66 SFA-Proximal: 118 SFA-Mid: 165 SFA-Distal: 131 Popliteal: 110 PTA: 95 FRANCESCO: 80 DPA: 106 RIGHT: Multiphasic waveforms throughout the right lower extremity. No significant arterial stenosis identified. LEFT: Multiphasic waveforms throughout the left lower extremity. No significant arterial stenosis identified. 3.3 x 1.9 x 0.8 cm left groin lymph node. Normal-appearing fatty hilum.     IMPRESSION: 1.  RIGHT LOWER EXTREMITY: Right CHIQUITA is elevated. Findings consistent with vascular calcification. Right digital index is normal. Multiphasic waveforms throughout the right lower extremity with no significant arterial stenosis identified. 2.  LEFT LOWER EXTREMITY: Left CHIQUITA is elevated. Findings consistent with vascular calcification. Left digital index is normal. Multiphasic waveforms throughout the left lower extremity with no significant arterial stenosis identified. 3.  Left groin lymphadenopathy. I suspect this is a reactive node.    US CHIQUITA Doppler No Exercise    Result Date: 5/21/2023  [facility] LOCATION: [facility] DATE: 5/21/2023 1. EXAM: RESTING ANKLE-BRACHIAL INDICES (ABIs) 2. DUPLEX ARTERIAL ULTRASOUND OF THE LOWER EXTREMITIES BILATERAL INDICATION: PAD. Diabetic wounds. TECHNIQUE: Duplex imaging is performed utilizing gray-scale, two-dimensional images and color-flow imaging. Doppler waveform analysis and spectral Doppler imaging is also performed. COMPARISON: None. FINDINGS: SEGMENTAL BP RIGHT (mmHg) Brachial: 125 Ankle (PT): 183; Index 1.46 Ankle (DP): 151; Index 1.21 Digit: 131; Index 1.05 LEFT (mmHg) Brachial: -- Ankle (PT): 178; Index 1.42 Ankle (DP): 151; Index 1.21 Digit: 86; Index 0.69 Resting ABIs are 1.5 on the right. Right digital index 1.0. Resting ABIs are 1.4 on the left. Left  digital index 0.69. WAVEFORMS: Pedal waveforms are multiphasic bilaterally. DUPLEX ARTERIAL ULTRASOUND FINDINGS: LOWER EXTREMITY ARTERIAL DUPLEX EXAM RIGHT (cm/s) EIA: 154 CFA: 89 PFA: 58 SFA-Proximal: 102 SFA-Mid: 139 SFA-Distal: 101 Popliteal: 107 PTA: 107 FRANCESCO: 62 DPA: 71 LEFT (cm/s) EIA: 116 CFA: 120 PFA: 66 SFA-Proximal: 118 SFA-Mid: 165 SFA-Distal: 131 Popliteal: 110 PTA: 95 FRANCESCO: 80 DPA: 106 RIGHT: Multiphasic waveforms throughout the right lower extremity. No significant arterial stenosis identified. LEFT: Multiphasic waveforms throughout the left lower extremity. No significant arterial stenosis identified. 3.3 x 1.9 x 0.8 cm left groin lymph node. Normal-appearing fatty hilum.     IMPRESSION: 1.  RIGHT LOWER EXTREMITY: Right CHIQUITA is elevated. Findings consistent with vascular calcification. Right digital index is normal. Multiphasic waveforms throughout the right lower extremity with no significant arterial stenosis identified. 2.  LEFT LOWER EXTREMITY: Left CHIQUITA is elevated. Findings consistent with vascular calcification. Left digital index is normal. Multiphasic waveforms throughout the left lower extremity with no significant arterial stenosis identified. 3.  Left groin lymphadenopathy. I suspect this is a reactive node.    MR Foot Left w/o & w Contrast    Result Date: 5/21/2023  EXAM: MR FOOT LEFT W/O and W CONTRAST LOCATION: [facility] DATE/TIME: 5/21/2023 12:45 PM CDT INDICATION: Left foot cellulitis. Lateral forefoot ulcer. COMPARISON: Foot radiographic exam 5/21/2023. Foot MRI 1/18/2023. TECHNIQUE: Routine. Additional postgadolinium T1 sequences were obtained. IV CONTRAST: 6 mL Gadavist FINDINGS: JOINTS AND BONES: -No evidence for left foot fracture, metatarsal stress fracture, or definitive evidence for osteomyelitis. Specifically, there is no confluent T1 hypointense signal in the 5th proximal phalangeal base or 5th metatarsal head or neck in the area of the ulceration in  the lateral forefoot dorsally. Mild left 1st MTP and metatarsal sesamoid chondromalacia. Navicular-medial cuneiform chondromalacia with subchondral marrow edema medial navicular distally. Mild synovitis at the 1st MTP joint. Nothing for septic arthritis at the 5th MTP joint. TENDONS: -No acute tendon injury. Distal anterior tibialis tendon intact. Distal peroneus longus tendon intact. No forefoot tenosynovitis. LIGAMENTS: -Lisfranc ligament: Intact. No subluxation. MUSCLES AND SOFT TISSUES: -Mild intrinsic foot muscle atrophy. Patchy muscle edema without significant myositis. Dorsal foot soft tissue swelling. Soft tissue ulceration along the dorsal and lateral aspect of the forefoot at the level of the 5th MTP joint. Deep area of nonviable soft tissue with adjacent granulation tissue. Mild dorsal foot cellulitis.     IMPRESSION: 1.  Soft tissue wound/ulcer in the dorsal and lateral aspect of the forefoot at the level of the 5th MTP joint. Small deep area of nonviable subjacent soft tissue with adjacent cellulitis. 2.  No convincing evidence for acute osteomyelitis of the 5th toe or distal 5th metatarsal. 3.  Mild dorsal foot soft tissue swelling with cellulitis. 4.  Mild first MTP and metatarsal sesamoid chondromalacia. Mild navicular-medial cuneiform chondromalacia. 5.  Intact Lisfranc ligament. No midfoot subluxation. 6.  No forefoot flexor or extensor tenosynovitis.     Foot XR, G/E 3 views, left    Result Date: 5/21/2023  EXAM: XR FOOT LEFT G/E 3 VIEWS LOCATION: [facility] DATE/TIME: 5/21/2023 12:44 AM CDT INDICATION: Foot pain. Evaluate for osteomyelitis at distal fifth metatarsal. COMPARISON: 01/18/2023     IMPRESSION: Prominent ulcer noted at the level of the fifth metatarsal head. No underlying radiographic evidence of osteomyelitis. No displaced fracture.             Jf Huerta; ENEIDA  [facility] Foot & Ankle Surgery/Podiatry

## 2023-05-22 NOTE — PHARMACY-VANCOMYCIN DOSING SERVICE
"Pharmacy Vancomycin Initial Note  Date of Service May 22, 2023  Patient's  1965  57 year old, male    Indication: Skin and Soft Tissue Infection    Current estimated CrCl = Estimated Creatinine Clearance: 53.7 mL/min (A) (based on SCr of 1.3 mg/dL (H)).    Creatinine for last 3 days  2023: 11:44 PM Creatinine 1.59 mg/dL  2023:  6:27 AM Creatinine 1.34 mg/dL  2023:  5:57 AM Creatinine 1.30 mg/dL    Recent Vancomycin Level(s) for last 3 days  No results found for requested labs within last 3 days.      Vancomycin IV Administrations (past 72 hours)                   vancomycin (VANCOCIN) 1000 mg in dextrose 5% 200 mL PREMIX (mg) 1,000 mg New Bag 23 0057    vancomycin (VANCOCIN) 1,250 mg in sodium chloride 0.9 % 250 mL intermittent infusion (mg) 1,250 mg New Bag 23 0016                Nephrotoxins and other renal medications (From now, onward)    Start     Dose/Rate Route Frequency Ordered Stop    23 2200  vancomycin (VANCOCIN) 1,250 mg in sodium chloride 0.9 % 250 mL intermittent infusion         1,250 mg  over 90 Minutes Intravenous EVERY 24 HOURS 23 1823      23 1730  piperacillin-tazobactam (ZOSYN) 3.375 g vial to attach to  mL bag        Note to Pharmacy: For [facility], [facility] and [facility]: For Zosyn-naive patients, use the \"Zosyn initial dose + extended infusion\" order panel.    3.375 g  over 240 Minutes Intravenous EVERY 8 HOURS 23 1643      23 1230  empagliflozin (JARDIANCE) tablet 25 mg        Note to Pharmacy: PTA Sig:Take 1 tablet (25 mg) by mouth daily      25 mg Oral DAILY 23 1227            Contrast Orders - past 72 hours (72h ago, onward)    Start     Dose/Rate Route Frequency Stop    23 1230  gadobutrol (GADAVIST) injection 6 mL         6 mL Intravenous ONCE 23 1222          [employer] Prediction of Planned Initial Vancomycin Regimen    Regimen: 1250 mg IV every 24 hours.  Start time:22:00 on 2023  Exposure target: AUC24 " (range)400-600 mg/L.hr   AUC24,ss: 506 mg/L.hr  Probability of AUC24 > 400: 75 %  Ctrough,ss: 14.7 mg/L  Probability of Ctrough,ss > 20: 24 %  Probability of nephrotoxicity (Lodise MARINA 2009): 10 %          Plan:  1. Start vancomycin  1250 mg IV q24h.   2. Vancomycin monitoring method: AUC  3. Vancomycin therapeutic monitoring goal: 400-600 mg*h/L  4. Pharmacy will check vancomycin levels as appropriate in 1-3 Days.    5. Serum creatinine levels will be ordered daily for the first week of therapy and at least twice weekly for subsequent weeks.      Adelaida Yu, [facility]

## 2023-05-22 NOTE — PLAN OF CARE
Physical Therapy    PT evaluation not completed, per RN, no current PT needs. Pt has been up independently in room. Will discontinue PT orders. Please re-order if status changes/pt has surgery.     Tisha Terrazas, PT, DPT  5/22/2023

## 2023-05-22 NOTE — PROGRESS NOTES
"Care Management Follow Up    Length of Stay (days): 1    Expected Discharge Date: 05/27/2023     Concerns to be Addressed:     Medical progression and discharge planning.  Patient plan of care discussed at interdisciplinary rounds: Yes    Anticipated Discharge Disposition:  Home, pending PT/OT recommendation      Anticipated Discharge Services:  NA  Anticipated Discharge DME:  NA    Patient/family educated on Medicare website which has current facility and service quality ratings:  NA  Education Provided on the Discharge Plan:  Per team  Patient/Family in Agreement with the Plan:  Per team    Referrals Placed by CM/SW:  None at this time.  Private pay costs discussed: Not applicable    Additional Information:  RNCM received update form MD. Patient is medically ready for discharge.  PT/OT has not seen patient yet, will need consult placed; MD aware.         Social HX:   \"Patient lives with friend, Patrick, in a trailer home. Independent at baseline. Patient states no concerns, is requesting follow up with [facility] for further benefits/ assistance.\"     5/21: \"RNCM placed referral to [facility] Line - [id number].\"  5/22 at 1015: RNCM received a call from [employer] [facility] reporting patient has MA, no .  [facility] will update patient on benefits and options for financial resources.  RNCM made [facility] aware that patient may be discharging today.        \"Patient's goal home.\"     CM will continue to follow care progression and aide in discharge planning as needed.        Lauren Calhoun RN        "

## 2023-05-22 NOTE — PROGRESS NOTES
[facility]    Medicine Progress Note - Hospitalist Service    Date of Admission:  5/20/2023    Assessment & Plan   Abdulaziz Rausch is a 57 year old male with medical history of poorly controlled type 2 diabetes mellitus, HTN, PAD status post right second and third toe amputations, hypertension, admitted on 5/20/2023 with nontraumatic worsening wound with pain, swelling and redness of the left foot for 1 day.  ED work-up showed marked hyperglycemia with blood sugar of 590, acute kidney injury, lactic acidosis and mild anemia.  Left foot x-ray showed a prominent ulcer at the level of the fifth metatarsal head with no evidence of osteomyelitis or displaced fracture.     Left foot cellulitis with sepsis  - Wound and blood cultures.  - Broad-spectrum IV antibiotics to include coverage for MRSA.  - Podiatry consult appreciated.  Planning for I&D tomorrow.  N.p.o. after midnight  - CHIQUITA Doppler is significant for vascular calcification bilaterally.     T2DM with hyperglycemia and left foot ulcer  - Insulin sliding scale plus basal insulin.  - Local wound care.  - Review compliance issues.     CHRISTY on CKD stage III  -IV fluids  -Limit potentially nephrotoxic agents  - Monitor BMP    Hypertension  -Blood pressure on the higher side.  No home antihypertensive medication noted  -Hydralazine as needed.  Discontinue normal saline.  monitor blood pressure  -Consider oral antihypertensive medication if blood pressure remains elevated        Diet: Combination Diet Regular Diet Adult; Low Consistent Carb (45 g CHO per Meal) Diet  Diet  NPO per Anesthesia Guidelines for Procedure/Surgery Except for: Meds    DVT Prophylaxis: Enoxaparin (Lovenox) SQ  Fischer Catheter: Not present  Lines: None     Cardiac Monitoring: None  Code Status: No CPR- Do NOT Intubate      Clinically Significant Risk Factors                  # Hypertension: Noted on problem list       # DMII: A1C = 11.3 % (Ref range: 0.0 - 5.6 %) within  past 6 months, PRESENT ON ADMISSION           Disposition Plan      Expected Discharge Date: 05/24/2023        Discharge Comments: I&D of foot 5/23          Juan Pablo Conner MD  Hospitalist Service  [facility]  Securely message with Dm (more info)  Text page via [employer] Paging/Directory   ______________________________________________________________________    Interval History   Patient denies having significant pain.  No distress noted.  No new complaints.  Management plan discussed with the patient and he expressed understanding.  Initially patient was to be discharged.  Nevertheless podiatry is planning to do I&D for tomorrow therefore discharge has been canceled.    Physical Exam   Vital Signs: Temp: 98.2  F (36.8  C) Temp src: Oral BP: (!) 156/74 Pulse: 83   Resp: 18 SpO2: 97 % O2 Device: None (Room air)    Weight: 136 lbs 10.96 oz    General Appearance: No distress noted  Respiratory: Good air entry bilaterally  Cardiovascular: S1 and S2 well heard, no murmur or gallop  GI: Soft abdomen, no tenderness, normoactive bowel sounds  Skin: Intact and warm, left foot fifth toe swollen, tender with some discharge.    Medical Decision Making       45 MINUTES SPENT BY ME on the date of service doing chart review, history, exam, documentation & further activities per the note.      Data

## 2023-05-23 ENCOUNTER — ANESTHESIA (OUTPATIENT)
Dept: SURGERY | Facility: HOSPITAL | Age: 58
End: 2023-05-23
Payer: COMMERCIAL

## 2023-05-23 ENCOUNTER — ANCILLARY PROCEDURE (OUTPATIENT)
Dept: ULTRASOUND IMAGING | Facility: HOSPITAL | Age: 58
End: 2023-05-23
Attending: ANESTHESIOLOGY
Payer: COMMERCIAL

## 2023-05-23 LAB
ANION GAP SERPL CALCULATED.3IONS-SCNC: 10 MMOL/L (ref 7–15)
BASOPHILS # BLD AUTO: 0 10E3/UL (ref 0–0.2)
BASOPHILS NFR BLD AUTO: 1 %
BUN SERPL-MCNC: 23.3 MG/DL (ref 8–23)
CALCIUM SERPL-MCNC: 9 MG/DL (ref 8.6–10)
CHLORIDE SERPL-SCNC: 107 MMOL/L (ref 98–107)
CREAT SERPL-MCNC: 1.5 MG/DL (ref 0.67–1.17)
DEPRECATED HCO3 PLAS-SCNC: 23 MMOL/L (ref 22–29)
EOSINOPHIL # BLD AUTO: 0.2 10E3/UL (ref 0–0.7)
EOSINOPHIL NFR BLD AUTO: 4 %
ERYTHROCYTE [DISTWIDTH] IN BLOOD BY AUTOMATED COUNT: 12.4 % (ref 10–15)
GFR SERPL CREATININE-BSD FRML MDRD: 53 ML/MIN/1.73M2
GLUCOSE BLDC GLUCOMTR-MCNC: 150 MG/DL (ref 70–99)
GLUCOSE BLDC GLUCOMTR-MCNC: 152 MG/DL (ref 70–99)
GLUCOSE BLDC GLUCOMTR-MCNC: 233 MG/DL (ref 70–99)
GLUCOSE BLDC GLUCOMTR-MCNC: 280 MG/DL (ref 70–99)
GLUCOSE BLDC GLUCOMTR-MCNC: 306 MG/DL (ref 70–99)
GLUCOSE SERPL-MCNC: 160 MG/DL (ref 70–99)
GRAM STAIN RESULT: ABNORMAL
GRAM STAIN RESULT: ABNORMAL
HCT VFR BLD AUTO: 30 % (ref 40–53)
HGB BLD-MCNC: 9.9 G/DL (ref 13.3–17.7)
IMM GRANULOCYTES # BLD: 0 10E3/UL
IMM GRANULOCYTES NFR BLD: 0 %
LYMPHOCYTES # BLD AUTO: 1.3 10E3/UL (ref 0.8–5.3)
LYMPHOCYTES NFR BLD AUTO: 25 %
MCH RBC QN AUTO: 28.6 PG (ref 26.5–33)
MCHC RBC AUTO-ENTMCNC: 33 G/DL (ref 31.5–36.5)
MCV RBC AUTO: 87 FL (ref 78–100)
MONOCYTES # BLD AUTO: 0.5 10E3/UL (ref 0–1.3)
MONOCYTES NFR BLD AUTO: 9 %
NEUTROPHILS # BLD AUTO: 3.2 10E3/UL (ref 1.6–8.3)
NEUTROPHILS NFR BLD AUTO: 61 %
NRBC # BLD AUTO: 0 10E3/UL
NRBC BLD AUTO-RTO: 0 /100
PLATELET # BLD AUTO: 331 10E3/UL (ref 150–450)
POTASSIUM SERPL-SCNC: 4.2 MMOL/L (ref 3.4–5.3)
RBC # BLD AUTO: 3.46 10E6/UL (ref 4.4–5.9)
SODIUM SERPL-SCNC: 140 MMOL/L (ref 136–145)
WBC # BLD AUTO: 5.3 10E3/UL (ref 4–11)

## 2023-05-23 PROCEDURE — 250N000013 HC RX MED GY IP 250 OP 250 PS 637: Performed by: PODIATRIST

## 2023-05-23 PROCEDURE — 258N000003 HC RX IP 258 OP 636: Performed by: ANESTHESIOLOGY

## 2023-05-23 PROCEDURE — 370N000017 HC ANESTHESIA TECHNICAL FEE, PER MIN: Performed by: PODIATRIST

## 2023-05-23 PROCEDURE — 85004 AUTOMATED DIFF WBC COUNT: CPT | Performed by: INTERNAL MEDICINE

## 2023-05-23 PROCEDURE — 250N000011 HC RX IP 250 OP 636: Performed by: PODIATRIST

## 2023-05-23 PROCEDURE — 99232 SBSQ HOSP IP/OBS MODERATE 35: CPT | Performed by: HOSPITALIST

## 2023-05-23 PROCEDURE — 87077 CULTURE AEROBIC IDENTIFY: CPT | Performed by: PODIATRIST

## 2023-05-23 PROCEDURE — 28002 TREATMENT OF FOOT INFECTION: CPT | Mod: LT | Performed by: PODIATRIST

## 2023-05-23 PROCEDURE — 250N000009 HC RX 250: Performed by: PODIATRIST

## 2023-05-23 PROCEDURE — 87205 SMEAR GRAM STAIN: CPT | Performed by: PODIATRIST

## 2023-05-23 PROCEDURE — 999N000141 HC STATISTIC PRE-PROCEDURE NURSING ASSESSMENT: Performed by: PODIATRIST

## 2023-05-23 PROCEDURE — 120N000001 HC R&B MED SURG/OB

## 2023-05-23 PROCEDURE — 36415 COLL VENOUS BLD VENIPUNCTURE: CPT | Performed by: INTERNAL MEDICINE

## 2023-05-23 PROCEDURE — 250N000013 HC RX MED GY IP 250 OP 250 PS 637: Performed by: ANESTHESIOLOGY

## 2023-05-23 PROCEDURE — 258N000003 HC RX IP 258 OP 636: Performed by: PODIATRIST

## 2023-05-23 PROCEDURE — 258N000003 HC RX IP 258 OP 636: Performed by: HOSPITALIST

## 2023-05-23 PROCEDURE — 250N000011 HC RX IP 250 OP 636: Performed by: STUDENT IN AN ORGANIZED HEALTH CARE EDUCATION/TRAINING PROGRAM

## 2023-05-23 PROCEDURE — 272N000001 HC OR GENERAL SUPPLY STERILE: Performed by: PODIATRIST

## 2023-05-23 PROCEDURE — 258N000003 HC RX IP 258 OP 636: Performed by: NURSE ANESTHETIST, CERTIFIED REGISTERED

## 2023-05-23 PROCEDURE — 80048 BASIC METABOLIC PNL TOTAL CA: CPT | Performed by: INTERNAL MEDICINE

## 2023-05-23 PROCEDURE — 250N000011 HC RX IP 250 OP 636: Performed by: NURSE ANESTHETIST, CERTIFIED REGISTERED

## 2023-05-23 PROCEDURE — 360N000075 HC SURGERY LEVEL 2, PER MIN: Performed by: PODIATRIST

## 2023-05-23 PROCEDURE — 87075 CULTR BACTERIA EXCEPT BLOOD: CPT | Performed by: PODIATRIST

## 2023-05-23 PROCEDURE — 0SBN0ZZ EXCISION OF LEFT METATARSAL-PHALANGEAL JOINT, OPEN APPROACH: ICD-10-PCS | Performed by: PODIATRIST

## 2023-05-23 RX ORDER — ONDANSETRON 2 MG/ML
INJECTION INTRAMUSCULAR; INTRAVENOUS PRN
Status: DISCONTINUED | OUTPATIENT
Start: 2023-05-23 | End: 2023-05-23

## 2023-05-23 RX ORDER — DIMENHYDRINATE 50 MG/ML
25 INJECTION, SOLUTION INTRAMUSCULAR; INTRAVENOUS
Status: DISCONTINUED | OUTPATIENT
Start: 2023-05-23 | End: 2023-05-23 | Stop reason: HOSPADM

## 2023-05-23 RX ORDER — PROPOFOL 10 MG/ML
INJECTION, EMULSION INTRAVENOUS CONTINUOUS PRN
Status: DISCONTINUED | OUTPATIENT
Start: 2023-05-23 | End: 2023-05-23

## 2023-05-23 RX ORDER — SODIUM CHLORIDE, SODIUM LACTATE, POTASSIUM CHLORIDE, CALCIUM CHLORIDE 600; 310; 30; 20 MG/100ML; MG/100ML; MG/100ML; MG/100ML
INJECTION, SOLUTION INTRAVENOUS CONTINUOUS
Status: DISCONTINUED | OUTPATIENT
Start: 2023-05-23 | End: 2023-05-23

## 2023-05-23 RX ORDER — FENTANYL CITRATE 50 UG/ML
25-100 INJECTION, SOLUTION INTRAMUSCULAR; INTRAVENOUS
Status: DISCONTINUED | OUTPATIENT
Start: 2023-05-23 | End: 2023-05-23

## 2023-05-23 RX ORDER — ACETAMINOPHEN 325 MG/1
975 TABLET ORAL ONCE
Status: COMPLETED | OUTPATIENT
Start: 2023-05-23 | End: 2023-05-23

## 2023-05-23 RX ORDER — SODIUM CHLORIDE 9 MG/ML
INJECTION, SOLUTION INTRAVENOUS CONTINUOUS
Status: DISCONTINUED | OUTPATIENT
Start: 2023-05-23 | End: 2023-05-26 | Stop reason: HOSPADM

## 2023-05-23 RX ORDER — FENTANYL CITRATE 50 UG/ML
25 INJECTION, SOLUTION INTRAMUSCULAR; INTRAVENOUS EVERY 5 MIN PRN
Status: DISCONTINUED | OUTPATIENT
Start: 2023-05-23 | End: 2023-05-23 | Stop reason: HOSPADM

## 2023-05-23 RX ORDER — ONDANSETRON 4 MG/1
4 TABLET, ORALLY DISINTEGRATING ORAL EVERY 30 MIN PRN
Status: DISCONTINUED | OUTPATIENT
Start: 2023-05-23 | End: 2023-05-23 | Stop reason: HOSPADM

## 2023-05-23 RX ORDER — OXYCODONE HYDROCHLORIDE 5 MG/1
10 TABLET ORAL
Status: DISCONTINUED | OUTPATIENT
Start: 2023-05-23 | End: 2023-05-23 | Stop reason: HOSPADM

## 2023-05-23 RX ORDER — LIDOCAINE 40 MG/G
CREAM TOPICAL
Status: DISCONTINUED | OUTPATIENT
Start: 2023-05-23 | End: 2023-05-26 | Stop reason: HOSPADM

## 2023-05-23 RX ORDER — DEXAMETHASONE SODIUM PHOSPHATE 4 MG/ML
INJECTION, SOLUTION INTRA-ARTICULAR; INTRALESIONAL; INTRAMUSCULAR; INTRAVENOUS; SOFT TISSUE PRN
Status: DISCONTINUED | OUTPATIENT
Start: 2023-05-23 | End: 2023-05-23

## 2023-05-23 RX ORDER — LABETALOL HYDROCHLORIDE 5 MG/ML
10 INJECTION, SOLUTION INTRAVENOUS
Status: DISCONTINUED | OUTPATIENT
Start: 2023-05-23 | End: 2023-05-23 | Stop reason: HOSPADM

## 2023-05-23 RX ORDER — ONDANSETRON 2 MG/ML
4 INJECTION INTRAMUSCULAR; INTRAVENOUS EVERY 30 MIN PRN
Status: DISCONTINUED | OUTPATIENT
Start: 2023-05-23 | End: 2023-05-23 | Stop reason: HOSPADM

## 2023-05-23 RX ORDER — FENTANYL CITRATE 50 UG/ML
50 INJECTION, SOLUTION INTRAMUSCULAR; INTRAVENOUS EVERY 5 MIN PRN
Status: DISCONTINUED | OUTPATIENT
Start: 2023-05-23 | End: 2023-05-23 | Stop reason: HOSPADM

## 2023-05-23 RX ORDER — HALOPERIDOL 5 MG/ML
1 INJECTION INTRAMUSCULAR
Status: DISCONTINUED | OUTPATIENT
Start: 2023-05-23 | End: 2023-05-23 | Stop reason: HOSPADM

## 2023-05-23 RX ORDER — OXYCODONE HYDROCHLORIDE 5 MG/1
5 TABLET ORAL
Status: DISCONTINUED | OUTPATIENT
Start: 2023-05-23 | End: 2023-05-23 | Stop reason: HOSPADM

## 2023-05-23 RX ORDER — FENTANYL CITRATE 50 UG/ML
INJECTION, SOLUTION INTRAMUSCULAR; INTRAVENOUS PRN
Status: DISCONTINUED | OUTPATIENT
Start: 2023-05-23 | End: 2023-05-23

## 2023-05-23 RX ORDER — PROPOFOL 10 MG/ML
INJECTION, EMULSION INTRAVENOUS PRN
Status: DISCONTINUED | OUTPATIENT
Start: 2023-05-23 | End: 2023-05-23

## 2023-05-23 RX ORDER — SODIUM CHLORIDE, SODIUM LACTATE, POTASSIUM CHLORIDE, CALCIUM CHLORIDE 600; 310; 30; 20 MG/100ML; MG/100ML; MG/100ML; MG/100ML
INJECTION, SOLUTION INTRAVENOUS CONTINUOUS
Status: DISCONTINUED | OUTPATIENT
Start: 2023-05-23 | End: 2023-05-23 | Stop reason: HOSPADM

## 2023-05-23 RX ORDER — BUPIVACAINE HYDROCHLORIDE 5 MG/ML
INJECTION, SOLUTION PERINEURAL PRN
Status: DISCONTINUED | OUTPATIENT
Start: 2023-05-23 | End: 2023-05-23 | Stop reason: HOSPADM

## 2023-05-23 RX ADMIN — ONDANSETRON 4 MG: 2 INJECTION INTRAMUSCULAR; INTRAVENOUS at 07:36

## 2023-05-23 RX ADMIN — GABAPENTIN 300 MG: 300 CAPSULE ORAL at 09:55

## 2023-05-23 RX ADMIN — VANCOMYCIN HYDROCHLORIDE 1250 MG: 5 INJECTION, POWDER, LYOPHILIZED, FOR SOLUTION INTRAVENOUS at 22:29

## 2023-05-23 RX ADMIN — PIPERACILLIN AND TAZOBACTAM 3.38 G: 3; .375 INJECTION, POWDER, LYOPHILIZED, FOR SOLUTION INTRAVENOUS at 17:02

## 2023-05-23 RX ADMIN — INSULIN GLARGINE 16 UNITS: 100 INJECTION, SOLUTION SUBCUTANEOUS at 22:32

## 2023-05-23 RX ADMIN — SODIUM CHLORIDE, POTASSIUM CHLORIDE, SODIUM LACTATE AND CALCIUM CHLORIDE: 600; 310; 30; 20 INJECTION, SOLUTION INTRAVENOUS at 14:18

## 2023-05-23 RX ADMIN — PIPERACILLIN AND TAZOBACTAM 3.38 G: 3; .375 INJECTION, POWDER, LYOPHILIZED, FOR SOLUTION INTRAVENOUS at 07:33

## 2023-05-23 RX ADMIN — GABAPENTIN 300 MG: 300 CAPSULE ORAL at 22:31

## 2023-05-23 RX ADMIN — PIPERACILLIN AND TAZOBACTAM 3.38 G: 3; .375 INJECTION, POWDER, LYOPHILIZED, FOR SOLUTION INTRAVENOUS at 01:29

## 2023-05-23 RX ADMIN — PROPOFOL 100 MCG/KG/MIN: 10 INJECTION, EMULSION INTRAVENOUS at 07:22

## 2023-05-23 RX ADMIN — INSULIN ASPART 6 UNITS: 100 INJECTION, SOLUTION INTRAVENOUS; SUBCUTANEOUS at 17:02

## 2023-05-23 RX ADMIN — PROPOFOL 20 MG: 10 INJECTION, EMULSION INTRAVENOUS at 07:26

## 2023-05-23 RX ADMIN — INSULIN ASPART 1 UNITS: 100 INJECTION, SOLUTION INTRAVENOUS; SUBCUTANEOUS at 09:56

## 2023-05-23 RX ADMIN — ACETAMINOPHEN 650 MG: 325 TABLET ORAL at 22:31

## 2023-05-23 RX ADMIN — FENTANYL CITRATE 50 MCG: 50 INJECTION, SOLUTION INTRAMUSCULAR; INTRAVENOUS at 07:27

## 2023-05-23 RX ADMIN — DEXAMETHASONE SODIUM PHOSPHATE 4 MG: 4 INJECTION, SOLUTION INTRA-ARTICULAR; INTRALESIONAL; INTRAMUSCULAR; INTRAVENOUS; SOFT TISSUE at 07:36

## 2023-05-23 RX ADMIN — ACETAMINOPHEN 975 MG: 325 TABLET ORAL at 06:30

## 2023-05-23 RX ADMIN — EMPAGLIFLOZIN 25 MG: 25 TABLET, FILM COATED ORAL at 09:55

## 2023-05-23 RX ADMIN — SODIUM CHLORIDE: 9 INJECTION, SOLUTION INTRAVENOUS at 17:02

## 2023-05-23 RX ADMIN — SODIUM CHLORIDE, POTASSIUM CHLORIDE, SODIUM LACTATE AND CALCIUM CHLORIDE: 600; 310; 30; 20 INJECTION, SOLUTION INTRAVENOUS at 06:30

## 2023-05-23 RX ADMIN — ENOXAPARIN SODIUM 40 MG: 40 INJECTION SUBCUTANEOUS at 09:55

## 2023-05-23 RX ADMIN — INSULIN ASPART 4 UNITS: 100 INJECTION, SOLUTION INTRAVENOUS; SUBCUTANEOUS at 12:16

## 2023-05-23 RX ADMIN — PHENYLEPHRINE HYDROCHLORIDE 100 MCG: 10 INJECTION INTRAVENOUS at 07:40

## 2023-05-23 ASSESSMENT — ACTIVITIES OF DAILY LIVING (ADL)
ADLS_ACUITY_SCORE: 22

## 2023-05-23 NOTE — ANESTHESIA POSTPROCEDURE EVALUATION
Patient: Abdulaziz Rausch    Procedure: Procedure(s):  Incision and drainage of abscess left foot with debridement and irrigation of wound left foot       Anesthesia Type:  MAC    Note:  Disposition: Inpatient   Postop Pain Control: Uneventful            Sign Out: Well controlled pain   PONV: No   Neuro/Psych: Uneventful            Sign Out: Acceptable/Baseline neuro status   Airway/Respiratory: Uneventful            Sign Out: Acceptable/Baseline resp. status   CV/Hemodynamics: Uneventful            Sign Out: Acceptable CV status; No obvious hypovolemia; No obvious fluid overload   Other NRE: NONE   DID A NON-ROUTINE EVENT OCCUR? No           Last vitals:  Vitals:    05/23/23 0602 05/23/23 0815 05/23/23 0918   BP: 121/72 99/64 (!) 143/75   Pulse: 77 62 68   Resp: 18 17 10   Temp: 36.4  C (97.5  F) 36.4  C (97.6  F) 36.4  C (97.5  F)   SpO2: 98% 95% 98%       Electronically Signed By: Huey Guerrero MD  May 23, 2023  2:54 PM

## 2023-05-23 NOTE — OP NOTE
Date of surgery: 5/23/2023    Surgeon: Jf Huerta D.P.M.    Preoperative diagnosis: 1. Abscess fifth metatarsal phalangeal joint left foot 2.  Infected wound left foot    Postoperative diagnosis: Same    Procedure: Incision and drainage of abscess with debridement and irrigation of left foot    Anesthesia: MAC    Hemostasis: Pneumatic ankle tourniquet 250 mmHg    Specimen: Aerobic and anaerobic wound cultures    Blood loss: None    Complication: None    Description: The patient was taken to the operating room and placed on the table in supine position.  Under local anesthesia of 0.5% Marcaine plain and 2% lidocaine plain in a one-to-one mixture along with IV sedation the left foot was prepped and draped in usual aseptic fashion following exsanguination of the left foot with a Lv's bandage the tourniquet was inflated and the formal procedures were performed.    Attention was directed to the dorsal lateral aspect of the left foot where a necrotic infected wound was noted at the lateral aspect of the fifth metatarsal phalangeal joint.  Utilizing a #10 blade an incision was made along the circumference of the wound.  This incision was carried approximately 2 mm outside the borders of the infected wound.  With sharp dissection all necrotic tissue was resected.  Once this had been accomplished purulent drainage was sent noted to be expressed from the wound.  Intraoperative wound cultures were then obtained.  Utilizing a rongeur all necrotic tissue at the base of the wound was debrided.  Next utilizing a #10 blade sharp debridement was carried throughout the wound to remove all  nonviable tissue.  The debridement was carried down to the level of the joint capsule.  The bone was not exposed.  The debridement was performed to the proposed wound healing.  The size of the wound following sharp debridement was 4.5 cm x 4.5 cm x 2 cm.  The wound was then copiously lavaged with sterile saline solution utilizing pulse  lavage.  A sterile dressing was then applied to the left foot comprising of Xeroform gauze, 4 x 4 gauze, ABD, Kerlix and an Ace bandage.  The tourniquet was deflated and normal color return to the left foot.  The patient appeared to tolerate the procedures and anesthesia well and was transported from the operating room to the recovery room with vital signs stable neurovascular status intact.

## 2023-05-23 NOTE — PLAN OF CARE
Problem: Plan of Care - These are the overarching goals to be used throughout the patient stay.    Goal: Plan of Care Review  Description: The Plan of Care Review/Shift note should be completed every shift.  The Outcome Evaluation is a brief statement about your assessment that the patient is improving, declining, or no change.  This information will be displayed automatically on your shift note.  Outcome: Progressing     Problem: Diabetes Comorbidity  Goal: Blood Glucose Level Within Targeted Range  Outcome: Progressing     Problem: Wound Healing Progression  Goal: Optimal Wound Healing  Outcome: Progressing  Intervention: Promote Wound Healing  Recent Flowsheet Documentation  Taken 5/23/2023 0918 by Sujatha Wilson RN  Activity Management: activity adjusted per tolerance  Taken 5/23/2023 0815 by Sujatha Wilson RN  Activity Management: activity adjusted per tolerance     Problem: Infection  Goal: Absence of Infection Signs and Symptoms  Outcome: Progressing  Intervention: Prevent or Manage Infection  Recent Flowsheet Documentation  Taken 5/23/2023 0815 by Sujatha Wilson RN  Isolation Precautions: contact precautions maintained   Goal Outcome Evaluation:       Pt had an I&D of his left foot this morning, recovering well.  Pt has been comfortable, sleeping between cares.  Rates pain up to a 6 but declined pain medication.  Pt got up to the bathroom on his own without calling.  Told him that he needs to call and use a walker and not bear any weight on his left foot.  Foot is wrapped in an ace, no drainage noted.  Pt can't feel when I touch his toes.  Blood sugars were 150 and 233, insulin given as ordered, pt ate 100% of his meals.  Sujatha Wilson RN

## 2023-05-23 NOTE — PROVIDER NOTIFICATION
Dm Text Message to Dr. Mccoy:     LR is not compatible with zosyn, do you want to change his maintenance fluid?

## 2023-05-23 NOTE — PROGRESS NOTES
[facility]    Medicine Progress Note - Hospitalist Service    Date of Admission:  5/20/2023    Assessment & Plan   Abdulaziz Rausch is a 57 year old male with medical history of poorly controlled type 2 diabetes mellitus, HTN, PAD status post right second and third toe amputations, hypertension, admitted on 5/20/2023 with nontraumatic worsening wound with pain, swelling and redness of the left foot for 1 day.  ED work-up showed marked hyperglycemia with blood sugar of 590, acute kidney injury, lactic acidosis and mild anemia.  Left foot x-ray showed a prominent ulcer at the level of the fifth metatarsal head with no evidence of osteomyelitis or displaced fracture.     Left foot cellulitis   Left 5th MTP joint abscess  sepsis  - Wound and blood cultures.  - Broad-spectrum IV antibiotics to include coverage for MRSA.  - Podiatry consult appreciated.    S/p I&D of left fifth metatarsophalangeal joint abscess.    - CHIQUITA Doppler is significant for vascular calcification bilaterally.     T2DM with hyperglycemia and left foot ulcer  - Insulin sliding scale plus basal insulin.  - Local wound care.  - Review compliance issues.     CHRISTY on CKD stage III  -IV fluids  -Limit potentially nephrotoxic agents  - Monitor BMP     Hypertension  -Blood pressure on the higher side.  No home antihypertensive medication noted  -Hydralazine as needed.  Discontinue normal saline.  monitor blood pressure  -Consider oral antihypertensive medication if blood pressure remains elevated       Diet: Diet  Moderate Consistent Carb (60 g CHO per Meal) Diet    DVT Prophylaxis: Enoxaparin (Lovenox) SQ  Fischer Catheter: Not present  Lines: None     Cardiac Monitoring: None  Code Status: No CPR- Do NOT Intubate      Clinically Significant Risk Factors                  # Hypertension: Noted on problem list       # DMII: A1C = 11.3 % (Ref range: 0.0 - 5.6 %) within past 6 months, PRESENT ON ADMISSION           Disposition Plan       Expected Discharge Date: 05/24/2023        Discharge Comments: I&D of foot 5/23          Florentino Mccoy MD  Hospitalist Service  [facility]  Securely message with Dm (more info)  Text page via [facility] Paging/Directory   ______________________________________________________________________    Interval History   Patient seen examined at bedside/chart reviewed.  Patient is feeling good he reports his pain is adequately controlled.  He has no complaints at this time denies chest pain or shortness of breath.    Physical Exam   Vital Signs: Temp: 97.5  F (36.4  C) Temp src: Oral BP: (!) 143/75 Pulse: 68   Resp: 10 SpO2: 98 % O2 Device: None (Room air)    Weight: 136 lbs 10.96 oz    General Appearance:  No distress noted  Respiratory: Good air entry bilaterally  Cardiovascular: S1 and S2 well heard, no murmur or gallop  GI: Soft abdomen, no tenderness, normoactive bowel sounds  Skin: Intact and warm, L foot dressed without any discharge,    Medical Decision Making       35 MINUTES SPENT BY ME on the date of service doing chart review, history, exam, documentation & further activities per the note.      Data   NOTE: Data reviewed over the past 24 hrs contributes toward MDM complexity

## 2023-05-23 NOTE — PLAN OF CARE
"Goal Outcome Evaluation:    Problem: Wound Healing Progression  Goal: Optimal Wound Healing  Outcome: Not Progressing  Intervention: Promote Wound Healing  Recent Flowsheet Documentation  Taken 5/22/2023 1749 by Yanira Johnson, RN  Activity Management: activity adjusted per tolerance     Purulent small amount of drainage. Covered with mepilex. Plan to have surgery on 5/23/23 at 0730. NPO at 0000.     Order to hold Lantus dose this evening. BG was 153.    Reports of pain in bilateral LEs. Given oxycodone at 1749. Pain reduced.     Pt independent in room.     BP (!) 156/74 (BP Location: Right arm)   Pulse 83   Temp 98.2  F (36.8  C) (Oral)   Resp 18   Ht 1.753 m (5' 9\")   Wt 62 kg (136 lb 11 oz)   SpO2 97%   BMI 20.18 kg/m      "

## 2023-05-23 NOTE — ANESTHESIA PREPROCEDURE EVALUATION
Anesthesia Pre-Procedure Evaluation    Patient: Abdulaziz Rausch   MRN: [id number] : 1965        Procedure : Procedure(s):  IRRIGATION AND DEBRIDEMENT digits 2 and 3 right foot          Past Medical History:   Diagnosis Date     Anemia      Depression      Diabetes mellitus, type 2 (H)      Diabetic foot ulcer (H)      Hypertension      Osteomyelitis (H)       Past Surgical History:   Procedure Laterality Date     AMPUTATE TOE(S) Right 2023    Procedure: AMPUTATION, digits two and three right foot;  Surgeon: Ulises Jimenez DPM;  [place] [facility] [place]     IRRIGATION AND DEBRIDEMENT TOE, COMBINED Right 2023    Procedure: IRRIGATION AND DEBRIDEMENT digits 2 and 3 right foot;  Surgeon: Jf Huerta DPM;  [place] [facility] [place]      No Known Allergies   Social History     Tobacco Use     Smoking status: Never     Smokeless tobacco: Never   Vaping Use     Vaping status: Never Used   Substance Use Topics     Alcohol use: Never      Wt Readings from Last 1 Encounters:   23 62 kg (136 lb 11 oz)        Anesthesia Evaluation   Pt has had prior anesthetic.     No history of anesthetic complications       ROS/MED HX  ENT/Pulmonary:    (-) recent URI   Neurologic:       Cardiovascular:     (+) Dyslipidemia hypertension-----    METS/Exercise Tolerance:     Hematologic:       Musculoskeletal:       GI/Hepatic:    (-) GERD   Renal/Genitourinary:    (-) renal disease   Endo:     (+) type II DM (uncontrolled), Last HgA1c: 13.6, Diabetic complications: neuropathy.     Psychiatric/Substance Use:     (+) psychiatric history depression     Infectious Disease:       Malignancy:       Other:            Physical Exam    Airway        Mallampati: II   TM distance: > 3 FB   Neck ROM: full     Respiratory Devices and Support         Dental       (+) Multiple visibly decayed, broken teeth      Cardiovascular          Rhythm and rate: regular     Pulmonary           breath sounds clear to auscultation            OUTSIDE LABS:  CBC:   Lab Results   Component Value Date    WBC 5.3 05/23/2023    WBC 6.7 05/22/2023    HGB 9.9 (L) 05/23/2023    HGB 9.7 (L) 05/22/2023    HCT 30.0 (L) 05/23/2023    HCT 29.0 (L) 05/22/2023     05/23/2023     05/22/2023     BMP:   Lab Results   Component Value Date     05/22/2023     05/21/2023    POTASSIUM 4.1 05/22/2023    POTASSIUM 4.0 05/21/2023    CHLORIDE 107 05/22/2023    CHLORIDE 106 05/21/2023    CO2 22 05/22/2023    CO2 23 05/21/2023    BUN 22.4 05/22/2023    BUN 28.7 (H) 05/21/2023    CR 1.30 (H) 05/22/2023    CR 1.34 (H) 05/21/2023     (H) 05/23/2023     (H) 05/22/2023     COAGS: No results found for: PTT, INR, FIBR  POC: No results found for: BGM, HCG, HCGS  HEPATIC:   Lab Results   Component Value Date    ALBUMIN 2.7 (L) 01/21/2023    PROTTOTAL 5.7 (L) 01/21/2023    ALT 36 01/21/2023    AST 24 01/21/2023    ALKPHOS 340 (H) 01/21/2023    BILITOTAL 0.3 01/21/2023     OTHER:   Lab Results   Component Value Date    LACT 2.3 (H) 05/21/2023    A1C 11.3 (H) 04/17/2023    AMIRAH 8.4 (L) 05/22/2023    MAG 2.0 02/19/2023    SED 74 (H) 05/21/2023       Anesthesia Plan    ASA Status:  3   NPO Status:  NPO Appropriate    Anesthesia Type: MAC.              Consents    Anesthesia Plan(s) and associated risks, benefits, and realistic alternatives discussed. Questions answered and patient/representative(s) expressed understanding.     - Discussed: Risks, Benefits and Alternatives for BOTH SEDATION and the PROCEDURE were discussed     - Discussed with:  Patient      - Extended Intubation/Ventilatory Support Discussed: No.      - Patient is DNR/DNI Status: No    Use of blood products discussed: No .     Postoperative Care    Pain management: IV analgesics, Multi-modal analgesia.   PONV prophylaxis: Ondansetron (or other 5HT-3)     Comments:                    Huey Guerrero MD

## 2023-05-23 NOTE — ANESTHESIA CARE TRANSFER NOTE
Patient: Abdulaziz Rausch    Procedure: Procedure(s):  Incision and drainage of abscess left foot with debridement and irrigation of wound left foot       Diagnosis: Cellulitis and abscess of foot excluding toe [L03.119, L02.619]  Diagnosis Additional Information: No value filed.    Anesthesia Type:   MAC     Note:    Oropharynx: oropharynx clear of all foreign objects  Level of Consciousness: awake  Oxygen Supplementation: face mask  Level of Supplemental Oxygen (L/min / FiO2): 8  Independent Airway: airway patency satisfactory and stable  Dentition: dentition unchanged  Vital Signs Stable: post-procedure vital signs reviewed and stable  Report to RN Given: handoff report given  Patient transferred to: PACU  Comments: Patient transferred to [facility].  SBAR report given to RN on floor per institutional policy and procedure.  Taken to PACU on O2 at 10L via mask.  Transport called.  Transfer of care.  Handoff Report: Identifed the Patient, Identified the Reponsible Provider, Reviewed the pertinent medical history, Discussed the surgical course, Reviewed Intra-OP anesthesia mangement and issues during anesthesia, Set expectations for post-procedure period and Allowed opportunity for questions and acknowledgement of understanding      Vitals:  Vitals Value Taken Time   BP 99/64 05/23/23 0815   Temp 36.4  C (97.6  F) 05/23/23 0815   Pulse 62 05/23/23 0815   Resp 17 05/23/23 0815   SpO2 95 % 05/23/23 0815       Electronically Signed By: AMANDA Ayala CRNA  May 23, 2023  8:41 AM

## 2023-05-24 LAB
BACTERIA WND CULT: ABNORMAL
CREAT SERPL-MCNC: 1.37 MG/DL (ref 0.67–1.17)
GFR SERPL CREATININE-BSD FRML MDRD: 59 ML/MIN/1.73M2
GLUCOSE BLDC GLUCOMTR-MCNC: 145 MG/DL (ref 70–99)
GLUCOSE BLDC GLUCOMTR-MCNC: 186 MG/DL (ref 70–99)
GLUCOSE BLDC GLUCOMTR-MCNC: 234 MG/DL (ref 70–99)
GLUCOSE BLDC GLUCOMTR-MCNC: 256 MG/DL (ref 70–99)
GRAM STAIN RESULT: ABNORMAL
GRAM STAIN RESULT: ABNORMAL
PLATELET # BLD AUTO: 332 10E3/UL (ref 150–450)
VANCOMYCIN SERPL-MCNC: 20.8 UG/ML

## 2023-05-24 PROCEDURE — 250N000013 HC RX MED GY IP 250 OP 250 PS 637: Performed by: PODIATRIST

## 2023-05-24 PROCEDURE — 99222 1ST HOSP IP/OBS MODERATE 55: CPT | Performed by: INTERNAL MEDICINE

## 2023-05-24 PROCEDURE — 82565 ASSAY OF CREATININE: CPT | Performed by: PODIATRIST

## 2023-05-24 PROCEDURE — 250N000011 HC RX IP 250 OP 636: Performed by: PODIATRIST

## 2023-05-24 PROCEDURE — 80202 ASSAY OF VANCOMYCIN: CPT | Performed by: STUDENT IN AN ORGANIZED HEALTH CARE EDUCATION/TRAINING PROGRAM

## 2023-05-24 PROCEDURE — 250N000011 HC RX IP 250 OP 636: Performed by: INTERNAL MEDICINE

## 2023-05-24 PROCEDURE — 99232 SBSQ HOSP IP/OBS MODERATE 35: CPT | Performed by: HOSPITALIST

## 2023-05-24 PROCEDURE — 258N000003 HC RX IP 258 OP 636: Performed by: PODIATRIST

## 2023-05-24 PROCEDURE — 120N000001 HC R&B MED SURG/OB

## 2023-05-24 PROCEDURE — G0463 HOSPITAL OUTPT CLINIC VISIT: HCPCS | Mod: 25

## 2023-05-24 PROCEDURE — 97605 NEG PRS WND THER DME<=50SQCM: CPT

## 2023-05-24 PROCEDURE — 36415 COLL VENOUS BLD VENIPUNCTURE: CPT | Performed by: PODIATRIST

## 2023-05-24 PROCEDURE — 258N000003 HC RX IP 258 OP 636: Performed by: HOSPITALIST

## 2023-05-24 PROCEDURE — 85049 AUTOMATED PLATELET COUNT: CPT | Performed by: PODIATRIST

## 2023-05-24 RX ORDER — CEFAZOLIN SODIUM 2 G/100ML
2 INJECTION, SOLUTION INTRAVENOUS EVERY 8 HOURS
Qty: 7800 ML | Refills: 0
Start: 2023-05-25 | End: 2023-06-20

## 2023-05-24 RX ORDER — CEFAZOLIN SODIUM 2 G/100ML
2 INJECTION, SOLUTION INTRAVENOUS EVERY 8 HOURS
Status: DISCONTINUED | OUTPATIENT
Start: 2023-05-24 | End: 2023-05-26 | Stop reason: HOSPADM

## 2023-05-24 RX ORDER — LIDOCAINE 40 MG/G
CREAM TOPICAL
Status: DISCONTINUED | OUTPATIENT
Start: 2023-05-25 | End: 2023-05-25 | Stop reason: ALTCHOICE

## 2023-05-24 RX ADMIN — INSULIN ASPART 2 UNITS: 100 INJECTION, SOLUTION INTRAVENOUS; SUBCUTANEOUS at 12:50

## 2023-05-24 RX ADMIN — VANCOMYCIN HYDROCHLORIDE 1250 MG: 5 INJECTION, POWDER, LYOPHILIZED, FOR SOLUTION INTRAVENOUS at 21:20

## 2023-05-24 RX ADMIN — INSULIN ASPART 1 UNITS: 100 INJECTION, SOLUTION INTRAVENOUS; SUBCUTANEOUS at 08:48

## 2023-05-24 RX ADMIN — CEFAZOLIN SODIUM 2 G: 2 INJECTION, SOLUTION INTRAVENOUS at 23:18

## 2023-05-24 RX ADMIN — INSULIN ASPART 4 UNITS: 100 INJECTION, SOLUTION INTRAVENOUS; SUBCUTANEOUS at 16:39

## 2023-05-24 RX ADMIN — GABAPENTIN 300 MG: 300 CAPSULE ORAL at 08:55

## 2023-05-24 RX ADMIN — PIPERACILLIN AND TAZOBACTAM 3.38 G: 3; .375 INJECTION, POWDER, LYOPHILIZED, FOR SOLUTION INTRAVENOUS at 08:57

## 2023-05-24 RX ADMIN — ENOXAPARIN SODIUM 40 MG: 40 INJECTION SUBCUTANEOUS at 08:50

## 2023-05-24 RX ADMIN — PIPERACILLIN AND TAZOBACTAM 3.38 G: 3; .375 INJECTION, POWDER, LYOPHILIZED, FOR SOLUTION INTRAVENOUS at 01:13

## 2023-05-24 RX ADMIN — GABAPENTIN 300 MG: 300 CAPSULE ORAL at 20:34

## 2023-05-24 RX ADMIN — CEFAZOLIN SODIUM 2 G: 2 INJECTION, SOLUTION INTRAVENOUS at 16:34

## 2023-05-24 RX ADMIN — SODIUM CHLORIDE: 9 INJECTION, SOLUTION INTRAVENOUS at 23:17

## 2023-05-24 RX ADMIN — EMPAGLIFLOZIN 25 MG: 25 TABLET, FILM COATED ORAL at 08:55

## 2023-05-24 RX ADMIN — INSULIN GLARGINE 16 UNITS: 100 INJECTION, SOLUTION SUBCUTANEOUS at 20:52

## 2023-05-24 ASSESSMENT — ACTIVITIES OF DAILY LIVING (ADL)
ADLS_ACUITY_SCORE: 22
ADLS_ACUITY_SCORE: 23
ADLS_ACUITY_SCORE: 22
ADLS_ACUITY_SCORE: 23
ADLS_ACUITY_SCORE: 23
ADLS_ACUITY_SCORE: 22

## 2023-05-24 NOTE — DISCHARGE INSTRUCTIONS
"[facility] DISCHARGE INSTRUCTIONS:  Negative pressure wound therapy plan:  Wound location: Lateral L foot   Change Days: Tues/ Fri by [facility] RN    Supplies (including all accessories) used: small  Black foam  and Cavilon no sting barrier film  Cleanse with Vashe prior to replacing VAC    Suction setting: -125   Methods used: Window paned all periwound skin with vac drape prior to applying sponge and Bridged trac pad off bony prominences    Staff RN to assess integrity of dressing and ensure suction is set at appropriate level every shift.   Date canister. Chart canister output every shift. Change cannister weekly and PRN if full/occluded     Remove foam dressing and replace with BID normal saline moist gauze dressing if:   -a dressing failure which cannot be repaired within 2 hours   -patient is discharging to home without a home pump   -patient is discharging to a facility outside the local area   -if a dressing is a \"Silver Foam\", remove before Radiation Therapy or MRI     The hospital VAC pump is not to be discharged with the patient.?Ensure to disconnect patient from machine prior to discharge. Then,    - If a home KCI VAC pump has been delivered, connect home cannister to dressing tubing then connect cannister to home pump and turn on machine    - If transferring to a nearby facility with a KCI vac, can disconnect and clamp tubing then cover end with glove so can be reconnected within 2 hours    "

## 2023-05-24 NOTE — PLAN OF CARE
Problem: Wound Healing Progression  Goal: Optimal Wound Healing  Intervention: Promote Wound Healing  Recent Flowsheet Documentation  Taken 5/23/2023 2231 by Shakira Alberto, RN  Activity Management: activity adjusted per tolerance     Pt sleeping most of shift. Denies pain. Gave PRN acetaminophen at HS. Dressing clean, dry, intact.     Blood glucose were 280 & 306, gave novolog and lantus as scheduled.     Pt eating, voiding. Using urinal at bedside.     Able to turn self in bed.

## 2023-05-24 NOTE — CONSULTS
[facility] Nurse Inpatient Assessment     Consulted for: L foot vac    Summary: Followed by Dr Huerta    Patient History (according to provider note(s):          Assessment:      Negative pressure wound therapy applied to: L lateral 5th digit    5/24     Last photo: 5/24   Wound due to: Surgical Wound   Wound history/plan of care:      Surgical date: 5/23     Service following: Podiatry    Date Negative Pressure Wound Therapy initiated: 5/24     Interventions in place: offloading    Is patient s nutritional status compromised? no   a. If yes, what interventions are in place? N/A    Reason for initiating vac therapy? Presence of co-morbidities and Need for accelerated granulation tissue    Which?of?the?following?co-morbidities?apply? Diabetes  a. If diabetic is patient on a diabetic management program? Yes     Is osteomyelitis present in wound? no  a.  If yes what treatments are in place? N/A    Wound base: 100 % non-granular tissue,      Palpation of the wound bed: normal       Drainage: small      Volume in cannister: new     Last cannister change date: started today 5/24     Description of drainage: bloody      Measurements (length x width x depth, in cm) 4.5  x 3  x  0.5 cm       Tunneling N/A      Undermining N/A   Periwound skin: Intact and Erythema- blanchable       Color: pink and Small area at 9 oclock with slough       Temperature: normal    Odor: none   Pain: denies , none   Pain intervention prior to dressing change: no significant pain present   Treatment goal: Heal , Increase granulation and Protection  STATUS: initial assessment   Supplies ordered: ordered vac    Number of foam pieces removed from a wound (excluding foam for bridge) : 0 GranuFoam Black   Verified this matched the number of foam pieces applied last dressing change: Yes   Number of foam pieces packed into wound (excluding foam for bridge) : 1 GranuFoam Black - small bridge to dorsal foot      Treatment Plan:  "    Negative pressure wound therapy plan:  Wound location: Lateral L foot   Change Days: Tues/ Fri by WOC RN    Supplies (including all accessories) used: small  Black foam  and Cavilon no sting barrier film  Cleanse with Vashe prior to replacing VAC    Suction setting: -125   Methods used: Window paned all periwound skin with vac drape prior to applying sponge and Bridged trac pad off bony prominences    Staff RN to assess integrity of dressing and ensure suction is set at appropriate level every shift.   Date canister. Chart canister output every shift. Change cannister weekly and PRN if full/occluded     Remove foam dressing and replace with BID normal saline moist gauze dressing if:   -a dressing failure which cannot be repaired within 2 hours   -patient is discharging to home without a home pump   -patient is discharging to a facility outside the local area   -if a dressing is a \"Silver Foam\", remove before Radiation Therapy or MRI     The hospital VAC pump is not to be discharged with the patient.?Ensure to disconnect patient from machine prior to discharge. Then,    - If a home KCI VAC pump has been delivered, connect home cannister to dressing tubing then connect cannister to home pump and turn on machine    - If transferring to a nearby facility with a KCI vac, can disconnect and clamp tubing then cover end with glove so can be reconnected within 2 hours          Orders: Written    RECOMMEND PRIMARY TEAM ORDER: None, at this time  Education provided: importance of repositioning, plan of care, wound progress and Off-loading pressure  Discussed plan of care with: Patient  WOC nurse follow-up plan: Tuesday/Friday  Notify WOC if wound(s) deteriorate.  Nursing to notify the Provider(s) and re-consult the WOC Nurse if new skin concern.    DATA:     Current support surface: Standard  Standard gel/foam mattress (IsoFlex, Atmos air, etc)  Containment of urine/stool: Continent of bladder and Continent of bowel  BMI: " Body mass index is 21.1 kg/m .   Active diet order: Orders Placed This Encounter      Diet      Moderate Consistent Carb (60 g CHO per Meal) Diet     Output: I/O last 3 completed shifts:  In: 1896.67 [P.O.:1200; I.V.:696.67]  Out: 1500 [Urine:1500]     Labs: Recent Labs   Lab 05/23/23  0644   HGB 9.9*   WBC 5.3     Pressure injury risk assessment:   Sensory Perception: 3-->slightly limited  Moisture: 4-->rarely moist  Activity: 3-->walks occasionally  Mobility: 3-->slightly limited  Nutrition: 3-->adequate  Friction and Shear: 3-->no apparent problem  Lisandro Score: 19    OLIVA StewartN RN CWOCN  Pager no longer in use, please contact through [place] group: [facility]

## 2023-05-24 NOTE — CONSULTS
[facility]    Infectious Disease Consultation     Date of Admission:  5/20/2023  Date of Consult (When I saw the patient): 05/24/23    Assessment & Plan   Abdulaziz Rausch is a 57 year old male who was admitted on 5/20/2023.     Impression:  1. Diabetic foot infection  2. Abscess fifth metatarsal phalangeal joint left foot  3. Status post incision and drainage of abscess with debridement and irrigation of wound left foot, wound VAC in place  4. Concern for osteomyelitis  5. Staphylococcus aureus, Streptococcus, previous history of MRSA    Recommendations    1. IV Cefazolin  2.  IV Vancomycin while inpatient  3. Care coordinator consult for IV cefazolin x 28 days on discharge, plus p.o. doxycycline  4.  Okay to place PICC line as blood cultures have been negative for 3 days  5.  IV antibiotic orders entered under discharge navigator  6.  Patient will likely need TCU on discharge  7. Podiatry note reviewed    Discussed with patient and questions answered  Thank you for consulting infectious disease.  Please call with questions    Brooklynn Loera MD  [facility]  Answering Service: [phone]  On-Call ID provider: [phone], option: 9  Photos from patient's chart previously:                  Reason for Consult   Reason for consult: I was asked to evaluate this patient for Abx guidance    Primary Care Physician   Daryn Pittman    Chief Complaint   Left foot cellulitis ulcer of abscess    History is obtained from the patient and medical records    History of Present Illness   Abdulaziz Rausch is a 57 year old male who presents with left foot fifth metatarsophalangeal joint region ulceration, cellulitis, abscess  Patient had fevers and chills, he has poorly controlled diabetes with peripheral neuropathy  History of right second and third toe amputation in January 2023  Patient had a scab, worsening pain and swelling  Underwent I&D  Cultures with strep and  дмитрий BOLDEN consulted to assist with antibiotic management  Pain controlled currently on wound VAC in place  Updated patient regarding antibiotic and plan      Past Medical History   I have reviewed this patient's medical history and updated it with pertinent information if needed.   Past Medical History:   Diagnosis Date     Anemia      Depression      Diabetes mellitus, type 2 (H)      Diabetic foot ulcer (H)      Hypertension      Osteomyelitis (H)        Past Surgical History   I have reviewed this patient's surgical history and updated it with pertinent information if needed.  Past Surgical History:   Procedure Laterality Date     AMPUTATE TOE(S) Right 2/17/2023    Procedure: AMPUTATION, digits two and three right foot;  Surgeon: Ulises Jimenez DPM;  Location: [facility] OR     INCISION AND DRAINAGE FOOT, COMBINED Left 5/23/2023    Procedure: Incision and drainage of abscess left foot with debridement and irrigation of wound left foot;  Surgeon: Jf Huerta DPM;  Location: [facility] OR     IRRIGATION AND DEBRIDEMENT TOE, COMBINED Right 1/20/2023    Procedure: IRRIGATION AND DEBRIDEMENT digits 2 and 3 right foot;  Surgeon: Jf Huerta DPM;  Location: [facility] OR       Prior to Admission Medications   Prior to Admission Medications   Prescriptions Last Dose Informant Patient Reported? Taking?   Continuous Blood Gluc  (FREESTYLE NOAH 2 READER) CHUCK   No No   Sig: Use to read blood sugars as per 's instructions.   Continuous Blood Gluc Sensor (FREESTYLE NOAH 2 SENSOR) MISC   No No   Sig: Change every 14 days.   alcohol swab prep pads   No No   Sig: Use to swab area of injection/ella as directed.   blood glucose (NO BRAND SPECIFIED) test strip   No No   Sig: Use to test blood sugar 4 times daily or as directed. To accompany: Blood Glucose Monitor Brands: per insurance.   blood glucose monitoring (NO BRAND SPECIFIED) meter device kit   No No   Sig: Use to test blood  sugar 4 time daily or as directed. Preferred blood glucose meter OR supplies to accompany: Blood Glucose Monitor Brands: per insurance.   empagliflozin (JARDIANCE) 25 MG TABS tablet 5/20/2023 at AM  No Yes   Sig: Take 1 tablet (25 mg) by mouth daily   gabapentin (NEURONTIN) 300 MG capsule 5/20/2023 at AM  Yes Yes   Sig: Take 300 mg by mouth 2 times daily   insulin glargine (LANTUS SOLOSTAR) 100 UNIT/ML pen 5/19/2023 at Bedtime  No Yes   Sig: Inject 16 Units Subcutaneous At Bedtime   insulin lispro (HUMALOG KWIKPEN) 100 UNIT/ML (1 unit dial) KWIKPEN 5/19/2023 at PM  Yes Yes   Sig: Inject 6 Units Subcutaneous 3 times daily (before meals)   insulin pen needle (31G X 8 MM) 31G X 8 MM miscellaneous   No No   Sig: Use 4 pen needles daily or as directed.   metFORMIN (GLUCOPHAGE) 500 MG tablet 5/20/2023 at AM  No Yes   Sig: Take 2 tablets (1,000 mg) by mouth 2 times daily (with meals)   oxyCODONE-acetaminophen (PERCOCET) 5-325 MG tablet Past Month at PRN  No Yes   Sig: Take 1 tablet by mouth every 6 hours as needed for pain   thin (NO BRAND SPECIFIED) lancets   No No   Sig: Use with lanceting device. To accompany: Blood Glucose Monitor Brands: per insurance.      Facility-Administered Medications: None     Allergies   No Known Allergies    Immunization History   Immunization History   Administered Date(s) Administered     COVID-19 Bivalent 12+ (Pfizer) 04/17/2023     COVID-19 Monovalent 18+ (Moderna) 11/07/2021     COVID-19 Vaccine (Jacobo) 06/16/2021     Influenza Vaccine 50-64 or 18-64 w/egg allergy (Flublok) 01/22/2023     Influenza Vaccine >6 months (Alfuria,Fluzone) 10/08/2018     Pneumococcal 20 valent Conjugate (Prevnar 20) 04/17/2023     TDAP Vaccine (Adacel) 07/05/2018       Social History   I have reviewed this patient's social history and updated it with pertinent information if needed. Abdulaziz Rausch  reports that he has never smoked. He has never used smokeless tobacco. He reports that he does not drink  alcohol and does not use drugs.    Family History   I have reviewed this patient's family history and updated it with pertinent information if needed.   Family History   Problem Relation Age of Onset     Diabetes Father        Review of Systems   The 10 point Review of Systems is negative other than noted in the HPI or here.     Physical Exam   Temp: 97.9  F (36.6  C) Temp src: Oral BP: 119/69 Pulse: 63   Resp: 18 SpO2: 99 % O2 Device: None (Room air)    Vital Signs with Ranges  Temp:  [97.5  F (36.4  C)-98.1  F (36.7  C)] 97.9  F (36.6  C)  Pulse:  [63-80] 63  Resp:  [12-18] 18  BP: (117-141)/(66-78) 119/69  SpO2:  [96 %-99 %] 99 %  142 lbs 13.73 oz  Body mass index is 21.1 kg/m .    GENERAL APPEARANCE:  awake, NAD, appears chronically ill  EYES: Eyes grossly normal to inspection, conjunctivae and sclerae normal  HENT: mouth without ulcers or lesions  NECK: supple  RESP: normal breathing pattern  CV: regular rates and rhythm, S1 S2  LYMPHATICS: no swelling  ABDOMEN: soft, nontender, nondistended  MS: extremities normal- no gross deformities noted  SKIN: Sting on foot, see photos above  NEURO: coherent      LABORATORY DATA:  Reviewed    CBC RESULTS:   Recent Labs   Lab Test 05/24/23  0628 05/23/23  0644   WBC  --  5.3   RBC  --  3.46*   HGB  --  9.9*   HCT  --  30.0*   MCV  --  87   MCH  --  28.6   MCHC  --  33.0   RDW  --  12.4    331        Last Comprehensive Metabolic Panel:  Sodium   Date Value Ref Range Status   05/23/2023 140 136 - 145 mmol/L Final     Potassium   Date Value Ref Range Status   05/23/2023 4.2 3.4 - 5.3 mmol/L Final   08/26/2021 4.0 3.5 - 5.0 mmol/L Final     Chloride   Date Value Ref Range Status   05/23/2023 107 98 - 107 mmol/L Final   08/26/2021 106 98 - 107 mmol/L Final     Carbon Dioxide (CO2)   Date Value Ref Range Status   05/23/2023 23 22 - 29 mmol/L Final   08/26/2021 24 22 - 31 mmol/L Final     Anion Gap   Date Value Ref Range Status   05/23/2023 10 7 - 15 mmol/L Final    08/26/2021 10 5 - 18 mmol/L Final     Glucose   Date Value Ref Range Status   08/26/2021 189 (H) 70 - 125 mg/dL Final     GLUCOSE BY METER POCT   Date Value Ref Range Status   05/24/2023 186 (H) 70 - 99 mg/dL Final     Urea Nitrogen   Date Value Ref Range Status   05/23/2023 23.3 (H) 8.0 - 23.0 mg/dL Final   08/26/2021 15 8 - 22 mg/dL Final     Creatinine   Date Value Ref Range Status   05/24/2023 1.37 (H) 0.67 - 1.17 mg/dL Final     GFR Estimate   Date Value Ref Range Status   05/24/2023 59 (L) >60 mL/min/1.73m2 Final     Comment:     eGFR calculated using 2021 CKD-EPI equation.     Calcium   Date Value Ref Range Status   05/23/2023 9.0 8.6 - 10.0 mg/dL Final     Bilirubin Total   Date Value Ref Range Status   01/21/2023 0.3 <=1.2 mg/dL Final     Alkaline Phosphatase   Date Value Ref Range Status   01/21/2023 340 (H) 40 - 129 U/L Final     ALT   Date Value Ref Range Status   01/21/2023 36 10 - 50 U/L Final     AST   Date Value Ref Range Status   01/21/2023 24 10 - 50 U/L Final             No results found for: CRP     Reviewed CBC with differential  Reviewed creatinine  Reviewed microbiology tissue cultures and blood culture results next  MICROBIOLOGY:    Reviewed    Blood cultures  Other Micro:   7-Day Micro Results     Collected Updated Procedure Result Status      05/23/2023 0740 05/24/2023 1216 Anaerobic Bacterial Culture Routine [[id number]]   Wound from Foot, Left    Preliminary result Component Value   Culture No anaerobic organisms isolated after 1 day  [P]                05/23/2023 0740 05/23/2023 1443 Gram Stain [[id number]]   (Abnormal)   Wound from Foot, Left    Final result Component Value   Gram Stain Result 1+ Gram positive cocci   Gram Stain Result 2+ WBC seen            05/23/2023 0740 05/24/2023 1153 Wound Aerobic Bacterial Culture Routine [[id number]]   (Abnormal)   Wound from Foot, Left    Preliminary result Component Value   Culture Culture in progress  [P]     2+ Streptococcus  agalactiae (Group B Streptococcus)  [P]     This organism is susceptible to ampicillin, penicillin, vancomycin and the cephalosporins. If treatment is required and your patient is allergic to penicillin, contact the microbiology lab within 5 days to request susceptibility testing.    1+ Staphylococcus aureus  [P]     Susceptibilities done on previous cultures               05/21/2023 0000 05/24/2023 0946 Blood Culture Peripheral Blood [[id number]]   Peripheral Blood    Preliminary result Component Value   Culture No growth after 3 days  [P]                05/20/2023 2346 05/24/2023 0152 Wound Aerobic Bacterial Culture Routine with Gram Stain [[id number]]    (Abnormal)   Wound from Foot, Left    Final result Component Value   Culture 2+ Streptococcus agalactiae (Group B Streptococcus)    This organism is susceptible to ampicillin, penicillin, vancomycin and the cephalosporins. If treatment is required and your patient is allergic to penicillin, contact the microbiology lab within 5 days to request susceptibility testing.    1+ Staphylococcus aureus    1+ Normal nabila   Gram Stain Result 2+ Gram positive cocci    1+ WBC seen        Susceptibility      Staphylococcus aureus      SUJATA      Clindamycin  Resistant  [1]      Erythromycin >=8 ug/mL Resistant     Gentamicin <=0.5 ug/mL Susceptible      Oxacillin <=0.25 ug/mL Susceptible  [2]       Tetracycline <=1 ug/mL Susceptible      Trimethoprim/Sulfamethoxazole <=0.5/9.5 ug/mL Susceptible      Vancomycin <=0.5 ug/mL Susceptible                   [1]  This isolate is presumed to be clindamycin resistant based on detection of inducible clindamycin resistance. Erythromycin and clindamycin are resistant; therefore, they are not recommended for use.     [2]  Oxacillin susceptible isolates are susceptible to cephalosporins (example: cefazolin and cephalexin) and beta lactam combination agents. Oxacillin resistant isolates are resistant to these agents.                  "05/20/2023 2344 05/24/2023 0946 Blood Culture Peripheral Blood [[id number]]    Peripheral Blood    Preliminary result Component Value   Culture No growth after 3 days  [P]                      RADIOLOGY:  POC US Guidance Needle Placement    Result Date: 5/23/2023  Ultrasound was performed as guidance to an anesthesia procedure.  Click \"PACS images\" hyperlink below to view any stored images.  For specific procedure details, view procedure note authored by anesthesia.    US Lower Extremity Arterial Duplex Bilateral    Result Date: 5/21/2023  [place] [facility] LOCATION: [facility] DATE: 5/21/2023 1. EXAM: RESTING ANKLE-BRACHIAL INDICES (ABIs) 2. DUPLEX ARTERIAL ULTRASOUND OF THE LOWER EXTREMITIES BILATERAL INDICATION: PAD. Diabetic wounds. TECHNIQUE: Duplex imaging is performed utilizing gray-scale, two-dimensional images and color-flow imaging. Doppler waveform analysis and spectral Doppler imaging is also performed. COMPARISON: None. FINDINGS: SEGMENTAL BP RIGHT (mmHg) Brachial: 125 Ankle (PT): 183; Index 1.46 Ankle (DP): 151; Index 1.21 Digit: 131; Index 1.05 LEFT (mmHg) Brachial: -- Ankle (PT): 178; Index 1.42 Ankle (DP): 151; Index 1.21 Digit: 86; Index 0.69 Resting ABIs are 1.5 on the right. Right digital index 1.0. Resting ABIs are 1.4 on the left. Left digital index 0.69. WAVEFORMS: Pedal waveforms are multiphasic bilaterally. DUPLEX ARTERIAL ULTRASOUND FINDINGS: LOWER EXTREMITY ARTERIAL DUPLEX EXAM RIGHT (cm/s) EIA: 154 CFA: 89 PFA: 58 SFA-Proximal: 102 SFA-Mid: 139 SFA-Distal: 101 Popliteal: 107 PTA: 107 FRANCESCO: 62 DPA: 71 LEFT (cm/s) EIA: 116 CFA: 120 PFA: 66 SFA-Proximal: 118 SFA-Mid: 165 SFA-Distal: 131 Popliteal: 110 PTA: 95 FRANCESCO: 80 DPA: 106 RIGHT: Multiphasic waveforms throughout the right lower extremity. No significant arterial stenosis identified. LEFT: Multiphasic waveforms throughout the left lower extremity. No significant arterial stenosis identified. 3.3 x 1.9 x 0.8 cm " left groin lymph node. Normal-appearing fatty hilum.     IMPRESSION: 1.  RIGHT LOWER EXTREMITY: Right CHIQUITA is elevated. Findings consistent with vascular calcification. Right digital index is normal. Multiphasic waveforms throughout the right lower extremity with no significant arterial stenosis identified. 2.  LEFT LOWER EXTREMITY: Left CHIQUITA is elevated. Findings consistent with vascular calcification. Left digital index is normal. Multiphasic waveforms throughout the left lower extremity with no significant arterial stenosis identified. 3.  Left groin lymphadenopathy. I suspect this is a reactive node.    US CHIQUITA Doppler No Exercise    Result Date: 5/21/2023  [place] [facility] LOCATION: [facility] DATE: 5/21/2023 1. EXAM: RESTING ANKLE-BRACHIAL INDICES (ABIs) 2. DUPLEX ARTERIAL ULTRASOUND OF THE LOWER EXTREMITIES BILATERAL INDICATION: PAD. Diabetic wounds. TECHNIQUE: Duplex imaging is performed utilizing gray-scale, two-dimensional images and color-flow imaging. Doppler waveform analysis and spectral Doppler imaging is also performed. COMPARISON: None. FINDINGS: SEGMENTAL BP RIGHT (mmHg) Brachial: 125 Ankle (PT): 183; Index 1.46 Ankle (DP): 151; Index 1.21 Digit: 131; Index 1.05 LEFT (mmHg) Brachial: -- Ankle (PT): 178; Index 1.42 Ankle (DP): 151; Index 1.21 Digit: 86; Index 0.69 Resting ABIs are 1.5 on the right. Right digital index 1.0. Resting ABIs are 1.4 on the left. Left digital index 0.69. WAVEFORMS: Pedal waveforms are multiphasic bilaterally. DUPLEX ARTERIAL ULTRASOUND FINDINGS: LOWER EXTREMITY ARTERIAL DUPLEX EXAM RIGHT (cm/s) EIA: 154 CFA: 89 PFA: 58 SFA-Proximal: 102 SFA-Mid: 139 SFA-Distal: 101 Popliteal: 107 PTA: 107 FRANCESCO: 62 DPA: 71 LEFT (cm/s) EIA: 116 CFA: 120 PFA: 66 SFA-Proximal: 118 SFA-Mid: 165 SFA-Distal: 131 Popliteal: 110 PTA: 95 FRANCESCO: 80 DPA: 106 RIGHT: Multiphasic waveforms throughout the right lower extremity. No significant arterial stenosis identified. LEFT:  Multiphasic waveforms throughout the left lower extremity. No significant arterial stenosis identified. 3.3 x 1.9 x 0.8 cm left groin lymph node. Normal-appearing fatty hilum.     IMPRESSION: 1.  RIGHT LOWER EXTREMITY: Right CHIQUITA is elevated. Findings consistent with vascular calcification. Right digital index is normal. Multiphasic waveforms throughout the right lower extremity with no significant arterial stenosis identified. 2.  LEFT LOWER EXTREMITY: Left CHIQUITA is elevated. Findings consistent with vascular calcification. Left digital index is normal. Multiphasic waveforms throughout the left lower extremity with no significant arterial stenosis identified. 3.  Left groin lymphadenopathy. I suspect this is a reactive node.    MR Foot Left w/o & w Contrast    Result Date: 5/21/2023  EXAM: MR FOOT LEFT W/O and W CONTRAST LOCATION: [facility] DATE/TIME: 5/21/2023 12:45 PM CDT INDICATION: Left foot cellulitis. Lateral forefoot ulcer. COMPARISON: Foot radiographic exam 5/21/2023. Foot MRI 1/18/2023. TECHNIQUE: Routine. Additional postgadolinium T1 sequences were obtained. IV CONTRAST: 6 mL Gadavist FINDINGS: JOINTS AND BONES: -No evidence for left foot fracture, metatarsal stress fracture, or definitive evidence for osteomyelitis. Specifically, there is no confluent T1 hypointense signal in the 5th proximal phalangeal base or 5th metatarsal head or neck in the area of the ulceration in the lateral forefoot dorsally. Mild left 1st MTP and metatarsal sesamoid chondromalacia. Navicular-medial cuneiform chondromalacia with subchondral marrow edema medial navicular distally. Mild synovitis at the 1st MTP joint. Nothing for septic arthritis at the 5th MTP joint. TENDONS: -No acute tendon injury. Distal anterior tibialis tendon intact. Distal peroneus longus tendon intact. No forefoot tenosynovitis. LIGAMENTS: -Lisfranc ligament: Intact. No subluxation. MUSCLES AND SOFT TISSUES: -Mild intrinsic foot  muscle atrophy. Patchy muscle edema without significant myositis. Dorsal foot soft tissue swelling. Soft tissue ulceration along the dorsal and lateral aspect of the forefoot at the level of the 5th MTP joint. Deep area of nonviable soft tissue with adjacent granulation tissue. Mild dorsal foot cellulitis.     IMPRESSION: 1.  Soft tissue wound/ulcer in the dorsal and lateral aspect of the forefoot at the level of the 5th MTP joint. Small deep area of nonviable subjacent soft tissue with adjacent cellulitis. 2.  No convincing evidence for acute osteomyelitis of the 5th toe or distal 5th metatarsal. 3.  Mild dorsal foot soft tissue swelling with cellulitis. 4.  Mild first MTP and metatarsal sesamoid chondromalacia. Mild navicular-medial cuneiform chondromalacia. 5.  Intact Lisfranc ligament. No midfoot subluxation. 6.  No forefoot flexor or extensor tenosynovitis.     Foot XR, G/E 3 views, left    Result Date: 5/21/2023  EXAM: XR FOOT LEFT G/E 3 VIEWS LOCATION: [facility] DATE/TIME: 5/21/2023 12:44 AM CDT INDICATION: Foot pain. Evaluate for osteomyelitis at distal fifth metatarsal. COMPARISON: 01/18/2023     IMPRESSION: Prominent ulcer noted at the level of the fifth metatarsal head. No underlying radiographic evidence of osteomyelitis. No displaced fracture.

## 2023-05-24 NOTE — PROGRESS NOTES
[facility]    Medicine Progress Note - Hospitalist Service    Date of Admission:  5/20/2023    Assessment & Plan   Abdulaziz Rausch is a 57 year old male with medical history of poorly controlled type 2 diabetes mellitus, HTN, PAD status post right second and third toe amputations, hypertension, admitted on 5/20/2023 with nontraumatic worsening wound with pain, swelling and redness of the left foot for 1 day.  ED work-up showed marked hyperglycemia with blood sugar of 590, acute kidney injury, lactic acidosis and mild anemia.  Left foot x-ray showed a prominent ulcer at the level of the fifth metatarsal head with no evidence of osteomyelitis or displaced fracture.     Left foot cellulitis   Left 5th MTP joint abscess  sepsis  - Wound and blood cultures.  - Broad-spectrum IV antibiotics on Vanco and Zosyn  - Podiatry consult appreciated.  POD #1   S/p I&D of left fifth metatarsophalangeal joint abscess on 5/20   --L foot Cx growing strep agalactiae and Staph aureus  - CHIQUITA Doppler is significant for vascular calcification bilaterally.     T2DM with hyperglycemia and left foot ulcer  - Insulin sliding scale plus basal insulin.  - Local wound care.  - Review compliance issues.     CHRISTY on CKD stage III  -IV fluids  -Limit potentially nephrotoxic agents  - Monitor BMP     Hypertension  -Blood pressure on the higher side.  No home antihypertensive medication noted  -Hydralazine as needed.  Discontinue normal saline.  monitor blood pressure  -Consider oral antihypertensive medication if blood pressure remains elevated       Diet: Diet  Moderate Consistent Carb (60 g CHO per Meal) Diet    DVT Prophylaxis: Enoxaparin (Lovenox) SQ  Fischer Catheter: Not present  Lines: None     Cardiac Monitoring: None  Code Status: No CPR- Do NOT Intubate      Clinically Significant Risk Factors                  # Hypertension: Noted on problem list       # DMII: A1C = 11.3 % (Ref range: 0.0 - 5.6 %) within past 6  months, PRESENT ON ADMISSION           Disposition Plan      Expected Discharge Date: 05/25/2023        Discharge Comments: I&D of foot 5/23  WOC to place wound vac  PT/OT to see          Florentino Mccoy MD  Hospitalist Service  [facility]  Securely message with Dm (more info)  Text page via B[employer] Paging/Directory   ______________________________________________________________________    Interval History   Patient seen examined at bedside/chart reviewed.  Patient is feeling good, he denies any pain of his extremity    Physical Exam   Vital Signs: Temp: 97.9  F (36.6  C) Temp src: Oral BP: 119/69 Pulse: 63   Resp: 18 SpO2: 99 % O2 Device: None (Room air)    Weight: 142 lbs 13.73 oz    General Appearance:  No distress noted  Respiratory: Good air entry bilaterally  Cardiovascular: S1 and S2 well heard, no murmur or gallop  GI: Soft abdomen, no tenderness, normoactive bowel sounds  Skin: Intact and warm, L foot dressed without any discharge,    Medical Decision Making       35 MINUTES SPENT BY ME on the date of service doing chart review, history, exam, documentation & further activities per the note.      Data   NOTE: Data reviewed over the past 24 hrs contributes toward MDM complexity

## 2023-05-24 NOTE — PROGRESS NOTES
FOOT AND ANKLE SURGERY/PODIATRY Progress Note        ASSESSMENT/PLAN:  Abscess fifth metatarsal phalangeal joint left foot  S/P incision and drainage of abscess with debridement and irrigation of wound left foot    Continue same.  Waiting application of wound VAC to left foot.  Wound cultures pending.  Patient may be discharged per ID if no other barriers.  The patient should follow-up with the vascular clinic upon discharge for continued local wound care.  Podiatry is signing off.  Please call with questions.    Objective findings: The dressings were clean dry and intact.  No sign of active drainage or bleeding.  Neurovascular status is intact.  Vital signs stable.    HPI: Abdulaziz Rausch was seen again today at bedside resting comfortably.  The patient had no complaints..      Past Medical History:   Diagnosis Date     Anemia      Depression      Diabetes mellitus, type 2 (H)      Diabetic foot ulcer (H)      Hypertension      Osteomyelitis (H)         Past Surgical History:   Procedure Laterality Date     AMPUTATE TOE(S) Right 2/17/2023    Procedure: AMPUTATION, digits two and three right foot;  Surgeon: Ulises Jimenez DPM;  [place] [facility] [place]     INCISION AND DRAINAGE FOOT, COMBINED Left 5/23/2023    Procedure: Incision and drainage of abscess left foot with debridement and irrigation of wound left foot;  Surgeon: Jf Huerta DPM;  [place] [facility] [place]     IRRIGATION AND DEBRIDEMENT TOE, COMBINED Right 1/20/2023    Procedure: IRRIGATION AND DEBRIDEMENT digits 2 and 3 right foot;  Surgeon: Jf Huerta DPM;  [place] [facility] [place]       No Known Allergies      Current Facility-Administered Medications:      acetaminophen (TYLENOL) tablet 650 mg, 650 mg, Oral, Q6H PRN, 650 mg at 05/23/23 2231 **OR** acetaminophen (TYLENOL) Suppository 650 mg, 650 mg, Rectal, Q6H PRN, Jf Huerta DPM     glucose gel 15-30 g, 15-30 g, Oral, Q15 Min PRN **OR** dextrose 50 % injection 25-50  mL, 25-50 mL, Intravenous, Q15 Min PRN **OR** glucagon injection 1 mg, 1 mg, Subcutaneous, Q15 Min PRN, Deanna, Jf, DPM     docusate sodium (COLACE) capsule 100 mg, 100 mg, Oral, BID, Deanna, Jf, DPM, 100 mg at 05/21/23 2111     empagliflozin (JARDIANCE) tablet 25 mg, 25 mg, Oral, Daily, Deanna, Jf, DPM, 25 mg at 05/24/23 0855     enoxaparin ANTICOAGULANT (LOVENOX) injection 40 mg, 40 mg, Subcutaneous, Q24H, Deanna, Jf, DPM, 40 mg at 05/24/23 0850     gabapentin (NEURONTIN) capsule 300 mg, 300 mg, Oral, BID, Deanna, Jf, DPM, 300 mg at 05/24/23 0855     HYDROmorphone (PF) (DILAUDID) injection 0.5 mg, 0.5 mg, Intravenous, Q2H PRN, Deanna, Jf, DPM     insulin aspart (NovoLOG) injection (RAPID ACTING), 1-10 Units, Subcutaneous, TID AC, Deanna, Jf, DPM, 1 Units at 05/24/23 0848     insulin aspart (NovoLOG) injection (RAPID ACTING), 1-7 Units, Subcutaneous, At Bedtime, Deanna, Jf, DPM, 5 Units at 05/23/23 2232     insulin glargine (LANTUS PEN) injection 16 Units, 16 Units, Subcutaneous, At Bedtime, Deanna, Jf, DPM, 16 Units at 05/23/23 2232     lidocaine (LMX4) cream, , Topical, Q1H PRN, Deanna, Jf, DPM     lidocaine (LMX4) cream, , Topical, Q1H PRN, Deanna, Jf, DPM     lidocaine 1 % 0.1-1 mL, 0.1-1 mL, Other, Q1H PRN, Deanna, Jf, DPM     lidocaine 1 % 0.1-1 mL, 0.1-1 mL, Other, Q1H PRN, Deanna, Jf, DPM     magnesium hydroxide (MILK OF MAGNESIA) suspension 30 mL, 30 mL, Oral, Daily PRN, Deanna, Jf, ENEIDA     melatonin tablet 1 mg, 1 mg, Oral, At Bedtime PRN, Jf Huerta DPM     naloxone (NARCAN) injection 0.2 mg, 0.2 mg, Intravenous, Q2 Min PRN **OR** naloxone (NARCAN) injection 0.4 mg, 0.4 mg, Intravenous, Q2 Min PRN **OR** naloxone (NARCAN) injection 0.2 mg, 0.2 mg, Intramuscular, Q2 Min PRN **OR** naloxone (NARCAN) injection 0.4 mg, 0.4 mg, Intramuscular, Q2 Min PRN, Jf Huerta, ENEIDA     ondansetron (ZOFRAN ODT) ODT tab 4 mg, 4 mg, Oral, Q6H  PRN **OR** ondansetron (ZOFRAN) injection 4 mg, 4 mg, Intravenous, Q6H PRN, Deanna, Jf, DPM     oxyCODONE (ROXICODONE) tablet 5 mg, 5 mg, Oral, Q4H PRN, Deanna, Jf, DPM, 5 mg at 05/22/23 1749     piperacillin-tazobactam (ZOSYN) 3.375 g vial to attach to  mL bag, 3.375 g, Intravenous, Q8H, Deanna, Jf, DPM, 3.375 g at 05/24/23 0857     sodium chloride (PF) 0.9% PF flush 3 mL, 3 mL, Intracatheter, Q8H, Deanna, Jf, DPM     sodium chloride (PF) 0.9% PF flush 3 mL, 3 mL, Intracatheter, q1 min prn, Deanan, Jf, DPM     sodium chloride (PF) 0.9% PF flush 3 mL, 3 mL, Intracatheter, Q8H, Deanna, Jf, DPM, 3 mL at 05/22/23 2126     sodium chloride (PF) 0.9% PF flush 3 mL, 3 mL, Intracatheter, q1 min prn, Deanna, Jf, DPM     sodium chloride 0.9% infusion, , Intravenous, Continuous, Florentino Mccoy MD, Last Rate: 100 mL/hr at 05/23/23 1702, New Bag at 05/23/23 1702     vancomycin (VANCOCIN) 1,250 mg in sodium chloride 0.9 % 250 mL intermittent infusion, 1,250 mg, Intravenous, Q24H, Deanna, Jf, DPM, Last Rate: 0 mL/hr at 05/23/23 0030, 1,250 mg at 05/23/23 2229    Family History   Problem Relation Age of Onset     Diabetes Father        Social History     Socioeconomic History     Marital status:      Spouse name: Not on file     Number of children: Not on file     Years of education: Not on file     Highest education level: Not on file   Occupational History     Not on file   Tobacco Use     Smoking status: Never     Smokeless tobacco: Never   Vaping Use     Vaping status: Never Used   Substance and Sexual Activity     Alcohol use: Never     Drug use: Never     Sexual activity: Not on file   Other Topics Concern     Not on file   Social History Narrative     Not on file     Social Determinants of Health     Financial Resource Strain: Not on file   Food Insecurity: Not on file   Transportation Needs: Not on file   Physical Activity: Not on file   Stress: Not on file   Social  "Connections: Not on file   Intimate Partner Violence: Not on file   Housing Stability: Not on file            /69 (BP Location: Right arm, Patient Position: Supine)   Pulse 63   Temp 97.9  F (36.6  C) (Oral)   Resp 18   Ht 1.753 m (5' 9\")   Wt 64.8 kg (142 lb 13.7 oz)   SpO2 99%   BMI 21.10 kg/m      BMI= Body mass index is 21.1 kg/m .    OBJECTIVE:  General appearance: Patient is alert and fully cooperative with history & exam.  No sign of distress is noted during the visit.      Imaging:         POC US Guidance Needle Placement    Result Date: 5/23/2023  Ultrasound was performed as guidance to an anesthesia procedure.  Click \"PACS images\" hyperlink below to view any stored images.  For specific procedure details, view procedure note authored by anesthesia.    US Lower Extremity Arterial Duplex Bilateral    Result Date: 5/21/2023  [place] [facility] LOCATION: [facility] DATE: 5/21/2023 1. EXAM: RESTING ANKLE-BRACHIAL INDICES (ABIs) 2. DUPLEX ARTERIAL ULTRASOUND OF THE LOWER EXTREMITIES BILATERAL INDICATION: PAD. Diabetic wounds. TECHNIQUE: Duplex imaging is performed utilizing gray-scale, two-dimensional images and color-flow imaging. Doppler waveform analysis and spectral Doppler imaging is also performed. COMPARISON: None. FINDINGS: SEGMENTAL BP RIGHT (mmHg) Brachial: 125 Ankle (PT): 183; Index 1.46 Ankle (DP): 151; Index 1.21 Digit: 131; Index 1.05 LEFT (mmHg) Brachial: -- Ankle (PT): 178; Index 1.42 Ankle (DP): 151; Index 1.21 Digit: 86; Index 0.69 Resting ABIs are 1.5 on the right. Right digital index 1.0. Resting ABIs are 1.4 on the left. Left digital index 0.69. WAVEFORMS: Pedal waveforms are multiphasic bilaterally. DUPLEX ARTERIAL ULTRASOUND FINDINGS: LOWER EXTREMITY ARTERIAL DUPLEX EXAM RIGHT (cm/s) EIA: 154 CFA: 89 PFA: 58 SFA-Proximal: 102 SFA-Mid: 139 SFA-Distal: 101 Popliteal: 107 PTA: 107 FRANCESCO: 62 DPA: 71 LEFT (cm/s) EIA: 116 CFA: 120 PFA: 66 SFA-Proximal: 118 " SFA-Mid: 165 SFA-Distal: 131 Popliteal: 110 PTA: 95 FRANCESCO: 80 DPA: 106 RIGHT: Multiphasic waveforms throughout the right lower extremity. No significant arterial stenosis identified. LEFT: Multiphasic waveforms throughout the left lower extremity. No significant arterial stenosis identified. 3.3 x 1.9 x 0.8 cm left groin lymph node. Normal-appearing fatty hilum.     IMPRESSION: 1.  RIGHT LOWER EXTREMITY: Right CHIQUITA is elevated. Findings consistent with vascular calcification. Right digital index is normal. Multiphasic waveforms throughout the right lower extremity with no significant arterial stenosis identified. 2.  LEFT LOWER EXTREMITY: Left CHIQUITA is elevated. Findings consistent with vascular calcification. Left digital index is normal. Multiphasic waveforms throughout the left lower extremity with no significant arterial stenosis identified. 3.  Left groin lymphadenopathy. I suspect this is a reactive node.    US CHIQUITA Doppler No Exercise    Result Date: 5/21/2023  [place] [facility] LOCATION: [facility] DATE: 5/21/2023 1. EXAM: RESTING ANKLE-BRACHIAL INDICES (ABIs) 2. DUPLEX ARTERIAL ULTRASOUND OF THE LOWER EXTREMITIES BILATERAL INDICATION: PAD. Diabetic wounds. TECHNIQUE: Duplex imaging is performed utilizing gray-scale, two-dimensional images and color-flow imaging. Doppler waveform analysis and spectral Doppler imaging is also performed. COMPARISON: None. FINDINGS: SEGMENTAL BP RIGHT (mmHg) Brachial: 125 Ankle (PT): 183; Index 1.46 Ankle (DP): 151; Index 1.21 Digit: 131; Index 1.05 LEFT (mmHg) Brachial: -- Ankle (PT): 178; Index 1.42 Ankle (DP): 151; Index 1.21 Digit: 86; Index 0.69 Resting ABIs are 1.5 on the right. Right digital index 1.0. Resting ABIs are 1.4 on the left. Left digital index 0.69. WAVEFORMS: Pedal waveforms are multiphasic bilaterally. DUPLEX ARTERIAL ULTRASOUND FINDINGS: LOWER EXTREMITY ARTERIAL DUPLEX EXAM RIGHT (cm/s) EIA: 154 CFA: 89 PFA: 58 SFA-Proximal: 102 SFA-Mid:  139 SFA-Distal: 101 Popliteal: 107 PTA: 107 FRANCESCO: 62 DPA: 71 LEFT (cm/s) EIA: 116 CFA: 120 PFA: 66 SFA-Proximal: 118 SFA-Mid: 165 SFA-Distal: 131 Popliteal: 110 PTA: 95 FRANCESCO: 80 DPA: 106 RIGHT: Multiphasic waveforms throughout the right lower extremity. No significant arterial stenosis identified. LEFT: Multiphasic waveforms throughout the left lower extremity. No significant arterial stenosis identified. 3.3 x 1.9 x 0.8 cm left groin lymph node. Normal-appearing fatty hilum.     IMPRESSION: 1.  RIGHT LOWER EXTREMITY: Right CHIQUITA is elevated. Findings consistent with vascular calcification. Right digital index is normal. Multiphasic waveforms throughout the right lower extremity with no significant arterial stenosis identified. 2.  LEFT LOWER EXTREMITY: Left CHIQUITA is elevated. Findings consistent with vascular calcification. Left digital index is normal. Multiphasic waveforms throughout the left lower extremity with no significant arterial stenosis identified. 3.  Left groin lymphadenopathy. I suspect this is a reactive node.    MR Foot Left w/o & w Contrast    Result Date: 5/21/2023  EXAM: MR FOOT LEFT W/O and W CONTRAST LOCATION: [facility] DATE/TIME: 5/21/2023 12:45 PM CDT INDICATION: Left foot cellulitis. Lateral forefoot ulcer. COMPARISON: Foot radiographic exam 5/21/2023. Foot MRI 1/18/2023. TECHNIQUE: Routine. Additional postgadolinium T1 sequences were obtained. IV CONTRAST: 6 mL Gadavist FINDINGS: JOINTS AND BONES: -No evidence for left foot fracture, metatarsal stress fracture, or definitive evidence for osteomyelitis. Specifically, there is no confluent T1 hypointense signal in the 5th proximal phalangeal base or 5th metatarsal head or neck in the area of the ulceration in the lateral forefoot dorsally. Mild left 1st MTP and metatarsal sesamoid chondromalacia. Navicular-medial cuneiform chondromalacia with subchondral marrow edema medial navicular distally. Mild synovitis at the 1st MTP  joint. Nothing for septic arthritis at the 5th MTP joint. TENDONS: -No acute tendon injury. Distal anterior tibialis tendon intact. Distal peroneus longus tendon intact. No forefoot tenosynovitis. LIGAMENTS: -Lisfranc ligament: Intact. No subluxation. MUSCLES AND SOFT TISSUES: -Mild intrinsic foot muscle atrophy. Patchy muscle edema without significant myositis. Dorsal foot soft tissue swelling. Soft tissue ulceration along the dorsal and lateral aspect of the forefoot at the level of the 5th MTP joint. Deep area of nonviable soft tissue with adjacent granulation tissue. Mild dorsal foot cellulitis.     IMPRESSION: 1.  Soft tissue wound/ulcer in the dorsal and lateral aspect of the forefoot at the level of the 5th MTP joint. Small deep area of nonviable subjacent soft tissue with adjacent cellulitis. 2.  No convincing evidence for acute osteomyelitis of the 5th toe or distal 5th metatarsal. 3.  Mild dorsal foot soft tissue swelling with cellulitis. 4.  Mild first MTP and metatarsal sesamoid chondromalacia. Mild navicular-medial cuneiform chondromalacia. 5.  Intact Lisfranc ligament. No midfoot subluxation. 6.  No forefoot flexor or extensor tenosynovitis.     Foot XR, G/E 3 views, left    Result Date: 5/21/2023  EXAM: XR FOOT LEFT G/E 3 VIEWS LOCATION: [facility] DATE/TIME: 5/21/2023 12:44 AM CDT INDICATION: Foot pain. Evaluate for osteomyelitis at distal fifth metatarsal. COMPARISON: 01/18/2023     IMPRESSION: Prominent ulcer noted at the level of the fifth metatarsal head. No underlying radiographic evidence of osteomyelitis. No displaced fracture.       Jf Awad DPM  [facility] Foot & Ankle Surgery/Podiatry

## 2023-05-24 NOTE — PHARMACY-VANCOMYCIN DOSING SERVICE
"Pharmacy Vancomycin Note  Date of Service May 24, 2023  Patient's  1965   57 year old, male    Indication: Skin and Soft Tissue Infection  Day of Therapy: 4  Current vancomycin regimen:  1250 mg IV q24h  Current vancomycin monitoring method: AUC  Current vancomycin therapeutic monitoring goal: 400-600 mg*h/L    InsightRX Prediction of Current Vancomycin Regimen  Loading dose: N/A  Regimen: 1250 mg IV every 24 hours.  Start time: 22:29 on 2023  Exposure target: AUC24 (range)400-600 mg/L.hr   AUC24,ss: 498 mg/L.hr  Probability of AUC24 > 400: 87 %  Ctrough,ss: 14.5 mg/L  Probability of Ctrough,ss > 20: 15 %  Probability of nephrotoxicity (Lodise MARINA ): 10 %      Current estimated CrCl = Estimated Creatinine Clearance: 53.2 mL/min (A) (based on SCr of 1.37 mg/dL (H)).    Creatinine for last 3 days  2023:  5:57 AM Creatinine 1.30 mg/dL  2023:  6:44 AM Creatinine 1.50 mg/dL  2023:  6:28 AM Creatinine 1.37 mg/dL    Recent Vancomycin Levels (past 3 days)  2023:  6:28 AM Vancomycin 20.8 ug/mL    Vancomycin IV Administrations (past 72 hours)                   vancomycin (VANCOCIN) 1,250 mg in sodium chloride 0.9 % 250 mL intermittent infusion (mg) 1,250 mg New Bag 23 2229     1,250 mg New Bag 23 2217    vancomycin (VANCOCIN) 1000 mg in dextrose 5% 200 mL PREMIX (mg) 1,000 mg New Bag 23 0057                Nephrotoxins and other renal medications (From now, onward)    Start     Dose/Rate Route Frequency Ordered Stop    23 2200  vancomycin (VANCOCIN) 1,250 mg in sodium chloride 0.9 % 250 mL intermittent infusion         1,250 mg  over 90 Minutes Intravenous EVERY 24 HOURS 23 1823      05/22/23 1730  piperacillin-tazobactam (ZOSYN) 3.375 g vial to attach to  mL bag        Note to Pharmacy: For [facility], [facility] and [facility]: For Zosyn-naive patients, use the \"Zosyn initial dose + extended infusion\" order panel.    3.375 g  over 240 Minutes Intravenous EVERY 8 HOURS " 05/22/23 1643      05/21/23 1230  empagliflozin (JARDIANCE) tablet 25 mg        Note to Pharmacy: PTA Sig:Take 1 tablet (25 mg) by mouth daily      25 mg Oral DAILY 05/21/23 1227               Contrast Orders - past 72 hours (72h ago, onward)    Start     Dose/Rate Route Frequency Stop    05/21/23 1230  gadobutrol (GADAVIST) injection 6 mL         6 mL Intravenous ONCE 05/21/23 1222          Interpretation of levels and current regimen:  Vancomycin level is reflective of -600    Has serum creatinine changed greater than 50% in last 72 hours: No      Renal Function: Stable    InsightRX Prediction of Planned New Vancomycin Regimen  Loading dose: N/A  Regimen: 1250 mg IV every 24 hours.  Start time: 22:29 on 05/24/2023  Exposure target: AUC24 (range)400-600 mg/L.hr   AUC24,ss: 498 mg/L.hr  Probability of AUC24 > 400: 87 %  Ctrough,ss: 14.5 mg/L  Probability of Ctrough,ss > 20: 15 %  Probability of nephrotoxicity (Lodise MARINA 2009): 10 %      Plan:  1. Continue Current Dose  2. Vancomycin monitoring method: AUC  3. Vancomycin therapeutic monitoring goal: 400-600 mg*h/L  4. Pharmacy will check vancomycin levels as appropriate in 1-3 Days.  5. Serum creatinine levels will be ordered daily for the first week of therapy and at least twice weekly for subsequent weeks.    MONHCO BAZAN RPH

## 2023-05-24 NOTE — PLAN OF CARE
Problem: Infection  Goal: Absence of Infection Signs and Symptoms  Outcome: Progressing  Intervention: Prevent or Manage Infection  Recent Flowsheet Documentation  Taken 5/24/2023 1530 by Anamika Valverde RN  Isolation Precautions: contact precautions maintained  Taken 5/24/2023 0845 by Anamika Valverde RN  Isolation Precautions: contact precautions maintained      Problem: Wound Healing Progression  Goal: Optimal Wound Healing  Outcome: Progressing  Intervention: Promote Wound Healing  Recent Flowsheet Documentation  Taken 5/24/2023 1530 by Anamika Valverde RN  Activity Management: patient refuses activity   Goal Outcome Evaluation:    Patient admitted for cellulitis of the left foot. I & D was performed on 5/23. Culture of foot shows infection requiring wound vac placement at 1400. Patient was also switched from Zosyn to Ancef per infectious disease. Patient remains inactive and was sleeping the majority of the day. Blood sugars remain elevated ranging from 145-234 today. Novolog administered with meals.  Patient is scheduled for PICC line placement for tomorrow.

## 2023-05-24 NOTE — PROGRESS NOTES
Care Management Follow Up    Length of Stay (days): 3    Expected Discharge Date: 05/25/2023     Concerns to be Addressed:     Discharge planning  Patient plan of care discussed at interdisciplinary rounds: Yes    Anticipated Discharge Disposition:  TBD     Anticipated Discharge Services:  TBD, possibly Home infusion and wound vac  Anticipated Discharge DME:  TBD, possibly Wound vac    Patient/family educated on Medicare website which has current facility and service quality ratings:  NA  Education Provided on the Discharge Plan:  Per team  Patient/Family in Agreement with the Plan:  Per team    Referrals Placed by CM/SW:  NA  Private pay costs discussed: Not applicable    Additional Information:  Chart reviewed.  Patient will need IV Cefazolin x 28 days on discharge and wound vac.  PT and OT have not been consulted to see patient.  Unsure if patient will go home or to TCU, fidelia [facility].    RNCM will continue to follow progression of care and aide in discharge planning as needed.   Lauren Calhoun RN

## 2023-05-24 NOTE — PLAN OF CARE
"  Problem: Plan of Care - These are the overarching goals to be used throughout the patient stay.    Goal: Plan of Care Review  Description: The Plan of Care Review/Shift note should be completed every shift.  The Outcome Evaluation is a brief statement about your assessment that the patient is improving, declining, or no change.  This information will be displayed automatically on your shift note.  Outcome: Progressing     Problem: Plan of Care - These are the overarching goals to be used throughout the patient stay.    Goal: Absence of Hospital-Acquired Illness or Injury  Intervention: Identify and Manage Fall Risk  Recent Flowsheet Documentation  Taken 5/24/2023 0034 by Shani Jean RN  Safety Promotion/Fall Prevention:   activity supervised   nonskid shoes/slippers when out of bed   room door open   safety round/check completed   mobility aid in reach     Problem: Plan of Care - These are the overarching goals to be used throughout the patient stay.    Goal: Plan of Care Review  Description: The Plan of Care Review/Shift note should be completed every shift.  The Outcome Evaluation is a brief statement about your assessment that the patient is improving, declining, or no change.  This information will be displayed automatically on your shift note.  Outcome: Progressing  Goal: Patient-Specific Goal (Individualized)  Description: You can add care plan individualizations to a care plan. Examples of Individualization might be:  \"Parent requests to be called daily at 9am for status\", \"I have a hard time hearing out of my right ear\", or \"Do not touch me to wake me up as it startles me\".  Outcome: Progressing  Goal: Absence of Hospital-Acquired Illness or Injury  Outcome: Progressing  Intervention: Identify and Manage Fall Risk  Recent Flowsheet Documentation  Taken 5/24/2023 0034 by Shani Jean RN  Safety Promotion/Fall Prevention:   activity supervised   nonskid shoes/slippers when out of bed   room door open   " safety round/check completed   mobility aid in reach  Intervention: Prevent Skin Injury  Recent Flowsheet Documentation  Taken 5/24/2023 0034 by Shani Jean RN  Body Position: position changed independently  Taken 5/24/2023 0033 by Shani Jean RN  Body Position: position changed independently  Goal: Optimal Comfort and Wellbeing  Outcome: Progressing  Goal: Readiness for Transition of Care  Outcome: Progressing     Problem: Diabetes Comorbidity  Goal: Blood Glucose Level Within Targeted Range  Outcome: Progressing     Problem: Wound Healing Progression  Goal: Optimal Wound Healing  Outcome: Progressing  Intervention: Promote Wound Healing  Recent Flowsheet Documentation  Taken 5/24/2023 0033 by Shani Jean RN  Activity Management: activity adjusted per tolerance     Problem: Infection  Goal: Absence of Infection Signs and Symptoms  Outcome: Progressing     Problem: Skin or Soft Tissue Infection  Goal: Absence of Infection Signs and Symptoms  Outcome: Progressing   Goal Outcome Evaluation:       Pt slept through the night. No complaints of pain. VSS. Turns self independently.

## 2023-05-25 ENCOUNTER — HOME INFUSION (PRE-WILLOW HOME INFUSION) (OUTPATIENT)
Dept: PHARMACY | Facility: CLINIC | Age: 58
End: 2023-05-25
Payer: COMMERCIAL

## 2023-05-25 ENCOUNTER — APPOINTMENT (OUTPATIENT)
Dept: OCCUPATIONAL THERAPY | Facility: HOSPITAL | Age: 58
End: 2023-05-25
Attending: HOSPITALIST
Payer: COMMERCIAL

## 2023-05-25 LAB
BACTERIA WND CULT: ABNORMAL
GLUCOSE BLDC GLUCOMTR-MCNC: 165 MG/DL (ref 70–99)
GLUCOSE BLDC GLUCOMTR-MCNC: 242 MG/DL (ref 70–99)
GLUCOSE BLDC GLUCOMTR-MCNC: 283 MG/DL (ref 70–99)
GLUCOSE BLDC GLUCOMTR-MCNC: 296 MG/DL (ref 70–99)

## 2023-05-25 PROCEDURE — 250N000011 HC RX IP 250 OP 636: Performed by: INTERNAL MEDICINE

## 2023-05-25 PROCEDURE — 258N000003 HC RX IP 258 OP 636: Performed by: HOSPITALIST

## 2023-05-25 PROCEDURE — 250N000011 HC RX IP 250 OP 636: Performed by: PODIATRIST

## 2023-05-25 PROCEDURE — 97535 SELF CARE MNGMENT TRAINING: CPT | Mod: GO

## 2023-05-25 PROCEDURE — 99232 SBSQ HOSP IP/OBS MODERATE 35: CPT | Performed by: HOSPITALIST

## 2023-05-25 PROCEDURE — 250N000013 HC RX MED GY IP 250 OP 250 PS 637: Performed by: PODIATRIST

## 2023-05-25 PROCEDURE — 99232 SBSQ HOSP IP/OBS MODERATE 35: CPT | Performed by: INTERNAL MEDICINE

## 2023-05-25 PROCEDURE — 120N000001 HC R&B MED SURG/OB

## 2023-05-25 PROCEDURE — 258N000003 HC RX IP 258 OP 636: Performed by: PODIATRIST

## 2023-05-25 PROCEDURE — 36569 INSJ PICC 5 YR+ W/O IMAGING: CPT

## 2023-05-25 PROCEDURE — 250N000013 HC RX MED GY IP 250 OP 250 PS 637: Performed by: INTERNAL MEDICINE

## 2023-05-25 PROCEDURE — 97165 OT EVAL LOW COMPLEX 30 MIN: CPT | Mod: GO

## 2023-05-25 PROCEDURE — 250N000009 HC RX 250: Performed by: INTERNAL MEDICINE

## 2023-05-25 PROCEDURE — 272N000450 HC KIT 4FR POWER PICC SINGLE LUMEN

## 2023-05-25 RX ORDER — DOXYCYCLINE 100 MG/1
100 CAPSULE ORAL EVERY 12 HOURS SCHEDULED
Status: DISCONTINUED | OUTPATIENT
Start: 2023-05-25 | End: 2023-05-26 | Stop reason: HOSPADM

## 2023-05-25 RX ADMIN — CEFAZOLIN SODIUM 2 G: 2 INJECTION, SOLUTION INTRAVENOUS at 16:48

## 2023-05-25 RX ADMIN — DOXYCYCLINE HYCLATE 100 MG: 100 CAPSULE ORAL at 20:08

## 2023-05-25 RX ADMIN — GABAPENTIN 300 MG: 300 CAPSULE ORAL at 20:08

## 2023-05-25 RX ADMIN — INSULIN ASPART 2 UNITS: 100 INJECTION, SOLUTION INTRAVENOUS; SUBCUTANEOUS at 07:52

## 2023-05-25 RX ADMIN — EMPAGLIFLOZIN 25 MG: 25 TABLET, FILM COATED ORAL at 08:00

## 2023-05-25 RX ADMIN — CEFAZOLIN SODIUM 2 G: 2 INJECTION, SOLUTION INTRAVENOUS at 23:40

## 2023-05-25 RX ADMIN — GABAPENTIN 300 MG: 300 CAPSULE ORAL at 08:00

## 2023-05-25 RX ADMIN — VANCOMYCIN HYDROCHLORIDE 1250 MG: 5 INJECTION, POWDER, LYOPHILIZED, FOR SOLUTION INTRAVENOUS at 21:14

## 2023-05-25 RX ADMIN — SODIUM CHLORIDE: 9 INJECTION, SOLUTION INTRAVENOUS at 10:31

## 2023-05-25 RX ADMIN — CEFAZOLIN SODIUM 2 G: 2 INJECTION, SOLUTION INTRAVENOUS at 08:00

## 2023-05-25 RX ADMIN — LIDOCAINE HYDROCHLORIDE 2 ML: 10 INJECTION, SOLUTION EPIDURAL; INFILTRATION; INTRACAUDAL; PERINEURAL at 09:05

## 2023-05-25 RX ADMIN — INSULIN ASPART 7 UNITS: 100 INJECTION, SOLUTION INTRAVENOUS; SUBCUTANEOUS at 16:40

## 2023-05-25 RX ADMIN — SODIUM CHLORIDE: 9 INJECTION, SOLUTION INTRAVENOUS at 20:13

## 2023-05-25 RX ADMIN — ENOXAPARIN SODIUM 40 MG: 40 INJECTION SUBCUTANEOUS at 08:00

## 2023-05-25 RX ADMIN — OXYCODONE HYDROCHLORIDE 5 MG: 5 TABLET ORAL at 16:51

## 2023-05-25 RX ADMIN — INSULIN ASPART 6 UNITS: 100 INJECTION, SOLUTION INTRAVENOUS; SUBCUTANEOUS at 11:19

## 2023-05-25 ASSESSMENT — ACTIVITIES OF DAILY LIVING (ADL)
ADLS_ACUITY_SCORE: 23
PREVIOUS_RESPONSIBILITIES: MEAL PREP;HOUSEKEEPING;LAUNDRY;SHOPPING;MEDICATION MANAGEMENT;FINANCES;DRIVING
ADLS_ACUITY_SCORE: 23

## 2023-05-25 NOTE — PROCEDURES
"PICC Line Insertion Procedure Note  Pt. Name: Abdulaziz Rausch  MRN:        [id number]  Procedure: Insertion of a  single Lumen  4 fr  Bard SOLO (valved) Power PICC, Lot number [id number]    Indications: antibiotics    Contraindications : none    Procedure Details   Patient identified with 2 identifiers and \"Time Out\" conducted.  .     Central line insertion bundle followed: hand hygeine performed prior to procedure, site cleansed with cholraprep, hat, mask, sterile gloves,sterile gown worn, patient draped with maximum barrier head to toe drape, sterile field maintained.    The vein was assessed and found to be compressible and of adequate size. 2 ml 1% Lidocaine administered sq to the insertion site. A 4 Fr PICC was inserted into the BASILIC vein of the right arm with ultrasound guidance. 1 attempt(s) required to access vein.   Catheter threaded without difficulty. Good blood return noted.    Modified Seldinger Technique used for insertion.    The 8 sharps that are included in the PICC insertion kit were accounted for and disposed of in the sharps container prior to breakdown of the sterile field.    Catheter secured with Statlock, biopatch and Tegaderm dressing applied.    Findings:  Total catheter length  44 cm, with 1 cm exposed. Mid upper arm circumference is 25.5 cm. Catheter was flushed with 20 cc NS. Patient  tolerated procedure well.    Tip placement verified by [employer] . Tip placement in the SVC.    CLABSI prevention brochure left at bedside.    Patient's primary RN notified PICC is ready for use.    Comments:            Jenny Waters, RN [facility] Access      "

## 2023-05-25 NOTE — PROGRESS NOTES
Care Management Follow Up    Length of Stay (days): 4    Expected Discharge Date: 05/25/2023     Concerns to be Addressed:     Discharge planning  Patient plan of care discussed at interdisciplinary rounds: Yes    Anticipated Discharge Disposition:  TBD     Anticipated Discharge Services:  TBD, possibly Home infusion and wound vac  Anticipated Discharge DME:  TBD, possibly Wound vac    Patient/family educated on Medicare website which has current facility and service quality ratings:  NA  Education Provided on the Discharge Plan:  Per team  Patient/Family in Agreement with the Plan:  Per team    Referrals Placed by CM/SW:  NA  Private pay costs discussed: Not applicable    Additional Information:  Chart reviewed.    Patient will need IV Cefazolin x 28 days on discharge and wound vac.     PT/OT evals pending.   Possible home vs TCU  RNCM sent referral to home infusion to assess for eligibility.     OT rec: TCU      1500: RNCM met with patient in patient's room.  Explained recommendation for TCU, pt in agreement.  RNCM gave patient choice for TCU, patient had no preferences.  RNCM submitted referrals to [facility] and [facility].    1540: Gómez from [facility] called CM, they can accept pt to [facility] [place].  [facility] can admit patient on 5/26 after first round of antibiotics. Plan for 1000.   RNCM updated MD, patient and RN.  Patient wants to drive himself, CM explained safety risk and encouraged patient to find a friend or family member who would be able to pik him up tomorrow.  + CM will follow up on ride in the morning.   PAS needed.      RNCM will continue to follow progression of care and aide in discharge planning as needed.   Lauren Calhoun RN

## 2023-05-25 NOTE — PROGRESS NOTES
[facility]    Referral received  from Lauren Calhoun RN CM, for IV antibiotics.    Benefits verified. Pt has 100% coverage for IV antibiotics under his [facility] PMAP plan.    Should pt discharge to home, writer will speak with pt to review benefits, home infusion and to offer choice of agency/home infusion provider. [facility] would then need to secure a home care agency for wound vac and therapies.    Thank you for the referral.    Sue Munoz RN, BSN  [facility] Liaison  [phone] (Mon through Fri, 8:00 am-5:00 pm)  [phone] (Office)

## 2023-05-25 NOTE — PROGRESS NOTES
[facility]    Infectious Disease Progress Note    Date of Service : 05/25/2023     Assessment & Plan   Abdulaziz Rausch is a 57 year old male who was admitted on 5/20/2023.     ASSESSMENT:  1. Diabetic foot infection  2. Abscess, 5th metatarsal phalangeal joint, S/P incision and drainage of abscess, and debridement, wound vac placement  3. Concern for osteomyelitis  4. Staphylococcus aureus, Streptococcus, previous hx of MRSA    Photo from chart 5/24/2023        RECOMMENDATIONS:    1. Antibiotics IV Cefazolin x 28 days plus PO Doxycycline  2. Follow culture results   3. PICC  4. Monitor CBC, CMP  5. Discussed with patient   6. Awaiting TCU  7. ID will sign off. Please call with questions      Brooklynn Loera MD  [facility]  [phone]      Interval History   Tired, resting  Updated patient       Physical Exam   Temp: 98.1  F (36.7  C) Temp src: Oral BP: 129/70 Pulse: 67   Resp: 16 SpO2: 97 % O2 Device: None (Room air)    Vitals:    05/20/23 2317 05/21/23 0214 05/23/23 0918   Weight: 65.8 kg (145 lb) 62 kg (136 lb 11 oz) 64.8 kg (142 lb 13.7 oz)     Vital Signs with Ranges  Temp:  [98.1  F (36.7  C)-98.4  F (36.9  C)] 98.1  F (36.7  C)  Pulse:  [67-75] 67  Resp:  [16] 16  BP: (101-164)/(53-89) 129/70  SpO2:  [95 %-97 %] 97 %    Constitutional: Awake, no apparent distress, fatigued  Lungs: normal breathing pattern, no crackles or wheezing  Cardiovascular: no orthopnea  Abdomen: non distended  Skin: warm  MSK: dressing on foot  Neuro: deconditioned  Psych: able to answer questions    Medications     sodium chloride 100 mL/hr at 05/25/23 1031       ceFAZolin  2 g Intravenous Q8H     docusate sodium  100 mg Oral BID     empagliflozin  25 mg Oral Daily     enoxaparin ANTICOAGULANT  40 mg Subcutaneous Q24H     gabapentin  300 mg Oral BID     insulin aspart  1-10 Units Subcutaneous TID AC     insulin aspart  1-7 Units Subcutaneous At Bedtime     insulin glargine  20  Units Subcutaneous At Bedtime     sodium chloride (PF)  10-40 mL Intracatheter Q7 Days     sodium chloride (PF)  3 mL Intracatheter Q8H     vancomycin  1,250 mg Intravenous Q24H       Data   All microbiology laboratory data reviewed.  Reviewed CBC with diff  Reviewed CMP    Recent Labs   Lab Test 05/24/23  0628 05/23/23  0644 05/22/23  0557 05/21/23  0627   WBC  --  5.3 6.7 7.8   HGB  --  9.9* 9.7* 9.5*   HCT  --  30.0* 29.0* 28.3*   MCV  --  87 86 86    331 299 300     Recent Labs   Lab Test 05/24/23  0628 05/23/23  0644 05/22/23  0557   CR 1.37* 1.50* 1.30*     Recent Labs   Lab Test 05/21/23  0627   SED 74*     No lab results found.    Invalid input(s):     MICROBIOLOGY:    Reviewed    7-Day Micro Results     Collected Updated Procedure Result Status      05/23/2023 0740 05/25/2023 1216 Anaerobic Bacterial Culture Routine [[id number]]   Wound from Foot, Left    Preliminary result Component Value   Culture No anaerobic organisms isolated after 2 days  [P]                05/23/2023 0740 05/23/2023 1443 Gram Stain [[id number]]   (Abnormal)   Wound from Foot, Left    Final result Component Value   Gram Stain Result 1+ Gram positive cocci   Gram Stain Result 2+ WBC seen            05/23/2023 0740 05/25/2023 0708 Wound Aerobic Bacterial Culture Routine [[id number]]   (Abnormal)   Wound from Foot, Left    Final result Component Value   Culture 2+ Streptococcus agalactiae (Group B Streptococcus)    This organism is susceptible to ampicillin, penicillin, vancomycin and the cephalosporins. If treatment is required and your patient is allergic to penicillin, contact the microbiology lab within 5 days to request susceptibility testing.    1+ Staphylococcus aureus    Susceptibilities done on previous cultures    1+ Normal nabila               05/21/2023 0000 05/25/2023 0946 Blood Culture Peripheral Blood [[id number]]   Peripheral Blood    Preliminary result Component Value   Culture No growth after 4 days  [P]  "               05/20/2023 2346 05/24/2023 0152 Wound Aerobic Bacterial Culture Routine with Gram Stain [[id number]]    (Abnormal)   Wound from Foot, Left    Final result Component Value   Culture 2+ Streptococcus agalactiae (Group B Streptococcus)    This organism is susceptible to ampicillin, penicillin, vancomycin and the cephalosporins. If treatment is required and your patient is allergic to penicillin, contact the microbiology lab within 5 days to request susceptibility testing.    1+ Staphylococcus aureus    1+ Normal nabila   Gram Stain Result 2+ Gram positive cocci    1+ WBC seen        Susceptibility      Staphylococcus aureus      SUJATA      Clindamycin  Resistant  [1]      Erythromycin >=8 ug/mL Resistant     Gentamicin <=0.5 ug/mL Susceptible      Oxacillin <=0.25 ug/mL Susceptible  [2]       Tetracycline <=1 ug/mL Susceptible      Trimethoprim/Sulfamethoxazole <=0.5/9.5 ug/mL Susceptible      Vancomycin <=0.5 ug/mL Susceptible                   [1]  This isolate is presumed to be clindamycin resistant based on detection of inducible clindamycin resistance. Erythromycin and clindamycin are resistant; therefore, they are not recommended for use.     [2]  Oxacillin susceptible isolates are susceptible to cephalosporins (example: cefazolin and cephalexin) and beta lactam combination agents. Oxacillin resistant isolates are resistant to these agents.                 05/20/2023 2344 05/25/2023 0946 Blood Culture Peripheral Blood [[id number]]    Peripheral Blood    Preliminary result Component Value   Culture No growth after 4 days  [P]                       RADIOLOGY:    Reviewed  POC US Guidance Needle Placement    Result Date: 5/23/2023  Ultrasound was performed as guidance to an anesthesia procedure.  Click \"PACS images\" hyperlink below to view any stored images.  For specific procedure details, view procedure note authored by anesthesia.    US Lower Extremity Arterial Duplex Bilateral    Result Date: " 5/21/2023  [place] [facility] LOCATION: [facility] DATE: 5/21/2023 1. EXAM: RESTING ANKLE-BRACHIAL INDICES (ABIs) 2. DUPLEX ARTERIAL ULTRASOUND OF THE LOWER EXTREMITIES BILATERAL INDICATION: PAD. Diabetic wounds. TECHNIQUE: Duplex imaging is performed utilizing gray-scale, two-dimensional images and color-flow imaging. Doppler waveform analysis and spectral Doppler imaging is also performed. COMPARISON: None. FINDINGS: SEGMENTAL BP RIGHT (mmHg) Brachial: 125 Ankle (PT): 183; Index 1.46 Ankle (DP): 151; Index 1.21 Digit: 131; Index 1.05 LEFT (mmHg) Brachial: -- Ankle (PT): 178; Index 1.42 Ankle (DP): 151; Index 1.21 Digit: 86; Index 0.69 Resting ABIs are 1.5 on the right. Right digital index 1.0. Resting ABIs are 1.4 on the left. Left digital index 0.69. WAVEFORMS: Pedal waveforms are multiphasic bilaterally. DUPLEX ARTERIAL ULTRASOUND FINDINGS: LOWER EXTREMITY ARTERIAL DUPLEX EXAM RIGHT (cm/s) EIA: 154 CFA: 89 PFA: 58 SFA-Proximal: 102 SFA-Mid: 139 SFA-Distal: 101 Popliteal: 107 PTA: 107 FRANCESCO: 62 DPA: 71 LEFT (cm/s) EIA: 116 CFA: 120 PFA: 66 SFA-Proximal: 118 SFA-Mid: 165 SFA-Distal: 131 Popliteal: 110 PTA: 95 FRANCESCO: 80 DPA: 106 RIGHT: Multiphasic waveforms throughout the right lower extremity. No significant arterial stenosis identified. LEFT: Multiphasic waveforms throughout the left lower extremity. No significant arterial stenosis identified. 3.3 x 1.9 x 0.8 cm left groin lymph node. Normal-appearing fatty hilum.     IMPRESSION: 1.  RIGHT LOWER EXTREMITY: Right CHIQUITA is elevated. Findings consistent with vascular calcification. Right digital index is normal. Multiphasic waveforms throughout the right lower extremity with no significant arterial stenosis identified. 2.  LEFT LOWER EXTREMITY: Left CHIQUITA is elevated. Findings consistent with vascular calcification. Left digital index is normal. Multiphasic waveforms throughout the left lower extremity with no significant arterial stenosis  identified. 3.  Left groin lymphadenopathy. I suspect this is a reactive node.    US CHIQUITA Doppler No Exercise    Result Date: 5/21/2023  [place] [facility] LOCATION: [facility] DATE: 5/21/2023 1. EXAM: RESTING ANKLE-BRACHIAL INDICES (ABIs) 2. DUPLEX ARTERIAL ULTRASOUND OF THE LOWER EXTREMITIES BILATERAL INDICATION: PAD. Diabetic wounds. TECHNIQUE: Duplex imaging is performed utilizing gray-scale, two-dimensional images and color-flow imaging. Doppler waveform analysis and spectral Doppler imaging is also performed. COMPARISON: None. FINDINGS: SEGMENTAL BP RIGHT (mmHg) Brachial: 125 Ankle (PT): 183; Index 1.46 Ankle (DP): 151; Index 1.21 Digit: 131; Index 1.05 LEFT (mmHg) Brachial: -- Ankle (PT): 178; Index 1.42 Ankle (DP): 151; Index 1.21 Digit: 86; Index 0.69 Resting ABIs are 1.5 on the right. Right digital index 1.0. Resting ABIs are 1.4 on the left. Left digital index 0.69. WAVEFORMS: Pedal waveforms are multiphasic bilaterally. DUPLEX ARTERIAL ULTRASOUND FINDINGS: LOWER EXTREMITY ARTERIAL DUPLEX EXAM RIGHT (cm/s) EIA: 154 CFA: 89 PFA: 58 SFA-Proximal: 102 SFA-Mid: 139 SFA-Distal: 101 Popliteal: 107 PTA: 107 FRANCESCO: 62 DPA: 71 LEFT (cm/s) EIA: 116 CFA: 120 PFA: 66 SFA-Proximal: 118 SFA-Mid: 165 SFA-Distal: 131 Popliteal: 110 PTA: 95 FRANCESCO: 80 DPA: 106 RIGHT: Multiphasic waveforms throughout the right lower extremity. No significant arterial stenosis identified. LEFT: Multiphasic waveforms throughout the left lower extremity. No significant arterial stenosis identified. 3.3 x 1.9 x 0.8 cm left groin lymph node. Normal-appearing fatty hilum.     IMPRESSION: 1.  RIGHT LOWER EXTREMITY: Right CHIQUITA is elevated. Findings consistent with vascular calcification. Right digital index is normal. Multiphasic waveforms throughout the right lower extremity with no significant arterial stenosis identified. 2.  LEFT LOWER EXTREMITY: Left CHIQUITA is elevated. Findings consistent with vascular calcification. Left  digital index is normal. Multiphasic waveforms throughout the left lower extremity with no significant arterial stenosis identified. 3.  Left groin lymphadenopathy. I suspect this is a reactive node.    MR Foot Left w/o & w Contrast    Result Date: 5/21/2023  EXAM: MR FOOT LEFT W/O and W CONTRAST LOCATION: [facility] DATE/TIME: 5/21/2023 12:45 PM CDT INDICATION: Left foot cellulitis. Lateral forefoot ulcer. COMPARISON: Foot radiographic exam 5/21/2023. Foot MRI 1/18/2023. TECHNIQUE: Routine. Additional postgadolinium T1 sequences were obtained. IV CONTRAST: 6 mL Gadavist FINDINGS: JOINTS AND BONES: -No evidence for left foot fracture, metatarsal stress fracture, or definitive evidence for osteomyelitis. Specifically, there is no confluent T1 hypointense signal in the 5th proximal phalangeal base or 5th metatarsal head or neck in the area of the ulceration in the lateral forefoot dorsally. Mild left 1st MTP and metatarsal sesamoid chondromalacia. Navicular-medial cuneiform chondromalacia with subchondral marrow edema medial navicular distally. Mild synovitis at the 1st MTP joint. Nothing for septic arthritis at the 5th MTP joint. TENDONS: -No acute tendon injury. Distal anterior tibialis tendon intact. Distal peroneus longus tendon intact. No forefoot tenosynovitis. LIGAMENTS: -Lisfranc ligament: Intact. No subluxation. MUSCLES AND SOFT TISSUES: -Mild intrinsic foot muscle atrophy. Patchy muscle edema without significant myositis. Dorsal foot soft tissue swelling. Soft tissue ulceration along the dorsal and lateral aspect of the forefoot at the level of the 5th MTP joint. Deep area of nonviable soft tissue with adjacent granulation tissue. Mild dorsal foot cellulitis.     IMPRESSION: 1.  Soft tissue wound/ulcer in the dorsal and lateral aspect of the forefoot at the level of the 5th MTP joint. Small deep area of nonviable subjacent soft tissue with adjacent cellulitis. 2.  No convincing  evidence for acute osteomyelitis of the 5th toe or distal 5th metatarsal. 3.  Mild dorsal foot soft tissue swelling with cellulitis. 4.  Mild first MTP and metatarsal sesamoid chondromalacia. Mild navicular-medial cuneiform chondromalacia. 5.  Intact Lisfranc ligament. No midfoot subluxation. 6.  No forefoot flexor or extensor tenosynovitis.     Foot XR, G/E 3 views, left    Result Date: 5/21/2023  EXAM: XR FOOT LEFT G/E 3 VIEWS LOCATION: [facility] DATE/TIME: 5/21/2023 12:44 AM CDT INDICATION: Foot pain. Evaluate for osteomyelitis at distal fifth metatarsal. COMPARISON: 01/18/2023     IMPRESSION: Prominent ulcer noted at the level of the fifth metatarsal head. No underlying radiographic evidence of osteomyelitis. No displaced fracture.     Attestation:  Total time on the floor involved in the patient's care: 35 minutes. Total time spent in counseling/care coordination: >50%

## 2023-05-25 NOTE — PLAN OF CARE
Problem: Plan of Care - These are the overarching goals to be used throughout the patient stay.    Goal: Absence of Hospital-Acquired Illness or Injury  Outcome: Progressing  Intervention: Identify and Manage Fall Risk  Recent Flowsheet Documentation  Taken 5/24/2023 2035 by Nilam Garcia RN  Safety Promotion/Fall Prevention:    clutter free environment maintained    activity supervised    increased rounding and observation    increase visualization of patient    nonskid shoes/slippers when out of bed    patient and family education    room near nurse's station    safety round/check completed  Intervention: Prevent Skin Injury  Recent Flowsheet Documentation  Taken 5/24/2023 2035 by Nilam Garcia RN  Body Position: supine  Goal: Optimal Comfort and Wellbeing  Outcome: Progressing  Goal: Readiness for Transition of Care  Outcome: Progressing     Problem: Diabetes Comorbidity  Goal: Blood Glucose Level Within Targeted Range  Outcome: Progressing     Problem: Wound Healing Progression  Goal: Optimal Wound Healing  Outcome: Progressing  Intervention: Promote Wound Healing  Recent Flowsheet Documentation  Taken 5/24/2023 2035 by Nilam Garcia RN  Activity Management: activity adjusted per tolerance     Problem: Infection  Goal: Absence of Infection Signs and Symptoms  Outcome: Progressing  Intervention: Prevent or Manage Infection  Recent Flowsheet Documentation  Taken 5/24/2023 2035 by Nilam Garcia RN  Isolation Precautions: contact precautions maintained   Goal Outcome Evaluation:             Patient alert and oriented x4. No complaints of pain. Patient slept all night. . Coverage given. Wound vac remains in place at 125. Dressing is clean, dry, and intact.     Nilam Garcia RN

## 2023-05-25 NOTE — PLAN OF CARE
Goal Outcome Evaluation:      Plan of Care Reviewed With: patient    Overall Patient Progress: improvingOverall Patient Progress: improving       Patient states he is okay for discharge tomorrow.  States he has no ride to TCU, states his car is here and he wants to drive himself to the TCU. Pain 6/10 in left foot PRN oxycodone administered pain decreased to 4/10 at reassessment.

## 2023-05-25 NOTE — PROGRESS NOTES
Therapy: IV abx  Insurance: [employer] (\[facility] [place]) PMAP      100% coverage     In reference to admission on 5/20/23 to check IV abx coverage    Please contact Intake with any questions, [phone] or In Basket pool, [facility] ([phone]).

## 2023-05-25 NOTE — PLAN OF CARE
Problem: Plan of Care - These are the overarching goals to be used throughout the patient stay.    Goal: Plan of Care Review  Description: The Plan of Care Review/Shift note should be completed every shift.  The Outcome Evaluation is a brief statement about your assessment that the patient is improving, declining, or no change.  This information will be displayed automatically on your shift note.  5/25/2023 1302 by Anamika Valverde RN  Outcome: Progressing  5/25/2023 1302 by Anamika Valverde RN  Outcome: Progressing     Problem: Plan of Care - These are the overarching goals to be used throughout the patient stay.    Goal: Readiness for Transition of Care  5/25/2023 1302 by Anamika Valverde RN  Outcome: Not Progressing  5/25/2023 1302 by Anamika Valverde RN  Outcome: Progressing   Goal Outcome Evaluation:    Patient had PICC line placed this morning and tolerated well. His blood sugars remain elevated ranging from 165-283, covered with Novolog as ordered. Wound vac remains in place. Remains on I.V antibiotics with continuous I.V. fluids. Patient was awake and more talkative this morning, but remains in bed. He is reluctant to move to chair after several conversations regarding importance of moving to chair. Patient was advised that getting out of bed and into chair is beneficial to prevent skin break down, pneumonia and muscle wasting.

## 2023-05-25 NOTE — PROGRESS NOTES
Occupational Therapy      05/25/23 1035   Appointment Info   Signing Clinician's Name / Credentials (OT) Yi Bang OTR/L   Living Environment   People in Home alone   Current Living Arrangements mobile home   Home Accessibility stairs to enter home   Number of Stairs, Main Entrance 3   Stair Railings, Main Entrance railings safe and in good condition   Transportation Anticipated family or friend will provide   Living Environment Comments W/I shower w/ 3 GB   Self-Care   Usual Activity Tolerance good   Current Activity Tolerance moderate   Regular Exercise No   Equipment Currently Used at Home none   Fall history within last six months no   Activity/Exercise/Self-Care Comment Ind. w/ ADL routine per pt report   Instrumental Activities of Daily Living (IADL)   Previous Responsibilities meal prep;housekeeping;laundry;shopping;medication management;finances;driving   General Information   Onset of Illness/Injury or Date of Surgery 05/20/23   Referring Physician Florentino Mccoy MD   Additional Occupational Profile Info/Pertinent History of Current Problem 57 year old male with medical history of poorly controlled type 2 diabetes mellitus, HTN, PAD status post right second and third toe amputations, hypertension, admitted on 5/20/2023 with nontraumatic worsening wound with pain, swelling and redness of the left foot for 1 day.  ED work-up showed marked hyperglycemia with blood sugar of 590, acute kidney injury, lactic acidosis and mild anemia.  Left foot x-ray showed a prominent ulcer at the level of the fifth metatarsal head with no evidence of osteomyelitis or displaced fracture. Abscess fifth metatarsal phalangeal joint left foot  S/P incision and drainage of abscess with debridement and irrigation of wound left foot wound vac placement   Existing Precautions/Restrictions fall;weight bearing  (wound vac on LLE)   Left Lower Extremity (Weight-bearing Status) (S)  non weight-bearing (NWB)   Cognitive Status Examination    Orientation Status person;place   Bed Mobility   Bed Mobility supine-sit   Supine-Sit Scott (Bed Mobility) supervision;verbal cues   Assistive Device (Bed Mobility) draw sheet   Transfers   Transfers sit-stand transfer   Sit-Stand Transfer   Sit-Stand Scott (Transfers) contact guard;verbal cues;supervision   Assistive Device (Sit-Stand Transfers) walker, front-wheeled   Balance   Balance Assessment standing balance: dynamic   Activities of Daily Living   BADL Assessment/Intervention lower body dressing   Lower Body Dressing Assessment/Training   Comment, (Lower Body Dressing) Assist for dressing, toileting, balance   Scott Level (Lower Body Dressing) minimum assist (75% patient effort)   Clinical Impression   Criteria for Skilled Therapeutic Interventions Met (OT) Yes, treatment indicated   OT Diagnosis decreased act. tolerance/ADL ind.   OT Problem List-Impairments impacting ADL problems related to;activity tolerance impaired;balance;post-surgical precautions;strength   Assessment of Occupational Performance 1-3 Performance Deficits   Identified Performance Deficits LB dressing, toileting, balance, trnfs w/ NWB w/ LLE   Planned Therapy Interventions (OT) ADL retraining;transfer training   Clinical Decision Making Complexity (OT) low complexity   Risk & Benefits of therapy have been explained evaluation/treatment results reviewed;patient   OT Total Evaluation Time   OT Eval, Low Complexity Minutes (62223) 10   OT Goals   Therapy Frequency (OT) Daily   OT Predicted Duration/Target Date for Goal Attainment 05/31/23   OT Goals Cognition;Toilet Transfer/Toileting;Transfers;Lower Body Dressing   OT: Lower Body Dressing Modified independent;using adaptive equipment;within precautions   OT: Transfer Modified independent;with assistive device;within precautions   OT: Toilet Transfer/Toileting Modified independent;using adaptive equipment;within precautions   OT: Cognitive Patient/caregiver will  verbalize understanding of cognitive assessment results/recommendations as needed for safe discharge planning   Self-Care/Home Management   Self-Care/Home Mgmt/ADL, Compensatory, Meal Prep Minutes (26822) 15   Symptoms Noted During/After Treatment (Meal Preparation/Planning Training) fatigue   Treatment Detail/Skilled Intervention Pt educated/trained in NWB restrictions w/ LLE and wound vac- pt able to get sup>EOB w/ supervision vc for tech and assist for managing IV & wound vac line- pt completed additoinal STS w/ CGA w/ fww pt able to maintain NWB w/ LLE- pt pivot trnf EOB<>recliner w/ CGA while maintaining NWB w/ use of fww for added support. Pt back in bed at end w/ Alarm on call light w/in reach   OT Discharge Planning   OT Plan NWB LLE, toileting, balance   OT Discharge Recommendation (DC Rec) Transitional Care Facility   OT Rationale for DC Rec Pt lives alone- w/ 3 SHARON home- OT recommending Assistx1 for all trnfs/mobility upon d/c d/t NWB w/ LLE- if pt able to get assist at home may be able to d/c home w/ added support w/ IADL/ADL routine. OT recommending further cognitive assessment prior to dc   OT Brief overview of current status CGA STS w/ fww NWB LLE   Total Session Time   Timed Code Treatment Minutes 15   Total Session Time (sum of timed and untimed services) 25

## 2023-05-25 NOTE — PROGRESS NOTES
[facility]    Medicine Progress Note - Hospitalist Service    Date of Admission:  5/20/2023    Assessment & Plan   Abdulaziz Rausch is a 57 year old male with medical history of poorly controlled type 2 diabetes mellitus, HTN, PAD status post right second and third toe amputations, hypertension, admitted on 5/20/2023 with nontraumatic worsening wound with pain, swelling and redness of the left foot for 1 day.  ED work-up showed marked hyperglycemia with blood sugar of 590, acute kidney injury, lactic acidosis and mild anemia.  Left foot x-ray showed a prominent ulcer at the level of the fifth metatarsal head with no evidence of osteomyelitis or displaced fracture.     Left foot cellulitis   Left 5th MTP joint abscess  sepsis  - Wound and blood cultures.  - Broad-spectrum IV antibiotics on Vanco and Zosyn  - Podiatry consult appreciated.  POD #2   S/p I&D of left fifth metatarsophalangeal joint abscess on 5/20   --L foot Cx growing strep agalactiae and Staph aureus  - CHIQUITA Doppler is significant for vascular calcification bilaterally.     T2DM with hyperglycemia and left foot ulcer  -  Continue insulin sliding scale plus basal insulin.  - Local wound care.  -  A1c 11.3 on 4/17  -- Increase basal insulin Lantus 20 units nightly     CHRISTY on CKD stage III  -IV fluids  -Limit potentially nephrotoxic agents  - Monitor BMP     Hypertension  -Blood pressure on the higher side.  No home antihypertensive medication noted  -Hydralazine as needed.  Discontinue normal saline.  monitor blood pressure  -Consider oral antihypertensive medication if blood pressure remains elevated       Diet: Diet  Moderate Consistent Carb (60 g CHO per Meal) Diet    DVT Prophylaxis: Enoxaparin (Lovenox) SQ  Fischer Catheter: Not present  Lines: PRESENT      PICC 05/25/23 Single Lumen Right Basilic antibiotics-Site Assessment: WDL      Cardiac Monitoring: None  Code Status: No CPR- Do NOT Intubate      Clinically Significant  Risk Factors                  # Hypertension: Noted on problem list       # DMII: A1C = 11.3 % (Ref range: 0.0 - 5.6 %) within past 6 months            Disposition Plan      Expected Discharge Date: 05/26/2023    Discharge Delays: OT New Consult needed  PT Disposition recs needed    Discharge Comments: IV abx  WOC to place wound vac  PT OT recommending TCU placement patient amenable       Florentino Mccoy MD  Hospitalist Service  [facility]  Securely message with [facility] (more info)  Text page via [employer] Paging/Directory   ______________________________________________________________________    Interval History   Patient seen examined at bedside/chart reviewed.  Patient is feeling good, he denies any pain of his extremity    Physical Exam   Vital Signs: Temp: 98.1  F (36.7  C) Temp src: Oral BP: 129/70 Pulse: 67   Resp: 16 SpO2: 97 % O2 Device: None (Room air)    Weight: 142 lbs 13.73 oz    General Appearance:  No distress noted  Respiratory: Good air entry bilaterally  Cardiovascular: S1 and S2 well heard, no murmur or gallop  GI: Soft abdomen, no tenderness, normoactive bowel sounds  Skin: Intact and warm, L foot dressed without any discharge,    Medical Decision Making       35 MINUTES SPENT BY ME on the date of service doing chart review, history, exam, documentation & further activities per the note.      Data   NOTE: Data reviewed over the past 24 hrs contributes toward MDM complexity

## 2023-05-26 ENCOUNTER — LAB REQUISITION (OUTPATIENT)
Dept: LAB | Facility: CLINIC | Age: 58
End: 2023-05-26
Payer: COMMERCIAL

## 2023-05-26 VITALS
HEART RATE: 72 BPM | OXYGEN SATURATION: 96 % | HEIGHT: 69 IN | WEIGHT: 142.86 LBS | BODY MASS INDEX: 21.16 KG/M2 | SYSTOLIC BLOOD PRESSURE: 157 MMHG | TEMPERATURE: 98.6 F | RESPIRATION RATE: 18 BRPM | DIASTOLIC BLOOD PRESSURE: 74 MMHG

## 2023-05-26 DIAGNOSIS — A49.9 BACTERIAL INFECTION, UNSPECIFIED: ICD-10-CM

## 2023-05-26 LAB
BACTERIA BLD CULT: NO GROWTH
BACTERIA BLD CULT: NO GROWTH
CREAT SERPL-MCNC: 1.05 MG/DL (ref 0.67–1.17)
GFR SERPL CREATININE-BSD FRML MDRD: 82 ML/MIN/1.73M2
GLUCOSE BLDC GLUCOMTR-MCNC: 180 MG/DL (ref 70–99)
HOLD SPECIMEN: NORMAL

## 2023-05-26 PROCEDURE — 99239 HOSP IP/OBS DSCHRG MGMT >30: CPT | Performed by: HOSPITALIST

## 2023-05-26 PROCEDURE — 250N000013 HC RX MED GY IP 250 OP 250 PS 637: Performed by: PODIATRIST

## 2023-05-26 PROCEDURE — 258N000003 HC RX IP 258 OP 636: Performed by: HOSPITALIST

## 2023-05-26 PROCEDURE — 250N000011 HC RX IP 250 OP 636: Performed by: PODIATRIST

## 2023-05-26 PROCEDURE — 250N000011 HC RX IP 250 OP 636: Performed by: INTERNAL MEDICINE

## 2023-05-26 PROCEDURE — 36415 COLL VENOUS BLD VENIPUNCTURE: CPT | Performed by: HOSPITALIST

## 2023-05-26 PROCEDURE — 250N000013 HC RX MED GY IP 250 OP 250 PS 637: Performed by: INTERNAL MEDICINE

## 2023-05-26 PROCEDURE — 82565 ASSAY OF CREATININE: CPT | Performed by: HOSPITALIST

## 2023-05-26 RX ORDER — DOXYCYCLINE 100 MG/1
100 CAPSULE ORAL EVERY 12 HOURS
Qty: 56 CAPSULE | Refills: 0
Start: 2023-05-26 | End: 2023-06-23

## 2023-05-26 RX ORDER — INSULIN GLARGINE 100 [IU]/ML
20 INJECTION, SOLUTION SUBCUTANEOUS AT BEDTIME
Qty: 15 ML | Status: ON HOLD
Start: 2023-05-26 | End: 2024-08-23

## 2023-05-26 RX ADMIN — GABAPENTIN 300 MG: 300 CAPSULE ORAL at 08:07

## 2023-05-26 RX ADMIN — DOXYCYCLINE HYCLATE 100 MG: 100 CAPSULE ORAL at 08:07

## 2023-05-26 RX ADMIN — ENOXAPARIN SODIUM 40 MG: 40 INJECTION SUBCUTANEOUS at 08:07

## 2023-05-26 RX ADMIN — SODIUM CHLORIDE: 9 INJECTION, SOLUTION INTRAVENOUS at 08:17

## 2023-05-26 RX ADMIN — EMPAGLIFLOZIN 25 MG: 25 TABLET, FILM COATED ORAL at 08:07

## 2023-05-26 RX ADMIN — CEFAZOLIN SODIUM 2 G: 2 INJECTION, SOLUTION INTRAVENOUS at 08:16

## 2023-05-26 RX ADMIN — INSULIN ASPART 2 UNITS: 100 INJECTION, SOLUTION INTRAVENOUS; SUBCUTANEOUS at 08:04

## 2023-05-26 ASSESSMENT — ACTIVITIES OF DAILY LIVING (ADL)
ADLS_ACUITY_SCORE: 23

## 2023-05-26 NOTE — DISCHARGE SUMMARY
[facility]  Hospitalist Discharge Summary      Date of Admission:  5/20/2023  Date of Discharge:  5/26/2023  Discharging Provider: Florentino Mccoy MD  Discharge Service: Hospitalist Service    Discharge Diagnoses   Sepsis  Left fifth MTP joint abscess  Left foot cellulitis  Left diabetic foot ulcer  Type 2 diabetes with hyperglycemia   CHRISTY superimposed on CKD stage III  Essential hypertension    Clinically Significant Risk Factors     # DMII: A1C = 11.3 % (Ref range: 0.0 - 5.6 %) within past 6 months       Follow-ups Needed After Discharge   Follow-up Appointments     Follow-up and recommended labs and tests       Follow up with primary care provider, Daryn Pittman, within 7 days for   hospital follow- up.  The following labs/tests are recommended: Patient   needs to follow with podiatry as an outpatient.             Unresulted Labs Ordered in the Past 30 Days of this Admission     Date and Time Order Name Status Description    5/23/2023  7:40 AM Anaerobic Bacterial Culture Routine Preliminary     5/20/2023 11:34 PM Blood Culture Peripheral Blood Preliminary     5/20/2023 11:34 PM Blood Culture Peripheral Blood Preliminary       These results will be followed up by Dr Loera    Discharge Disposition   Discharged to short-term care facility  Condition at discharge: Stable    Hospital Course   Abdulaziz Rausch is a 57 year old male with medical history of poorly controlled type 2 diabetes mellitus, HTN, PAD status post right second and third toe amputations, hypertension, admitted on 5/20/2023 with nontraumatic worsening wound with pain, swelling and redness of the left foot for 1 day.  ED work-up showed marked hyperglycemia with blood sugar of 590, acute kidney injury, lactic acidosis and mild anemia.  Left foot x-ray showed a prominent ulcer at the level of the fifth metatarsal head with no evidence of osteomyelitis or displaced fracture.     Left foot cellulitis   Left 5th MTP joint  abscess  sepsis  - Wound and blood cultures.  - Broad-spectrum IV antibiotics on Vanco and Zosyn  - Podiatry consult appreciated.  POD #2   S/p I&D of left fifth metatarsophalangeal joint abscess on 5/20   --L foot Cx growing strep agalactiae and Staph aureus  - CHIQUITA Doppler is significant for vascular calcification bilaterally.     T2DM with hyperglycemia and left foot ulcer  -  Continue insulin sliding scale plus basal insulin.  - Local wound care.  -  A1c 11.3 on 4/17  -- Increase basal insulin Lantus 20 units nightly     CHRISTY on CKD stage III  -IV fluids  -Limit potentially nephrotoxic agents  - Monitor BMP     Hypertension  -Blood pressure on the higher side.  No home antihypertensive medication noted  -Hydralazine as needed.  Discontinue normal saline.  monitor blood pressure  -Consider oral antihypertensive medication if blood pressure remains elevated       Consultations This Hospital Stay   PHARMACY TO DOSE VANCO  PODIATRY IP CONSULT  CARE MANAGEMENT / SOCIAL WORK IP CONSULT  PHARMACY TO DOSE VANCO  PHYSICAL THERAPY ADULT IP CONSULT  PHARMACY TO DOSE VANCO  WOUND OSTOMY CONTINENCE NURSE  IP CONSULT  INFECTIOUS DISEASES IP CONSULT  CARE MANAGEMENT / SOCIAL WORK IP CONSULT  VASCULAR ACCESS ADULT IP CONSULT  PHYSICAL THERAPY ADULT IP CONSULT  OCCUPATIONAL THERAPY ADULT IP CONSULT    Code Status   No CPR- Do NOT Intubate    Time Spent on this Encounter   I, Florentino Mccoy MD, personally saw the patient today and spent greater than 30 minutes discharging this patient.       Florentino Mccoy MD  [address] [address]  Phone: [phone]  Fax: [phone]  ______________________________________________________________________    Physical Exam   Vital Signs: Temp: 98.6  F (37  C) Temp src: Oral BP: (!) 157/74 Pulse: 72   Resp: 18 SpO2: 96 % O2 Device: None (Room air)    Weight: 142 lbs 13.73 oz    General Appearance:  No distress noted  Respiratory: Good air entry  bilaterally  Cardiovascular: S1 and S2 well heard, no murmur or gallop  GI: Soft abdomen, no tenderness, normoactive bowel sounds  Skin: Intact and warm, L foot dressed without any discharge,          Primary Care Physician   Daryn Pittman    Discharge Orders      Reason for your hospital stay    Left foot cellulitis with sepsis     Follow-up and recommended labs and tests     Follow up with primary care provider, Daryn Pittman, within 7 days for hospital follow- up.  The following labs/tests are recommended: Patient needs to follow with podiatry as an outpatient.     Activity    Your activity upon discharge: activity as tolerated     Nursing Communication 1    Weekly labs: CBC with diff, CMP, ESR, CRP. Fax to Brooklynn Loera MD at [phone] Infectious Disease. PICC line cares.     Follow Up (TCM)    Follow up with Brooklynn Loera MD, Infectious Disease at the [facility] ([facility]) in 2-3 weeks (or next available). Phone: [phone]     Diet    Follow this diet upon discharge: Orders Placed This Encounter      Combination Diet Regular Diet Adult; Low Consistent Carb (45 g CHO per Meal) Diet       Significant Results and Procedures   Results for orders placed or performed during the hospital encounter of 05/20/23   Foot XR, G/E 3 views, left    Narrative    EXAM: XR FOOT LEFT G/E 3 VIEWS  LOCATION: [facility]  DATE/TIME: 5/21/2023 12:44 AM CDT    INDICATION: Foot pain. Evaluate for osteomyelitis at distal fifth metatarsal.  COMPARISON: 01/18/2023      Impression    IMPRESSION: Prominent ulcer noted at the level of the fifth metatarsal head. No underlying radiographic evidence of osteomyelitis. No displaced fracture.   MR Foot Left w/o & w Contrast    Narrative    EXAM: MR FOOT LEFT W/O and W CONTRAST  LOCATION: [facility]  DATE/TIME: 5/21/2023 12:45 PM CDT    INDICATION: Left foot cellulitis. Lateral forefoot ulcer.  COMPARISON: Foot  radiographic exam 5/21/2023. Foot MRI 1/18/2023.  TECHNIQUE: Routine. Additional postgadolinium T1 sequences were obtained.  IV CONTRAST: 6 mL Gadavist    FINDINGS:     JOINTS AND BONES:   -No evidence for left foot fracture, metatarsal stress fracture, or definitive evidence for osteomyelitis. Specifically, there is no confluent T1 hypointense signal in the 5th proximal phalangeal base or 5th metatarsal head or neck in the area of the   ulceration in the lateral forefoot dorsally. Mild left 1st MTP and metatarsal sesamoid chondromalacia. Navicular-medial cuneiform chondromalacia with subchondral marrow edema medial navicular distally. Mild synovitis at the 1st MTP joint. Nothing for   septic arthritis at the 5th MTP joint.    TENDONS:   -No acute tendon injury. Distal anterior tibialis tendon intact. Distal peroneus longus tendon intact. No forefoot tenosynovitis.     LIGAMENTS:   -Lisfranc ligament: Intact. No subluxation.    MUSCLES AND SOFT TISSUES:   -Mild intrinsic foot muscle atrophy. Patchy muscle edema without significant myositis. Dorsal foot soft tissue swelling. Soft tissue ulceration along the dorsal and lateral aspect of the forefoot at the level of the 5th MTP joint. Deep area of nonviable   soft tissue with adjacent granulation tissue. Mild dorsal foot cellulitis.      Impression    IMPRESSION:  1.  Soft tissue wound/ulcer in the dorsal and lateral aspect of the forefoot at the level of the 5th MTP joint. Small deep area of nonviable subjacent soft tissue with adjacent cellulitis.  2.  No convincing evidence for acute osteomyelitis of the 5th toe or distal 5th metatarsal.  3.  Mild dorsal foot soft tissue swelling with cellulitis.  4.  Mild first MTP and metatarsal sesamoid chondromalacia. Mild navicular-medial cuneiform chondromalacia.  5.  Intact Lisfranc ligament. No midfoot subluxation.  6.  No forefoot flexor or extensor tenosynovitis.     US Lower Extremity Arterial Duplex Bilateral     Narrative    [facility]  LOCATION: [facility]  DATE: 5/21/2023    1. EXAM: RESTING ANKLE-BRACHIAL INDICES (ABIs)   2. DUPLEX ARTERIAL ULTRASOUND OF THE LOWER EXTREMITIES BILATERAL    INDICATION: PAD. Diabetic wounds.  TECHNIQUE: Duplex imaging is performed utilizing gray-scale, two-dimensional images and color-flow imaging. Doppler waveform analysis and spectral Doppler imaging is also performed.  COMPARISON: None.    FINDINGS:   SEGMENTAL BP  RIGHT (mmHg)  Brachial: 125  Ankle (PT): 183; Index 1.46  Ankle (DP): 151; Index 1.21  Digit: 131; Index 1.05    LEFT (mmHg)  Brachial: --  Ankle (PT): 178; Index 1.42  Ankle (DP): 151; Index 1.21  Digit: 86; Index 0.69    Resting ABIs are 1.5 on the right. Right digital index 1.0.    Resting ABIs are 1.4 on the left. Left digital index 0.69.    WAVEFORMS: Pedal waveforms are multiphasic bilaterally.     DUPLEX ARTERIAL ULTRASOUND FINDINGS:   LOWER EXTREMITY ARTERIAL DUPLEX EXAM   RIGHT (cm/s)  EIA: 154  CFA: 89  PFA: 58  SFA-Proximal: 102  SFA-Mid: 139  SFA-Distal: 101  Popliteal: 107  PTA: 107  FRANCESCO: 62  DPA: 71    LEFT (cm/s)  EIA: 116  CFA: 120  PFA: 66  SFA-Proximal: 118  SFA-Mid: 165  SFA-Distal: 131  Popliteal: 110  PTA: 95  FRANCESCO: 80  DPA: 106    RIGHT: Multiphasic waveforms throughout the right lower extremity. No significant arterial stenosis identified.    LEFT: Multiphasic waveforms throughout the left lower extremity. No significant arterial stenosis identified. 3.3 x 1.9 x 0.8 cm left groin lymph node. Normal-appearing fatty hilum.      Impression    IMPRESSION:  1.  RIGHT LOWER EXTREMITY: Right CHIQUITA is elevated. Findings consistent with vascular calcification. Right digital index is normal. Multiphasic waveforms throughout the right lower extremity with no significant arterial stenosis identified.  2.  LEFT LOWER EXTREMITY: Left CHIQUITA is elevated. Findings consistent with vascular calcification. Left digital index is normal. Multiphasic  waveforms throughout the left lower extremity with no significant arterial stenosis identified.  3.  Left groin lymphadenopathy. I suspect this is a reactive node.   US CHIQUITA Doppler No Exercise    Narrative    [facility]  LOCATION: [facility]  DATE: 5/21/2023    1. EXAM: RESTING ANKLE-BRACHIAL INDICES (ABIs)   2. DUPLEX ARTERIAL ULTRASOUND OF THE LOWER EXTREMITIES BILATERAL    INDICATION: PAD. Diabetic wounds.  TECHNIQUE: Duplex imaging is performed utilizing gray-scale, two-dimensional images and color-flow imaging. Doppler waveform analysis and spectral Doppler imaging is also performed.  COMPARISON: None.    FINDINGS:   SEGMENTAL BP  RIGHT (mmHg)  Brachial: 125  Ankle (PT): 183; Index 1.46  Ankle (DP): 151; Index 1.21  Digit: 131; Index 1.05    LEFT (mmHg)  Brachial: --  Ankle (PT): 178; Index 1.42  Ankle (DP): 151; Index 1.21  Digit: 86; Index 0.69    Resting ABIs are 1.5 on the right. Right digital index 1.0.    Resting ABIs are 1.4 on the left. Left digital index 0.69.    WAVEFORMS: Pedal waveforms are multiphasic bilaterally.     DUPLEX ARTERIAL ULTRASOUND FINDINGS:   LOWER EXTREMITY ARTERIAL DUPLEX EXAM   RIGHT (cm/s)  EIA: 154  CFA: 89  PFA: 58  SFA-Proximal: 102  SFA-Mid: 139  SFA-Distal: 101  Popliteal: 107  PTA: 107  FRANCESCO: 62  DPA: 71    LEFT (cm/s)  EIA: 116  CFA: 120  PFA: 66  SFA-Proximal: 118  SFA-Mid: 165  SFA-Distal: 131  Popliteal: 110  PTA: 95  FRANCESCO: 80  DPA: 106    RIGHT: Multiphasic waveforms throughout the right lower extremity. No significant arterial stenosis identified.    LEFT: Multiphasic waveforms throughout the left lower extremity. No significant arterial stenosis identified. 3.3 x 1.9 x 0.8 cm left groin lymph node. Normal-appearing fatty hilum.      Impression    IMPRESSION:  1.  RIGHT LOWER EXTREMITY: Right CHIQUITA is elevated. Findings consistent with vascular calcification. Right digital index is normal. Multiphasic waveforms throughout the right lower  "extremity with no significant arterial stenosis identified.  2.  LEFT LOWER EXTREMITY: Left CHIQUITA is elevated. Findings consistent with vascular calcification. Left digital index is normal. Multiphasic waveforms throughout the left lower extremity with no significant arterial stenosis identified.  3.  Left groin lymphadenopathy. I suspect this is a reactive node.   POC US Guidance Needle Placement    Narrative    Ultrasound was performed as guidance to an anesthesia procedure.  Click   \"PACS images\" hyperlink below to view any stored images.  For specific   procedure details, view procedure note authored by anesthesia.       Discharge Medications   Current Discharge Medication List      START taking these medications    Details   ceFAZolin (ANCEF) intermittent infusion 2 g in 100 mL dextrose PRE-MIX Inject 100 mLs (2 g) into the vein every 8 hours for 26 days  Qty: 7800 mL, Refills: 0    Comments: Weekly labs: CBC with diff, CMP, ESR, CRP. Fax to Brooklynn Loera MD at [phone] Infectious Disease. PICC line cares.  Associated Diagnoses: Sepsis, due to unspecified organism, unspecified whether acute organ dysfunction present (H); Cellulitis and abscess of foot excluding toe; Diabetic ulcer of other part of left foot associated with diabetes mellitus due to underlying condition, limited to breakdown of skin (H); Osteomyelitis of ankle and foot (H)      doxycycline hyclate (VIBRAMYCIN) 100 MG capsule Take 1 capsule (100 mg) by mouth every 12 hours for 28 days  Qty: 56 capsule, Refills: 0    Associated Diagnoses: Cellulitis and abscess of foot excluding toe; Ulcer of right foot with necrosis of muscle (H)         CONTINUE these medications which have CHANGED    Details   insulin glargine (LANTUS SOLOSTAR) 100 UNIT/ML pen Inject 20 Units Subcutaneous At Bedtime  Qty: 15 mL    Comments: If Lantus is not covered by insurance, may substitute Basaglar or Semglee or other insulin glargine product per insurance preference at " same dose and frequency.    Associated Diagnoses: Type 2 diabetes mellitus with other circulatory complication, with long-term current use of insulin (H)         CONTINUE these medications which have NOT CHANGED    Details   empagliflozin (JARDIANCE) 25 MG TABS tablet Take 1 tablet (25 mg) by mouth daily  Qty: 90 tablet, Refills: 1    Associated Diagnoses: Type 2 diabetes mellitus with hyperglycemia, with long-term current use of insulin (H); CKD (chronic kidney disease) stage 2, GFR 60-89 ml/min      gabapentin (NEURONTIN) 300 MG capsule Take 300 mg by mouth 2 times daily      insulin lispro (HUMALOG KWIKPEN) 100 UNIT/ML (1 unit dial) KWIKPEN Inject 6 Units Subcutaneous 3 times daily (before meals)  Qty: 15 mL, Refills: 4    Associated Diagnoses: Type 2 diabetes mellitus with other circulatory complication, with long-term current use of insulin (H)      metFORMIN (GLUCOPHAGE) 500 MG tablet Take 2 tablets (1,000 mg) by mouth 2 times daily (with meals)  Qty: 360 tablet, Refills: 3    Associated Diagnoses: Type 2 diabetes mellitus with other circulatory complication, with long-term current use of insulin (H)      oxyCODONE-acetaminophen (PERCOCET) 5-325 MG tablet Take 1 tablet by mouth every 6 hours as needed for pain  Qty: 12 tablet, Refills: 0    Associated Diagnoses: Diabetic ulcer of toe of right foot associated with diabetes mellitus due to underlying condition, with necrosis of muscle (H)      alcohol swab prep pads Use to swab area of injection/ella as directed.  Qty: 100 each, Refills: 3    Associated Diagnoses: Type 2 diabetes mellitus without complication, with long-term current use of insulin (H)      blood glucose (NO BRAND SPECIFIED) test strip Use to test blood sugar 4 times daily or as directed. To accompany: Blood Glucose Monitor Brands: per insurance.  Qty: 100 strip, Refills: 6    Associated Diagnoses: Type 2 diabetes mellitus without complication, with long-term current use of insulin (H)       blood glucose monitoring (NO BRAND SPECIFIED) meter device kit Use to test blood sugar 4 time daily or as directed. Preferred blood glucose meter OR supplies to accompany: Blood Glucose Monitor Brands: per insurance.  Qty: 1 kit, Refills: 0    Associated Diagnoses: Type 2 diabetes mellitus without complication, with long-term current use of insulin (H)      Continuous Blood Gluc  (FREESTYLE NOAH 2 READER) CHUCK Use to read blood sugars as per 's instructions.  Qty: 1 each, Refills: 0    Associated Diagnoses: Type 2 diabetes mellitus with other circulatory complication, with long-term current use of insulin (H)      Continuous Blood Gluc Sensor (FREESTYLE NOAH 2 SENSOR) MISC Change every 14 days.  Qty: 2 each, Refills: 5    Associated Diagnoses: Type 2 diabetes mellitus with other circulatory complication, with long-term current use of insulin (H)      insulin pen needle (31G X 8 MM) 31G X 8 MM miscellaneous Use 4 pen needles daily or as directed.  Qty: 400 each, Refills: 3    Associated Diagnoses: Type 2 diabetes mellitus with other circulatory complication, with long-term current use of insulin (H)      thin (NO BRAND SPECIFIED) lancets Use with lanceting device. To accompany: Blood Glucose Monitor Brands: per insurance.  Qty: 100 each, Refills: 6    Associated Diagnoses: Type 2 diabetes mellitus without complication, with long-term current use of insulin (H)           Allergies   No Known Allergies

## 2023-05-26 NOTE — PLAN OF CARE
Problem: Skin or Soft Tissue Infection  Goal: Absence of Infection Signs and Symptoms  Intervention: Minimize and Manage Infection Progression  Recent Flowsheet Documentation  Taken 5/26/2023 0400 by Zenon Burgos RN  Infection Prevention: hand hygiene promoted  Isolation Precautions: contact precautions maintained  Taken 5/25/2023 2015 by Zenon Burgos RN  Infection Prevention: hand hygiene promoted  Isolation Precautions: contact precautions maintained    Problem: Plan of Care - These are the overarching goals to be used throughout the patient stay.    Goal: Absence of Hospital-Acquired Illness or Injury  Intervention: Identify and Manage Fall Risk  Recent Flowsheet Documentation  Taken 5/26/2023 0400 by Zenon Burgos RN  Safety Promotion/Fall Prevention: mobility aid in reach  Taken 5/25/2023 2015 by Zenon Burgos RN  Safety Promotion/Fall Prevention: mobility aid in reach    Goal Outcome Evaluation:    Patient is alert and oriented and able to make needs known. Wound vac in place set to suction with no output overnight, dressing remains CDI. Able to self repositioned in bed. Minimal pain reported overnight. NWB to LLE. Patient rested well overnight will continue to monitor.

## 2023-05-26 NOTE — PROGRESS NOTES
Care Management Discharge Note    Discharge Date: 05/26/2023       Discharge Disposition: Transitional Care    Discharge Services: None    Discharge DME: Other (see comment) (TCU will)    Discharge Transportation: family or friend will provide    Private pay costs discussed: Not applicable    Does the patient's insurance plan have a 3 day qualifying hospital stay waiver?  No    PAS Confirmation Code: [id number]  Patient/family educated on Medicare website which has current facility and service quality ratings: yes    Education Provided on the Discharge Plan: Yes  Persons Notified of Discharge Plans: Patient, sister, facility, MD, RN, JEFFERSON  Patient/Family in Agreement with the Plan: yes    Handoff Referral Completed: Yes    Additional Information:  Patient discharging to [facility], plan for 1000.  Sister will  patient between 1257-1809.    RNCM faxed discharge orders.  RNCM discussed discharge plan with patient, lorin, MD, RN, and JEFFERSON.  Facility aware of wound vac and IV antibiotics.        Lauren Calhoun RN

## 2023-05-26 NOTE — PLAN OF CARE
Occupational Therapy Discharge Summary    Reason for therapy discharge:    Discharged to TCU.    Progress towards therapy goal(s). See goals on Care Plan in [facility] electronic health record for goal details.  Progressing towards goals.    Therapy recommendation(s):    Recommend continued OT @ TCU.

## 2023-05-26 NOTE — PLAN OF CARE
Patient to discharge to [facility].  IVFs stopped.  PICC saline locked, green cap applied.  Per orders wound vac removed and wet to dry dressing in place.  Sister to pick patient up for transfer to [facility].

## 2023-05-27 LAB — BACTERIA WND CULT: NORMAL

## 2023-05-28 ENCOUNTER — MEDICAL CORRESPONDENCE (OUTPATIENT)
Dept: HEALTH INFORMATION MANAGEMENT | Facility: CLINIC | Age: 58
End: 2023-05-28

## 2023-05-29 LAB
ALBUMIN SERPL BCG-MCNC: 3.6 G/DL (ref 3.5–5.2)
ALP SERPL-CCNC: 265 U/L (ref 40–129)
ALT SERPL W P-5'-P-CCNC: 37 U/L (ref 10–50)
ANION GAP SERPL CALCULATED.3IONS-SCNC: 13 MMOL/L (ref 7–15)
AST SERPL W P-5'-P-CCNC: 27 U/L (ref 10–50)
BASOPHILS # BLD AUTO: 0 10E3/UL (ref 0–0.2)
BASOPHILS NFR BLD AUTO: 1 %
BILIRUB SERPL-MCNC: 0.2 MG/DL
BUN SERPL-MCNC: 24.9 MG/DL (ref 8–23)
CALCIUM SERPL-MCNC: 9.2 MG/DL (ref 8.6–10)
CHLORIDE SERPL-SCNC: 99 MMOL/L (ref 98–107)
CREAT SERPL-MCNC: 1.18 MG/DL (ref 0.67–1.17)
CRP SERPL-MCNC: 4.78 MG/L
DEPRECATED HCO3 PLAS-SCNC: 27 MMOL/L (ref 22–29)
EOSINOPHIL # BLD AUTO: 0.4 10E3/UL (ref 0–0.7)
EOSINOPHIL NFR BLD AUTO: 4 %
ERYTHROCYTE [DISTWIDTH] IN BLOOD BY AUTOMATED COUNT: 13.1 % (ref 10–15)
ERYTHROCYTE [SEDIMENTATION RATE] IN BLOOD BY WESTERGREN METHOD: 36 MM/HR (ref 0–20)
GFR SERPL CREATININE-BSD FRML MDRD: 71 ML/MIN/1.73M2
GLUCOSE SERPL-MCNC: 255 MG/DL (ref 70–99)
HCT VFR BLD AUTO: 33.7 % (ref 40–53)
HGB BLD-MCNC: 10.6 G/DL (ref 13.3–17.7)
IMM GRANULOCYTES # BLD: 0 10E3/UL
IMM GRANULOCYTES NFR BLD: 0 %
LYMPHOCYTES # BLD AUTO: 1.6 10E3/UL (ref 0.8–5.3)
LYMPHOCYTES NFR BLD AUTO: 19 %
MCH RBC QN AUTO: 28.6 PG (ref 26.5–33)
MCHC RBC AUTO-ENTMCNC: 31.5 G/DL (ref 31.5–36.5)
MCV RBC AUTO: 91 FL (ref 78–100)
MONOCYTES # BLD AUTO: 0.6 10E3/UL (ref 0–1.3)
MONOCYTES NFR BLD AUTO: 8 %
NEUTROPHILS # BLD AUTO: 5.6 10E3/UL (ref 1.6–8.3)
NEUTROPHILS NFR BLD AUTO: 68 %
NRBC # BLD AUTO: 0 10E3/UL
NRBC BLD AUTO-RTO: 0 /100
PLATELET # BLD AUTO: 372 10E3/UL (ref 150–450)
POTASSIUM SERPL-SCNC: 4 MMOL/L (ref 3.4–5.3)
PROT SERPL-MCNC: 6.8 G/DL (ref 6.4–8.3)
RBC # BLD AUTO: 3.7 10E6/UL (ref 4.4–5.9)
SODIUM SERPL-SCNC: 139 MMOL/L (ref 136–145)
WBC # BLD AUTO: 8.2 10E3/UL (ref 4–11)

## 2023-05-29 PROCEDURE — 85652 RBC SED RATE AUTOMATED: CPT

## 2023-05-29 PROCEDURE — 36415 COLL VENOUS BLD VENIPUNCTURE: CPT

## 2023-05-29 PROCEDURE — 80053 COMPREHEN METABOLIC PANEL: CPT

## 2023-05-29 PROCEDURE — 86140 C-REACTIVE PROTEIN: CPT | Mod: ORL

## 2023-05-29 PROCEDURE — 85025 COMPLETE CBC W/AUTO DIFF WBC: CPT | Mod: ORL

## 2023-05-30 ENCOUNTER — PATIENT OUTREACH (OUTPATIENT)
Dept: CARE COORDINATION | Facility: CLINIC | Age: 58
End: 2023-05-30
Payer: COMMERCIAL

## 2023-05-30 NOTE — LETTER
[facility]       To:  TCU SW            Please give to facility      From:   Fiona OSMAN  Care Coordinator   [facility]   P: [phone]  [email or web address]        Patient Name:  Abdulaziz Rausch     YOB: 1965   Admit date:   5-        Information Needed:  Please contact me when the patient will discharge (or if they will move to long term care)- include the discharge date, disposition, and main diagnosis       If the patient is discharged with home care services, please provide the name of the agency    Also- Please inform me if a care conference is being held.     Phone or Email with information                              Thank you

## 2023-05-30 NOTE — PROGRESS NOTES
Clinic Care Coordination Contact  Care Coordination Transition Communication         Clinical Data: Patient was hospitalized at    [facility]  Hospitalist Discharge Summary       Date of Admission:  5/20/2023  Date of Discharge:  5/26/2023     Discharge Diagnoses  Sepsis  Left fifth MTP joint abscess  Left foot cellulitis  Left diabetic foot ulcer  Type 2 diabetes with hyperglycemia   CHRISTY superimposed on CKD stage III  Essential hypertension  Transition to Facility:              Facility Name: the [facility]              Contact name and phone number/fax: [phone]    Plan: RN/SW Care Coordinator will await notification from facility staff informing RN/SW Care Coordinator of patient's discharge plans/needs. RN/SW Care Coordinator will review chart and outreach to facility staff every 4 weeks and as needed.     Fiona Ramirez,   [facility]  [phone]

## 2023-06-07 ENCOUNTER — OFFICE VISIT (OUTPATIENT)
Dept: VASCULAR SURGERY | Facility: CLINIC | Age: 58
End: 2023-06-07
Attending: PODIATRIST
Payer: COMMERCIAL

## 2023-06-07 VITALS — DIASTOLIC BLOOD PRESSURE: 80 MMHG | OXYGEN SATURATION: 97 % | SYSTOLIC BLOOD PRESSURE: 158 MMHG | HEART RATE: 112 BPM

## 2023-06-07 DIAGNOSIS — L97.511 DIABETIC ULCER OF OTHER PART OF RIGHT FOOT ASSOCIATED WITH DIABETES MELLITUS DUE TO UNDERLYING CONDITION, LIMITED TO BREAKDOWN OF SKIN (H): ICD-10-CM

## 2023-06-07 DIAGNOSIS — E08.621 DIABETIC ULCER OF OTHER PART OF RIGHT FOOT ASSOCIATED WITH DIABETES MELLITUS DUE TO UNDERLYING CONDITION, LIMITED TO BREAKDOWN OF SKIN (H): ICD-10-CM

## 2023-06-07 DIAGNOSIS — L97.523 ULCER OF LEFT FOOT WITH NECROSIS OF MUSCLE (H): Primary | ICD-10-CM

## 2023-06-07 PROCEDURE — 11042 DBRDMT SUBQ TIS 1ST 20SQCM/<: CPT | Mod: XU,XS | Performed by: PODIATRIST

## 2023-06-07 PROCEDURE — 97597 DBRDMT OPN WND 1ST 20 CM/<: CPT | Performed by: PODIATRIST

## 2023-06-07 PROCEDURE — 11043 DBRDMT MUSC&/FSCA 1ST 20/<: CPT | Performed by: PODIATRIST

## 2023-06-07 ASSESSMENT — PAIN SCALES - GENERAL: PAINLEVEL: NO PAIN (0)

## 2023-06-07 NOTE — PATIENT INSTRUCTIONS
Important lnstructions    PLEASE ORDER PT A PRAFO BOOT FOR LEFT FOOT    WEIGHT BEARING STATUS: You are to remain NON WEIGHT BEARING on your left foot. NON WEIGHT BEARING MEANS NO PRESSURE ON YOUR FOOT OR HEEL AT ANY TIME FOR ANY REASON!    2. OFFLOADING DEVICE: Must use a A WHEELCHAIR at all times! (do not use affected foot to push wheelchair)    3. STABILIZATION DEVICE: Use a PRAFO BOOT . You will need to WEAR THIS AT ALL TIMES EVEN WHILE IN BED.     4. ELEVATE: Elevating your leg means laying with your head on a pillow and your foot ABOVE YOUR HEART.     5. DO NOT MOVE YOUR FOOT.  There is a risk of worsening the wound or incision. To give yourself a higher chance of healing, please DO NOT swing foot back and forth and wiggle foot/toes especially when inside a stabilization device. Limited movement is allowable with therapy as recommended by the doctor.     6. TAKE A PROTEIN SHAKE TWICE A DAY.  (For ex: Boost, Ensure, Glucerna)      Dressing Change lnstructions          - Dressing Application of  Endoform for Right dorsal and plantar wound:    1. Endoform should be cut to the size of the wound.  It should touch the edges of the ulcer. It is okay if it overlap the edges a small amount.  Then, moisten the Endoform with saline.(If it is easier for you, you can moisten it before laying it in the wound)    2. Cover the wound with Endoform, followed by dry gauze, and secure with roll gauze if needed.     3. Endoform is naturally used by the body over time so you may not find it in the wound when you change your dressing.  If you do find some, gently cleanse the wound with saline. Do not remove the remaining Endoform, which may appear as an off-white to south gel, just add Endoform on top.  Usually, more Endoform will need to be added every 5-7 days.     4. Change your top dressing every 1-2 days or as needed depending on drainage.    -Endoform is a collagen dressing created from ovine (sheep) fore-stomach tissue. The  collagen extracellular matrix transforms into a soft conforming sheet, which is naturally incorporated into the wound over time.  Collagen dressings are used to stimulate wound healing.   ,   Standard - Wound Vac Instructions  LEFT FOOT    1. 3x weekly and as needed cleanse the area with NS    2. Pat dry    3. Apply Cavilon no sting barrier wipe to the skin surrounding the wound to protect from drainage/maceration    4. Apply drape around incision. Cut strip of drape and apply to skin if bridging is needed; plan this area in advance; should not be over bony prominence     5. Cut the foam to fit the area of the incision    6. Cut narrow strip of foam if bridging    7. Cover foam with drape to obtain air tight seal    8. Cut opening the size of a quarter for where the suction pad will be applied    9. Apply Suction pad    10. 125mmHG suction continuous    [employer] can be reached at [phone], 24 hours a day 7 days a week     - Back up plan in place:     If the negative pressure wound therapy malfunctions or unable to maintain seal: dressing must be removed and reapplied within 2 hours of the incident. If unable to reapply negative pressure wound dressing, place Normal Saline moistened gauze in wound bed and cover with appropriate dressing to keep wound bed moist.  Change wet-to-dry dressing two times a day until healthcare staff can re-implement negative pressure therapy. Change canister at least weekly.  [employer] [facility] can be reached at [phone], 24 hours a day 7 days a week    Information on Vacuum-Assisted Closure of a Wound  Vacuum-assisted closure (VAC) of a wound is a type of treatment to help wounds heal. It s also known as negative pressure wound therapy. During the treatment, a device lowers air pressure on the wound. This can help the wound heal more quickly.  Understanding the wound VAC system  A wound VAC system has several parts. A foam or gauze dressing is put directly on the  wound. The dressing is changed every 24 to 72 hours. An adhesive film covers and seals the dressing and wound. A drainage tube leads from under the adhesive film and connects to a portable vacuum pump. This pump removes air pressure over the wound. It may do this constantly. Or it may do it in cycles. During the treatment, you ll need to carry the portable pump everywhere you go.  Why wound VAC is used  You might need this therapy for a recent traumatic wound. Or you may need it for a chronic wound. This is a wound that does not heal the way it should over time. This can happen with wounds in people who have diabetes. You may need a wound VAC if you ve had a recent skin graft. And you may need a wound VAC for a large wound. Large wounds can take a longer time to heal.  A wound vacuum system may help your wound heal more quickly by:  Draining extra fluid from the wound  Reducing swelling  Reducing bacteria in the wound  Keeping your wound moist and warm  Helping draw together wound edges  Increasing blood flow to your wound  Decreasing inflammation  Wound VAC offers some other advantages over other types of wound care. It may decrease your overall discomfort. The dressings usually need to be changed less often. And they may be easier to keep in position.         It IS NOT ok to get your wound wet in the bath or shower    SEEK MEDICAL CARE IF:  You have an increase in swelling, pain, or redness around the wound.  You have an increase in the amount of pus coming from the wound.  There is a bad smell coming from the wound.  The wound appears to be worsening/enlarging  You have a fever greater than 101.5 F      It is ok to continue current wound care treatment/products for the next 2-3 days until new wound care supplies are ordered and arrive. If longer than this please contact our office at [phone].        We want to hear from you!   In the next few weeks, you should receive a call or email to complete a survey  about your visit at [facility]. Please help us improve your appointment experience by letting us know how we did today. We strive to make your experience good and value any ways in which we could do better.      We value your input and suggestions.    Thank you for choosing the [facility]!

## 2023-06-07 NOTE — PROGRESS NOTES
FOOT AND ANKLE SURGERY/PODIATRY Progress Note      ASSESSMENT:   Ulceration left foot  Ulceration dorsal right foot  Diabetic ulceration plantar right foot    A new wound was identified today: yes,  it is located Left foot.    TREATMENT:  -Discussed with the patient that the ulceration along the lateral fifth MPJ on the left foot is progressing well.  Majority of the ulceration has granular tissue with small area of exposed capsule of the fifth MPJ.    -I recommend he continue with the wound VAC along the lateral fifth MPJ ulceration on the left foot.  Reviewed importance of nonweightbearing at all times and left foot along with protein supplements twice per day.    -Right foot ulcerations are stable with granular base.  We will begin use of endoform at both ulcerations on the right foot.    -After discussion of risk factors, nursing staff removed dressing, cleansed wound and consent obtained 2% Lidocaine HCL jelly was applied, under clean conditions, the left foot ulceration(s) were debrided using #15 blade scalpel.  Devitalized and nonviable tissue, along with any fibrin and slough, was removed to improve granulation tissue formation, stimulate wound healing, decrease overall bacteria load, disrupt biofilm formation and decrease edge senescence. Wound drainage was scant No. Total excisional debridement was 8.96 sq cm into the muscle/fascia with a depth of 0.5 cm.   Ulcers were improved afterwards and .  Measures were as noted on the flow sheet. A gauze dressing was applied.     -After discussion of risk factors, nursing staff removed dressing, cleansed wound and consent obtained 2% Lidocaine HCL jelly was applied, under clean conditions, the plantar right foot ulceration(s) were debrided using #15 blade scalpel.  Devitalized and nonviable tissue, along with any fibrin and slough, was removed to improve granulation tissue formation, stimulate wound healing, decrease overall bacteria load, disrupt biofilm  formation and decrease edge senescence. Wound drainage was scant No. Total excisional debridement was 0.25 sq cm into the subcutaneous tissue with a depth of 0.2 cm.   Ulcers were improved afterwards and .  Measures were as noted on the flow sheet. Collagen with a gauze dresssing was applied. He will continue to apply Collagen with a gauze dresssing as directed.    -After discussion of risk factors, nursing staff removed dressing, cleansed wound and consent obtained 2% Lidocaine HCL jelly was applied, under clean conditions, the dorsal right foot ulceration(s) were debrided using #15 blade scalpel.  Devitalized and nonviable tissue, along with any fibrin and slough, was removed to improve granulation tissue formation, stimulate wound healing, decrease overall bacteria load, disrupt biofilm formation and decrease edge senescence. Wound drainage was scant No. Total excisional debridement was 0.24 sq cm from the epidermis/dermis area with a depth of 0.1 cm.   Ulcers were improved afterwards and .  Measures were as noted on the flow sheet. Collagen with a gauze dresssing was applied. He will continue to apply Collagen with a gauze dresssing as directed.    -He will follow-up in 2 weeks    Ulises Jimenez [facility] [employer]      HPI: Abdulaziz Rausch was seen again today for bilateral foot ulcerations.  Patient is known to me having treated him in the past for right foot ulceration.  Since his last visit with me he underwent I&D with Dr. Huerta for left foot infection.  He has been using a wound VAC along the lateral fifth MPJ on the left foot and is remained nonweightbearing while at [facility].    Past Medical History:   Diagnosis Date     Anemia      Depression      Diabetes mellitus, type 2 (H)      Diabetic foot ulcer (H)      Hypertension      Osteomyelitis (H)        Past Surgical History:   Procedure Laterality Date     AMPUTATE TOE(S) Right 2/17/2023    Procedure: AMPUTATION, digits  two and three right foot;  Surgeon: Ulises Jimenez DPM;  [place] [facility] [place]     INCISION AND DRAINAGE FOOT, COMBINED Left 5/23/2023    Procedure: Incision and drainage of abscess left foot with debridement and irrigation of wound left foot;  Surgeon: Jf Huerta DPM;  [place] [facility] [place]     IRRIGATION AND DEBRIDEMENT TOE, COMBINED Right 1/20/2023    Procedure: IRRIGATION AND DEBRIDEMENT digits 2 and 3 right foot;  Surgeon: Jf Huerta DPM;  [place] [facility] [place]     PICC SINGLE LUMEN PLACEMENT  5/25/2023            No Known Allergies      Current Outpatient Medications:      alcohol swab prep pads, Use to swab area of injection/ella as directed., Disp: 100 each, Rfl: 3     blood glucose (NO BRAND SPECIFIED) test strip, Use to test blood sugar 4 times daily or as directed. To accompany: Blood Glucose Monitor Brands: per insurance., Disp: 100 strip, Rfl: 6     blood glucose monitoring (NO BRAND SPECIFIED) meter device kit, Use to test blood sugar 4 time daily or as directed. Preferred blood glucose meter OR supplies to accompany: Blood Glucose Monitor Brands: per insurance., Disp: 1 kit, Rfl: 0     ceFAZolin (ANCEF) intermittent infusion 2 g in 100 mL dextrose PRE-MIX, Inject 100 mLs (2 g) into the vein every 8 hours for 26 days, Disp: 7800 mL, Rfl: 0     Continuous Blood Gluc  (FREESTYLE NOAH 2 READER) CHUCK, Use to read blood sugars as per 's instructions., Disp: 1 each, Rfl: 0     Continuous Blood Gluc Sensor (FREESTYLE NOAH 2 SENSOR) [facility], Change every 14 days., Disp: 2 each, Rfl: 5     doxycycline hyclate (VIBRAMYCIN) 100 MG capsule, Take 1 capsule (100 mg) by mouth every 12 hours for 28 days, Disp: 56 capsule, Rfl: 0     empagliflozin (JARDIANCE) 25 MG TABS tablet, Take 1 tablet (25 mg) by mouth daily, Disp: 90 tablet, Rfl: 1     gabapentin (NEURONTIN) 300 MG capsule, Take 300 mg by mouth 2 times daily, Disp: , Rfl:      insulin glargine (LANTUS  SOLOSTAR) 100 UNIT/ML pen, Inject 20 Units Subcutaneous At Bedtime, Disp: 15 mL, Rfl:      insulin lispro (HUMALOG KWIKPEN) 100 UNIT/ML (1 unit dial) KWIKPEN, Inject 6 Units Subcutaneous 3 times daily (before meals), Disp: 15 mL, Rfl: 4     insulin pen needle (31G X 8 MM) 31G X 8 MM miscellaneous, Use 4 pen needles daily or as directed., Disp: 400 each, Rfl: 3     metFORMIN (GLUCOPHAGE) 500 MG tablet, Take 2 tablets (1,000 mg) by mouth 2 times daily (with meals), Disp: 360 tablet, Rfl: 3     oxyCODONE-acetaminophen (PERCOCET) 5-325 MG tablet, Take 1 tablet by mouth every 6 hours as needed for pain, Disp: 12 tablet, Rfl: 0     thin (NO BRAND SPECIFIED) lancets, Use with lanceting device. To accompany: Blood Glucose Monitor Brands: per insurance., Disp: 100 each, Rfl: 6    Review of Systems - 10 point Review of Systems is negative except for bilateral foot ulcers which is noted in HPI.      OBJECTIVE:  BP (!) 158/80   Pulse 112   SpO2 97%   General appearance: Patient is alert and fully cooperative with history & exam.  No sign of distress is noted during the visit.    Vascular: Dorsalis pedis non-palpableBilateral.  Dermatologic:    Negative Pressure Wound Therapy Foot Anterior;Left (Active)   Wound Type Surgical 05/26/23 0829   Cycle Continuous 05/26/23 0829   Target Pressure (mmHg) 125 05/26/23 0400   Drainage Color/Characteristics Other (Comment) 05/25/23 0757   [place] changed? No 05/26/23 0829   Output (ml) 0 ml 05/26/23 0829       VASC Wound left plantar lateral (Active)   Pre Size Length 3.2 06/07/23 0900   Pre Size Width 2.8 06/07/23 0900   Pre Size Depth 0.3 06/07/23 0900   Pre Total Sq cm 8.96 06/07/23 0900       VASC Wound Right foot (Active)       VASC Wound Right foot (Active)       VASC Wound Right foot Dorsal (Active)   Post Size Length 0.8 06/07/23 0900   Post Size Width 0.3 06/07/23 0900   Post Size Depth 0.1 06/07/23 0900   Post Total Sq cm 0.24 06/07/23 0900   Description scabbed 06/07/23 0900  "      VASC Wound Right foot plantar (Active)   Post Size Length 0.5 06/07/23 0900   Post Size Width 0.5 06/07/23 0900   Post Size Depth 0.2 06/07/23 0900   Post Total Sq cm 0.25 06/07/23 0900       Incision/Surgical Site 02/17/23 Right Foot (Active)       Incision/Surgical Site 05/23/23 Left Foot (Active)   Incision Assessment UTV 05/26/23 0400   Clara-Incision Assessment UTV 05/25/23 2015   Closure GOOD 05/25/23 0300   Incision Drainage Amount None 05/26/23 0400   Dressing Intervention Clean, dry, intact 05/26/23 0400   Granular base lateral plantar fifth MPJ ulceration left foot with small area of exposed capsule.  Granular tissue dorsal and plantar right foot ulcerations.  No erythema bilateral.  Neurologic: Diminished to light touch Bilateral.  Musculoskeletal: Contracted digits noted Bilateral.    Imaging:     POC US Guidance Needle Placement    Result Date: 5/23/2023  Ultrasound was performed as guidance to an anesthesia procedure.  Click \"PACS images\" hyperlink below to view any stored images.  For specific procedure details, view procedure note authored by anesthesia.    US Lower Extremity Arterial Duplex Bilateral    Result Date: 5/21/2023  [facility] [facility] LOCATION: [facility] DATE: 5/21/2023 1. EXAM: RESTING ANKLE-BRACHIAL INDICES (ABIs) 2. DUPLEX ARTERIAL ULTRASOUND OF THE LOWER EXTREMITIES BILATERAL INDICATION: PAD. Diabetic wounds. TECHNIQUE: Duplex imaging is performed utilizing gray-scale, two-dimensional images and color-flow imaging. Doppler waveform analysis and spectral Doppler imaging is also performed. COMPARISON: None. FINDINGS: SEGMENTAL BP RIGHT (mmHg) Brachial: 125 Ankle (PT): 183; Index 1.46 Ankle (DP): 151; Index 1.21 Digit: 131; Index 1.05 LEFT (mmHg) Brachial: -- Ankle (PT): 178; Index 1.42 Ankle (DP): 151; Index 1.21 Digit: 86; Index 0.69 Resting ABIs are 1.5 on the right. Right digital index 1.0. Resting ABIs are 1.4 on the left. Left digital index 0.69. " WAVEFORMS: Pedal waveforms are multiphasic bilaterally. DUPLEX ARTERIAL ULTRASOUND FINDINGS: LOWER EXTREMITY ARTERIAL DUPLEX EXAM RIGHT (cm/s) EIA: 154 CFA: 89 PFA: 58 SFA-Proximal: 102 SFA-Mid: 139 SFA-Distal: 101 Popliteal: 107 PTA: 107 FRANCESCO: 62 DPA: 71 LEFT (cm/s) EIA: 116 CFA: 120 PFA: 66 SFA-Proximal: 118 SFA-Mid: 165 SFA-Distal: 131 Popliteal: 110 PTA: 95 FRANCESCO: 80 DPA: 106 RIGHT: Multiphasic waveforms throughout the right lower extremity. No significant arterial stenosis identified. LEFT: Multiphasic waveforms throughout the left lower extremity. No significant arterial stenosis identified. 3.3 x 1.9 x 0.8 cm left groin lymph node. Normal-appearing fatty hilum.     IMPRESSION: 1.  RIGHT LOWER EXTREMITY: Right CHIQUITA is elevated. Findings consistent with vascular calcification. Right digital index is normal. Multiphasic waveforms throughout the right lower extremity with no significant arterial stenosis identified. 2.  LEFT LOWER EXTREMITY: Left CHIQUITA is elevated. Findings consistent with vascular calcification. Left digital index is normal. Multiphasic waveforms throughout the left lower extremity with no significant arterial stenosis identified. 3.  Left groin lymphadenopathy. I suspect this is a reactive node.    US CHIQUITA Doppler No Exercise    Result Date: 5/21/2023  [place] [facility] LOCATION: [facility] DATE: 5/21/2023 1. EXAM: RESTING ANKLE-BRACHIAL INDICES (ABIs) 2. DUPLEX ARTERIAL ULTRASOUND OF THE LOWER EXTREMITIES BILATERAL INDICATION: PAD. Diabetic wounds. TECHNIQUE: Duplex imaging is performed utilizing gray-scale, two-dimensional images and color-flow imaging. Doppler waveform analysis and spectral Doppler imaging is also performed. COMPARISON: None. FINDINGS: SEGMENTAL BP RIGHT (mmHg) Brachial: 125 Ankle (PT): 183; Index 1.46 Ankle (DP): 151; Index 1.21 Digit: 131; Index 1.05 LEFT (mmHg) Brachial: -- Ankle (PT): 178; Index 1.42 Ankle (DP): 151; Index 1.21 Digit: 86; Index 0.69  Resting ABIs are 1.5 on the right. Right digital index 1.0. Resting ABIs are 1.4 on the left. Left digital index 0.69. WAVEFORMS: Pedal waveforms are multiphasic bilaterally. DUPLEX ARTERIAL ULTRASOUND FINDINGS: LOWER EXTREMITY ARTERIAL DUPLEX EXAM RIGHT (cm/s) EIA: 154 CFA: 89 PFA: 58 SFA-Proximal: 102 SFA-Mid: 139 SFA-Distal: 101 Popliteal: 107 PTA: 107 FRANCESCO: 62 DPA: 71 LEFT (cm/s) EIA: 116 CFA: 120 PFA: 66 SFA-Proximal: 118 SFA-Mid: 165 SFA-Distal: 131 Popliteal: 110 PTA: 95 FRANCESCO: 80 DPA: 106 RIGHT: Multiphasic waveforms throughout the right lower extremity. No significant arterial stenosis identified. LEFT: Multiphasic waveforms throughout the left lower extremity. No significant arterial stenosis identified. 3.3 x 1.9 x 0.8 cm left groin lymph node. Normal-appearing fatty hilum.     IMPRESSION: 1.  RIGHT LOWER EXTREMITY: Right CHIQUITA is elevated. Findings consistent with vascular calcification. Right digital index is normal. Multiphasic waveforms throughout the right lower extremity with no significant arterial stenosis identified. 2.  LEFT LOWER EXTREMITY: Left CHIQUITA is elevated. Findings consistent with vascular calcification. Left digital index is normal. Multiphasic waveforms throughout the left lower extremity with no significant arterial stenosis identified. 3.  Left groin lymphadenopathy. I suspect this is a reactive node.    MR Foot Left w/o & w Contrast    Result Date: 5/21/2023  EXAM: MR FOOT LEFT W/O and W CONTRAST LOCATION: [facility] DATE/TIME: 5/21/2023 12:45 PM CDT INDICATION: Left foot cellulitis. Lateral forefoot ulcer. COMPARISON: Foot radiographic exam 5/21/2023. Foot MRI 1/18/2023. TECHNIQUE: Routine. Additional postgadolinium T1 sequences were obtained. IV CONTRAST: 6 mL Gadavist FINDINGS: JOINTS AND BONES: -No evidence for left foot fracture, metatarsal stress fracture, or definitive evidence for osteomyelitis. Specifically, there is no confluent T1 hypointense signal in  the 5th proximal phalangeal base or 5th metatarsal head or neck in the area of the ulceration in the lateral forefoot dorsally. Mild left 1st MTP and metatarsal sesamoid chondromalacia. Navicular-medial cuneiform chondromalacia with subchondral marrow edema medial navicular distally. Mild synovitis at the 1st MTP joint. Nothing for septic arthritis at the 5th MTP joint. TENDONS: -No acute tendon injury. Distal anterior tibialis tendon intact. Distal peroneus longus tendon intact. No forefoot tenosynovitis. LIGAMENTS: -Lisfranc ligament: Intact. No subluxation. MUSCLES AND SOFT TISSUES: -Mild intrinsic foot muscle atrophy. Patchy muscle edema without significant myositis. Dorsal foot soft tissue swelling. Soft tissue ulceration along the dorsal and lateral aspect of the forefoot at the level of the 5th MTP joint. Deep area of nonviable soft tissue with adjacent granulation tissue. Mild dorsal foot cellulitis.     IMPRESSION: 1.  Soft tissue wound/ulcer in the dorsal and lateral aspect of the forefoot at the level of the 5th MTP joint. Small deep area of nonviable subjacent soft tissue with adjacent cellulitis. 2.  No convincing evidence for acute osteomyelitis of the 5th toe or distal 5th metatarsal. 3.  Mild dorsal foot soft tissue swelling with cellulitis. 4.  Mild first MTP and metatarsal sesamoid chondromalacia. Mild navicular-medial cuneiform chondromalacia. 5.  Intact Lisfranc ligament. No midfoot subluxation. 6.  No forefoot flexor or extensor tenosynovitis.     Foot XR, G/E 3 views, left    Result Date: 5/21/2023  EXAM: XR FOOT LEFT G/E 3 VIEWS LOCATION: [facility] DATE/TIME: 5/21/2023 12:44 AM CDT INDICATION: Foot pain. Evaluate for osteomyelitis at distal fifth metatarsal. COMPARISON: 01/18/2023     IMPRESSION: Prominent ulcer noted at the level of the fifth metatarsal head. No underlying radiographic evidence of osteomyelitis. No displaced fracture.         Picture:

## 2023-06-21 ENCOUNTER — OFFICE VISIT (OUTPATIENT)
Dept: VASCULAR SURGERY | Facility: CLINIC | Age: 58
End: 2023-06-21
Attending: PODIATRIST
Payer: COMMERCIAL

## 2023-06-21 VITALS
RESPIRATION RATE: 17 BRPM | HEART RATE: 80 BPM | DIASTOLIC BLOOD PRESSURE: 72 MMHG | TEMPERATURE: 98.2 F | SYSTOLIC BLOOD PRESSURE: 118 MMHG | OXYGEN SATURATION: 96 %

## 2023-06-21 DIAGNOSIS — L97.523 ULCER OF LEFT FOOT WITH NECROSIS OF MUSCLE (H): Primary | ICD-10-CM

## 2023-06-21 PROBLEM — E08.621: Status: ACTIVE | Noted: 2023-01-27

## 2023-06-21 PROCEDURE — 11043 DBRDMT MUSC&/FSCA 1ST 20/<: CPT | Performed by: PODIATRIST

## 2023-06-21 RX ORDER — ACETAMINOPHEN 325 MG/1
650 TABLET ORAL EVERY 6 HOURS PRN
COMMUNITY
Start: 2023-06-01

## 2023-06-21 RX ORDER — SULFAMETHOXAZOLE/TRIMETHOPRIM 800-160 MG
TABLET ORAL
Status: ON HOLD | COMMUNITY
Start: 2023-05-22 | End: 2023-07-01

## 2023-06-21 ASSESSMENT — PAIN SCALES - GENERAL: PAINLEVEL: NO PAIN (0)

## 2023-06-21 NOTE — PATIENT INSTRUCTIONS
Important lnstructions        WEIGHT BEARING STATUS: You are to remain NON WEIGHT BEARING on your left foot. NON WEIGHT BEARING MEANS NO PRESSURE ON YOUR FOOT OR HEEL AT ANY TIME FOR ANY REASON!    2. OFFLOADING DEVICE: Must use a A WHEELCHAIR at all times! (do not use affected foot to push wheelchair)    3. STABILIZATION DEVICE: Use a PRAFO BOOT . You will need to WEAR THIS AT ALL TIMES EVEN WHILE IN BED.     4. ELEVATE: Elevating your leg means laying with your head on a pillow and your foot ABOVE YOUR HEART.     5. DO NOT MOVE YOUR FOOT.  There is a risk of worsening the wound or incision. To give yourself a higher chance of healing, please DO NOT swing foot back and forth and wiggle foot/toes especially when inside a stabilization device. Limited movement is allowable with therapy as recommended by the doctor.     6. TAKE A PROTEIN SHAKE TWICE A DAY.  (For ex: Boost, Ensure, Glucerna)      Dressing Change lnstructions            Standard - Wound Vac Instructions  LEFT LATERAL FOOT    1. 3x weekly and as needed cleanse the area with NS    2. Pat dry    3. Apply Cavilon no sting barrier wipe to the skin surrounding the wound to protect from drainage/maceration    4. Apply drape around incision. Cut strip of drape and apply to skin if bridging is needed; plan this area in advance; should not be over bony prominence     5. Cut the foam to fit the area of the incision    6. Cut narrow strip of foam if bridging    7. Cover foam with drape to obtain air tight seal    8. Cut opening the size of a quarter for where the suction pad will be applied    9. Apply Suction pad    10. 125mmHG suction continuous    [employer] [facility] can be reached at [phone], 24 hours a day 7 days a week     - Back up plan in place:     If the negative pressure wound therapy malfunctions or unable to maintain seal: dressing must be removed and reapplied within 2 hours of the incident. If unable to reapply negative pressure wound  dressing, place Normal Saline moistened gauze in wound bed and cover with appropriate dressing to keep wound bed moist.  Change wet-to-dry dressing two times a day until healthcare staff can re-implement negative pressure therapy. Change canister at least weekly.  [facility] Contact Center can be reached at [phone], 24 hours a day 7 days a week    Information on Vacuum-Assisted Closure of a Wound  Vacuum-assisted closure (VAC) of a wound is a type of treatment to help wounds heal. It s also known as negative pressure wound therapy. During the treatment, a device lowers air pressure on the wound. This can help the wound heal more quickly.  Understanding the wound VAC system  A wound VAC system has several parts. A foam or gauze dressing is put directly on the wound. The dressing is changed every 24 to 72 hours. An adhesive film covers and seals the dressing and wound. A drainage tube leads from under the adhesive film and connects to a portable vacuum pump. This pump removes air pressure over the wound. It may do this constantly. Or it may do it in cycles. During the treatment, you ll need to carry the portable pump everywhere you go.  Why wound VAC is used  You might need this therapy for a recent traumatic wound. Or you may need it for a chronic wound. This is a wound that does not heal the way it should over time. This can happen with wounds in people who have diabetes. You may need a wound VAC if you ve had a recent skin graft. And you may need a wound VAC for a large wound. Large wounds can take a longer time to heal.  A wound vacuum system may help your wound heal more quickly by:  Draining extra fluid from the wound  Reducing swelling  Reducing bacteria in the wound  Keeping your wound moist and warm  Helping draw together wound edges  Increasing blood flow to your wound  Decreasing inflammation  Wound VAC offers some other advantages over other types of wound care. It may decrease your overall discomfort. The  dressings usually need to be changed less often. And they may be easier to keep in position.         It IS NOT ok to get your wound wet in the bath or shower    SEEK MEDICAL CARE IF:  You have an increase in swelling, pain, or redness around the wound.  You have an increase in the amount of pus coming from the wound.  There is a bad smell coming from the wound.  The wound appears to be worsening/enlarging  You have a fever greater than 101.5 F      It is ok to continue current wound care treatment/products for the next 2-3 days until new wound care supplies are ordered and arrive. If longer than this please contact our office at [phone].        We want to hear from you!   In the next few weeks, you should receive a call or email to complete a survey about your visit at [facility]. Please help us improve your appointment experience by letting us know how we did today. We strive to make your experience good and value any ways in which we could do better.      We value your input and suggestions.    Thank you for choosing the [facility]!

## 2023-06-21 NOTE — PROGRESS NOTES
FOOT AND ANKLE SURGERY/PODIATRY Progress Note      ASSESSMENT:   Ulceration left foot        TREATMENT:  -I discussed with the patient that the right foot ulcerations has resolved, I am pleased with his progress.  The ulceration along the fifth MPJ left foot has also improved but does have exposed capsule of the fifth MPJ along the central margin.    -Recommend continue nonweightbearing in the left foot with use of wound VAC.  Protein supplements 60 g/day.    -After discussion of risk factors, nursing staff removed dressing, cleansed wound and consent obtained 2% Lidocaine HCL jelly was applied, under clean conditions, the left foot ulceration(s) were debrided using #15 blade scalpel.  Devitalized and nonviable tissue, along with any fibrin and slough, was removed to improve granulation tissue formation, stimulate wound healing, decrease overall bacteria load, disrupt biofilm formation and decrease edge senescence. Wound drainage was scant No. Total excisional debridement was 7.5 sq cm into the muscle/fascia with a depth of 0.4 cm.   Ulcers were improved afterwards and .  Measures were as noted on the flow sheet. A gauze dressing was applied.    -He will follow-up in 2 weeks    Ulises Jimenez DPM  [facility]      HPI: Abdulaziz Rausch was seen again today for bilateral foot ulcerations.  Since his last visit with me he has remained nonweightbearing and is use the wound VAC on the left foot as directed.    Past Medical History:   Diagnosis Date     Anemia      Depression      Diabetes mellitus, type 2 (H)      Diabetic foot ulcer (H)      Hypertension      Osteomyelitis (H)        Past Surgical History:   Procedure Laterality Date     AMPUTATE TOE(S) Right 2/17/2023    Procedure: AMPUTATION, digits two and three right foot;  Surgeon: Ulises Jimenez DPM;  Location: [facility] OR     INCISION AND DRAINAGE FOOT, COMBINED Left 5/23/2023    Procedure: Incision and drainage of  abscess left foot with debridement and irrigation of wound left foot;  Surgeon: Jf Huerta DPM;  Location: [facility] OR     IRRIGATION AND DEBRIDEMENT TOE, COMBINED Right 1/20/2023    Procedure: IRRIGATION AND DEBRIDEMENT digits 2 and 3 right foot;  Surgeon: Jf Huerta DPM;  Location: [facility] OR     PICC SINGLE LUMEN PLACEMENT  5/25/2023            No Known Allergies      Current Outpatient Medications:      acetaminophen (TYLENOL) 325 MG tablet, , Disp: , Rfl:      alcohol swab prep pads, Use to swab area of injection/ella as directed., Disp: 100 each, Rfl: 3     blood glucose (NO BRAND SPECIFIED) test strip, Use to test blood sugar 4 times daily or as directed. To accompany: Blood Glucose Monitor Brands: per insurance., Disp: 100 strip, Rfl: 6     blood glucose monitoring (NO BRAND SPECIFIED) meter device kit, Use to test blood sugar 4 time daily or as directed. Preferred blood glucose meter OR supplies to accompany: Blood Glucose Monitor Brands: per insurance., Disp: 1 kit, Rfl: 0     Continuous Blood Gluc  (FREESTYLE NOAH 2 READER) [facility], Use to read blood sugars as per 's instructions., Disp: 1 each, Rfl: 0     Continuous Blood Gluc Sensor (FREESTYLE NOAH 2 SENSOR) [facility], Change every 14 days., Disp: 2 each, Rfl: 5     doxycycline hyclate (VIBRAMYCIN) 100 MG capsule, Take 1 capsule (100 mg) by mouth every 12 hours for 28 days, Disp: 56 capsule, Rfl: 0     empagliflozin (JARDIANCE) 25 MG TABS tablet, Take 1 tablet (25 mg) by mouth daily, Disp: 90 tablet, Rfl: 1     gabapentin (NEURONTIN) 300 MG capsule, Take 300 mg by mouth 2 times daily, Disp: , Rfl:      insulin glargine (LANTUS SOLOSTAR) 100 UNIT/ML pen, Inject 20 Units Subcutaneous At Bedtime, Disp: 15 mL, Rfl:      insulin lispro (HUMALOG KWIKPEN) 100 UNIT/ML (1 unit dial) KWIKPEN, Inject 6 Units Subcutaneous 3 times daily (before meals), Disp: 15 mL, Rfl: 4     insulin pen needle (31G X 8 MM) 31G X 8 MM  miscellaneous, Use 4 pen needles daily or as directed., Disp: 400 each, Rfl: 3     metFORMIN (GLUCOPHAGE) 500 MG tablet, Take 2 tablets (1,000 mg) by mouth 2 times daily (with meals), Disp: 360 tablet, Rfl: 3     oxyCODONE-acetaminophen (PERCOCET) 5-325 MG tablet, Take 1 tablet by mouth every 6 hours as needed for pain, Disp: 12 tablet, Rfl: 0     sulfamethoxazole-trimethoprim (BACTRIM DS) 800-160 MG tablet, , Disp: , Rfl:      thin (NO BRAND SPECIFIED) lancets, Use with lanceting device. To accompany: Blood Glucose Monitor Brands: per insurance., Disp: 100 each, Rfl: 6    Review of Systems - 10 point Review of Systems is negative except for left foot ulcer which is noted in HPI.      OBJECTIVE:  /72   Pulse 80   Temp 98.2  F (36.8  C)   Resp 17   SpO2 96%   General appearance: Patient is alert and fully cooperative with history & exam.  No sign of distress is noted during the visit.    Vascular: Dorsalis pedis non-palpableBilateral.  Dermatologic:    Negative Pressure Wound Therapy Foot Anterior;Left (Active)   Wound Type Surgical 05/26/23 0829   Cycle Continuous 05/26/23 0829   Target Pressure (mmHg) 125 05/26/23 0400   Drainage Color/Characteristics Other (Comment) 05/25/23 0757   Cannister changed? No 05/26/23 0829   Output (ml) 0 ml 05/26/23 0829       VASC Wound left plantar lateral (Active)   Pre Size Length 3 06/21/23 0900   Pre Size Width 2.5 06/21/23 0900   Pre Size Depth 0.4 06/21/23 0900   Pre Total Sq cm 7.5 06/21/23 0900       VASC Wound Right foot (Active)       VASC Wound Right foot (Active)       VASC Wound Right foot Dorsal (Active)   Post Size Length 0 06/21/23 0900   Post Size Width 0 06/21/23 0900   Post Size Depth 0 06/21/23 0900   Post Total Sq cm 0 06/21/23 0900   Description scabbed 06/07/23 0900       VASC Wound Right foot plantar (Active)   Post Size Length 0 06/21/23 0900   Post Size Width 0 06/21/23 0900   Post Size Depth 0 06/21/23 0900   Post Total Sq cm 0 06/21/23 0900      "  Incision/Surgical Site 02/17/23 Right Foot (Active)       Incision/Surgical Site 05/23/23 Left Foot (Active)   Incision Assessment UTV 05/26/23 0400   Clara-Incision Assessment UTV 05/25/23 2015   Closure GOOD 05/25/23 0300   Incision Drainage Amount None 05/26/23 0400   Dressing Intervention Clean, dry, intact 05/26/23 0400   Majority of left fifth MPJ ulceration has granular tissue with small area of exposed fifth MPJ capsule.  No erythema bilateral feet.  Neurologic: Diminished to light touch Bilateral.  Musculoskeletal: Contracted digits noted Bilateral.    Imaging:     POC US Guidance Needle Placement    Result Date: 5/23/2023  Ultrasound was performed as guidance to an anesthesia procedure.  Click \"PACS images\" hyperlink below to view any stored images.  For specific procedure details, view procedure note authored by anesthesia.         Picture:         "

## 2023-06-22 ENCOUNTER — PATIENT OUTREACH (OUTPATIENT)
Dept: CARE COORDINATION | Facility: CLINIC | Age: 58
End: 2023-06-22
Payer: COMMERCIAL

## 2023-06-22 NOTE — PROGRESS NOTES
Contact   Chart Review     Situation: Patient chart reviewed by .    Background: following for discharge from TCU.    Assessment: patient remains in TCU    Plan/Recommendations: will call at discharge if notified, or do chart review in one month.    Fiona Ramirez,   [facility]  [phone]

## 2023-06-30 ENCOUNTER — LAB REQUISITION (OUTPATIENT)
Dept: LAB | Facility: CLINIC | Age: 58
End: 2023-06-30
Payer: COMMERCIAL

## 2023-06-30 ENCOUNTER — APPOINTMENT (OUTPATIENT)
Dept: GENERAL RADIOLOGY | Facility: CLINIC | Age: 58
End: 2023-06-30
Attending: EMERGENCY MEDICINE
Payer: COMMERCIAL

## 2023-06-30 ENCOUNTER — HOSPITAL ENCOUNTER (INPATIENT)
Facility: CLINIC | Age: 58
LOS: 4 days | Discharge: LEFT BEFORE DISCHARGE | End: 2023-07-05
Attending: EMERGENCY MEDICINE | Admitting: INTERNAL MEDICINE
Payer: COMMERCIAL

## 2023-06-30 DIAGNOSIS — R50.9 FEVER, UNSPECIFIED: ICD-10-CM

## 2023-06-30 DIAGNOSIS — A49.9 BACTERIAL INFECTION, UNSPECIFIED: ICD-10-CM

## 2023-06-30 DIAGNOSIS — L03.119 CELLULITIS OF FOOT: ICD-10-CM

## 2023-06-30 DIAGNOSIS — M86.9 OSTEOMYELITIS OF LEFT FOOT, UNSPECIFIED TYPE (H): ICD-10-CM

## 2023-06-30 DIAGNOSIS — L02.619 CELLULITIS AND ABSCESS OF FOOT EXCLUDING TOE: Primary | ICD-10-CM

## 2023-06-30 DIAGNOSIS — L03.119 CELLULITIS AND ABSCESS OF FOOT EXCLUDING TOE: Primary | ICD-10-CM

## 2023-06-30 DIAGNOSIS — L08.89 OTHER SPECIFIED LOCAL INFECTIONS OF THE SKIN AND SUBCUTANEOUS TISSUE: ICD-10-CM

## 2023-06-30 LAB
BASOPHILS # BLD AUTO: 0 10E3/UL (ref 0–0.2)
BASOPHILS # BLD AUTO: 0 10E3/UL (ref 0–0.2)
BASOPHILS NFR BLD AUTO: 0 %
BASOPHILS NFR BLD AUTO: 0 %
EOSINOPHIL # BLD AUTO: 0.3 10E3/UL (ref 0–0.7)
EOSINOPHIL # BLD AUTO: 0.4 10E3/UL (ref 0–0.7)
EOSINOPHIL NFR BLD AUTO: 4 %
EOSINOPHIL NFR BLD AUTO: 4 %
ERYTHROCYTE [DISTWIDTH] IN BLOOD BY AUTOMATED COUNT: 13.1 % (ref 10–15)
ERYTHROCYTE [DISTWIDTH] IN BLOOD BY AUTOMATED COUNT: 13.3 % (ref 10–15)
HCT VFR BLD AUTO: 33.2 % (ref 40–53)
HCT VFR BLD AUTO: 35.2 % (ref 40–53)
HGB BLD-MCNC: 10.5 G/DL (ref 13.3–17.7)
HGB BLD-MCNC: 11.4 G/DL (ref 13.3–17.7)
IMM GRANULOCYTES # BLD: 0 10E3/UL
IMM GRANULOCYTES # BLD: 0 10E3/UL
IMM GRANULOCYTES NFR BLD: 0 %
IMM GRANULOCYTES NFR BLD: 0 %
LYMPHOCYTES # BLD AUTO: 1.4 10E3/UL (ref 0.8–5.3)
LYMPHOCYTES # BLD AUTO: 1.6 10E3/UL (ref 0.8–5.3)
LYMPHOCYTES NFR BLD AUTO: 14 %
LYMPHOCYTES NFR BLD AUTO: 18 %
MCH RBC QN AUTO: 28.6 PG (ref 26.5–33)
MCH RBC QN AUTO: 29 PG (ref 26.5–33)
MCHC RBC AUTO-ENTMCNC: 31.6 G/DL (ref 31.5–36.5)
MCHC RBC AUTO-ENTMCNC: 32.4 G/DL (ref 31.5–36.5)
MCV RBC AUTO: 90 FL (ref 78–100)
MCV RBC AUTO: 91 FL (ref 78–100)
MONOCYTES # BLD AUTO: 0.6 10E3/UL (ref 0–1.3)
MONOCYTES # BLD AUTO: 0.8 10E3/UL (ref 0–1.3)
MONOCYTES NFR BLD AUTO: 7 %
MONOCYTES NFR BLD AUTO: 8 %
NEUTROPHILS # BLD AUTO: 5.3 10E3/UL (ref 1.6–8.3)
NEUTROPHILS # BLD AUTO: 8 10E3/UL (ref 1.6–8.3)
NEUTROPHILS NFR BLD AUTO: 70 %
NEUTROPHILS NFR BLD AUTO: 75 %
NRBC # BLD AUTO: 0 10E3/UL
NRBC # BLD AUTO: 0 10E3/UL
NRBC BLD AUTO-RTO: 0 /100
NRBC BLD AUTO-RTO: 0 /100
PLATELET # BLD AUTO: 296 10E3/UL (ref 150–450)
PLATELET # BLD AUTO: 365 10E3/UL (ref 150–450)
RBC # BLD AUTO: 3.67 10E6/UL (ref 4.4–5.9)
RBC # BLD AUTO: 3.93 10E6/UL (ref 4.4–5.9)
WBC # BLD AUTO: 10.8 10E3/UL (ref 4–11)
WBC # BLD AUTO: 7.6 10E3/UL (ref 4–11)

## 2023-06-30 PROCEDURE — P9604 ONE-WAY ALLOW PRORATED TRIP: HCPCS | Mod: ORL | Performed by: NURSE PRACTITIONER

## 2023-06-30 PROCEDURE — 99285 EMERGENCY DEPT VISIT HI MDM: CPT | Mod: 25

## 2023-06-30 PROCEDURE — 85025 COMPLETE CBC W/AUTO DIFF WBC: CPT | Mod: ORL | Performed by: NURSE PRACTITIONER

## 2023-06-30 PROCEDURE — 36415 COLL VENOUS BLD VENIPUNCTURE: CPT | Mod: ORL | Performed by: NURSE PRACTITIONER

## 2023-06-30 PROCEDURE — 73630 X-RAY EXAM OF FOOT: CPT | Mod: LT

## 2023-06-30 ASSESSMENT — ACTIVITIES OF DAILY LIVING (ADL): ADLS_ACUITY_SCORE: 35

## 2023-07-01 ENCOUNTER — APPOINTMENT (OUTPATIENT)
Dept: MRI IMAGING | Facility: CLINIC | Age: 58
End: 2023-07-01
Attending: INTERNAL MEDICINE
Payer: COMMERCIAL

## 2023-07-01 PROBLEM — M86.9 OSTEOMYELITIS OF LEFT FOOT, UNSPECIFIED TYPE (H): Status: ACTIVE | Noted: 2023-07-01

## 2023-07-01 PROBLEM — E11.628 DIABETIC FOOT INFECTION (H): Status: ACTIVE | Noted: 2023-07-01

## 2023-07-01 PROBLEM — L97.529: Status: ACTIVE | Noted: 2023-07-01

## 2023-07-01 PROBLEM — L03.119 CELLULITIS OF FOOT: Status: ACTIVE | Noted: 2023-07-01

## 2023-07-01 PROBLEM — L08.9 DIABETIC FOOT INFECTION (H): Status: ACTIVE | Noted: 2023-07-01

## 2023-07-01 PROBLEM — Z86.14 HISTORY OF MRSA INFECTION: Status: ACTIVE | Noted: 2023-07-01

## 2023-07-01 LAB
ANION GAP SERPL CALCULATED.3IONS-SCNC: 13 MMOL/L (ref 7–15)
BASOPHILS # BLD AUTO: 0 10E3/UL (ref 0–0.2)
BASOPHILS NFR BLD AUTO: 0 %
BUN SERPL-MCNC: 28.3 MG/DL (ref 8–23)
CALCIUM SERPL-MCNC: 8.8 MG/DL (ref 8.6–10)
CHLORIDE SERPL-SCNC: 102 MMOL/L (ref 98–107)
CREAT SERPL-MCNC: 1.24 MG/DL (ref 0.67–1.17)
CRP SERPL-MCNC: 57.66 MG/L
DEPRECATED HCO3 PLAS-SCNC: 22 MMOL/L (ref 22–29)
EOSINOPHIL # BLD AUTO: 0.2 10E3/UL (ref 0–0.7)
EOSINOPHIL NFR BLD AUTO: 3 %
ERYTHROCYTE [DISTWIDTH] IN BLOOD BY AUTOMATED COUNT: 12.8 % (ref 10–15)
ERYTHROCYTE [SEDIMENTATION RATE] IN BLOOD BY WESTERGREN METHOD: 61 MM/HR (ref 0–20)
GFR SERPL CREATININE-BSD FRML MDRD: 67 ML/MIN/1.73M2
GLUCOSE BLDC GLUCOMTR-MCNC: 132 MG/DL (ref 70–99)
GLUCOSE BLDC GLUCOMTR-MCNC: 152 MG/DL (ref 70–99)
GLUCOSE BLDC GLUCOMTR-MCNC: 170 MG/DL (ref 70–99)
GLUCOSE BLDC GLUCOMTR-MCNC: 205 MG/DL (ref 70–99)
GLUCOSE BLDC GLUCOMTR-MCNC: 228 MG/DL (ref 70–99)
GLUCOSE SERPL-MCNC: 292 MG/DL (ref 70–99)
HCT VFR BLD AUTO: 31.5 % (ref 40–53)
HGB BLD-MCNC: 10.6 G/DL (ref 13.3–17.7)
IMM GRANULOCYTES # BLD: 0 10E3/UL
IMM GRANULOCYTES NFR BLD: 0 %
LYMPHOCYTES # BLD AUTO: 1.2 10E3/UL (ref 0.8–5.3)
LYMPHOCYTES NFR BLD AUTO: 15 %
MCH RBC QN AUTO: 29.6 PG (ref 26.5–33)
MCHC RBC AUTO-ENTMCNC: 33.7 G/DL (ref 31.5–36.5)
MCV RBC AUTO: 88 FL (ref 78–100)
MONOCYTES # BLD AUTO: 0.5 10E3/UL (ref 0–1.3)
MONOCYTES NFR BLD AUTO: 6 %
NEUTROPHILS # BLD AUTO: 6.3 10E3/UL (ref 1.6–8.3)
NEUTROPHILS NFR BLD AUTO: 76 %
NRBC # BLD AUTO: 0 10E3/UL
NRBC BLD AUTO-RTO: 0 /100
PLATELET # BLD AUTO: 297 10E3/UL (ref 150–450)
POTASSIUM SERPL-SCNC: 4.5 MMOL/L (ref 3.4–5.3)
RBC # BLD AUTO: 3.58 10E6/UL (ref 4.4–5.9)
SODIUM SERPL-SCNC: 137 MMOL/L (ref 136–145)
WBC # BLD AUTO: 8.2 10E3/UL (ref 4–11)

## 2023-07-01 PROCEDURE — 250N000011 HC RX IP 250 OP 636

## 2023-07-01 PROCEDURE — 73720 MRI LWR EXTREMITY W/O&W/DYE: CPT | Mod: LT

## 2023-07-01 PROCEDURE — 255N000002 HC RX 255 OP 636: Performed by: INTERNAL MEDICINE

## 2023-07-01 PROCEDURE — 36415 COLL VENOUS BLD VENIPUNCTURE: CPT | Performed by: EMERGENCY MEDICINE

## 2023-07-01 PROCEDURE — 250N000012 HC RX MED GY IP 250 OP 636 PS 637: Performed by: INTERNAL MEDICINE

## 2023-07-01 PROCEDURE — 86140 C-REACTIVE PROTEIN: CPT | Performed by: EMERGENCY MEDICINE

## 2023-07-01 PROCEDURE — 99223 1ST HOSP IP/OBS HIGH 75: CPT | Performed by: PODIATRIST

## 2023-07-01 PROCEDURE — A9585 GADOBUTROL INJECTION: HCPCS | Performed by: INTERNAL MEDICINE

## 2023-07-01 PROCEDURE — 85025 COMPLETE CBC W/AUTO DIFF WBC: CPT | Performed by: EMERGENCY MEDICINE

## 2023-07-01 PROCEDURE — 99223 1ST HOSP IP/OBS HIGH 75: CPT | Mod: AI | Performed by: INTERNAL MEDICINE

## 2023-07-01 PROCEDURE — 80048 BASIC METABOLIC PNL TOTAL CA: CPT | Performed by: EMERGENCY MEDICINE

## 2023-07-01 PROCEDURE — 258N000003 HC RX IP 258 OP 636: Performed by: INTERNAL MEDICINE

## 2023-07-01 PROCEDURE — 250N000013 HC RX MED GY IP 250 OP 250 PS 637: Performed by: STUDENT IN AN ORGANIZED HEALTH CARE EDUCATION/TRAINING PROGRAM

## 2023-07-01 PROCEDURE — 250N000012 HC RX MED GY IP 250 OP 636 PS 637: Performed by: STUDENT IN AN ORGANIZED HEALTH CARE EDUCATION/TRAINING PROGRAM

## 2023-07-01 PROCEDURE — 250N000013 HC RX MED GY IP 250 OP 250 PS 637: Performed by: INTERNAL MEDICINE

## 2023-07-01 PROCEDURE — 87040 BLOOD CULTURE FOR BACTERIA: CPT | Performed by: EMERGENCY MEDICINE

## 2023-07-01 PROCEDURE — 250N000011 HC RX IP 250 OP 636: Mod: JZ | Performed by: INTERNAL MEDICINE

## 2023-07-01 PROCEDURE — 120N000001 HC R&B MED SURG/OB

## 2023-07-01 PROCEDURE — 258N000003 HC RX IP 258 OP 636

## 2023-07-01 PROCEDURE — 85652 RBC SED RATE AUTOMATED: CPT | Performed by: EMERGENCY MEDICINE

## 2023-07-01 PROCEDURE — 250N000013 HC RX MED GY IP 250 OP 250 PS 637: Performed by: EMERGENCY MEDICINE

## 2023-07-01 RX ORDER — NALOXONE HYDROCHLORIDE 0.4 MG/ML
0.2 INJECTION, SOLUTION INTRAMUSCULAR; INTRAVENOUS; SUBCUTANEOUS
Status: DISCONTINUED | OUTPATIENT
Start: 2023-07-01 | End: 2023-07-05 | Stop reason: HOSPADM

## 2023-07-01 RX ORDER — NICOTINE POLACRILEX 4 MG
15-30 LOZENGE BUCCAL
Status: DISCONTINUED | OUTPATIENT
Start: 2023-07-01 | End: 2023-07-05 | Stop reason: HOSPADM

## 2023-07-01 RX ORDER — GADOBUTROL 604.72 MG/ML
6 INJECTION INTRAVENOUS ONCE
Status: COMPLETED | OUTPATIENT
Start: 2023-07-01 | End: 2023-07-01

## 2023-07-01 RX ORDER — SODIUM CHLORIDE 9 MG/ML
INJECTION, SOLUTION INTRAVENOUS CONTINUOUS
Status: DISCONTINUED | OUTPATIENT
Start: 2023-07-01 | End: 2023-07-01

## 2023-07-01 RX ORDER — LIDOCAINE 40 MG/G
CREAM TOPICAL
Status: DISCONTINUED | OUTPATIENT
Start: 2023-07-01 | End: 2023-07-05 | Stop reason: HOSPADM

## 2023-07-01 RX ORDER — AMOXICILLIN 250 MG
1 CAPSULE ORAL 2 TIMES DAILY PRN
Status: DISCONTINUED | OUTPATIENT
Start: 2023-07-01 | End: 2023-07-05 | Stop reason: HOSPADM

## 2023-07-01 RX ORDER — DEXTROSE MONOHYDRATE 25 G/50ML
25-50 INJECTION, SOLUTION INTRAVENOUS
Status: DISCONTINUED | OUTPATIENT
Start: 2023-07-01 | End: 2023-07-05 | Stop reason: HOSPADM

## 2023-07-01 RX ORDER — ACETAMINOPHEN 650 MG/1
650 SUPPOSITORY RECTAL EVERY 6 HOURS PRN
Status: DISCONTINUED | OUTPATIENT
Start: 2023-07-01 | End: 2023-07-05 | Stop reason: HOSPADM

## 2023-07-01 RX ORDER — NALOXONE HYDROCHLORIDE 0.4 MG/ML
0.4 INJECTION, SOLUTION INTRAMUSCULAR; INTRAVENOUS; SUBCUTANEOUS
Status: DISCONTINUED | OUTPATIENT
Start: 2023-07-01 | End: 2023-07-05 | Stop reason: HOSPADM

## 2023-07-01 RX ORDER — ONDANSETRON 2 MG/ML
4 INJECTION INTRAMUSCULAR; INTRAVENOUS EVERY 6 HOURS PRN
Status: DISCONTINUED | OUTPATIENT
Start: 2023-07-01 | End: 2023-07-05 | Stop reason: HOSPADM

## 2023-07-01 RX ORDER — ACETAMINOPHEN 325 MG/1
650 TABLET ORAL EVERY 6 HOURS PRN
Status: DISCONTINUED | OUTPATIENT
Start: 2023-07-01 | End: 2023-07-05 | Stop reason: HOSPADM

## 2023-07-01 RX ORDER — GABAPENTIN 300 MG/1
300 CAPSULE ORAL 2 TIMES DAILY
Status: DISCONTINUED | OUTPATIENT
Start: 2023-07-01 | End: 2023-07-05 | Stop reason: HOSPADM

## 2023-07-01 RX ORDER — PROCHLORPERAZINE 25 MG
25 SUPPOSITORY, RECTAL RECTAL EVERY 12 HOURS PRN
Status: DISCONTINUED | OUTPATIENT
Start: 2023-07-01 | End: 2023-07-05 | Stop reason: HOSPADM

## 2023-07-01 RX ORDER — ONDANSETRON 4 MG/1
4 TABLET, ORALLY DISINTEGRATING ORAL EVERY 6 HOURS PRN
Status: DISCONTINUED | OUTPATIENT
Start: 2023-07-01 | End: 2023-07-05 | Stop reason: HOSPADM

## 2023-07-01 RX ORDER — BISACODYL 10 MG
10 SUPPOSITORY, RECTAL RECTAL DAILY PRN
Status: DISCONTINUED | OUTPATIENT
Start: 2023-07-01 | End: 2023-07-05 | Stop reason: HOSPADM

## 2023-07-01 RX ORDER — HYDRALAZINE HYDROCHLORIDE 20 MG/ML
10 INJECTION INTRAMUSCULAR; INTRAVENOUS EVERY 4 HOURS PRN
Status: DISCONTINUED | OUTPATIENT
Start: 2023-07-01 | End: 2023-07-05 | Stop reason: HOSPADM

## 2023-07-01 RX ORDER — HYDROMORPHONE HCL IN WATER/PF 6 MG/30 ML
0.2 PATIENT CONTROLLED ANALGESIA SYRINGE INTRAVENOUS
Status: DISCONTINUED | OUTPATIENT
Start: 2023-07-01 | End: 2023-07-05 | Stop reason: HOSPADM

## 2023-07-01 RX ORDER — OXYCODONE HYDROCHLORIDE 5 MG/1
5 TABLET ORAL EVERY 4 HOURS PRN
Status: DISCONTINUED | OUTPATIENT
Start: 2023-07-01 | End: 2023-07-05 | Stop reason: HOSPADM

## 2023-07-01 RX ORDER — PIPERACILLIN SODIUM, TAZOBACTAM SODIUM 3; .375 G/15ML; G/15ML
3.38 INJECTION, POWDER, LYOPHILIZED, FOR SOLUTION INTRAVENOUS EVERY 6 HOURS
Status: DISCONTINUED | OUTPATIENT
Start: 2023-07-01 | End: 2023-07-05 | Stop reason: HOSPADM

## 2023-07-01 RX ORDER — AMOXICILLIN 250 MG
2 CAPSULE ORAL 2 TIMES DAILY PRN
Status: DISCONTINUED | OUTPATIENT
Start: 2023-07-01 | End: 2023-07-05 | Stop reason: HOSPADM

## 2023-07-01 RX ORDER — PROCHLORPERAZINE MALEATE 10 MG
10 TABLET ORAL EVERY 6 HOURS PRN
Status: DISCONTINUED | OUTPATIENT
Start: 2023-07-01 | End: 2023-07-05 | Stop reason: HOSPADM

## 2023-07-01 RX ORDER — HYDROMORPHONE HCL IN WATER/PF 6 MG/30 ML
0.4 PATIENT CONTROLLED ANALGESIA SYRINGE INTRAVENOUS
Status: DISCONTINUED | OUTPATIENT
Start: 2023-07-01 | End: 2023-07-05 | Stop reason: HOSPADM

## 2023-07-01 RX ORDER — OXYCODONE HYDROCHLORIDE 5 MG/1
5 TABLET ORAL ONCE
Status: COMPLETED | OUTPATIENT
Start: 2023-07-01 | End: 2023-07-01

## 2023-07-01 RX ADMIN — PIPERACILLIN AND TAZOBACTAM 3.38 G: 3; .375 INJECTION, POWDER, FOR SOLUTION INTRAVENOUS at 02:53

## 2023-07-01 RX ADMIN — GADOBUTROL 6 ML: 604.72 INJECTION INTRAVENOUS at 14:54

## 2023-07-01 RX ADMIN — SODIUM CHLORIDE, PRESERVATIVE FREE: 5 INJECTION INTRAVENOUS at 04:56

## 2023-07-01 RX ADMIN — PIPERACILLIN AND TAZOBACTAM 3.38 G: 3; .375 INJECTION, POWDER, FOR SOLUTION INTRAVENOUS at 21:54

## 2023-07-01 RX ADMIN — GABAPENTIN 300 MG: 300 CAPSULE ORAL at 21:53

## 2023-07-01 RX ADMIN — INSULIN ASPART 2 UNITS: 100 INJECTION, SOLUTION INTRAVENOUS; SUBCUTANEOUS at 21:55

## 2023-07-01 RX ADMIN — OXYCODONE HYDROCHLORIDE 5 MG: 5 TABLET ORAL at 00:30

## 2023-07-01 RX ADMIN — INSULIN GLARGINE 10 UNITS: 100 INJECTION, SOLUTION SUBCUTANEOUS at 23:06

## 2023-07-01 RX ADMIN — PIPERACILLIN AND TAZOBACTAM 3.38 G: 3; .375 INJECTION, POWDER, FOR SOLUTION INTRAVENOUS at 15:26

## 2023-07-01 RX ADMIN — OXYCODONE HYDROCHLORIDE 5 MG: 5 TABLET ORAL at 21:54

## 2023-07-01 RX ADMIN — PIPERACILLIN AND TAZOBACTAM 3.38 G: 3; .375 INJECTION, POWDER, FOR SOLUTION INTRAVENOUS at 08:33

## 2023-07-01 RX ADMIN — INSULIN ASPART 1 UNITS: 100 INJECTION, SOLUTION INTRAVENOUS; SUBCUTANEOUS at 17:32

## 2023-07-01 RX ADMIN — OXYCODONE HYDROCHLORIDE 5 MG: 5 TABLET ORAL at 09:47

## 2023-07-01 RX ADMIN — VANCOMYCIN HYDROCHLORIDE 1500 MG: 10 INJECTION, POWDER, LYOPHILIZED, FOR SOLUTION INTRAVENOUS at 01:09

## 2023-07-01 RX ADMIN — INSULIN ASPART 1 UNITS: 100 INJECTION, SOLUTION INTRAVENOUS; SUBCUTANEOUS at 09:49

## 2023-07-01 RX ADMIN — INSULIN ASPART 2 UNITS: 100 INJECTION, SOLUTION INTRAVENOUS; SUBCUTANEOUS at 07:38

## 2023-07-01 ASSESSMENT — ACTIVITIES OF DAILY LIVING (ADL)
ADLS_ACUITY_SCORE: 20
ADLS_ACUITY_SCORE: 20
ADLS_ACUITY_SCORE: 35
VISION_MANAGEMENT: READING GLASSES
DOING_ERRANDS_INDEPENDENTLY_DIFFICULTY: NO
ADLS_ACUITY_SCORE: 20
TOILETING_ISSUES: NO
ADLS_ACUITY_SCORE: 20
ADLS_ACUITY_SCORE: 20
CHANGE_IN_FUNCTIONAL_STATUS_SINCE_ONSET_OF_CURRENT_ILLNESS/INJURY: NO
ADLS_ACUITY_SCORE: 35
DRESSING/BATHING_DIFFICULTY: NO
ADLS_ACUITY_SCORE: 35
FALL_HISTORY_WITHIN_LAST_SIX_MONTHS: NO
ADLS_ACUITY_SCORE: 20
ADLS_ACUITY_SCORE: 20
CONCENTRATING,_REMEMBERING_OR_MAKING_DECISIONS_DIFFICULTY: NO
WEAR_GLASSES_OR_BLIND: YES
DIFFICULTY_EATING/SWALLOWING: NO
ADLS_ACUITY_SCORE: 20
ADLS_ACUITY_SCORE: 35
WALKING_OR_CLIMBING_STAIRS_DIFFICULTY: NO

## 2023-07-01 NOTE — ED NOTES
Bed: ED29  Expected date:   Expected time:   Means of arrival:   Comments:  HEMS 427 57M cellulitis

## 2023-07-01 NOTE — ED NOTES
RECEIVING UNIT ED HANDOFF REVIEW    ED Nurse Handoff Report was reviewed by: Dorie Serra RN on July 1, 2023 at 3:42 AM

## 2023-07-01 NOTE — CONSULTS
[facility]  CONSULTATION NOTE      ASSESSMENT:  57-year-old male with past medical history significant for type 2 diabetes, peripheral neuropathy, status post toe amputations, hypertension and peripheral neuropathy who was admitted on 6/30/2023 with progressive signs of infection, left foot.  X-ray concerning for osteomyelitis of the left fifth metatarsal head.    Although I was not able to readily palpate pedal pulses and hair growth ceases at the mid shin, recent noninvasive vascular studies from 5/21/2023 suggest adequate blood flow to the left foot for healing.    MEDICAL DECISION MAKING:   I personally reviewed the left foot x-ray images.  Changes seen in the left fifth metatarsal head are consistent with osteomyelitis.  No gas.    Clinical exam is also supporting osteomyelitis, as the wound probes to a hard endpoint.  ESR 61  CRP 57.7    I discussed the situation with Abdulaziz.  I explained the likely need partial left fifth ray amputation to manage this infection.      Surgery will likely be Monday morning at the earliest.   I would like to first obtain an MRI to evaluate the extent of infection.      At this time I anticipate partial left fifth ray amputation with rotational skin skin flap creation from the fifth toe and rotation to hopefully achieve full or near primary closure.    Case request submitted    Infectious disease supporting vancomycin and Zosyn.  Bone cultures will be taken at the time of surgery.    We will follow-up with the MRI results and surgical plan tomorrow.    ______________________________________________________________________________________________________________________________________________    CHIEF COMPLAINT:      I was asked by Dr. Gera Epstein to evaluate this patient, Abdulaziz Rausch, for left foot osteomyelitis.     PATIENT HISTORY:  Abdulaziz Rausch is a 57 year old male  with a past medical history significant for type 2 diabetes,  peripheral neuropathy, status post toe amputations, diabetic foot wounds, hypertension and peripheral arterial disease was admitted on 6/30/2023 reporting progressive changes in his left foot.  This included malodor, swelling, redness, increased drainage and pain.      During a hospitalization at [facility] from 5/20/2023 until 5/26/2023, he underwent excisional debridement of this left foot wound 5/23.  Detailed history and reviewed in the H&P.  There are also the above symptoms started after the IV antibiotics were discontinued.  He denies nausea, vomiting, fever and chills.    X-rays taken in the emergency department show lucency of the left fifth metatarsal phalangeal joint.  This correlates to the location of the wound.    Review of Systems:  A 10 point review of systems was performed and is positive for that noted above in the patient history.  All other areas are negative.     PAST MEDICAL HISTORY:   Past Medical History:   Diagnosis Date     Anemia      Depression      Diabetes mellitus, type 2 (H)      Diabetic foot infection (H) 7/1/2023     Diabetic foot ulcer (H)      History of MRSA infection 7/1/2023 Jan 2023 RIGHT foot wound     Hypertension      Non-healing ulcer of left foot (H) 7/1/2023     Osteomyelitis (H)         PAST SURGICAL HISTORY:   Past Surgical History:   Procedure Laterality Date     AMPUTATE TOE(S) Right 2/17/2023    Procedure: AMPUTATION, digits two and three right foot;  Surgeon: Ulises Jimenez DPM;  Location: [facility] OR     INCISION AND DRAINAGE FOOT, COMBINED Left 5/23/2023    Procedure: Incision and drainage of abscess left foot with debridement and irrigation of wound left foot;  Surgeon: Jf Huerta DPM;  Location: [facility] OR     IRRIGATION AND DEBRIDEMENT TOE, COMBINED Right 1/20/2023    Procedure: IRRIGATION AND DEBRIDEMENT digits 2 and 3 right foot;  Surgeon: Jf Huerta DPM;  Location: [facility] OR     PICC SINGLE LUMEN PLACEMENT   5/25/2023             MEDICATIONS:  Reviewed in Epic. Current IV Zosyn and vancomycin.     ALLERGIES:  No Known Allergies     SOCIAL HISTORY:   Social History     Socioeconomic History     Marital status:      Spouse name: Not on file     Number of children: Not on file     Years of education: Not on file     Highest education level: Not on file   Occupational History     Not on file   Tobacco Use     Smoking status: Never     Smokeless tobacco: Never   Vaping Use     Vaping Use: Never used   Substance and Sexual Activity     Alcohol use: Never     Drug use: Never     Sexual activity: Not on file   Other Topics Concern     Not on file   Social History Narrative     Not on file     Social Determinants of Health     Financial Resource Strain: Not on file   Food Insecurity: Not on file   Transportation Needs: Not on file   Physical Activity: Not on file   Stress: Not on file   Social Connections: Not on file   Intimate Partner Violence: Not on file   Housing Stability: Not on file        FAMILY HISTORY:   Family History   Problem Relation Age of Onset     Diabetes Father         EXAM:  Vitals: /71 (BP Location: Right arm)   Pulse 85   Temp 97.7  F (36.5  C) (Oral)   Resp 18   Wt 63.5 kg (140 lb)   SpO2 97%   BMI 20.67 kg/m    BMI= Body mass index is 20.67 kg/m .    LABS:   Lab Results   Component Value Date    A1C 11.3 04/17/2023    A1C 11.2 02/15/2023    A1C 13.7 01/18/2023    A1C 7.4 08/26/2021       No results found for: INR    Lab Results   Component Value Date    WBC 8.2 07/01/2023     Lab Results   Component Value Date    HGB 10.6 07/01/2023     Lab Results   Component Value Date     07/01/2023       All cultures:  No results for input(s): CULTURE in the last 168 hours.    General appearance:    Abdulaziz is alert and fully cooperative with history & exam.  No sign of distress is noted during the visit.      Psychiatric: Affect is pleasant & appropriate.  Abdulaziz is motivated to improve  health.       Respiratory: Breathing is regular & unlabored while sitting.      HEENT: Hearing is intact to spoken word.  Speech is clear.  No gross evidence of visual impairment that would impact ambulation.       Vascular: Dorsalis pedis and posterior tibial pulses are not readily palpable on the left..  There is generalized left lower extremity edema involving the foot and ankle.  Pedal hair growth ends at the mid shin.    Neurologic: Lower extremity sensation is profoundly diminished, bilateral foot.    Musculoskeletal: Patient is ambulatory without an assistive device or brace.  No gross foot or ankle deformity noted other than prior amputation of the right second and third toes.  Pes cavus.    Dermatologic: There is a 2 to 3 cm in diameter wound over the lateral aspect of the left fifth metatarsal head.  This probes to a hard endpoint.  Surrounding erythema and edema.  Mild malodor.    IMAGING:   EXAM: XR FOOT LEFT G/E 3 VIEWS  LOCATION: [facility]  DATE: 6/30/2023     INDICATION: Pain, swelling.  COMPARISON: 5/21/2023                                                                  IMPRESSION: Negative for fracture or dislocation. Arterial calcifications. Wound VAC in place overlying the left fifth MTP joint. There is some relative lucency seen in the lateral aspect of the head of the fifth metatarsal which appears new from   previous. This would be concerning for potential underlying osteomyelitis. Further evaluation with MRI would be helpful.

## 2023-07-01 NOTE — ED TRIAGE NOTES
Pt BIBA from nursing facility. Had a recent surgery on his L foot which has a wound vac in place. Some redness noted around his foot with some swelling. Per the facility they talked with the MD on call and was told to send him to the hospital for eval. Pt c/o pain to that foot but not more than usual. VSS, A&O x4.

## 2023-07-01 NOTE — ED PROVIDER NOTES
History     Chief Complaint:  Foot Pain       HPI   Abdulaziz Rausch is a 57 year old male with history of type II diabetes, hypertension, and cellulitis of left foot who presents for evaluation of worsening left foot pain and swelling. Abdulaziz states that since his left foot surgery in May he's been dealing with healing his wounds and has a wound vac on . Recently, however, Abdulaziz reports that he's had increased swelling and pain in his left foot, prompting him to come in. During evaluation, Abdulaziz reports that his pain does radiate up into his left calf. He also notes some chills as well as numbness in his left foot, the latter of which he notes is his baseline due to neuropathy. Abdulaziz otherwise denies any fevers, cough, shortness of breath, chest pain, and current use of antibiotics. Of note, Abdulaziz states that his blood sugars have been running well lately.     Independent Historian:   None - Patient Only      Medications:    Lantus solostar   Jardiance  Metformin   Gabapentin   Humalog kwikpen     Past Medical History:    Type II diabetes mellitus   Osteomyelitis  Anemia  Diabetic ulcer of foot  Hypertension  Depression   Cellulitis of left foot     Past Surgical History:    Amputate toes, right  I&D foot, left - 5/23  I&D toe, right  PICC single lumen placement     Physical Exam     Patient Vitals for the past 24 hrs:   BP Temp Temp src Pulse Resp SpO2   06/30/23 2242 (!) 152/82 97.7  F (36.5  C) Oral 91 18 99 %        Physical Exam  Eyes:  The pupils are equal and round    Conjunctivae and sclerae are normal  ENT:    The nose is normal    Pinnae are normal  CV:  Regular rate and rhythm     No edema  Resp:  Lungs are clear    Non-labored    No rales    No wheezing   MS:  Normal muscular tone    No asymmetric leg swelling    Wound vac on left 5th metatarsal area  Skin:  Erythema of the dorsum of the left foot. No tenderness of the foot/leg  Neuro:   Awake, alert.      Speech is normal and  fluent.    Face is symmetric.     Moves all extremities    Emergency Department Course   Imaging:  Foot XR, G/E 3 views, left   Final Result   IMPRESSION: Negative for fracture or dislocation. Arterial calcifications. Wound VAC in place overlying the left fifth MTP joint. There is some relative lucency seen in the lateral aspect of the head of the fifth metatarsal which appears new from    previous. This would be concerning for potential underlying osteomyelitis. Further evaluation with MRI would be helpful.         Report per radiology    Laboratory:  Labs Ordered and Resulted from Time of ED Arrival to Time of ED Departure   CBC WITH PLATELETS AND DIFFERENTIAL - Abnormal       Result Value    WBC Count 8.2      RBC Count 3.58 (*)     Hemoglobin 10.6 (*)     Hematocrit 31.5 (*)     MCV 88      MCH 29.6      MCHC 33.7      RDW 12.8      Platelet Count 297      % Neutrophils 76      % Lymphocytes 15      % Monocytes 6      % Eosinophils 3      % Basophils 0      % Immature Granulocytes 0      NRBCs per 100 WBC 0      Absolute Neutrophils 6.3      Absolute Lymphocytes 1.2      Absolute Monocytes 0.5      Absolute Eosinophils 0.2      Absolute Basophils 0.0      Absolute Immature Granulocytes 0.0      Absolute NRBCs 0.0     BASIC METABOLIC PANEL   CRP INFLAMMATION   ERYTHROCYTE SEDIMENTATION RATE AUTO   BLOOD CULTURE   BLOOD CULTURE        Procedures   none    Emergency Department Course & Assessments:       Interventions:  Medications - No data to display     Assessments:  2333 I obtained history and examined the patient as noted above.     Consultations/Discussion of Management or Tests:  0055 I consulted with Dr. Pereyra of the hospitalist service and discussed patient admission. He accepted care of the patient.          Social Determinants of Health affecting care:   None    Disposition:  The patient was admitted to the hospital under the care of Dr. Pereyra.     Impression & Plan    CMS Diagnoses: None    Medical  Decision Making:  Abdulaziz Rausch is a 57 year old male who presents to the emergency department concerns about his left foot.  He was recently admitted to the hospital where he had I&D performed for an abscess in osteomyelitis of his left fifth metatarsal area.  He has a wound VAC in place.  He stopped his antibiotics about a week ago.  He is now developed redness on the dorsum of his foot and some associated pain.  No evidence of necrotizing fasciitis.  X-ray obtained and does show some lucency suspicious for osteomyelitis.  Additionally he appears to have cellulitis on exam.  He is a diabetic.  Given his complicated history we will plan for admission with antibiotics.  Discussed with the hospitalist agreed except the patient as an admission.    Diagnosis:    ICD-10-CM    1. Osteomyelitis of left foot, unspecified type (H)  M86.9       2. Cellulitis of foot  L03.119             Scribe Disclosure:  Michelle BRITO, am serving as a scribe at 11:58 PM on 6/30/2023 to document services personally performed by Miguel Hudson MD based on my observations and the provider's statements to me.     Scribe Disclosure:  Chandni BRITO, am serving as a scribe at 12:56 AM on 7/1/2023 to document services personally performed by Miguel Hudson MD based on my observations and the provider's statements to me.     Miguel Hudson MD  07/01/23 0058

## 2023-07-01 NOTE — PHARMACY-VANCOMYCIN DOSING SERVICE
"Pharmacy Vancomycin Initial Note  Date of Service 2023  Patient's  1965  57 year old, male    Indication: Skin and Soft Tissue Infection/MRSA    Current estimated CrCl = Estimated Creatinine Clearance: 57.6 mL/min (A) (based on SCr of 1.24 mg/dL (H)).    Creatinine for last 3 days  2023: 12:01 AM Creatinine 1.24 mg/dL    Recent Vancomycin Level(s) for last 3 days  No results found for requested labs within last 3 days.      Vancomycin IV Administrations (past 72 hours)                   vancomycin (VANCOCIN) 1,500 mg in 0.9% NaCl 250 mL intermittent infusion (mg) 1,500 mg New Bag 23 0109                Nephrotoxins and other renal medications (From now, onward)    Start     Dose/Rate Route Frequency Ordered Stop    23 0200  vancomycin (VANCOCIN) 1,250 mg in 0.9% NaCl 250 mL intermittent infusion         1,250 mg  over 90 Minutes Intravenous EVERY 24 HOURS 23 0902      23 0200  piperacillin-tazobactam (ZOSYN) 3.375 g vial to attach to  mL bag        Note to Pharmacy: For [facility], [facility] and [facility]: For Zosyn-naive patients, use the \"Zosyn initial dose + extended infusion\" order panel.    3.375 g  over 30 Minutes Intravenous EVERY 6 HOURS 23 0133            Contrast Orders - past 72 hours (72h ago, onward)    None          [employer] Prediction of Planned Initial Vancomycin Regimen  Loading dose: N/A  Regimen: 1250 mg IV every 24 hours.  Start time: 13:09 on 2023  Exposure target: AUC24 (range)400-600 mg/L.hr   AUC24,ss: 494 mg/L.hr  Probability of AUC24 > 400: 73 %  Ctrough,ss: 14.1 mg/L  Probability of Ctrough,ss > 20: 21 %  Probability of nephrotoxicity (Lodise MARINA ): 9 %    Plan:  1. Start vancomycin  1250 mg IV q24h.   2. Vancomycin monitoring method: AUC  3. Vancomycin therapeutic monitoring goal: 400-600 mg*h/L  4. Pharmacy will check vancomycin levels as appropriate in 1-3 Days.    5. Serum creatinine levels will be ordered daily for the first week of " therapy and at least twice weekly for subsequent weeks.      Ric Zhou RPH

## 2023-07-01 NOTE — PLAN OF CARE
Mental Status: A&O x4.  Activity/dangle: Asst of 1 GB/W.  Diet: Mod Carb diet.  Pain: Managed with Oxycodone  Fischer/Voiding: Voiding adequately in urinal.  Tele/Restraints/Iso: Contact iso maintained.  02/LDA: Room air. NS running at 50 mL/hr  D/C Date: Pending  Other Info: Baseline Numbness and Tingling BLE. Possible surgery Monday 7/3.

## 2023-07-01 NOTE — PHARMACY-ADMISSION MEDICATION HISTORY
Pharmacist Admission Medication History    Admission medication history is complete. The information provided in this note is only as accurate as the sources available at the time of the update.    Medication reconciliation/reorder completed by provider prior to medication history? No    Information Source(s): Facility (U/[facility]/) medication list/MAR via phone    Pertinent Information: mar from the [facility] [phone] has one page that is unreadable. Also no last dose info as MAR I got was for July. I called and reviewed the MAR over the phone with a staff member.     Changes made to PTA medication list:    Added: None    Deleted: percocet    Changed: apap    Medication Affordability:       Allergies reviewed with MAR and updates made in EHR: yes    Medication History Completed By: Ronnie Pineda RPH 7/1/2023 4:22 PM    Prior to Admission medications    Medication Sig Last Dose Taking? Auth Provider Long Term End Date   acetaminophen (TYLENOL) 325 MG tablet Take 650 mg by mouth every 6 hours as needed for pain Unknown Yes Reported, Patient     empagliflozin (JARDIANCE) 25 MG TABS tablet Take 1 tablet (25 mg) by mouth daily 6/30/2023 Yes Daryn Pittman MD     gabapentin (NEURONTIN) 300 MG capsule Take 300 mg by mouth 2 times daily Given at 0800 and 1600 6/30/2023 Yes Unknown, Entered By History No    insulin glargine (LANTUS SOLOSTAR) 100 UNIT/ML pen Inject 20 Units Subcutaneous At Bedtime 6/30/2023 Yes Florentino Mccoy MD Yes    insulin lispro (HUMALOG KWIKPEN) 100 UNIT/ML (1 unit dial) KWIKPEN Inject 6 Units Subcutaneous 3 times daily (before meals) 6/30/2023 Yes Daryn Pittman MD Yes    metFORMIN (GLUCOPHAGE) 500 MG tablet Take 2 tablets (1,000 mg) by mouth 2 times daily (with meals) 6/30/2023 Yes Daryn Pittman MD Yes    alcohol swab prep pads Use to swab area of injection/ella as directed.   Daryn Pittman MD     blood glucose (NO BRAND SPECIFIED) test strip Use to test blood sugar 4 times daily or  as directed. To accompany: Blood Glucose Monitor Brands: per insurance.   Daryn Pittman MD     blood glucose monitoring (NO BRAND SPECIFIED) meter device kit Use to test blood sugar 4 time daily or as directed. Preferred blood glucose meter OR supplies to accompany: Blood Glucose Monitor Brands: per insurance.   Daryn Pittman MD     Continuous Blood Gluc  (FREESTYLE NOAH 2 READER) CHUCK Use to read blood sugars as per 's instructions.   Daryn Pittman MD     Continuous Blood Gluc Sensor (FREESTYLE NOAH 2 SENSOR) MISC Change every 14 days.   Daryn Pittman MD     insulin pen needle (31G X 8 MM) 31G X 8 MM miscellaneous Use 4 pen needles daily or as directed.   Miguel Ryan MD     thin (NO BRAND SPECIFIED) lancets Use with lanceting device. To accompany: Blood Glucose Monitor Brands: per insurance.   Daryn Pittman MD

## 2023-07-01 NOTE — PROGRESS NOTES
Date/Time 7/1  1079-3770 AM    Summary:    Left foot cellulitis  Suspected left 5th MTP osteomyelitis  Non healing diabetic foot wounds    Mental Status: A/Ox4  Activity/dangle:SBA  Diet: NPO  Pain: denies  Fischer/Voiding: BR  Tele/Restraints/Iso: Contact precaution maintained.  Skin: biilateral LE leg wounds.  02/LDA: VSS on RA, PIV infusing .  D/C Date: tbd  Other Info: -MRI of the left foot  -ID consult  -Podiatry consult  -Wound RN consult

## 2023-07-01 NOTE — H&P
[facility]    History and Physical - Hospitalist Service       Date of Admission:  6/30/2023     Assessment & Plan      Abdulaziz Rausch is a 57 year old male with a history of DM2 with diabetic foot wounds, Htn, PAD who is admitted on 6/30/2023 with recurrent left foot cellulitis and now possible osteo.     Left foot cellulitis  Suspected left 5th MTP osteomyelitis  Non healing diabetic foot wounds  I&D of left 5th MTP 5/20/23 with subsequent 1 month course of cefazolin and doxy (?bactrim at some point) with recurrent symptoms after discontinuing abx and now concern for left 5th MTP osteo. Left foot cultures from 5/20/23 growing MSSA and group B strep.   -continue vanco  -will start zosyn given diabetic foot wounds and living in nursing home as it is possible there is a new bacteria other than gram positive  -MRI of the left foot  -ID consult  -Podiatry consult  -Wound RN consult  -continue wound vac - management by podiatry/wound RN    Diabetes type 2 with neuropathy, nephropathy and foot wounds  -continue lantus once verified  -resume prandial coverage once taking po depending on plan from podiatry  -sliding scale  -continue gabapentin    Hypertension: does not appear to be on any meds for this.   -prn hydralazine  -pain control    Chronic anemia: baseline hgb range of 10-11  -monitor    CKD stage 2: baseline creatinine around 1.2.   -monitor       Diet:   NPO until podiatry assessment, then mod carb if no plans for surgery  DVT Prophylaxis: Pneumatic Compression Devices  Fischer Catheter: Not present  Lines: PRESENT             Cardiac Monitoring: None  Code Status:   DNR/DNI, discussed with patient    Disposition: 3-4 days likely for IV abx and possible surgery    Clinically Significant Risk Factors Present on Admission                    # Hypertension: Noted on problem list     # DMII: A1C = 11.3 % (Ref range: 0.0 - 5.6 %) within past 6 months                  Gera Pereyra,  DO  Hospitalist Service  [facility]  Securely message with [employer] (more info)  Text page via [employer] Paging/Directory     ______________________________________________________________________    Chief Complaint   Left foot redness and pain    History is obtained from the patient and ED physician    History of Present Illness   Abdulaziz Rausch is a 57 year old male who was recently admitted [facility] from 5/20-26 with left 5th MTP infection who has been off IV abx for one week and returns with recurrent swelling, pain and erythema. During his last admission he underwent I&D of the left 5th MTP on 5/20, subsequently was discharged on IV ancef and oral doxy for 28 days. It appears he subsequently may have also been initiated on bactrim. Cultures from his wound on the left foot grew Group B strep and MSSA. He previously had cultures on the right foot (amputation of two digits) growing enterobacter, enterococcus faecalis, MRSA and Group B strep. In follow up with podiatry from his last hospitalization it appears his wound vac was functioning well, wound was healing and progressing as expected. Patient notes he felt well until just a couple days ago, shortly after abx were discontinued when he started noting redness of the left foot, occasional pains shooting up his leg and swelling. No new trauma. Wound vac remains in place. He has been living in a nursing home. No fevers or chills. No chest pain, shortness of breath, abd pain.     In the ED he has a plain film showing some lucency of the left 5th MTP concerning for osteo. Of note it was reported from his last hospitalization that he did not have osteo at that time. He was initiated on vanco with plans for admission.       Past Medical History    Past Medical History:   Diagnosis Date     Anemia      Depression      Diabetes mellitus, type 2 (H)      Diabetic foot ulcer (H)      Hypertension      Osteomyelitis (H)        Past Surgical  History   Past Surgical History:   Procedure Laterality Date     AMPUTATE TOE(S) Right 2/17/2023    Procedure: AMPUTATION, digits two and three right foot;  Surgeon: Ulises Jimenez DPM;  Location: [facility] OR     INCISION AND DRAINAGE FOOT, COMBINED Left 5/23/2023    Procedure: Incision and drainage of abscess left foot with debridement and irrigation of wound left foot;  Surgeon: Jf Huerta DPM;  Location: [facility] OR     IRRIGATION AND DEBRIDEMENT TOE, COMBINED Right 1/20/2023    Procedure: IRRIGATION AND DEBRIDEMENT digits 2 and 3 right foot;  Surgeon: Jf Huerta DPM;  Location: [facility] OR     PICC SINGLE LUMEN PLACEMENT  5/25/2023            Prior to Admission Medications   Prior to Admission Medications   Prescriptions Last Dose Informant Patient Reported? Taking?   Continuous Blood Gluc  (FREESTYLE NOAH 2 READER) CHUCK   No No   Sig: Use to read blood sugars as per 's instructions.   Continuous Blood Gluc Sensor (FREESTYLE NOAH 2 SENSOR) MISC   No No   Sig: Change every 14 days.   acetaminophen (TYLENOL) 325 MG tablet   Yes No   alcohol swab prep pads   No No   Sig: Use to swab area of injection/ella as directed.   blood glucose (NO BRAND SPECIFIED) test strip   No No   Sig: Use to test blood sugar 4 times daily or as directed. To accompany: Blood Glucose Monitor Brands: per insurance.   blood glucose monitoring (NO BRAND SPECIFIED) meter device kit   No No   Sig: Use to test blood sugar 4 time daily or as directed. Preferred blood glucose meter OR supplies to accompany: Blood Glucose Monitor Brands: per insurance.   empagliflozin (JARDIANCE) 25 MG TABS tablet   No No   Sig: Take 1 tablet (25 mg) by mouth daily   gabapentin (NEURONTIN) 300 MG capsule   Yes No   Sig: Take 300 mg by mouth 2 times daily   insulin glargine (LANTUS SOLOSTAR) 100 UNIT/ML pen   No No   Sig: Inject 20 Units Subcutaneous At Bedtime   insulin lispro (HUMALOG KWIKPEN) 100 UNIT/ML  (1 unit dial) KWIKPEN   Yes No   Sig: Inject 6 Units Subcutaneous 3 times daily (before meals)   insulin pen needle (31G X 8 MM) 31G X 8 MM miscellaneous   No No   Sig: Use 4 pen needles daily or as directed.   metFORMIN (GLUCOPHAGE) 500 MG tablet   No No   Sig: Take 2 tablets (1,000 mg) by mouth 2 times daily (with meals)   oxyCODONE-acetaminophen (PERCOCET) 5-325 MG tablet   No No   Sig: Take 1 tablet by mouth every 6 hours as needed for pain   sulfamethoxazole-trimethoprim (BACTRIM DS) 800-160 MG tablet   Yes No   thin (NO BRAND SPECIFIED) lancets   No No   Sig: Use with lanceting device. To accompany: Blood Glucose Monitor Brands: per insurance.      Facility-Administered Medications: None        Review of Systems    The 10 point Review of Systems is negative other than noted in the HPI or here.      Physical Exam   Vital Signs: Temp: 97.7  F (36.5  C) Temp src: Oral BP: (!) 140/78 Pulse: 91   Resp: 18 SpO2: 98 % O2 Device: None (Room air)    Weight: 140 lbs 0 oz    Gen: lying in bed, eating food. Appears comfortable  CV: RRR  Pulm: CTAB, no wheeze  Skin: left forefoot erythematous and warm to the touch. Wound vac in place.     Medical Decision Making             Data     I have personally reviewed the following data over the past 24 hrs:    8.2  \   10.6 (L)   / 297     137 102 28.3 (H) /  292 (H)   4.5 22 1.24 (H) \       Procal: N/A CRP: 57.66 (H) Lactic Acid: N/A         Imaging results reviewed over the past 24 hrs:   Recent Results (from the past 24 hour(s))   Foot XR, G/E 3 views, left    Narrative    EXAM: XR FOOT LEFT G/E 3 VIEWS  LOCATION: [facility]  DATE: 6/30/2023    INDICATION: Pain, swelling.  COMPARISON: 5/21/2023      Impression    IMPRESSION: Negative for fracture or dislocation. Arterial calcifications. Wound VAC in place overlying the left fifth MTP joint. There is some relative lucency seen in the lateral aspect of the head of the fifth metatarsal which appears new  from   previous. This would be concerning for potential underlying osteomyelitis. Further evaluation with MRI would be helpful.

## 2023-07-01 NOTE — CONSULTS
"Note INFECTIOUS DISEASES CONSULTATION NOTE  Covering for Xena Conroy & Stu     Date 2023     Name /  Abdulaziz CHANDRA Shalom   1965     MRN [id number]     Thank you for asking us to see Abdulaziz Rausch for infectious diseases evaluation  REQUESTING PROVIDER Dr Pereyra  REASON FOR CONSULT left 5th MTP oesto      CHIEF COMPLAINT    Non healing foot / toe wound    HPI    49 yo / DM / hx periph arterial DZ / chronic nonhealing wound foot    20 May left 5th MTP wound debrided   Culture MSSA + Group B Strep  4 weeks iv cefazolin + doxycycline/TMP-S       Admit w persistent / worsening wound short time after finishing antibiotics from May hospital stay  Plain XR foot \"lucency of the left 5th MTP concerning for osteo\"    ROS  No rigors  No other site pain / redness other than L foot  No GI /  troubles  No resp troubles    Rest of 15 pt ROS negative      OTHER HISTORY  PFSH  Past Medical History:   Diagnosis Date     Anemia      Depression      Diabetes mellitus, type 2 (H)      Diabetic foot infection (H) 2023     Diabetic foot ulcer (H)      Hypertension      Osteomyelitis (H)      Past Surgical History:   Procedure Laterality Date     AMPUTATE TOE(S) Right 2023    Procedure: AMPUTATION, digits two and three right foot;  Surgeon: Ulises Jimenez DPM;  [place] [facility]     INCISION AND DRAINAGE FOOT, COMBINED Left 2023    Procedure: Incision and drainage of abscess left foot with debridement and irrigation of wound left foot;  Surgeon: Jf Huerta DPM;  [place] [facility] [place]     IRRIGATION AND DEBRIDEMENT TOE, COMBINED Right 2023    Procedure: IRRIGATION AND DEBRIDEMENT digits 2 and 3 right foot;  Surgeon: Jf Huerta DPM;  [place] [facility] [place]     PICC SINGLE LUMEN PLACEMENT  2023           Problem (# of Occurrences) Relation (Name,Age of Onset)    Diabetes (1) Father        Family History   Problem Relation Age of Onset     Diabetes Father  "     Social History     Socioeconomic History     Marital status:    Tobacco Use     Smoking status: Never     Smokeless tobacco: Never   Vaping Use     Vaping Use: Never used   Substance and Sexual Activity     Alcohol use: Never     Drug use: Never     No Known Allergies     Immunization History   Administered Date(s) Administered     COVID-19 Bivalent 12+ (Pfizer) 04/17/2023     COVID-19 Monovalent 18+ (Moderna) 11/07/2021     COVID-19 Vaccine (Jacobo) 06/16/2021     Influenza Vaccine 50-64 or 18-64 w/egg allergy (Flublok) 01/22/2023     Influenza Vaccine >6 months (Alfuria,Fluzone) 10/08/2018     Pneumococcal 20 valent Conjugate (Prevnar 20) 04/17/2023     TDAP Vaccine (Adacel) 07/05/2018     EXAM  /71 (BP Location: Right arm)   Pulse 85   Temp 97.7  F (36.5  C) (Oral)   Resp 18   Wt 63.5 kg (140 lb)   SpO2 97%   BMI 20.67 kg/m      genl   In bed     neuro   Easily awakened from sleep  Normal conversation     psyche   calm, coherent, cooperative, appropriate     skin   No rash     foot   VAC on wound     abd   flat       DATA  Images  6.30 XRay Wound VAC in place overlying the left fifth MTP joint.   There is some relative lucency seen in the lateral aspect of the head of the fifth metatarsal which appears new from previous.   This would be concerning for potential underlying osteomyelitis.    Microbiology test results    RIGHT foot wound  1/2023  MRSA  Grp B Strep  2/2023 Grp B Strep Enterococcus  3/2023 Grp B Strep  Enterococcus  Enterobacter    LEFT foot wound  5/2023 MSSA Grp B Strep    LABS  Recent Labs   Lab Test 07/01/23  0001 06/30/23  1607 06/30/23  1209 05/29/23  0952 02/15/23  1401 01/21/23  0647   WBC 8.2 10.8   < > 8.2   < > 6.7   AST  --   --   --  27  --  24   ALT  --   --   --  37  --  36   BILITOTAL  --   --   --  0.2  --  0.3   ALKPHOS  --   --   --  265*  --  340*    < > = values in this interval not displayed.          ASSESSMENT   MED DECISION MAKING  Patient Active  Problem List   Diagnosis Code           Non-healing ulcer L  foot L97.529     Cellulitis of left foot L03.116     Cellulitis of foot L03.119     Osteomyelitis of left foot,(H) M86.9     Diabetic foot infection (H) E11.628, L08.9     History of MRSA infection Z86.14     Likely persistent infection / osteomyelitis despite 4 w iv cefazolin  ? Focus devitalized tissue   ? Other pathogen not identified last month  No indication sepsis / toxic / other focus infection    broad spectrum antimicrobial agents   tho' MSSA in May, hx MRSA in Jan - agree w vanco  Zosyn to cover GNR possibility  Surgical decisions per podiatry         Mark Bolden MD  Covering for Drs Xena & Lanre & Stu  Infectious Disease service  Current Meds Reviewed  Current Facility-Administered Medications   Medication     acetaminophen (TYLENOL) tablet 650 mg    Or     acetaminophen (TYLENOL) Suppository 650 mg     bisacodyl (DULCOLAX) suppository 10 mg     glucose gel 15-30 g    Or     dextrose 50 % injection 25-50 mL    Or     glucagon injection 1 mg     hydrALAZINE (APRESOLINE) injection 10 mg     HYDROmorphone (DILAUDID) injection 0.2 mg     HYDROmorphone (DILAUDID) injection 0.4 mg     insulin aspart (NovoLOG) injection (RAPID ACTING)     lidocaine (LMX4) cream     lidocaine 1 % 0.1-1 mL     melatonin tablet 1 mg     naloxone (NARCAN) injection 0.2 mg    Or     naloxone (NARCAN) injection 0.4 mg    Or     naloxone (NARCAN) injection 0.2 mg    Or     naloxone (NARCAN) injection 0.4 mg     ondansetron (ZOFRAN ODT) ODT tab 4 mg    Or     ondansetron (ZOFRAN) injection 4 mg     oxyCODONE (ROXICODONE) tablet 5 mg     oxyCODONE IR (ROXICODONE) half-tab 2.5 mg     piperacillin-tazobactam (ZOSYN) 3.375 g vial to attach to  mL bag     prochlorperazine (COMPAZINE) injection 10 mg    Or     prochlorperazine (COMPAZINE) tablet 10 mg    Or     prochlorperazine (COMPAZINE) suppository 25 mg     senna-docusate (SENOKOT-S/PERICOLACE) 8.6-50 MG per tablet 1  tablet    Or     senna-docusate (SENOKOT-S/PERICOLACE) 8.6-50 MG per tablet 2 tablet     sodium chloride (PF) 0.9% PF flush 3 mL     sodium chloride (PF) 0.9% PF flush 3 mL     sodium chloride 0.9% infusion

## 2023-07-01 NOTE — ED NOTES
[facility]  ED Nurse Handoff Report    ED Chief complaint: Foot Pain      ED Diagnosis:   Final diagnoses:   Osteomyelitis of left foot, unspecified type (H)   Cellulitis of foot       Code Status: DNR    Allergies: No Known Allergies    Patient Story: see ED triage note  Focused Assessment:  pt resting comfortably     Treatments and/or interventions provided: labs, imaging, meds   Patient's response to treatments and/or interventions: tolerated well     To be done/followed up on inpatient unit:  per MD orders     Does this patient have any cognitive concerns?:  none    Activity level - Baseline/Home:  Stand with Assist  Activity Level - Current:   Stand with Assist    Patient's Preferred language: English   Needed?: No    Isolation: Contact  Infection: MRSA  Patient tested for COVID 19 prior to admission: NO  Bariatric?: No    Vital Signs:   Vitals:    06/30/23 2242 07/01/23 0007 07/01/23 0051   BP: (!) 152/82 (!) 140/78    Pulse: 91     Resp: 18     Temp: 97.7  F (36.5  C)     TempSrc: Oral     SpO2: 99% 98%    Weight:   63.5 kg (140 lb)       Cardiac Rhythm:     Was the PSS-3 completed:   Yes  What interventions are required if any?               Family Comments: notified per pt   OBS brochure/video discussed/provided to patient/family: N/A              Name of person given brochure if not patient:               Relationship to patient:     For the majority of the shift this patient's behavior was Green.   Behavioral interventions performed were none.    ED NURSE PHONE NUMBER: *[phone]

## 2023-07-01 NOTE — PROGRESS NOTES
[facility]    Medicine Progress Note - Hospitalist Service    Date of Admission:  6/30/2023    Assessment & Plan   Abdulaziz Rausch is a 57 year old male admitted on 6/30/2023. He has a history of DM2 with diabetic foot wounds, Htn, PAD who is admitted on 6/30/2023 with recurrent left foot cellulitis and now possible osteo. Now s/p 4 weeks of IV cefazolin and doxy from 5/20 with progressing cellulitis, likely osteo.     #left foot cellulitis  #suspected 5th MTP osteomyelitis  #DM foot wounds  Patient with history of MRSA infections with recent I&D of left foot on 5/20/23 that grew MSSA and Group B strep with completed course of 4 weeks of IV cefazolin and doxycycline and now presents on 6/30 with worsening cellulitis. Started on vancomycin given MRSA history and progression on cefazolin and pip/tazo for GNR coverage of DM foot infection. Concern for osteomyelitis given progression of infection on IV antibiotics.  - MRI of foot  - Appreciate ID consult, will continue vancomycin and pip/tazo for now  - Appreciate ortho consult. Plan for surgery next week, Monday at the earliest.   - Wound RN consult  - Wound vac management per podiatry/wound RN.    #Diabetes type 2 with neuropathy, nephropathy and foot wounds  -resume lantus given diet is starting and no surgery this weekend.  -sliding scale  -continue gabapentin    #HTN  Not on any home medications  - Prn hydralazine    #Chronic anemia  Baseline range 10-11  - daily CBC    #CKD stage 2  Baseline creatinine 1.2  - trend creat       Diet: Moderate Consistent Carb (60 g CHO per Meal) Diet    DVT Prophylaxis: Enoxaparin (Lovenox) SQ  Fischer Catheter: Not present  Lines: None     Cardiac Monitoring: None  Code Status: No CPR- Do NOT Intubate      Clinically Significant Risk Factors Present on Admission                  # Hypertension: Noted on problem list     # DMII: A1C = 11.3 % (Ref range: 0.0 - 5.6 %) within past 6 months              Disposition Plan      Expected Discharge Date: 07/03/2023                  Phill Hawkins MD  Hospitalist Service  [facility]  Securely message with [employer] (more info)  Text page via [employer] Paging/Directory   ______________________________________________________________________    Interval History   NAEON  Patient doing well today, resting in bed.  Significant pain of the left foot. States it is no better or worse today  Denies f/c/n/v/diarrhea/abd pain/rash  Tolerating abx ok. Wants to eat .    Physical Exam   Vital Signs: Temp: 97.4  F (36.3  C) Temp src: Oral BP: 137/61 Pulse: 85   Resp: 18 SpO2: 99 % O2 Device: None (Room air)    Weight: 140 lbs 0 oz    Constitutional: awake, alert, cooperative, no apparent distress, and appears stated age  Respiratory: No increased work of breathing, good air exchange, clear to auscultation bilaterally, no crackles or wheezing  Cardiovascular: Normal apical impulse, regular rate and rhythm, normal S1 and S2, no S3 or S4, and no murmur noted  GI: No scars, normal bowel sounds, soft, non-distended, non-tender, no masses palpated, no hepatosplenomegally  Skin: LLE with redness from distal foot to distal ankle, tender to touch, wound is wrapped  Musculoskeletal: no lower extremity pitting edema present  Neurologic: moving all extremities    Medical Decision Making       45 MINUTES SPENT BY ME on the date of service doing chart review, history, exam, documentation & further activities per the note.      Data     I have personally reviewed the following data over the past 24 hrs:    8.2  \   10.6 (L)   / 297     137 102 28.3 (H) /  132 (H)   4.5 22 1.24 (H) \       Procal: N/A CRP: 57.66 (H) Lactic Acid: N/A         Imaging results reviewed over the past 24 hrs:   Recent Results (from the past 24 hour(s))   Foot XR, G/E 3 views, left    Narrative    EXAM: XR FOOT LEFT G/E 3 VIEWS  LOCATION: [facility]  DATE:  6/30/2023    INDICATION: Pain, swelling.  COMPARISON: 5/21/2023      Impression    IMPRESSION: Negative for fracture or dislocation. Arterial calcifications. Wound VAC in place overlying the left fifth MTP joint. There is some relative lucency seen in the lateral aspect of the head of the fifth metatarsal which appears new from   previous. This would be concerning for potential underlying osteomyelitis. Further evaluation with MRI would be helpful.   MR Foot Left w/o & w Contrast    Narrative    EXAM: MR FOOT LEFT WITHOUT AND WITH CONTRAST  LOCATION: [facility]  DATE: 7/1/2023    INDICATION: Left 5th MTP osteo? Non-healing diabetic wound.  COMPARISON: None.  TECHNIQUE: Routine. Additional postgadolinium T1 sequences were obtained.  IV CONTRAST: 6 mL Gadavist.    FINDINGS:     JOINTS AND BONES:   -Osteomyelitis involving the fifth metatarsal head. Osteomyelitis involving the proximal phalanx of the small toe. No additional areas worrisome for osteomyelitis. No evidence for fracture. No significant effusion to suggest septic arthropathy.    TENDONS:   -The flexor and extensor tendons are negative for tendinopathy, tenosynovitis, or tearing. The hallucis tendons are intact but there is flexor hallucis tendinopathy.     LIGAMENTS:   -The ligament of Lisfranc is intact. The collateral ligaments at the first through fourth MTP joints are intact. The medial collateral ligament at the fifth MTP joint is identified but the lateral collateral ligament appears disrupted. This is at the   base of a surface ulceration which extends down to the bone margin and joint margin.    MUSCLES AND SOFT TISSUES:   -Surface ulceration along the lateral aspect of the forefoot adjacent to the fifth metatarsal head and MTP joint. Surrounding edema or cellulitis. No evidence for abscess. There is some reactive edema versus infectious or inflammatory myositis within the   plantar and interosseous musculature.       Impression    IMPRESSION:  1.  Ulceration along the lateral aspect of the forefoot extends down to the bone margin of the fifth metatarsal head and lateral margin of the fifth MTP joint. Surrounding edema or cellulitis but no evidence for abscess.  2.  Osteomyelitis involving the fifth metatarsal head and proximal phalanx of the small toe.  3.  No significant  effusion to suggest septic arthropathy.  4.  No additional areas worrisome for osteomyelitis or septic arthropathy.  5.  No evidence for fracture.  6.  Flexor hallucis tendon tendinopathy without tearing.  7.  Exam otherwise negative.

## 2023-07-02 LAB
ANION GAP SERPL CALCULATED.3IONS-SCNC: 8 MMOL/L (ref 7–15)
BUN SERPL-MCNC: 22.1 MG/DL (ref 8–23)
CALCIUM SERPL-MCNC: 8.6 MG/DL (ref 8.6–10)
CHLORIDE SERPL-SCNC: 108 MMOL/L (ref 98–107)
CREAT SERPL-MCNC: 1.17 MG/DL (ref 0.67–1.17)
DEPRECATED HCO3 PLAS-SCNC: 24 MMOL/L (ref 22–29)
ERYTHROCYTE [DISTWIDTH] IN BLOOD BY AUTOMATED COUNT: 12.9 % (ref 10–15)
GFR SERPL CREATININE-BSD FRML MDRD: 72 ML/MIN/1.73M2
GLUCOSE BLDC GLUCOMTR-MCNC: 177 MG/DL (ref 70–99)
GLUCOSE BLDC GLUCOMTR-MCNC: 202 MG/DL (ref 70–99)
GLUCOSE BLDC GLUCOMTR-MCNC: 225 MG/DL (ref 70–99)
GLUCOSE BLDC GLUCOMTR-MCNC: 231 MG/DL (ref 70–99)
GLUCOSE BLDC GLUCOMTR-MCNC: 255 MG/DL (ref 70–99)
GLUCOSE SERPL-MCNC: 146 MG/DL (ref 70–99)
HCT VFR BLD AUTO: 29.1 % (ref 40–53)
HGB BLD-MCNC: 9.5 G/DL (ref 13.3–17.7)
MCH RBC QN AUTO: 28.8 PG (ref 26.5–33)
MCHC RBC AUTO-ENTMCNC: 32.6 G/DL (ref 31.5–36.5)
MCV RBC AUTO: 88 FL (ref 78–100)
PLATELET # BLD AUTO: 301 10E3/UL (ref 150–450)
POTASSIUM SERPL-SCNC: 4.5 MMOL/L (ref 3.4–5.3)
RBC # BLD AUTO: 3.3 10E6/UL (ref 4.4–5.9)
SODIUM SERPL-SCNC: 140 MMOL/L (ref 136–145)
WBC # BLD AUTO: 6.1 10E3/UL (ref 4–11)

## 2023-07-02 PROCEDURE — 80048 BASIC METABOLIC PNL TOTAL CA: CPT | Performed by: STUDENT IN AN ORGANIZED HEALTH CARE EDUCATION/TRAINING PROGRAM

## 2023-07-02 PROCEDURE — 99232 SBSQ HOSP IP/OBS MODERATE 35: CPT | Mod: 57 | Performed by: PODIATRIST

## 2023-07-02 PROCEDURE — 120N000001 HC R&B MED SURG/OB

## 2023-07-02 PROCEDURE — 258N000003 HC RX IP 258 OP 636: Performed by: INTERNAL MEDICINE

## 2023-07-02 PROCEDURE — 85027 COMPLETE CBC AUTOMATED: CPT | Performed by: STUDENT IN AN ORGANIZED HEALTH CARE EDUCATION/TRAINING PROGRAM

## 2023-07-02 PROCEDURE — 99232 SBSQ HOSP IP/OBS MODERATE 35: CPT | Performed by: STUDENT IN AN ORGANIZED HEALTH CARE EDUCATION/TRAINING PROGRAM

## 2023-07-02 PROCEDURE — 250N000013 HC RX MED GY IP 250 OP 250 PS 637: Performed by: INTERNAL MEDICINE

## 2023-07-02 PROCEDURE — 250N000011 HC RX IP 250 OP 636: Performed by: INTERNAL MEDICINE

## 2023-07-02 PROCEDURE — 250N000013 HC RX MED GY IP 250 OP 250 PS 637: Performed by: STUDENT IN AN ORGANIZED HEALTH CARE EDUCATION/TRAINING PROGRAM

## 2023-07-02 PROCEDURE — 36415 COLL VENOUS BLD VENIPUNCTURE: CPT | Performed by: STUDENT IN AN ORGANIZED HEALTH CARE EDUCATION/TRAINING PROGRAM

## 2023-07-02 RX ADMIN — GABAPENTIN 300 MG: 300 CAPSULE ORAL at 21:24

## 2023-07-02 RX ADMIN — OXYCODONE HYDROCHLORIDE 5 MG: 5 TABLET ORAL at 02:30

## 2023-07-02 RX ADMIN — PIPERACILLIN AND TAZOBACTAM 3.38 G: 3; .375 INJECTION, POWDER, FOR SOLUTION INTRAVENOUS at 13:08

## 2023-07-02 RX ADMIN — INSULIN ASPART 2 UNITS: 100 INJECTION, SOLUTION INTRAVENOUS; SUBCUTANEOUS at 06:30

## 2023-07-02 RX ADMIN — VANCOMYCIN HYDROCHLORIDE 1250 MG: 10 INJECTION, POWDER, LYOPHILIZED, FOR SOLUTION INTRAVENOUS at 02:31

## 2023-07-02 RX ADMIN — OXYCODONE HYDROCHLORIDE 5 MG: 5 TABLET ORAL at 13:08

## 2023-07-02 RX ADMIN — INSULIN ASPART 2 UNITS: 100 INJECTION, SOLUTION INTRAVENOUS; SUBCUTANEOUS at 02:47

## 2023-07-02 RX ADMIN — PIPERACILLIN AND TAZOBACTAM 3.38 G: 3; .375 INJECTION, POWDER, FOR SOLUTION INTRAVENOUS at 08:45

## 2023-07-02 RX ADMIN — OXYCODONE HYDROCHLORIDE 5 MG: 5 TABLET ORAL at 21:24

## 2023-07-02 RX ADMIN — PIPERACILLIN AND TAZOBACTAM 3.38 G: 3; .375 INJECTION, POWDER, FOR SOLUTION INTRAVENOUS at 02:28

## 2023-07-02 RX ADMIN — PIPERACILLIN AND TAZOBACTAM 3.38 G: 3; .375 INJECTION, POWDER, FOR SOLUTION INTRAVENOUS at 19:52

## 2023-07-02 RX ADMIN — GABAPENTIN 300 MG: 300 CAPSULE ORAL at 08:45

## 2023-07-02 ASSESSMENT — ACTIVITIES OF DAILY LIVING (ADL)
ADLS_ACUITY_SCORE: 20
ADLS_ACUITY_SCORE: 21
ADLS_ACUITY_SCORE: 20
ADLS_ACUITY_SCORE: 21
ADLS_ACUITY_SCORE: 20
ADLS_ACUITY_SCORE: 20

## 2023-07-02 NOTE — PROGRESS NOTES
"[facility]      ASSESSMENT:  57-year-old male with past medical history significant for type 2 diabetes, peripheral neuropathy, status post toe amputations, hypertension and peripheral neuropathy who was admitted on 6/30/2023 with progressive signs of infection, left foot.      Clinical exam, X-ray and MRI consistent with osteomyelitis of the distal left 5th ray.     Although I was not able to readily palpate pedal pulses and hair growth ceases at the mid shin, recent noninvasive vascular studies from 5/21/2023 suggest adequate blood flow to the left foot for healing.     MEDICAL DECISION MAKING:  I reviewed the MRI findings with Abdulaziz and discussed the option of bone biopsy versus proceeding with partial left fifth ray amputation. He elects for the latter.  We discussed the typical post operative course.  I told him I will try to close wound, area of ulcer with a skin flap created from his toe. No gaurantees however.    Left foot re-dressed.  activity as tolerated until surgery    To OR tomorrow for partial left 5th ray amputation and skin flap creation and closure.  OR not able to provide time of surgery until tomorrow AM.  Case is on the \"add on\" list.   If late in afternoon, might be rescheduled for Tuesday.  NPO post midnight    Alejandro Mccoy DPM, FACFAS, MS  M [facility] Department of Podiatry/Foot & Ankle Surgery  [phone]      _______________________________________________________________________      SUBJECTIVE:   No complaints. Reports he has crutches and walker at home.     EXAM:    B/P: 139/81, T: 98.2, P: 88, R: 18    Vascular: Dorsalis pedis and posterior tibial pulses are not readily palpable on the left..  There is generalized left lower extremity edema involving the foot and ankle.  Pedal hair growth ends at the mid shin.     Neurologic: Lower extremity sensation is profoundly diminished, bilateral foot.     Musculoskeletal: Patient is ambulatory without an " assistive device or brace.  No gross foot or ankle deformity noted other than prior amputation of the right second and third toes.  Pes cavus.     Dermatologic: There is a 2 to 3 cm in diameter wound over the lateral aspect of the left fifth metatarsal head.  This probes to a hard endpoint.  Surrounding erythema and edema.  Mild malodor.     IMAGING:     MRI LEFT FOOT 6/30/2023                                                                 IMPRESSION:  1.  Ulceration along the lateral aspect of the forefoot extends down to the bone margin of the fifth metatarsal head and lateral margin of the fifth MTP joint. Surrounding edema or cellulitis but no evidence for abscess.  2.  Osteomyelitis involving the fifth metatarsal head and proximal phalanx of the small toe.  3.  No significant  effusion to suggest septic arthropathy.  4.  No additional areas worrisome for osteomyelitis or septic arthropathy.  5.  No evidence for fracture.  6.  Flexor hallucis tendon tendinopathy without tearing.  7.  Exam otherwise negative.      EXAM: XR FOOT LEFT G/E 3 VIEWS  LOCATION: [facility]  DATE: 6/30/2023     INDICATION: Pain, swelling.  COMPARISON: 5/21/2023                                                                  IMPRESSION: Negative for fracture or dislocation. Arterial calcifications. Wound VAC in place overlying the left fifth MTP joint. There is some relative lucency seen in the lateral aspect of the head of the fifth metatarsal which appears new from   previous. This would be concerning for potential underlying osteomyelitis. Further evaluation with MRI would be helpful.       Lab Results   Component Value Date    WBC 6.1 07/02/2023     Lab Results   Component Value Date    RBC 3.30 07/02/2023     Lab Results   Component Value Date    HGB 9.5 07/02/2023     Lab Results   Component Value Date    HCT 29.1 07/02/2023     No components found for: MCT  Lab Results   Component Value Date    MCV 88  07/02/2023     Lab Results   Component Value Date    MCH 28.8 07/02/2023     Lab Results   Component Value Date    MCHC 32.6 07/02/2023     Lab Results   Component Value Date    RDW 12.9 07/02/2023     Lab Results   Component Value Date     07/02/2023       All cultures:  No results for input(s): CULT in the last 168 hours.    Hemoglobin   Date Value Ref Range Status   07/02/2023 9.5 (L) 13.3 - 17.7 g/dL Final

## 2023-07-02 NOTE — PROGRESS NOTES
[facility]    Medicine Progress Note - Hospitalist Service    Date of Admission:  6/30/2023    Assessment & Plan   Abdulaziz Rausch is a 57 year old male admitted on 6/30/2023. He has a history of DM2 with diabetic foot wounds, Htn, PAD who is admitted on 6/30/2023 with recurrent left foot cellulitis and now possible osteo. Now s/p 4 weeks of IV cefazolin and doxy from 5/20 with progressing cellulitis, likely osteo.     #left foot cellulitis  #5th MTP osteomyelitis, MRI confirmed  #DM foot wounds  Patient with history of MRSA infections with recent I&D of left foot on 5/20/23 that grew MSSA and Group B strep with completed course of 4 weeks of IV cefazolin and doxycycline and now presents on 6/30 with worsening cellulitis. Started on vancomycin given MRSA history and progression on cefazolin and pip/tazo for GNR coverage of DM foot infection. Concern for osteomyelitis given progression of infection on IV antibiotics. Now confirmed with MRI demonstrating osteo of left 5th toe.  - Appreciate ID consult, will continue vancomycin and pip/tazo for now  - Appreciate ortho consult. Awaiting recs today but will plan for surgery Monday.   - NPO at midnight  - Wound RN consult  - Wound vac management per podiatry/wound RN.    #Diabetes type 2 with neuropathy, nephropathy and foot wounds  -resume lantus given diet is starting and no surgery this weekend. Hold while NPO  -sliding scale  -continue gabapentin    #HTN  Not on any home medications  - Prn hydralazine    #Chronic anemia  Baseline range 10-11  - daily CBC    #CKD stage 2  Baseline creatinine 1.2  - trend creat       Diet: Regular Diet Adult    DVT Prophylaxis: Enoxaparin (Lovenox) SQ  Fischer Catheter: Not present  Lines: None     Cardiac Monitoring: None  Code Status: No CPR- Do NOT Intubate      Clinically Significant Risk Factors                  # Hypertension: Noted on problem list       # DMII: A1C = 11.3 % (Ref range: 0.0 - 5.6 %)  within past 6 months, PRESENT ON ADMISSION           Disposition Plan      Expected Discharge Date: 07/03/2023                  Phill Hawkins MD  Hospitalist Service  [facility]  Securely message with Vetr (more info)  Text page via Nginx Paging/Directory   ______________________________________________________________________    Interval History   NAEON  Patient doing well today, resting in bed. Concerned about surgery and another amputation  Pain is well controlled with oxycodone  Denies f/c/n/v/diarrhea/abd pain/rash  Tolerating abx ok.     Physical Exam   Vital Signs: Temp: 98.2  F (36.8  C) Temp src: Oral BP: 139/81 Pulse: 88   Resp: 18 SpO2: 98 % O2 Device: None (Room air)    Weight: 140 lbs 0 oz    Constitutional: awake, alert, cooperative, no apparent distress, and appears stated age  Respiratory: No increased work of breathing, good air exchange, clear to auscultation bilaterally, no crackles or wheezing  Cardiovascular: Normal apical impulse, regular rate and rhythm, normal S1 and S2, no S3 or S4, and no murmur noted  GI: No scars, normal bowel sounds, soft, non-distended, non-tender, no masses palpated, no hepatosplenomegally  Skin: Left foot is wrapped, no redness or tenderness of toes 1-3 or proximal foot/lower leg  Musculoskeletal: no lower extremity pitting edema present  Neurologic: moving all extremities    Medical Decision Making       45 MINUTES SPENT BY ME on the date of service doing chart review, history, exam, documentation & further activities per the note.      Data     I have personally reviewed the following data over the past 24 hrs:    6.1  \   9.5 (L)   / 301     140 108 (H) 22.1 /  146 (H)   4.5 24 1.17 \       Imaging results reviewed over the past 24 hrs:   Recent Results (from the past 24 hour(s))   MR Foot Left w/o & w Contrast    Narrative    EXAM: MR FOOT LEFT WITHOUT AND WITH CONTRAST  LOCATION: [facility]  DATE:  7/1/2023    INDICATION: Left 5th MTP osteo? Non-healing diabetic wound.  COMPARISON: None.  TECHNIQUE: Routine. Additional postgadolinium T1 sequences were obtained.  IV CONTRAST: 6 mL Gadavist.    FINDINGS:     JOINTS AND BONES:   -Osteomyelitis involving the fifth metatarsal head. Osteomyelitis involving the proximal phalanx of the small toe. No additional areas worrisome for osteomyelitis. No evidence for fracture. No significant effusion to suggest septic arthropathy.    TENDONS:   -The flexor and extensor tendons are negative for tendinopathy, tenosynovitis, or tearing. The hallucis tendons are intact but there is flexor hallucis tendinopathy.     LIGAMENTS:   -The ligament of Lisfranc is intact. The collateral ligaments at the first through fourth MTP joints are intact. The medial collateral ligament at the fifth MTP joint is identified but the lateral collateral ligament appears disrupted. This is at the   base of a surface ulceration which extends down to the bone margin and joint margin.    MUSCLES AND SOFT TISSUES:   -Surface ulceration along the lateral aspect of the forefoot adjacent to the fifth metatarsal head and MTP joint. Surrounding edema or cellulitis. No evidence for abscess. There is some reactive edema versus infectious or inflammatory myositis within the   plantar and interosseous musculature.      Impression    IMPRESSION:  1.  Ulceration along the lateral aspect of the forefoot extends down to the bone margin of the fifth metatarsal head and lateral margin of the fifth MTP joint. Surrounding edema or cellulitis but no evidence for abscess.  2.  Osteomyelitis involving the fifth metatarsal head and proximal phalanx of the small toe.  3.  No significant  effusion to suggest septic arthropathy.  4.  No additional areas worrisome for osteomyelitis or septic arthropathy.  5.  No evidence for fracture.  6.  Flexor hallucis tendon tendinopathy without tearing.  7.  Exam otherwise negative.

## 2023-07-02 NOTE — PLAN OF CARE
Mental Status: A&O x4.  Activity/dangle: Asst of 1 GB/W.  Diet: Mod Carb diet.  Pain: Managed with Oxycodone  Fischer/Voiding: Voiding adequately in urinal.  Tele/Restraints/Iso: Contact iso maintained.  02/LDA: Room air. IV saline locked.  D/C Date: Pending  Other Info: Baseline Numbness and Tingling BLE. Possible surgery Monday 7/3. BG Check.

## 2023-07-02 NOTE — PROGRESS NOTES
Date/Time 7/1-7/2   1158-0829 AM  Summary:    Left foot cellulitis  Suspected left 5th MTP osteomyelitis  Non healing diabetic foot wounds    Mental Status: A/Ox4  Activity/dangle:SBA  Diet: regular  Pain: denies  Fischer/Voiding: BR  Tele/Restraints/Iso: Contact precaution maintained.  Skin: wound on LLE.dressing cdi  02/LDA: VSS on RA, PIV SL  D/C Date: TBD  Numbness in LE(baseline).CMS intact

## 2023-07-03 ENCOUNTER — ANESTHESIA (OUTPATIENT)
Dept: SURGERY | Facility: CLINIC | Age: 58
End: 2023-07-03
Payer: COMMERCIAL

## 2023-07-03 ENCOUNTER — APPOINTMENT (OUTPATIENT)
Dept: GENERAL RADIOLOGY | Facility: CLINIC | Age: 58
End: 2023-07-03
Attending: PODIATRIST
Payer: COMMERCIAL

## 2023-07-03 ENCOUNTER — ANESTHESIA EVENT (OUTPATIENT)
Dept: SURGERY | Facility: CLINIC | Age: 58
End: 2023-07-03
Payer: COMMERCIAL

## 2023-07-03 LAB
ALBUMIN SERPL BCG-MCNC: 3 G/DL (ref 3.5–5.2)
ALP SERPL-CCNC: 295 U/L (ref 40–129)
ALT SERPL W P-5'-P-CCNC: 60 U/L (ref 0–70)
ANION GAP SERPL CALCULATED.3IONS-SCNC: 8 MMOL/L (ref 7–15)
AST SERPL W P-5'-P-CCNC: 65 U/L (ref 0–45)
BILIRUB DIRECT SERPL-MCNC: <0.2 MG/DL (ref 0–0.3)
BILIRUB SERPL-MCNC: <0.2 MG/DL
BUN SERPL-MCNC: 25.2 MG/DL (ref 8–23)
CALCIUM SERPL-MCNC: 8.6 MG/DL (ref 8.6–10)
CHLORIDE SERPL-SCNC: 106 MMOL/L (ref 98–107)
CREAT SERPL-MCNC: 1.04 MG/DL (ref 0.67–1.17)
DEPRECATED HCO3 PLAS-SCNC: 23 MMOL/L (ref 22–29)
ERYTHROCYTE [DISTWIDTH] IN BLOOD BY AUTOMATED COUNT: 12.5 % (ref 10–15)
ERYTHROCYTE [DISTWIDTH] IN BLOOD BY AUTOMATED COUNT: 12.8 % (ref 10–15)
GFR SERPL CREATININE-BSD FRML MDRD: 83 ML/MIN/1.73M2
GLUCOSE BLDC GLUCOMTR-MCNC: 123 MG/DL (ref 70–99)
GLUCOSE BLDC GLUCOMTR-MCNC: 167 MG/DL (ref 70–99)
GLUCOSE BLDC GLUCOMTR-MCNC: 179 MG/DL (ref 70–99)
GLUCOSE BLDC GLUCOMTR-MCNC: 181 MG/DL (ref 70–99)
GLUCOSE BLDC GLUCOMTR-MCNC: 187 MG/DL (ref 70–99)
GLUCOSE BLDC GLUCOMTR-MCNC: 257 MG/DL (ref 70–99)
GLUCOSE SERPL-MCNC: 185 MG/DL (ref 70–99)
HCT VFR BLD AUTO: 29.7 % (ref 40–53)
HCT VFR BLD AUTO: 29.8 % (ref 40–53)
HGB BLD-MCNC: 10 G/DL (ref 13.3–17.7)
HGB BLD-MCNC: 9.9 G/DL (ref 13.3–17.7)
IRON BINDING CAPACITY (ROCHE): 193 UG/DL (ref 240–430)
IRON SATN MFR SERPL: 32 % (ref 15–46)
IRON SERPL-MCNC: 62 UG/DL (ref 61–157)
MCH RBC QN AUTO: 29.2 PG (ref 26.5–33)
MCH RBC QN AUTO: 29.4 PG (ref 26.5–33)
MCHC RBC AUTO-ENTMCNC: 33.3 G/DL (ref 31.5–36.5)
MCHC RBC AUTO-ENTMCNC: 33.6 G/DL (ref 31.5–36.5)
MCV RBC AUTO: 88 FL (ref 78–100)
MCV RBC AUTO: 88 FL (ref 78–100)
PLATELET # BLD AUTO: 319 10E3/UL (ref 150–450)
PLATELET # BLD AUTO: 341 10E3/UL (ref 150–450)
POTASSIUM SERPL-SCNC: 4.6 MMOL/L (ref 3.4–5.3)
PROT SERPL-MCNC: 5.9 G/DL (ref 6.4–8.3)
RBC # BLD AUTO: 3.39 10E6/UL (ref 4.4–5.9)
RBC # BLD AUTO: 3.4 10E6/UL (ref 4.4–5.9)
SODIUM SERPL-SCNC: 137 MMOL/L (ref 136–145)
VANCOMYCIN SERPL-MCNC: 10.9 UG/ML
WBC # BLD AUTO: 6.6 10E3/UL (ref 4–11)
WBC # BLD AUTO: 7.4 10E3/UL (ref 4–11)

## 2023-07-03 PROCEDURE — 999N000141 HC STATISTIC PRE-PROCEDURE NURSING ASSESSMENT: Performed by: PODIATRIST

## 2023-07-03 PROCEDURE — 87077 CULTURE AEROBIC IDENTIFY: CPT | Performed by: PODIATRIST

## 2023-07-03 PROCEDURE — 250N000009 HC RX 250: Performed by: PODIATRIST

## 2023-07-03 PROCEDURE — 250N000011 HC RX IP 250 OP 636: Performed by: NURSE ANESTHETIST, CERTIFIED REGISTERED

## 2023-07-03 PROCEDURE — 85027 COMPLETE CBC AUTOMATED: CPT | Performed by: INTERNAL MEDICINE

## 2023-07-03 PROCEDURE — 85018 HEMOGLOBIN: CPT | Performed by: STUDENT IN AN ORGANIZED HEALTH CARE EDUCATION/TRAINING PROGRAM

## 2023-07-03 PROCEDURE — 250N000013 HC RX MED GY IP 250 OP 250 PS 637: Performed by: PODIATRIST

## 2023-07-03 PROCEDURE — 272N000001 HC OR GENERAL SUPPLY STERILE: Performed by: PODIATRIST

## 2023-07-03 PROCEDURE — 88311 DECALCIFY TISSUE: CPT | Mod: TC | Performed by: PODIATRIST

## 2023-07-03 PROCEDURE — 80202 ASSAY OF VANCOMYCIN: CPT | Performed by: PODIATRIST

## 2023-07-03 PROCEDURE — 88311 DECALCIFY TISSUE: CPT | Mod: 26 | Performed by: PATHOLOGY

## 2023-07-03 PROCEDURE — 82248 BILIRUBIN DIRECT: CPT | Performed by: STUDENT IN AN ORGANIZED HEALTH CARE EDUCATION/TRAINING PROGRAM

## 2023-07-03 PROCEDURE — 271N000001 HC OR GENERAL SUPPLY NON-STERILE: Performed by: PODIATRIST

## 2023-07-03 PROCEDURE — 80053 COMPREHEN METABOLIC PANEL: CPT | Performed by: STUDENT IN AN ORGANIZED HEALTH CARE EDUCATION/TRAINING PROGRAM

## 2023-07-03 PROCEDURE — 120N000001 HC R&B MED SURG/OB

## 2023-07-03 PROCEDURE — 87075 CULTR BACTERIA EXCEPT BLOOD: CPT | Performed by: PODIATRIST

## 2023-07-03 PROCEDURE — 36415 COLL VENOUS BLD VENIPUNCTURE: CPT | Performed by: PODIATRIST

## 2023-07-03 PROCEDURE — 250N000011 HC RX IP 250 OP 636: Mod: JZ | Performed by: INTERNAL MEDICINE

## 2023-07-03 PROCEDURE — 83550 IRON BINDING TEST: CPT | Performed by: INTERNAL MEDICINE

## 2023-07-03 PROCEDURE — 99232 SBSQ HOSP IP/OBS MODERATE 35: CPT | Performed by: INTERNAL MEDICINE

## 2023-07-03 PROCEDURE — 28122 PARTIAL REMOVAL OF FOOT BONE: CPT | Mod: LT | Performed by: PODIATRIST

## 2023-07-03 PROCEDURE — 250N000009 HC RX 250: Performed by: NURSE ANESTHETIST, CERTIFIED REGISTERED

## 2023-07-03 PROCEDURE — 250N000013 HC RX MED GY IP 250 OP 250 PS 637: Performed by: STUDENT IN AN ORGANIZED HEALTH CARE EDUCATION/TRAINING PROGRAM

## 2023-07-03 PROCEDURE — 88305 TISSUE EXAM BY PATHOLOGIST: CPT | Mod: 26 | Performed by: PATHOLOGY

## 2023-07-03 PROCEDURE — 360N000076 HC SURGERY LEVEL 3, PER MIN: Performed by: PODIATRIST

## 2023-07-03 PROCEDURE — 370N000017 HC ANESTHESIA TECHNICAL FEE, PER MIN: Performed by: PODIATRIST

## 2023-07-03 PROCEDURE — 250N000011 HC RX IP 250 OP 636: Performed by: PODIATRIST

## 2023-07-03 PROCEDURE — 250N000011 HC RX IP 250 OP 636: Mod: JZ | Performed by: PODIATRIST

## 2023-07-03 PROCEDURE — 999N000197 HC STATISTIC WOC PT EDUCATION, 0-15 MIN

## 2023-07-03 PROCEDURE — 258N000003 HC RX IP 258 OP 636: Performed by: INTERNAL MEDICINE

## 2023-07-03 PROCEDURE — 2894A VOIDCORRECT: CPT | Performed by: INTERNAL MEDICINE

## 2023-07-03 PROCEDURE — 710N000009 HC RECOVERY PHASE 1, LEVEL 1, PER MIN: Performed by: PODIATRIST

## 2023-07-03 PROCEDURE — 0Y6N0ZF DETACHMENT AT LEFT FOOT, PARTIAL 5TH RAY, OPEN APPROACH: ICD-10-PCS | Performed by: PODIATRIST

## 2023-07-03 PROCEDURE — 258N000003 HC RX IP 258 OP 636: Performed by: ANESTHESIOLOGY

## 2023-07-03 PROCEDURE — 999N000063 XR FOOT PORT LEFT 2 VIEWS: Mod: LT

## 2023-07-03 PROCEDURE — 99231 SBSQ HOSP IP/OBS SF/LOW 25: CPT | Performed by: INTERNAL MEDICINE

## 2023-07-03 PROCEDURE — 36415 COLL VENOUS BLD VENIPUNCTURE: CPT | Performed by: STUDENT IN AN ORGANIZED HEALTH CARE EDUCATION/TRAINING PROGRAM

## 2023-07-03 RX ORDER — ONDANSETRON 2 MG/ML
4 INJECTION INTRAMUSCULAR; INTRAVENOUS EVERY 30 MIN PRN
Status: DISCONTINUED | OUTPATIENT
Start: 2023-07-03 | End: 2023-07-03 | Stop reason: HOSPADM

## 2023-07-03 RX ORDER — MAGNESIUM HYDROXIDE 1200 MG/15ML
LIQUID ORAL PRN
Status: DISCONTINUED | OUTPATIENT
Start: 2023-07-03 | End: 2023-07-03 | Stop reason: HOSPADM

## 2023-07-03 RX ORDER — PROPOFOL 10 MG/ML
INJECTION, EMULSION INTRAVENOUS CONTINUOUS PRN
Status: DISCONTINUED | OUTPATIENT
Start: 2023-07-03 | End: 2023-07-03

## 2023-07-03 RX ORDER — LABETALOL HYDROCHLORIDE 5 MG/ML
10 INJECTION, SOLUTION INTRAVENOUS
Status: DISCONTINUED | OUTPATIENT
Start: 2023-07-03 | End: 2023-07-03 | Stop reason: HOSPADM

## 2023-07-03 RX ORDER — LIDOCAINE HYDROCHLORIDE 20 MG/ML
INJECTION, SOLUTION INFILTRATION; PERINEURAL PRN
Status: DISCONTINUED | OUTPATIENT
Start: 2023-07-03 | End: 2023-07-03

## 2023-07-03 RX ORDER — HYDROMORPHONE HCL IN WATER/PF 6 MG/30 ML
0.4 PATIENT CONTROLLED ANALGESIA SYRINGE INTRAVENOUS EVERY 5 MIN PRN
Status: DISCONTINUED | OUTPATIENT
Start: 2023-07-03 | End: 2023-07-03 | Stop reason: HOSPADM

## 2023-07-03 RX ORDER — CEFAZOLIN SODIUM/WATER 2 G/20 ML
2 SYRINGE (ML) INTRAVENOUS
Status: COMPLETED | OUTPATIENT
Start: 2023-07-03 | End: 2023-07-03

## 2023-07-03 RX ORDER — SODIUM CHLORIDE, SODIUM LACTATE, POTASSIUM CHLORIDE, CALCIUM CHLORIDE 600; 310; 30; 20 MG/100ML; MG/100ML; MG/100ML; MG/100ML
INJECTION, SOLUTION INTRAVENOUS CONTINUOUS
Status: DISCONTINUED | OUTPATIENT
Start: 2023-07-03 | End: 2023-07-03 | Stop reason: HOSPADM

## 2023-07-03 RX ORDER — ONDANSETRON 2 MG/ML
INJECTION INTRAMUSCULAR; INTRAVENOUS PRN
Status: DISCONTINUED | OUTPATIENT
Start: 2023-07-03 | End: 2023-07-03

## 2023-07-03 RX ORDER — HYDROMORPHONE HCL IN WATER/PF 6 MG/30 ML
0.2 PATIENT CONTROLLED ANALGESIA SYRINGE INTRAVENOUS EVERY 5 MIN PRN
Status: DISCONTINUED | OUTPATIENT
Start: 2023-07-03 | End: 2023-07-03 | Stop reason: HOSPADM

## 2023-07-03 RX ORDER — FENTANYL CITRATE 50 UG/ML
25 INJECTION, SOLUTION INTRAMUSCULAR; INTRAVENOUS EVERY 5 MIN PRN
Status: DISCONTINUED | OUTPATIENT
Start: 2023-07-03 | End: 2023-07-03 | Stop reason: HOSPADM

## 2023-07-03 RX ORDER — BUPIVACAINE HYDROCHLORIDE 5 MG/ML
INJECTION, SOLUTION EPIDURAL; INTRACAUDAL PRN
Status: DISCONTINUED | OUTPATIENT
Start: 2023-07-03 | End: 2023-07-03 | Stop reason: HOSPADM

## 2023-07-03 RX ORDER — FENTANYL CITRATE 50 UG/ML
50 INJECTION, SOLUTION INTRAMUSCULAR; INTRAVENOUS EVERY 5 MIN PRN
Status: DISCONTINUED | OUTPATIENT
Start: 2023-07-03 | End: 2023-07-03 | Stop reason: HOSPADM

## 2023-07-03 RX ORDER — CEFAZOLIN SODIUM/WATER 2 G/20 ML
2 SYRINGE (ML) INTRAVENOUS SEE ADMIN INSTRUCTIONS
Status: DISCONTINUED | OUTPATIENT
Start: 2023-07-03 | End: 2023-07-03 | Stop reason: HOSPADM

## 2023-07-03 RX ORDER — ONDANSETRON 4 MG/1
4 TABLET, ORALLY DISINTEGRATING ORAL EVERY 30 MIN PRN
Status: DISCONTINUED | OUTPATIENT
Start: 2023-07-03 | End: 2023-07-03 | Stop reason: HOSPADM

## 2023-07-03 RX ADMIN — GABAPENTIN 300 MG: 300 CAPSULE ORAL at 08:04

## 2023-07-03 RX ADMIN — PIPERACILLIN AND TAZOBACTAM 3.38 G: 3; .375 INJECTION, POWDER, FOR SOLUTION INTRAVENOUS at 14:57

## 2023-07-03 RX ADMIN — SODIUM CHLORIDE, POTASSIUM CHLORIDE, SODIUM LACTATE AND CALCIUM CHLORIDE: 600; 310; 30; 20 INJECTION, SOLUTION INTRAVENOUS at 11:06

## 2023-07-03 RX ADMIN — HYDROMORPHONE HYDROCHLORIDE 0.4 MG: 0.2 INJECTION, SOLUTION INTRAMUSCULAR; INTRAVENOUS; SUBCUTANEOUS at 00:20

## 2023-07-03 RX ADMIN — OXYCODONE HYDROCHLORIDE 5 MG: 5 TABLET ORAL at 14:57

## 2023-07-03 RX ADMIN — MIDAZOLAM 2 MG: 1 INJECTION INTRAMUSCULAR; INTRAVENOUS at 11:00

## 2023-07-03 RX ADMIN — Medication 2 G: at 11:06

## 2023-07-03 RX ADMIN — PIPERACILLIN AND TAZOBACTAM 3.38 G: 3; .375 INJECTION, POWDER, FOR SOLUTION INTRAVENOUS at 01:15

## 2023-07-03 RX ADMIN — PIPERACILLIN AND TAZOBACTAM 3.38 G: 3; .375 INJECTION, POWDER, FOR SOLUTION INTRAVENOUS at 07:59

## 2023-07-03 RX ADMIN — PROPOFOL 135 MCG/KG/MIN: 10 INJECTION, EMULSION INTRAVENOUS at 11:14

## 2023-07-03 RX ADMIN — ONDANSETRON 4 MG: 2 INJECTION INTRAMUSCULAR; INTRAVENOUS at 11:18

## 2023-07-03 RX ADMIN — VANCOMYCIN HYDROCHLORIDE 1250 MG: 10 INJECTION, POWDER, LYOPHILIZED, FOR SOLUTION INTRAVENOUS at 01:14

## 2023-07-03 RX ADMIN — LIDOCAINE HYDROCHLORIDE 60 MG: 20 INJECTION, SOLUTION INFILTRATION; PERINEURAL at 11:13

## 2023-07-03 RX ADMIN — PIPERACILLIN AND TAZOBACTAM 3.38 G: 3; .375 INJECTION, POWDER, FOR SOLUTION INTRAVENOUS at 19:29

## 2023-07-03 RX ADMIN — OXYCODONE HYDROCHLORIDE 5 MG: 5 TABLET ORAL at 20:07

## 2023-07-03 RX ADMIN — GABAPENTIN 300 MG: 300 CAPSULE ORAL at 20:08

## 2023-07-03 ASSESSMENT — ACTIVITIES OF DAILY LIVING (ADL)
ADLS_ACUITY_SCORE: 23
ADLS_ACUITY_SCORE: 23
ADLS_ACUITY_SCORE: 21
ADLS_ACUITY_SCORE: 21
ADLS_ACUITY_SCORE: 23
ADLS_ACUITY_SCORE: 21
ADLS_ACUITY_SCORE: 23

## 2023-07-03 NOTE — CONSULTS
"[facility]  WOC Nurse Inpatient Assessment     Consulted for: Wound left foot, wound vac    Summary: woc reviewed charting and noted patient went to OR for left foot. woc will sign off 7/3. Provider to reconsult woc team if needed.     Patient History (according to provider note(s):      \"Procedure(s):  1) partial left fifth ray amputation  2) rotational skin flap creation and closre, left foot\"    Assessment:      Areas visualized during today's visit: none. chart reviewed.         Treatment Plan:     Orders: none added by WO     RECOMMEND PRIMARY TEAM ORDER: None, at this time  Education provided: None needed  Discussed plan of care with: note entered EMR   WOC nurse follow-up plan: signing off  Notify WOC if wound(s) deteriorate.  Nursing to notify the Provider(s) and re-consult the WOC Nurse if new skin concern.    DATA:     BMI: Body mass index is 20.67 kg/m .   Active diet order: Orders Placed This Encounter      NPO per Anesthesia Guidelines for Procedure/Surgery Except for: Meds     Output: I/O last 3 completed shifts:  In: 250 [P.O.:250]  Out: 1350 [Urine:1350]     Labs: Recent Labs   Lab 07/03/23  0741   ALBUMIN 3.0*   HGB 10.0*   WBC 6.6     Pressure injury risk assessment:   Sensory Perception: 4-->no impairment  Moisture: 4-->rarely moist  Activity: 3-->walks occasionally  Mobility: 3-->slightly limited  Nutrition: 3-->adequate  Friction and Shear: 3-->no apparent problem  Lisandro Score: 20    Yaa YDE   1st: [employer], call \"Yaa Strong\" or call Group \"WO Nurse\"   (2nd option: leave a voicemail at Dept. Office Number: [phone]. Messages checked occasionally M-F)    "

## 2023-07-03 NOTE — ANESTHESIA PREPROCEDURE EVALUATION
Anesthesia Pre-Procedure Evaluation    Patient: Abdulaziz Rausch   MRN: [id number] : 1965        Procedure : Procedure(s):  1) partial left fifth ray amputation  2) rotational skin flap creation and closre, left foot          Past Medical History:   Diagnosis Date     Anemia      Depression      Diabetes mellitus, type 2 (H)      Diabetic foot infection (H) 2023     Diabetic foot ulcer (H)      History of MRSA infection 2023 RIGHT foot wound     Hypertension      Non-healing ulcer of left foot (H) 2023     Osteomyelitis (H)       Past Surgical History:   Procedure Laterality Date     AMPUTATE TOE(S) Right 2023    Procedure: AMPUTATION, digits two and three right foot;  Surgeon: Ulises Jimenez DPM;  Location: [facility] OR     INCISION AND DRAINAGE FOOT, COMBINED Left 2023    Procedure: Incision and drainage of abscess left foot with debridement and irrigation of wound left foot;  Surgeon: Jf Huerta DPM;  Location: [facility] OR     IRRIGATION AND DEBRIDEMENT TOE, COMBINED Right 2023    Procedure: IRRIGATION AND DEBRIDEMENT digits 2 and 3 right foot;  Surgeon: Jf Huerta DPM;  Location: [facility] OR     PICC SINGLE LUMEN PLACEMENT  2023           No Known Allergies   Social History     Tobacco Use     Smoking status: Never     Smokeless tobacco: Never   Substance Use Topics     Alcohol use: Never      Wt Readings from Last 1 Encounters:   23 63.5 kg (140 lb)        Anesthesia Evaluation   Pt has had prior anesthetic.     No history of anesthetic complications       ROS/MED HX  ENT/Pulmonary:    (-) recent URI   Neurologic:       Cardiovascular:     (+) Dyslipidemia hypertension-----    METS/Exercise Tolerance:     Hematologic:       Musculoskeletal:       GI/Hepatic:    (-) GERD   Renal/Genitourinary:    (-) renal disease   Endo:     (+) type II DM (uncontrolled), Last HgA1c: 13.6, Diabetic complications: neuropathy.      Psychiatric/Substance Use:     (+) psychiatric history depression     Infectious Disease:       Malignancy:       Other:            Physical Exam    Airway        Mallampati: II   TM distance: > 3 FB   Neck ROM: full     Respiratory Devices and Support         Dental       (+) Edentulous      Cardiovascular          Rhythm and rate: regular     Pulmonary           breath sounds clear to auscultation           OUTSIDE LABS:  CBC:   Lab Results   Component Value Date    WBC 6.1 07/02/2023    WBC 8.2 07/01/2023    HGB 9.5 (L) 07/02/2023    HGB 10.6 (L) 07/01/2023    HCT 29.1 (L) 07/02/2023    HCT 31.5 (L) 07/01/2023     07/02/2023     07/01/2023     BMP:   Lab Results   Component Value Date     07/02/2023     07/01/2023    POTASSIUM 4.5 07/02/2023    POTASSIUM 4.5 07/01/2023    CHLORIDE 108 (H) 07/02/2023    CHLORIDE 102 07/01/2023    CO2 24 07/02/2023    CO2 22 07/01/2023    BUN 22.1 07/02/2023    BUN 28.3 (H) 07/01/2023    CR 1.17 07/02/2023    CR 1.24 (H) 07/01/2023     (H) 07/03/2023     (H) 07/03/2023     COAGS: No results found for: PTT, INR, FIBR  POC: No results found for: BGM, HCG, HCGS  HEPATIC:   Lab Results   Component Value Date    ALBUMIN 3.6 05/29/2023    PROTTOTAL 6.8 05/29/2023    ALT 37 05/29/2023    AST 27 05/29/2023    ALKPHOS 265 (H) 05/29/2023    BILITOTAL 0.2 05/29/2023     OTHER:   Lab Results   Component Value Date    LACT 2.3 (H) 05/21/2023    A1C 11.3 (H) 04/17/2023    AMIRAH 8.6 07/02/2023    MAG 2.0 02/19/2023    SED 61 (H) 07/01/2023       Anesthesia Plan    ASA Status:  3   NPO Status:  NPO Appropriate    Anesthesia Type: MAC.     - Reason for MAC: immobility needed, straight local not clinically adequate              Consents    Anesthesia Plan(s) and associated risks, benefits, and realistic alternatives discussed. Questions answered and patient/representative(s) expressed understanding.    - Discussed:     - Discussed with:  Patient      -  Extended Intubation/Ventilatory Support Discussed: No.      - Patient is DNR/DNI Status: No    Use of blood products discussed: Yes.     - Discussed with: Patient.     - Consented: consented to blood products            Reason for refusal: other.     Postoperative Care    Pain management: IV analgesics, Oral pain medications, Multi-modal analgesia.   PONV prophylaxis: Ondansetron (or other 5HT-3), Dexamethasone or Solumedrol, Background Propofol Infusion     Comments:                Gera Bolden, DO

## 2023-07-03 NOTE — BRIEF OP NOTE
[facility]    Brief Operative Note    Pre-operative diagnosis: Osteomyelitis of left foot, unspecified type (H) [M86.9]  Post-operative diagnosis Same as pre-operative diagnosis    Procedure: Procedure(s):  1) partial left fifth ray amputation  2) rotational skin flap creation and closre, left foot  Surgeon: Surgeon(s) and Role:     * Alejandro Mccoy DPM - Primary  Anesthesia: MAC with Local   Estimated Blood Loss: 30 ml    Drains:   Specimens:   ID Type Source Tests Collected by Time Destination   1 : left 5th metatarsal proximal margin  Bone Biopsy Foot, Left SURGICAL PATHOLOGY EXAM Alejandro Mccoy DPM 7/3/2023 11:36 AM    2 : left partial 5th ray amputation Tissue Toe, Left SURGICAL PATHOLOGY EXAM Alejandro Mccoy DPM 7/3/2023 11:40 AM    3 : LEFT PARTIAL FIFTH RAY  Bone Biopsy Toe, Left SURGICAL PATHOLOGY EXAM Alejandro Mccoy DPM 7/3/2023 12:12 PM    A : left 5th metatarsal Bone Biopsy Toe, Left ANAEROBIC BACTERIAL CULTURE ROUTINE, AEROBIC BACTERIAL CULTURE ROUTINE Alejandro Mccoy DPM 7/3/2023 11:40 AM      Findings:  All tissues at the level of amputation appeared grossly viable. No purulence encountered.  A fairly large skin flap was created from the fifth toe. However, not enough plasticity to rotate/ transpose - likely due to the chronic edema.    Primary closure was still achieved.   Complications:  none.  Implants: * No implants in log *    Plan:  Current abx per ID; vanco and Zosyn  Await culture results - bone from the left fifth metatarsal head.   Await proximal margin pathology. Level of amp based on MRI. Bone appeared grossly viable at the amputation level, yet might be some heat necrosis from the saw.   Dr. Whittaker will follow tomorrow.     NOTE:  It was brought to my attention after surgery that one of the specimen containers was empty.  I reviewed the specimens sent with the control desk.  Both the aerobic and anaerobic cultures were sent.  Only  "one container was sent to pathology.  This likely has both the \"proximal margin\" bone and the rest of the amputated fifth ray.  The control desk will notify pathology of this.   needed    Alejandro Mccoy DPM, FACTRACY, MS  [facility] Department of Podiatry/Foot & Ankle Surgery  [phone]        "

## 2023-07-03 NOTE — ANESTHESIA CARE TRANSFER NOTE
Patient: Abdulaziz Rausch    Procedure: Procedure(s):  partial left fifth ray amputation, skin flap creation and closre, left foot       Diagnosis: Osteomyelitis of left foot, unspecified type (H) [M86.9]  Diagnosis Additional Information: No value filed.    Anesthesia Type:   MAC     Note:    Oropharynx: oropharynx clear of all foreign objects  Level of Consciousness: drowsy  Oxygen Supplementation: face mask  Level of Supplemental Oxygen (L/min / FiO2): 4  Independent Airway: airway patency satisfactory and stable  Dentition: dentition unchanged  Vital Signs Stable: post-procedure vital signs reviewed and stable  Report to RN Given: handoff report given  Patient transferred to: PACU    Handoff Report: Identifed the Patient, Identified the Reponsible Provider, Reviewed the pertinent medical history, Discussed the surgical course, Reviewed Intra-OP anesthesia mangement and issues during anesthesia, Set expectations for post-procedure period and Allowed opportunity for questions and acknowledgement of understanding      Vitals:  Vitals Value Taken Time   /71    Temp 97.2    Pulse 78 07/03/23 1228   Resp 12 07/03/23 1228   SpO2 99 % 07/03/23 1228   Vitals shown include unvalidated device data.    Electronically Signed By: AMANDA Gonzalez CRNA  July 3, 2023  12:28 PM

## 2023-07-03 NOTE — ANESTHESIA POSTPROCEDURE EVALUATION
Patient: Abdulaziz Rausch    Procedure: Procedure(s):  partial left fifth ray amputation, skin flap creation and closre, left foot       Anesthesia Type:  MAC    Note:  Disposition: Inpatient   Postop Pain Control: Uneventful            Sign Out: Well controlled pain   PONV: No   Neuro/Psych: Uneventful            Sign Out: Acceptable/Baseline neuro status   Airway/Respiratory: Uneventful            Sign Out: Acceptable/Baseline resp. status   CV/Hemodynamics: Uneventful            Sign Out: Acceptable CV status; No obvious hypovolemia; No obvious fluid overload   Other NRE: NONE   DID A NON-ROUTINE EVENT OCCUR? No           Last vitals:  Vitals Value Taken Time   /89 07/03/23 1245   Temp 37  C (98.6  F) 07/03/23 1245   Pulse 78 07/03/23 1256   Resp 18 07/03/23 1256   SpO2 97 % 07/03/23 1256   Vitals shown include unvalidated device data.    Electronically Signed By: Gera Bolden DO  July 3, 2023  5:49 PM

## 2023-07-03 NOTE — PROGRESS NOTES
[facility]  Infectious Disease Progress Note          Assessment and Plan:   IMP 1 57-year-old diabetic male recent ongoing left foot wound, amputation and very prolonged IV antibiotics despite no clear residual osteo despite the IV antibiotics now failing treatment with further wound formation Prior cultures of Staph aureus and group B strep Getting operation now with new cultures pending, not bacteremic so far  2 Recent diabetic foot infection with osteomyelitis status post amputation and prolonged IV antibiotic  3 MRSA colonized not involved in the current infection  4 Diabetes mellitus  5 Chronic kidney disease    REc 1 Vanco and Zosyn awaiting current cultures and operative findings readjust treatment accordingly amount and type of antibiotics depends on and surgical findings and microbiology        Interval History:   no new complaints Op now, temp down, blood cultures negative, foot cultures of course pending and surgery now              Medications:       ceFAZolin  2 g Intravenous See Admin Instructions     [Auto Hold] gabapentin  300 mg Oral BID     [Auto Hold] insulin aspart  4 Units Subcutaneous TID w/meals     [Auto Hold] insulin aspart  1-6 Units Subcutaneous TID w/meals     [Auto Hold] insulin glargine  15 Units Subcutaneous At Bedtime     [Auto Hold] piperacillin-tazobactam  3.375 g Intravenous Q6H     [Auto Hold] sodium chloride (PF)  3 mL Intracatheter Q8H     [Auto Hold] vancomycin  1,250 mg Intravenous Q24H                  Physical Exam:   Blood pressure 134/78, pulse 85, temperature 97.5  F (36.4  C), temperature source Oral, resp. rate 16, weight 63.5 kg (140 lb), SpO2 97 %.  Wt Readings from Last 2 Encounters:   07/01/23 63.5 kg (140 lb)   05/23/23 64.8 kg (142 lb 13.7 oz)     Vital Signs with Ranges  Temp:  [97.4  F (36.3  C)-98.2  F (36.8  C)] 97.5  F (36.4  C)  Pulse:  [85-96] 85  Resp:  [16] 16  BP: (134-146)/(76-78) 134/78  SpO2:  [97 %] 97 %    Constitutional:  Awake, alert, cooperative, no apparent distress   Lungs: Clear to auscultation bilaterally, no crackles or wheezing   Cardiovascular: Regular rate and rhythm, normal S1 and S2, and no murmur noted   Abdomen: Normal bowel sounds, soft, non-distended, non-tender   Skin: No rashes, no cyanosis, no edema foot covered   Other:           Data:   All microbiology laboratory data reviewed.  Recent Labs   Lab Test 07/03/23  0741 07/02/23  0829 07/01/23  0001   WBC 6.6 6.1 8.2   HGB 10.0* 9.5* 10.6*   HCT 29.8* 29.1* 31.5*   MCV 88 88 88    301 297     Recent Labs   Lab Test 07/03/23  0741 07/02/23  0829 07/01/23  0001   CR 1.04 1.17 1.24*     Recent Labs   Lab Test 07/01/23  0001   SED 61*     No lab results found.    Invalid input(s):

## 2023-07-03 NOTE — PLAN OF CARE
Mental Status: A&O x4.  Activity/dangle: SB asst.  Diet: Mod Carb diet.  Pain: Managed with Oxycodone  Fischer/Voiding: Voiding adequately in urinal.  Tele/Restraints/Iso: Contact iso maintained.  02/LDA: Room air. IV saline locked.  D/C Date: Pending  Other Info: Baseline numbness and tingling on BLE. BG Check. NPO at midnight, surgery tomorrow morning.

## 2023-07-03 NOTE — OP NOTE
Procedure Date: 07/03/2023    SURGEON:  Alejandro Mccoy DPM.    PREOPERATIVE DIAGNOSIS:  Chronic ulceration with osteomyelitis, left 5th ray.    POSTOPERATIVE DIAGNOSIS:  Chronic ulceration with osteomyelitis, left 5th ray.    PROCEDURE:  Partial left 5th ray amputation.    ANESTHESIA:  MAC with local.    HEMOSTASIS:  Electrocautery.    ESTIMATED BLOOD LOSS:  30 mL.    MATERIALS:  Nonabsorbable suture material.    INJECTABLES:  0.5% Marcaine plain.    INTRAOPERATIVE FINDINGS:  All tissues at the level of amputation appeared grossly viable and free of infection.  No purulence was found.    INDICATIONS FOR SURGERY:  The patient is a 57-year-old male with past medical history significant for type 2 diabetes, peripheral neuropathy, status post toe amputations, hypertension, and peripheral neuropathy, who was admitted 06/30/2023 with progressive signs of infection involving his left foot.  Initial x-ray was concerning for osteomyelitis of the head of the 5th metatarsal.  This was supported by the clinical exam, as well as an MRI.  I discussed ongoing care with the patient and we discussed the option of bone biopsy for further investigation versus proceeding with partial left 5th ray amputation.  He elected to proceed with the latter.  No guarantees were given.  I explained I would attempt to create a local skin flap from the skin on his 5th toe and try to close the area of ulceration.  No guarantees were given.    DESCRIPTION OF PROCEDURE:  The patient was transported to the operating room and placed supine on the operating table.  IV sedation was initiated.  A timeout was called and local anesthetic was injected into the left foot.  The left foot was then prepped and draped in the normal aseptic fashion.  A second timeout for the procedure was called.    A modified incision was made.  This started over the dorsal lateral aspect of the left 5th metatarsal and ran distally around the 5th toe.  This was made as far distally  on the toe as possible to create a local skin flap.  A fin was brought dorsally circumventing the plantar ulceration.  The incision was taken down to the level of bone.  First, the left 5th toe was disarticulated at the metatarsophalangeal joint level and removed.  Next, the ulcer tissue was excised full thickness.  Bleeding vessels were electrocauterized.    Based on measurements on the MRI, a sagittal saw was used to make an osteotomy through the left 5th metatarsal approximately 2.5 cm from the distal end.  A second bone cut was made for harvesting a small wafer of bone labeled proximal margin.  This was sent to pathology.  The wound was irrigated with a copious amount of normal sterile saline.    At this point, the local skin flap from the 5th toe was not easily mobilized, secondary to his chronic edema.  I found I was able to close the incision primarily without the skin flap.  Therefore, it was excised.  The closure is a T-shaped incision.  Closure was achieved with 3-0 Prolene.  A well-padded compressive dressing was placed.    Bone from the left 5th metatarsal head was harvested and sent for aerobic and anaerobic culture, in addition to the proximal margin bone sent to pathology.    Alejandro Mccoy DPM        D: 2023   T: 2023   MT: melissa    Name:     CHERYLNIGELANUM  MRN:      [id number]        Account:        [id number]   :      1965           Procedure Date: 2023     Document: [id number]

## 2023-07-03 NOTE — PROGRESS NOTES
Date/Time 7/2/7/3  3302-8892 AM  Summary:    Left foot cellulitis  Suspected left 5th MTP osteomyelitis  Non healing diabetic foot wounds    Mental Status: A/Ox4  Activity/dangle:SBA  Diet: NPO  Pain: managed with oxycodone and IV dilauded  Fischer/Voiding: BR  Tele/Restraints/Iso: Contact precaution maintained.  Skin: wound on LLE.dressing cdi  02/LDA: VSS on RA, PIV SL  D/C Date: TBD  Numbness in LE(baseline).possible surgery today.

## 2023-07-03 NOTE — PROGRESS NOTES
[facility]    Medicine Progress Note - Hospitalist Service    Date of Admission:  6/30/2023    Assessment & Plan    57 year old male admitted on 6/30/2023.   He has a history of DM2 with diabetic foot wounds, HTN, PAD who was admitted on 6/30/2023 with recurrent left foot cellulitis and now possible osteo.   Now s/p 4 weeks of IV cefazolin and doxy from 5/20 with progressing cellulitis, likely osteo.   He is s/p right second and third toe amputation    left foot cellulitis  5th MTP osteomyelitis, MRI confirmed POD#0   Diabetic foot ulcer  Patient with history of MRSA infections with recent I&D of left foot on 5/20/23 that grew MSSA and Group B strep with completed course of 4 weeks of IV cefazolin and doxycycline and now presents on 6/30 with worsening cellulitis.   Started on vancomycin given MRSA history and progression on cefazolin and pip/tazo for GNR coverage of DM foot infection.   Concern for osteomyelitis given progression of infection on IV antibiotics.   Now confirmed with MRI demonstrating osteo of left 5th toe.  - Appreciate ID consult, will continue vancomycin and pip/tazo for now  - Appreciate ortho consult.  plan for surgery today s/p partial left 5th ray amputation.  - he is NPO  - Wound RN consult  - Wound vac management per podiatry/wound RN.    Diabetes type 2 with neuropathy, nephropathy and foot wounds  - Hold lantus while NPO  -sliding scale  -continue gabapentin    Hypertension  Not on any home medications  - Prn hydralazine    Chronic anemia  Baseline range 10-11  - daily CBC  He would need work-up as outpatient    CKD stage 2  Baseline creatinine 1.2  - trend creat    I discussed the care with the patient and his RN     Diet: Moderate Consistent Carb (60 g CHO per Meal) Diet    DVT Prophylaxis: Ambulate every shift  Fischer Catheter: Not present  Lines: None     Cardiac Monitoring: None  Code Status: No CPR- Do NOT Intubate      Clinically Significant Risk Factors               # Hypoalbuminemia: Lowest albumin = 3 g/dL at 7/3/2023  7:41 AM, will monitor as appropriate     # Hypertension: Noted on problem list       # DMII: A1C = 11.3 % (Ref range: 0.0 - 5.6 %) within past 6 months, PRESENT ON ADMISSION           Disposition Plan      Expected Discharge Date: 07/04/2023  When ok with ortho                Monserrat Morales MD  Hospitalist Service  [facility]  Securely message with [employer] (more info)  Text page via [employer] [facility] Paging/Directory   ______________________________________________________________________    Interval History   Pain is manageable  No fever or nausea     Physical Exam   Vital Signs: Temp: 97.5  F (36.4  C) Temp src: Oral BP: (!) 153/85 Pulse: 108   Resp: 16 SpO2: 99 % O2 Device: None (Room air)    Weight: 140 lbs 0 oz    GENERAL: healthy, alert and no distress  PSYCH: mentation appears normal, affect normal/bright  His foot is dressed     Medical Decision Making             Data     I have personally reviewed the following data over the past 24 hrs:    6.6  \   10.0 (L)   / 319     137 106 25.2 (H) /  167 (H)   4.6 23 1.04 \       ALT: 60 AST: 65 (H) AP: 295 (H) TBILI: <0.2   ALB: 3.0 (L) TOT PROTEIN: 5.9 (L) LIPASE: N/A

## 2023-07-03 NOTE — PLAN OF CARE
Mental Status: A&O x4.  Activity/dangle: SB asst but not OOB since surgery as pt is to be on bedrest today.  Diet: Mod Carb diet.  Pain: Managed with Oxycodone  Fischer/Voiding: Voiding adequately in urinal.  Tele/Restraints/Iso: Contact iso maintained.  02/LDA: Room air. IV saline locked.  D/C Date: Pending  Other Info: Baseline numbness and tingling on BLE. BG Check. NWB LLE.    Morning report given to Pre Op nurse Tristian.  Pt arrived back on floor around 1345. Settled pt, pt comfortable.

## 2023-07-04 ENCOUNTER — APPOINTMENT (OUTPATIENT)
Dept: PHYSICAL THERAPY | Facility: CLINIC | Age: 58
End: 2023-07-04
Attending: PODIATRIST
Payer: COMMERCIAL

## 2023-07-04 LAB
CREAT SERPL-MCNC: 1.16 MG/DL (ref 0.67–1.17)
ERYTHROCYTE [DISTWIDTH] IN BLOOD BY AUTOMATED COUNT: 12.7 % (ref 10–15)
GFR SERPL CREATININE-BSD FRML MDRD: 73 ML/MIN/1.73M2
GLUCOSE BLDC GLUCOMTR-MCNC: 170 MG/DL (ref 70–99)
GLUCOSE BLDC GLUCOMTR-MCNC: 193 MG/DL (ref 70–99)
GLUCOSE BLDC GLUCOMTR-MCNC: 196 MG/DL (ref 70–99)
GLUCOSE BLDC GLUCOMTR-MCNC: 227 MG/DL (ref 70–99)
GLUCOSE BLDC GLUCOMTR-MCNC: 264 MG/DL (ref 70–99)
HCT VFR BLD AUTO: 30.3 % (ref 40–53)
HGB BLD-MCNC: 10.2 G/DL (ref 13.3–17.7)
MCH RBC QN AUTO: 29 PG (ref 26.5–33)
MCHC RBC AUTO-ENTMCNC: 33.7 G/DL (ref 31.5–36.5)
MCV RBC AUTO: 86 FL (ref 78–100)
PLATELET # BLD AUTO: 341 10E3/UL (ref 150–450)
RBC # BLD AUTO: 3.52 10E6/UL (ref 4.4–5.9)
WBC # BLD AUTO: 7.3 10E3/UL (ref 4–11)

## 2023-07-04 PROCEDURE — 82565 ASSAY OF CREATININE: CPT | Performed by: INTERNAL MEDICINE

## 2023-07-04 PROCEDURE — 97530 THERAPEUTIC ACTIVITIES: CPT | Mod: GP | Performed by: PHYSICAL THERAPIST

## 2023-07-04 PROCEDURE — 250N000011 HC RX IP 250 OP 636: Performed by: INTERNAL MEDICINE

## 2023-07-04 PROCEDURE — 120N000001 HC R&B MED SURG/OB

## 2023-07-04 PROCEDURE — 99232 SBSQ HOSP IP/OBS MODERATE 35: CPT | Performed by: INTERNAL MEDICINE

## 2023-07-04 PROCEDURE — 36415 COLL VENOUS BLD VENIPUNCTURE: CPT | Performed by: INTERNAL MEDICINE

## 2023-07-04 PROCEDURE — 258N000003 HC RX IP 258 OP 636: Performed by: INTERNAL MEDICINE

## 2023-07-04 PROCEDURE — 97116 GAIT TRAINING THERAPY: CPT | Mod: GP | Performed by: PHYSICAL THERAPIST

## 2023-07-04 PROCEDURE — 97161 PT EVAL LOW COMPLEX 20 MIN: CPT | Mod: GP | Performed by: PHYSICAL THERAPIST

## 2023-07-04 PROCEDURE — 250N000013 HC RX MED GY IP 250 OP 250 PS 637: Performed by: PODIATRIST

## 2023-07-04 PROCEDURE — 85027 COMPLETE CBC AUTOMATED: CPT | Performed by: INTERNAL MEDICINE

## 2023-07-04 PROCEDURE — 250N000011 HC RX IP 250 OP 636: Mod: JZ | Performed by: PODIATRIST

## 2023-07-04 RX ADMIN — PIPERACILLIN AND TAZOBACTAM 3.38 G: 3; .375 INJECTION, POWDER, FOR SOLUTION INTRAVENOUS at 15:12

## 2023-07-04 RX ADMIN — OXYCODONE HYDROCHLORIDE 5 MG: 5 TABLET ORAL at 08:16

## 2023-07-04 RX ADMIN — PIPERACILLIN AND TAZOBACTAM 3.38 G: 3; .375 INJECTION, POWDER, FOR SOLUTION INTRAVENOUS at 20:06

## 2023-07-04 RX ADMIN — VANCOMYCIN HYDROCHLORIDE 1500 MG: 10 INJECTION, POWDER, LYOPHILIZED, FOR SOLUTION INTRAVENOUS at 02:01

## 2023-07-04 RX ADMIN — PIPERACILLIN AND TAZOBACTAM 3.38 G: 3; .375 INJECTION, POWDER, FOR SOLUTION INTRAVENOUS at 08:16

## 2023-07-04 RX ADMIN — OXYCODONE HYDROCHLORIDE 5 MG: 5 TABLET ORAL at 01:22

## 2023-07-04 RX ADMIN — OXYCODONE HYDROCHLORIDE 5 MG: 5 TABLET ORAL at 15:54

## 2023-07-04 RX ADMIN — PIPERACILLIN AND TAZOBACTAM 3.38 G: 3; .375 INJECTION, POWDER, FOR SOLUTION INTRAVENOUS at 01:23

## 2023-07-04 RX ADMIN — OXYCODONE HYDROCHLORIDE 5 MG: 5 TABLET ORAL at 20:17

## 2023-07-04 RX ADMIN — GABAPENTIN 300 MG: 300 CAPSULE ORAL at 20:06

## 2023-07-04 RX ADMIN — GABAPENTIN 300 MG: 300 CAPSULE ORAL at 08:16

## 2023-07-04 ASSESSMENT — ACTIVITIES OF DAILY LIVING (ADL)
ADLS_ACUITY_SCORE: 23
ADLS_ACUITY_SCORE: 24
ADLS_ACUITY_SCORE: 23

## 2023-07-04 NOTE — PLAN OF CARE
Goal Outcome Evaluation:    [id number]    Dx: Partial L fifth amputation   DOA: 6/30/23  Mental Statues: A&O X4  VS/O2: VSS on RA  Activity: Bedrest NWB LLE  Diet: Mod carb   Bowel/bladder: Voiding adequately with bedside urinal    Skin: Ace wrap to LLE is CDI. CMS intact except baseline numbness and tingling in BLE.   Pain: PRN Oxycodone   LDAs: IV saline locked   Discharge: Pending   Other: Blood sugar checks. Contact precautions maintained.

## 2023-07-04 NOTE — PHARMACY-VANCOMYCIN DOSING SERVICE
"Pharmacy Vancomycin Note  Date of Service July 3, 2023  Patient's  1965   57 year old, male    Indication: Skin and Soft Tissue Infection and MRSA  Day of Therapy: 3  Current vancomycin regimen:  1250 mg IV q24h  Current vancomycin monitoring method: AUC  Current vancomycin therapeutic monitoring goal: 400-600 mg*h/L    [employer] Prediction of Current Vancomycin Regimen  Regimen: 1250 mg IV every 24 hours.  Start time: 01:14 on 2023  Exposure target: AUC24 (range)400-600 mg/L.hr   AUC24,ss: 382 mg/L.hr  Probability of AUC24 > 400: 40 %  Ctrough,ss: 9.5 mg/L  Probability of Ctrough,ss > 20: 1 %  Probability of nephrotoxicity (Lodise MARINA ): 5 %    Current estimated CrCl = Estimated Creatinine Clearance: 68.7 mL/min (based on SCr of 1.04 mg/dL).    Creatinine for last 3 days  2023: 12:01 AM Creatinine 1.24 mg/dL  2023:  8:29 AM Creatinine 1.17 mg/dL  7/3/2023:  7:41 AM Creatinine 1.04 mg/dL    Recent Vancomycin Levels (past 3 days)  7/3/2023:  7:39 PM Vancomycin 10.9 ug/mL    Vancomycin IV Administrations (past 72 hours)                   vancomycin (VANCOCIN) 1,250 mg in 0.9% NaCl 250 mL intermittent infusion (mg) 1,250 mg New Bag 23 0114     1,250 mg New Bag 23 0231    vancomycin (VANCOCIN) 1,500 mg in 0.9% NaCl 250 mL intermittent infusion (mg) 1,500 mg New Bag 23 0109                Nephrotoxins and other renal medications (From now, onward)    Start     Dose/Rate Route Frequency Ordered Stop    23 0200  vancomycin (VANCOCIN) 1,250 mg in 0.9% NaCl 250 mL intermittent infusion         1,250 mg  over 90 Minutes Intravenous EVERY 24 HOURS 23 0902      23 0200  piperacillin-tazobactam (ZOSYN) 3.375 g vial to attach to  mL bag        Note to Pharmacy: For [facility], [facility] and [facility]: For Zosyn-naive patients, use the \"Zosyn initial dose + extended infusion\" order panel.    3.375 g  over 30 Minutes Intravenous EVERY 6 HOURS 23 0133               Contrast " Orders - past 72 hours (72h ago, onward)    Start     Dose/Rate Route Frequency Stop    07/01/23 1430  gadobutrol (GADAVIST) injection 6 mL         6 mL Intravenous ONCE 07/01/23 1454          Interpretation of levels and current regimen:  Vancomycin level is reflective of AUC less than 400    Has serum creatinine changed greater than 50% in last 72 hours: No    Urine output:  good urine output    Renal Function: Improving    [employer] Prediction of Planned New Vancomycin Regimen  Regimen: 1500 mg IV every 24 hours.  Start time: 01:14 on 07/04/2023  Exposure target: AUC24 (range)400-600 mg/L.hr   AUC24,ss: 455 mg/L.hr  Probability of AUC24 > 400: 77 %  Ctrough,ss: 11.4 mg/L  Probability of Ctrough,ss > 20: 2 %  Probability of nephrotoxicity (Lodise MARINA 2009): 7 %    Plan:  1. Increase Dose to 1500 mg IV every 24 hours  2. Vancomycin monitoring method: AUC  3. Vancomycin therapeutic monitoring goal: 400-600 mg*h/L  4. Pharmacy will check vancomycin levels as appropriate in 3-5 Days.  5. Serum creatinine levels will be ordered daily for the first week of therapy and at least twice weekly for subsequent weeks.    Ivonne Acuna [facility]

## 2023-07-04 NOTE — PROGRESS NOTES
[facility]    Medicine Progress Note - Hospitalist Service       Date of Admission:  6/30/2023    Assessment & Plan   Abdulaziz Rausch is a 57 year old male with hx of DM2 with diabetic foot wounds s/p prior right 2nd and 3rd toe amputations, HTN, PAD, and recent hospitalization (5/20-5/26/23) for left 5th MTP joint abscess and left foot cellulitis s/p I&D and 4 weeks of IV cefazolin and doxycycline who was admitted on 6/30/2023 for worsening left foot cellulitis and osteomyelitis of left 5th toe. He is s/p left partial fifth ray toe amputation on 7/3     Recurrent left foot cellulitis due to left 5th toe osteomyelitis, s/p left partial   5th ray amputation (7/3/2023)  * Hx of MRSA infections. Last hospitalized at [facility] (5/20-5/26/2023) for left 5th MTP joint abscess and left foot cellulitis. Underwent I&D that admission, and was ultimately discharged on a 4-week course of IV antibiotics (cefazolin and doxycycline)  * Presented on this admission (6/30) with worsening left foot cellulitis. AFVSS on arrival. WBC wnl. Initiated on IV vancomycin and pip-tazo in the ED.   * Podiatry and ID consulted on admission  * Left foot MRI (7/1) confirmed osteo of left 5th toe.  * Underwent uncomplicated left partial 5th ray amputation per Dr. Mccoy on 7/3  - Post-op cares as per Podiatry. Awaiting pathology to determine if pt needs additional surgery  - Continues on IV vanco and pip-tazo while awaiting culture data  - Podiatry and ID following, appreciate assistance     DM2 with neuropathy, nephropathy, and peripheral vascular disease, long-term insulin use  * A1c 11.3 on 4/17/23. Admits to intermittent non-adherence  * PTA regimen: Lantus 20 units qhs, lispro 6 units tid ac, metformin 1000 mg   BID, empagliflozin 25 mg daily  - Holding PTA oral agents while hospitalized  - Continues on PTA Lantus  - On aspart 1u 10g CHO while hospitalized  - Increase from medium to high SSI  - Importance of  medication compliance has been discussed    Acute blood loss on chronic anemia due to surgery  * Baseline Hgb 10-11. Hgb decreased to 9.5 following surgery, now stable in mid 10-range    CKD stage 2  * Creatinine stable within baseline 1.1-1.2     Diet: Moderate Consistent Carb (60 g CHO per Meal) Diet    DVT Prophylaxis: Pneumatic Compression Devices  Fischer Catheter: Not present  Code Status: No CPR- Do NOT Intubate         Disposition: Expected discharge home once cleared by Podiatry; awaiting bone path to determine if pt needs more surgery, 1-2 more days    The patient's care was discussed with the Patient.    Samra Delacruz MD  Hospitalist Service  [facility]  Contact information available via [facility] Paging/Directory    ______________________________________________________________________    Interval History   No acute events overnight. Reports minimal pain. Denies cp/sob, eating well. Eager to discharge. Podiatry and ID recs reviewed; awaiting results of bone path. Continue IV antibiotics while here. Diabetes management discussed. Pt admits to intermittent non-adherence. Discussed importance of blood sugar control for wound healing and infection management. Insulin adjusted. PT/OT ordered.     Data reviewed today: I reviewed all medications, new labs and imaging results over the last 24 hours. I personally reviewed no images or EKG's today.    Physical Exam   Vital Signs: Temp: 98.1  F (36.7  C) Temp src: Oral BP: (!) 162/84 Pulse: 87   Resp: 16 SpO2: 97 % O2 Device: None (Room air)    Weight: 140 lbs 0 oz  Constitutional: Resting comfortably, NAD  HEENT: Sclera white, MMM  Respiratory: Breathing non-labored. Lungs CTAB - no wheezes, crackles, or rhonchi  Cardiovascular: Heart RRR, no m/r/g. No pedal edema  GI: +BS, abd soft/NT  Skin/Integument: No rash  Musculoskeletal: s/p prior R second and third toe amputations. Left foot bandages intact  Neuro: Alert and appropriate,  SERGIO  Psych: [facility]    Data   Recent Labs   Lab 07/04/23  1440 07/04/23  0648 07/04/23  0601 07/04/23  0203 07/03/23  2210 07/03/23  1939 07/03/23  1238 07/03/23  0741 07/02/23  1201 07/02/23  0829 07/01/23  0625 07/01/23  0001   WBC  --   --   --   --   --  7.4  --  6.6  --  6.1  --  8.2   HGB  --   --   --   --   --  9.9*  --  10.0*  --  9.5*  --  10.6*   MCV  --   --   --   --   --  88  --  88  --  88  --  88   PLT  --   --   --   --   --  341  --  319  --  301  --  297   NA  --   --   --   --   --   --   --  137  --  140  --  137   POTASSIUM  --   --   --   --   --   --   --  4.6  --  4.5  --  4.5   CHLORIDE  --   --   --   --   --   --   --  106  --  108*  --  102   CO2  --   --   --   --   --   --   --  23  --  24  --  22   BUN  --   --   --   --   --   --   --  25.2*  --  22.1  --  28.3*   CR  --  1.16  --   --   --   --   --  1.04  --  1.17  --  1.24*   ANIONGAP  --   --   --   --   --   --   --  8  --  8  --  13   AMIRAH  --   --   --   --   --   --   --  8.6  --  8.6  --  8.8   *  --  193* 170*   < >  --    < > 185*   < > 146*   < > 292*   ALBUMIN  --   --   --   --   --   --   --  3.0*  --   --   --   --    PROTTOTAL  --   --   --   --   --   --   --  5.9*  --   --   --   --    BILITOTAL  --   --   --   --   --   --   --  <0.2  --   --   --   --    ALKPHOS  --   --   --   --   --   --   --  295*  --   --   --   --    ALT  --   --   --   --   --   --   --  60  --   --   --   --    AST  --   --   --   --   --   --   --  65*  --   --   --   --     < > = values in this interval not displayed.         No results found for this or any previous visit (from the past 24 hour(s)).    Medications       gabapentin  300 mg Oral BID     insulin aspart  4 Units Subcutaneous TID w/meals     insulin aspart  1-6 Units Subcutaneous TID w/meals     insulin glargine  20 Units Subcutaneous At Bedtime     piperacillin-tazobactam  3.375 g Intravenous Q6H     sodium chloride (PF)  3 mL Intracatheter Q8H      vancomycin  1,500 mg Intravenous Q24H

## 2023-07-04 NOTE — PROGRESS NOTES
Foot & Ankle Surgery Progress Note  July 4, 2023    S:  Abdulaziz was seen at bedside today for continued monitoring of left lower extremity POD#1 sp partial 5th ray amp with flap closure by Dr Mccoy.  Pain levels low. PT this morning for NWB training.    BP (!) 145/82 (BP Location: Right arm, Patient Position: Semi-Lloyd's, Cuff Size: Adult Regular)   Pulse 96   Temp 98.3  F (36.8  C) (Oral)   Resp 18   Wt 63.5 kg (140 lb)   SpO2 95%   BMI 20.67 kg/m      ROS - Pos for CC.  Denies nausea, vomiting, chills, fever, belly pain, calf pain, chest pain or shortness of breath. Complete remainder of ROS is otherwise neg.      PE - minimal bleeding on bandage.  Sutures intact, skin margins well coapted.  Minimal erythema, and no necrosis. Minimal superficial gapping at apex of flap/incision.  Skin shows no trophic, color or temperature changes otherwise.  No calf redness, swelling or pain noted otherwise.    Imaging:  IMPRESSION: Interval amputation of the fifth metatarsal at level of  the shaft. Resection margin is smooth. Postoperative soft tissue  Swelling.    Cultures:  H/o MRSA; current aerobic/anaerobic cultures pending    Pathology:  Pending    Labs:    Hemoglobin A1C 0.0 - 5.6 % 11.3 High   11.2 High  CM  13.7 High  R, CM  7.4 High           Assessment:  57 year old male POD#1 sp partial left 5th ray amp with flap closure for wound/osteomyelitis in setting of uncontrolled DMII with neuropathy    Plan:  Dressing change at bedside  -reviewed exam/Path are 2 factors that determine timing/type of further surgery, if needed.  Exam appears stable today, Path results still pending.  No plan for further surgery for now.    -continue PT for NWB training  -Dr Sabillon to follow up with patient tomorrow for wound check and culture/Path results     Activity -  NWB left lower extremity    Pain Management -  Oxycodone, tylenol    DVT Prophylaxis -  mechanical   Abx therapy -  Vanc/Zosyn per ID         Wilfrido Whittaker,  ENEIDA BALDWIN FACFAOM  Podiatric Foot & Ankle Surgeon  [facility]  [phone]

## 2023-07-04 NOTE — PLAN OF CARE
Mental Status: A&O x4.  Activity/dangle: Not OOB yet but repositions himself.  Diet: Mod Carb diet.  Pain: Managed with Oxycodone  Fischer/Voiding: Voiding adequately in urinal.  Tele/Restraints/Iso: Contact iso maintained.  02/LDA: Room air. IV saline locked.  D/C Date: Pending  Other Info: Baseline numbness and tingling on BLE. BG Check. NWB LLE.

## 2023-07-04 NOTE — PROGRESS NOTES
[facility]    Medicine Progress Note - Hospitalist Service    Date of Admission:  6/30/2023    Assessment & Plan    57 year old male admitted on 6/30/2023.   He has a history of DM2 with diabetic foot wounds, HTN, PAD who was admitted on 6/30/2023 with recurrent left foot cellulitis and now possible osteo.   Now s/p 4 weeks of IV cefazolin and doxy from 5/20 with progressing cellulitis, likely osteo.   He is s/p right second and third toe amputation  Now he got left fifth partial fifth ray toe amputation on July 3    left foot cellulitis  5th MTP osteomyelitis, MRI confirmed POD#1  Diabetic foot ulcer  Patient with history of MRSA infections with recent I&D of left foot on 5/20/23 that grew MSSA and Group B strep with completed course of 4 weeks of IV cefazolin and doxycycline and now presents on 6/30 with worsening cellulitis.   Started on vancomycin given MRSA history and progression on cefazolin and pip/tazo for GNR coverage of DM foot infection.   Concern for osteomyelitis given progression of infection on IV antibiotics.   Now confirmed with MRI demonstrating osteo of left 5th toe.  - Appreciate ID consult, per note review   will continue vancomycin and pip/tazo for now until biopsy is back  Get the final micro at that point oral antibiotic should be acceptable   - Appreciate ortho consult.  Patient s/p partial 5th ray amp with flap closure by Dr Mccoy on July 3  - Wound vac management per podiatry/wound RN.    Diabetes type 2 with neuropathy, nephropathy and foot wounds  Is on Lantus also  I increased his Lantus and put him back to 20 units as sugars are on higher side  This was his PTA dose  -sliding scale  -continue gabapentin    Hypertension  Not on any home medications  - Prn hydralazine    Chronic anemia  Baseline range 10-11  He would need work-up as outpatient    CKD stage 2  Baseline creatinine 1.2  - trend creat    I discussed the care with the patient and his RN       Diet:  Moderate Consistent Carb (60 g CHO per Meal) Diet    DVT Prophylaxis: Defer to ortho service  Fischer Catheter: Not present  Lines: None     Cardiac Monitoring: None  Code Status: No CPR- Do NOT Intubate      Clinically Significant Risk Factors              # Hypoalbuminemia: Lowest albumin = 3 g/dL at 7/3/2023  7:41 AM, will monitor as appropriate     # Hypertension: Noted on problem list       # DMII: A1C = 11.3 % (Ref range: 0.0 - 5.6 %) within past 6 months, PRESENT ON ADMISSION           Disposition Plan      Expected Discharge Date:   Pending clinical course when cleared by Ortho and ID   at this point he is on IV antibiotics                  Monserrat Morales MD  Hospitalist Service  [facility]  Securely message with [employer] (more info)  Text page via [employer] Paging/Directory   ______________________________________________________________________    Interval History   Today's postop day 1  Pain is under control   denies any nausea vomiting diarrhea or fever      Physical Exam   Vital Signs: Temp: 98.3  F (36.8  C) Temp src: Oral BP: (!) 145/82 Pulse: 96   Resp: 18 SpO2: 95 % O2 Device: None (Room air)    Weight: 140 lbs 0 oz    He is nice and pleasant  He is comfortable and not in any kind of distress  He is alert awake oriented  He got his fifth toe partial amputation  His left foot is in surgical dressing    Medical Decision Making             Data     I have personally reviewed the following data over the past 24 hrs:    7.4  \   9.9 (L)   / 341     N/A N/A N/A /  196 (H)   N/A N/A 1.16 \

## 2023-07-04 NOTE — PROGRESS NOTES
[facility]  Infectious Disease Progress Note          Assessment and Plan:   IMP 1 57-year-old diabetic male recent ongoing left foot wound, amputation and very prolonged IV antibiotics despite no clear residual osteo despite the IV antibiotics now failing treatment with further wound formation Prior cultures of Staph aureus and group B strep Getting operation now with new cultures pending, not bacteremic so far  2 Recent diabetic foot infection with osteomyelitis status post amputation and prolonged IV antibiotic  3 MRSA colonized not involved in the current infection  4 Diabetes mellitus  5 Chronic kidney disease    REc 1 Vanco and Zosyn awaiting current cultures .  Very likely to be the same and not needing this kind of broad coverage.   2 Operative findings noted, biopsy done and pending to make sure all proximal infection resected.  Given this has relapsed despite IV antibiotics, probably logical to do IV until biopsy is back, get the final micro at that point oral antibiotic should be acceptable here.        Interval History:   no new complaints Op now, temp down, blood cultures negative, foot cultures of course pending and surgery noted, felt to have all bony infection resected, presumptively failure because previously not fully resected, if so even with IV antibiotics failure is likely and has occurred              Medications:       gabapentin  300 mg Oral BID     insulin aspart  4 Units Subcutaneous TID w/meals     insulin aspart  1-6 Units Subcutaneous TID w/meals     insulin glargine  15 Units Subcutaneous At Bedtime     piperacillin-tazobactam  3.375 g Intravenous Q6H     sodium chloride (PF)  3 mL Intracatheter Q8H     vancomycin  1,500 mg Intravenous Q24H                  Physical Exam:   Blood pressure (!) 145/82, pulse 96, temperature 98.3  F (36.8  C), temperature source Oral, resp. rate 18, weight 63.5 kg (140 lb), SpO2 95 %.  Wt Readings from Last 2 Encounters:   07/01/23  63.5 kg (140 lb)   05/23/23 64.8 kg (142 lb 13.7 oz)     Vital Signs with Ranges  Temp:  [97.5  F (36.4  C)-98.6  F (37  C)] 98.3  F (36.8  C)  Pulse:  [] 96  Resp:  [13-18] 18  BP: (122-153)/(73-89) 145/82  SpO2:  [95 %-100 %] 95 %    Constitutional: Awake, alert, cooperative, no apparent distress   Lungs: Clear to auscultation bilaterally, no crackles or wheezing   Cardiovascular: Regular rate and rhythm, normal S1 and S2, and no murmur noted   Abdomen: Normal bowel sounds, soft, non-distended, non-tender   Skin: No rashes, no cyanosis, no edema foot covered   Other:           Data:   All microbiology laboratory data reviewed.  Recent Labs   Lab Test 07/03/23  1939 07/03/23  0741 07/02/23  0829   WBC 7.4 6.6 6.1   HGB 9.9* 10.0* 9.5*   HCT 29.7* 29.8* 29.1*   MCV 88 88 88    319 301     Recent Labs   Lab Test 07/04/23  0648 07/03/23  0741 07/02/23  0829   CR 1.16 1.04 1.17     Recent Labs   Lab Test 07/01/23  0001   SED 61*     No lab results found.    Invalid input(s): MELISSA

## 2023-07-04 NOTE — PROGRESS NOTES
07/03/23 0946   Appointment Info   Signing Clinician's Name / Credentials (PT) Denice Dumont PT   Living Environment   People in Home friend(s)   Current Living Arrangements mobile home   Home Accessibility stairs to enter home   Number of Stairs, Main Entrance 2   Stair Railings, Main Entrance railing on right side (ascending)   Transportation Anticipated car, drives self   Living Environment Comments Pt reports [facility], he passed PT but has stayed for nursing reasons, on wound vac and monitoring meds.  He has been allowed to leave during the day to go home, drives himself, but returns to the [facility] for nights.   Self-Care   Usual Activity Tolerance good   Current Activity Tolerance good   Equipment Currently Used at Home crutches;walker, rolling;other (see comments)  (knee roller)   Fall history within last six months no   Activity/Exercise/Self-Care Comment pt reports he is I with self cares and mob with knee scooter and crutches   General Information   Onset of Illness/Injury or Date of Surgery 07/03/23   Referring Physician Alejandro Mccoy   Patient/Family Therapy Goals Statement (PT) return to home   Pertinent History of Current Problem (include personal factors and/or comorbidities that impact the POC) Abdulaziz Rausch is a 57 year old male with a history of DM2 with diabetic foot wounds, Htn, PAD who is admitted on 6/30/2023 with recurrent left foot cellulitis and now possible osteo.   Existing Precautions/Restrictions fall   Weight-Bearing Status - LUE full weight-bearing   Weight-Bearing Status - RUE full weight-bearing   Weight-Bearing Status - LLE nonweight-bearing   Weight-Bearing Status - RLE full weight-bearing   Cognition   Affect/Mental Status (Cognition) WFL   Orientation Status (Cognition) oriented x 4   Follows Commands (Cognition) WNL   Pain Assessment   Patient Currently in Pain Yes, see Vital Sign flowsheet  (minimal with meds)   Integumentary/Edema    Integumentary/Edema Comments per surgery, L foot wrapped   Range of Motion (ROM)   Range of Motion ROM is WFL   Strength (Manual Muscle Testing)   Strength (Manual Muscle Testing) strength is WFL   Bed Mobility   Bed Mobility no deficits identified   Transfers   Transfers sit-stand transfer   Comment, (Transfers) cues for safety   Sit-Stand Transfer   Sit-Stand [place] (Transfers) supervision   Assistive Device (Sit-Stand Transfers) walker, front-wheeled   Comment, (Sit-Stand Transfer) cues for safety   Gait/Stairs (Locomotion)   [place] Level (Gait) supervision   Assistive Device (Gait) walker, front-wheeled   Distance in Feet 15'   Distance in Feet (Gait) 150' x 1, 10' x 1   Pattern (Gait) swing-through   Maintains Weight-bearing Status (Gait) able to maintain   Negotiation (Stairs) stairs independence;stairs assistive device;number of steps;handrail location   [place] Level (Stairs) contact guard   Handrail Location (Stairs) both sides   Number of Steps (Stairs) 2   Comment, (Gait/Stairs) Pt initiually unsafe with hop to next step, uncontrolled   Balance   Balance Comments decreased standing static and dynamic balance with NWB on L, requires B UE support   Clinical Impression   Criteria for Skilled Therapeutic Intervention Yes, treatment indicated   PT Diagnosis (PT) difficulty walking   Influenced by the following impairments NWB status   Functional limitations due to impairments impaired functional mob I and safety   Clinical Presentation (PT Evaluation Complexity) Stable/Uncomplicated   Clinical Presentation Rationale clinical judgement   Clinical Decision Making (Complexity) low complexity   Planned Therapy Interventions (PT) gait training;transfer training   Risk & Benefits of therapy have been explained evaluation/treatment results reviewed;care plan/treatment goals reviewed;risks/benefits reviewed;current/potential barriers reviewed;participants voiced agreement with care  plan;patient;participants included   PT Total Evaluation Time   PT Eval, Low Complexity Minutes (98487) 10   Plan of Care Review   Plan of Care Reviewed With patient   Physical Therapy Goals   PT Frequency One time eval and treatment only   PT Predicted Duration/Target Date for Goal Attainment 07/04/23   PT Goals Transfers;Gait;Bed Mobility   PT: Bed Mobility Independent;Goal Met   PT: Transfers Modified independent;Within precautions;Goal Met   PT: Gait Modified independent;Rolling walker;100 feet;Within precautions;Goal Met   Interventions   Interventions Quick Adds Gait Training;Therapeutic Activity   Therapeutic Activity   Therapeutic Activities: dynamic activities to improve functional performance Minutes (57467) 10   Symptoms Noted During/After Treatment None   Treatment Detail/Skilled Intervention PT pt educated on POC and PT role and NWB.  discussed equipment needs and use.  Pt I with bed mob and I to don shoe and sock,  SBA for transfers sit to stand with cues for hand placement, rep from chair with good follow through.   Gait Training   Gait Training Minutes (90179) 20   Symptoms Noted During/After Treatment (Gait Training) fatigue   Treatment Detail/Skilled Intervention PT gait training with pregait static stance with FWW to demonstrate NWB.  good control.  progressed with gait with steady gait with ' to stairs, seated rest able to maintains NWB throughout.  stair training with 2 rails, then instructed in 1 crutch and 1 rail for better control, rep 4 steps x 2 with good improvement.  knee scooter back to room with good control and I, aboe to navigate to bed with knee scooter.   [place] Level (Gait Training) independent   Weight Bearing (Gait Training) nonweight-bearing   Assistive Device (Gait Training) rolling walker;other (see comments)  (knee roller)   PT Discharge Planning   PT Plan dc   PT Discharge Recommendation (DC Rec) home with assist   PT Rationale for DC Rec Pt appears to be back  at baseline with NWB and use of crutches, FWW and knee scooter. He may dc to sisters home, will need A for household tasks.  May need nursing cares as [facility] was [facility] since [facility], he reports he could leave to go home on day pass but returned to [facility] for nights.  Reports they were monitoring his meds and sound vac.   PT Brief overview of current status mod I with FWW and knee scooter.   Total Session Time   Timed Code Treatment Minutes 30   Total Session Time (sum of timed and untimed services) 40     Physical Therapy Discharge Summary    Reason for therapy discharge:    All goals and outcomes met, no further needs identified.    Progress towards therapy goal(s). See goals on Care Plan in [facility] electronic health record for goal details.  Goals met    Therapy recommendation(s):    No further therapy is recommended.

## 2023-07-05 VITALS
BODY MASS INDEX: 20.67 KG/M2 | DIASTOLIC BLOOD PRESSURE: 84 MMHG | RESPIRATION RATE: 18 BRPM | HEART RATE: 83 BPM | WEIGHT: 140 LBS | OXYGEN SATURATION: 97 % | TEMPERATURE: 98.4 F | SYSTOLIC BLOOD PRESSURE: 154 MMHG

## 2023-07-05 LAB
CREAT SERPL-MCNC: 1.18 MG/DL (ref 0.67–1.17)
GFR SERPL CREATININE-BSD FRML MDRD: 71 ML/MIN/1.73M2
GLUCOSE BLDC GLUCOMTR-MCNC: 159 MG/DL (ref 70–99)
GLUCOSE BLDC GLUCOMTR-MCNC: 195 MG/DL (ref 70–99)
GLUCOSE BLDC GLUCOMTR-MCNC: 228 MG/DL (ref 70–99)

## 2023-07-05 PROCEDURE — 36415 COLL VENOUS BLD VENIPUNCTURE: CPT | Performed by: INTERNAL MEDICINE

## 2023-07-05 PROCEDURE — 250N000011 HC RX IP 250 OP 636: Mod: JZ | Performed by: PODIATRIST

## 2023-07-05 PROCEDURE — 99232 SBSQ HOSP IP/OBS MODERATE 35: CPT | Performed by: INTERNAL MEDICINE

## 2023-07-05 PROCEDURE — 999N000111 HC STATISTIC OT IP EVAL DEFER: Performed by: OCCUPATIONAL THERAPIST

## 2023-07-05 PROCEDURE — 258N000003 HC RX IP 258 OP 636: Performed by: INTERNAL MEDICINE

## 2023-07-05 PROCEDURE — 250N000013 HC RX MED GY IP 250 OP 250 PS 637: Performed by: PODIATRIST

## 2023-07-05 PROCEDURE — 82565 ASSAY OF CREATININE: CPT | Performed by: INTERNAL MEDICINE

## 2023-07-05 PROCEDURE — 99233 SBSQ HOSP IP/OBS HIGH 50: CPT | Performed by: INTERNAL MEDICINE

## 2023-07-05 PROCEDURE — 250N000011 HC RX IP 250 OP 636: Performed by: INTERNAL MEDICINE

## 2023-07-05 RX ORDER — OXYCODONE HYDROCHLORIDE 5 MG/1
5 TABLET ORAL EVERY 4 HOURS PRN
Qty: 20 TABLET | Refills: 0 | Status: SHIPPED | OUTPATIENT
Start: 2023-07-05 | End: 2023-07-05

## 2023-07-05 RX ADMIN — GABAPENTIN 300 MG: 300 CAPSULE ORAL at 08:11

## 2023-07-05 RX ADMIN — PIPERACILLIN AND TAZOBACTAM 3.38 G: 3; .375 INJECTION, POWDER, FOR SOLUTION INTRAVENOUS at 08:11

## 2023-07-05 RX ADMIN — PIPERACILLIN AND TAZOBACTAM 3.38 G: 3; .375 INJECTION, POWDER, FOR SOLUTION INTRAVENOUS at 13:20

## 2023-07-05 RX ADMIN — VANCOMYCIN HYDROCHLORIDE 1500 MG: 10 INJECTION, POWDER, LYOPHILIZED, FOR SOLUTION INTRAVENOUS at 02:22

## 2023-07-05 RX ADMIN — PIPERACILLIN AND TAZOBACTAM 3.38 G: 3; .375 INJECTION, POWDER, FOR SOLUTION INTRAVENOUS at 01:42

## 2023-07-05 ASSESSMENT — ACTIVITIES OF DAILY LIVING (ADL)
ADLS_ACUITY_SCORE: 22
ADLS_ACUITY_SCORE: 24
ADLS_ACUITY_SCORE: 24
ADLS_ACUITY_SCORE: 22
ADLS_ACUITY_SCORE: 24
ADLS_ACUITY_SCORE: 24
ADLS_ACUITY_SCORE: 22
ADLS_ACUITY_SCORE: 24
ADLS_ACUITY_SCORE: 22

## 2023-07-05 NOTE — PROGRESS NOTES
Podiatry / Foot and Ankle Surgery Progress Note    July 5, 2023    Subject: Patient was seen at bedside.  Notes no pain to foot but has baseline neuropathy.     Objective:  Vitals: BP (!) 154/84 (BP Location: Right arm)   Pulse 83   Temp 98.4  F (36.9  C) (Oral)   Resp 18   Wt 63.5 kg (140 lb)   SpO2 97%   BMI 20.67 kg/m    BMI= Body mass index is 20.67 kg/m .    A1C: 11.3(4/17/2023)    WBC Count   Date Value Ref Range Status   07/04/2023 7.3 4.0 - 11.0 10e3/uL Final     General:  Patient is alert and orientated.  NAD.    Vascular:  DP and PT pulses are palpable.  No edema or varicosities noted.  CFT's < 3secs.  Skin temp is normal.    Neuro:  Light and gross touch diminished to feet.    Derm: Dressing is c/d/i. Sutures intact. No redness or acute signs of infection noted. Minimal superficial gapping at apex of flap/incision.     Musculoskeletal:  Left 5th ray now amputated.     Imaging: left foot post op -  I personally reviewed the xrays.  Interval amputation of the fifth metatarsal at level of the shaft. Resection margin is smooth. Postoperative soft tissue swelling.    Cultures:  Isolated in broth only Staphylococcus aureus Abnormal     Pathology:  pending    Assessment: 57 yr old uncontrolled diabetic male s/p partial left 5th ray amputation due to osteomyelitis.     Medical Decision Making/Plan:   - POD#2  -Dressing changed at bedside.   -Keep foot and dressing dry .  - non weight bearing on left foot.   - awaiting pathology to see if further surgery needs to be done. Pending pathology.   -My partner Dr. Sabillon will follow up with patient tomorrow.       Gabrielle Padgett DPM, Podiatry/Foot and Ankle Surgery

## 2023-07-05 NOTE — CARE PLAN
"Pt stated that \"he was fucking leaving\" MD notified and pt will discharge AMA. Antibiotics script sent to pts home pharmacy.  "

## 2023-07-05 NOTE — PROGRESS NOTES
[facility]  Infectious Disease Progress Note          Assessment and Plan:   IMP 1 57-year-old diabetic male recent ongoing left foot wound, amputation and very prolonged IV antibiotics despite no clear residual osteo despite the IV antibiotics now failing treatment with further wound formation Prior cultures of Staph aureus and group B strep Getting operation now with new cultures pending, not bacteremic so far  2 Recent diabetic foot infection with osteomyelitis status post amputation and prolonged IV antibiotic  3 MRSA colonized not involved in the current infection  4 Diabetes mellitus  5 Chronic kidney disease    REc 1  Zosyn awaiting current cultures .  Very likely to be the same Micro as May cultures, unlikely to have MRSA DC Vanco for now has some mild renal insufficiency already   2 Operative findings noted, biopsy done and pending to make sure all proximal infection resected.  Given this has relapsed despite IV antibiotics, probably logical to do IV until biopsy is back, get the final micro at that point oral antibiotic should be acceptable here.  If discharging now would do Augmentin 875 twice daily for a full 2 weeks, otherwise IV while here awaiting final micro and pathology        Interval History:   no new complaints Op now, temp down, blood cultures negative, foot cultures of course pending and surgery noted, felt to have all bony infection resected, presumptively failure because previously not fully resected, if so even with IV antibiotics failure is likely and has occurred              Medications:       gabapentin  300 mg Oral BID     insulin aspart   Subcutaneous TID AC     insulin aspart  1-10 Units Subcutaneous TID AC     insulin aspart  1-7 Units Subcutaneous At Bedtime     insulin glargine  20 Units Subcutaneous At Bedtime     piperacillin-tazobactam  3.375 g Intravenous Q6H     sodium chloride (PF)  3 mL Intracatheter Q8H                  Physical Exam:   Blood  pressure (!) 154/84, pulse 83, temperature 98.4  F (36.9  C), temperature source Oral, resp. rate 18, weight 63.5 kg (140 lb), SpO2 97 %.  Wt Readings from Last 2 Encounters:   07/01/23 63.5 kg (140 lb)   05/23/23 64.8 kg (142 lb 13.7 oz)     Vital Signs with Ranges  Temp:  [97.7  F (36.5  C)-98.4  F (36.9  C)] 98.4  F (36.9  C)  Pulse:  [83-87] 83  Resp:  [16-20] 18  BP: (149-162)/(79-84) 154/84  SpO2:  [93 %-97 %] 97 %    Constitutional: Awake, alert, cooperative, no apparent distress   Lungs: Clear to auscultation bilaterally, no crackles or wheezing   Cardiovascular: Regular rate and rhythm, normal S1 and S2, and no murmur noted   Abdomen: Normal bowel sounds, soft, non-distended, non-tender   Skin: No rashes, no cyanosis, no edema foot covered   Other:           Data:   All microbiology laboratory data reviewed.  Recent Labs   Lab Test 07/04/23  2339 07/03/23  1939 07/03/23  0741   WBC 7.3 7.4 6.6   HGB 10.2* 9.9* 10.0*   HCT 30.3* 29.7* 29.8*   MCV 86 88 88    341 319     Recent Labs   Lab Test 07/05/23  0738 07/04/23  0648 07/03/23  0741   CR 1.18* 1.16 1.04     Recent Labs   Lab Test 07/01/23  0001   SED 61*     No lab results found.    Invalid input(s): MELISSA

## 2023-07-05 NOTE — PROGRESS NOTES
Alert and oriented x 4. VSS, room air. Baseline neuropathy to roberto LE. Dressing to LLE CDI.  In bed all shift, able to turn and repo independently. NWB LLE. Voiding adequately. Pain managed with Oxycodone.PIV SL. Discharge TBD.

## 2023-07-05 NOTE — PROGRESS NOTES
"OT: Orders rec'd and chart reviewed. Pt is a  57 year old male POD#2 sp partial left 5th ray amp with flap closure for wound/osteomyelitis in setting of uncontrolled DMII with neuropathy.L LE NWB. Per PT, pt has returned to baseline with use of FWW and knee scooter for functional mobility including stairs with supervision. Attempted OT, pt denies need for OT and does not have any concerns with dressing, toileting and tub/shower transfers, pt reports when he was NWB on other LE was able to get up and down in tub using grab bars. Pt educated in tub bench but does not feel he will need this. Pt educated in home RN and OT if needed for home safety eval to insure safety at home. Pt wondering about PCA or HHA. Pt denies need for inpt OT, recommend home RN for med mgmt and OT for home safety eval to insure safety with ADL\"s at home ie tub/shower transfer.   "

## 2023-07-05 NOTE — PLAN OF CARE
Goal Outcome Evaluation:      Plan of Care Reviewed With: patient    Overall Patient Progress: improvingOverall Patient Progress: improving     A&O x 4. VSS, RA. CMS intact. Dressing CDI. Denies pain. Up with 1, NWB LLE. Voiding adequately. Pending culture. Pt keeps stating he wants to go home today.

## 2023-07-05 NOTE — PLAN OF CARE
PT: New PT consult received. PT evaluation completed on 7/4/23, see PT evaluation note for details. No further IP PT needs indicated as patient is mobilizing with use of FWW and knee scooter with modified independence. Will complete PT order.

## 2023-07-06 ENCOUNTER — PATIENT OUTREACH (OUTPATIENT)
Dept: CARE COORDINATION | Facility: CLINIC | Age: 58
End: 2023-07-06
Payer: COMMERCIAL

## 2023-07-06 LAB
BACTERIA BLD CULT: NO GROWTH
BACTERIA BLD CULT: NO GROWTH
BACTERIA BONE ANAEROBE+AEROBE CULT: ABNORMAL
PATH REPORT.COMMENTS IMP SPEC: NORMAL
PATH REPORT.COMMENTS IMP SPEC: NORMAL
PATH REPORT.FINAL DX SPEC: NORMAL
PATH REPORT.GROSS SPEC: NORMAL
PATH REPORT.MICROSCOPIC SPEC OTHER STN: NORMAL
PATH REPORT.RELEVANT HX SPEC: NORMAL
PHOTO IMAGE: NORMAL

## 2023-07-06 NOTE — DISCHARGE SUMMARY
[facility]  Hospitalist Discharge Summary      Date of Admission:  6/30/2023  Date of Discharge:  Patient left AMA on 7/5/2023  Discharging Provider: Samra Delacruz MD    Discharge Diagnoses   1. Recurrent left foot cellulitis due to left 5th toe osteomyelitis, s/p left partial   5th ray amputation (7/3/2023)  2. DM2 with neuropathy, nephropathy, and peripheral vascular disease, long-term insulin use  3. Acute blood loss on chronic anemia due to surgery  CKD stage 2    Follow-ups Needed After Discharge   Unresulted Labs Ordered in the Past 30 Days of this Admission     Date and Time Order Name Status Description    7/3/2023 11:43 AM Surgical Pathology Exam In process     7/3/2023 11:43 AM Bone Biopsy Aerobic Bacterial Culture Routine Preliminary     7/3/2023 11:43 AM Anaerobic Bacterial Culture Routine Preliminary     6/30/2023 11:34 PM Blood Culture Peripheral Blood Preliminary     6/30/2023 11:34 PM Blood Culture Peripheral Blood Preliminary       These results will be followed up by me/ID/Podiatry    Discharge Disposition   Patient left AMA in stable condition    Hospital Course   Abdulaziz Rausch is a 57 year old male with hx of DM2 with diabetic foot wounds s/p prior right 2nd and 3rd toe amputations, HTN, PAD, and recent hospitalization (5/20-5/26/23) for left 5th MTP joint abscess and left foot cellulitis s/p I&D and 4 weeks of IV cefazolin and doxycycline who was admitted on 6/30/2023 for worsening left foot cellulitis and osteomyelitis of left 5th toe. He is s/p left partial fifth ray toe amputation on 7/3     Recurrent left foot cellulitis due to left 5th toe osteomyelitis, s/p left partial   5th ray amputation (7/3/2023)  * Hx of MRSA infections. Last hospitalized at [facility] (5/20-5/26/2023) for left 5th MTP joint abscess and left foot cellulitis. Underwent I&D that admission, and was ultimately discharged on a 4-week course of IV antibiotics (cefazolin and  doxycycline)  * Presented on this admission (6/30) with worsening left foot cellulitis. AFVSS on arrival. WBC wnl. Initiated on IV vancomycin and pip-tazo in the ED.   * Podiatry and ID consulted on admission  * Left foot MRI (7/1) confirmed osteo of left 5th toe.  * Underwent uncomplicated left partial 5th ray amputation per Dr. Mccoy on 7/3  - Per Podiatry recs, patient was awaiting bone path results to determine whether additional surgery was needed. He refused to wait, and was discharged against medical advice. 2 week course of Augmentin was ordered per ID recs, and sent to his pharmacy of choice     DM2 with neuropathy, nephropathy, and peripheral vascular disease, long-term insulin use  * A1c 11.3 on 4/17/23. Admits to intermittent non-adherence  * PTA regimen: Lantus 20 units qhs, lispro 6 units tid ac, metformin 1000 mg   BID, empagliflozin 25 mg daily  - PTA oral agents held while hospitalized  - Continues on PTA meds at discharge  - Importance of medication compliance has been discussed    Acute blood loss on chronic anemia due to surgery  * Baseline Hgb 10-11. Hgb decreased to 9.5 following surgery, now stable in mid 10-range    CKD stage 2  * Creatinine stable within baseline 1.1-1.2    Consultations This Hospital Stay   INFECTIOUS DISEASES IP CONSULT  PODIATRY IP CONSULT  WOUND OSTOMY CONTINENCE NURSE  IP CONSULT  PHARMACY TO DOSE VANCO  PHYSICAL THERAPY ADULT IP CONSULT  OCCUPATIONAL THERAPY ADULT IP CONSULT  CARE MANAGEMENT / SOCIAL WORK IP CONSULT    Code Status   No CPR- Do NOT Intubate    Time Spent on this Encounter   I, Samra Delacruz MD, discharged this patient today but I did not personally see the patient today and will not be billing for the patient's discharge.       Samra Delacruz MD  [facility] ORTHOPEDICS  [facility] [facility] [address]  Phone: [phone]  Fax:  [phone]  ______________________________________________________________________    Physical Exam   Vital Signs: Temp: 98.4  F (36.9  C) Temp src: Oral BP: (!) 154/84 Pulse: 83   Resp: 18 SpO2: 97 % O2 Device: None (Room air)    Weight: 140 lbs 0 oz    Constitutional: Resting comfortably, NAD  HEENT: Sclera white, MMM  Respiratory: Breathing non-labored. Lungs CTAB - no wheezes, crackles, or rhonchi  Cardiovascular: Heart RRR, no m/r/g. No pedal edema  GI: +BS, abd soft/NT  Skin/Integument: No rash  Musculoskeletal: s/p prior R second and third toe amputations. Left foot bandages intact  Neuro: Alert and appropriate, HILL  Psych: Calm and cooperative    Primary Care Physician   Daryn Pittman    Discharge Orders   No discharge procedures on file.    Significant Results and Procedures   Most Recent 3 CBC's:Recent Labs   Lab Test 07/04/23  2339 07/03/23  1939 07/03/23  0741   WBC 7.3 7.4 6.6   HGB 10.2* 9.9* 10.0*   MCV 86 88 88    341 319     Most Recent 3 BMP's:Recent Labs   Lab Test 07/05/23  1320 07/05/23  0746 07/05/23  0738 07/05/23  0146 07/04/23  1440 07/04/23  0648 07/03/23  1238 07/03/23  0741 07/02/23  1201 07/02/23  0829 07/01/23  0625 07/01/23  0001   NA  --   --   --   --   --   --   --  137  --  140  --  137   POTASSIUM  --   --   --   --   --   --   --  4.6  --  4.5  --  4.5   CHLORIDE  --   --   --   --   --   --   --  106  --  108*  --  102   CO2  --   --   --   --   --   --   --  23  --  24  --  22   BUN  --   --   --   --   --   --   --  25.2*  --  22.1  --  28.3*   CR  --   --  1.18*  --   --  1.16  --  1.04  --  1.17  --  1.24*   ANIONGAP  --   --   --   --   --   --   --  8  --  8  --  13   AMIRAH  --   --   --   --   --   --   --  8.6  --  8.6  --  8.8   * 228*  --  195*   < >  --    < > 185*   < > 146*   < > 292*    < > = values in this interval not displayed.   ,   Results for orders placed or performed during the hospital encounter of 06/30/23   Foot XR, G/E 3 views, left     Narrative    EXAM: XR FOOT LEFT G/E 3 VIEWS  LOCATION: [facility]  DATE: 6/30/2023    INDICATION: Pain, swelling.  COMPARISON: 5/21/2023      Impression    IMPRESSION: Negative for fracture or dislocation. Arterial calcifications. Wound VAC in place overlying the left fifth MTP joint. There is some relative lucency seen in the lateral aspect of the head of the fifth metatarsal which appears new from   previous. This would be concerning for potential underlying osteomyelitis. Further evaluation with MRI would be helpful.   MR Foot Left w/o & w Contrast    Narrative    EXAM: MR FOOT LEFT WITHOUT AND WITH CONTRAST  LOCATION: [facility]  DATE: 7/1/2023    INDICATION: Left 5th MTP osteo? Non-healing diabetic wound.  COMPARISON: None.  TECHNIQUE: Routine. Additional postgadolinium T1 sequences were obtained.  IV CONTRAST: 6 mL Gadavist.    FINDINGS:     JOINTS AND BONES:   -Osteomyelitis involving the fifth metatarsal head. Osteomyelitis involving the proximal phalanx of the small toe. No additional areas worrisome for osteomyelitis. No evidence for fracture. No significant effusion to suggest septic arthropathy.    TENDONS:   -The flexor and extensor tendons are negative for tendinopathy, tenosynovitis, or tearing. The hallucis tendons are intact but there is flexor hallucis tendinopathy.     LIGAMENTS:   -The ligament of Lisfranc is intact. The collateral ligaments at the first through fourth MTP joints are intact. The medial collateral ligament at the fifth MTP joint is identified but the lateral collateral ligament appears disrupted. This is at the   base of a surface ulceration which extends down to the bone margin and joint margin.    MUSCLES AND SOFT TISSUES:   -Surface ulceration along the lateral aspect of the forefoot adjacent to the fifth metatarsal head and MTP joint. Surrounding edema or cellulitis. No evidence for abscess. There is some reactive edema  versus infectious or inflammatory myositis within the   plantar and interosseous musculature.      Impression    IMPRESSION:  1.  Ulceration along the lateral aspect of the forefoot extends down to the bone margin of the fifth metatarsal head and lateral margin of the fifth MTP joint. Surrounding edema or cellulitis but no evidence for abscess.  2.  Osteomyelitis involving the fifth metatarsal head and proximal phalanx of the small toe.  3.  No significant  effusion to suggest septic arthropathy.  4.  No additional areas worrisome for osteomyelitis or septic arthropathy.  5.  No evidence for fracture.  6.  Flexor hallucis tendon tendinopathy without tearing.  7.  Exam otherwise negative.       XR Foot Port Left 2 Views    Narrative    FOOT PORTABLE LEFT TWO VIEWS 7/3/2023 1:50 PM     HISTORY: Postop; new baseline.    COMPARISON: 7/1/2023.       Impression    IMPRESSION: Interval amputation of the fifth metatarsal at level of  the shaft. Resection margin is smooth. Postoperative soft tissue  swelling.    TRI FLEMING MD         SYSTEM ID:  [id number]       Discharge Medications   Discharge Medication List as of 7/5/2023  5:26 PM      START taking these medications    Details   amoxicillin-clavulanate (AUGMENTIN) 875-125 MG tablet Take 1 tablet by mouth 2 times daily for 14 days, Disp-28 tablet, R-0, E-Prescribe         CONTINUE these medications which have NOT CHANGED    Details   acetaminophen (TYLENOL) 325 MG tablet Take 650 mg by mouth every 6 hours as needed for pain, Historical      alcohol swab prep pads Use to swab area of injection/ella as directed.Disp-100 each, [id number]      blood glucose (NO BRAND SPECIFIED) test strip Use to test blood sugar 4 times daily or as directed. To accompany: Blood Glucose Monitor Brands: per insurance., Disp-100 strip, R-6, E-Prescribe      blood glucose monitoring (NO BRAND SPECIFIED) meter device kit Use to test blood sugar 4 time daily or as directed. Preferred  blood glucose meter OR supplies to accompany: Blood Glucose Monitor Brands: per insurance.Disp-1 kit, [id number]      Continuous Blood Gluc  (FREESTYLE NOAH 2 READER) CHUCK Use to read blood sugars as per 's instructions., Disp-1 each, R-0, E-Prescribe      Continuous Blood Gluc Sensor (FREESTYLE NOAH 2 SENSOR) MISC Change every 14 days., Disp-2 each, R-5, E-Prescribe      empagliflozin (JARDIANCE) 25 MG TABS tablet Take 1 tablet (25 mg) by mouth daily, Disp-90 tablet, R-1, E-Prescribe      gabapentin (NEURONTIN) 300 MG capsule Take 300 mg by mouth 2 times daily Given at 0800 and 1600, Historical      insulin glargine (LANTUS SOLOSTAR) 100 UNIT/ML pen Inject 20 Units Subcutaneous At Bedtime, Disp-15 mL, No Print OutIf Lantus is not covered by insurance, may substitute Basaglar or Semglee or other insulin glargine product per insurance preference at same dose and frequency.        insulin lispro (HUMALOG KWIKPEN) 100 UNIT/ML (1 unit dial) KWIKPEN Inject 6 Units Subcutaneous 3 times daily (before meals), Disp-15 mL, R-4, Historical      insulin pen needle (31G X 8 MM) 31G X 8 MM miscellaneous Use 4 pen needles daily or as directed.Disp-400 each, [id number]      metFORMIN (GLUCOPHAGE) 500 MG tablet Take 2 tablets (1,000 mg) by mouth 2 times daily (with meals), Disp-360 tablet, R-3, E-Prescribe      thin (NO BRAND SPECIFIED) lancets Use with lanceting device. To accompany: Blood Glucose Monitor Brands: per insurance., Disp-100 each, R-6, E-Prescribe         STOP taking these medications       oxyCODONE (ROXICODONE) 5 MG tablet Comments:   Reason for Stopping:             Allergies   No Known Allergies

## 2023-07-06 NOTE — LETTER
[facility] CARE COORDINATION  [facility]  July 7, 2023    Abdulaziz Rausch  [address]  [place] [address]      Dear Abdulaziz,        I am a  clinic care coordinator who works with Daryn Pittman MD with the [facility]. I wanted to introduce myself and provide you with my contact information for you to be able to call me with any questions or concerns. Below is a description of clinic care coordination and how I can further assist you.       The clinic care coordination team is made up of a registered nurse, , financial resource worker and community health worker who understand the health care system. The goal of clinic care coordination is to help you manage your health and improve access to the health care system. Our team works alongside your provider to assist you in determining your health and social needs. We can help you obtain health care and community resources, providing you with necessary information and education. We can work with you through any barriers and develop a care plan that helps coordinate and strengthen the communication between you and your care team.  Our services are voluntary and are offered without charge to you personally.    Please feel free to contact me with any questions or concerns regarding care coordination and what we can offer.      We are focused on providing you with the highest-quality healthcare experience possible.    Sincerely,     Fiona Ramirez,   [facility]  [phone]

## 2023-07-06 NOTE — PROGRESS NOTES
Contact  [facility]/Voicemail    Referral Source: Care Team  Clinical Data:  Outreach- had podiatry appt and it looks like he is discharged from TCU to home.   Outreach attempted x 2.  Left message on voicemail with call back information and requested return call.  Plan:  will send care coordination introduction letter with care coordinator contact information and explanation of care coordination services.  will do no further outreaches at this time.  Social Deana Angela  [facility]  [phone]       Contact  [facility]/Voicemail    Referral Source: Care Team  Clinical Data:  Outreach-  CC following for discharge from TCU.  Was discharged from hospital on 7-5.  Did chart review and looks like patient continues to receive care at TCU, but goes home during the day.    Outreach attempted x 1.  Left message on patient's voicemail with call back information and requested return call.  Plan:  will try to reach patient again in 3-5 business days. Need clarity of status of being in TCU or long term care.  Social Deana Angela  [facility]  [phone]

## 2023-07-07 ENCOUNTER — TELEPHONE (OUTPATIENT)
Dept: VASCULAR SURGERY | Facility: CLINIC | Age: 58
End: 2023-07-07

## 2023-07-07 ENCOUNTER — OFFICE VISIT (OUTPATIENT)
Dept: VASCULAR SURGERY | Facility: CLINIC | Age: 58
End: 2023-07-07
Payer: COMMERCIAL

## 2023-07-07 VITALS
HEIGHT: 71 IN | BODY MASS INDEX: 19.88 KG/M2 | DIASTOLIC BLOOD PRESSURE: 92 MMHG | WEIGHT: 142 LBS | SYSTOLIC BLOOD PRESSURE: 175 MMHG | OXYGEN SATURATION: 97 % | HEART RATE: 97 BPM

## 2023-07-07 DIAGNOSIS — L97.525 ULCER OF LEFT FOOT WITH MUSCLE INVOLVEMENT WITHOUT EVIDENCE OF NECROSIS (H): Primary | ICD-10-CM

## 2023-07-07 PROCEDURE — G0463 HOSPITAL OUTPT CLINIC VISIT: HCPCS | Mod: 25 | Performed by: PODIATRIST

## 2023-07-07 PROCEDURE — 99024 POSTOP FOLLOW-UP VISIT: CPT | Performed by: PODIATRIST

## 2023-07-07 PROCEDURE — 11043 DBRDMT MUSC&/FSCA 1ST 20/<: CPT | Performed by: PODIATRIST

## 2023-07-07 RX ORDER — SULFAMETHOXAZOLE/TRIMETHOPRIM 800-160 MG
1 TABLET ORAL 2 TIMES DAILY
Qty: 20 TABLET | Refills: 0 | Status: SHIPPED | OUTPATIENT
Start: 2023-07-07 | End: 2023-10-05

## 2023-07-07 NOTE — TELEPHONE ENCOUNTER
Caller: Patient    Provider: ENEIDA Jimenez    Detailed reason for call: Patient states that [facility] is no longer able to provide him with a wound vac, and it needs to be reordered by Dr. Jimenez.     Best phone number to contact: [phone]    Best time to contact: Any    Ok to leave a detailed message: Yes    Ok to speak to authorized person if needed: No      (Noted to patient if reason is related to wound or incision, to please send a photo via email or [employer].)

## 2023-07-07 NOTE — TELEPHONE ENCOUNTER
Called pt and he said he left the wound vac at [facility] when he discharged. So we need to reorder the vac.     Mayra can you please initiate that today.

## 2023-07-07 NOTE — PROGRESS NOTES
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    TREATMENT:  -I discussed with the patient that the majority of the lateral left foot incision is progressing well with intact sutures with skin edges well approximated.  However, he does have full-thickness dehiscence of the vertical incision, I removed the sutures along this vertical incision today.    -Due to the increased depth along the lateral left foot ulceration I recommend he resume use of wound VAC at this time.  Discussed importance of nonweightbearing at all times and risks of weightbearing including dehiscence of the horizontal incision and need for additional surgical intervention or partial foot amputation.    -Pathology report indicates a clean proximal margin.    -Also referred for updated hemoglobin A1c.  Discussed importance of good glycemic control, looking for hemoglobin A1c below 8%.    -ID did recommend Augmentin.  Discussed with hospitalist service at [facility] that most recent culture report indicated sensitivity to Bactrim which I will start him on at this time.    -After discussion of risk factors, nursing staff removed dressing, cleansed wound and consent obtained 2% Lidocaine HCL jelly was applied, under clean conditions, the left foot ulceration(s) were debrided using #15 blade scalpel.  Devitalized and nonviable tissue, along with any fibrin and slough, was removed to improve granulation tissue formation, stimulate wound healing, decrease overall bacteria load, disrupt biofilm formation and decrease edge senescence. Wound drainage was scant No. Total excisional debridement was 3 sq cm into the muscle/fascia with a depth of 2.5 cm.   Ulcers were improved afterwards and .  Measures were as noted on the flow sheet. A gauze dressing was applied.     -He will follow-up in 2  job Jimenez DPM  [facility] [place]      HPI: Abdulaziz Rausch was seen again today status post partial fifth ray amputation of the left foot.  He underwent this procedure with Dr. Mccoy at [facility] on 7/3.  Patient is currently on Augmentin per ID but has not picked up this prescription today.  He is living at home and is try to remain nonweightbearing on the left foot but does admit applying pressure when walking to the bathroom in the kitchen.    Past Medical History:   Diagnosis Date     Anemia      Depression      Diabetes mellitus, type 2 (H)      Diabetic foot infection (H) 7/1/2023     Diabetic foot ulcer (H)      History of MRSA infection 7/1/2023 Jan 2023 RIGHT foot wound     Hypertension      Non-healing ulcer of left foot (H) 7/1/2023     Osteomyelitis (H)        Past Surgical History:   Procedure Laterality Date     AMPUTATE FOOT Left 7/3/2023    Procedure: partial left fifth ray amputation, skin flap creation and closre, left foot;  Surgeon: Alejandro Mccoy DPM;  Location: [facility] OR     AMPUTATE TOE(S) Right 2/17/2023    Procedure: AMPUTATION, digits two and three right foot;  Surgeon: Ulises Jimenez DPM;  Location: [facility] OR     INCISION AND DRAINAGE FOOT, COMBINED Left 5/23/2023    Procedure: Incision and drainage of abscess left foot with debridement and irrigation of wound left foot;  Surgeon: Jf Huerta DPM;  Location: [facility] OR     IRRIGATION AND DEBRIDEMENT TOE, COMBINED Right 1/20/2023    Procedure: IRRIGATION AND DEBRIDEMENT digits 2 and 3 right foot;  Surgeon: Jf Huerta DPM;  Location: [facility] OR     PICC SINGLE LUMEN PLACEMENT  5/25/2023            No Known Allergies      Current Outpatient Medications:      acetaminophen (TYLENOL) 325 MG tablet, Take 650 mg by mouth every 6 hours as needed for pain, Disp: , Rfl:      alcohol swab prep pads, Use to swab area of injection/ella as directed., Disp: 100 each, Rfl:  "3     amoxicillin-clavulanate (AUGMENTIN) 875-125 MG tablet, Take 1 tablet by mouth 2 times daily for 14 days, Disp: 28 tablet, Rfl: 0     blood glucose (NO BRAND SPECIFIED) test strip, Use to test blood sugar 4 times daily or as directed. To accompany: Blood Glucose Monitor Brands: per insurance., Disp: 100 strip, Rfl: 6     blood glucose monitoring (NO BRAND SPECIFIED) meter device kit, Use to test blood sugar 4 time daily or as directed. Preferred blood glucose meter OR supplies to accompany: Blood Glucose Monitor Brands: per insurance., Disp: 1 kit, Rfl: 0     Continuous Blood Gluc  (FREESTYLE NOAH 2 READER) CHUCK, Use to read blood sugars as per 's instructions., Disp: 1 each, Rfl: 0     Continuous Blood Gluc Sensor (FREESTYLE NOAH 2 SENSOR) [facility] [place] Change every 14 days., Disp: 2 each, Rfl: 5     empagliflozin (JARDIANCE) 25 MG TABS tablet, Take 1 tablet (25 mg) by mouth daily, Disp: 90 tablet, Rfl: 1     gabapentin (NEURONTIN) 300 MG capsule, Take 300 mg by mouth 2 times daily Given at 0800 and 1600, Disp: , Rfl:      insulin glargine (LANTUS SOLOSTAR) 100 UNIT/ML pen, Inject 20 Units Subcutaneous At Bedtime, Disp: 15 mL, Rfl:      insulin lispro (HUMALOG KWIKPEN) 100 UNIT/ML (1 unit dial) KWIKPEN, Inject 6 Units Subcutaneous 3 times daily (before meals), Disp: 15 mL, Rfl: 4     insulin pen needle (31G X 8 MM) 31G X 8 MM miscellaneous, Use 4 pen needles daily or as directed., Disp: 400 each, Rfl: 3     metFORMIN (GLUCOPHAGE) 500 MG tablet, Take 2 tablets (1,000 mg) by mouth 2 times daily (with meals), Disp: 360 tablet, Rfl: 3     thin (NO BRAND SPECIFIED) lancets, Use with lanceting device. To accompany: Blood Glucose Monitor Brands: per insurance., Disp: 100 each, Rfl: 6    Review of Systems - 10 point Review of Systems is negative except for left foot ulcer which is noted in HPI.      OBJECTIVE:  BP (!) 175/92 (BP Location: Left arm)   Pulse 97   Ht 5' 10.8\" (1.798 m)   Wt 142 lb (64.4 " kg)   SpO2 97%   BMI 19.92 kg/m    General appearance: Patient is alert and fully cooperative with history & exam.  No sign of distress is noted during the visit.    Vascular: Dorsalis pedis non-palpableLeft.  Dermatologic:    Negative Pressure Wound Therapy Foot Anterior;Left (Active)       VASC Wound left plantar lateral (Active)   Pre Size Length 3 06/21/23 0900   Pre Size Width 2.5 06/21/23 0900   Pre Size Depth 0.4 06/21/23 0900   Pre Total Sq cm 7.5 06/21/23 0900       VASC Wound Right foot (Active)       VASC Wound Right foot (Active)       VASC Wound Right foot Dorsal (Active)   Post Size Length 0 06/21/23 0900   Post Size Width 0 06/21/23 0900   Post Size Depth 0 06/21/23 0900   Post Total Sq cm 0 06/21/23 0900   Description scabbed 06/07/23 0900       VASC Wound Right foot plantar (Active)   Post Size Length 3 07/07/23 0900   Post Size Width 1 07/07/23 0900   Post Size Depth 2.5 07/07/23 0900   Post Total Sq cm 3 07/07/23 0900       Incision/Surgical Site 02/17/23 Right Foot (Active)       Incision/Surgical Site 05/23/23 Left Foot (Active)       Incision/Surgical Site 07/03/23 Left Toe (Comment  which one) (Active)   Incision Assessment UTV 07/05/23 1600   Closure GOOD 07/05/23 1600   Incision Drainage Amount UTV 07/05/23 1600   Dressing Intervention Clean, dry, intact 07/05/23 1600   Sutures intact along transverse and vertical incisions fifth ray left foot with full-thickness dehiscence of the vertical incision with increased depth.  No erythema left foot.  Remaining incision along the lateral left foot is well approximated.  Neurologic: Diminished to light touch Left.  Musculoskeletal: Contracted digits noted Left.    Imaging:     XR Foot Port Left 2 Views    Result Date: 7/3/2023  FOOT PORTABLE LEFT TWO VIEWS 7/3/2023 1:50 PM HISTORY: Postop; new baseline. COMPARISON: 7/1/2023.     IMPRESSION: Interval amputation of the fifth metatarsal at level of the shaft. Resection margin is smooth. Postoperative  soft tissue swelling. TRI FLEMING MD   SYSTEM ID:  [id number]    MR Foot Left w/o & w Contrast    Result Date: 7/1/2023  EXAM: MR FOOT LEFT WITHOUT AND WITH CONTRAST LOCATION: [facility] DATE: 7/1/2023 INDICATION: Left 5th MTP osteo? Non-healing diabetic wound. COMPARISON: None. TECHNIQUE: Routine. Additional postgadolinium T1 sequences were obtained. IV CONTRAST: 6 mL Gadavist. FINDINGS: JOINTS AND BONES: -Osteomyelitis involving the fifth metatarsal head. Osteomyelitis involving the proximal phalanx of the small toe. No additional areas worrisome for osteomyelitis. No evidence for fracture. No significant effusion to suggest septic arthropathy. TENDONS: -The flexor and extensor tendons are negative for tendinopathy, tenosynovitis, or tearing. The hallucis tendons are intact but there is flexor hallucis tendinopathy. LIGAMENTS: -The ligament of Lisfranc is intact. The collateral ligaments at the first through fourth MTP joints are intact. The medial collateral ligament at the fifth MTP joint is identified but the lateral collateral ligament appears disrupted. This is at the base of a surface ulceration which extends down to the bone margin and joint margin. MUSCLES AND SOFT TISSUES: -Surface ulceration along the lateral aspect of the forefoot adjacent to the fifth metatarsal head and MTP joint. Surrounding edema or cellulitis. No evidence for abscess. There is some reactive edema versus infectious or inflammatory myositis within the  plantar and interosseous musculature.     IMPRESSION: 1.  Ulceration along the lateral aspect of the forefoot extends down to the bone margin of the fifth metatarsal head and lateral margin of the fifth MTP joint. Surrounding edema or cellulitis but no evidence for abscess. 2.  Osteomyelitis involving the fifth metatarsal head and proximal phalanx of the small toe. 3.  No significant  effusion to suggest septic arthropathy. 4.  No additional areas  worrisome for osteomyelitis or septic arthropathy. 5.  No evidence for fracture. 6.  Flexor hallucis tendon tendinopathy without tearing. 7.  Exam otherwise negative.     Foot XR, G/E 3 views, left    Result Date: 7/1/2023  EXAM: XR FOOT LEFT G/E 3 VIEWS LOCATION: [facility] DATE: 6/30/2023 INDICATION: Pain, swelling. COMPARISON: 5/21/2023     IMPRESSION: Negative for fracture or dislocation. Arterial calcifications. Wound VAC in place overlying the left fifth MTP joint. There is some relative lucency seen in the lateral aspect of the head of the fifth metatarsal which appears new from previous. This would be concerning for potential underlying osteomyelitis. Further evaluation with MRI would be helpful.

## 2023-07-07 NOTE — TELEPHONE ENCOUNTER
Faxed Home Care Referral to the following agencies - awaiting response:    -[facility]  -[facility]  -[facility]  -[facility]

## 2023-07-07 NOTE — PATIENT INSTRUCTIONS
Important lnstructions    PLEASE CALL [facility] TO GET YOUR WOUND VAC BACK. [phone].   IF YOU ARE UNABLE TO GET IT BACK, PLEASE LET US KNOW.     WEIGHT BEARING STATUS: You are to remain NON WEIGHT BEARING on your left foot. NON WEIGHT BEARING MEANS NO PRESSURE ON YOUR FOOT OR HEEL AT ANY TIME FOR ANY REASON!    2. OFFLOADING DEVICE: Must use a A WHEELCHAIR at all times! (do not use affected foot to push wheelchair)    3. STABILIZATION DEVICE: Use a PRAFO BOOT . You will need to WEAR THIS AT ALL TIMES EVEN WHILE IN BED.     4. ELEVATE: Elevating your leg means laying with your head on a pillow and your foot ABOVE YOUR HEART.     5. DO NOT MOVE YOUR FOOT.  There is a risk of worsening the wound or incision. To give yourself a higher chance of healing, please DO NOT swing foot back and forth and wiggle foot/toes especially when inside a stabilization device. Limited movement is allowable with therapy as recommended by the doctor.     6. TAKE A PROTEIN SHAKE TWICE A DAY.  (For ex: Boost, Ensure, Glucerna)      Dressing Change lnstructions        Until your get your wound vac, pack would with moistened gauze. Cover with dry guaze and change daily    Standard - Wound Vac Instructions  LEFT LATERAL FOOT    1. 3x weekly and as needed cleanse the area with NS    2. Pat dry    3. Apply Cavilon no sting barrier wipe to the skin surrounding the wound to protect from drainage/maceration    4. Apply drape around incision. Cut strip of drape and apply to skin if bridging is needed; plan this area in advance; should not be over bony prominence     5. Cut the foam to fit the area of the incision    6. Cut narrow strip of foam if bridging    7. Cover foam with drape to obtain air tight seal    8. Cut opening the size of a quarter for where the suction pad will be applied    9. Apply Suction pad    10. 125mmHG suction continuous    [employer] can be reached at [phone], 24 hours a day 7 days a week     - Back up  plan in place:     If the negative pressure wound therapy malfunctions or unable to maintain seal: dressing must be removed and reapplied within 2 hours of the incident. If unable to reapply negative pressure wound dressing, place Normal Saline moistened gauze in wound bed and cover with appropriate dressing to keep wound bed moist.  Change wet-to-dry dressing two times a day until healthcare staff can re-implement negative pressure therapy. Change canister at least weekly.  [facility] can be reached at [phone], 24 hours a day 7 days a week    Information on Vacuum-Assisted Closure of a Wound  Vacuum-assisted closure (VAC) of a wound is a type of treatment to help wounds heal. It s also known as negative pressure wound therapy. During the treatment, a device lowers air pressure on the wound. This can help the wound heal more quickly.  Understanding the wound VAC system  A wound VAC system has several parts. A foam or gauze dressing is put directly on the wound. The dressing is changed every 24 to 72 hours. An adhesive film covers and seals the dressing and wound. A drainage tube leads from under the adhesive film and connects to a portable vacuum pump. This pump removes air pressure over the wound. It may do this constantly. Or it may do it in cycles. During the treatment, you ll need to carry the portable pump everywhere you go.  Why wound VAC is used  You might need this therapy for a recent traumatic wound. Or you may need it for a chronic wound. This is a wound that does not heal the way it should over time. This can happen with wounds in people who have diabetes. You may need a wound VAC if you ve had a recent skin graft. And you may need a wound VAC for a large wound. Large wounds can take a longer time to heal.  A wound vacuum system may help your wound heal more quickly by:  Draining extra fluid from the wound  Reducing swelling  Reducing bacteria in the wound  Keeping your wound moist and  warm  Helping draw together wound edges  Increasing blood flow to your wound  Decreasing inflammation  Wound VAC offers some other advantages over other types of wound care. It may decrease your overall discomfort. The dressings usually need to be changed less often. And they may be easier to keep in position.         It IS NOT ok to get your wound wet in the bath or shower    SEEK MEDICAL CARE IF:  You have an increase in swelling, pain, or redness around the wound.  You have an increase in the amount of pus coming from the wound.  There is a bad smell coming from the wound.  The wound appears to be worsening/enlarging  You have a fever greater than 101.5 F      It is ok to continue current wound care treatment/products for the next 2-3 days until new wound care supplies are ordered and arrive. If longer than this please contact our office at [phone].        We want to hear from you!   In the next few weeks, you should receive a call or email to complete a survey about your visit at [facility]. Please help us improve your appointment experience by letting us know how we did today. We strive to make your experience good and value any ways in which we could do better.      We value your input and suggestions.    Thank you for choosing the [facility]!

## 2023-07-10 LAB — BACTERIA BONE ANAEROBE+AEROBE CULT: NORMAL

## 2023-07-13 NOTE — TELEPHONE ENCOUNTER
Patient's wound vac was delivered, scheduled him for nurse visit on Monday for 1st application. He is unable to come tomorrow.

## 2023-07-14 NOTE — TELEPHONE ENCOUNTER
Will stop finding homecare for now. No availibility for homecare agencies. Will plan to set up nurse visits in clinic.

## 2023-07-17 ENCOUNTER — OFFICE VISIT (OUTPATIENT)
Dept: VASCULAR SURGERY | Facility: CLINIC | Age: 58
End: 2023-07-17
Payer: COMMERCIAL

## 2023-07-17 VITALS
HEART RATE: 72 BPM | SYSTOLIC BLOOD PRESSURE: 140 MMHG | RESPIRATION RATE: 12 BRPM | DIASTOLIC BLOOD PRESSURE: 80 MMHG | TEMPERATURE: 97.9 F

## 2023-07-17 DIAGNOSIS — L97.525 ULCER OF LEFT FOOT WITH MUSCLE INVOLVEMENT WITHOUT EVIDENCE OF NECROSIS (H): Primary | ICD-10-CM

## 2023-07-17 PROCEDURE — 97607 NEG PRS WND THR NDME<=50SQCM: CPT

## 2023-07-17 ASSESSMENT — PAIN SCALES - GENERAL: PAINLEVEL: SEVERE PAIN (6)

## 2023-07-17 NOTE — PROGRESS NOTES
Post Dr Jimenez removed sutures and cleaned wound.    [facility]  -  Nurse Visit        Date of Service:  July 17, 2023     Requesting Provider: ENEIDA Jimenez    Diagnosis:     ICD-10-CM    1. Ulcer of left foot with muscle involvement without evidence of necrosis (H)  L97.525           Chief Complaint: Abdulaziz is being seen today at [facility] [place] for his wound(s) dressing change. Reports pain of 6.  Denies any fevers, chills, or generalized ill feeling. Dr Jimenez removed sutures and cleaned wound. Reviewed VAC how to lock screen and charge at night. Need to be not weight bearing. Arrives using RKW and slipper.    Dressing on Arrival: wet to drydressing, Pt is currently using none for compression and none used tolerate well. Upon removal of dressings moderate Serosanguinous drainage is noted.    New Wounds noted: No    Vital Signs: BP (!) 140/80   Pulse 72   Temp 97.9  F (36.6  C)   Resp 12     Assessment:      General:  Patient presents to clinic in no apparent distress.   Psychiatric:  Alert and oriented x3.   Lower extremity:  edema is present.    Integumentary:  Skin is mascerated.    Circumferential volume measures:       No data to display                Wound info:  Negative Pressure Wound Therapy Foot Anterior;Left (Active)       VASC Wound left plantar lateral (Active)   Pre Size Length 3.3 07/17/23 1500   Pre Size Width 1.6 07/17/23 1500   Pre Size Depth 1.8 07/17/23 1500   Pre Total Sq cm 5.28 07/17/23 1500   Description mascerated 07/17/23 1500   Product Used Negative pressure 07/17/23 1500       VASC Wound Right foot (Active)       VASC Wound Right foot (Active)       VASC Wound Right foot plantar (Active)   Pre Size Length 0 07/17/23 1500   Pre Size Width 0 07/17/23 1500   Post Size Length 3 07/07/23 0900   Post Size Width 1 07/07/23 0900   Post Size Depth 2.5 07/07/23 0900   Post Total Sq cm 3 07/07/23 0900       Incision/Surgical Site  02/17/23 Right Foot (Active)       Incision/Surgical Site 05/23/23 Left Foot (Active)       Incision/Surgical Site 07/03/23 Left Toe (Comment  which one) (Active)   Incision Assessment UTV 07/05/23 1600   Closure GOOD 07/05/23 1600   Incision Drainage Amount UTV 07/05/23 1600   Dressing Intervention Clean, dry, intact 07/05/23 1600       Undermining is not present.    The periwoundskin is maceration      Plan:         1. Patient will return in 3 days for virtual visit.            2. As listed below, treatment provided irrigation, mechanical cleansing, and dressings to promote autolytic debridement.             Cleansed with: Normal saline and soap and water    Protected skin with: 3M Cavilon    Dressings Applied to wound: Negative wound vac therapy:      Applied vac drape to the periwound skin to protect from the drainage and sponge; window pane fashion     Cut the black vac foam to fit the size of the wound. Used 1 piece(s) of black sponge total. 1 piece for bridge    Applied vac drape to wherever the bridging is going to be (NEVER apply black sponge to the intact skin)     Covered with vac drape for air tight seal. Cut a hole the size of a quarter and applied suction pad. Be mindful of the direction of the tubing.    Connected tubing and turned vac machine to 125mmHG continuous suction.     No alarm or leaks noted.      Compression Applied to the right leg: None  Compression Applied to the left leg: Tubular compression    Tubigrip Size F       Offloading used: Rolling knee walker and slipper    Trial Products: No    Provider notified regarding concerns: Yes: Dr Jimenez removed sutures and cleaned wound.    Treatment Changes: Yes: 2 times a week VAC changes    Tolerated Dressing Change:  Yes    Taught Regarding: follow up appointment(s), wound cares, elevation, offloading and compliance    Educational Barriers: No barriers      Kashif Brewster RN

## 2023-07-17 NOTE — PATIENT INSTRUCTIONS
Important lnstructions    PLEASE CALL [facility] TO GET YOUR WOUND VAC BACK. [phone]. Unable to get VAC back  IF YOU ARE UNABLE TO GET IT BACK, PLEASE LET US KNOW.     WEIGHT BEARING STATUS: You are to remain NON WEIGHT BEARING on your left foot. NON WEIGHT BEARING MEANS NO PRESSURE ON YOUR FOOT OR HEEL AT ANY TIME FOR ANY REASON!    2. OFFLOADING DEVICE: Must use a A WHEELCHAIR at all times! (do not use affected foot to push wheelchair)    3. STABILIZATION DEVICE: Use a PRAFO BOOT . You will need to WEAR THIS AT ALL TIMES EVEN WHILE IN BED.     4. ELEVATE: Elevating your leg means laying with your head on a pillow and your foot ABOVE YOUR HEART.     5. DO NOT MOVE YOUR FOOT.  There is a risk of worsening the wound or incision. To give yourself a higher chance of healing, please DO NOT swing foot back and forth and wiggle foot/toes especially when inside a stabilization device. Limited movement is allowable with therapy as recommended by the doctor.     6. TAKE A PROTEIN SHAKE TWICE A DAY.  (For ex: Boost, Ensure, Glucerna)      Dressing Change lnstructions        Until your get your wound vac, pack would with moistened gauze. Cover with dry guaze and change daily    Standard - Wound Vac Instructions  LEFT LATERAL FOOT    1. 2x weekly and as needed cleanse the area with NS    2. Pat dry    3. Apply Cavilon no sting barrier wipe to the skin surrounding the wound to protect from drainage/maceration    4. Apply drape around incision. Cut strip of drape and apply to skin if bridging is needed; plan this area in advance; should not be over bony prominence     5. Cut the foam to fit the area of the incision    6. Cut narrow strip of foam if bridging    7. Cover foam with drape to obtain air tight seal    8. Cut opening the size of a quarter for where the suction pad will be applied    9. Apply Suction pad    10. 125mmHG suction continuous    [employer] [facility] can be reached at [phone], 24 hours a day 7 days  a week     - Back up plan in place:     If the negative pressure wound therapy malfunctions or unable to maintain seal: dressing must be removed and reapplied within 2 hours of the incident. If unable to reapply negative pressure wound dressing, place Normal Saline moistened gauze in wound bed and cover with appropriate dressing to keep wound bed moist.  Change wet-to-dry dressing two times a day until healthcare staff can re-implement negative pressure therapy. Change canister at least weekly.  [facility] Contact Center can be reached at [phone], 24 hours a day 7 days a week    Information on Vacuum-Assisted Closure of a Wound  Vacuum-assisted closure (VAC) of a wound is a type of treatment to help wounds heal. It s also known as negative pressure wound therapy. During the treatment, a device lowers air pressure on the wound. This can help the wound heal more quickly.  Understanding the wound VAC system  A wound VAC system has several parts. A foam or gauze dressing is put directly on the wound. The dressing is changed every 24 to 72 hours. An adhesive film covers and seals the dressing and wound. A drainage tube leads from under the adhesive film and connects to a portable vacuum pump. This pump removes air pressure over the wound. It may do this constantly. Or it may do it in cycles. During the treatment, you ll need to carry the portable pump everywhere you go.  Why wound VAC is used  You might need this therapy for a recent traumatic wound. Or you may need it for a chronic wound. This is a wound that does not heal the way it should over time. This can happen with wounds in people who have diabetes. You may need a wound VAC if you ve had a recent skin graft. And you may need a wound VAC for a large wound. Large wounds can take a longer time to heal.  A wound vacuum system may help your wound heal more quickly by:  Draining extra fluid from the wound  Reducing swelling  Reducing bacteria in the wound  Keeping  your wound moist and warm  Helping draw together wound edges  Increasing blood flow to your wound  Decreasing inflammation  Wound VAC offers some other advantages over other types of wound care. It may decrease your overall discomfort. The dressings usually need to be changed less often. And they may be easier to keep in position.         It IS NOT ok to get your wound wet in the bath or shower    SEEK MEDICAL CARE IF:  You have an increase in swelling, pain, or redness around the wound.  You have an increase in the amount of pus coming from the wound.  There is a bad smell coming from the wound.  The wound appears to be worsening/enlarging  You have a fever greater than 101.5 F      It is ok to continue current wound care treatment/products for the next 2-3 days until new wound care supplies are ordered and arrive. If longer than this please contact our office at [phone].        We want to hear from you!   In the next few weeks, you should receive a call or email to complete a survey about your visit at [facility]. Please help us improve your appointment experience by letting us know how we did today. We strive to make your experience good and value any ways in which we could do better.      We value your input and suggestions.    Thank you for choosing the [facility]!

## 2023-07-20 ENCOUNTER — OFFICE VISIT (OUTPATIENT)
Dept: VASCULAR SURGERY | Facility: CLINIC | Age: 58
End: 2023-07-20
Attending: PODIATRIST
Payer: COMMERCIAL

## 2023-07-20 VITALS
SYSTOLIC BLOOD PRESSURE: 128 MMHG | HEART RATE: 74 BPM | TEMPERATURE: 97.8 F | DIASTOLIC BLOOD PRESSURE: 78 MMHG | RESPIRATION RATE: 18 BRPM

## 2023-07-20 DIAGNOSIS — L97.525 ULCER OF LEFT FOOT WITH MUSCLE INVOLVEMENT WITHOUT EVIDENCE OF NECROSIS (H): Primary | ICD-10-CM

## 2023-07-20 PROCEDURE — 97607 NEG PRS WND THR NDME<=50SQCM: CPT

## 2023-07-20 ASSESSMENT — PAIN SCALES - GENERAL: PAINLEVEL: NO PAIN (0)

## 2023-07-20 NOTE — PATIENT INSTRUCTIONS
If you do not have a back up plan in place: If the negative pressure wound therapy malfunctions or unable to maintain seal: dressing must be removed and reapplied within 2 hours of the incident. If unable to reapply negative pressure wound dressing, place Normal Saline moistened gauze in wound bed and cover with appropriate dressing to keep wound bed moist.  Change wet-to-dry dressing two times a day until healthcare staff can re-implement negative pressure therapy.   Change canister at least weekly.  [facility] Contact Center can be reached at [phone], 24 hours a day 7 days a week    What is V.A.C.  Therapy?   V.A.C.  Therapy is a medical device system that   promotes wound healing by delivering negative   pressure (a vacuum) to the wound through a   patented dressing and therapy unit creating an   environment that promotes the wound healing   process. This negative pressure helps draw wound   edges together, remove wound fluids and infectious   materials and promote granulation tissue formation   (the connective tissue in healing wounds).   Unlike gauze bandages that merely cover a wound,   V.A.C.  Therapy actively works to help the wound   healing process.   The V.A.C.  Therapy System helps:    Promote wound healing    Provide a moist wound healing environment    Draw wound edges together    Remove fluid and infectious materials    Reduce wound odor    Reduce the need for daily dressing changes   When should I call my clinician when on   V.A.C.  Therapy?   Immediately report to your clinician if you have any   of these symptoms:    Fever over 102     Diarrhea    Headache    Sore throat    Confusion    Sick to your stomach or throwing up    Dizziness or feel faint when you stand up    Redness around the wound    Skin itches or rash present    Wound is sore, red or swollen    Pus or bad smell from the wound    Area in or around wound feels very warm    Vacuum-Assisted Closure of a Wound  Vacuum-assisted closure  (VAC) of a wound is a type of treatment to help wounds heal. It s also known as negative pressure wound therapy. During the treatment, a device lowers air pressure on the wound. This can help the wound heal more quickly.  Understanding the wound VAC system  A wound VAC system has several parts. A foam or gauze dressing is put directly on the wound. The dressing is changed every 24 to 72 hours. An adhesive film covers and seals the dressing and wound. A drainage tube leads from under the adhesive film and connects to a portable vacuum pump. This pump removes air pressure over the wound. It may do this constantly. Or it may do it in cycles. During the treatment, you ll need to carry the portable pump everywhere you go.  Why wound VAC is used  You might need this therapy for a recent traumatic wound. Or you may need it for a chronic wound. This is a wound that does not heal the way it should over time. This can happen with wounds in people who have diabetes. You may need a wound VAC if you ve had a recent skin graft. And you may need a wound VAC for a large wound. Large wounds can take a longer time to heal.  A wound vacuum system may help your wound heal more quickly by:  Draining extra fluid from the wound  Reducing swelling  Reducing bacteria in the wound  Keeping your wound moist and warm  Helping draw together wound edges  Increasing blood flow to your wound  Decreasing inflammation  Wound VAC offers some other advantages over other types of wound care. It may decrease your overall discomfort. The dressings usually need to be changed less often. And they may be easier to keep in position.  Risks of wound VAC  Wound VAC has some rare risks, such as:  Bleeding (which may be severe)  Wound infection  An abnormal connection between the intestinal tract and the skin (enteric fistula)  Proper training in dressing changes can help reduce the risk for these complications. Also, your doctor will carefully evaluate you to  make sure you are a good candidate for the therapy. Certain problems can increase your risk for complications. These include:  Exposed organs or blood vessels  High risk of bleeding from another medical problem  Wound infection  Nearby bone infection  Dead wound tissue  Cancer tissue  Fragile skin, such as from aging or longtime use of topical steroids  Allergy to adhesive  Very poor blood flow to your wound  Wounds close to joints that may reopen because of movement  Your doctor will discuss the risks that apply to you. Make sure to talk with him or her about all of your questions and concerns.  Getting ready for wound VAC  You likely won t need to do much to get ready for wound VAC. In some cases, you may need to wait a while before having this therapy. For example, your doctor may first need to treat an infection in your wound. Dead or damaged tissue may also need to be removed from your wound.  You or a caregiver may need training on how to use the wound VAC device. This is done if you will be able to have your wound vacuum therapy at home. In other cases, you may need to have your wound vacuum therapy in a health care facility.  On the day of your procedure  A health care provider will cover your wound with foam or gauze wound dressing. An adhesive film will be put over the dressing and wound. This seals the wound. The foam connects to a drainage tube, which leads to a vacuum pump. This pump is portable. When the pump is turned on, it draws fluid through the foam and out the drainage tubing. The pump may run constantly, or it may cycle off and on. Your exact setup will depend on the specific type of wound vacuum system that you use.  Managing your wound  You may need the dressing changed about once a day. You may need it changed more or less often, depending on your wound. You or your caregiver may be trained to do this at home. Or it may be done by a visiting health care provider. Your doctor may prescribe a  pain medicine. This is to prevent or reduce pain during the dressing change.  You will likely need to use the wound VAC system for several weeks or months. During this time, you ll carry the portable pump everywhere you go.  Nutrition for wound healing  During this time, make sure you follow a healthy diet. This is needed so the wound can heal and to prevent infection. Your doctor can tell you more about what to include in your diet during this time.  follow up with your doctor if you have a medical condition that led to your wound, such as diabetes. He or she can help you prevent future wounds.  Follow-up care  Your doctor will carefully keep track of your healing. Make sure to keep all follow-up appointments.  When to call your health care provider  Call your health care provider right away if you have any of these:  Fever of 100.4 F (38.0 C) or higher  Increased redness, swelling, or warmth around wound  Increased pain  Bright red blood or blood clots in tubing or the collection chamber of the vacuum

## 2023-07-20 NOTE — PROGRESS NOTES
[facility]  -  Nurse Visit        Date of Service:  July 20, 2023     Requesting Provider: ENEIDA Jimenez    Diagnosis:     ICD-10-CM    1. Ulcer of left foot with muscle involvement without evidence of necrosis (H)  L97.525           Chief Complaint: Abdulaziz is being seen today at [facility] [place] for his left foot wound check and wound vac dressing change. Reports pain of 1.  Denies any fevers, chills, or generalized ill feeling.     Dressing on Arrival: NWPT running at 125 mmHg, Pt is currently using tubular compression for compression and did tolerate well. Upon removal of dressings moderate Serosanguinous drainage is noted.    New Wounds noted: No        Left lateral foot (amp site) 7/20        Left foot amp site 7/20    Vital Signs: /78   Pulse 74   Temp 97.8  F (36.6  C)   Resp 18     Assessment:      General:  Patient presents to clinic in no apparent distress.   Psychiatric:  Alert and oriented x3.   Lower extremity:  edema is present.    Integumentary:  Skin is WNL    Circumferential volume measures:       No data to display                Wound info:  Negative Pressure Wound Therapy Foot Anterior;Left (Active)       VASC Wound left plantar lateral (Active)   Pre Size Length 3.2 07/20/23 1300   Pre Size Width 1.2 07/20/23 1300   Pre Size Depth 1.4 07/20/23 1300   Pre Total Sq cm 3.84 07/20/23 1300   Description pink 07/20/23 1300   Product Used Negative pressure 07/20/23 1300       VASC Wound Right foot (Active)       VASC Wound Right foot (Active)       VASC Wound Right foot plantar (Active)   Pre Size Length 0 07/17/23 1500   Pre Size Width 0 07/17/23 1500   Post Size Length 3 07/07/23 0900   Post Size Width 1 07/07/23 0900   Post Size Depth 2.5 07/07/23 0900   Post Total Sq cm 3 07/07/23 0900       Incision/Surgical Site 02/17/23 Right Foot (Active)       Incision/Surgical Site 05/23/23 Left Foot (Active)       Incision/Surgical Site 07/03/23 Left  Toe (Comment  which one) (Active)   Incision Assessment UTV 07/05/23 1600   Closure GOOD 07/05/23 1600   Incision Drainage Amount UTV 07/05/23 1600   Dressing Intervention Clean, dry, intact 07/05/23 1600       Undermining is not present.    The periwoundskin is maceration/pink      Plan:         1. Patient will return in 5 days for nurse visit.            2. As listed below, treatment provided irrigation, mechanical cleansing, and dressings to promote autolytic debridement.             Cleansed with: Normal saline    Protected skin with: 3M Cavilon    Dressings Applied to wound: Negative wound vac therapy:      Applied vac drape to the periwound skin to protect from the drainage and sponge; window pane fashion     Cut the black vac foam to fit the size of the wound. Used 1 piece(s) of black sponge total.     Applied vac drape to wherever the bridging is going to be (NEVER apply black sponge to the intact skin)     Covered with vac drape for air tight seal. Cut a hole the size of a quarter and applied suction pad. Be mindful of the direction of the tubing.    Connected tubing and turned vac machine to 125mmHG continuous suction.     No alarm or leaks noted.      Compression Applied to the right leg: None  Compression Applied to the left leg: Tubular compression    Tubigrip Size F       Offloading used: Rolling knee walker/slipper    Trial Products: No    Provider notified regarding concerns: No    Treatment Changes: No    Tolerated Dressing Change:  Yes    Taught Regarding: follow up appointment(s), wound cares, compression, elevation, offloading, compliance and signs of infection    Educational Barriers: No barriers      Veronica Mckinley RN, WTA-C, CF

## 2023-07-25 ENCOUNTER — OFFICE VISIT (OUTPATIENT)
Dept: VASCULAR SURGERY | Facility: CLINIC | Age: 58
End: 2023-07-25
Attending: PODIATRIST
Payer: COMMERCIAL

## 2023-07-25 VITALS
DIASTOLIC BLOOD PRESSURE: 84 MMHG | HEART RATE: 96 BPM | SYSTOLIC BLOOD PRESSURE: 148 MMHG | TEMPERATURE: 98.2 F | RESPIRATION RATE: 16 BRPM

## 2023-07-25 DIAGNOSIS — L97.525 ULCER OF LEFT FOOT WITH MUSCLE INVOLVEMENT WITHOUT EVIDENCE OF NECROSIS (H): Primary | ICD-10-CM

## 2023-07-25 PROCEDURE — 97607 NEG PRS WND THR NDME<=50SQCM: CPT

## 2023-07-25 ASSESSMENT — PAIN SCALES - GENERAL: PAINLEVEL: NO PAIN (1)

## 2023-07-25 NOTE — PROGRESS NOTES
[facility]  -  Nurse Visit        Date of Service:  July 25, 2023     Requesting Provider: ENEIDA Jimenez    Diagnosis:     ICD-10-CM    1. Ulcer of left foot with muscle involvement without evidence of necrosis (H)  L97.525           Chief Complaint: Abdulaziz is being seen today at [facility] [place] for his wound(s) dressing change. Reports pain of 1.  Denies any fevers, chills, or generalized ill feeling.     Dressing on Arrival: NPWT, Pt is currently using tubigrip for compression. Pt had pushed this down to ankle and it had rolled up on foot. Upon removal of dressings scant Serous drainage is noted.    New Wounds noted: No    Vital Signs: BP (!) 148/84   Pulse 96   Temp 98.2  F (36.8  C)   Resp 16     Assessment:      General:  Patient presents to clinic in no apparent distress.   Psychiatric:  Alert and oriented x3.   Lower extremity:  edema is present- from poorly wearing tubigrip  Integumentary:  Skin is intact      Wound info:  Negative Pressure Wound Therapy Foot Anterior;Left (Active)       VASC Wound left plantar lateral (Active)   Pre Size Length 3 07/25/23 1200   Pre Size Width 0.7 07/25/23 1200   Pre Size Depth 1.4 07/25/23 1200   Pre Total Sq cm 2.1 07/25/23 1200   Description pink 07/20/23 1300   Product Used Negative pressure 07/20/23 1300           Undermining is not present.    The periwoundskin is  very macerated      Plan:         1. Patient will return in 3 days for visit with podiatrist and VAC change.            2. As listed below, treatment provided irrigation, mechanical cleansing, and dressings to promote autolytic debridement.             Cleansed with: Normal saline    Protected skin with:  stoma powder and Cavilon no-sting skin barrier    Dressings Applied to wound: NPWT: 1 sponge and 1 bridge removed, 1 sponge and 1 bridge placed    Compression Applied to the right leg: None  Compression Applied to the left leg: None      Offloading used:  Rolling knee walker    Trial Products: No    Provider notified regarding concerns: No    Treatment Changes: No    Tolerated Dressing Change:  Yes    Taught Regarding: follow up appointment(s) and wound cares    Educational Barriers: No barriers      MONCHO SHRESTHA RN CWOCN, CFCN

## 2023-07-25 NOTE — PATIENT INSTRUCTIONS
If you do not have a back up plan in place: If the negative pressure wound therapy malfunctions or unable to maintain seal: dressing must be removed and reapplied within 2 hours of the incident. If unable to reapply negative pressure wound dressing, place Normal Saline moistened gauze in wound bed and cover with appropriate dressing to keep wound bed moist.  Change wet-to-dry dressing two times a day until healthcare staff can re-implement negative pressure therapy.   Change canister at least weekly.  [facility] Contact Center can be reached at [phone], 24 hours a day 7 days a week    What is V.A.C.  Therapy?   V.A.C.  Therapy is a medical device system that   promotes wound healing by delivering negative   pressure (a vacuum) to the wound through a   patented dressing and therapy unit creating an   environment that promotes the wound healing   process. This negative pressure helps draw wound   edges together, remove wound fluids and infectious   materials and promote granulation tissue formation   (the connective tissue in healing wounds).   Unlike gauze bandages that merely cover a wound,   V.A.C.  Therapy actively works to help the wound   healing process.   The V.A.C.  Therapy System helps:    Promote wound healing    Provide a moist wound healing environment    Draw wound edges together    Remove fluid and infectious materials    Reduce wound odor    Reduce the need for daily dressing changes   When should I call my clinician when on   V.A.C.  Therapy?   Immediately report to your clinician if you have any   of these symptoms:    Fever over 102     Diarrhea    Headache    Sore throat    Confusion    Sick to your stomach or throwing up    Dizziness or feel faint when you stand up    Redness around the wound    Skin itches or rash present    Wound is sore, red or swollen    Pus or bad smell from the wound    Area in or around wound feels very warm    Vacuum-Assisted Closure of a Wound  Vacuum-assisted closure  (VAC) of a wound is a type of treatment to help wounds heal. It s also known as negative pressure wound therapy. During the treatment, a device lowers air pressure on the wound. This can help the wound heal more quickly.  Understanding the wound VAC system  A wound VAC system has several parts. A foam or gauze dressing is put directly on the wound. The dressing is changed every 24 to 72 hours. An adhesive film covers and seals the dressing and wound. A drainage tube leads from under the adhesive film and connects to a portable vacuum pump. This pump removes air pressure over the wound. It may do this constantly. Or it may do it in cycles. During the treatment, you ll need to carry the portable pump everywhere you go.  Why wound VAC is used  You might need this therapy for a recent traumatic wound. Or you may need it for a chronic wound. This is a wound that does not heal the way it should over time. This can happen with wounds in people who have diabetes. You may need a wound VAC if you ve had a recent skin graft. And you may need a wound VAC for a large wound. Large wounds can take a longer time to heal.  A wound vacuum system may help your wound heal more quickly by:  Draining extra fluid from the wound  Reducing swelling  Reducing bacteria in the wound  Keeping your wound moist and warm  Helping draw together wound edges  Increasing blood flow to your wound  Decreasing inflammation  Wound VAC offers some other advantages over other types of wound care. It may decrease your overall discomfort. The dressings usually need to be changed less often. And they may be easier to keep in position.  Risks of wound VAC  Wound VAC has some rare risks, such as:  Bleeding (which may be severe)  Wound infection  An abnormal connection between the intestinal tract and the skin (enteric fistula)  Proper training in dressing changes can help reduce the risk for these complications. Also, your doctor will carefully evaluate you to  make sure you are a good candidate for the therapy. Certain problems can increase your risk for complications. These include:  Exposed organs or blood vessels  High risk of bleeding from another medical problem  Wound infection  Nearby bone infection  Dead wound tissue  Cancer tissue  Fragile skin, such as from aging or longtime use of topical steroids  Allergy to adhesive  Very poor blood flow to your wound  Wounds close to joints that may reopen because of movement  Your doctor will discuss the risks that apply to you. Make sure to talk with him or her about all of your questions and concerns.  Getting ready for wound VAC  You likely won t need to do much to get ready for wound VAC. In some cases, you may need to wait a while before having this therapy. For example, your doctor may first need to treat an infection in your wound. Dead or damaged tissue may also need to be removed from your wound.  You or a caregiver may need training on how to use the wound VAC device. This is done if you will be able to have your wound vacuum therapy at home. In other cases, you may need to have your wound vacuum therapy in a health care facility.  On the day of your procedure  A health care provider will cover your wound with foam or gauze wound dressing. An adhesive film will be put over the dressing and wound. This seals the wound. The foam connects to a drainage tube, which leads to a vacuum pump. This pump is portable. When the pump is turned on, it draws fluid through the foam and out the drainage tubing. The pump may run constantly, or it may cycle off and on. Your exact setup will depend on the specific type of wound vacuum system that you use.  Managing your wound  You may need the dressing changed about once a day. You may need it changed more or less often, depending on your wound. You or your caregiver may be trained to do this at home. Or it may be done by a visiting health care provider. Your doctor may prescribe a  pain medicine. This is to prevent or reduce pain during the dressing change.  You will likely need to use the wound VAC system for several weeks or months. During this time, you ll carry the portable pump everywhere you go.  Nutrition for wound healing  During this time, make sure you follow a healthy diet. This is needed so the wound can heal and to prevent infection. Your doctor can tell you more about what to include in your diet during this time.  follow up with your doctor if you have a medical condition that led to your wound, such as diabetes. He or she can help you prevent future wounds.  Follow-up care  Your doctor will carefully keep track of your healing. Make sure to keep all follow-up appointments.  When to call your health care provider  Call your health care provider right away if you have any of these:  Fever of 100.4 F (38.0 C) or higher  Increased redness, swelling, or warmth around wound  Increased pain  Bright red blood or blood clots in tubing or the collection chamber of the vacuum

## 2023-07-28 ENCOUNTER — OFFICE VISIT (OUTPATIENT)
Dept: VASCULAR SURGERY | Facility: CLINIC | Age: 58
End: 2023-07-28
Attending: PODIATRIST
Payer: COMMERCIAL

## 2023-07-28 ENCOUNTER — LAB (OUTPATIENT)
Dept: LAB | Facility: CLINIC | Age: 58
End: 2023-07-28
Payer: COMMERCIAL

## 2023-07-28 VITALS
SYSTOLIC BLOOD PRESSURE: 158 MMHG | TEMPERATURE: 98 F | OXYGEN SATURATION: 98 % | HEART RATE: 96 BPM | DIASTOLIC BLOOD PRESSURE: 85 MMHG

## 2023-07-28 DIAGNOSIS — Z11.59 NEED FOR HEPATITIS C SCREENING TEST: ICD-10-CM

## 2023-07-28 DIAGNOSIS — Z11.4 SCREENING FOR HIV (HUMAN IMMUNODEFICIENCY VIRUS): Primary | ICD-10-CM

## 2023-07-28 DIAGNOSIS — L97.525 ULCER OF LEFT FOOT WITH MUSCLE INVOLVEMENT WITHOUT EVIDENCE OF NECROSIS (H): ICD-10-CM

## 2023-07-28 DIAGNOSIS — L97.525 ULCER OF LEFT FOOT WITH MUSCLE INVOLVEMENT WITHOUT EVIDENCE OF NECROSIS (H): Primary | ICD-10-CM

## 2023-07-28 LAB
HBA1C MFR BLD: 8.8 % (ref 0–5.6)
HCV AB SERPL QL IA: NONREACTIVE
HIV 1+2 AB+HIV1 P24 AG SERPL QL IA: NONREACTIVE

## 2023-07-28 PROCEDURE — 83036 HEMOGLOBIN GLYCOSYLATED A1C: CPT

## 2023-07-28 PROCEDURE — 86803 HEPATITIS C AB TEST: CPT

## 2023-07-28 PROCEDURE — 36415 COLL VENOUS BLD VENIPUNCTURE: CPT

## 2023-07-28 PROCEDURE — 11043 DBRDMT MUSC&/FSCA 1ST 20/<: CPT | Performed by: PODIATRIST

## 2023-07-28 PROCEDURE — 87389 HIV-1 AG W/HIV-1&-2 AB AG IA: CPT

## 2023-07-28 PROCEDURE — 97607 NEG PRS WND THR NDME<=50SQCM: CPT

## 2023-07-28 PROCEDURE — 97605 NEG PRS WND THER DME<=50SQCM: CPT | Mod: RT | Performed by: PODIATRIST

## 2023-07-28 NOTE — PATIENT INSTRUCTIONS
Continue with wound vac changes in nurse visit clinic.     If you do not have a back up plan in place: If the negative pressure wound therapy malfunctions or unable to maintain seal: dressing must be removed and reapplied within 2 hours of the incident. If unable to reapply negative pressure wound dressing, place Normal Saline moistened gauze in wound bed and cover with appropriate dressing to keep wound bed moist.  Change wet-to-dry dressing two times a day until healthcare staff can re-implement negative pressure therapy.   Change canister at least weekly.  [facility] Contact Center can be reached at [phone], 24 hours a day 7 days a week    What is V.A.C.  Therapy?   V.A.C.  Therapy is a medical device system that   promotes wound healing by delivering negative   pressure (a vacuum) to the wound through a   patented dressing and therapy unit creating an   environment that promotes the wound healing   process. This negative pressure helps draw wound   edges together, remove wound fluids and infectious   materials and promote granulation tissue formation   (the connective tissue in healing wounds).   Unlike gauze bandages that merely cover a wound,   V.A.C.  Therapy actively works to help the wound   healing process.   The V.A.C.  Therapy System helps:    Promote wound healing    Provide a moist wound healing environment    Draw wound edges together    Remove fluid and infectious materials    Reduce wound odor    Reduce the need for daily dressing changes   When should I call my clinician when on   V.A.C.  Therapy?   Immediately report to your clinician if you have any   of these symptoms:    Fever over 102     Diarrhea    Headache    Sore throat    Confusion    Sick to your stomach or throwing up    Dizziness or feel faint when you stand up    Redness around the wound    Skin itches or rash present    Wound is sore, red or swollen    Pus or bad smell from the wound    Area in or around wound feels very  warm    Vacuum-Assisted Closure of a Wound  Vacuum-assisted closure (VAC) of a wound is a type of treatment to help wounds heal. It s also known as negative pressure wound therapy. During the treatment, a device lowers air pressure on the wound. This can help the wound heal more quickly.  Understanding the wound VAC system  A wound VAC system has several parts. A foam or gauze dressing is put directly on the wound. The dressing is changed every 24 to 72 hours. An adhesive film covers and seals the dressing and wound. A drainage tube leads from under the adhesive film and connects to a portable vacuum pump. This pump removes air pressure over the wound. It may do this constantly. Or it may do it in cycles. During the treatment, you ll need to carry the portable pump everywhere you go.  Why wound VAC is used  You might need this therapy for a recent traumatic wound. Or you may need it for a chronic wound. This is a wound that does not heal the way it should over time. This can happen with wounds in people who have diabetes. You may need a wound VAC if you ve had a recent skin graft. And you may need a wound VAC for a large wound. Large wounds can take a longer time to heal.  A wound vacuum system may help your wound heal more quickly by:  Draining extra fluid from the wound  Reducing swelling  Reducing bacteria in the wound  Keeping your wound moist and warm  Helping draw together wound edges  Increasing blood flow to your wound  Decreasing inflammation  Wound VAC offers some other advantages over other types of wound care. It may decrease your overall discomfort. The dressings usually need to be changed less often. And they may be easier to keep in position.  Risks of wound VAC  Wound VAC has some rare risks, such as:  Bleeding (which may be severe)  Wound infection  An abnormal connection between the intestinal tract and the skin (enteric fistula)  Proper training in dressing changes can help reduce the risk for  these complications. Also, your doctor will carefully evaluate you to make sure you are a good candidate for the therapy. Certain problems can increase your risk for complications. These include:  Exposed organs or blood vessels  High risk of bleeding from another medical problem  Wound infection  Nearby bone infection  Dead wound tissue  Cancer tissue  Fragile skin, such as from aging or longtime use of topical steroids  Allergy to adhesive  Very poor blood flow to your wound  Wounds close to joints that may reopen because of movement  Your doctor will discuss the risks that apply to you. Make sure to talk with him or her about all of your questions and concerns.  Getting ready for wound VAC  You likely won t need to do much to get ready for wound VAC. In some cases, you may need to wait a while before having this therapy. For example, your doctor may first need to treat an infection in your wound. Dead or damaged tissue may also need to be removed from your wound.  You or a caregiver may need training on how to use the wound VAC device. This is done if you will be able to have your wound vacuum therapy at home. In other cases, you may need to have your wound vacuum therapy in a health care facility.  On the day of your procedure  A health care provider will cover your wound with foam or gauze wound dressing. An adhesive film will be put over the dressing and wound. This seals the wound. The foam connects to a drainage tube, which leads to a vacuum pump. This pump is portable. When the pump is turned on, it draws fluid through the foam and out the drainage tubing. The pump may run constantly, or it may cycle off and on. Your exact setup will depend on the specific type of wound vacuum system that you use.  Managing your wound  You may need the dressing changed about once a day. You may need it changed more or less often, depending on your wound. You or your caregiver may be trained to do this at home. Or it may be  done by a visiting health care provider. Your doctor may prescribe a pain medicine. This is to prevent or reduce pain during the dressing change.  You will likely need to use the wound VAC system for several weeks or months. During this time, you ll carry the portable pump everywhere you go.  Nutrition for wound healing  During this time, make sure you follow a healthy diet. This is needed so the wound can heal and to prevent infection. Your doctor can tell you more about what to include in your diet during this time.  follow up with your doctor if you have a medical condition that led to your wound, such as diabetes. He or she can help you prevent future wounds.  Follow-up care  Your doctor will carefully keep track of your healing. Make sure to keep all follow-up appointments.  When to call your health care provider  Call your health care provider right away if you have any of these:  Fever of 100.4 F (38.0 C) or higher  Increased redness, swelling, or warmth around wound  Increased pain  Bright red blood or blood clots in tubing or the collection chamber of the vacuum

## 2023-07-28 NOTE — PROGRESS NOTES
NPWT applied: stoma powder and cavilon no-sting skin barrier applied to fely wound. 1 sponge, 1 bridge, 1/2 barrier ring placed. Achieve -125mmHg.

## 2023-07-28 NOTE — PROGRESS NOTES
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  There is continued increased depth and I recommend continue with wound VAC at this time with nonweightbearing.    -After discussion of risk factors, nursing staff removed dressing, cleansed wound and consent obtained 2% Lidocaine HCL jelly was applied, under clean conditions, the right foot ulceration(s) were debrided using currette.  Devitalized and nonviable tissue, along with any fibrin and slough, was removed to improve granulation tissue formation, stimulate wound healing, decrease overall bacteria load, disrupt biofilm formation and decrease edge senescence. Wound drainage was scant No. Total excisional debridement was 1 sq cm into the muscle/fascia with a depth of 1.7 cm.   Ulcers were improved afterwards and .  Measures were as noted on the flow sheet.  Wound VAC applied to the right foot today.    -He will follow-up in 2 to 3 weeks    Ulises Jimenez DPM  [facility]      HPI: Abdulaziz Rausch was seen again today right foot ulceration.  Patient is using wound VAC as directed remain nonweightbearing on the right foot.    Past Medical History:   Diagnosis Date    Anemia     Depression     Diabetes mellitus, type 2 (H)     Diabetic foot infection (H) 7/1/2023    Diabetic foot ulcer (H)     History of MRSA infection 7/1/2023 Jan 2023 RIGHT foot wound    Hypertension     Non-healing ulcer of left foot (H) 7/1/2023    Osteomyelitis (H)        Past Surgical History:   Procedure Laterality Date    AMPUTATE FOOT Left 7/3/2023    Procedure: partial left fifth ray amputation, skin flap creation and closre, left foot;  Surgeon: Alejandro Mccoy DPM;  Location: [facility] OR    AMPUTATE TOE(S) Right 2/17/2023     Procedure: AMPUTATION, digits two and three right foot;  Surgeon: Ulises Jimenez DPM;  Location: [facility] OR    INCISION AND DRAINAGE FOOT, COMBINED Left 5/23/2023    Procedure: Incision and drainage of abscess left foot with debridement and irrigation of wound left foot;  Surgeon: Jf Huerta DPM;  Location: [facility] OR    IRRIGATION AND DEBRIDEMENT TOE, COMBINED Right 1/20/2023    Procedure: IRRIGATION AND DEBRIDEMENT digits 2 and 3 right foot;  Surgeon: Jf Huerta DPM;  Location: [facility] OR    PICC SINGLE LUMEN PLACEMENT  5/25/2023            No Known Allergies      Current Outpatient Medications:     acetaminophen (TYLENOL) 325 MG tablet, Take 650 mg by mouth every 6 hours as needed for pain, Disp: , Rfl:     alcohol swab prep pads, Use to swab area of injection/ella as directed., Disp: 100 each, Rfl: 3    blood glucose (NO BRAND SPECIFIED) test strip, Use to test blood sugar 4 times daily or as directed. To accompany: Blood Glucose Monitor Brands: per insurance., Disp: 100 strip, Rfl: 6    blood glucose monitoring (NO BRAND SPECIFIED) meter device kit, Use to test blood sugar 4 time daily or as directed. Preferred blood glucose meter OR supplies to accompany: Blood Glucose Monitor Brands: per insurance., Disp: 1 kit, Rfl: 0    Continuous Blood Gluc  (FREESTYLE NOAH 2 READER) CHUCK, Use to read blood sugars as per 's instructions., Disp: 1 each, Rfl: 0    Continuous Blood Gluc Sensor (FREESTYLE NOAH 2 SENSOR) [facility], Change every 14 days., Disp: 2 each, Rfl: 5    empagliflozin (JARDIANCE) 25 MG TABS tablet, Take 1 tablet (25 mg) by mouth daily, Disp: 90 tablet, Rfl: 1    gabapentin (NEURONTIN) 300 MG capsule, Take 300 mg by mouth 2 times daily Given at 0800 and 1600, Disp: , Rfl:     insulin glargine (LANTUS SOLOSTAR) 100 UNIT/ML pen, Inject 20 Units Subcutaneous At Bedtime, Disp: 15 mL, Rfl:     insulin lispro (HUMALOG KWIKPEN) 100 UNIT/ML (1 unit dial)  KWIKPEN, Inject 6 Units Subcutaneous 3 times daily (before meals), Disp: 15 mL, Rfl: 4    insulin pen needle (31G X 8 MM) 31G X 8 MM miscellaneous, Use 4 pen needles daily or as directed., Disp: 400 each, Rfl: 3    metFORMIN (GLUCOPHAGE) 500 MG tablet, Take 2 tablets (1,000 mg) by mouth 2 times daily (with meals), Disp: 360 tablet, Rfl: 3    thin (NO BRAND SPECIFIED) lancets, Use with lanceting device. To accompany: Blood Glucose Monitor Brands: per insurance., Disp: 100 each, Rfl: 6    sulfamethoxazole-trimethoprim (BACTRIM DS) 800-160 MG tablet, Take 1 tablet by mouth 2 times daily (Patient not taking: Reported on 7/28/2023), Disp: 20 tablet, Rfl: 0    Review of Systems - 10 point Review of Systems is negative except for right foot ulcer which is noted in HPI.      OBJECTIVE:  BP (!) 172/92   Pulse 96   Temp 98  F (36.7  C)   SpO2 98%   General appearance: Patient is alert and fully cooperative with history & exam.  No sign of distress is noted during the visit.    Vascular: Dorsalis pedis non-palpableRight.  Dermatologic:    Negative Pressure Wound Therapy Foot Anterior;Left (Active)       VASC Wound left plantar lateral (Active)   Pre Size Length 2 07/28/23 1000   Pre Size Width 0.5 07/28/23 1000   Pre Size Depth 1.7 07/28/23 1000   Pre Total Sq cm 1 07/28/23 1000   Description macerated 07/28/23 1000   Product Used Negative pressure 07/28/23 1000       VASC Wound Right foot (Active)       VASC Wound Right foot (Active)       VASC Wound Right foot plantar (Active)   Pre Size Length 0 07/17/23 1500   Pre Size Width 0 07/17/23 1500   Post Size Length 3 07/07/23 0900   Post Size Width 1 07/07/23 0900   Post Size Depth 2.5 07/07/23 0900   Post Total Sq cm 3 07/07/23 0900       Incision/Surgical Site 02/17/23 Right Foot (Active)       Incision/Surgical Site 05/23/23 Left Foot (Active)       Incision/Surgical Site 07/03/23 Left Toe (Comment  which one) (Active)   Incision Assessment UTV 07/05/23 1600   Closure GOOD  07/05/23 1600   Incision Drainage Amount UTV 07/05/23 1600   Dressing Intervention Clean, dry, intact 07/05/23 1600   Granular tissue lateral right foot ulceration with increased depth, no erythema.  Neurologic: Diminished to light touch Right.  Musculoskeletal: Contracted digits noted Right.    Imaging:     XR Foot Port Left 2 Views    Result Date: 7/3/2023  FOOT PORTABLE LEFT TWO VIEWS 7/3/2023 1:50 PM HISTORY: Postop; new baseline. COMPARISON: 7/1/2023.     IMPRESSION: Interval amputation of the fifth metatarsal at level of the shaft. Resection margin is smooth. Postoperative soft tissue swelling. TRI FLEMING MD   SYSTEM ID:  [id number]    MR Foot Left w/o & w Contrast    Result Date: 7/1/2023  EXAM: MR FOOT LEFT WITHOUT AND WITH CONTRAST LOCATION: [facility] DATE: 7/1/2023 INDICATION: Left 5th MTP osteo? Non-healing diabetic wound. COMPARISON: None. TECHNIQUE: Routine. Additional postgadolinium T1 sequences were obtained. IV CONTRAST: 6 mL Gadavist. FINDINGS: JOINTS AND BONES: -Osteomyelitis involving the fifth metatarsal head. Osteomyelitis involving the proximal phalanx of the small toe. No additional areas worrisome for osteomyelitis. No evidence for fracture. No significant effusion to suggest septic arthropathy. TENDONS: -The flexor and extensor tendons are negative for tendinopathy, tenosynovitis, or tearing. The hallucis tendons are intact but there is flexor hallucis tendinopathy. LIGAMENTS: -The ligament of Lisfranc is intact. The collateral ligaments at the first through fourth MTP joints are intact. The medial collateral ligament at the fifth MTP joint is identified but the lateral collateral ligament appears disrupted. This is at the base of a surface ulceration which extends down to the bone margin and joint margin. MUSCLES AND SOFT TISSUES: -Surface ulceration along the lateral aspect of the forefoot adjacent to the fifth metatarsal head and MTP joint. Surrounding  edema or cellulitis. No evidence for abscess. There is some reactive edema versus infectious or inflammatory myositis within the  plantar and interosseous musculature.     IMPRESSION: 1.  Ulceration along the lateral aspect of the forefoot extends down to the bone margin of the fifth metatarsal head and lateral margin of the fifth MTP joint. Surrounding edema or cellulitis but no evidence for abscess. 2.  Osteomyelitis involving the fifth metatarsal head and proximal phalanx of the small toe. 3.  No significant  effusion to suggest septic arthropathy. 4.  No additional areas worrisome for osteomyelitis or septic arthropathy. 5.  No evidence for fracture. 6.  Flexor hallucis tendon tendinopathy without tearing. 7.  Exam otherwise negative.     Foot XR, G/E 3 views, left    Result Date: 7/1/2023  EXAM: XR FOOT LEFT G/E 3 VIEWS LOCATION: [facility] DATE: 6/30/2023 INDICATION: Pain, swelling. COMPARISON: 5/21/2023     IMPRESSION: Negative for fracture or dislocation. Arterial calcifications. Wound VAC in place overlying the left fifth MTP joint. There is some relative lucency seen in the lateral aspect of the head of the fifth metatarsal which appears new from previous. This would be concerning for potential underlying osteomyelitis. Further evaluation with MRI would be helpful.         Picture:

## 2023-07-31 NOTE — RESULT ENCOUNTER NOTE
Abdulaziz,  Your results from your recent clinic visit show:  Your HIV is normal or negative  Your Hep c screen is normal or negative    If you have more questions please call the clinic at [phone] or send me a [employer] message    Dr. Daryn Walton

## 2023-08-01 ENCOUNTER — OFFICE VISIT (OUTPATIENT)
Dept: VASCULAR SURGERY | Facility: CLINIC | Age: 58
End: 2023-08-01
Attending: PODIATRIST
Payer: COMMERCIAL

## 2023-08-01 VITALS
DIASTOLIC BLOOD PRESSURE: 88 MMHG | OXYGEN SATURATION: 98 % | RESPIRATION RATE: 16 BRPM | SYSTOLIC BLOOD PRESSURE: 148 MMHG | TEMPERATURE: 97.8 F | HEART RATE: 100 BPM

## 2023-08-01 DIAGNOSIS — L97.525 ULCER OF LEFT FOOT WITH MUSCLE INVOLVEMENT WITHOUT EVIDENCE OF NECROSIS (H): Primary | ICD-10-CM

## 2023-08-01 PROCEDURE — 97607 NEG PRS WND THR NDME<=50SQCM: CPT

## 2023-08-01 ASSESSMENT — PAIN SCALES - GENERAL: PAINLEVEL: MODERATE PAIN (5)

## 2023-08-01 NOTE — PROGRESS NOTES
[facility]  -  Nurse Visit        Date of Service:  August 1, 2023     Requesting Provider: ENEIDA Jimenez    Diagnosis:     ICD-10-CM    1. Ulcer of left foot with muscle involvement without evidence of necrosis (H)  L97.525           Chief Complaint: Abdulaziz is being seen today at [facility] [place] for his wound(s) dressing change. Reports pain of 5.  Denies any fevers, chills, or generalized ill feeling.     Dressing on Arrival: NPWT, Pt is currently using no compression. VAC dressing had peeled up this morning causing leakage. Significant macerated noted under drape, early stages of heat/moisture rash from sweating and VAC drape. Patient does wear a sock and very thick slipper- encouraged patient to keep his foot cool. Upon removal of dressings scant Serous drainage is noted.    New Wounds noted: No    Vital Signs: BP (!) 148/88   Pulse 100   Temp 97.8  F (36.6  C)   Resp 16   SpO2 98%     Assessment:      General:  Patient presents to clinic in no apparent distress.   Psychiatric:  Alert and oriented x3.   Lower extremity:  edema is not present.    Integumentary:  Skin is intact    Wound info:  Negative Pressure Wound Therapy Foot Anterior;Left (Active)       VASC Wound left plantar lateral (Active)   Pre Size Length 2.3 08/01/23 1200   Pre Size Width 0.9 08/01/23 1200   Pre Size Depth 1.5 08/01/23 1200   Pre Total Sq cm 2.07 08/01/23 1200   Description macerated 07/28/23 1000   Product Used Negative pressure 07/28/23 1000       VASC Wound Right foot (Active)       VASC Wound Right foot (Active)       VASC Wound Right foot plantar (Active)   Pre Size Length 0 07/17/23 1500   Pre Size Width 0 07/17/23 1500   Post Size Length 3 07/07/23 0900   Post Size Width 1 07/07/23 0900   Post Size Depth 2.5 07/07/23 0900   Post Total Sq cm 3 07/07/23 0900       Incision/Surgical Site 02/17/23 Right Foot (Active)       Incision/Surgical Site 05/23/23 Left Foot (Active)        Incision/Surgical Site 07/03/23 Left Toe (Comment  which one) (Active)   Incision Assessment UTV 07/05/23 1600   Closure GOOD 07/05/23 1600   Incision Drainage Amount UTV 07/05/23 1600   Dressing Intervention Clean, dry, intact 07/05/23 1600       Undermining is not present.    The periwoundskin is  very macerated, pink, pinpoint areas of deeper pink from rash starting      Plan:         1. Patient will return in 3 days for nurse visit.            2. As listed below, treatment provided irrigation, mechanical cleansing, and dressings to promote autolytic debridement.             Cleansed with: Normal saline    Protected skin with: stoma powder and cavilon no-sting skin barrier x2    Dressings Applied to wound: Negative wound vac therapy:      Applied vac drape to the periwound skin to protect from the drainage and sponge; window pane fashion   Cut the black vac foam to fit the size of the wound. Used 1 piece(s) of black sponge, 1 bridge, used 3/4 barrier ring around entire wound  Applied vac drape to wherever the bridging is going to be (NEVER apply black sponge to the intact skin)   Covered with vac drape for air tight seal. Cut a hole the size of a quarter and applied suction pad. Be mindful of the direction of the tubing.  Connected tubing and turned vac machine to 125mmHG continuous suction.   No alarm or leaks noted.    Compression Applied to the right leg: None  Compression Applied to the left leg: None    Offloading used: Rolling knee walker    Trial Products: No    Provider notified regarding concerns: No    Treatment Changes: No    Tolerated Dressing Change:  Yes    Taught Regarding: follow up appointment(s) and wound cares    Educational Barriers: No barriers      MONCHO SHRESTHA RN CWOCN, CFCN

## 2023-08-01 NOTE — PATIENT INSTRUCTIONS
Continue with wound vac changes in nurse visit clinic.     If you do not have a back up plan in place: If the negative pressure wound therapy malfunctions or unable to maintain seal: dressing must be removed and reapplied within 2 hours of the incident. If unable to reapply negative pressure wound dressing, place Normal Saline moistened gauze in wound bed and cover with appropriate dressing to keep wound bed moist.  Change wet-to-dry dressing two times a day until healthcare staff can re-implement negative pressure therapy.   Change canister at least weekly.  [facility] Contact Center can be reached at [phone], 24 hours a day 7 days a week    What is V.A.C.  Therapy?   V.A.C.  Therapy is a medical device system that   promotes wound healing by delivering negative   pressure (a vacuum) to the wound through a   patented dressing and therapy unit creating an   environment that promotes the wound healing   process. This negative pressure helps draw wound   edges together, remove wound fluids and infectious   materials and promote granulation tissue formation   (the connective tissue in healing wounds).   Unlike gauze bandages that merely cover a wound,   V.A.C.  Therapy actively works to help the wound   healing process.   The V.A.C.  Therapy System helps:    Promote wound healing    Provide a moist wound healing environment    Draw wound edges together    Remove fluid and infectious materials    Reduce wound odor    Reduce the need for daily dressing changes   When should I call my clinician when on   V.A.C.  Therapy?   Immediately report to your clinician if you have any   of these symptoms:    Fever over 102     Diarrhea    Headache    Sore throat    Confusion    Sick to your stomach or throwing up    Dizziness or feel faint when you stand up    Redness around the wound    Skin itches or rash present    Wound is sore, red or swollen    Pus or bad smell from the wound    Area in or around wound feels very  warm    Vacuum-Assisted Closure of a Wound  Vacuum-assisted closure (VAC) of a wound is a type of treatment to help wounds heal. It s also known as negative pressure wound therapy. During the treatment, a device lowers air pressure on the wound. This can help the wound heal more quickly.  Understanding the wound VAC system  A wound VAC system has several parts. A foam or gauze dressing is put directly on the wound. The dressing is changed every 24 to 72 hours. An adhesive film covers and seals the dressing and wound. A drainage tube leads from under the adhesive film and connects to a portable vacuum pump. This pump removes air pressure over the wound. It may do this constantly. Or it may do it in cycles. During the treatment, you ll need to carry the portable pump everywhere you go.  Why wound VAC is used  You might need this therapy for a recent traumatic wound. Or you may need it for a chronic wound. This is a wound that does not heal the way it should over time. This can happen with wounds in people who have diabetes. You may need a wound VAC if you ve had a recent skin graft. And you may need a wound VAC for a large wound. Large wounds can take a longer time to heal.  A wound vacuum system may help your wound heal more quickly by:  Draining extra fluid from the wound  Reducing swelling  Reducing bacteria in the wound  Keeping your wound moist and warm  Helping draw together wound edges  Increasing blood flow to your wound  Decreasing inflammation  Wound VAC offers some other advantages over other types of wound care. It may decrease your overall discomfort. The dressings usually need to be changed less often. And they may be easier to keep in position.  Risks of wound VAC  Wound VAC has some rare risks, such as:  Bleeding (which may be severe)  Wound infection  An abnormal connection between the intestinal tract and the skin (enteric fistula)  Proper training in dressing changes can help reduce the risk for  these complications. Also, your doctor will carefully evaluate you to make sure you are a good candidate for the therapy. Certain problems can increase your risk for complications. These include:  Exposed organs or blood vessels  High risk of bleeding from another medical problem  Wound infection  Nearby bone infection  Dead wound tissue  Cancer tissue  Fragile skin, such as from aging or longtime use of topical steroids  Allergy to adhesive  Very poor blood flow to your wound  Wounds close to joints that may reopen because of movement  Your doctor will discuss the risks that apply to you. Make sure to talk with him or her about all of your questions and concerns.  Getting ready for wound VAC  You likely won t need to do much to get ready for wound VAC. In some cases, you may need to wait a while before having this therapy. For example, your doctor may first need to treat an infection in your wound. Dead or damaged tissue may also need to be removed from your wound.  You or a caregiver may need training on how to use the wound VAC device. This is done if you will be able to have your wound vacuum therapy at home. In other cases, you may need to have your wound vacuum therapy in a health care facility.  On the day of your procedure  A health care provider will cover your wound with foam or gauze wound dressing. An adhesive film will be put over the dressing and wound. This seals the wound. The foam connects to a drainage tube, which leads to a vacuum pump. This pump is portable. When the pump is turned on, it draws fluid through the foam and out the drainage tubing. The pump may run constantly, or it may cycle off and on. Your exact setup will depend on the specific type of wound vacuum system that you use.  Managing your wound  You may need the dressing changed about once a day. You may need it changed more or less often, depending on your wound. You or your caregiver may be trained to do this at home. Or it may be  done by a visiting health care provider. Your doctor may prescribe a pain medicine. This is to prevent or reduce pain during the dressing change.  You will likely need to use the wound VAC system for several weeks or months. During this time, you ll carry the portable pump everywhere you go.  Nutrition for wound healing  During this time, make sure you follow a healthy diet. This is needed so the wound can heal and to prevent infection. Your doctor can tell you more about what to include in your diet during this time.  follow up with your doctor if you have a medical condition that led to your wound, such as diabetes. He or she can help you prevent future wounds.  Follow-up care  Your doctor will carefully keep track of your healing. Make sure to keep all follow-up appointments.  When to call your health care provider  Call your health care provider right away if you have any of these:  Fever of 100.4 F (38.0 C) or higher  Increased redness, swelling, or warmth around wound  Increased pain  Bright red blood or blood clots in tubing or the collection chamber of the vacuum

## 2023-08-08 ENCOUNTER — OFFICE VISIT (OUTPATIENT)
Dept: VASCULAR SURGERY | Facility: CLINIC | Age: 58
End: 2023-08-08
Attending: PODIATRIST
Payer: COMMERCIAL

## 2023-08-08 VITALS
HEART RATE: 93 BPM | TEMPERATURE: 97.6 F | SYSTOLIC BLOOD PRESSURE: 176 MMHG | OXYGEN SATURATION: 98 % | RESPIRATION RATE: 16 BRPM | DIASTOLIC BLOOD PRESSURE: 93 MMHG

## 2023-08-08 DIAGNOSIS — L97.525 ULCER OF LEFT FOOT WITH MUSCLE INVOLVEMENT WITHOUT EVIDENCE OF NECROSIS (H): Primary | ICD-10-CM

## 2023-08-08 PROCEDURE — 97607 NEG PRS WND THR NDME<=50SQCM: CPT

## 2023-08-08 ASSESSMENT — PAIN SCALES - GENERAL: PAINLEVEL: SEVERE PAIN (6)

## 2023-08-08 NOTE — PROGRESS NOTES
[facility]  -  Nurse Visit          Date of Service:  August 8, 2023     Requesting Provider: ENEIDA Jimenez    Diagnosis:     ICD-10-CM    1. Ulcer of left foot with muscle involvement without evidence of necrosis (H)  L97.525           Chief Complaint: Abdulaziz is being seen today at [facility] [place] for his wound(s) dressing change. Reports pain of 6.  Denies any fevers, chills, or generalized ill feeling. Pt reports that he fell asleep last Friday and missed his appt. Current dressing has been on for a week.    Dressing on Arrival: NPWT, Pt is currently using no compression. Upon removal of dressings scant Serosanguinous drainage is noted.    New Wounds noted: No    Vital Signs: BP (!) 176/93   Pulse 93   Temp 97.6  F (36.4  C)   Resp 16   SpO2 98% . This was 2nd BP check- pt reports that it is a lot of work to get in the building with the RKW with all the rocks in the parking lot making it difficult to maneuver.    Assessment:      General:  Patient presents to clinic in no apparent distress.   Psychiatric:  Alert and oriented x3.   Lower extremity:  edema is not present.    Integumentary:  Skin is pink, macerated.          Wound info:  Negative Pressure Wound Therapy Foot Anterior;Left (Active)       VASC Wound left plantar lateral (Active)   Pre Size Length 2 08/08/23 0800   Pre Size Width 0.6 08/08/23 0800   Pre Size Depth 0.9 08/08/23 0800   Pre Total Sq cm 1.2 08/08/23 0800   Description macerated 07/28/23 1000   Product Used Negative pressure 07/28/23 1000       VASC Wound Right foot (Active)       VASC Wound Right foot (Active)       VASC Wound Right foot plantar (Active)   Pre Size Length 0 07/17/23 1500   Pre Size Width 0 07/17/23 1500   Post Size Length 3 07/07/23 0900   Post Size Width 1 07/07/23 0900   Post Size Depth 2.5 07/07/23 0900   Post Total Sq cm 3 07/07/23 0900       Incision/Surgical Site 02/17/23 Right Foot (Active)       Incision/Surgical  Site 05/23/23 Left Foot (Active)       Incision/Surgical Site 07/03/23 Left Toe (Comment  which one) (Active)       Undermining is not present.    The periwoundskin is  macerated      Plan:         1. Patient will return in 3 days for nurse visit.            2. As listed below, treatment provided irrigation, mechanical cleansing, and dressings to promote autolytic debridement.             Cleansed with: Normal saline    Protected skin with: 3M Cavilon    Dressings Applied to wound: Negative wound vac therapy:      Applied vac drape to the periwound skin to protect from the drainage and sponge; window pane fashion   Cut the black vac foam to fit the size of the wound. Used 1 piece(s) of black sponge total and 1 bridge  Applied vac drape to wherever the bridging is going to be (NEVER apply black sponge to the intact skin)   Covered with vac drape for air tight seal. Cut a hole the size of a quarter and applied suction pad. Be mindful of the direction of the tubing.  Connected tubing and turned vac machine to 125mmHG continuous suction.   No alarm or leaks noted.    Compression Applied to the right leg: None  Compression Applied to the left leg: None    Offloading used: Rolling knee walker    Trial Products: No    Provider notified regarding concerns: No    Treatment Changes: No    Tolerated Dressing Change:  Yes    Taught Regarding: follow up appointment(s) and wound cares    Educational Barriers: No barriers      MONCHO SHRESTHA RN CWOCN, CFCN

## 2023-08-08 NOTE — PATIENT INSTRUCTIONS
Continue with wound vac changes in nurse visit clinic.     If you do not have a back up plan in place: If the negative pressure wound therapy malfunctions or unable to maintain seal: dressing must be removed and reapplied within 2 hours of the incident. If unable to reapply negative pressure wound dressing, place Normal Saline moistened gauze in wound bed and cover with appropriate dressing to keep wound bed moist.  Change wet-to-dry dressing two times a day until healthcare staff can re-implement negative pressure therapy.   Change canister at least weekly.  [facility] Contact Center can be reached at [phone], 24 hours a day 7 days a week    What is V.A.C.  Therapy?   V.A.C.  Therapy is a medical device system that   promotes wound healing by delivering negative   pressure (a vacuum) to the wound through a   patented dressing and therapy unit creating an   environment that promotes the wound healing   process. This negative pressure helps draw wound   edges together, remove wound fluids and infectious   materials and promote granulation tissue formation   (the connective tissue in healing wounds).   Unlike gauze bandages that merely cover a wound,   V.A.C.  Therapy actively works to help the wound   healing process.   The V.A.C.  Therapy System helps:    Promote wound healing    Provide a moist wound healing environment    Draw wound edges together    Remove fluid and infectious materials    Reduce wound odor    Reduce the need for daily dressing changes   When should I call my clinician when on   V.A.C.  Therapy?   Immediately report to your clinician if you have any   of these symptoms:    Fever over 102     Diarrhea    Headache    Sore throat    Confusion    Sick to your stomach or throwing up    Dizziness or feel faint when you stand up    Redness around the wound    Skin itches or rash present    Wound is sore, red or swollen    Pus or bad smell from the wound    Area in or around wound feels very  warm    Vacuum-Assisted Closure of a Wound  Vacuum-assisted closure (VAC) of a wound is a type of treatment to help wounds heal. It s also known as negative pressure wound therapy. During the treatment, a device lowers air pressure on the wound. This can help the wound heal more quickly.  Understanding the wound VAC system  A wound VAC system has several parts. A foam or gauze dressing is put directly on the wound. The dressing is changed every 24 to 72 hours. An adhesive film covers and seals the dressing and wound. A drainage tube leads from under the adhesive film and connects to a portable vacuum pump. This pump removes air pressure over the wound. It may do this constantly. Or it may do it in cycles. During the treatment, you ll need to carry the portable pump everywhere you go.  Why wound VAC is used  You might need this therapy for a recent traumatic wound. Or you may need it for a chronic wound. This is a wound that does not heal the way it should over time. This can happen with wounds in people who have diabetes. You may need a wound VAC if you ve had a recent skin graft. And you may need a wound VAC for a large wound. Large wounds can take a longer time to heal.  A wound vacuum system may help your wound heal more quickly by:  Draining extra fluid from the wound  Reducing swelling  Reducing bacteria in the wound  Keeping your wound moist and warm  Helping draw together wound edges  Increasing blood flow to your wound  Decreasing inflammation  Wound VAC offers some other advantages over other types of wound care. It may decrease your overall discomfort. The dressings usually need to be changed less often. And they may be easier to keep in position.  Risks of wound VAC  Wound VAC has some rare risks, such as:  Bleeding (which may be severe)  Wound infection  An abnormal connection between the intestinal tract and the skin (enteric fistula)  Proper training in dressing changes can help reduce the risk for  these complications. Also, your doctor will carefully evaluate you to make sure you are a good candidate for the therapy. Certain problems can increase your risk for complications. These include:  Exposed organs or blood vessels  High risk of bleeding from another medical problem  Wound infection  Nearby bone infection  Dead wound tissue  Cancer tissue  Fragile skin, such as from aging or longtime use of topical steroids  Allergy to adhesive  Very poor blood flow to your wound  Wounds close to joints that may reopen because of movement  Your doctor will discuss the risks that apply to you. Make sure to talk with him or her about all of your questions and concerns.  Getting ready for wound VAC  You likely won t need to do much to get ready for wound VAC. In some cases, you may need to wait a while before having this therapy. For example, your doctor may first need to treat an infection in your wound. Dead or damaged tissue may also need to be removed from your wound.  You or a caregiver may need training on how to use the wound VAC device. This is done if you will be able to have your wound vacuum therapy at home. In other cases, you may need to have your wound vacuum therapy in a health care facility.  On the day of your procedure  A health care provider will cover your wound with foam or gauze wound dressing. An adhesive film will be put over the dressing and wound. This seals the wound. The foam connects to a drainage tube, which leads to a vacuum pump. This pump is portable. When the pump is turned on, it draws fluid through the foam and out the drainage tubing. The pump may run constantly, or it may cycle off and on. Your exact setup will depend on the specific type of wound vacuum system that you use.  Managing your wound  You may need the dressing changed about once a day. You may need it changed more or less often, depending on your wound. You or your caregiver may be trained to do this at home. Or it may be  done by a visiting health care provider. Your doctor may prescribe a pain medicine. This is to prevent or reduce pain during the dressing change.  You will likely need to use the wound VAC system for several weeks or months. During this time, you ll carry the portable pump everywhere you go.  Nutrition for wound healing  During this time, make sure you follow a healthy diet. This is needed so the wound can heal and to prevent infection. Your doctor can tell you more about what to include in your diet during this time.  follow up with your doctor if you have a medical condition that led to your wound, such as diabetes. He or she can help you prevent future wounds.  Follow-up care  Your doctor will carefully keep track of your healing. Make sure to keep all follow-up appointments.  When to call your health care provider  Call your health care provider right away if you have any of these:  Fever of 100.4 F (38.0 C) or higher  Increased redness, swelling, or warmth around wound  Increased pain  Bright red blood or blood clots in tubing or the collection chamber of the vacuum

## 2023-08-11 ENCOUNTER — OFFICE VISIT (OUTPATIENT)
Dept: VASCULAR SURGERY | Facility: CLINIC | Age: 58
End: 2023-08-11
Attending: PODIATRIST
Payer: COMMERCIAL

## 2023-08-11 VITALS
HEART RATE: 94 BPM | DIASTOLIC BLOOD PRESSURE: 84 MMHG | OXYGEN SATURATION: 98 % | SYSTOLIC BLOOD PRESSURE: 166 MMHG | TEMPERATURE: 97.8 F | RESPIRATION RATE: 16 BRPM

## 2023-08-11 DIAGNOSIS — L97.525 ULCER OF LEFT FOOT WITH MUSCLE INVOLVEMENT WITHOUT EVIDENCE OF NECROSIS (H): Primary | ICD-10-CM

## 2023-08-11 PROCEDURE — 97607 NEG PRS WND THR NDME<=50SQCM: CPT

## 2023-08-11 ASSESSMENT — PAIN SCALES - GENERAL: PAINLEVEL: NO PAIN (0)

## 2023-08-11 NOTE — PATIENT INSTRUCTIONS
Continue with wound vac changes in nurse visit clinic.     If you do not have a back up plan in place: If the negative pressure wound therapy malfunctions or unable to maintain seal: dressing must be removed and reapplied within 2 hours of the incident. If unable to reapply negative pressure wound dressing, place Normal Saline moistened gauze in wound bed and cover with appropriate dressing to keep wound bed moist.  Change wet-to-dry dressing two times a day until healthcare staff can re-implement negative pressure therapy.   Change canister at least weekly.  [facility] Contact Center can be reached at [phone], 24 hours a day 7 days a week    What is V.A.C.  Therapy?   V.A.C.  Therapy is a medical device system that   promotes wound healing by delivering negative   pressure (a vacuum) to the wound through a   patented dressing and therapy unit creating an   environment that promotes the wound healing   process. This negative pressure helps draw wound   edges together, remove wound fluids and infectious   materials and promote granulation tissue formation   (the connective tissue in healing wounds).   Unlike gauze bandages that merely cover a wound,   V.A.C.  Therapy actively works to help the wound   healing process.   The V.A.C.  Therapy System helps:    Promote wound healing    Provide a moist wound healing environment    Draw wound edges together    Remove fluid and infectious materials    Reduce wound odor    Reduce the need for daily dressing changes   When should I call my clinician when on   V.A.C.  Therapy?   Immediately report to your clinician if you have any   of these symptoms:    Fever over 102     Diarrhea    Headache    Sore throat    Confusion    Sick to your stomach or throwing up    Dizziness or feel faint when you stand up    Redness around the wound    Skin itches or rash present    Wound is sore, red or swollen    Pus or bad smell from the wound    Area in or around wound feels very  warm    Vacuum-Assisted Closure of a Wound  Vacuum-assisted closure (VAC) of a wound is a type of treatment to help wounds heal. It s also known as negative pressure wound therapy. During the treatment, a device lowers air pressure on the wound. This can help the wound heal more quickly.  Understanding the wound VAC system  A wound VAC system has several parts. A foam or gauze dressing is put directly on the wound. The dressing is changed every 24 to 72 hours. An adhesive film covers and seals the dressing and wound. A drainage tube leads from under the adhesive film and connects to a portable vacuum pump. This pump removes air pressure over the wound. It may do this constantly. Or it may do it in cycles. During the treatment, you ll need to carry the portable pump everywhere you go.  Why wound VAC is used  You might need this therapy for a recent traumatic wound. Or you may need it for a chronic wound. This is a wound that does not heal the way it should over time. This can happen with wounds in people who have diabetes. You may need a wound VAC if you ve had a recent skin graft. And you may need a wound VAC for a large wound. Large wounds can take a longer time to heal.  A wound vacuum system may help your wound heal more quickly by:  Draining extra fluid from the wound  Reducing swelling  Reducing bacteria in the wound  Keeping your wound moist and warm  Helping draw together wound edges  Increasing blood flow to your wound  Decreasing inflammation  Wound VAC offers some other advantages over other types of wound care. It may decrease your overall discomfort. The dressings usually need to be changed less often. And they may be easier to keep in position.  Risks of wound VAC  Wound VAC has some rare risks, such as:  Bleeding (which may be severe)  Wound infection  An abnormal connection between the intestinal tract and the skin (enteric fistula)  Proper training in dressing changes can help reduce the risk for  these complications. Also, your doctor will carefully evaluate you to make sure you are a good candidate for the therapy. Certain problems can increase your risk for complications. These include:  Exposed organs or blood vessels  High risk of bleeding from another medical problem  Wound infection  Nearby bone infection  Dead wound tissue  Cancer tissue  Fragile skin, such as from aging or longtime use of topical steroids  Allergy to adhesive  Very poor blood flow to your wound  Wounds close to joints that may reopen because of movement  Your doctor will discuss the risks that apply to you. Make sure to talk with him or her about all of your questions and concerns.  Getting ready for wound VAC  You likely won t need to do much to get ready for wound VAC. In some cases, you may need to wait a while before having this therapy. For example, your doctor may first need to treat an infection in your wound. Dead or damaged tissue may also need to be removed from your wound.  You or a caregiver may need training on how to use the wound VAC device. This is done if you will be able to have your wound vacuum therapy at home. In other cases, you may need to have your wound vacuum therapy in a health care facility.  On the day of your procedure  A health care provider will cover your wound with foam or gauze wound dressing. An adhesive film will be put over the dressing and wound. This seals the wound. The foam connects to a drainage tube, which leads to a vacuum pump. This pump is portable. When the pump is turned on, it draws fluid through the foam and out the drainage tubing. The pump may run constantly, or it may cycle off and on. Your exact setup will depend on the specific type of wound vacuum system that you use.  Managing your wound  You may need the dressing changed about once a day. You may need it changed more or less often, depending on your wound. You or your caregiver may be trained to do this at home. Or it may be  done by a visiting health care provider. Your doctor may prescribe a pain medicine. This is to prevent or reduce pain during the dressing change.  You will likely need to use the wound VAC system for several weeks or months. During this time, you ll carry the portable pump everywhere you go.  Nutrition for wound healing  During this time, make sure you follow a healthy diet. This is needed so the wound can heal and to prevent infection. Your doctor can tell you more about what to include in your diet during this time.  follow up with your doctor if you have a medical condition that led to your wound, such as diabetes. He or she can help you prevent future wounds.  Follow-up care  Your doctor will carefully keep track of your healing. Make sure to keep all follow-up appointments.  When to call your health care provider  Call your health care provider right away if you have any of these:  Fever of 100.4 F (38.0 C) or higher  Increased redness, swelling, or warmth around wound  Increased pain  Bright red blood or blood clots in tubing or the collection chamber of the vacuum

## 2023-08-11 NOTE — PROGRESS NOTES
[facility]  -  Nurse Visit        Date of Service:  August 11, 2023     Requesting Provider: ENEIDA Jimenez    Diagnosis:     ICD-10-CM    1. Ulcer of left foot with muscle involvement without evidence of necrosis (H)  L97.525           Chief Complaint: Abdulaziz is being seen today at [facility] [place] for his wound(s) dressing change. Reports pain of 0.  Denies any fevers, chills, or generalized ill feeling.     Dressing on Arrival: NPWT, Pt is currently using no compression.  Upon removal of dressings scant Serosanguinous drainage is noted.    New Wounds noted: No    Vital Signs: BP (!) 166/84   Pulse 94   Temp 97.8  F (36.6  C)   Resp 16   SpO2 98%     Assessment:      General:  Patient presents to clinic in no apparent distress.   Psychiatric:  Alert and oriented x3.   Lower extremity:  edema is not present.    Integumentary:  Skin is intact      Wound info:  Negative Pressure Wound Therapy Foot Anterior;Left (Active)       VASC Wound left plantar lateral (Active)   Pre Size Length 1.5 08/11/23 0900   Pre Size Width 0.7 08/11/23 0900   Pre Size Depth 0.8 08/11/23 0900   Pre Total Sq cm 1.05 08/11/23 0900   Description macerated 07/28/23 1000   Product Used Negative pressure 07/28/23 1000           Undermining is not present.    The periwoundskin is  macerated      Plan:         1. Patient will return in 4 days for nurse visit.            2. As listed below, treatment provided irrigation, mechanical cleansing, and dressings to promote autolytic debridement.             Cleansed with: Normal saline    Protected skin with: 3M Cavilon and stoma powder    Dressings Applied to wound: Negative wound vac therapy:      Applied vac drape to the periwound skin to protect from the drainage and sponge; window pane fashion   Cut the black vac foam to fit the size of the wound. Used 1 piece(s) of black sponge total. And 1 bridge  Applied vac drape to wherever the bridging is  going to be (NEVER apply black sponge to the intact skin)   Covered with vac drape for air tight seal. Cut a hole the size of a quarter and applied suction pad. Be mindful of the direction of the tubing.  Connected tubing and turned vac machine to 125mmHG continuous suction.   No alarm or leaks noted.    Offloading used: None    Trial Products: No    Provider notified regarding concerns: No    Treatment Changes: No    Tolerated Dressing Change:  Yes    Taught Regarding: follow up appointment(s)    Educational Barriers: No barriers      MONCHO SHRESTHA RN CWOCN, CFCN

## 2023-08-18 ENCOUNTER — OFFICE VISIT (OUTPATIENT)
Dept: VASCULAR SURGERY | Facility: CLINIC | Age: 58
End: 2023-08-18
Payer: COMMERCIAL

## 2023-08-18 VITALS
RESPIRATION RATE: 12 BRPM | SYSTOLIC BLOOD PRESSURE: 167 MMHG | HEART RATE: 84 BPM | TEMPERATURE: 97.8 F | OXYGEN SATURATION: 98 % | DIASTOLIC BLOOD PRESSURE: 82 MMHG

## 2023-08-18 DIAGNOSIS — L97.511 DIABETIC ULCER OF OTHER PART OF RIGHT FOOT ASSOCIATED WITH DIABETES MELLITUS DUE TO UNDERLYING CONDITION, LIMITED TO BREAKDOWN OF SKIN (H): Primary | ICD-10-CM

## 2023-08-18 DIAGNOSIS — E08.621 DIABETIC ULCER OF OTHER PART OF RIGHT FOOT ASSOCIATED WITH DIABETES MELLITUS DUE TO UNDERLYING CONDITION, LIMITED TO BREAKDOWN OF SKIN (H): Primary | ICD-10-CM

## 2023-08-18 PROCEDURE — 97607 NEG PRS WND THR NDME<=50SQCM: CPT

## 2023-08-18 NOTE — PROGRESS NOTES
[facility]  -  Nurse Visit        Date of Service:  August 18, 2023     Requesting Provider: ENEIDA Jimenez    Diagnosis:     ICD-10-CM    1. Diabetic ulcer of other part of right foot associated with diabetes mellitus due to underlying condition, limited to breakdown of skin (H)  E08.621     L97.511           Chief Complaint: Abdulaziz is being seen today at [facility] [place] for his wound(s) dressing change. Reports pain of 0 out of 10.  Denies any fevers, chills, or generalized ill feeling.     Dressing on Arrival: NPWT running @ 125 mmHg. Pt is currently using not using compression. Upon removal of dressings scant serosanguinous drainage is noted. Pt BP elevated. Pt stated it has been running high. Encouraged pt to call pcp to get seen for elevated BP's and told he should purchase a cuff to check at home. Pt understanding of the plan. Number for pcp provided.     New Wounds noted: No    Vital Signs: BP (!) 163/88   Pulse 84   Temp 97.8  F (36.6  C) (Oral)   Resp 12   SpO2 98%     Assessment:      General:  Patient presents to clinic in no apparent distress.   Psychiatric:  Alert and oriented x3.   Lower extremity:  edema is present.    Integumentary:  Skin is WNL    Circumferential volume measures:       No data to display                Wound info:  Negative Pressure Wound Therapy Foot Anterior;Left (Active)       VASC Wound left plantar lateral (Active)   Pre Size Length 1.2 08/18/23 1000   Pre Size Width 0.5 08/18/23 1000   Pre Size Depth 0.6 08/18/23 1000   Pre Total Sq cm 0.6 08/18/23 1000   Description macerated 07/28/23 1000   Product Used Negative pressure 07/28/23 1000       VASC Wound Right foot (Active)       VASC Wound Right foot (Active)       VASC Wound Right foot plantar (Active)   Pre Size Length 0 07/17/23 1500   Pre Size Width 0 07/17/23 1500   Post Size Length 3 07/07/23 0900   Post Size Width 1 07/07/23 0900   Post Size Depth 2.5 07/07/23 0900    Post Total Sq cm 3 07/07/23 0900       Incision/Surgical Site 02/17/23 Right Foot (Active)       Incision/Surgical Site 05/23/23 Left Foot (Active)       Incision/Surgical Site 07/03/23 Left Toe (Comment  which one) (Active)       Undermining is not present.    The periwoundskin is  macerated      Plan:         1. Patient will return on 8/22/23 for provider visit.            2. As listed below, treatment provided irrigation, mechanical cleansing, and dressings to promote autolytic debridement.             Cleansed with: Normal saline    Protected skin with: 3M Cavilon    Dressings Applied to wound: Negative wound vac therapy:      Applied vac drape to the periwound skin to protect from the drainage and sponge; window pane fashion   Cut the black vac foam to fit the size of the wound. Used 1 piece(s) of black sponge total.   Applied vac drape to wherever the bridging is going to be (NEVER apply black sponge to the intact skin)   Covered with vac drape for air tight seal. Cut a hole the size of a quarter and applied suction pad. Be mindful of the direction of the tubing.  Connected tubing and turned vac machine to 125mmHG continuous suction.   No alarm or leaks noted.    Compression Applied to the right leg: None  Compression Applied to the left leg: None      Offloading used: Rolling knee walker. Reminded pt should be wearing boot and he will look for it at home.     Trial Products: No    Provider notified regarding concerns: No    Treatment Changes: No    Tolerated Dressing Change:  Yes    Taught Regarding: follow up appointment(s), wound cares, elevation, offloading, compliance, and BP    Educational Barriers: No barriers      JONATAN CALHOUN RN

## 2023-08-18 NOTE — PATIENT INSTRUCTIONS
If you do not have a back up plan inplace: If the negative pressure wound therapy malfunctions or unable to maintain seal: dressing must be removed and reapplied within 2 hours of the incident. If unable to reapply negative pressure wound dressing, placeNormal Saline moistened gauze in wound bed and cover with appropriate dressing to keep wound bed moist.  Change wet-to-dry dressing two times a day until healthcare staff can re-implement negative pressure therapy.   Change canister at least weekly.  [facility] Contact Center can be reached at [phone], 24 hours a day 7 days a week    What is V.A.C.  Therapy?   V.A.C. Therapy is a medical device system that   promotes wound healing by delivering negative   pressure (a vacuum) to the wound through a   patented dressing and therapy unit creating an   environment that promotes the woundhealing   process. This negative pressure helps draw wound  edges together, remove wound fluids and infectious   materials and promote granulation tissue formation   (the connective tissue in healing wounds).   Unlike gauze bandages that merely cover a wound,   V.A.C.  Therapy actively works to help the wound   healing process.   The V.A.C.  Therapy System helps:    Promote wound healing    Provide a moist wound healing environment    Draw wound edges together    Remove fluid and infectious materials    Reduce wound odor    Reduce the need fordaily dressing changes   When should I call my clinician when on   V.A.C.  Therapy?   Immediately report to your clinician if you have any   of these symptoms:    Fever over 102     Diarrhea    Headache    Sore throat    Confusion    Sick to your stomach or throwing up    Dizziness or feel faint when you stand up    Redness around the wound    Skinitches or rash present    Wound is sore, red or swollen    Pus or bad smell from the wound    Area in or around wound feels very warm    Vacuum-Assisted Closure of a Wound  Vacuum-assisted closure (VAC) of  a wound is a type of treatment to help woundsheal. It s also known as negative pressure wound therapy. During the treatment, a device lowers air pressureon the wound. This can help the wound heal more quickly.  Understanding the wound VAC system  A wound VACsystem has several parts. A foam or gauze dressing is put directly on the wound. The dressing is changed every 24 to 72 hours. An adhesive film covers and seals the dressing and wound. A drainage tube leads from under theadhesive film and connects to a portable vacuum pump. This pump removes air pressure over the wound. It may do this constantly. Or it may do it in cycles. During the treatment, you ll need to carry the portable pump everywhere you go.  Why wound VAC is used  You might need this therapy for a recent traumatic wound. Or you may need it for a chronic wound. This is a wound that does not heal the way it should over time. This can happenwith wounds in people who have diabetes. You may need a wound VAC if you ve had a recent skin graft. And youmay need a wound VAC for a large wound. Large wounds can take a longer time to heal.  A wound vacuum system may help your wound heal more quickly by:  Draining extra fluid from the wound  Reducing swelling  Reducing bacteria in the wound  Keeping your wound moist and warm  Helping draw together wound edges  Increasing blood flow to your wound  Decreasing inflammation  Wound VACoffers some other advantages over other types of wound care. It may decrease your overall discomfort. The dressings usually need to be changed less often. And they may be easier to keep in position.  Risks of wound VAC  Wound VAC has some rare risks, such as:  Bleeding (which may be severe)  Wound infection  An abnormal connection between the intestinal tract and the skin (enteric fistula)  Proper training in dressingchanges can help reduce the risk for these complications. Also, your doctor will carefully evaluate you to make sure you are  a good candidate for the therapy. Certain problems can increase your risk for complications. Theseinclude:  Exposed organs or blood vessels  High risk of bleeding from another medical problem  Wound infection  Nearby bone infection  Dead wound tissue  Cancer tissue  Fragile skin, such as from aging or longtime use of topical steroids  Allergy to adhesive  Very poor blood flow to your wound  Wounds close to joints that may reopen because of movement  Your doctor will discuss the risks that apply to you. Make sure to talk with him or her about all of your questions and concerns.  Getting ready for wound VAC  You likely won t need to do much to get ready for wound VAC. In some cases, you may need to wait a while before having this therapy. For example, your doctor may first need to treat aninfection in your wound. Dead or damaged tissue may also need to be removed from your wound.  You or a caregiver may need training on how to use the wound VAC device. This is done if you will be able to have your woundvacuum therapy at home. In other cases, you may need to have your wound vacuum therapy in a health care facility.  On the day of your procedure  A health care provider will cover your wound with foam or gauze wound dressing. An adhesive film will be put over the dressing and wound. This seals the wound. The foam connects toa drainage tube, which leads to a vacuum pump. This pump is portable. When the pump is turned on, it draws fluid through the foam and out the drainage tubing. The pump may run constantly, or it may cycle off and on. Yourexact setup will depend on the specific type of wound vacuum system that you use.  Managing your wound  Youmay need the dressing changed about once a day. You may need it changed more or less often, depending on your wound. You or your caregiver may be trained to do this at home. Or it may be done by a visiting health careprovider. Your doctor may prescribe a pain medicine. This is to  prevent or reduce pain during the dressing change.  You will likely need to use the wound VAC system for several weeks or months. During this time, you ll carry the portable pump everywhere you go.  Nutrition for wound healing  During this time, make sure you follow a healthy diet. This is needed so the wound can heal and to prevent infection. Your doctor can tellyou more about what to include in your diet during this time.  follow up with your doctor if you have a medical condition that led to your wound, such as diabetes. He or she can help you prevent future wounds.  Follow-up care  Your doctor will carefully keep track of your healing. Make sure to keep all follow-up appointments.  When to call your health care provider  Call your health care providerright away if you have any of these:  Fever of 100.4 F(38.0 C) or higher  Increased redness, swelling, or warmth around wound  Increased pain  Brightred blood or blood clots in tubing or the collection chamber of the vacuum     Patient Education   High Blood Pressure: Care Instructions  Overview     It's normal for blood pressure to go up and down throughout the day. But if it stays up, you have high blood pressure. Another name for high blood pressure is hypertension. For diagnosis, the top number may be 130 or 140 or higher. The bottom number may be 80 or 90 or higher.  Despite what a lot of people think, high blood pressure usually doesn't cause headaches or make you feel dizzy or lightheaded. It usually has no symptoms. But it does increase your risk of stroke, heart attack, and other problems. You and your doctor will talk about your risks of these problems based on your blood pressure.  Your doctor will give you a goal for your blood pressure. Your goal will be based on your health and your age.  Lifestyle changes, such as eating healthy and being active, are always important to help lower blood pressure. You might also take medicine to reach your blood  pressure goal.  Follow-up care is a key part of your treatment and safety. Be sure to make and go to all appointments, and call your doctor if you are having problems. It's also a good idea to know your test results and keep a list of the medicines you take.  How can you care for yourself at home?  Medical treatment  If you stop taking your medicine, your blood pressure will go back up. You may take one or more types of medicine to lower your blood pressure. Be safe with medicines. Take your medicine exactly as prescribed. Call your doctor if you think you are having a problem with your medicine.  Talk to your doctor before you start taking aspirin every day. Aspirin can help certain people lower their risk of a heart attack or stroke. But taking aspirin isn't right for everyone, because it can cause serious bleeding.  See your doctor regularly. You may need to see the doctor more often at first or until your blood pressure comes down.  If you are taking blood pressure medicine, talk to your doctor before you take decongestants or anti-inflammatory medicine, such as ibuprofen. Some of these medicines can raise blood pressure.  Learn how to check your blood pressure at home.  Lifestyle changes  Stay at a healthy weight. This is especially important if you put on weight around the waist. Losing even 10 pounds can help you lower your blood pressure.  If your doctor recommends it, get more exercise. Walking is a good choice. Bit by bit, increase the amount you walk every day. Try for at least 30 minutes on most days of the week. You also may want to swim, bike, or do other activities.  Avoid or limit alcohol. Talk to your doctor about whether you can drink any alcohol.  Try to limit how much sodium you eat to less than 2,300 milligrams (mg) a day. Your doctor may ask you to try to eat less than 1,500 mg a day.  Eat plenty of fruits (such as bananas and oranges), vegetables, legumes, whole grains, and low-fat dairy  products.  Lower the amount of saturated fat in your diet. Saturated fat is found in animal products such as milk, cheese, and meat. Limiting these foods may help you lose weight and also lower your risk for heart disease.  Do not smoke. Smoking increases your risk for heart attack and stroke. If you need help quitting, talk to your doctor about stop-smoking programs and medicines. These can increase your chances of quitting for good.  When should you call for help?   Call 911  anytime you think you may need emergency care. This may mean having symptoms that suggest that your blood pressure is causing a serious heart or blood vessel problem. Your blood pressure may be over 180/120.  For example, call 911 if:    You have symptoms of a heart attack. These may include:  Chest pain or pressure, or a strange feeling in the chest.  Sweating.  Shortness of breath.  Nausea or vomiting.  Pain, pressure, or a strange feeling in the back, neck, jaw, or upper belly or in one or both shoulders or arms.  Lightheadedness or sudden weakness.  A fast or irregular heartbeat.     You have symptoms of a stroke. These may include:  Sudden numbness, tingling, weakness, or loss of movement in your face, arm, or leg, especially on only one side of your body.  Sudden vision changes.  Sudden trouble speaking.  Sudden confusion or trouble understanding simple statements.  Sudden problems with walking or balance.  A sudden, severe headache that is different from past headaches.     You have severe back or belly pain.   Do not wait until your blood pressure comes down on its own. Get help right away.  Call your doctor now or seek immediate care if:    Your blood pressure is much higher than normal (such as 180/120 or higher), but you don't have symptoms.     You think high blood pressure is causing symptoms, such as:  Severe headache.  Blurry vision.   Watch closely for changes in your health, and be sure to contact your doctor if:    Your  "blood pressure measures higher than your doctor recommends at least 2 times. That means the top number is higher or the bottom number is higher, or both.     You think you may be having side effects from your blood pressure medicine.   Where can you learn more?  Go to https://www.[employer].net/patiented  Enter X567 in the search box to learn more about \"High Blood Pressure: Care Instructions.\"  Current as of: February 27, 2023               Content Version: 13.7    3471-2452 [employer].   Care instructions adapted under license by your healthcare professional. If you have questions about a medical condition or this instruction, always ask your healthcare professional. [employer] disclaims any warranty or liability for your use of this information.           "

## 2023-08-22 ENCOUNTER — OFFICE VISIT (OUTPATIENT)
Dept: VASCULAR SURGERY | Facility: CLINIC | Age: 58
End: 2023-08-22
Attending: PODIATRIST
Payer: COMMERCIAL

## 2023-08-22 VITALS
DIASTOLIC BLOOD PRESSURE: 88 MMHG | RESPIRATION RATE: 18 BRPM | SYSTOLIC BLOOD PRESSURE: 150 MMHG | TEMPERATURE: 98.2 F | HEART RATE: 100 BPM

## 2023-08-22 DIAGNOSIS — L97.525 ULCER OF LEFT FOOT WITH MUSCLE INVOLVEMENT WITHOUT EVIDENCE OF NECROSIS (H): Primary | ICD-10-CM

## 2023-08-22 PROCEDURE — 11042 DBRDMT SUBQ TIS 1ST 20SQCM/<: CPT | Performed by: PODIATRIST

## 2023-08-22 ASSESSMENT — PAIN SCALES - GENERAL: PAINLEVEL: NO PAIN (0)

## 2023-08-22 NOTE — PROGRESS NOTES
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    -We will hold the wound VAC at this time and begin use of endoform with a gauze dressing.    -Discussed importance of continued nonweightbearing on the left foot at all times with the walker.    -After discussion of risk factors, nursing staff removed dressing, cleansed wound and consent obtained 2% Lidocaine HCL jelly was applied, under clean conditions, the left foot ulceration(s) were debrided using currette or #15 blade scalpel.  Devitalized and nonviable tissue, along with any fibrin and slough, was removed to improve granulation tissue formation, stimulate wound healing, decrease overall bacteria load, disrupt biofilm formation and decrease edge senescence. Wound drainage was scant No. Total excisional debridement was 0.15 sq cm into the subcutaneous tissue with a depth of 0.3 cm.   Ulcers were improved afterwards and .  Measures were as noted on the flow sheet. Collagen with a gauze dresssing was applied. He will continue to apply Collagen with a gauze dresssing as directed.    -He will follow-up in 2 weeks    Ulises Jimenez DPM  [facility]      HPI: Abdulaziz Rausch was seen again today for a left foot ulceration.  Patient admits to weightbearing on the left foot and was try to remain nonweightbearing with a knee walker.    Past Medical History:   Diagnosis Date    Anemia     Depression     Diabetes mellitus, type 2 (H)     Diabetic foot infection (H) 7/1/2023    Diabetic foot ulcer (H)     History of MRSA infection 7/1/2023 Jan 2023 RIGHT foot wound    Hypertension     Non-healing ulcer of left foot (H) 7/1/2023    Osteomyelitis (H)        Past Surgical History:   Procedure Laterality Date    AMPUTATE FOOT  Left 7/3/2023    Procedure: partial left fifth ray amputation, skin flap creation and closre, left foot;  Surgeon: Alejandro Mccoy DPM;  Location: [facility] OR    AMPUTATE TOE(S) Right 2/17/2023    Procedure: AMPUTATION, digits two and three right foot;  Surgeon: Ulises Jimenez DPM;  Location: [facility] OR    INCISION AND DRAINAGE FOOT, COMBINED Left 5/23/2023    Procedure: Incision and drainage of abscess left foot with debridement and irrigation of wound left foot;  Surgeon: Jf Huerta DPM;  Location: [facility] OR    IRRIGATION AND DEBRIDEMENT TOE, COMBINED Right 1/20/2023    Procedure: IRRIGATION AND DEBRIDEMENT digits 2 and 3 right foot;  Surgeon: Jf Huerta DPM;  Location: [facility] OR    PICC SINGLE LUMEN PLACEMENT  5/25/2023            No Known Allergies      Current Outpatient Medications:     acetaminophen (TYLENOL) 325 MG tablet, Take 650 mg by mouth every 6 hours as needed for pain, Disp: , Rfl:     alcohol swab prep pads, Use to swab area of injection/ella as directed., Disp: 100 each, Rfl: 3    blood glucose (NO BRAND SPECIFIED) test strip, Use to test blood sugar 4 times daily or as directed. To accompany: Blood Glucose Monitor Brands: per insurance., Disp: 100 strip, Rfl: 6    blood glucose monitoring (NO BRAND SPECIFIED) meter device kit, Use to test blood sugar 4 time daily or as directed. Preferred blood glucose meter OR supplies to accompany: Blood Glucose Monitor Brands: per insurance., Disp: 1 kit, Rfl: 0    Continuous Blood Gluc  (FREESTYLE NOAH 2 READER) CHUCK, Use to read blood sugars as per 's instructions., Disp: 1 each, Rfl: 0    Continuous Blood Gluc Sensor (FREESTYLE NOAH 2 SENSOR) [facility], Change every 14 days., Disp: 2 each, Rfl: 5    empagliflozin (JARDIANCE) 25 MG TABS tablet, Take 1 tablet (25 mg) by mouth daily, Disp: 90 tablet, Rfl: 1    gabapentin (NEURONTIN) 300 MG capsule, Take 300 mg by mouth 2 times daily Given at 0800 and 1600,  Disp: , Rfl:     insulin glargine (LANTUS SOLOSTAR) 100 UNIT/ML pen, Inject 20 Units Subcutaneous At Bedtime, Disp: 15 mL, Rfl:     insulin lispro (HUMALOG KWIKPEN) 100 UNIT/ML (1 unit dial) KWIKPEN, Inject 6 Units Subcutaneous 3 times daily (before meals), Disp: 15 mL, Rfl: 4    insulin pen needle (31G X 8 MM) 31G X 8 MM miscellaneous, Use 4 pen needles daily or as directed., Disp: 400 each, Rfl: 3    metFORMIN (GLUCOPHAGE) 500 MG tablet, Take 2 tablets (1,000 mg) by mouth 2 times daily (with meals), Disp: 360 tablet, Rfl: 3    sulfamethoxazole-trimethoprim (BACTRIM DS) 800-160 MG tablet, Take 1 tablet by mouth 2 times daily, Disp: 20 tablet, Rfl: 0    thin (NO BRAND SPECIFIED) lancets, Use with lanceting device. To accompany: Blood Glucose Monitor Brands: per insurance., Disp: 100 each, Rfl: 6    Review of Systems - 10 point Review of Systems is negative except for left foot ulcer which is noted in HPI.      OBJECTIVE:  BP (!) 150/88   Pulse 100   Temp 98.2  F (36.8  C)   Resp 18   General appearance: Patient is alert and fully cooperative with history & exam.  No sign of distress is noted during the visit.    Vascular: Dorsalis pedis non-palpableLeft.  Dermatologic:    Negative Pressure Wound Therapy Foot Anterior;Left (Active)       VASC Wound left plantar lateral (Active)   Pre Size Length 1.5 08/22/23 1400   Pre Size Width 0.1 08/22/23 1400   Pre Size Depth 0.3 08/22/23 1400   Pre Total Sq cm 0.15 08/22/23 1400   Description macerated 07/28/23 1000   Product Used Negative pressure 07/28/23 1000       VASC Wound Right foot (Active)       VASC Wound Right foot (Active)       VASC Wound Right foot plantar (Active)   Pre Size Length 0 07/17/23 1500   Pre Size Width 0 07/17/23 1500   Post Size Length 3 07/07/23 0900   Post Size Width 1 07/07/23 0900   Post Size Depth 2.5 07/07/23 0900   Post Total Sq cm 3 07/07/23 0900       Incision/Surgical Site 02/17/23 Right Foot (Active)       Incision/Surgical Site  05/23/23 Left Foot (Active)       Incision/Surgical Site 07/03/23 Left Toe (Comment  which one) (Active)   Ulceration plantar lateral left foot has granular base with periwound maceration, no erythema.  Neurologic: Diminished to light touch Left.  Musculoskeletal: Contracted digits noted Left.      Picture:

## 2023-08-22 NOTE — LETTER
2023             [facility]             Wound Dressing Rx and Order Form             Order Status:New Order             Verbal: Kitty Childs  [facility]   Account # [id number]  Fax: [phone]  Customer Service: [phone]          Patient Info:  Name: Abdulaziz Rausch  : 1965  Address:   [address] [place] [address]  Phone: [phone]      Insurance Info:  PRIMARY INSURANCE: Payor: [facility] / Plan: [facility] [employer] [place] PMAP / Product Type: *No Product type* /   Primary Policy ID#: [id number]  SECONDARY INSURANCE:  N/A   Secondary Policy ID#: N/A    Physician Info:   Name:  Ulises Jimenez DPM   Dept Address/Phones:   [address] [address] [phone]  Dept: [phone]  Fax: [phone]      No data recorded    Wound info:  Encounter Diagnosis   Name Primary?    Ulcer of left foot with muscle involvement without evidence of necrosis (H) Yes     Negative Pressure Wound Therapy Foot Anterior;Left (Active)       VASC Wound left plantar lateral (Active)   Pre Size Length 1.5 23 1400   Pre Size Width 0.1 23 1400   Pre Size Depth 0.3 23 1400   Pre Total Sq cm 0.15 23 1400   Description macerated 23 1000   Product Used Negative pressure 23 1000       VASC Wound Right foot (Active)       VASC Wound Right foot (Active)       VASC Wound Right foot plantar (Active)   Pre Size Length 0 23 1500   Pre Size Width 0 23 1500   Post Size Length 3 23 0900   Post Size Width 1 23 0900   Post Size Depth 2.5 23 0900   Post Total Sq cm 3 23 0900       Incision/Surgical Site 23 Right Foot (Active)       Incision/Surgical Site 23 Left Foot (Active)       Incision/Surgical Site 23 Left Toe (Comment  which one) (Active)     Drainage: moderate  Thickness:  Full  Duration  of Need: 60  Days Supply: 30  Start Date: 8/23/2023  Starter Kit: Ancillary Kit (saline, gloves, gauze)  Qualifying wound/Debridement: Yes     Dressing Type Brand Size Days Supply  15/30 Quantity  changes/week   Primary Endoform   2''x2'' 30 Weekly   Secondary Square gauze   4''x4'' 30 Daily    Sof form roll gauze   4''x75'' 30 Daily   Tape Papertape  1in 30 Daily       OK to forward to covered supplier.    Electronically Signed Physician: Ulises Jimenez DPM Date: 8/23/2023

## 2023-08-22 NOTE — PATIENT INSTRUCTIONS
Important lnstructions    HOLD WOUND VAC!     WEIGHT BEARING STATUS: You are to remain NON WEIGHT BEARING on your left foot. NON WEIGHT BEARING MEANS NO PRESSURE ON YOUR FOOT OR HEEL AT ANY TIME FOR ANY REASON!    2. OFFLOADING DEVICE: Must use a A ROLLING KNEE WALKER at all times! (do not use affected foot to push wheelchair)    3. STABILIZATION DEVICE: Use a PRAFO BOOT . You will need to WEAR THIS AT ALL TIMES EVEN WHILE IN BED.     4. ELEVATE: Elevating your leg means laying with your head on a pillow and your foot ABOVE YOUR HEART.     5. DO NOT MOVE YOUR FOOT.  There is a risk of worsening the wound or incision. To give yourself a higher chance of healing, please DO NOT swing foot back and forth and wiggle foot/toes especially when inside a stabilization device. Limited movement is allowable with therapy as recommended by the doctor.     6. TAKE A PROTEIN SHAKE TWICE A DAY.  (For ex: Boost, Ensure, Glucerna)      Dressing Change lnstructions       Hold wound vac!     - Dressing Application of  Endoform for left foot wound:    1. Endoform should be cut to the size of the wound.  It should touch the edges of the ulcer. It is okay if it overlap the edges a small amount.  Then, moisten the Endoform with saline.(If it is easier for you, you can moisten it before laying it in the wound)    2. Cover the wound with Endoform, followed by dry gauze, and secure with roll gauze if needed.     3. Endoform is naturally used by the body over time so you may not find it in the wound when you change your dressing.  If you do find some, gently cleanse the wound with saline. Do not remove the remaining Endoform, which may appear as an off-white to south gel, just add Endoform on top.  Usually, more Endoform will need to be added every 5-7 days.     4. Change your top dressing every 1-2 days or as needed depending on drainage.    -Endoform is a collagen dressing created from ovine (sheep) fore-stomach tissue. The collagen  extracellular matrix transforms into a soft conforming sheet, which is naturally incorporated into the wound over time.  Collagen dressings are used to stimulate wound healing.   ,        It IS NOT ok to get your wound wet in the bath or shower    SEEK MEDICAL CARE IF:  You have an increase in swelling, pain, or redness around the wound.  You have an increase in the amount of pus coming from the wound.  There is a bad smell coming from the wound.  The wound appears to be worsening/enlarging  You have a fever greater than 101.5 F      It is ok to continue current wound care treatment/products for the next 2-3 days until new wound care supplies are ordered and arrive. If longer than this please contact our office at [phone].        We want to hear from you!   In the next few weeks, you should receive a call or email to complete a survey about your visit at [facility]. Please help us improve your appointment experience by letting us know how we did today. We strive to make your experience good and value any ways in which we could do better.      We value your input and suggestions.    Thank you for choosing the [facility]!

## 2023-09-06 ENCOUNTER — TELEPHONE (OUTPATIENT)
Dept: VASCULAR SURGERY | Facility: CLINIC | Age: 58
End: 2023-09-06
Payer: COMMERCIAL

## 2023-09-06 NOTE — TELEPHONE ENCOUNTER
Abdulaziz and his sister placed call to clinic to report a new blister on the left foot. They missed the appointment with Dr. Jimenez yesterday and did not provide a reason for this. They state the original wound looks good and is almost closed but there is a new blister towards the toe/amp area. There is no redness, increase in pain or warmth to the area.     Discussed with Abdulaziz and his sister that they should keep the area covered. Discussed signs and symptoms of infection and that they should go to ED if they think there may be an infection. Scheduled next with Dr. Jimenez next on 9/11. Requested that they send a picture for us to review and they will try to do that as soon as they get someone to help them email a picture of the area.

## 2023-09-06 NOTE — TELEPHONE ENCOUNTER
Called and left a message for Lottie (Abdulaziz's sister). Let her know the following per Dr. Jimenez:    Ulises Jimenez, ENEIDA  [facility] Support Pool12 minutes ago (2:55 PM)     VS  No signs of infection that I see based on the picture. I recommend urgent care or ED if infection is a concern.       Let her know she can call back with any further questions or concerns.

## 2023-09-06 NOTE — TELEPHONE ENCOUNTER
Received via email on 9/6/23 at 2:18pm:    Please see attached photos. I noticed a blister on his foot and its smells.     Thank you.    I, Lottie Khan, can be reached at [phone] or Abdulaziz Rausch's new cellphone number is [phone].

## 2023-09-12 ENCOUNTER — OFFICE VISIT (OUTPATIENT)
Dept: VASCULAR SURGERY | Facility: CLINIC | Age: 58
End: 2023-09-12
Attending: PODIATRIST
Payer: COMMERCIAL

## 2023-09-12 VITALS — DIASTOLIC BLOOD PRESSURE: 87 MMHG | SYSTOLIC BLOOD PRESSURE: 186 MMHG | OXYGEN SATURATION: 98 % | HEART RATE: 93 BPM

## 2023-09-12 DIAGNOSIS — E08.621 DIABETIC ULCER OF OTHER PART OF LEFT FOOT ASSOCIATED WITH DIABETES MELLITUS DUE TO UNDERLYING CONDITION, LIMITED TO BREAKDOWN OF SKIN (H): ICD-10-CM

## 2023-09-12 DIAGNOSIS — L97.525 ULCER OF LEFT FOOT WITH MUSCLE INVOLVEMENT WITHOUT EVIDENCE OF NECROSIS (H): Primary | ICD-10-CM

## 2023-09-12 DIAGNOSIS — L97.521 DIABETIC ULCER OF OTHER PART OF LEFT FOOT ASSOCIATED WITH DIABETES MELLITUS DUE TO UNDERLYING CONDITION, LIMITED TO BREAKDOWN OF SKIN (H): ICD-10-CM

## 2023-09-12 PROCEDURE — 99213 OFFICE O/P EST LOW 20 MIN: CPT | Performed by: PODIATRIST

## 2023-09-12 PROCEDURE — G0463 HOSPITAL OUTPT CLINIC VISIT: HCPCS | Performed by: PODIATRIST

## 2023-09-12 ASSESSMENT — PAIN SCALES - GENERAL: PAINLEVEL: NO PAIN (0)

## 2023-09-12 NOTE — PATIENT INSTRUCTIONS
Congratulations! Your wound has healed.    Until you receive your new diabetic shoes & inserts Dr. Jimenez would like you to wea  your CAM boot on your left, to allow the newly healed skin to become stronger.    You may begin showering as normal in 2 weeks    Continue to monitor the area for breakdown & call us if your wound reopens.      We want to hear from you!   In the next few weeks, you should receive a call or email to complete a survey about your visit at [facility]. Please help us improve your appointment experience by letting us know how we did today. We strive to make your experience good and value any ways in which we could do better.      We value your input and suggestions.    Thank you for choosing the [facility]!    [facility] LOCATIONS  [facility]  [address] [place] [address] [address]  [place] [address]  Phone: [phone]  Fax: [phone] [facility]  [address] [place] [address]  Home Medical Equipment, [address] Phone: [phone]  Orthotics and Prosthetics, [address]  Phone [phone] [facility]  [address]  [place] [address]  Phone: [phone]   Fax: [phone]   [facility]  [address]  [place] [address]  Phone: [phone]  Fax: [phone] [facility]   Home Medical Equipment   [address], [place] [address]  Phone :[phone]  Orthotics and Prosthetics  [address] [facility], [address], [facility] [address]  Phone:[phone]   [facility]  [address]  [place] [address]  Phone: [phone]   Fax: [phone]   [facility]   [address]  [place] [address]  Phone: [phone]  Fax: [phone] [place] [address]  [place] [address]  Phone: [phone] [place]  [address] [place] [address]  Phone :[phone]             WEARING YOUR CUSTOM FOOT ORTHOTICS   Most insurance plans cover one pair of orthotics per year. You must check with your   insurance plan to see what your payment responsibility will be. Please call your   insurance company by calling the number on the back of your insurance card.   Orthotic's are non-refundable and non-returnable.   Orthotics are made of various designs. Some orthotics are covered with material that extends beyond your toes. If your orthotic is of this design, you will likely need to trim the toe end to get a proper fit. The insole from your shoe can be used as a template. Simply overlay the shoe insert on top of the custom orthotic. Align the heel end while tracing the length of the insert onto the custom orthotic. Use a large scissor to trim the toe end until you get a proper fit in the shoe.   The orthotic needs to be pushed as far back in the shoe as possible. The heel portion should not ride forward so as not to irritate your heel.   Orthotics are designed to work with socks. Excessive perspiration will shorten the life span of the orthotics. Remove the orthotic from the shoe frequently for proper drying.   The break-in period lasts for weeks. People new to orthotics will likely experience new aches and pains. The orthotic is forcing your foot into a new position. Arch, foot and leg muscle aches and fatigue are common during these weeks. Minor discomfort can be considered normal break in phenomenon. Start wearing your orthotic around your home your first day. Limited activity for one to two hours is recommended. You can increase one or two additional hours each day provided the aches and pains are subsiding. The degree of discomfort, fatigue and problems will dictate the speed of break in. You may require multiple weeks to work up to full time use.   Do not continue  wearing your orthotics if they are creating problems such as blisters or sores. Do not hesitate to call the clinic to speak with a nurse regarding orthotic   break in, fit, trimming, etc. You may also need to see the doctor if the orthotics are   simply not working out. Adjustments are sometimes made to improve orthotic   function.     Orthotics will only work in certain styles and types of shoes. Orthotics rarely work in dress shoes. Slip-ons, clogs, sandals and heels are particularly troublesome. Specially designed orthotics may be necessary for these types of shoes. Your custom orthotic was designed for activities that require appropriate walking or running shoes. Lace up athletic shoes, walking shoes or work boots should work appropriately. You may need a wider or longer shoe. Shoes with a removable  or insert work best. In general, you want to remove an insert from the shoe before placing the orthotic into the shoe. Shoes without a removable liner may not work as well.     When purchasing new shoes, bring your orthotics along to get a proper fit. Shop at stores that are familiar with orthotics.   Frequent washing of the orthotic may shorten the life span of the top cover. The top cover can be replaced but will generally last one to five years depending on use and foot perspiration.

## 2023-09-12 NOTE — PROGRESS NOTES
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     -We discussed that due to his anatomy, there is a pressure point which will continue to build callus tissue. If the callus tissue becomes too thick, skin breakdown will likely occur.     -I recommend use of a pumice stone x2-3 per week to remove callus tissue. I have asked that he return for sharp debridement of the callus prn.     -I have referred him to [facility] for diabetic shoes and inserts to offload the area of increased skin pressure. He will continue to use the CAM boot until the diabetic inserts/shoes are available.     -He is discharged from my care at this time but encouraged to return as concerns develop.     Ulises Jimenez DPM  [facility]      HPI: Abdulaziz Rausch was seen again today for a left foot ulceration.  Since his last visit patient admits wearing a gym shoe because it feels better than his cam boot.    Past Medical History:   Diagnosis Date    Anemia     Depression     Diabetes mellitus, type 2 (H)     Diabetic foot infection (H) 7/1/2023    Diabetic foot ulcer (H)     History of MRSA infection 7/1/2023 Jan 2023 RIGHT foot wound    Hypertension     Non-healing ulcer of left foot (H) 7/1/2023    Osteomyelitis (H)        Past Surgical History:   Procedure Laterality Date    AMPUTATE FOOT Left 7/3/2023    Procedure: partial left fifth ray amputation, skin flap creation and closre, left foot;  Surgeon: Alejandro Mccoy DPM;  Location: [facility] OR    AMPUTATE TOE(S) Right 2/17/2023    Procedure: AMPUTATION, digits two and three right foot;  Surgeon: Ulises Jimenez DPM;  Location: [facility] OR    INCISION AND DRAINAGE FOOT, COMBINED Left 5/23/2023    Procedure: Incision and drainage of abscess left foot with debridement and irrigation of wound left foot;  Surgeon: Deanna  ENEIDA Rhoades;  Location: [facility] OR    IRRIGATION AND DEBRIDEMENT TOE, COMBINED Right 1/20/2023    Procedure: IRRIGATION AND DEBRIDEMENT digits 2 and 3 right foot;  Surgeon: Jf Huerta DPM;  Location: [facility] OR    PICC SINGLE LUMEN PLACEMENT  5/25/2023            No Known Allergies      Current Outpatient Medications:     acetaminophen (TYLENOL) 325 MG tablet, Take 650 mg by mouth every 6 hours as needed for pain, Disp: , Rfl:     alcohol swab prep pads, Use to swab area of injection/ella as directed., Disp: 100 each, Rfl: 3    blood glucose (NO BRAND SPECIFIED) test strip, Use to test blood sugar 4 times daily or as directed. To accompany: Blood Glucose Monitor Brands: per insurance., Disp: 100 strip, Rfl: 6    blood glucose monitoring (NO BRAND SPECIFIED) meter device kit, Use to test blood sugar 4 time daily or as directed. Preferred blood glucose meter OR supplies to accompany: Blood Glucose Monitor Brands: per insurance., Disp: 1 kit, Rfl: 0    Continuous Blood Gluc  (FREESTYLE NOAH 2 READER) [facility], Use to read blood sugars as per 's instructions., Disp: 1 each, Rfl: 0    Continuous Blood Gluc Sensor (FREESTYLE NOAH 2 SENSOR) [facility], Change every 14 days., Disp: 2 each, Rfl: 5    empagliflozin (JARDIANCE) 25 MG TABS tablet, Take 1 tablet (25 mg) by mouth daily, Disp: 90 tablet, Rfl: 1    gabapentin (NEURONTIN) 300 MG capsule, Take 300 mg by mouth 2 times daily Given at 0800 and 1600, Disp: , Rfl:     insulin glargine (LANTUS SOLOSTAR) 100 UNIT/ML pen, Inject 20 Units Subcutaneous At Bedtime, Disp: 15 mL, Rfl:     insulin lispro (HUMALOG KWIKPEN) 100 UNIT/ML (1 unit dial) KWIKPEN, Inject 6 Units Subcutaneous 3 times daily (before meals), Disp: 15 mL, Rfl: 4    insulin pen needle (31G X 8 MM) 31G X 8 MM miscellaneous, Use 4 pen needles daily or as directed., Disp: 400 each, Rfl: 3    metFORMIN (GLUCOPHAGE) 500 MG tablet, Take 2 tablets (1,000 mg) by mouth 2 times daily (with  meals), Disp: 360 tablet, Rfl: 3    sulfamethoxazole-trimethoprim (BACTRIM DS) 800-160 MG tablet, Take 1 tablet by mouth 2 times daily, Disp: 20 tablet, Rfl: 0    thin (NO BRAND SPECIFIED) lancets, Use with lanceting device. To accompany: Blood Glucose Monitor Brands: per insurance., Disp: 100 each, Rfl: 6    Review of Systems - 10 point Review of Systems is negative except for left foot ulcer which is noted in HPI.      OBJECTIVE:  BP (!) 186/87   Pulse 93   SpO2 98%   General appearance: Patient is alert and fully cooperative with history & exam.  No sign of distress is noted during the visit.    Vascular: Dorsalis pedis non-palpableLeft.  Dermatologic:    Negative Pressure Wound Therapy Foot Anterior;Left (Active)       VASC Wound left plantar lateral (Active)   Pre Size Length 1.5 08/22/23 1400   Pre Size Width 0.1 08/22/23 1400   Pre Size Depth 0.3 08/22/23 1400   Pre Total Sq cm 0.15 08/22/23 1400   Post Size Length 0 09/12/23 1300   Post Size Width 0 09/12/23 1300   Post Size Depth 0 09/12/23 1300   Post Total Sq cm 0 09/12/23 1300   Description macerated 07/28/23 1000   Product Used Negative pressure 07/28/23 1000       VASC Wound Right foot (Active)       VASC Wound Right foot (Active)       VASC Wound Right foot plantar (Active)   Pre Size Length 0 07/17/23 1500   Pre Size Width 0 07/17/23 1500   Post Size Length 3 07/07/23 0900   Post Size Width 1 07/07/23 0900   Post Size Depth 2.5 07/07/23 0900   Post Total Sq cm 3 07/07/23 0900       Incision/Surgical Site 02/17/23 Right Foot (Active)       Incision/Surgical Site 05/23/23 Left Foot (Active)       Incision/Surgical Site 07/03/23 Left Toe (Comment  which one) (Active)     Neurologic: Diminished to light touch Left.  Musculoskeletal: Contracted digits noted Left.      Picture:

## 2023-09-19 ENCOUNTER — TRANSFERRED RECORDS (OUTPATIENT)
Dept: HEALTH INFORMATION MANAGEMENT | Facility: CLINIC | Age: 58
End: 2023-09-19
Payer: COMMERCIAL

## 2023-09-19 LAB — RETINOPATHY: POSITIVE

## 2023-09-25 ENCOUNTER — APPOINTMENT (OUTPATIENT)
Dept: CT IMAGING | Facility: HOSPITAL | Age: 58
End: 2023-09-25
Attending: EMERGENCY MEDICINE
Payer: COMMERCIAL

## 2023-09-25 ENCOUNTER — HOSPITAL ENCOUNTER (EMERGENCY)
Facility: HOSPITAL | Age: 58
Discharge: HOME | End: 2023-09-25
Attending: EMERGENCY MEDICINE | Admitting: EMERGENCY MEDICINE
Payer: COMMERCIAL

## 2023-09-25 VITALS
BODY MASS INDEX: 20.34 KG/M2 | WEIGHT: 145 LBS | OXYGEN SATURATION: 100 % | TEMPERATURE: 97.6 F | HEART RATE: 85 BPM | RESPIRATION RATE: 16 BRPM | SYSTOLIC BLOOD PRESSURE: 187 MMHG | DIASTOLIC BLOOD PRESSURE: 87 MMHG

## 2023-09-25 DIAGNOSIS — R73.9 HYPERGLYCEMIA: ICD-10-CM

## 2023-09-25 DIAGNOSIS — R07.9 ACUTE CHEST PAIN: ICD-10-CM

## 2023-09-25 LAB
ALBUMIN SERPL BCG-MCNC: 3.3 G/DL (ref 3.5–5.2)
ALP SERPL-CCNC: 131 U/L (ref 40–129)
ALT SERPL W P-5'-P-CCNC: 27 U/L (ref 0–70)
ANION GAP SERPL CALCULATED.3IONS-SCNC: 6 MMOL/L (ref 7–15)
AST SERPL W P-5'-P-CCNC: 18 U/L (ref 0–45)
BASE EXCESS BLDV CALC-SCNC: 2 MMOL/L
BASOPHILS # BLD AUTO: 0 10E3/UL (ref 0–0.2)
BASOPHILS NFR BLD AUTO: 0 %
BILIRUB DIRECT SERPL-MCNC: <0.2 MG/DL (ref 0–0.3)
BILIRUB SERPL-MCNC: 0.3 MG/DL
BUN SERPL-MCNC: 26.3 MG/DL (ref 8–23)
CALCIUM SERPL-MCNC: 8.3 MG/DL (ref 8.8–10.2)
CHLORIDE SERPL-SCNC: 103 MMOL/L (ref 98–107)
CREAT SERPL-MCNC: 1.07 MG/DL (ref 0.67–1.17)
DEPRECATED HCO3 PLAS-SCNC: 25 MMOL/L (ref 22–29)
EGFRCR SERPLBLD CKD-EPI 2021: 79 ML/MIN/1.73M2
EOSINOPHIL # BLD AUTO: 0.2 10E3/UL (ref 0–0.7)
EOSINOPHIL NFR BLD AUTO: 2 %
ERYTHROCYTE [DISTWIDTH] IN BLOOD BY AUTOMATED COUNT: 12.8 % (ref 10–15)
GLUCOSE SERPL-MCNC: 428 MG/DL (ref 70–99)
HCO3 BLDV-SCNC: 28 MMOL/L (ref 24–30)
HCT VFR BLD AUTO: 31.6 % (ref 40–53)
HGB BLD-MCNC: 11 G/DL (ref 13.3–17.7)
HOLD SPECIMEN: NORMAL
HOLD SPECIMEN: NORMAL
IMM GRANULOCYTES # BLD: 0 10E3/UL
IMM GRANULOCYTES NFR BLD: 0 %
LIPASE SERPL-CCNC: 23 U/L (ref 13–60)
LYMPHOCYTES # BLD AUTO: 1.4 10E3/UL (ref 0.8–5.3)
LYMPHOCYTES NFR BLD AUTO: 21 %
MCH RBC QN AUTO: 28.8 PG (ref 26.5–33)
MCHC RBC AUTO-ENTMCNC: 34.8 G/DL (ref 31.5–36.5)
MCV RBC AUTO: 83 FL (ref 78–100)
MONOCYTES # BLD AUTO: 0.4 10E3/UL (ref 0–1.3)
MONOCYTES NFR BLD AUTO: 5 %
NEUTROPHILS # BLD AUTO: 4.7 10E3/UL (ref 1.6–8.3)
NEUTROPHILS NFR BLD AUTO: 72 %
NRBC # BLD AUTO: 0 10E3/UL
NRBC BLD AUTO-RTO: 0 /100
OXYHGB MFR BLDV: 27.8 % (ref 70–75)
PCO2 BLDV: 53 MM HG (ref 35–50)
PH BLDV: 7.33 [PH] (ref 7.35–7.45)
PLATELET # BLD AUTO: 239 10E3/UL (ref 150–450)
PO2 BLDV: 19 MM HG (ref 25–47)
POTASSIUM SERPL-SCNC: 4.8 MMOL/L (ref 3.4–5.3)
PROT SERPL-MCNC: 5.9 G/DL (ref 6.4–8.3)
RBC # BLD AUTO: 3.82 10E6/UL (ref 4.4–5.9)
SAO2 % BLDV: 28.4 % (ref 70–75)
SODIUM SERPL-SCNC: 134 MMOL/L (ref 136–145)
TROPONIN T SERPL HS-MCNC: 36 NG/L
TROPONIN T SERPL HS-MCNC: 36 NG/L
WBC # BLD AUTO: 6.6 10E3/UL (ref 4–11)

## 2023-09-25 PROCEDURE — 36415 COLL VENOUS BLD VENIPUNCTURE: CPT | Performed by: EMERGENCY MEDICINE

## 2023-09-25 PROCEDURE — 80053 COMPREHEN METABOLIC PANEL: CPT | Performed by: EMERGENCY MEDICINE

## 2023-09-25 PROCEDURE — 93005 ELECTROCARDIOGRAM TRACING: CPT | Performed by: EMERGENCY MEDICINE

## 2023-09-25 PROCEDURE — 84484 ASSAY OF TROPONIN QUANT: CPT | Performed by: EMERGENCY MEDICINE

## 2023-09-25 PROCEDURE — 82805 BLOOD GASES W/O2 SATURATION: CPT | Performed by: EMERGENCY MEDICINE

## 2023-09-25 PROCEDURE — 74177 CT ABD & PELVIS W/CONTRAST: CPT

## 2023-09-25 PROCEDURE — 99285 EMERGENCY DEPT VISIT HI MDM: CPT | Mod: 25

## 2023-09-25 PROCEDURE — 83690 ASSAY OF LIPASE: CPT | Performed by: EMERGENCY MEDICINE

## 2023-09-25 PROCEDURE — 71275 CT ANGIOGRAPHY CHEST: CPT

## 2023-09-25 PROCEDURE — 82248 BILIRUBIN DIRECT: CPT | Performed by: EMERGENCY MEDICINE

## 2023-09-25 PROCEDURE — 250N000011 HC RX IP 250 OP 636: Mod: JZ | Performed by: EMERGENCY MEDICINE

## 2023-09-25 PROCEDURE — 85004 AUTOMATED DIFF WBC COUNT: CPT | Performed by: EMERGENCY MEDICINE

## 2023-09-25 RX ORDER — IOPAMIDOL 755 MG/ML
75 INJECTION, SOLUTION INTRAVASCULAR ONCE
Status: COMPLETED | OUTPATIENT
Start: 2023-09-25 | End: 2023-09-25

## 2023-09-25 RX ADMIN — IOPAMIDOL 75 ML: 755 INJECTION, SOLUTION INTRAVENOUS at 18:03

## 2023-09-25 ASSESSMENT — ACTIVITIES OF DAILY LIVING (ADL)
ADLS_ACUITY_SCORE: 35

## 2023-09-25 ASSESSMENT — ENCOUNTER SYMPTOMS
DYSURIA: 0
SHORTNESS OF BREATH: 0
FEVER: 0
NAUSEA: 0
DIARRHEA: 0
VOMITING: 0
COUGH: 0
ABDOMINAL PAIN: 0

## 2023-09-25 NOTE — ED NOTES
Expected Patient Referral to ED  2:19 PM    Referring Clinic/Provider:  [facility]    Reason for referral/Clinical facts:  Patient with atypical chest pain.  EKG normal.  Recent treatment for diabetic ulcer.    Recommendations provided:  Send to ED for further evaluation    Caller was informed that this institution does possess the capabilities and/or resources to provide for patient and should be transferred to our facility.    Discussed that if direct admit is sought and any hurdles are encountered, this ED would be happy to see the patient and evaluate.    Informed caller that recommendations provided are recommendations based only on the facts provided and that they responsible to accept or reject the advice, or to seek a formal in person consultation as needed and that this ED will see/treat patient should they arrive.      Andres Antoine MD  [facility] EMERGENCY DEPARTMENT  [address] [place] [id number] [phone]       Andres Antoine MD  09/25/23 1088

## 2023-09-25 NOTE — ED PROVIDER NOTES
EMERGENCY DEPARTMENT ENCOUNTER      NAME: Abdulaziz Rausch  AGE: 58 year old male  YOB: 1965  MRN: [id number]  EVALUATION DATE & TIME: 9/25/2023  4:36 PM    PCP: Daryn Pittman    ED PROVIDER: Sujatha Ayala M.D.        Chief Complaint   Patient presents with    Chest Pain    Hyperglycemia         FINAL IMPRESSION:    1. Acute chest pain    2. Hyperglycemia            MEDICAL DECISION MAKING:    Abdulaziz Rausch is a 58 year old male with history of diabetes, hypertension, CAD, who presents to the ER with complaints of chest pain.  Has been constant now for couple of days.  Nothing makes it better or worse.      Repeat troponin stable.  EKG unremarkable.  CT imaging unremarkable.  Plan at this time is close follow-up with cardiology and referral has been placed to cardiology rapid access clinic.  Patient understands what to watch for and when to return to the ER and all of his questions have been answered.    Glucose is elevated though no signs for DKA.  Given the fact that is close to bedtime I will hold off on giving him a bolus of insulin, instead encouraged him to take his usual medications and adhere to diabetic diet.  He understands this and agrees.      ED COURSE:  4:48 PM  I met with the patient to gather history and perform my exam. ED course and treatment discussed.    9:03 PM  Updated patient on results.  He had a negative troponin emergency room and has now had 2 basically negative troponins and stable troponins here.  CT imaging unremarkable.  He is hyperglycemic but no signs for DKA.  Encourage patient to adhere to a diabetic diet and be sure he is compliant with his medications.  We will have him follow-up with cardiology in rapid access clinic as an outpatient and follow-up closely with primary care.  Patient agrees with this plan.  He understands what to watch for and when to return to the ER and all of his questions have been answered.    I do not think that this represents  rib fractures, myocarditis, pericarditis, endocarditis, PE, ruptured AAA, pneumothorax, aortic dissection, bowel obstruction, bowel ischemia, cholecystitis, kidney stone, pyelonephritis, or other such etiologies at this time.  I feel that ACS is less likely, though I can not fully ruleout ACS in the emergency department. At this time I feel that this patient can be discharged from the emergency department and can followup as directed.    At the conclusion of the encounter I discussed the results of all of the tests and the disposition. Their questions were answered. The patient (and any family present) acknowledged understanding and were agreeable with the care plan.      CONSULTANTS:  Cardiology rapid access clinic referral        MEDICATIONS GIVEN IN THE EMERGENCY:  Medications   iopamidol (ISOVUE-370) solution 75 mL (75 mLs Intravenous $Given 9/25/23 7983)           NEW PRESCRIPTIONS STARTED AT TODAY'S ER VISIT     Medication List      There are no discharge medications for this visit.             CONDITION:  stable        DISPOSITION:  Discharge home         =================================================================  =================================================================  TRIAGE ASSESSMENT:  [facility] brought pt to ED from [facility] where pt has been having chest pain on and off for a few days. Troponin negative. Pt found to be hyperglycemic. States blood sugars are always high. Was 474 for medics. Pt takes insulin but hasn't taken it for a few days.     Triage Assessment       Row Name 09/25/23 1639       Triage Assessment (Adult)    Airway WDL WDL                         ED Triage Vitals [09/25/23 1641]   Enc Vitals Group      BP       Pulse 80      Resp 18      Temp 97.6  F (36.4  C)      Temp src Oral      SpO2 97 %      Weight 65.8 kg (145 lb)     "      ================================================================  ================================================================    HPI    Patient information was obtained from: patient    Use of Intrepreter: N/A      Abdulaziz Rausch is a 58 year old male with history of diabetes, hypertension, CAD, who presents to the ER with complaints of chest pain.  Has been constant now for couple of days.  Nothing makes it better or worse.  Denies any fevers, cough, abdominal pain, vomiting or diarrhea.  Does complain of feeling short of breath with it in the same timeframe.  Initially presented to the urgency room and states that he really did not have much of a work-up done there.  Laboratories reviewed there and were unremarkable including a normal troponin.  Sent here to the ED for further evaluation.    She denies anything that makes the pain better or worse.  Has not taken anything at home to help with the pain.  Was given some \"pills\" prior to arrival.    =========================================  CHART REVIEW FROM URGENCY ROOM:  ODIUM 137 - 145 mmol/L 134 Low     POTASSIUM 3.5 - 5.1 mmol/L 4.7    CHLORIDE 98 - 107 mmol/L 99    CO2,TOTAL 22 - 30 mmol/L 28    ANION GAP 8 - 12 7 Low     GLUCOSE,RANDOM 74 - 106 mg/dL 432 High Panic     CALCIUM 8.4 - 10.2 mg/dL 9.0    BUN 9 - 20 mg/dL 33 High     CREATININE 0.66 - 1.25 mg/dL 1.28 High     BUN/CREAT RATIO 10 - 20 26 High     ALBUMIN 3.5 - 5.0 g/dL 3.7    PROTEIN,TOTAL 6.3 - 8.2 g/dL 6.8    GLOBULIN 2.3 - 3.5 g/dL 3.1    A/G RATIO 1.7 - 2.2 1.2 Low     BILIRUBIN,TOTAL 0.2 - 1.3 mg/dL 0.5    ALK PHOSPHATASE 38 - 126 IU/L 155 High     AST (SGOT) 15 - 46 IU/L 26    ALTv (SGPT) <50 U/L 37    eGFR >90 mL/min/1.73m2 64 Low           Ref Range & Units Today    TROPONIN I <0.034: Result is below the 99th percentile upper range limit. ng/mL <0.012   Resulting Agency  [facility]     EXAM: XR CHEST 2 VIEWS PA AND LATERAL   LOCATION: [facility] [place]   DATE: " 9/25/2023     INDICATION: SOB   COMPARISON: None.     IMPRESSION:   Negative chest.   ============================      REVIEW OF SYSTEMS  Review of Systems   Constitutional:  Negative for fever.   Respiratory:  Negative for cough and shortness of breath.    Cardiovascular:  Negative for chest pain.   Gastrointestinal:  Negative for abdominal pain, diarrhea, nausea and vomiting.   Genitourinary:  Negative for dysuria.   All other systems reviewed and are negative.        PAST MEDICAL HISTORY:  Past Medical History:   Diagnosis Date    Anemia     Depression     Diabetes mellitus, type 2 (H)     Diabetic foot infection (H) 7/1/2023    Diabetic foot ulcer (H)     History of MRSA infection 7/1/2023 Jan 2023 RIGHT foot wound    Hypertension     Non-healing ulcer of left foot (H) 7/1/2023    Osteomyelitis (H)          PAST SURGICAL HISTORY:  Past Surgical History:   Procedure Laterality Date    AMPUTATE FOOT Left 7/3/2023    Procedure: partial left fifth ray amputation, skin flap creation and closre, left foot;  Surgeon: Alejandro Mccoy DPM;  Location:  OR    AMPUTATE TOE(S) Right 2/17/2023    Procedure: AMPUTATION, digits two and three right foot;  Surgeon: Ulises Jimenez DPM;  Location: [facility] OR    INCISION AND DRAINAGE FOOT, COMBINED Left 5/23/2023    Procedure: Incision and drainage of abscess left foot with debridement and irrigation of wound left foot;  Surgeon: Jf Huerta DPM;  Location: [facility] OR    IRRIGATION AND DEBRIDEMENT TOE, COMBINED Right 1/20/2023    Procedure: IRRIGATION AND DEBRIDEMENT digits 2 and 3 right foot;  Surgeon: Jf Huerta DPM;  Location: [facility] OR    PICC SINGLE LUMEN PLACEMENT  5/25/2023              CURRENT MEDICATIONS:    Prior to Admission medications    Medication Sig Start Date End Date Taking? Authorizing Provider   acetaminophen (TYLENOL) 325 MG tablet Take 650 mg by mouth every 6 hours as needed for pain 6/1/23   Reported, Patient    alcohol swab prep pads Use to swab area of injection/ella as directed. 8/26/21   Daryn Pittman MD   blood glucose (NO BRAND SPECIFIED) test strip Use to test blood sugar 4 times daily or as directed. To accompany: Blood Glucose Monitor Brands: per insurance. 8/26/21   Daryn Pittman MD   blood glucose monitoring (NO BRAND SPECIFIED) meter device kit Use to test blood sugar 4 time daily or as directed. Preferred blood glucose meter OR supplies to accompany: Blood Glucose Monitor Brands: per insurance. 8/26/21   Daryn Pittman MD   Continuous Blood Gluc  (FREESTYLE NOAH 2 READER) CHUCK Use to read blood sugars as per 's instructions. 4/17/23   Daryn Pittman MD   Continuous Blood Gluc Sensor (FREESTYLE NOAH 2 SENSOR) MISC Change every 14 days. 4/17/23   Daryn Pittman MD   empagliflozin (JARDIANCE) 25 MG TABS tablet Take 1 tablet (25 mg) by mouth daily 4/18/23   Daryn Pittman MD   gabapentin (NEURONTIN) 300 MG capsule Take 300 mg by mouth 2 times daily Given at 0800 and 1600    Unknown, Entered By History   insulin glargine (LANTUS SOLOSTAR) 100 UNIT/ML pen Inject 20 Units Subcutaneous At Bedtime 5/26/23   Florentino Mccoy MD   insulin lispro (HUMALOG KWIKPEN) 100 UNIT/ML (1 unit dial) KWIKPEN Inject 6 Units Subcutaneous 3 times daily (before meals) 4/17/23   Daryn Pittman MD   insulin pen needle (31G X 8 MM) 31G X 8 MM miscellaneous Use 4 pen needles daily or as directed. 2/13/23   Miguel Ryan MD   metFORMIN (GLUCOPHAGE) 500 MG tablet Take 2 tablets (1,000 mg) by mouth 2 times daily (with meals) 4/17/23   Daryn Pittman MD   sulfamethoxazole-trimethoprim (BACTRIM DS) 800-160 MG tablet Take 1 tablet by mouth 2 times daily 7/7/23   Ulises Jimenez DPM   thin (NO BRAND SPECIFIED) lancets Use with lanceting device. To accompany: Blood Glucose Monitor Brands: per insurance. 8/26/21   Daryn Pittman MD         ALLERGIES:  No Known Allergies      FAMILY HISTORY:  Family  History   Problem Relation Age of Onset    Diabetes Father          SOCIAL HISTORY:  Social History     Socioeconomic History    Marital status:    Tobacco Use    Smoking status: Never    Smokeless tobacco: Never   Vaping Use    Vaping Use: Never used   Substance and Sexual Activity    Alcohol use: Never    Drug use: Never         VITALS:  Patient Vitals for the past 24 hrs:   BP Temp Temp src Pulse Resp SpO2 Weight   09/25/23 2123 (!) 187/87 -- -- 85 16 100 % --   09/25/23 1923 (!) 163/93 -- -- 85 16 99 % --   09/25/23 1825 (!) 182/87 -- -- 83 -- -- --   09/25/23 1810 (!) 194/93 -- -- 81 -- -- --   09/25/23 1718 (!) 189/94 -- -- 79 20 100 % --   09/25/23 1703 (!) 173/88 -- -- 79 16 99 % --   09/25/23 1648 (!) 170/93 -- -- 79 16 99 % --   09/25/23 1644 (!) 174/90 -- -- 79 -- -- --   09/25/23 1641 -- 97.6  F (36.4  C) Oral 80 18 97 % 65.8 kg (145 lb)       Wt Readings from Last 3 Encounters:   09/25/23 65.8 kg (145 lb)   07/07/23 64.4 kg (142 lb)   07/01/23 63.5 kg (140 lb)       Estimated Creatinine Clearance: 68.3 mL/min (based on SCr of 1.07 mg/dL).    PHYSICAL EXAM    Constitutional:  Well developed, Well nourished, NAD, GCS 15  HENT:  Normocephalic, Atraumatic, Bilateral external ears normal, Nose normal. Neck- Supple, No stridor.   Eyes:  PERRL, EOMI, Conjunctiva normal, No discharge.  Respiratory:  Normal breath sounds, No respiratory distress, No wheezing, Speaks full sentences easily. No cough.   Cardiovascular:  Normal heart rate, Regular rhythm, No rubs, No gallops. Chest wall nontender.   GI:  No excessive obesity.  Bowel sounds normal, Soft, No tenderness, No masses, No flank tenderness. No rebound or guarding.   : deferred  Musculoskeletal:  No cyanosis, No clubbing. Good range of motion in all major joints. No major deformities noted.   Integument:  Warm, Dry, No erythema, No rash.  No petechiae.   Neurologic:  Alert & oriented x 3  Psychiatric:  Affect normal, Cooperative         LAB:  All  pertinent labs reviewed and interpreted.  Recent Results (from the past 24 hour(s))   COVID-19 VIRUS (CORONAVIRUS) BY PCR (EXTERNAL RESULT)    Collection Time: 09/25/23  1:17 PM   Result Value Ref Range    COVID-19 Virus by PCR (External Result) Negative Negative   Basic metabolic panel    Collection Time: 09/25/23  5:11 PM   Result Value Ref Range    Sodium 134 (L) 136 - 145 mmol/L    Potassium 4.8 3.4 - 5.3 mmol/L    Chloride 103 98 - 107 mmol/L    Carbon Dioxide (CO2) 25 22 - 29 mmol/L    Anion Gap 6 (L) 7 - 15 mmol/L    Urea Nitrogen 26.3 (H) 8.0 - 23.0 mg/dL    Creatinine 1.07 0.67 - 1.17 mg/dL    GFR Estimate 79 >60 mL/min/1.73m2    Calcium 8.3 (L) 8.8 - 10.2 mg/dL    Glucose 428 (H) 70 - 99 mg/dL   Troponin T, High Sensitivity (now)    Collection Time: 09/25/23  5:11 PM   Result Value Ref Range    Troponin T, High Sensitivity 36 (H) <=22 ng/L   Hepatic function panel    Collection Time: 09/25/23  5:11 PM   Result Value Ref Range    Protein Total 5.9 (L) 6.4 - 8.3 g/dL    Albumin 3.3 (L) 3.5 - 5.2 g/dL    Bilirubin Total 0.3 <=1.2 mg/dL    Alkaline Phosphatase 131 (H) 40 - 129 U/L    AST 18 0 - 45 U/L    ALT 27 0 - 70 U/L    Bilirubin Direct <0.20 0.00 - 0.30 mg/dL   Lipase    Collection Time: 09/25/23  5:11 PM   Result Value Ref Range    Lipase 23 13 - 60 U/L   Blood gas venous    Collection Time: 09/25/23  5:11 PM   Result Value Ref Range    pH Venous 7.33 (L) 7.35 - 7.45    pCO2 Venous 53 (H) 35 - 50 mm Hg    pO2 Venous 19 (L) 25 - 47 mm Hg    Bicarbonate Venous 28 24 - 30 mmol/L    Base Excess/Deficit 2.0   mmol/L    Oxyhemoglobin Venous 27.8 (L) 70.0 - 75.0 %    O2 Sat, Venous 28.4 (L) 70.0 - 75.0 %   Extra Blue Top Tube    Collection Time: 09/25/23  5:11 PM   Result Value Ref Range    Hold Specimen JIC    Extra Red Top Tube    Collection Time: 09/25/23  5:11 PM   Result Value Ref Range    Hold Specimen JIC    CBC with platelets and differential    Collection Time: 09/25/23  5:11 PM   Result Value Ref  Range    WBC Count 6.6 4.0 - 11.0 10e3/uL    RBC Count 3.82 (L) 4.40 - 5.90 10e6/uL    Hemoglobin 11.0 (L) 13.3 - 17.7 g/dL    Hematocrit 31.6 (L) 40.0 - 53.0 %    MCV 83 78 - 100 fL    MCH 28.8 26.5 - 33.0 pg    MCHC 34.8 31.5 - 36.5 g/dL    RDW 12.8 10.0 - 15.0 %    Platelet Count 239 150 - 450 10e3/uL    % Neutrophils 72 %    % Lymphocytes 21 %    % Monocytes 5 %    % Eosinophils 2 %    % Basophils 0 %    % Immature Granulocytes 0 %    NRBCs per 100 WBC 0 <1 /100    Absolute Neutrophils 4.7 1.6 - 8.3 10e3/uL    Absolute Lymphocytes 1.4 0.8 - 5.3 10e3/uL    Absolute Monocytes 0.4 0.0 - 1.3 10e3/uL    Absolute Eosinophils 0.2 0.0 - 0.7 10e3/uL    Absolute Basophils 0.0 0.0 - 0.2 10e3/uL    Absolute Immature Granulocytes 0.0 <=0.4 10e3/uL    Absolute NRBCs 0.0 10e3/uL   Troponin T, High Sensitivity (now)    Collection Time: 09/25/23  7:25 PM   Result Value Ref Range    Troponin T, High Sensitivity 36 (H) <=22 ng/L       No results found for: ABOR        RADIOLOGY:  Reviewed all pertinent imaging. Please see official radiology report.    CT Abdomen Pelvis w Contrast   Final Result   IMPRESSION:    1.  No acute abnormality in the abdomen or pelvis.      CT Chest Pulmonary Embolism w Contrast   Final Result   IMPRESSION:   No pulmonary embolism, acute aortic pathology, or other acute abnormality in the chest.            EKG:    Indication: Chest pain     Performed at: 16:41p  Impression: Sinus rhythm at 80 bpm.  Flipped T waves noted lead aVR.  NE interval 138 Muslims, QRS 84 ms, QTc 440 ms.  EKG appears similar to February 15, 2023.      I have independently reviewed and interpreted the EKG(s) documented above.        PROCEDURES:  none    Medical Decision Making    History:  Supplemental history from: Documented in chart, if applicable  External Record(s) reviewed: Documented in chart, if applicable.    Work Up:  Chart documentation includes differential considered and any EKGs or imaging independently interpreted by  provider, where specified.  In additional to work up documented, I considered the following work up: Documented in chart, if applicable.    External consultation:  Discussion of management with another provider: Documented in chart, if applicable    Complicating factors:  Care impacted by chronic illness: Diabetes and Hypertension  Care affected by social determinants of health: Access to Medical Care    Disposition considerations: Discharge. No recommendations on prescription strength medication(s). I considered admission, but ultimately discharged patient repeat troponin stable.  At this time I feel he is appropriate for close follow-up in cardiology rapid access clinic.  No signs for DKA..      Sujatha Ayala M.D. [facility]  Emergency Medicine and Medical Toxicology  [facility] EMERGENCY DEPARTMENT  [facility] [facility] [id number] [phone]  Dept: [phone]           Sujatha Ayala MD  09/25/23 3606

## 2023-09-25 NOTE — ED TRIAGE NOTES
[facility] brought pt to ED from [facility] where pt has been having chest pain on and off for a few days. Troponin negative. Pt found to be hyperglycemic. States blood sugars are always high. Was 474 for medics. Pt takes insulin but hasn't taken it for a few days.     Triage Assessment       Row Name 09/25/23 2274       Triage Assessment (Adult)    Airway WDL WDL

## 2023-09-26 NOTE — DISCHARGE INSTRUCTIONS
Call in the morning cardiology rapid access clinic.    Also call in the morning to make an appointment to follow-up with your family doctor to recheck your sugars this week.  Be sure you are taking all of your diabetes medications.  Be sure that you are watching what you eat and eating foods for diabetics.  Your blood sugars at home as prior directed.    Return to emergency department with worsening chest pain, difficulty breathing, blood sugars persistently over 500, fevers, or any other concerns.    Thank you for choosing [facility] Emergency Department.  It has been my pleasure caring for you today.     ~Dr. Jamie MD

## 2023-10-03 ENCOUNTER — TELEPHONE (OUTPATIENT)
Dept: FAMILY MEDICINE | Facility: CLINIC | Age: 58
End: 2023-10-03
Payer: COMMERCIAL

## 2023-10-03 NOTE — TELEPHONE ENCOUNTER
Writer called patient and left a voicemail to return call to clinic to verify that he will be at his appointment on 10/5/2023 with Dr. Pittman..      If patient returns call please verify that he will be at appointment scheduled for 1140 on 10/5/2023.  If patient unable to make it please assist with rescheduling appointment.      OLIVA BranchN, RN  [facility]

## 2023-10-05 ENCOUNTER — OFFICE VISIT (OUTPATIENT)
Dept: FAMILY MEDICINE | Facility: CLINIC | Age: 58
End: 2023-10-05
Payer: COMMERCIAL

## 2023-10-05 ENCOUNTER — OFFICE VISIT (OUTPATIENT)
Dept: CARDIOLOGY | Facility: CLINIC | Age: 58
End: 2023-10-05
Attending: EMERGENCY MEDICINE
Payer: COMMERCIAL

## 2023-10-05 VITALS
OXYGEN SATURATION: 98 % | SYSTOLIC BLOOD PRESSURE: 160 MMHG | BODY MASS INDEX: 20.23 KG/M2 | DIASTOLIC BLOOD PRESSURE: 98 MMHG | WEIGHT: 137 LBS | RESPIRATION RATE: 16 BRPM | HEART RATE: 95 BPM

## 2023-10-05 VITALS
SYSTOLIC BLOOD PRESSURE: 140 MMHG | TEMPERATURE: 97.7 F | BODY MASS INDEX: 20.14 KG/M2 | HEIGHT: 69 IN | WEIGHT: 136 LBS | HEART RATE: 101 BPM | OXYGEN SATURATION: 98 % | DIASTOLIC BLOOD PRESSURE: 70 MMHG

## 2023-10-05 DIAGNOSIS — Z79.4 TYPE 2 DIABETES MELLITUS WITH DIABETIC PERIPHERAL ANGIOPATHY WITHOUT GANGRENE, WITH LONG-TERM CURRENT USE OF INSULIN (H): ICD-10-CM

## 2023-10-05 DIAGNOSIS — L97.513 DIABETIC ULCER OF TOE OF RIGHT FOOT ASSOCIATED WITH DIABETES MELLITUS DUE TO UNDERLYING CONDITION, WITH NECROSIS OF MUSCLE (H): ICD-10-CM

## 2023-10-05 DIAGNOSIS — Z12.11 SCREENING FOR COLON CANCER: ICD-10-CM

## 2023-10-05 DIAGNOSIS — E08.621 DIABETIC ULCER OF TOE OF RIGHT FOOT ASSOCIATED WITH DIABETES MELLITUS DUE TO UNDERLYING CONDITION, WITH NECROSIS OF MUSCLE (H): ICD-10-CM

## 2023-10-05 DIAGNOSIS — R07.9 ACUTE CHEST PAIN: Primary | ICD-10-CM

## 2023-10-05 DIAGNOSIS — Z79.4 TYPE 2 DIABETES MELLITUS WITH OTHER CIRCULATORY COMPLICATION, WITH LONG-TERM CURRENT USE OF INSULIN (H): ICD-10-CM

## 2023-10-05 DIAGNOSIS — E11.59 TYPE 2 DIABETES MELLITUS WITH OTHER CIRCULATORY COMPLICATION, WITH LONG-TERM CURRENT USE OF INSULIN (H): ICD-10-CM

## 2023-10-05 DIAGNOSIS — Z79.4 TYPE 2 DIABETES MELLITUS WITH HYPERGLYCEMIA, WITH LONG-TERM CURRENT USE OF INSULIN (H): Primary | ICD-10-CM

## 2023-10-05 DIAGNOSIS — E11.65 TYPE 2 DIABETES MELLITUS WITH HYPERGLYCEMIA, WITH LONG-TERM CURRENT USE OF INSULIN (H): Primary | ICD-10-CM

## 2023-10-05 DIAGNOSIS — E11.51 TYPE 2 DIABETES MELLITUS WITH DIABETIC PERIPHERAL ANGIOPATHY WITHOUT GANGRENE, WITH LONG-TERM CURRENT USE OF INSULIN (H): ICD-10-CM

## 2023-10-05 DIAGNOSIS — N18.2 CKD (CHRONIC KIDNEY DISEASE) STAGE 2, GFR 60-89 ML/MIN: ICD-10-CM

## 2023-10-05 DIAGNOSIS — I10 PRIMARY HYPERTENSION: ICD-10-CM

## 2023-10-05 DIAGNOSIS — I73.9 PVD (PERIPHERAL VASCULAR DISEASE) (H): ICD-10-CM

## 2023-10-05 PROBLEM — E10.9 TYPE 1 DIABETES MELLITUS WITHOUT COMPLICATION (H): Status: RESOLVED | Noted: 2022-12-30 | Resolved: 2023-10-05

## 2023-10-05 PROBLEM — L08.9 DIABETIC FOOT INFECTION (H): Status: RESOLVED | Noted: 2023-07-01 | Resolved: 2023-10-05

## 2023-10-05 PROBLEM — L03.116 CELLULITIS OF LEFT FOOT: Status: RESOLVED | Noted: 2023-05-21 | Resolved: 2023-10-05

## 2023-10-05 PROBLEM — M86.9 OSTEOMYELITIS OF ANKLE AND FOOT (H): Status: RESOLVED | Noted: 2023-01-27 | Resolved: 2023-10-05

## 2023-10-05 PROBLEM — L97.523 ULCER OF LEFT FOOT WITH NECROSIS OF MUSCLE (H): Status: RESOLVED | Noted: 2023-06-07 | Resolved: 2023-10-05

## 2023-10-05 PROBLEM — L03.119 CELLULITIS AND ABSCESS OF FOOT EXCLUDING TOE: Status: RESOLVED | Noted: 2023-03-16 | Resolved: 2023-10-05

## 2023-10-05 PROBLEM — A41.9 SEPSIS, DUE TO UNSPECIFIED ORGANISM, UNSPECIFIED WHETHER ACUTE ORGAN DYSFUNCTION PRESENT (H): Status: RESOLVED | Noted: 2023-05-21 | Resolved: 2023-10-05

## 2023-10-05 PROBLEM — E87.6 HYPOKALEMIA: Status: RESOLVED | Noted: 2023-01-18 | Resolved: 2023-10-05

## 2023-10-05 PROBLEM — L97.511 DIABETIC ULCER OF OTHER PART OF RIGHT FOOT ASSOCIATED WITH DIABETES MELLITUS DUE TO UNDERLYING CONDITION, LIMITED TO BREAKDOWN OF SKIN (H): Status: RESOLVED | Noted: 2023-06-07 | Resolved: 2023-10-05

## 2023-10-05 PROBLEM — L02.619 CELLULITIS AND ABSCESS OF FOOT EXCLUDING TOE: Status: RESOLVED | Noted: 2023-03-16 | Resolved: 2023-10-05

## 2023-10-05 PROBLEM — L97.525 ULCER OF LEFT FOOT WITH MUSCLE INVOLVEMENT WITHOUT EVIDENCE OF NECROSIS (H): Status: RESOLVED | Noted: 2023-07-07 | Resolved: 2023-10-05

## 2023-10-05 PROBLEM — R73.9 HYPERGLYCEMIA: Status: RESOLVED | Noted: 2023-05-21 | Resolved: 2023-10-05

## 2023-10-05 PROBLEM — L97.521 DIABETIC ULCER OF OTHER PART OF LEFT FOOT ASSOCIATED WITH DIABETES MELLITUS DUE TO UNDERLYING CONDITION, LIMITED TO BREAKDOWN OF SKIN (H): Status: RESOLVED | Noted: 2023-03-16 | Resolved: 2023-10-05

## 2023-10-05 PROBLEM — L03.119 CELLULITIS OF FOOT: Status: RESOLVED | Noted: 2023-07-01 | Resolved: 2023-10-05

## 2023-10-05 PROBLEM — L97.529: Status: RESOLVED | Noted: 2023-07-01 | Resolved: 2023-10-05

## 2023-10-05 PROBLEM — E11.628 DIABETIC FOOT INFECTION (H): Status: RESOLVED | Noted: 2023-07-01 | Resolved: 2023-10-05

## 2023-10-05 PROBLEM — M86.9 OSTEOMYELITIS OF LEFT FOOT, UNSPECIFIED TYPE (H): Status: RESOLVED | Noted: 2023-07-01 | Resolved: 2023-10-05

## 2023-10-05 PROBLEM — E87.1 HYPONATREMIA: Status: RESOLVED | Noted: 2023-01-18 | Resolved: 2023-10-05

## 2023-10-05 LAB — HBA1C MFR BLD: 12.1 % (ref 0–5.6)

## 2023-10-05 PROCEDURE — 90471 IMMUNIZATION ADMIN: CPT | Performed by: STUDENT IN AN ORGANIZED HEALTH CARE EDUCATION/TRAINING PROGRAM

## 2023-10-05 PROCEDURE — 83036 HEMOGLOBIN GLYCOSYLATED A1C: CPT | Performed by: STUDENT IN AN ORGANIZED HEALTH CARE EDUCATION/TRAINING PROGRAM

## 2023-10-05 PROCEDURE — 99204 OFFICE O/P NEW MOD 45 MIN: CPT | Performed by: INTERNAL MEDICINE

## 2023-10-05 PROCEDURE — 36415 COLL VENOUS BLD VENIPUNCTURE: CPT | Performed by: STUDENT IN AN ORGANIZED HEALTH CARE EDUCATION/TRAINING PROGRAM

## 2023-10-05 PROCEDURE — 90682 RIV4 VACC RECOMBINANT DNA IM: CPT | Performed by: STUDENT IN AN ORGANIZED HEALTH CARE EDUCATION/TRAINING PROGRAM

## 2023-10-05 PROCEDURE — 99214 OFFICE O/P EST MOD 30 MIN: CPT | Mod: 25 | Performed by: STUDENT IN AN ORGANIZED HEALTH CARE EDUCATION/TRAINING PROGRAM

## 2023-10-05 RX ORDER — AMLODIPINE BESYLATE 2.5 MG/1
2.5 TABLET ORAL DAILY
Qty: 30 TABLET | Refills: 6 | Status: ON HOLD | OUTPATIENT
Start: 2023-10-05 | End: 2024-08-23

## 2023-10-05 ASSESSMENT — PATIENT HEALTH QUESTIONNAIRE - PHQ9
SUM OF ALL RESPONSES TO PHQ QUESTIONS 1-9: 15
10. IF YOU CHECKED OFF ANY PROBLEMS, HOW DIFFICULT HAVE THESE PROBLEMS MADE IT FOR YOU TO DO YOUR WORK, TAKE CARE OF THINGS AT HOME, OR GET ALONG WITH OTHER PEOPLE: VERY DIFFICULT
SUM OF ALL RESPONSES TO PHQ QUESTIONS 1-9: 15

## 2023-10-05 ASSESSMENT — PAIN SCALES - GENERAL: PAINLEVEL: NO PAIN (0)

## 2023-10-05 NOTE — PROGRESS NOTES
Thank you, Dr. Sujatha Downs, for asking the [facility] team to see Mr. Abdulaziz Rausch in the rapid access clinic to evaluate chest discomfort.    Assessment/Recommendations   Assessment:    1.  Intermittent chest discomfort, etiology unclear as work-up in the ED was unrevealing.  ECG showed no acute changes despite report of more constant discomfort at the time of his presentation although troponin levels were in an indeterminate range.  He clearly has risk factors for coronary artery disease with diabetes and peripheral arterial disease.  Blood pressure is also markedly elevated.  I did recommend setting up a nuclear stress test for further evaluation.  2.  Hypertension, currently untreated.  Unfortunately, he is not a good candidate for ACE inhibitor or ARB due to borderline hyperkalemia.  We will begin amlodipine 2.5 mg daily.  3.  Unknown lipid status.  Would recommend fasting lipids when he follows up with primary care.  4.  Peripheral arterial disease, status post toe amputation    Plan:  1.  Continue current medications  2.  Begin amlodipine 2.5 mg daily to help with blood pressure control  3.  Schedule pharmacologic nuclear stress test with further recommendations to follow       History of Present Illness    Mr. Abdulaziz Rausch is a 58 year old male with history of type 2 diabetes mellitus, peripheral arterial disease status post toe amputation, unknown lipid status who recently presented to the ED for evaluation of chest discomfort.  Reported intermittent discomfort which has been present over the last 1 to 2 months but possibly worse over the last week or 2.  No clear precipitating or exacerbating factors.  Possibly worse with deep breath.  ECG was obtained demonstrating sinus rhythm without ischemic changes.  High-sensitivity troponin mildly elevated at 36 with repeat troponin again at 36.  Was noted to be quite hypertensive in the ED although had not been treated for  hypertension in the past.  Was subsequently discharged to be seen in the rapid access clinic today.  Continues to note intermittent chest discomfort as well as some numbness in his hands.  No history of prior coronary artery disease.  Denies orthopnea, PND or lower extremity edema.    ECG (personally reviewed): ECG as reported above    Cardiac Imaging Studies (personally reviewed): No recent cardiac imaging     Physical Examination Review of Systems   BP (!) 160/98 (BP Location: Left arm, Patient Position: Sitting, Cuff Size: Adult Regular)   Pulse 95   Resp 16   Wt 62.1 kg (137 lb)   SpO2 98%   BMI 20.23 kg/m    Body mass index is 20.23 kg/m .  Wt Readings from Last 3 Encounters:   10/05/23 62.1 kg (137 lb)   10/05/23 61.7 kg (136 lb)   09/25/23 65.8 kg (145 lb)     General Appearance:   Awake, Alert, No acute distress.   HEENT:  No scleral icterus; the mucous membranes were pink and moist.   Neck: No cervical bruits or jugular venous distention    Chest: The spine was straight. The chest was symmetric.   Lungs:   Respirations unlabored; the lungs are clear to auscultation. No wheezing   Cardiovascular:   Regular rate and rhythm.  S1, S2 normal.  No murmur or gallop   Abdomen:  No organomegaly, masses, bruits, or tenderness. Bowels sounds are present   Extremities: No peripheral edema bilaterally   Skin: No xanthelasma. Warm, Dry.   Musculoskeletal: No tenderness.   Neurologic: Mood and affect are appropriate.    Enc Vitals  BP: (!) 160/98  Pulse: 95  Resp: 16  SpO2: 98 %  Weight: 62.1 kg (137 lb)                                         Medical History  Surgical History Family History Social History   Past Medical History:   Diagnosis Date    Anemia     Depression     Diabetes mellitus, type 2 (H)     Diabetic foot infection (H) 7/1/2023    Diabetic foot ulcer (H)     History of MRSA infection 7/1/2023 Jan 2023 RIGHT foot wound    Hypertension     Non-healing ulcer of left foot (H) 7/1/2023    Osteomyelitis  (H)     Past Surgical History:   Procedure Laterality Date    AMPUTATE FOOT Left 7/3/2023    Procedure: partial left fifth ray amputation, skin flap creation and closre, left foot;  Surgeon: Alejandro Mccoy DPM;  Location: [facility] OR    AMPUTATE TOE(S) Right 2/17/2023    Procedure: AMPUTATION, digits two and three right foot;  Surgeon: Ulises Jimenez DPM;  Location: [facility] OR    INCISION AND DRAINAGE FOOT, COMBINED Left 5/23/2023    Procedure: Incision and drainage of abscess left foot with;  Surgeon: Jf Huerta DPM;  Location: [facility] OR    IRRIGATION AND DEBRIDEMENT FOOT, COMBINED Left 5/23/2023    Procedure: debridement and irrigation of wound left foot;  Surgeon: Jf Huerta DPM;  Location: [facility] OR    IRRIGATION AND DEBRIDEMENT TOE, COMBINED Right 1/20/2023    Procedure: IRRIGATION AND DEBRIDEMENT digits 2 and 3 right foot;  Surgeon: Jf Huerta DPM;  Location: [facility] OR    PICC SINGLE LUMEN PLACEMENT  5/25/2023         Family History   Problem Relation Age of Onset    Diabetes Father     Social History     Socioeconomic History    Marital status:      Spouse name: Not on file    Number of children: Not on file    Years of education: Not on file    Highest education level: Not on file   Occupational History    Not on file   Tobacco Use    Smoking status: Never    Smokeless tobacco: Never   Vaping Use    Vaping Use: Never used   Substance and Sexual Activity    Alcohol use: Never    Drug use: Never    Sexual activity: Not on file   Other Topics Concern    Not on file   Social History Narrative    Not on file     Social Determinants of Health     Financial Resource Strain: High Risk (10/5/2023)    Financial Resource Strain     Within the past 12 months, have you or your family members you live with been unable to get utilities (heat, electricity) when it was really needed?: Yes   Food Insecurity: High Risk (10/5/2023)    Food Insecurity     Within the  past 12 months, did you worry that your food would run out before you got money to buy more?: Yes     Within the past 12 months, did the food you bought just not last and you didn t have money to get more?: Yes   Transportation Needs: Low Risk  (10/5/2023)    Transportation Needs     Within the past 12 months, has lack of transportation kept you from medical appointments, getting your medicines, non-medical meetings or appointments, work, or from getting things that you need?: No   Physical Activity: Not on file   Stress: Not on file   Social Connections: Not on file   Interpersonal Safety: Low Risk  (10/5/2023)    Interpersonal Safety     Do you feel physically and emotionally safe where you currently live?: Yes     Within the past 12 months, have you been hit, slapped, kicked or otherwise physically hurt by someone?: No     Within the past 12 months, have you been humiliated or emotionally abused in other ways by your partner or ex-partner?: No   Housing Stability: Low Risk  (10/5/2023)    Housing Stability     Do you have housing? : Yes     Are you worried about losing your housing?: No          Medications  Allergies   Current Outpatient Medications   Medication Sig Dispense Refill    acetaminophen (TYLENOL) 325 MG tablet Take 650 mg by mouth every 6 hours as needed for pain      alcohol swab prep pads Use to swab area of injection/ella as directed. 100 each 3    amLODIPine (NORVASC) 2.5 MG tablet Take 1 tablet (2.5 mg) by mouth daily 30 tablet 6    blood glucose (NO BRAND SPECIFIED) test strip Use to test blood sugar 4 times daily or as directed. To accompany: Blood Glucose Monitor Brands: per insurance. 100 strip 6    blood glucose monitoring (NO BRAND SPECIFIED) meter device kit Use to test blood sugar 4 time daily or as directed. Preferred blood glucose meter OR supplies to accompany: Blood Glucose Monitor Brands: per insurance. 1 kit 0    Continuous Blood Gluc  (FREESTYLE NOAH 2 READER) CHUCK Use  to read blood sugars as per 's instructions. 1 each 0    Continuous Blood Gluc Sensor (FREESTYLE NOAH 2 SENSOR) MISC Use 1 sensor every 14 days. Use to read blood sugars per 's instructions. 2 each 5    empagliflozin (JARDIANCE) 25 MG TABS tablet Take 1 tablet (25 mg) by mouth daily 90 tablet 3    gabapentin (NEURONTIN) 300 MG capsule Take 300 mg by mouth 2 times daily Given at 0800 and 1600      insulin glargine (LANTUS SOLOSTAR) 100 UNIT/ML pen Inject 20 Units Subcutaneous At Bedtime 15 mL     insulin lispro (HUMALOG KWIKPEN) 100 UNIT/ML (1 unit dial) KWIKPEN Inject 6 Units Subcutaneous 3 times daily (before meals) 15 mL 4    insulin pen needle (31G X 8 MM) 31G X 8 MM miscellaneous Use 4 pen needles daily or as directed. 400 each 3    metFORMIN (GLUCOPHAGE) 500 MG tablet Take 2 tablets (1,000 mg) by mouth 2 times daily (with meals) 360 tablet 3    thin (NO BRAND SPECIFIED) lancets Use with lanceting device. To accompany: Blood Glucose Monitor Brands: per insurance. 100 each 6      No Known Allergies      Lab Results    Chemistry/lipid CBC Cardiac Enzymes/BNP/TSH/INR   Recent Labs   Lab Test 09/25/23  1711 01/18/23  2137 01/18/23  1448   TRIG  --   --  226*   LDL  --   --  63   BUN 26.3*   < > 27.9*   *   < > 126*   CO2 25   < > 28    < > = values in this interval not displayed.    Recent Labs   Lab Test 09/25/23  1711   WBC 6.6   HGB 11.0*   HCT 31.6*   MCV 83       No results for input(s): CKTOTAL, CKMB, TROPONINI, BNP, TSH, INR in the last 65788 hours.    Invalid input(s): CKMBINDEX     A total of 40 minutes was spent reviewing patient's medical records, obtaining history and performing examination, as well as discussing diagnoses/ recommendations with patient and answering all questions.

## 2023-10-05 NOTE — PROGRESS NOTES
Diabetes Check-up      ASSESSMENT AND PLAN:  58-year-old male with possible history of type 2 diabetes on insulin with complications of hyperglycemia and diabetic neuropathy/peripheral vascular disease surgical post several toe amputations, subsequent chronic anemia, essential hypertension who presents for diabetic follow-up.  Patient's A1c is improved to 8.8 in July from 11 due to taking his medications more consistently.  He is still overall poor about follow-up.  I recommend a recheck of an A1c today to see his current value but will likely increase his mealtime insulin as he noticed a trend with wearing continuous glucose monitor in the TCU he was at after his latest hospitalization that his daytime blood sugars would spike especially with meals.  I gave him freestyle noah 2 sensors and hopefully will have more data to review on his next visit in order to know neck steps for his diabetes.  He will continue on his metformin and Jardiance as well.  On next visit I would start him on a statin as well as likely start aspirin once daily baby dose as well.    1. Type 2 diabetes mellitus with hyperglycemia, with long-term current use of insulin (H)  - Continuous Blood Gluc Sensor (FREESTYLE NOAH 2 SENSOR) [facility]; Use 1 sensor every 14 days. Use to read blood sugars per 's instructions.  Dispense: 2 each; Refill: 5  - empagliflozin (JARDIANCE) 25 MG TABS tablet; Take 1 tablet (25 mg) by mouth daily  Dispense: 90 tablet; Refill: 3  - blood glucose (NO BRAND SPECIFIED) test strip; Use to test blood sugar 4 times daily or as directed. To accompany: Blood Glucose Monitor Brands: per insurance.  Dispense: 100 strip; Refill: 6  - Hemoglobin A1c; Future    2. Diabetic ulcer of toe of right foot associated with diabetes mellitus due to underlying condition, with necrosis of muscle (H)    3. PVD (peripheral vascular disease) (H24)    4. Type 2 diabetes mellitus with other circulatory complication, with  long-term current use of insulin (H)  - metFORMIN (GLUCOPHAGE) 500 MG tablet; Take 2 tablets (1,000 mg) by mouth 2 times daily (with meals)  Dispense: 360 tablet; Refill: 3    5. CKD (chronic kidney disease) stage 2, GFR 60-89 ml/min  - empagliflozin (JARDIANCE) 25 MG TABS tablet; Take 1 tablet (25 mg) by mouth daily  Dispense: 90 tablet; Refill: 3    6. Screening for colon cancer  - Colonoscopy Screening  Referral; Future    Follow up in 3 months, sooner as needed    Options for treatment and follow-up care were reviewed with the patient. Abdulaziz Rausch  engaged in the decision making process and verbalized understanding of the options discussed and agreed with the final plan.    Dr. Daryn Walton       SUBJECTIVE  Abdulaziz Rausch is a 58 year old who presents today for follow up of DIABETES MELLITUS .       DM Overview:  Diagnosed in 2021    Most recent HgbA1c:  Lab Results   Component Value Date    A1C 8.8 07/28/2023    A1C 11.3 04/17/2023    A1C 11.2 02/15/2023    A1C 13.7 01/18/2023    A1C 7.4 08/26/2021     Current medication regimen: Lantus 20 units qhs, lispro 6 units tid ac, metformin 1000 mg   BID, empagliflozin 25 mg daily     ASA: no  Statin: no    Exams:  - Retinopathy: Last ophthalmology appointment unkown  - Neuropathy: foot exam known neuropathy    Complications:  - CKD? No  Last Comprehensive Metabolic Panel:  Lab Results   Component Value Date     02/21/2023    POTASSIUM 4.2 02/21/2023    CHLORIDE 108 (H) 02/21/2023    CO2 22 02/21/2023    ANIONGAP 9 02/21/2023     (H) 02/21/2023    BUN 18.8 02/21/2023    CR 1.17 02/21/2023    GFRESTIMATED 72 02/21/2023    AMIRAH 8.4 (L) 02/21/2023     - CAD? No  - CHF No    BP Readings from Last 3 Encounters:   10/05/23 (!) 140/70   09/25/23 (!) 187/87   09/12/23 (!) 186/87       Recent Labs   Lab Test 01/18/23  1448   CHOL 145   HDL 37*   LDL 63   TRIG 226*       Wt Readings from Last 3 Encounters:   10/05/23 61.7 kg (136 lb)   09/25/23  65.8 kg (145 lb)   07/07/23 64.4 kg (142 lb)       Current Outpatient Medications   Medication Sig Dispense Refill    acetaminophen (TYLENOL) 325 MG tablet Take 650 mg by mouth every 6 hours as needed for pain      alcohol swab prep pads Use to swab area of injection/ella as directed. 100 each 3    blood glucose (NO BRAND SPECIFIED) test strip Use to test blood sugar 4 times daily or as directed. To accompany: Blood Glucose Monitor Brands: per insurance. 100 strip 6    blood glucose monitoring (NO BRAND SPECIFIED) meter device kit Use to test blood sugar 4 time daily or as directed. Preferred blood glucose meter OR supplies to accompany: Blood Glucose Monitor Brands: per insurance. 1 kit 0    Continuous Blood Gluc  (FREESTYLE NOAH 2 READER) CHUCK Use to read blood sugars as per 's instructions. 1 each 0    Continuous Blood Gluc Sensor (FREESTYLE NOAH 2 SENSOR) MISC Change every 14 days. 2 each 5    empagliflozin (JARDIANCE) 25 MG TABS tablet Take 1 tablet (25 mg) by mouth daily 90 tablet 1    gabapentin (NEURONTIN) 300 MG capsule Take 300 mg by mouth 2 times daily Given at 0800 and 1600      insulin glargine (LANTUS SOLOSTAR) 100 UNIT/ML pen Inject 20 Units Subcutaneous At Bedtime 15 mL     insulin lispro (HUMALOG KWIKPEN) 100 UNIT/ML (1 unit dial) KWIKPEN Inject 6 Units Subcutaneous 3 times daily (before meals) 15 mL 4    insulin pen needle (31G X 8 MM) 31G X 8 MM miscellaneous Use 4 pen needles daily or as directed. 400 each 3    metFORMIN (GLUCOPHAGE) 500 MG tablet Take 2 tablets (1,000 mg) by mouth 2 times daily (with meals) 360 tablet 3    sulfamethoxazole-trimethoprim (BACTRIM DS) 800-160 MG tablet Take 1 tablet by mouth 2 times daily 20 tablet 0    thin (NO BRAND SPECIFIED) lancets Use with lanceting device. To accompany: Blood Glucose Monitor Brands: per insurance. 100 each 6       Histories reviewed and updated in Epic.    OBJECTIVE:  Vitals: BP (!) 140/70   Pulse 101   Temp 97.7  F  "(36.5  C)   Ht 1.753 m (5' 9\")   Wt 61.7 kg (136 lb)   SpO2 98%   BMI 20.08 kg/m    BMIE= Body mass index is 20.08 kg/m .    General appearance: Alert, cooperative, no distress, appears stated age  Head: Normocephalic, atraumatic, without obvious abnormality  Eyes: Pupils equal round, reactive.  Conjunctiva clear.  Nose: Nares normal, no drainage.  Throat: Lips, mucosa, tongue normal mucosa pink and moist  Neck: Supple, symmetric, trachea midline    "

## 2023-10-05 NOTE — PATIENT INSTRUCTIONS
Begin amlodipine 2.5 mg daily  Set up nuclear stress test to look at the blood flow to your heart. We will contact you with results.

## 2023-10-05 NOTE — COMMUNITY RESOURCES LIST (ENGLISH)
10/05/2023   [facility]  N/A  For questions about this resource list or additional care needs, please contact your primary care clinic or care manager.  Phone: [phone]   Email: N/A   Address: [address] [place] [id number]   Hours: N/A        Financial Stability       Utility payment assistance  1  [employer] - Emergency Assistance [place] Distance: 1.38 miles      In-Person, Phone/Virtual   [address] [place] [address]  Language: English, Macedonian  Hours: Mon - Fri 9:00 AM - 4:00 PM  Fees: Free   Phone: (192) 548-3116 Email: info@[employer].org Website: http://www.[employer].org     2  [facility] [place] Distance: 3.98 miles      Phone/Virtual   [address] [place] [address]  Language: English, Hmong, Mongolian, Macedonian  Hours: Mon - Fri 10:00 AM - 3:00 PM  Fees: Free   Phone: (820) 840-3568 Email: [email or web address].org Website: [email or web address]/          Food and Nutrition       Food pantry  3  [employer] Distance: 1.38 miles      In-Person, Delivery, Pickup   [address] [place] [address]  Language: English, Macedonian  Hours: Mon - Fri 8:45 AM - 11:30 AM , Mon - Fri 1:00 PM - 3:30 PM  Fees: Free   Phone: (921) 771-9824 Email: info@[employer].org Website: http://www.[employer].org     4  [facility] Food [employer] Distance: 2.78 miles      Pickup   [address] [place] [address]  Language: Polish, English, Hmong, Lolly, Macedonian  Hours: Mon 1:00 PM - 4:00 PM , Mon 5:00 PM - 6:30 PM , Tue - Fri 9:00 AM - 11:30 AM , Tue - Fri 1:00 PM - 3:30 PM  Fees: Free   Phone: (158) 184-8687 Website: https://[employer]mn.org/     SNAP application assistance  5  [facility] Outreach Distance: 2.78 miles      Phone/Virtual   [address] [facility] [place] [address]  Language: Polish,  English, Hmong, Lolly, Ecuadorean  Hours: Mon - Fri 10:00 AM - 12:00 PM , Mon - Fri 2:00 PM - 4:00 PM  Fees: Free   Phone: (736) 355-3538 Website: https://[facility].[email or web address]/     6  [facility] - [employer] (SNAP) Distance: 4.46 miles      Phone/Virtual   [address] [address] [place] [address]  Language: English, Hmong, Tunisian, Guamanian, Ecuadorean, Latvian  Hours: Mon - Fri 9:00 AM - 5:00 PM  Fees: Free   Phone: (271) 649-1017 Website: https://[place].gov/dhs/people-we-serve/adults/economic-assistance/food-nutrition/programs-and-services/supplemental-nutrition-assistance-program.jsp     Soup kitchen or free meals  7  [facility] - [employer] Me[employer] Distance: 1.17 miles      Pickup   [address] [place] [address]  Language: English  Hours: Mon 3:00 PM - 8:00 PM , Tue - Fri 5:00 AM - 8:00 PM , Sat 7:00 AM - 2:00 PM  Fees: Free   Phone: (317) 595-8715 Website: https://[email or web address].[email or web address]/     8  [facility] - [employer] Distance: 2.38 miles      In-Person, Pickup   [address] [place] [address]  Language: English  Hours: Mon - Thu 5:00 PM - 6:30 PM  Fees: Free   Phone: (427) 854-7226 Email: office@[employer].org Website: http://[employer].org/          Important Numbers & Websites       Emergency Services   911  City Services   311  Poison Control   (279) 169-7727  Suicide Prevention Lifeline   (724) 772-9308 (TALK)  Child Abuse Hotline   (430) 952-9153 (4-A-Child)  Sexual Assault Hotline   (173) 431-2794 (HOPE)  National Runaway Safeline   (292) 417-2492 (RUNAWAY)  All-Options Talkline   (914) 185-3083  Substance Abuse Referral   (812) 760-4142 (HELP)

## 2023-10-05 NOTE — LETTER
10/5/2023    Daryn Pittman MD  [address]  [facility] [address]    RE: Abdulaziz Rausch       Dear Colleague,     I had the pleasure of seeing Abdulaziz Rausch in the [facility].      Thank you, Dr. Sujatha Downs, for asking the [facility] team to see Mr. Abdulaziz Rausch in the rapid access clinic to evaluate chest discomfort.    Assessment/Recommendations   Assessment:    1.  Intermittent chest discomfort, etiology unclear as work-up in the ED was unrevealing.  ECG showed no acute changes despite report of more constant discomfort at the time of his presentation although troponin levels were in an indeterminate range.  He clearly has risk factors for coronary artery disease with diabetes and peripheral arterial disease.  Blood pressure is also markedly elevated.  I did recommend setting up a nuclear stress test for further evaluation.  2.  Hypertension, currently untreated.  Unfortunately, he is not a good candidate for ACE inhibitor or ARB due to borderline hyperkalemia.  We will begin amlodipine 2.5 mg daily.  3.  Unknown lipid status.  Would recommend fasting lipids when he follows up with primary care.  4.  Peripheral arterial disease, status post toe amputation    Plan:  1.  Continue current medications  2.  Begin amlodipine 2.5 mg daily to help with blood pressure control  3.  Schedule pharmacologic nuclear stress test with further recommendations to follow       History of Present Illness    Mr. Abdulaziz Rausch is a 58 year old male with history of type 2 diabetes mellitus, peripheral arterial disease status post toe amputation, unknown lipid status who recently presented to the ED for evaluation of chest discomfort.  Reported intermittent discomfort which has been present over the last 1 to 2 months but possibly worse over the last week or 2.  No clear precipitating or exacerbating factors.  Possibly worse with deep breath.  ECG was obtained demonstrating  sinus rhythm without ischemic changes.  High-sensitivity troponin mildly elevated at 36 with repeat troponin again at 36.  Was noted to be quite hypertensive in the ED although had not been treated for hypertension in the past.  Was subsequently discharged to be seen in the rapid access clinic today.  Continues to note intermittent chest discomfort as well as some numbness in his hands.  No history of prior coronary artery disease.  Denies orthopnea, PND or lower extremity edema.    ECG (personally reviewed): ECG as reported above    Cardiac Imaging Studies (personally reviewed): No recent cardiac imaging     Physical Examination Review of Systems   BP (!) 160/98 (BP Location: Left arm, Patient Position: Sitting, Cuff Size: Adult Regular)   Pulse 95   Resp 16   Wt 62.1 kg (137 lb)   SpO2 98%   BMI 20.23 kg/m    Body mass index is 20.23 kg/m .  Wt Readings from Last 3 Encounters:   10/05/23 62.1 kg (137 lb)   10/05/23 61.7 kg (136 lb)   09/25/23 65.8 kg (145 lb)     General Appearance:   Awake, Alert, No acute distress.   HEENT:  No scleral icterus; the mucous membranes were pink and moist.   Neck: No cervical bruits or jugular venous distention    Chest: The spine was straight. The chest was symmetric.   Lungs:   Respirations unlabored; the lungs are clear to auscultation. No wheezing   Cardiovascular:   Regular rate and rhythm.  S1, S2 normal.  No murmur or gallop   Abdomen:  No organomegaly, masses, bruits, or tenderness. Bowels sounds are present   Extremities: No peripheral edema bilaterally   Skin: No xanthelasma. Warm, Dry.   Musculoskeletal: No tenderness.   Neurologic: Mood and affect are appropriate.    Enc Vitals  BP: (!) 160/98  Pulse: 95  Resp: 16  SpO2: 98 %  Weight: 62.1 kg (137 lb)                                         Medical History  Surgical History Family History Social History   Past Medical History:   Diagnosis Date    Anemia     Depression     Diabetes mellitus, type 2 (H)     Diabetic  foot infection (H) 7/1/2023    Diabetic foot ulcer (H)     History of MRSA infection 7/1/2023 Jan 2023 RIGHT foot wound    Hypertension     Non-healing ulcer of left foot (H) 7/1/2023    Osteomyelitis (H)     Past Surgical History:   Procedure Laterality Date    AMPUTATE FOOT Left 7/3/2023    Procedure: partial left fifth ray amputation, skin flap creation and closre, left foot;  Surgeon: Alejandro Mccoy DPM;  Location: [facility] OR    AMPUTATE TOE(S) Right 2/17/2023    Procedure: AMPUTATION, digits two and three right foot;  Surgeon: Ulises Jimenez DPM;  Location: [facility] OR    INCISION AND DRAINAGE FOOT, COMBINED Left 5/23/2023    Procedure: Incision and drainage of abscess left foot with;  Surgeon: Jf Huerta DPM;  Location: [facility] OR    IRRIGATION AND DEBRIDEMENT FOOT, COMBINED Left 5/23/2023    Procedure: debridement and irrigation of wound left foot;  Surgeon: Jf Huerta DPM;  Location: [facility] OR    IRRIGATION AND DEBRIDEMENT TOE, COMBINED Right 1/20/2023    Procedure: IRRIGATION AND DEBRIDEMENT digits 2 and 3 right foot;  Surgeon: Jf Huerta DPM;  Location: [facility] OR    PICC SINGLE LUMEN PLACEMENT  5/25/2023         Family History   Problem Relation Age of Onset    Diabetes Father     Social History     Socioeconomic History    Marital status:      Spouse name: Not on file    Number of children: Not on file    Years of education: Not on file    Highest education level: Not on file   Occupational History    Not on file   Tobacco Use    Smoking status: Never    Smokeless tobacco: Never   Vaping Use    Vaping Use: Never used   Substance and Sexual Activity    Alcohol use: Never    Drug use: Never    Sexual activity: Not on file   Other Topics Concern    Not on file   Social History Narrative    Not on file     Social Determinants of Health     Financial Resource Strain: High Risk (10/5/2023)    Financial Resource Strain     Within the past 12 months,  have you or your family members you live with been unable to get utilities (heat, electricity) when it was really needed?: Yes   Food Insecurity: High Risk (10/5/2023)    Food Insecurity     Within the past 12 months, did you worry that your food would run out before you got money to buy more?: Yes     Within the past 12 months, did the food you bought just not last and you didn t have money to get more?: Yes   Transportation Needs: Low Risk  (10/5/2023)    Transportation Needs     Within the past 12 months, has lack of transportation kept you from medical appointments, getting your medicines, non-medical meetings or appointments, work, or from getting things that you need?: No   Physical Activity: Not on file   Stress: Not on file   Social Connections: Not on file   Interpersonal Safety: Low Risk  (10/5/2023)    Interpersonal Safety     Do you feel physically and emotionally safe where you currently live?: Yes     Within the past 12 months, have you been hit, slapped, kicked or otherwise physically hurt by someone?: No     Within the past 12 months, have you been humiliated or emotionally abused in other ways by your partner or ex-partner?: No   Housing Stability: Low Risk  (10/5/2023)    Housing Stability     Do you have housing? : Yes     Are you worried about losing your housing?: No          Medications  Allergies   Current Outpatient Medications   Medication Sig Dispense Refill    acetaminophen (TYLENOL) 325 MG tablet Take 650 mg by mouth every 6 hours as needed for pain      alcohol swab prep pads Use to swab area of injection/ella as directed. 100 each 3    amLODIPine (NORVASC) 2.5 MG tablet Take 1 tablet (2.5 mg) by mouth daily 30 tablet 6    blood glucose (NO BRAND SPECIFIED) test strip Use to test blood sugar 4 times daily or as directed. To accompany: Blood Glucose Monitor Brands: per insurance. 100 strip 6    blood glucose monitoring (NO BRAND SPECIFIED) meter device kit Use to test blood sugar 4 time  daily or as directed. Preferred blood glucose meter OR supplies to accompany: Blood Glucose Monitor Brands: per insurance. 1 kit 0    Continuous Blood Gluc  (FREESTYLE NOAH 2 READER) CHUCK Use to read blood sugars as per 's instructions. 1 each 0    Continuous Blood Gluc Sensor (FREESTYLE NOAH 2 SENSOR) MISC Use 1 sensor every 14 days. Use to read blood sugars per 's instructions. 2 each 5    empagliflozin (JARDIANCE) 25 MG TABS tablet Take 1 tablet (25 mg) by mouth daily 90 tablet 3    gabapentin (NEURONTIN) 300 MG capsule Take 300 mg by mouth 2 times daily Given at 0800 and 1600      insulin glargine (LANTUS SOLOSTAR) 100 UNIT/ML pen Inject 20 Units Subcutaneous At Bedtime 15 mL     insulin lispro (HUMALOG KWIKPEN) 100 UNIT/ML (1 unit dial) KWIKPEN Inject 6 Units Subcutaneous 3 times daily (before meals) 15 mL 4    insulin pen needle (31G X 8 MM) 31G X 8 MM miscellaneous Use 4 pen needles daily or as directed. 400 each 3    metFORMIN (GLUCOPHAGE) 500 MG tablet Take 2 tablets (1,000 mg) by mouth 2 times daily (with meals) 360 tablet 3    thin (NO BRAND SPECIFIED) lancets Use with lanceting device. To accompany: Blood Glucose Monitor Brands: per insurance. 100 each 6      No Known Allergies      Lab Results    Chemistry/lipid CBC Cardiac Enzymes/BNP/TSH/INR   Recent Labs   Lab Test 09/25/23  1711 01/18/23  2137 01/18/23  1448   TRIG  --   --  226*   LDL  --   --  63   BUN 26.3*   < > 27.9*   *   < > 126*   CO2 25   < > 28    < > = values in this interval not displayed.    Recent Labs   Lab Test 09/25/23  1711   WBC 6.6   HGB 11.0*   HCT 31.6*   MCV 83       No results for input(s): CKTOTAL, CKMB, TROPONINI, BNP, TSH, INR in the last 90706 hours.    Invalid input(s): CKMBINDEX     A total of 40 minutes was spent reviewing patient's medical records, obtaining history and performing examination, as well as discussing diagnoses/ recommendations with patient and answering all  questions.                        Thank you for allowing me to participate in the care of your patient.      Sincerely,     Bonnie Cruz MD     [facility]  cc:   Sujatha Ayala MD  [address] [place] [address]

## 2023-10-06 ENCOUNTER — TELEPHONE (OUTPATIENT)
Dept: FAMILY MEDICINE | Facility: CLINIC | Age: 58
End: 2023-10-06
Payer: COMMERCIAL

## 2023-10-06 NOTE — LETTER
October 11, 2023      Abdulaziz Rausch  [address] [place] [address]        Dear ,    We are writing to inform you of your test results.    Dr. Pittman would like you to know that your A1C is very high again. He recommends you use your medications as prescribed and you should schedule a diabetic visit sooner, in about a month.    Please call the clinic at [phone] to schedule this visit.    Resulted Orders   Hemoglobin A1c   Result Value Ref Range    Hemoglobin A1C 12.1 (H) 0.0 - 5.6 %    Narrative    Results confirmed by repeat test.         If you have any questions or concerns, please call the clinic at the number listed above.       Sincerely,      OLIVA BranchN, RN  [facility]

## 2023-10-06 NOTE — RESULT ENCOUNTER NOTE
Please call and inform patient his A1C is very high again at 12.1. I would recommend he use his medications as prescribed. He should reschedule a diabetic visit sooner in a month    Daryn Walton MD

## 2023-10-06 NOTE — TELEPHONE ENCOUNTER
----- Message from Daryn Pittman MD sent at 10/6/2023  2:48 PM CDT -----  Please call and inform patient his A1C is very high again at 12.1. I would recommend he use his medications as prescribed. He should reschedule a diabetic visit sooner in a month    Daryn Pittman MD

## 2023-10-06 NOTE — TELEPHONE ENCOUNTER
Left message to return call. Please relay provider's message to pt or route to RN queue when pt returns call.    OLIVA SánchezN, RN  [facility]

## 2023-10-09 NOTE — TELEPHONE ENCOUNTER
Writer called patient and left message to return call to clinic.     If patient returns call please relay results per PCP and assist with scheduling an appointment.    MARITA Branch, RN  [facility]

## 2023-10-11 NOTE — TELEPHONE ENCOUNTER
Writer called patient and left message to return call to clinic.     3 attempts have been made to reach patient, letter sent.     If patient returns call please relay results per PCP and assist with scheduling an appointment.     MARITA Branch, RN  [facility]

## 2023-10-12 ENCOUNTER — HOSPITAL ENCOUNTER (OUTPATIENT)
Dept: CARDIOLOGY | Facility: CLINIC | Age: 58
Discharge: HOME | End: 2023-10-12
Attending: INTERNAL MEDICINE
Payer: COMMERCIAL

## 2023-10-12 ENCOUNTER — HOSPITAL ENCOUNTER (OUTPATIENT)
Dept: NUCLEAR MEDICINE | Facility: CLINIC | Age: 58
Discharge: HOME | End: 2023-10-12
Attending: INTERNAL MEDICINE
Payer: COMMERCIAL

## 2023-10-12 DIAGNOSIS — R07.9 ACUTE CHEST PAIN: ICD-10-CM

## 2023-10-12 LAB
CV STRESS CURRENT BP HE: NORMAL
CV STRESS CURRENT HR HE: 84
CV STRESS CURRENT HR HE: 87
CV STRESS CURRENT HR HE: 88
CV STRESS CURRENT HR HE: 89
CV STRESS CURRENT HR HE: 90
CV STRESS CURRENT HR HE: 90
CV STRESS DEVIATION TIME HE: NORMAL
CV STRESS ECHO PERCENT HR HE: NORMAL
CV STRESS EXERCISE STAGE HE: NORMAL
CV STRESS FINAL RESTING BP HE: NORMAL
CV STRESS FINAL RESTING HR HE: 87
CV STRESS MAX HR HE: 90
CV STRESS MAX TREADMILL GRADE HE: 0
CV STRESS MAX TREADMILL SPEED HE: 0
CV STRESS PEAK DIA BP HE: NORMAL
CV STRESS PEAK SYS BP HE: NORMAL
CV STRESS PHASE HE: NORMAL
CV STRESS PROTOCOL HE: NORMAL
CV STRESS RESTING PT POSITION HE: NORMAL
CV STRESS ST DEVIATION AMOUNT HE: NORMAL
CV STRESS ST DEVIATION ELEVATION HE: NORMAL
CV STRESS ST EVELATION AMOUNT HE: NORMAL
CV STRESS TEST TYPE HE: NORMAL
CV STRESS TOTAL STAGE TIME MIN 1 HE: NORMAL
NUC STRESS EJECTION FRACTION: 69 %
RATE PRESSURE PRODUCT: NORMAL
STRESS ECHO BASELINE DIASTOLIC HE: 102
STRESS ECHO BASELINE HR: 85
STRESS ECHO BASELINE SYSTOLIC BP: 197
STRESS ECHO CALCULATED PERCENT HR: 56 %
STRESS ECHO LAST STRESS DIASTOLIC BP: 77
STRESS ECHO LAST STRESS HR: 88
STRESS ECHO LAST STRESS SYSTOLIC BP: 165
STRESS ECHO TARGET HR: 160
STRESS/REST PERFUSION RATIO: 1.31

## 2023-10-12 PROCEDURE — 93016 CV STRESS TEST SUPVJ ONLY: CPT | Performed by: INTERNAL MEDICINE

## 2023-10-12 PROCEDURE — 93018 CV STRESS TEST I&R ONLY: CPT | Performed by: INTERNAL MEDICINE

## 2023-10-12 PROCEDURE — 343N000001 HC RX 343: Performed by: INTERNAL MEDICINE

## 2023-10-12 PROCEDURE — A9500 TC99M SESTAMIBI: HCPCS | Performed by: INTERNAL MEDICINE

## 2023-10-12 PROCEDURE — 78452 HT MUSCLE IMAGE SPECT MULT: CPT | Mod: 26 | Performed by: INTERNAL MEDICINE

## 2023-10-12 PROCEDURE — 78452 HT MUSCLE IMAGE SPECT MULT: CPT

## 2023-10-12 PROCEDURE — 93017 CV STRESS TEST TRACING ONLY: CPT

## 2023-10-12 PROCEDURE — 250N000011 HC RX IP 250 OP 636: Performed by: INTERNAL MEDICINE

## 2023-10-12 RX ORDER — AMINOPHYLLINE 25 MG/ML
50 INJECTION, SOLUTION INTRAVENOUS
Status: DISCONTINUED | OUTPATIENT
Start: 2023-10-12 | End: 2023-10-12 | Stop reason: HOSPADM

## 2023-10-12 RX ORDER — REGADENOSON 0.08 MG/ML
0.4 INJECTION, SOLUTION INTRAVENOUS ONCE
Status: COMPLETED | OUTPATIENT
Start: 2023-10-12 | End: 2023-10-12

## 2023-10-12 RX ADMIN — Medication 8.7 MILLICURIE: at 08:04

## 2023-10-12 RX ADMIN — Medication 31.4 MILLICURIE: at 08:35

## 2023-10-12 RX ADMIN — REGADENOSON 0.4 MG: 0.08 INJECTION, SOLUTION INTRAVENOUS at 08:32

## 2023-10-12 NOTE — PROGRESS NOTES
Pt has been having chest pain on left side for several months. States gets pain more at rest with shortness of breath.  Pt has multiple risk factors for CAD including DM2, PAD and HTN.  Here for cardiac evaluation.  Pt has not started hypertensive medications yet and per chart DM2 not controlled with A1C over 12.  Elvira Gamez RN

## 2023-10-13 DIAGNOSIS — I10 PRIMARY HYPERTENSION: Primary | ICD-10-CM

## 2023-10-13 DIAGNOSIS — R07.9 ACUTE CHEST PAIN: ICD-10-CM

## 2023-10-13 NOTE — PROGRESS NOTES
Edit Comments   Add Notifications     Abdulaziz,     Your nuclear stress test came back abnormal suggesting a small area of heart attack involving the distal front and tip of your heart as well as the dividing wall of your heart with possibly a small area of associated decreased blood flow.  At this point, I would favor continuing with medical management.  Lets see if you notice any change in your symptoms with the addition of that blood pressure medication I prescribed.  I would like to see you back in the clinic in 3 to 4 months.     Dr. Cruz   Written by Bonnie Cruz MD on 10/13/2023  2:06 PM CDT      ==follow up order placed. -[facility]

## 2024-01-03 ENCOUNTER — TELEPHONE (OUTPATIENT)
Dept: FAMILY MEDICINE | Facility: CLINIC | Age: 59
End: 2024-01-03
Payer: COMMERCIAL

## 2024-01-03 NOTE — TELEPHONE ENCOUNTER
Contacted patient for friendly reminder regarding appointment with Dr Pittman. If pt returns call please verify they plan to come to this apt. If pt no longer wants apt please remove from schedule. If pt does plan to come to this apt please remained them of check in time.

## 2024-03-09 ENCOUNTER — HEALTH MAINTENANCE LETTER (OUTPATIENT)
Age: 59
End: 2024-03-09

## 2024-07-27 ENCOUNTER — HEALTH MAINTENANCE LETTER (OUTPATIENT)
Age: 59
End: 2024-07-27

## 2024-08-11 ENCOUNTER — ANCILLARY PROCEDURE (OUTPATIENT)
Dept: GENERAL RADIOLOGY | Facility: CLINIC | Age: 59
End: 2024-08-11
Attending: PREVENTIVE MEDICINE
Payer: COMMERCIAL

## 2024-08-11 ENCOUNTER — OFFICE VISIT (OUTPATIENT)
Dept: FAMILY MEDICINE | Facility: CLINIC | Age: 59
End: 2024-08-11
Payer: COMMERCIAL

## 2024-08-11 ENCOUNTER — HOSPITAL ENCOUNTER (INPATIENT)
Facility: CLINIC | Age: 59
LOS: 2 days | Discharge: ANOTHER HOSPITAL | End: 2024-08-13
Attending: EMERGENCY MEDICINE | Admitting: STUDENT IN AN ORGANIZED HEALTH CARE EDUCATION/TRAINING PROGRAM
Payer: COMMERCIAL

## 2024-08-11 VITALS
DIASTOLIC BLOOD PRESSURE: 98 MMHG | HEART RATE: 97 BPM | TEMPERATURE: 97.8 F | RESPIRATION RATE: 18 BRPM | OXYGEN SATURATION: 96 % | SYSTOLIC BLOOD PRESSURE: 161 MMHG

## 2024-08-11 DIAGNOSIS — I50.21 ACUTE SYSTOLIC CONGESTIVE HEART FAILURE (H): Primary | ICD-10-CM

## 2024-08-11 DIAGNOSIS — R94.31 ABNORMAL EKG: Primary | ICD-10-CM

## 2024-08-11 DIAGNOSIS — I50.9 ACUTE ON CHRONIC CONGESTIVE HEART FAILURE, UNSPECIFIED HEART FAILURE TYPE (H): ICD-10-CM

## 2024-08-11 DIAGNOSIS — J90 PLEURAL EFFUSION: ICD-10-CM

## 2024-08-11 DIAGNOSIS — I27.20 PULMONARY HYPERTENSION (H): ICD-10-CM

## 2024-08-11 DIAGNOSIS — R06.02 SOB (SHORTNESS OF BREATH): ICD-10-CM

## 2024-08-11 DIAGNOSIS — R06.01 ORTHOPNEA: ICD-10-CM

## 2024-08-11 DIAGNOSIS — E11.65 TYPE 2 DIABETES MELLITUS WITH HYPERGLYCEMIA, WITH LONG-TERM CURRENT USE OF INSULIN (H): ICD-10-CM

## 2024-08-11 DIAGNOSIS — I10 ESSENTIAL HYPERTENSION: ICD-10-CM

## 2024-08-11 DIAGNOSIS — R79.89 TROPONIN LEVEL ELEVATED: ICD-10-CM

## 2024-08-11 DIAGNOSIS — Z79.4 TYPE 2 DIABETES MELLITUS WITH HYPERGLYCEMIA, WITH LONG-TERM CURRENT USE OF INSULIN (H): ICD-10-CM

## 2024-08-11 DIAGNOSIS — I73.9 PVD (PERIPHERAL VASCULAR DISEASE) (H): ICD-10-CM

## 2024-08-11 DIAGNOSIS — I21.4 NSTEMI (NON-ST ELEVATED MYOCARDIAL INFARCTION) (H): ICD-10-CM

## 2024-08-11 LAB
ANION GAP SERPL CALCULATED.3IONS-SCNC: 8 MMOL/L (ref 7–15)
ATRIAL RATE - MUSE: 101 BPM
BASOPHILS # BLD AUTO: 0 10E3/UL (ref 0–0.2)
BASOPHILS NFR BLD AUTO: 0 %
BUN SERPL-MCNC: 31.1 MG/DL (ref 8–23)
CALCIUM SERPL-MCNC: 9 MG/DL (ref 8.8–10.4)
CHLORIDE SERPL-SCNC: 106 MMOL/L (ref 98–107)
CREAT SERPL-MCNC: 1.52 MG/DL (ref 0.67–1.17)
DIASTOLIC BLOOD PRESSURE - MUSE: NORMAL MMHG
EGFRCR SERPLBLD CKD-EPI 2021: 52 ML/MIN/1.73M2
EOSINOPHIL # BLD AUTO: 0.1 10E3/UL (ref 0–0.7)
EOSINOPHIL NFR BLD AUTO: 1 %
ERYTHROCYTE [DISTWIDTH] IN BLOOD BY AUTOMATED COUNT: 13.1 % (ref 10–15)
GLUCOSE BLDC GLUCOMTR-MCNC: 211 MG/DL (ref 70–99)
GLUCOSE BLDC GLUCOMTR-MCNC: 252 MG/DL (ref 70–99)
GLUCOSE SERPL-MCNC: 262 MG/DL (ref 70–99)
HBA1C MFR BLD: 10.1 %
HCO3 SERPL-SCNC: 26 MMOL/L (ref 22–29)
HCT VFR BLD AUTO: 30.9 % (ref 40–53)
HGB BLD-MCNC: 10.7 G/DL (ref 13.3–17.7)
HOLD SPECIMEN: NORMAL
IMM GRANULOCYTES # BLD: 0 10E3/UL
IMM GRANULOCYTES NFR BLD: 0 %
INTERPRETATION ECG - MUSE: NORMAL
LYMPHOCYTES # BLD AUTO: 1.1 10E3/UL (ref 0.8–5.3)
LYMPHOCYTES NFR BLD AUTO: 14 %
MCH RBC QN AUTO: 28.8 PG (ref 26.5–33)
MCHC RBC AUTO-ENTMCNC: 34.6 G/DL (ref 31.5–36.5)
MCV RBC AUTO: 83 FL (ref 78–100)
MONOCYTES # BLD AUTO: 0.5 10E3/UL (ref 0–1.3)
MONOCYTES NFR BLD AUTO: 7 %
NEUTROPHILS # BLD AUTO: 6.3 10E3/UL (ref 1.6–8.3)
NEUTROPHILS NFR BLD AUTO: 78 %
NRBC # BLD AUTO: 0 10E3/UL
NRBC BLD AUTO-RTO: 0 /100
NT-PROBNP SERPL-MCNC: ABNORMAL PG/ML (ref 0–900)
P AXIS - MUSE: 43 DEGREES
PLATELET # BLD AUTO: 387 10E3/UL (ref 150–450)
POTASSIUM SERPL-SCNC: 4 MMOL/L (ref 3.4–5.3)
PR INTERVAL - MUSE: 118 MS
QRS DURATION - MUSE: 96 MS
QT - MUSE: 374 MS
QTC - MUSE: 484 MS
R AXIS - MUSE: 41 DEGREES
RBC # BLD AUTO: 3.71 10E6/UL (ref 4.4–5.9)
SODIUM SERPL-SCNC: 140 MMOL/L (ref 135–145)
SYSTOLIC BLOOD PRESSURE - MUSE: NORMAL MMHG
T AXIS - MUSE: 268 DEGREES
TROPONIN T SERPL HS-MCNC: 217 NG/L
TROPONIN T SERPL HS-MCNC: 224 NG/L
VENTRICULAR RATE- MUSE: 101 BPM
WBC # BLD AUTO: 8.1 10E3/UL (ref 4–11)

## 2024-08-11 PROCEDURE — 93010 ELECTROCARDIOGRAM REPORT: CPT | Performed by: INTERNAL MEDICINE

## 2024-08-11 PROCEDURE — 85025 COMPLETE CBC W/AUTO DIFF WBC: CPT | Performed by: EMERGENCY MEDICINE

## 2024-08-11 PROCEDURE — 99222 1ST HOSP IP/OBS MODERATE 55: CPT | Performed by: STUDENT IN AN ORGANIZED HEALTH CARE EDUCATION/TRAINING PROGRAM

## 2024-08-11 PROCEDURE — 71046 X-RAY EXAM CHEST 2 VIEWS: CPT | Mod: TC | Performed by: RADIOLOGY

## 2024-08-11 PROCEDURE — 82962 GLUCOSE BLOOD TEST: CPT

## 2024-08-11 PROCEDURE — 80048 BASIC METABOLIC PNL TOTAL CA: CPT | Performed by: EMERGENCY MEDICINE

## 2024-08-11 PROCEDURE — 250N000011 HC RX IP 250 OP 636: Performed by: EMERGENCY MEDICINE

## 2024-08-11 PROCEDURE — 83036 HEMOGLOBIN GLYCOSYLATED A1C: CPT | Performed by: STUDENT IN AN ORGANIZED HEALTH CARE EDUCATION/TRAINING PROGRAM

## 2024-08-11 PROCEDURE — 36415 COLL VENOUS BLD VENIPUNCTURE: CPT | Performed by: STUDENT IN AN ORGANIZED HEALTH CARE EDUCATION/TRAINING PROGRAM

## 2024-08-11 PROCEDURE — 250N000012 HC RX MED GY IP 250 OP 636 PS 637: Performed by: STUDENT IN AN ORGANIZED HEALTH CARE EDUCATION/TRAINING PROGRAM

## 2024-08-11 PROCEDURE — 120N000004 HC R&B MS OVERFLOW

## 2024-08-11 PROCEDURE — 93005 ELECTROCARDIOGRAM TRACING: CPT | Performed by: EMERGENCY MEDICINE

## 2024-08-11 PROCEDURE — 96375 TX/PRO/DX INJ NEW DRUG ADDON: CPT

## 2024-08-11 PROCEDURE — 250N000013 HC RX MED GY IP 250 OP 250 PS 637: Performed by: EMERGENCY MEDICINE

## 2024-08-11 PROCEDURE — 84484 ASSAY OF TROPONIN QUANT: CPT | Performed by: EMERGENCY MEDICINE

## 2024-08-11 PROCEDURE — 84484 ASSAY OF TROPONIN QUANT: CPT | Performed by: STUDENT IN AN ORGANIZED HEALTH CARE EDUCATION/TRAINING PROGRAM

## 2024-08-11 PROCEDURE — 36415 COLL VENOUS BLD VENIPUNCTURE: CPT | Performed by: EMERGENCY MEDICINE

## 2024-08-11 PROCEDURE — 93005 ELECTROCARDIOGRAM TRACING: CPT | Performed by: PREVENTIVE MEDICINE

## 2024-08-11 PROCEDURE — 99215 OFFICE O/P EST HI 40 MIN: CPT | Performed by: PREVENTIVE MEDICINE

## 2024-08-11 PROCEDURE — 96374 THER/PROPH/DIAG INJ IV PUSH: CPT

## 2024-08-11 PROCEDURE — 250N000011 HC RX IP 250 OP 636: Performed by: STUDENT IN AN ORGANIZED HEALTH CARE EDUCATION/TRAINING PROGRAM

## 2024-08-11 PROCEDURE — 250N000013 HC RX MED GY IP 250 OP 250 PS 637: Performed by: STUDENT IN AN ORGANIZED HEALTH CARE EDUCATION/TRAINING PROGRAM

## 2024-08-11 PROCEDURE — 83880 ASSAY OF NATRIURETIC PEPTIDE: CPT | Performed by: EMERGENCY MEDICINE

## 2024-08-11 PROCEDURE — 99285 EMERGENCY DEPT VISIT HI MDM: CPT | Mod: 25

## 2024-08-11 RX ORDER — NICOTINE POLACRILEX 4 MG
15-30 LOZENGE BUCCAL
Status: DISCONTINUED | OUTPATIENT
Start: 2024-08-11 | End: 2024-08-13 | Stop reason: HOSPADM

## 2024-08-11 RX ORDER — ENOXAPARIN SODIUM 100 MG/ML
40 INJECTION SUBCUTANEOUS EVERY 24 HOURS
Status: DISCONTINUED | OUTPATIENT
Start: 2024-08-11 | End: 2024-08-12

## 2024-08-11 RX ORDER — DEXTROSE MONOHYDRATE 25 G/50ML
25-50 INJECTION, SOLUTION INTRAVENOUS
Status: DISCONTINUED | OUTPATIENT
Start: 2024-08-11 | End: 2024-08-13 | Stop reason: HOSPADM

## 2024-08-11 RX ORDER — CALCIUM CARBONATE 500 MG/1
1000 TABLET, CHEWABLE ORAL 4 TIMES DAILY PRN
Status: DISCONTINUED | OUTPATIENT
Start: 2024-08-11 | End: 2024-08-13

## 2024-08-11 RX ORDER — IBUPROFEN 200 MG
200 TABLET ORAL EVERY 8 HOURS PRN
Status: ON HOLD | COMMUNITY
End: 2024-08-23

## 2024-08-11 RX ORDER — GABAPENTIN 300 MG/1
300 CAPSULE ORAL 2 TIMES DAILY
Status: DISCONTINUED | OUTPATIENT
Start: 2024-08-11 | End: 2024-08-13 | Stop reason: HOSPADM

## 2024-08-11 RX ORDER — PROCHLORPERAZINE 25 MG
25 SUPPOSITORY, RECTAL RECTAL EVERY 12 HOURS PRN
Status: DISCONTINUED | OUTPATIENT
Start: 2024-08-11 | End: 2024-08-13 | Stop reason: HOSPADM

## 2024-08-11 RX ORDER — AMOXICILLIN 250 MG
1 CAPSULE ORAL 2 TIMES DAILY PRN
Status: DISCONTINUED | OUTPATIENT
Start: 2024-08-11 | End: 2024-08-13 | Stop reason: HOSPADM

## 2024-08-11 RX ORDER — AMOXICILLIN 250 MG
2 CAPSULE ORAL 2 TIMES DAILY PRN
Status: DISCONTINUED | OUTPATIENT
Start: 2024-08-11 | End: 2024-08-13 | Stop reason: HOSPADM

## 2024-08-11 RX ORDER — PROCHLORPERAZINE MALEATE 10 MG
10 TABLET ORAL EVERY 6 HOURS PRN
Status: DISCONTINUED | OUTPATIENT
Start: 2024-08-11 | End: 2024-08-13 | Stop reason: HOSPADM

## 2024-08-11 RX ORDER — FUROSEMIDE 10 MG/ML
20 INJECTION INTRAMUSCULAR; INTRAVENOUS EVERY 6 HOURS
Status: COMPLETED | OUTPATIENT
Start: 2024-08-11 | End: 2024-08-12

## 2024-08-11 RX ORDER — FUROSEMIDE 10 MG/ML
40 INJECTION INTRAMUSCULAR; INTRAVENOUS EVERY 12 HOURS
Status: DISCONTINUED | OUTPATIENT
Start: 2024-08-12 | End: 2024-08-11

## 2024-08-11 RX ORDER — IPRATROPIUM BROMIDE AND ALBUTEROL SULFATE 2.5; .5 MG/3ML; MG/3ML
3 SOLUTION RESPIRATORY (INHALATION) EVERY 4 HOURS PRN
Status: DISCONTINUED | OUTPATIENT
Start: 2024-08-11 | End: 2024-08-13 | Stop reason: HOSPADM

## 2024-08-11 RX ORDER — MORPHINE SULFATE 2 MG/ML
2 INJECTION, SOLUTION INTRAMUSCULAR; INTRAVENOUS
Status: COMPLETED | OUTPATIENT
Start: 2024-08-11 | End: 2024-08-11

## 2024-08-11 RX ORDER — ASPIRIN 81 MG/1
81 TABLET ORAL DAILY
Status: DISCONTINUED | OUTPATIENT
Start: 2024-08-12 | End: 2024-08-13 | Stop reason: HOSPADM

## 2024-08-11 RX ORDER — ONDANSETRON 2 MG/ML
4 INJECTION INTRAMUSCULAR; INTRAVENOUS EVERY 6 HOURS PRN
Status: DISCONTINUED | OUTPATIENT
Start: 2024-08-11 | End: 2024-08-13 | Stop reason: HOSPADM

## 2024-08-11 RX ORDER — LIDOCAINE 40 MG/G
CREAM TOPICAL
Status: DISCONTINUED | OUTPATIENT
Start: 2024-08-11 | End: 2024-08-13 | Stop reason: HOSPADM

## 2024-08-11 RX ORDER — ONDANSETRON 4 MG/1
4 TABLET, ORALLY DISINTEGRATING ORAL EVERY 6 HOURS PRN
Status: DISCONTINUED | OUTPATIENT
Start: 2024-08-11 | End: 2024-08-13 | Stop reason: HOSPADM

## 2024-08-11 RX ORDER — ASPIRIN 325 MG
325 TABLET ORAL ONCE
Status: COMPLETED | OUTPATIENT
Start: 2024-08-11 | End: 2024-08-11

## 2024-08-11 RX ORDER — FUROSEMIDE 10 MG/ML
40 INJECTION INTRAMUSCULAR; INTRAVENOUS ONCE
Status: COMPLETED | OUTPATIENT
Start: 2024-08-11 | End: 2024-08-11

## 2024-08-11 RX ORDER — AMLODIPINE BESYLATE 2.5 MG/1
2.5 TABLET ORAL DAILY
Status: DISCONTINUED | OUTPATIENT
Start: 2024-08-11 | End: 2024-08-12

## 2024-08-11 RX ADMIN — ENOXAPARIN SODIUM 40 MG: 100 INJECTION SUBCUTANEOUS at 18:05

## 2024-08-11 RX ADMIN — MORPHINE SULFATE 2 MG: 2 INJECTION, SOLUTION INTRAMUSCULAR; INTRAVENOUS at 16:17

## 2024-08-11 RX ADMIN — ASPIRIN 325 MG ORAL TABLET 325 MG: 325 PILL ORAL at 16:16

## 2024-08-11 RX ADMIN — FUROSEMIDE 20 MG: 10 INJECTION, SOLUTION INTRAMUSCULAR; INTRAVENOUS at 22:15

## 2024-08-11 RX ADMIN — INSULIN ASPART 2 UNITS: 100 INJECTION, SOLUTION INTRAVENOUS; SUBCUTANEOUS at 18:11

## 2024-08-11 RX ADMIN — FUROSEMIDE 40 MG: 10 INJECTION, SOLUTION INTRAMUSCULAR; INTRAVENOUS at 17:02

## 2024-08-11 RX ADMIN — GABAPENTIN 300 MG: 300 CAPSULE ORAL at 18:05

## 2024-08-11 ASSESSMENT — ACTIVITIES OF DAILY LIVING (ADL)
ADLS_ACUITY_SCORE: 31
ADLS_ACUITY_SCORE: 38
ADLS_ACUITY_SCORE: 28
ADLS_ACUITY_SCORE: 38
DEPENDENT_IADLS:: INDEPENDENT
ADLS_ACUITY_SCORE: 38
ADLS_ACUITY_SCORE: 38
ADLS_ACUITY_SCORE: 28
ADLS_ACUITY_SCORE: 38
ADLS_ACUITY_SCORE: 38

## 2024-08-11 ASSESSMENT — COLUMBIA-SUICIDE SEVERITY RATING SCALE - C-SSRS
2. HAVE YOU ACTUALLY HAD ANY THOUGHTS OF KILLING YOURSELF IN THE PAST MONTH?: YES
6. HAVE YOU EVER DONE ANYTHING, STARTED TO DO ANYTHING, OR PREPARED TO DO ANYTHING TO END YOUR LIFE?: NO
5. HAVE YOU STARTED TO WORK OUT OR WORKED OUT THE DETAILS OF HOW TO KILL YOURSELF? DO YOU INTEND TO CARRY OUT THIS PLAN?: NO
1. IN THE PAST MONTH, HAVE YOU WISHED YOU WERE DEAD OR WISHED YOU COULD GO TO SLEEP AND NOT WAKE UP?: YES
3. HAVE YOU BEEN THINKING ABOUT HOW YOU MIGHT KILL YOURSELF?: NO
4. HAVE YOU HAD THESE THOUGHTS AND HAD SOME INTENTION OF ACTING ON THEM?: YES

## 2024-08-11 NOTE — PHARMACY-ADMISSION MEDICATION HISTORY
Pharmacist Admission Medication History    Admission medication history is complete. The information provided in this note is only as accurate as the sources available at the time of the update.    Information Source(s): Patient and CareEverywhere/SureScripts via in-person    Pertinent Information: Patient Last filled amlodipine , empagliflozin, and metformin 10/23 . The gabapentin, novolog and lantus , have not been filled per surescripts in the last 12 months .  but all medications were left on his medication list, because he states he should be taking      Changes made to PTA medication list:  Added: ibuprofen  Deleted: None  Changed: None    Allergies reviewed with patient and updates made in EHR: yes    Medication History Completed By: Cynthia Morales [facility] 8/11/2024 4:10 PM    PTA Med List   Medication Sig Last Dose    acetaminophen (TYLENOL) 325 MG tablet Take 650 mg by mouth every 6 hours as needed for pain 8/11/2024 at am    amLODIPine (NORVASC) 2.5 MG tablet Take 1 tablet (2.5 mg) by mouth daily More than a month    empagliflozin (JARDIANCE) 25 MG TABS tablet Take 1 tablet (25 mg) by mouth daily More than a month    gabapentin (NEURONTIN) 300 MG capsule Take 300 mg by mouth 2 times daily Given at 0800 and 1600 More than a month    ibuprofen (ADVIL/MOTRIN) 200 MG tablet Take 200 mg by mouth every 8 hours as needed for pain 8/11/2024 at am    insulin glargine (LANTUS SOLOSTAR) 100 UNIT/ML pen Inject 20 Units Subcutaneous At Bedtime More than a month    insulin lispro (HUMALOG KWIKPEN) 100 UNIT/ML (1 unit dial) KWIKPEN Inject 6 Units Subcutaneous 3 times daily (before meals) More than a month    metFORMIN (GLUCOPHAGE) 500 MG tablet Take 2 tablets (1,000 mg) by mouth 2 times daily (with meals) More than a month

## 2024-08-11 NOTE — CONSULTS
Care Management Initial Consult      General Information  Assessment completed with: Patient,    Type of CM/SW Visit: Initial Assessment  Primary Care Provider verified and updated as needed: Yes   Readmission within the last 30 days: no previous admission in last 30 days   Reason for Consult: discharge planning, health care directive, transportation  Advance Care Planning: Advance Care Planning Reviewed: questions answered, concerns discussed        Communication Assessment  Patient's communication style: spoken language (English or Bilingual)        Cognitive  Cognitive/Neuro/Behavioral: WDL                      Living Environment:   People in home: other (see comments) (Pt states he has a friend currently staying with him. This friend will reportedly be moving out soon)     Current living Arrangements: mobile home      Able to return to prior arrangements: yes     Family/Social Support:  Care provided by: self (Pt's sister assists as needed/able)  Provides care for: no one  Marital Status:   Sibling(s), Friend          Description of Support System: Supportive, Involved    Support Assessment: Adequate family and caregiver support, Adequate social supports (Per pt report)    Current Resources:   Patient receiving home care services: No  Community Resources: None  Equipment currently used at home: glucometer  Supplies currently used at home: Diabetic Supplies    Employment/Financial:  Employment Status: retired     Financial Concerns: none   Referral to Financial Worker: No     Does the patient's insurance plan have a 3 day qualifying hospital stay waiver?  No    Lifestyle & Psychosocial Needs:  Social Determinants of Health     Food Insecurity: High Risk (10/5/2023)    Food Insecurity     Within the past 12 months, did you worry that your food would run out before you got money to buy more?: Yes     Within the past 12 months, did the food you bought just not last and you didn t have money to get more?:  Yes   Depression: At risk (10/5/2023)    PHQ-2     PHQ-2 Score: 5   Housing Stability: Low Risk  (10/5/2023)    Housing Stability     Do you have housing? : Yes     Are you worried about losing your housing?: No   Tobacco Use: Low Risk  (10/5/2023)    Patient History     Smoking Tobacco Use: Never     Smokeless Tobacco Use: Never     Passive Exposure: Not on file   Financial Resource Strain: High Risk (10/5/2023)    Financial Resource Strain     Within the past 12 months, have you or your family members you live with been unable to get utilities (heat, electricity) when it was really needed?: Yes   Alcohol Use: Not on file   Transportation Needs: Low Risk  (10/5/2023)    Transportation Needs     Within the past 12 months, has lack of transportation kept you from medical appointments, getting your medicines, non-medical meetings or appointments, work, or from getting things that you need?: No   Physical Activity: Not on file   Interpersonal Safety: Low Risk  (10/5/2023)    Interpersonal Safety     Do you feel physically and emotionally safe where you currently live?: Yes     Within the past 12 months, have you been hit, slapped, kicked or otherwise physically hurt by someone?: No     Within the past 12 months, have you been humiliated or emotionally abused in other ways by your partner or ex-partner?: No   Stress: Not on file   Social Connections: Not on file   Health Literacy: Not on file     Functional Status:  Prior to admission patient needed assistance:   Dependent ADLs:: Independent (Pt states independence when at baseline)  Dependent IADLs:: Independent (Pt states independence when at baseline)  Assessment of Functional Status: Not at  functional baseline    Mental Health Status:  Mental Health Status: No Current Concerns       Chemical Dependency Status:  Chemical Dependency Status: No Current Concerns           Values/Beliefs:  Spiritual, Cultural Beliefs, Quaker Practices, Values that affect care: no      "        Additional Information:  Writer met with pt to introduce Care Management(CM), obtain an initial assessment, and offer discharge support. Pt resides in a house and has a friend staying there currently who will be moving out soon, per pt report. Pt states he is I/ADL independent when at baseline. No current in-home or other services identified. No DME use identified. Pt stated no concerns in regard to eventual return home.    8/11 per CARLINE Dhillon-\"59 year old male who presents chest pain, shortness of breath.  Patient states that he has had ongoing chest discomfort and shortness of breath for the past week.  Describes the chest pain as a tightness.  Shortness of breath is worse when he is laying flat, improves when he is sitting up.  Associated symptoms include swelling in the bilateral lower extremities that he states has gotten worse over the past 2 days.  Patient went to walk-in care clinic where EKG and chest x-ray were obtained, he was sent to the Emergency Department for concern of possible congestive heart failure.  Otherwise he denies any vomiting, fevers, blood in his urine or stools, falls or trauma, or focal weakness.\"    OT, CORE, and CARD Consults pending. Anticipate improvement and return to home without service coordination needs. CM to follow-up on consults and assist with service coordination needs if they should arise. Friend/family to transport pt home at time of discharge.    Patricio Gardiner RN      "

## 2024-08-11 NOTE — H&P
"[facility]    History and Physical - Hospitalist Service       Date of Admission:  8/11/2024    Assessment & Plan      Abdulaziz Rausch is a 59 year old male admitted on 8/11/2024. He has a past medical history significant for hypertension, PAD, and type 2 diabetes presented with several weeks of BLE edema, followed by 2 days of SOB, orthopnea, nocturnal dyspnea as well as a pleuritic chest pain admitted to [facility] for new onset acute CHF exacerbation    Troponin leak  CHF exacerbation-Hx of abnormal stress tst in 2023. Does not take any of his medications because \"I've just been busy\" denies difficulty paying/obtaining. Troponin on admission 224, repeat 217.  -Cardiology consulted in ED, okay to stay at [facility]  -Lasix q6h  -Strict I/O  -Cardiac diet  -Echo   -Tele  -Repeat troponin flat  -Loaded with aspirin in ED, start baby aspirin in AM    Expiratory wheezing-No history of COPD, no history of smoking, could just be from fluid, on room air, will order some nebulizer to see if that improves his WOB.  -Duonebs prn    CHRISTY-Suspect cardiorenal with congestion. Will avoid nephrotoxins, last Cr. 10 months ago 1.07.  -Trend  -Avoid nephrotoxins    [id number] pending, per history not controlled, does not take medications.  -sliding scale insulin   -Holding off on long acting as he has not had in over 1 month, will see his requirements here and adjust    PAD-Hx of multiple toe amputations        Diet: Combination Diet 2 gm NA Diet; No Caffeine Diet (and additional linked orders)  Fluid restriction 2000 ML FLUID (and additional linked orders)    DVT Prophylaxis: Enoxaparin (Lovenox) SQ  Fischer Catheter: Not present  Lines: None     Cardiac Monitoring: ACTIVE order. Indication: Acute decompensated heart failure (48 hours)  Code Status: No CPR- Do NOT Intubate      Clinically Significant Risk Factors Present on Admission                  # Hypertension: Noted on problem list    # Anemia: based on " "hgb <11           # Financial/Environmental Concerns: none               Disposition Plan     Medically Ready for Discharge: Anticipated in 2-4 Days           Carlos Avendaño MD  Hospitalist Service  [facility]  Securely message with [employer] (more info)  Text page via [employer] Paging/Directory     ______________________________________________________________________    Chief Complaint   SOB    History is obtained from the patient    History of Present Illness   Abdulaziz Rausch is a 59 year old male who per ED    \"Abdulaziz Rausch is a 59 year old male who presents chest pain, shortness of breath.  Patient states that he has had ongoing chest discomfort and shortness of breath for the past week.  Describes the chest pain as a tightness.  Shortness of breath is worse when he is laying flat, improves when he is sitting up.  Associated symptoms include swelling in the bilateral lower extremities that he states has gotten worse over the past 2 days.  Patient went to walk-in care clinic where EKG and chest x-ray were obtained, he was sent to the Emergency Department for concern of possible congestive heart failure.  Otherwise he denies any vomiting, fevers, blood in his urine or stools, falls or trauma, or focal weakness.\"    Evaluated patient bedside in the emergency department, confirmed above history.  Reports swelling has been going on for at least 3 weeks before the rest of his symptoms started, states he has not been taking any of his medications because he has just been really busy.  He reports he feels \"much better now\" but is still working a little bit hard to breathe it appears.  Denies any dizziness, nausea, no productive cough, fevers, or recent sick contacts.      Past Medical History    Past Medical History:   Diagnosis Date    Anemia     Depression     Diabetes mellitus, type 2 (H)     Diabetic foot infection (H) 7/1/2023    Diabetic foot ulcer (H)     History of MRSA infection " 7/1/2023 Jan 2023 RIGHT foot wound    Hypertension     Non-healing ulcer of left foot (H) 7/1/2023    Osteomyelitis (H)        Past Surgical History   Past Surgical History:   Procedure Laterality Date    AMPUTATE FOOT Left 7/3/2023    Procedure: partial left fifth ray amputation, skin flap creation and closre, left foot;  Surgeon: Alejandro Mccoy DPM;  Location: [facility] OR    AMPUTATE TOE(S) Right 2/17/2023    Procedure: AMPUTATION, digits two and three right foot;  Surgeon: Ulises Jimenez DPM;  Location: [facility] OR    INCISION AND DRAINAGE FOOT, COMBINED Left 5/23/2023    Procedure: Incision and drainage of abscess left foot with;  Surgeon: Jf Huerta DPM;  Location: [facility] OR    IRRIGATION AND DEBRIDEMENT FOOT, COMBINED Left 5/23/2023    Procedure: debridement and irrigation of wound left foot;  Surgeon: Jf Huerta DPM;  Location: [facility] OR    IRRIGATION AND DEBRIDEMENT TOE, COMBINED Right 1/20/2023    Procedure: IRRIGATION AND DEBRIDEMENT digits 2 and 3 right foot;  Surgeon: Jf Huerta DPM;  Location: [facility] OR    PICC SINGLE LUMEN PLACEMENT  5/25/2023            Prior to Admission Medications   Prior to Admission Medications   Prescriptions Last Dose Informant Patient Reported? Taking?   Continuous Blood Gluc  (FREESTYLE NOAH 2 READER) CHUCK   No No   Sig: Use to read blood sugars as per 's instructions.   Continuous Blood Gluc Sensor (FREESTYLE NOAH 2 SENSOR) [facility]   No No   Sig: Use 1 sensor every 14 days. Use to read blood sugars per 's instructions.   acetaminophen (TYLENOL) 325 MG tablet 8/11/2024 at am  Yes Yes   Sig: Take 650 mg by mouth every 6 hours as needed for pain   alcohol swab prep pads   No No   Sig: Use to swab area of injection/ella as directed.   amLODIPine (NORVASC) 2.5 MG tablet More than a month  No Yes   Sig: Take 1 tablet (2.5 mg) by mouth daily   blood glucose (NO BRAND SPECIFIED) test strip   No No    Sig: Use to test blood sugar 4 times daily or as directed. To accompany: Blood Glucose Monitor Brands: per insurance.   blood glucose monitoring (NO BRAND SPECIFIED) meter device kit   No No   Sig: Use to test blood sugar 4 time daily or as directed. Preferred blood glucose meter OR supplies to accompany: Blood Glucose Monitor Brands: per insurance.   empagliflozin (JARDIANCE) 25 MG TABS tablet More than a month  No Yes   Sig: Take 1 tablet (25 mg) by mouth daily   gabapentin (NEURONTIN) 300 MG capsule More than a month  Yes Yes   Sig: Take 300 mg by mouth 2 times daily Given at 0800 and 1600   ibuprofen (ADVIL/MOTRIN) 200 MG tablet 8/11/2024 at am  Yes Yes   Sig: Take 200 mg by mouth every 8 hours as needed for pain   insulin glargine (LANTUS SOLOSTAR) 100 UNIT/ML pen More than a month  No Yes   Sig: Inject 20 Units Subcutaneous At Bedtime   insulin lispro (HUMALOG KWIKPEN) 100 UNIT/ML (1 unit dial) KWIKPEN More than a month  Yes Yes   Sig: Inject 6 Units Subcutaneous 3 times daily (before meals)   insulin pen needle (31G X 8 MM) 31G X 8 MM miscellaneous   No No   Sig: Use 4 pen needles daily or as directed.   metFORMIN (GLUCOPHAGE) 500 MG tablet More than a month  No Yes   Sig: Take 2 tablets (1,000 mg) by mouth 2 times daily (with meals)   thin (NO BRAND SPECIFIED) lancets   No No   Sig: Use with lanceting device. To accompany: Blood Glucose Monitor Brands: per insurance.      Facility-Administered Medications: None           Physical Exam   Vital Signs: Temp: 98.1  F (36.7  C) Temp src: Oral BP: (!) 166/106 (MD notified) Pulse: 100   Resp: 22 SpO2: 91 % O2 Device: None (Room air)    Weight: 135 lbs 0 oz    Gen:Chronically ill appearing, NAD, some increased WOB but on room air  HEENT: NC/AT, MMM, GLADYS, EOMI, Supple  CV: RRR, normal s1, normal s2, no m/r/g  Resp: Crackles bases bilaterally, slight expiratory wheezing, some increased accessory muscle use  Abd: +BS, non-tender, non-distended, no guarding or  rebound tenderness  Ext: 1+ pitting edema bilaterally, otherwise No significant deformities or trauma, moving all ext freely  Skin: No erythema, no lesions or rashes.   Neuro:No focal neurologic deficit, AxOx4. Strength and sensation grossly intact  Psych: Pleasant, answering questions appropriately, normal mood/affect. Insight good, judgement intact.      Medical Decision Making       65 MINUTES SPENT BY ME on the date of service doing chart review, history, exam, documentation & further activities per the note.      Data   ------------------------- PAST 24 HR DATA REVIEWED -----------------------------------------------

## 2024-08-11 NOTE — ED PROVIDER NOTES
EMERGENCY DEPARTMENT ENCOUNTER      NAME: Abdulaziz Rausch  AGE: 59 year old male  YOB: 1965  MRN: [id number]  EVALUATION DATE & TIME: 8/11/2024  2:44 PM    PCP: Daryn Pittman    ED PROVIDER: Enzo Aguero M.D.      Chief Complaint   Patient presents with    Shortness of Breath    Leg Swelling         FINAL IMPRESSION:  1. Acute on chronic congestive heart failure, unspecified heart failure type (H)    2. Troponin level elevated          ED COURSE & MEDICAL DECISION MAKING:    3:14 PM performed my initial evaluation of the patient.  4:28 PM Spoke with Dr. Chapman, cardiology  4:47 PM repeat exam is benign.  Discussed findings and need for admission.  5:15 PM Spoke with the hospitalist      Pertinent Labs & Imaging studies reviewed. (See chart for details)  59 year old male presents to the Emergency Department for evaluation of chest pain, shortness of breath. Patient appears non toxic, slight tachycardia but no fever or hypoxia.  Exam significant for pitting edema bilateral lower extremities, distant breath sounds, abdomen benign.  Per review of the medical record, did review office visit through [facility] walk-in clinic [facility] earlier today patient was seen and an EKG reported by this note showed new ischemic changes, chest x-ray and symptoms consistent with CHF.  Certainly, given history and exam noted, also concern for CHF, considered but less likely ACS, no pleurisy, no ripping or tearing chest discomfort the back or shoulders, nothing suggest PE or dissection, esophageal rupture.  Denies any fever or cough, history and exam not consistent with pneumonia, COVID or influenza.  Again ECG, chest x-ray obtained prior to arrival, we will obtain additional screening labs to assess troponin, electrolytes, BNP.  Considered but no indication for CT imaging of the chest again nothing she has PE or dissection.    Reassessment: Labs by my independent interpretation concerning for congestive heart  failure and troponin leak.  Did note a BNP of 21,454, with peripheral edema and findings of pleural effusions on chest x-ray certainly concern for CHF.  Did note elevated troponin but more so likely related to troponin leak and true ACS.  That said, did discuss patient case and findings with cardiology and at this time they agree to treat the CHF, there is no recommendations for heparin infusion, no need to transfer and patient can be kept here at [facility].  Discussed these findings and need for admission with the patient who is in agreement.  Patient was started on IV Lasix.  Discussed care plan to admitting service.    Medical Decision Making  Obtained supplemental history:Supplemental history obtained?: No  Reviewed external records: External records reviewed?: Documented in chart  Care impacted by chronic illness:Diabetes  Care significantly affected by social determinants of health:N/A  Did you consider but not order tests?: Work up considered but not performed and documented in chart, if applicable  Did you interpret images independently?: Independent interpretation of ECG and images noted in documentation, when applicable.  Consultation discussion with other provider:Did you involve another provider (consultant, , pharmacy, etc.)?: I discussed the care with another health care provider, see documentation for details.  Admit.      At the conclusion of the encounter I discussed the results of all of the tests and the disposition. The questions were answered and return precautions provided. The patient or family acknowledged understanding and was agreeable with the care plan.         MEDICATIONS GIVEN IN THE EMERGENCY:  Medications   lidocaine 1 % 0.1-1 mL (has no administration in time range)   lidocaine (LMX4) cream (has no administration in time range)   sodium chloride (PF) 0.9% PF flush 3 mL (has no administration in time range)   sodium chloride (PF) 0.9% PF flush 3 mL (has no administration in  time range)   senna-docusate (SENOKOT-S/PERICOLACE) 8.6-50 MG per tablet 1 tablet (has no administration in time range)     Or   senna-docusate (SENOKOT-S/PERICOLACE) 8.6-50 MG per tablet 2 tablet (has no administration in time range)   calcium carbonate (TUMS) chewable tablet 1,000 mg (has no administration in time range)   Continuing ACE inhibitor/ARB/ARNI from home medication list OR ACE inhibitor/ARB/ARNI order already placed during this visit (has no administration in time range)   Continuing beta blocker from home medication list OR beta blocker order already placed during this visit (has no administration in time range)   ondansetron (ZOFRAN ODT) ODT tab 4 mg (has no administration in time range)     Or   ondansetron (ZOFRAN) injection 4 mg (has no administration in time range)   prochlorperazine (COMPAZINE) injection 10 mg (has no administration in time range)     Or   prochlorperazine (COMPAZINE) tablet 10 mg (has no administration in time range)     Or   prochlorperazine (COMPAZINE) suppository 25 mg (has no administration in time range)   enoxaparin ANTICOAGULANT (LOVENOX) injection 40 mg (has no administration in time range)   glucose gel 15-30 g (has no administration in time range)     Or   dextrose 50 % injection 25-50 mL (has no administration in time range)     Or   glucagon injection 1 mg (has no administration in time range)   insulin aspart (NovoLOG) injection (RAPID ACTING) (has no administration in time range)   insulin aspart (NovoLOG) injection (RAPID ACTING) (has no administration in time range)   amLODIPine (NORVASC) tablet 2.5 mg ( Oral Automatically Held 8/14/24 0800)   gabapentin (NEURONTIN) capsule 300 mg (has no administration in time range)   ipratropium - albuterol 0.5 mg/2.5 mg/3 mL (DUONEB) neb solution 3 mL (has no administration in time range)   furosemide (LASIX) injection 20 mg (has no administration in time range)   aspirin (ASA) tablet 325 mg (325 mg Oral $Given 8/11/24 1616)    morphine (PF) injection 2 mg (2 mg Intravenous $Given 8/11/24 1617)   furosemide (LASIX) injection 40 mg (40 mg Intravenous $Given 8/11/24 1702)       NEW PRESCRIPTIONS STARTED AT TODAY'S ER VISIT  New Prescriptions    No medications on file            =================================================================    HPI    Patient information was obtained from: patient    Use of Intrepreter: N/A         Abdulaziz Rausch is a 59 year old male who presents chest pain, shortness of breath.  Patient states that he has had ongoing chest discomfort and shortness of breath for the past week.  Describes the chest pain as a tightness.  Shortness of breath is worse when he is laying flat, improves when he is sitting up.  Associated symptoms include swelling in the bilateral lower extremities that he states has gotten worse over the past 2 days.  Patient went to walk-in care clinic where EKG and chest x-ray were obtained, he was sent to the Emergency Department for concern of possible congestive heart failure.  Otherwise he denies any vomiting, fevers, blood in his urine or stools, falls or trauma, or focal weakness.      REVIEW OF SYSTEMS   Constitutional:  Denies fever, chills  Respiratory: Endorses shortness of breath  Cardiovascular: Endorses chest pain  GI:  Denies abdominal pain, nausea, vomiting, or change in bowel or bladder habits   Musculoskeletal:  Denies any new muscle/joint swelling  Skin:  Denies rash   Neurologic:  Denies focal weakness  All systems negative except as marked.     PAST MEDICAL HISTORY:  Past Medical History:   Diagnosis Date    Anemia     Depression     Diabetes mellitus, type 2 (H)     Diabetic foot infection (H) 7/1/2023    Diabetic foot ulcer (H)     History of MRSA infection 7/1/2023 Jan 2023 RIGHT foot wound    Hypertension     Non-healing ulcer of left foot (H) 7/1/2023    Osteomyelitis (H)        PAST SURGICAL HISTORY:  Past Surgical History:   Procedure Laterality Date     AMPUTATE FOOT Left 7/3/2023    Procedure: partial left fifth ray amputation, skin flap creation and closre, left foot;  Surgeon: Alejandro Mccoy DPM;  Location: [facility] OR    AMPUTATE TOE(S) Right 2/17/2023    Procedure: AMPUTATION, digits two and three right foot;  Surgeon: Ulises Jimenez DPM;  Location: [facility] OR    INCISION AND DRAINAGE FOOT, COMBINED Left 5/23/2023    Procedure: Incision and drainage of abscess left foot with;  Surgeon: Jf Huerta DPM;  Location: [facility] OR    IRRIGATION AND DEBRIDEMENT FOOT, COMBINED Left 5/23/2023    Procedure: debridement and irrigation of wound left foot;  Surgeon: Jf Huerta DPM;  Location: [facility] OR    IRRIGATION AND DEBRIDEMENT TOE, COMBINED Right 1/20/2023    Procedure: IRRIGATION AND DEBRIDEMENT digits 2 and 3 right foot;  Surgeon: Jf Huerta DPM;  Location: [facility] OR    PICC SINGLE LUMEN PLACEMENT  5/25/2023              CURRENT MEDICATIONS:    Prior to Admission medications    Medication Sig Start Date End Date Taking? Authorizing Provider   acetaminophen (TYLENOL) 325 MG tablet Take 650 mg by mouth every 6 hours as needed for pain 6/1/23   Reported, Patient   alcohol swab prep pads Use to swab area of injection/ella as directed. 8/26/21   Daryn Pittman MD   amLODIPine (NORVASC) 2.5 MG tablet Take 1 tablet (2.5 mg) by mouth daily 10/5/23   Bonnie Cruz MD   blood glucose (NO BRAND SPECIFIED) test strip Use to test blood sugar 4 times daily or as directed. To accompany: Blood Glucose Monitor Brands: per insurance. 10/5/23   Daryn Pittman MD   blood glucose monitoring (NO BRAND SPECIFIED) meter device kit Use to test blood sugar 4 time daily or as directed. Preferred blood glucose meter OR supplies to accompany: Blood Glucose Monitor Brands: per insurance. 8/26/21   Daryn Pittman MD   Continuous Blood Gluc  (FREESTYLE NOAH 2 READER) CHUCK Use to read blood sugars as per 's instructions.  4/17/23   Daryn Pittman MD   Continuous Blood Gluc Sensor (FREESTYLE NOAH 2 SENSOR) MISC Use 1 sensor every 14 days. Use to read blood sugars per 's instructions. 10/5/23   Daryn Pittman MD   empagliflozin (JARDIANCE) 25 MG TABS tablet Take 1 tablet (25 mg) by mouth daily 10/5/23   Daryn Pittman MD   gabapentin (NEURONTIN) 300 MG capsule Take 300 mg by mouth 2 times daily Given at 0800 and 1600    Unknown, Entered By History   insulin glargine (LANTUS SOLOSTAR) 100 UNIT/ML pen Inject 20 Units Subcutaneous At Bedtime 5/26/23   Florentino Mccoy MD   insulin lispro (HUMALOG KWIKPEN) 100 UNIT/ML (1 unit dial) KWIKPEN Inject 6 Units Subcutaneous 3 times daily (before meals) 4/17/23   Daryn Pittman MD   insulin pen needle (31G X 8 MM) 31G X 8 MM miscellaneous Use 4 pen needles daily or as directed. 2/13/23   Miguel Ryan MD   metFORMIN (GLUCOPHAGE) 500 MG tablet Take 2 tablets (1,000 mg) by mouth 2 times daily (with meals) 10/5/23   Daryn Pittman MD   thin (NO BRAND SPECIFIED) lancets Use with lanceting device. To accompany: Blood Glucose Monitor Brands: per insurance. 8/26/21   Daryn Pittman MD        ALLERGIES:  No Known Allergies    FAMILY HISTORY:  Family History   Problem Relation Age of Onset    Diabetes Father        SOCIAL HISTORY:   Social History     Socioeconomic History    Marital status:    Tobacco Use    Smoking status: Never    Smokeless tobacco: Never   Vaping Use    Vaping status: Never Used   Substance and Sexual Activity    Alcohol use: Never    Drug use: Never     Social Determinants of Health     Financial Resource Strain: High Risk (10/5/2023)    Financial Resource Strain     Within the past 12 months, have you or your family members you live with been unable to get utilities (heat, electricity) when it was really needed?: Yes   Food Insecurity: High Risk (10/5/2023)    Food Insecurity     Within the past 12 months, did you worry that your food would run out before  "you got money to buy more?: Yes     Within the past 12 months, did the food you bought just not last and you didn t have money to get more?: Yes   Transportation Needs: Low Risk  (10/5/2023)    Transportation Needs     Within the past 12 months, has lack of transportation kept you from medical appointments, getting your medicines, non-medical meetings or appointments, work, or from getting things that you need?: No   Interpersonal Safety: Low Risk  (10/5/2023)    Interpersonal Safety     Do you feel physically and emotionally safe where you currently live?: Yes     Within the past 12 months, have you been hit, slapped, kicked or otherwise physically hurt by someone?: No     Within the past 12 months, have you been humiliated or emotionally abused in other ways by your partner or ex-partner?: No   Housing Stability: Low Risk  (10/5/2023)    Housing Stability     Do you have housing? : Yes     Are you worried about losing your housing?: No       VITALS:  Patient Vitals for the past 24 hrs:   BP Temp Temp src Pulse Resp SpO2 Height Weight   08/11/24 1745 (!) 166/106 -- -- 100 22 91 % -- --   08/11/24 1630 (!) 161/96 -- -- 98 21 91 % -- --   08/11/24 1615 (!) 157/94 -- -- 101 29 93 % -- --   08/11/24 1530 (!) 166/96 -- -- 100 28 94 % -- --   08/11/24 1500 (!) 161/92 -- -- 100 30 94 % -- --   08/11/24 1438 (!) 146/81 98.1  F (36.7  C) Oral 98 18 92 % 1.753 m (5' 9\") 61.2 kg (135 lb)        PHYSICAL EXAM    Constitutional:  Awake, alert, in no apparent distress  HENT:  Normocephalic, Atraumatic. Bilateral external ears normal. Oropharynx moist. Nose normal. Neck- Normal range of motion with no guarding, No midline cervical tenderness, Supple, No stridor.   Eyes:  PERRL, EOMI with no signs of entrapment, Conjunctiva normal, No discharge.   Respiratory: Distant breath sounds, no wheezing  Cardiovascular: Mild tachycardia, Normal rhythm, No appreciable rubs or gallops.   GI:  Soft, No tenderness, No distension, No palpable " masses  Musculoskeletal:  Intact distal pulses, 1+ pitting edema bilateral lower extremities, no gross deformities.  Integument:  Warm, Dry, No erythema, No rash.   Neurologic:  Alert & oriented, Normal motor function, Normal sensory function, No focal deficits noted.   Psychiatric:  Affect normal, Judgment normal, Mood normal.     LAB:  All pertinent labs reviewed and interpreted.  Results for orders placed or performed during the hospital encounter of 08/11/24   Extra Blue Top Tube   Result Value Ref Range    Hold Specimen JIC    Extra Red Top Tube   Result Value Ref Range    Hold Specimen JIC    Extra Green Top (Lithium Heparin) Tube   Result Value Ref Range    Hold Specimen JIC    Extra Purple Top Tube   Result Value Ref Range    Hold Specimen JIC    Basic metabolic panel   Result Value Ref Range    Sodium 140 135 - 145 mmol/L    Potassium 4.0 3.4 - 5.3 mmol/L    Chloride 106 98 - 107 mmol/L    Carbon Dioxide (CO2) 26 22 - 29 mmol/L    Anion Gap 8 7 - 15 mmol/L    Urea Nitrogen 31.1 (H) 8.0 - 23.0 mg/dL    Creatinine 1.52 (H) 0.67 - 1.17 mg/dL    GFR Estimate 52 (L) >60 mL/min/1.73m2    Calcium 9.0 8.8 - 10.4 mg/dL    Glucose 262 (H) 70 - 99 mg/dL   Result Value Ref Range    Troponin T, High Sensitivity 224 (HH) <=22 ng/L   Nt probnp inpatient (BNP)   Result Value Ref Range    N terminal Pro BNP Inpatient 21,454 (H) 0 - 900 pg/mL   CBC with platelets and differential   Result Value Ref Range    WBC Count 8.1 4.0 - 11.0 10e3/uL    RBC Count 3.71 (L) 4.40 - 5.90 10e6/uL    Hemoglobin 10.7 (L) 13.3 - 17.7 g/dL    Hematocrit 30.9 (L) 40.0 - 53.0 %    MCV 83 78 - 100 fL    MCH 28.8 26.5 - 33.0 pg    MCHC 34.6 31.5 - 36.5 g/dL    RDW 13.1 10.0 - 15.0 %    Platelet Count 387 150 - 450 10e3/uL    % Neutrophils 78 %    % Lymphocytes 14 %    % Monocytes 7 %    % Eosinophils 1 %    % Basophils 0 %    % Immature Granulocytes 0 %    NRBCs per 100 WBC 0 <1 /100    Absolute Neutrophils 6.3 1.6 - 8.3 10e3/uL    Absolute  Lymphocytes 1.1 0.8 - 5.3 10e3/uL    Absolute Monocytes 0.5 0.0 - 1.3 10e3/uL    Absolute Eosinophils 0.1 0.0 - 0.7 10e3/uL    Absolute Basophils 0.0 0.0 - 0.2 10e3/uL    Absolute Immature Granulocytes 0.0 <=0.4 10e3/uL    Absolute NRBCs 0.0 10e3/uL   ECG 12-LEAD WITH MUSE (LHE)   Result Value Ref Range    Systolic Blood Pressure  mmHg    Diastolic Blood Pressure  mmHg    Ventricular Rate 101 BPM    Atrial Rate 101 BPM    OK Interval 118 ms    QRS Duration 96 ms     ms    QTc 484 ms    P Axis 43 degrees    R AXIS 41 degrees    T Axis 268 degrees    Interpretation ECG       Sinus tachycardia  ST & T wave abnormality, consider lateral ischemia  Abnormal ECG  When compared with ECG of 11-Aug-2024 12:55,  No significant change was found  Confirmed by SEE ED PROVIDER NOTE FOR, ECG INTERPRETATION ([id number]),  HECTOR DIALLO ([id number]) on 8/11/2024 3:07:05 PM         RADIOLOGY:  Echocardiogram Complete    (Results Pending)          EKG:    Sinus tachycardia, no specific ST elevation changes, T wave inversions noted laterally in V4 and V5 and V6, no concerning dysrhythmias and for prolongation, compared to ECG of September 23, 2023, no specific ST acute ischemic change appreciated  I have independently reviewed and interpreted the EKG(s) documented above.    PROCEDURES:        [facility] System Documentation:   CMS Diagnoses:       I, ENZO SMITH MD, am serving as a scribe to document services personally performed by Enzo Smith MD, based on my observation and the provider's statements to me. I, Enzo Smith MD attest that ENZO SMITH MD is acting in a scribe capacity, has observed my performance of the services and has documented them in accordance with my direction.    Enzo Smith M.D.  Emergency Medicine  [facility] EMERGENCY ROOM  [address] [address] [phone]  Dept: [phone]       Enzo Smith,  MD  08/11/24 3060

## 2024-08-11 NOTE — PROGRESS NOTES
Assessment & Plan     (R06.02) SOB (shortness of breath)  (R06.01) Orthopnea  (R94.31) Abnormal EKG  (primary encounter diagnosis)  New ischemic changes on EKG compared to prior    (J90) Pleural effusion  Pleural effusions bilateral that are new, cephalization on cxr per my read    Suspect symptoms due to cardiac ischemia and chf    Referred to ED now for further evaluation and treatment          42 minutes spent by me on the date of the encounter doing chart review, history and exam, documentation and further activities per the note        No follow-ups on file.    Narinder Zhou MD  [facility] URGENT CARE    Subjective     Abdulaziz Rausch is a 59 year old year old male who presents to clinic today for the following health issues:    Patient presents with:  Shortness of Breath: Symptoms started a couple of days ago. Difficulty breathing when laying down, painful. Chest pain when breathing in. Denies history of asthma, copd, or lung associated illness.     This is a 58 yo male who presents with markedly worsening sob over the past 2 days.  Worse when lying flat.  Some pain in the chest with a deep breath as well.  No fever or chills.      Patient Active Problem List   Diagnosis    Type 2 diabetes mellitus with hyperglycemia, with long-term current use of insulin (H)    Current moderate episode of major depressive disorder without prior episode (H)    Essential hypertension    Chronic anemia    History of MRSA infection    PVD (peripheral vascular disease) (H24)       Current Outpatient Medications   Medication Sig Dispense Refill    acetaminophen (TYLENOL) 325 MG tablet Take 650 mg by mouth every 6 hours as needed for pain      alcohol swab prep pads Use to swab area of injection/ella as directed. 100 each 3    amLODIPine (NORVASC) 2.5 MG tablet Take 1 tablet (2.5 mg) by mouth daily 30 tablet 6    blood glucose (NO BRAND SPECIFIED) test strip Use to test blood sugar 4 times daily or as  directed. To accompany: Blood Glucose Monitor Brands: per insurance. 100 strip 6    blood glucose monitoring (NO BRAND SPECIFIED) meter device kit Use to test blood sugar 4 time daily or as directed. Preferred blood glucose meter OR supplies to accompany: Blood Glucose Monitor Brands: per insurance. 1 kit 0    Continuous Blood Gluc  (FREESTYLE NOAH 2 READER) CHUCK Use to read blood sugars as per 's instructions. 1 each 0    Continuous Blood Gluc Sensor (FREESTYLE NOAH 2 SENSOR) MISC Use 1 sensor every 14 days. Use to read blood sugars per 's instructions. 2 each 5    empagliflozin (JARDIANCE) 25 MG TABS tablet Take 1 tablet (25 mg) by mouth daily 90 tablet 3    gabapentin (NEURONTIN) 300 MG capsule Take 300 mg by mouth 2 times daily Given at 0800 and 1600      insulin glargine (LANTUS SOLOSTAR) 100 UNIT/ML pen Inject 20 Units Subcutaneous At Bedtime 15 mL     insulin lispro (HUMALOG KWIKPEN) 100 UNIT/ML (1 unit dial) KWIKPEN Inject 6 Units Subcutaneous 3 times daily (before meals) 15 mL 4    insulin pen needle (31G X 8 MM) 31G X 8 MM miscellaneous Use 4 pen needles daily or as directed. 400 each 3    metFORMIN (GLUCOPHAGE) 500 MG tablet Take 2 tablets (1,000 mg) by mouth 2 times daily (with meals) 360 tablet 3    thin (NO BRAND SPECIFIED) lancets Use with lanceting device. To accompany: Blood Glucose Monitor Brands: per insurance. 100 each 6     No current facility-administered medications for this visit.       Past Medical History:   Diagnosis Date    Anemia     Depression     Diabetes mellitus, type 2 (H)     Diabetic foot infection (H) 7/1/2023    Diabetic foot ulcer (H)     History of MRSA infection 7/1/2023 Jan 2023 RIGHT foot wound    Hypertension     Non-healing ulcer of left foot (H) 7/1/2023    Osteomyelitis (H)        Social History   reports that he has never smoked. He has never used smokeless tobacco. He reports that he does not drink alcohol and does not use  drugs.    Family History   Problem Relation Age of Onset    Diabetes Father        Review of Systems  CONSTITUTIONAL: NEGATIVE for fever, chills, change in weight  INTEGUMENTARY/SKIN: NEGATIVE for worrisome rashes, moles or lesions  EYES: NEGATIVE for vision changes or irritation  ENT/MOUTH: NEGATIVE for ear, mouth and throat problems  RESP: NEGATIVE for significant cough or SOB  CV: NEGATIVE for chest pain, palpitations or peripheral edema  GI: NEGATIVE for nausea, abdominal pain, heartburn, or change in bowel habits  : NEGATIVE for frequency, dysuria, or hematuria  MUSCULOSKELETAL: NEGATIVE for significant arthralgias or myalgia  NEURO: NEGATIVE for weakness, dizziness or paresthesias  ENDOCRINE: NEGATIVE for temperature intolerance, skin/hair changes  HEME: NEGATIVE for bleeding problems  PSYCHIATRIC: NEGATIVE for changes in mood or affect      Objective    BP (!) 161/98   Pulse 97   Temp 97.8  F (36.6  C) (Oral)   Resp 18   SpO2 96%   Physical Exam   GENERAL: alert and no distress  EYES: Eyes grossly normal to inspection, PERRL and conjunctivae and sclerae normal  HENT: ear canals and TM's normal, nose and mouth without ulcers or lesions  NECK: no adenopathy, no asymmetry, masses, or scars  RESP: lungs clear to auscultation - no rales, rhonchi or wheezes  CV: regular rate and rhythm, normal S1 S2, no S3 or S4, no murmur, click or rub  ABDOMEN: soft, nontender, no hepatosplenomegaly, no masses and bowel sounds normal  MS: no gross musculoskeletal defects noted, no edema  SKIN: no suspicious lesions or rashes  NEURO: Normal strength and tone, mentation intact and speech normal  PSYCH: mentation appears normal, affect normal/bright    EKG - nsr, rate 97, t wave inversion 2/3/f, v5, v6, nl axis, ivcd, nl pr and qt intervals  CXR b pleural effusions with pulmonary edema

## 2024-08-12 ENCOUNTER — APPOINTMENT (OUTPATIENT)
Dept: CARDIOLOGY | Facility: CLINIC | Age: 59
End: 2024-08-12
Attending: STUDENT IN AN ORGANIZED HEALTH CARE EDUCATION/TRAINING PROGRAM
Payer: COMMERCIAL

## 2024-08-12 PROBLEM — I21.4 NSTEMI (NON-ST ELEVATED MYOCARDIAL INFARCTION) (H): Status: ACTIVE | Noted: 2024-08-12

## 2024-08-12 LAB
ANION GAP SERPL CALCULATED.3IONS-SCNC: 9 MMOL/L (ref 7–15)
ATRIAL RATE - MUSE: 100 BPM
ATRIAL RATE - MUSE: 97 BPM
BUN SERPL-MCNC: 32.9 MG/DL (ref 8–23)
CALCIUM SERPL-MCNC: 8.3 MG/DL (ref 8.8–10.4)
CHLORIDE SERPL-SCNC: 105 MMOL/L (ref 98–107)
CREAT SERPL-MCNC: 1.69 MG/DL (ref 0.67–1.17)
DIASTOLIC BLOOD PRESSURE - MUSE: NORMAL MMHG
DIASTOLIC BLOOD PRESSURE - MUSE: NORMAL MMHG
EGFRCR SERPLBLD CKD-EPI 2021: 46 ML/MIN/1.73M2
ERYTHROCYTE [DISTWIDTH] IN BLOOD BY AUTOMATED COUNT: 13.1 % (ref 10–15)
GLUCOSE BLDC GLUCOMTR-MCNC: 167 MG/DL (ref 70–99)
GLUCOSE BLDC GLUCOMTR-MCNC: 232 MG/DL (ref 70–99)
GLUCOSE BLDC GLUCOMTR-MCNC: 255 MG/DL (ref 70–99)
GLUCOSE BLDC GLUCOMTR-MCNC: 267 MG/DL (ref 70–99)
GLUCOSE SERPL-MCNC: 298 MG/DL (ref 70–99)
HCO3 SERPL-SCNC: 23 MMOL/L (ref 22–29)
HCT VFR BLD AUTO: 28.3 % (ref 40–53)
HGB BLD-MCNC: 9.6 G/DL (ref 13.3–17.7)
INTERPRETATION ECG - MUSE: NORMAL
INTERPRETATION ECG - MUSE: NORMAL
LVEF ECHO: NORMAL
MCH RBC QN AUTO: 29.2 PG (ref 26.5–33)
MCHC RBC AUTO-ENTMCNC: 33.9 G/DL (ref 31.5–36.5)
MCV RBC AUTO: 86 FL (ref 78–100)
P AXIS - MUSE: 34 DEGREES
P AXIS - MUSE: 54 DEGREES
PLATELET # BLD AUTO: 340 10E3/UL (ref 150–450)
POTASSIUM SERPL-SCNC: 4.3 MMOL/L (ref 3.4–5.3)
PR INTERVAL - MUSE: 116 MS
PR INTERVAL - MUSE: 116 MS
QRS DURATION - MUSE: 102 MS
QRS DURATION - MUSE: 94 MS
QT - MUSE: 372 MS
QT - MUSE: 386 MS
QTC - MUSE: 479 MS
QTC - MUSE: 490 MS
R AXIS - MUSE: 4 DEGREES
R AXIS - MUSE: 41 DEGREES
RBC # BLD AUTO: 3.29 10E6/UL (ref 4.4–5.9)
SODIUM SERPL-SCNC: 137 MMOL/L (ref 135–145)
SYSTOLIC BLOOD PRESSURE - MUSE: NORMAL MMHG
SYSTOLIC BLOOD PRESSURE - MUSE: NORMAL MMHG
T AXIS - MUSE: 247 DEGREES
T AXIS - MUSE: 266 DEGREES
UFH PPP CHRO-ACNC: 0.22 IU/ML
VENTRICULAR RATE- MUSE: 100 BPM
VENTRICULAR RATE- MUSE: 97 BPM
WBC # BLD AUTO: 10.5 10E3/UL (ref 4–11)

## 2024-08-12 PROCEDURE — 250N000011 HC RX IP 250 OP 636: Performed by: HOSPITALIST

## 2024-08-12 PROCEDURE — 250N000011 HC RX IP 250 OP 636: Performed by: STUDENT IN AN ORGANIZED HEALTH CARE EDUCATION/TRAINING PROGRAM

## 2024-08-12 PROCEDURE — 250N000012 HC RX MED GY IP 250 OP 636 PS 637: Performed by: HOSPITALIST

## 2024-08-12 PROCEDURE — 93306 TTE W/DOPPLER COMPLETE: CPT | Mod: 26 | Performed by: INTERNAL MEDICINE

## 2024-08-12 PROCEDURE — C8929 TTE W OR WO FOL WCON,DOPPLER: HCPCS

## 2024-08-12 PROCEDURE — 36415 COLL VENOUS BLD VENIPUNCTURE: CPT | Performed by: HOSPITALIST

## 2024-08-12 PROCEDURE — 250N000013 HC RX MED GY IP 250 OP 250 PS 637: Performed by: INTERNAL MEDICINE

## 2024-08-12 PROCEDURE — 250N000011 HC RX IP 250 OP 636: Performed by: INTERNAL MEDICINE

## 2024-08-12 PROCEDURE — 255N000002 HC RX 255 OP 636: Performed by: HOSPITALIST

## 2024-08-12 PROCEDURE — 99232 SBSQ HOSP IP/OBS MODERATE 35: CPT | Performed by: HOSPITALIST

## 2024-08-12 PROCEDURE — 250N000013 HC RX MED GY IP 250 OP 250 PS 637: Performed by: STUDENT IN AN ORGANIZED HEALTH CARE EDUCATION/TRAINING PROGRAM

## 2024-08-12 PROCEDURE — 85027 COMPLETE CBC AUTOMATED: CPT | Performed by: STUDENT IN AN ORGANIZED HEALTH CARE EDUCATION/TRAINING PROGRAM

## 2024-08-12 PROCEDURE — 36415 COLL VENOUS BLD VENIPUNCTURE: CPT | Performed by: STUDENT IN AN ORGANIZED HEALTH CARE EDUCATION/TRAINING PROGRAM

## 2024-08-12 PROCEDURE — 85520 HEPARIN ASSAY: CPT | Performed by: HOSPITALIST

## 2024-08-12 PROCEDURE — 93005 ELECTROCARDIOGRAM TRACING: CPT

## 2024-08-12 PROCEDURE — 250N000013 HC RX MED GY IP 250 OP 250 PS 637: Performed by: HOSPITALIST

## 2024-08-12 PROCEDURE — 99223 1ST HOSP IP/OBS HIGH 75: CPT | Performed by: INTERNAL MEDICINE

## 2024-08-12 PROCEDURE — 120N000004 HC R&B MS OVERFLOW

## 2024-08-12 PROCEDURE — 80048 BASIC METABOLIC PNL TOTAL CA: CPT | Performed by: STUDENT IN AN ORGANIZED HEALTH CARE EDUCATION/TRAINING PROGRAM

## 2024-08-12 PROCEDURE — 93005 ELECTROCARDIOGRAM TRACING: CPT | Performed by: INTERNAL MEDICINE

## 2024-08-12 PROCEDURE — 93010 ELECTROCARDIOGRAM REPORT: CPT | Performed by: STUDENT IN AN ORGANIZED HEALTH CARE EDUCATION/TRAINING PROGRAM

## 2024-08-12 RX ORDER — CARVEDILOL 3.12 MG/1
3.12 TABLET ORAL 2 TIMES DAILY WITH MEALS
Status: DISCONTINUED | OUTPATIENT
Start: 2024-08-12 | End: 2024-08-13 | Stop reason: HOSPADM

## 2024-08-12 RX ORDER — HEPARIN SODIUM 5000 [USP'U]/.5ML
5000 INJECTION, SOLUTION INTRAVENOUS; SUBCUTANEOUS 2 TIMES DAILY
Status: DISCONTINUED | OUTPATIENT
Start: 2024-08-12 | End: 2024-08-12

## 2024-08-12 RX ORDER — ATORVASTATIN CALCIUM 40 MG/1
40 TABLET, FILM COATED ORAL EVERY EVENING
Status: DISCONTINUED | OUTPATIENT
Start: 2024-08-12 | End: 2024-08-13 | Stop reason: HOSPADM

## 2024-08-12 RX ORDER — ISOSORBIDE MONONITRATE 30 MG/1
30 TABLET, EXTENDED RELEASE ORAL DAILY
Status: DISCONTINUED | OUTPATIENT
Start: 2024-08-12 | End: 2024-08-13

## 2024-08-12 RX ORDER — FUROSEMIDE 10 MG/ML
40 INJECTION INTRAMUSCULAR; INTRAVENOUS EVERY 6 HOURS
Status: DISCONTINUED | OUTPATIENT
Start: 2024-08-12 | End: 2024-08-13 | Stop reason: HOSPADM

## 2024-08-12 RX ORDER — HEPARIN SODIUM 10000 [USP'U]/100ML
0-5000 INJECTION, SOLUTION INTRAVENOUS CONTINUOUS
Status: DISCONTINUED | OUTPATIENT
Start: 2024-08-12 | End: 2024-08-13 | Stop reason: HOSPADM

## 2024-08-12 RX ADMIN — FUROSEMIDE 40 MG: 10 INJECTION, SOLUTION INTRAMUSCULAR; INTRAVENOUS at 15:00

## 2024-08-12 RX ADMIN — FUROSEMIDE 20 MG: 10 INJECTION, SOLUTION INTRAMUSCULAR; INTRAVENOUS at 04:19

## 2024-08-12 RX ADMIN — HEPARIN SODIUM 800 UNITS/HR: 10000 INJECTION, SOLUTION INTRAVENOUS at 16:45

## 2024-08-12 RX ADMIN — FUROSEMIDE 40 MG: 10 INJECTION, SOLUTION INTRAMUSCULAR; INTRAVENOUS at 09:52

## 2024-08-12 RX ADMIN — INSULIN ASPART 3 UNITS: 100 INJECTION, SOLUTION INTRAVENOUS; SUBCUTANEOUS at 18:27

## 2024-08-12 RX ADMIN — ATORVASTATIN CALCIUM 40 MG: 40 TABLET, FILM COATED ORAL at 21:32

## 2024-08-12 RX ADMIN — PERFLUTREN 3 ML: 6.52 INJECTION, SUSPENSION INTRAVENOUS at 10:30

## 2024-08-12 RX ADMIN — SENNOSIDES AND DOCUSATE SODIUM 1 TABLET: 50; 8.6 TABLET ORAL at 04:52

## 2024-08-12 RX ADMIN — GABAPENTIN 300 MG: 300 CAPSULE ORAL at 09:17

## 2024-08-12 RX ADMIN — INSULIN ASPART 3 UNITS: 100 INJECTION, SOLUTION INTRAVENOUS; SUBCUTANEOUS at 06:53

## 2024-08-12 RX ADMIN — INSULIN GLARGINE 10 UNITS: 100 INJECTION, SOLUTION SUBCUTANEOUS at 22:27

## 2024-08-12 RX ADMIN — INSULIN ASPART 3 UNITS: 100 INJECTION, SOLUTION INTRAVENOUS; SUBCUTANEOUS at 11:28

## 2024-08-12 RX ADMIN — ASPIRIN 81 MG: 81 TABLET, COATED ORAL at 09:17

## 2024-08-12 RX ADMIN — ISOSORBIDE MONONITRATE 30 MG: 30 TABLET, EXTENDED RELEASE ORAL at 09:52

## 2024-08-12 RX ADMIN — CARVEDILOL 3.12 MG: 3.12 TABLET, FILM COATED ORAL at 16:46

## 2024-08-12 RX ADMIN — FUROSEMIDE 40 MG: 10 INJECTION, SOLUTION INTRAMUSCULAR; INTRAVENOUS at 21:32

## 2024-08-12 RX ADMIN — GABAPENTIN 300 MG: 300 CAPSULE ORAL at 22:44

## 2024-08-12 ASSESSMENT — ACTIVITIES OF DAILY LIVING (ADL)
ADLS_ACUITY_SCORE: 36
ADLS_ACUITY_SCORE: 35
ADLS_ACUITY_SCORE: 30
ADLS_ACUITY_SCORE: 35
ADLS_ACUITY_SCORE: 30
ADLS_ACUITY_SCORE: 35
ADLS_ACUITY_SCORE: 31
ADLS_ACUITY_SCORE: 35
ADLS_ACUITY_SCORE: 30
ADLS_ACUITY_SCORE: 30
ADLS_ACUITY_SCORE: 35
ADLS_ACUITY_SCORE: 30
ADLS_ACUITY_SCORE: 35
ADLS_ACUITY_SCORE: 35
ADLS_ACUITY_SCORE: 30
ADLS_ACUITY_SCORE: 30
ADLS_ACUITY_SCORE: 37
ADLS_ACUITY_SCORE: 35

## 2024-08-12 NOTE — PLAN OF CARE
A/O. VSS on 0.5L via NC overnight. Midsternum chest tightness, no pain, radiating to epigastric area. Pt states he has not had a BM since last Sunday, 8/04, and feels very bloated. Bowel sounds are present; no tenderness on palpation. Tele: sinus rhythm to sinus tachy with inverted T-waves.   Ambulates with 1 assist and gait belt; gait unsteady. Pt states this is d/t multiple toe amputation. Urinal at bedside.   Call light within reach. Pt does not call. Bed alarm on for safety. Will continue to monitor.     Goal Outcome Evaluation:    Problem: Adult Inpatient Plan of Care  Goal: Absence of Hospital-Acquired Illness or Injury  Intervention: Prevent Infection  Recent Flowsheet Documentation  Taken 8/12/2024 0200 by Tiera Mckeon RN  Infection Prevention:   cohorting utilized   equipment surfaces disinfected   hand hygiene promoted   personal protective equipment utilized   rest/sleep promoted   single patient room provided  Taken 8/11/2024 2043 by Tiera Mckeon RN  Infection Prevention:   cohorting utilized   equipment surfaces disinfected   hand hygiene promoted   personal protective equipment utilized   rest/sleep promoted   single patient room provided     Problem: Adult Inpatient Plan of Care  Goal: Optimal Comfort and Wellbeing  8/12/2024 0326 by Tiera Mckeon RN  Outcome: Progressing  8/11/2024 2115 by Tiera Mckeon RN  Outcome: Progressing     Problem: Fall Injury Risk  Goal: Absence of Fall and Fall-Related Injury  8/12/2024 0326 by Tiera Mckeon RN  Outcome: Progressing  8/11/2024 2115 by Tiera Mckeon RN  Outcome: Progressing  Intervention: Identify and Manage Contributors  Recent Flowsheet Documentation  Taken 8/12/2024 0200 by Tiera Mckeon RN  Medication Review/Management: medications reviewed  Taken 8/11/2024 2043 by Tiera Mckeon, RN  Medication Review/Management: medications reviewed  Intervention: Promote Injury-Free Environment  Recent Flowsheet Documentation  Taken 8/12/2024 0315 by Tiera Mckeon,  RN  Safety Promotion/Fall Prevention: safety round/check completed  Taken 8/12/2024 0200 by Tiera Mckeon RN  Safety Promotion/Fall Prevention: safety round/check completed  Taken 8/12/2024 0100 by iTera Mckeon RN  Safety Promotion/Fall Prevention: safety round/check completed  Taken 8/12/2024 0020 by Tiera Mckeon RN  Safety Promotion/Fall Prevention: safety round/check completed  Taken 8/11/2024 2300 by Tiera Mckeon RN  Safety Promotion/Fall Prevention: safety round/check completed  Taken 8/11/2024 2200 by Tiera Mckeon RN  Safety Promotion/Fall Prevention: safety round/check completed  Taken 8/11/2024 2100 by Tiear Mckeon RN  Safety Promotion/Fall Prevention: safety round/check completed  Taken 8/11/2024 2043 by Tiera Mckeon RN  Safety Promotion/Fall Prevention:   safety round/check completed   supervised activity   room near nurse's station   room door open   nonskid shoes/slippers when out of bed   lighting adjusted   increase visualization of patient   increased rounding and observation   clutter free environment maintained

## 2024-08-12 NOTE — PLAN OF CARE
Goal Outcome Evaluation:     Patient alert and Oriented x4. Up in chair. Great appetite. Eating well. Still has chest pain 6/10. Cardiology aware. Started imdur. Patient had echo and awaiting results.                   Problem: Fall Injury Risk  Goal: Absence of Fall and Fall-Related Injury  Outcome: Progressing  Intervention: Identify and Manage Contributors  Recent Flowsheet Documentation  Taken 8/12/2024 0917 by Tala Hsu RN  Medication Review/Management: medications reviewed  Intervention: Promote Injury-Free Environment  Recent Flowsheet Documentation  Taken 8/12/2024 0917 by Tala Hsu RN  Safety Promotion/Fall Prevention:   activity supervised   clutter free environment maintained   lighting adjusted   nonskid shoes/slippers when out of bed   room door open   room near nurse's station   safety round/check completed   room organization consistent     Problem: Heart Failure  Goal: Optimal Coping  Outcome: Progressing  Intervention: Support Psychosocial Response  Recent Flowsheet Documentation  Taken 8/12/2024 0917 by Tala Hsu RN  Supportive Measures: active listening utilized  Goal: Optimal Cardiac Output  Outcome: Progressing  Goal: Stable Heart Rate and Rhythm  Outcome: Progressing  Goal: Optimal Functional Ability  Intervention: Optimize Functional Ability  Recent Flowsheet Documentation  Taken 8/12/2024 0917 by Tala Hsu RN  Activity Management:   activity adjusted per tolerance   activity encouraged  Goal: Fluid and Electrolyte Balance  Outcome: Progressing  Intervention: Monitor and Manage Fluid and Electrolyte Balance  Recent Flowsheet Documentation  Taken 8/12/2024 0917 by Tala Hsu RN  Fluid/Electrolyte Management: fluids restricted  Goal: Effective Oxygenation and Ventilation  Outcome: Progressing  Intervention: Promote Airway Secretion Clearance  Recent Flowsheet Documentation  Taken 8/12/2024 0917 by Tala Hsu RN  Cough And Deep Breathing: done  with encouragement  Activity Management:   activity adjusted per tolerance   activity encouraged  Intervention: Optimize Oxygenation and Ventilation  Recent Flowsheet Documentation  Taken 8/12/2024 0917 by Tala Hsu, RN  Head of Bed (HOB) Positioning: HOB at 30 degrees  Goal: Effective Breathing Pattern During Sleep  Outcome: Progressing  Intervention: Monitor and Manage Obstructive Sleep Apnea  Recent Flowsheet Documentation  Taken 8/12/2024 0917 by Tala Hsu, RN  Medication Review/Management: medications reviewed

## 2024-08-12 NOTE — CONSULTS
HEART CARE CONSULTATON NOTE          Assessment/Recommendations   Assessment  Acute congestive heart failure, likely systolic.   NTpBNP: >20,000.  WT acutely up 8 lb.    Acute kidney injury  Elevated Troponin  CT Chest: no coronary calcification in 2023.    Insulin dependent DM, A1c:10     Plan:   Recommend echocardiogram to assess left ventricular function  2.  Increase Lasix to 40 mg every 6 hours,  3.  If ongoing dyspnea despite fluid removal will consider CT of the chest as he has some wheezing is noticed some recent coughing  4.  Start Imdur 30 mg daily for afterload reduction  5.  May require ischemic evaluation tomorrow via coronary angiogram or coronary CT pending echo results  6.  Continue telemetry           Clinically Significant Risk Factors Present on Admission                      # Hypertension: Noted on problem list         # Anemia: based on hgb <11              # DMII: A1C = 10.1 % (Ref range: <5.7 %) within past 6 months           # Financial/Environmental Concerns: none                     History of Present Illness/Subjective    HPI: Abdulaziz Rausch is a 59 year old male diabetes, peripheral vascular disease related to diabetes who presents to [facility] with 3-day history of progressive dyspnea on exertion, lower extremity edema and orthopnea symptoms consistent with acute congestive heart failure.     Since admission he has been started on low-dose diuretic therapy.  He has had net -700 cc of fluid.  Continues to have significant dyspnea at rest and orthopnea symptoms.  Also noticing some pressure in his chest this morning.  Obtaining repeat EKG at this time.  Starting Imdur for afterload reduction.  Recent CT of the chest in 2023 demonstrated no calcium buildup on his coronary arteries.  Nuclear stress test in 2023 demonstrated no evidence of ischemia.  But did have elevated stress to rest cavity ratio.     Currently he is resting in bed with some abdominal fullness, lower extremity  "edema ongoing dyspnea and some mild chest pressure which has been ongoing this morning.  Troponin are elevated.       ECHO: pending     NM Stress: 10/12/2023    Lexiscan stress ECG negative for ischemia.    The nuclear stress test is abnormal.  There is a small area of nontransmural infarct involving the distal anteroseptal and apical walls with a small area of fely-infarct ischemia in the distal anteroseptal wall.  There is also a large area of fixed defect extending over the basal to distal inferior and inferolateral wall, which is more prominent on rest versus stress imaging and may represent bowel attenuation artifact.    Stress to rest cavity ratio is 1.31.  This is a nonspecific finding but may represent subendocardial ischemia.    The left ventricular ejection fraction at stress is 69%.    The patient is at an intermediate risk of future cardiac ischemic events.    There is no prior study for comparison.      Physical Examination      VITALS: BP (!) 148/83 (BP Location: Left arm)   Pulse 100   Temp 98.2  F (36.8  C) (Oral)   Resp 18   Ht 1.753 m (5' 9\")   Wt 67.8 kg (149 lb 7.6 oz)   SpO2 95%   BMI 22.07 kg/m    BMI: Body mass index is 22.07 kg/m .      Wt Readings from Last 3 Encounters:   08/12/24 67.8 kg (149 lb 7.6 oz)   10/05/23 62.1 kg (137 lb)   10/05/23 61.7 kg (136 lb)         Intake/Output Summary (Last 24 hours) at 8/12/2024 0856  Last data filed at 8/12/2024 0736      Gross per 24 hour   Intake 852 ml   Output 1575 ml   Net -723 ml      General Appearance:   no distress, normal body habitus   ENT/Mouth: membranes moist, no oral lesions or bleeding gums.      EYES:  no scleral icterus, normal conjunctivae   Neck: no carotid bruits or thyromegaly   Chest/Lungs:   + rales and +  wheezing, no sternal scar, equal chest wall expansion    Cardiovascular:   Regular. Normal first and second heart sounds with no murmurs, rubs, or gallops; the carotid, radial and posterior tibial pulses are intact, " "Jugular venous pressure normal, mild pitting edema bilaterally    Abdomen:  no organomegaly, masses, bruits, or tenderness; bowel sounds are present   Extremities: no cyanosis or clubbing   Skin: no xanthelasma, warm.    Neurologic: normal  bilateral, no tremors      Psychiatric: alert and oriented x3, calm              Lab Results     Chemistry/lipid CBC Cardiac Enzymes/BNP/TSH/INR        Recent Labs    Lab Test 01/18/23  1448    CHOL 145    HDL 37*    LDL 63    TRIG 226*            Recent Labs    Lab Test 01/18/23  1448    LDL 63             Recent Labs    Lab Test 08/12/24  0652 08/12/24  0550    NA  --  137    POTASSIUM  --  4.3    CHLORIDE  --  105    CO2  --  23    * 298*    BUN  --  32.9*    CR  --  1.69*    GFRESTIMATED  --  46*    AMIRAH  --  8.3*              Recent Labs    Lab Test 08/12/24  0550 08/11/24  1451 09/25/23  1711    CR 1.69* 1.52* 1.07              Recent Labs    Lab Test 08/11/24  1451 10/05/23  1228 07/28/23  1114    A1C 10.1* 12.1* 8.8*                    Recent Labs    Lab Test 08/12/24  0550    WBC 10.5    HGB 9.6*    HCT 28.3*    MCV 86                  Recent Labs    Lab Test 08/12/24  0550 08/11/24  1451 09/25/23  1711    HGB 9.6* 10.7* 11.0*     No results for input(s): \"TROPONINI\" in the last 00657 hours.       Recent Labs    Lab Test 08/11/24  1451    NTBNPI 21,454*       No results for input(s): \"TSH\" in the last 46275 hours.  No results for input(s): \"INR\" in the last 56537 hours.      Medical History  Surgical History Family History Social History   Past Medical History        Past Medical History:   Diagnosis Date    Anemia      Depression      Diabetes mellitus, type 2 (H)      Diabetic foot infection (H) 7/1/2023    Diabetic foot ulcer (H)      History of MRSA infection 7/1/2023 Jan 2023 RIGHT foot wound    Hypertension      Non-healing ulcer of left foot (H) 7/1/2023    Osteomyelitis (H)          Past Surgical History         Past Surgical History: "   Procedure Laterality Date    AMPUTATE FOOT Left 7/3/2023     Procedure: partial left fifth ray amputation, skin flap creation and closre, left foot;  Surgeon: Alejandro Mccoy DPM;  Location: [facility] OR    AMPUTATE TOE(S) Right 2/17/2023     Procedure: AMPUTATION, digits two and three right foot;  Surgeon: Ulises Jiemnez DPM;  [place] [facility] [place]    INCISION AND DRAINAGE FOOT, COMBINED Left 5/23/2023     Procedure: Incision and drainage of abscess left foot with;  Surgeon: Jf Huerta DPM;  [place] [facility] [place]    IRRIGATION AND DEBRIDEMENT FOOT, COMBINED Left 5/23/2023     Procedure: debridement and irrigation of wound left foot;  Surgeon: Jf Huerta DPM;  [place] [facility] [place]    IRRIGATION AND DEBRIDEMENT TOE, COMBINED Right 1/20/2023     Procedure: IRRIGATION AND DEBRIDEMENT digits 2 and 3 right foot;  Surgeon: Jf Huerta DPM;  [place] [facility] [place]    PICC SINGLE LUMEN PLACEMENT   5/25/2023              Family History         Family History   Problem Relation Age of Onset    Diabetes Father                Social History   Social History            Socioeconomic History    Marital status:        Spouse name: Not on file    Number of children: Not on file    Years of education: Not on file    Highest education level: Not on file   Occupational History    Not on file   Tobacco Use    Smoking status: Never    Smokeless tobacco: Never   Vaping Use    Vaping status: Never Used   Substance and Sexual Activity    Alcohol use: Never    Drug use: Never    Sexual activity: Not on file   Other Topics Concern    Not on file   Social History Narrative    Not on file      Social Determinants of Health           Financial Resource Strain: High Risk (10/5/2023)     Financial Resource Strain      Within the past 12 months, have you or your family members you live with been unable to get utilities (heat, electricity) when it was really needed?: Yes   Food Insecurity: High  Risk (10/5/2023)     Food Insecurity      Within the past 12 months, did you worry that your food would run out before you got money to buy more?: Yes      Within the past 12 months, did the food you bought just not last and you didn t have money to get more?: Yes   Transportation Needs: Low Risk  (10/5/2023)     Transportation Needs      Within the past 12 months, has lack of transportation kept you from medical appointments, getting your medicines, non-medical meetings or appointments, work, or from getting things that you need?: No   Physical Activity: Not on file   Stress: Not on file   Social Connections: Not on file   Interpersonal Safety: Low Risk  (10/5/2023)     Interpersonal Safety      Do you feel physically and emotionally safe where you currently live?: Yes      Within the past 12 months, have you been hit, slapped, kicked or otherwise physically hurt by someone?: No      Within the past 12 months, have you been humiliated or emotionally abused in other ways by your partner or ex-partner?: No   Housing Stability: Low Risk  (10/5/2023)     Housing Stability      Do you have housing? : Yes      Are you worried about losing your housing?: No             Medications  Allergies   Current Outpatient Prescriptions   No current outpatient medications on file.           Allergies   No Known Allergies          Amadeo Quinteros DO    Date of service: 8/12/2024

## 2024-08-12 NOTE — PROGRESS NOTES
[facility]    Medicine Progress Note - Hospitalist Service    Date of Admission:  8/11/2024    Assessment & Plan   Abdulaziz Rausch is a 59 year old male admitted with acute heart failure.  He has borderline tachycardia with an unremarkable blood pressure.  Continue diuretics per cardiology recommendations.  TTE shows significant LV dysfunction.  In the setting of elevated troponin and active chest pressure, most concerning for ACS.  Awaiting callback on cardiology re: heparin gtt and need for angiography/cath.  NPO at midnight.    Update: discussed with cardiology, start heparin gtt and beta blocker.    # Acute coronary syndrome  # Acute systolic heart failure  - TTE pending  - cardiology consulted  - diuretics per cardiology  - aspirin  - statin    # CHRISTY    # HTN    # DM  - start basal and meal associated insulin  - ISS    # Chronic anemia    # PAD          Diet: Combination Diet 2 gm NA Diet; No Caffeine Diet (and additional linked orders)  Fluid restriction 2000 ML FLUID (and additional linked orders)      Fischer Catheter: Not present  Lines: None     Cardiac Monitoring: ACTIVE order. Indication: Acute decompensated heart failure (48 hours)  Code Status: No CPR- Do NOT Intubate      Clinically Significant Risk Factors Present on Admission                  # Hypertension: Noted on problem list  # Acute heart failure with reduced ejection fraction: last echo with EF <40% and receiving IV diuretics   # Anemia: based on hgb <11      # DMII: A1C = 10.1 % (Ref range: <5.7 %) within past 6 months          # Financial/Environmental Concerns: none               Disposition Plan     Medically Ready for Discharge:              Satinder Sotelo MD  Hospitalist Service  [facility]  Securely message with [employer] (more info)  Text page via [employer] Paging/Directory   ______________________________________________________________________    Interval History   Reports persistent  chest pressure that has not improved.  Reports dyspnea, which is worse with lying down.  Had vomiting earlier.  Denies vomiting.    Physical Exam   Vital Signs: Temp: 98.2  F (36.8  C) Temp src: Oral BP: (!) 148/83 Pulse: 100   Resp: 18 SpO2: 95 % O2 Device: None (Room air) Oxygen Delivery: 1 LPM  Weight: 149 lbs 7.55 oz    Gen:  lying in bed in no extremis  Neuro: alert, conversangt  CV:  tachycardia, regular rhythm  Pulm: no acute resp distress, crackles at the left base  GI:  abdomen NTTP  Ext:  bilateral lower extremity edema    Medical Decision Making             Data   Reviewed:    Na 137  K 4.3  BUN 33  Cr 1.7    WBC 11  Hgb 9.6  Plts 340

## 2024-08-13 ENCOUNTER — HOSPITAL ENCOUNTER (INPATIENT)
Facility: HOSPITAL | Age: 59
LOS: 10 days | Discharge: HOME | End: 2024-08-23
Attending: STUDENT IN AN ORGANIZED HEALTH CARE EDUCATION/TRAINING PROGRAM | Admitting: INTERNAL MEDICINE
Payer: COMMERCIAL

## 2024-08-13 ENCOUNTER — TELEPHONE (OUTPATIENT)
Dept: CARDIOLOGY | Facility: CLINIC | Age: 59
End: 2024-08-13
Payer: COMMERCIAL

## 2024-08-13 VITALS
DIASTOLIC BLOOD PRESSURE: 81 MMHG | WEIGHT: 149.47 LBS | OXYGEN SATURATION: 91 % | HEIGHT: 69 IN | BODY MASS INDEX: 22.14 KG/M2 | SYSTOLIC BLOOD PRESSURE: 152 MMHG | TEMPERATURE: 98.1 F | RESPIRATION RATE: 20 BRPM | HEART RATE: 99 BPM

## 2024-08-13 DIAGNOSIS — I50.9 ACUTE ON CHRONIC CONGESTIVE HEART FAILURE, UNSPECIFIED HEART FAILURE TYPE (H): ICD-10-CM

## 2024-08-13 DIAGNOSIS — I27.20 PULMONARY HYPERTENSION (H): ICD-10-CM

## 2024-08-13 DIAGNOSIS — I50.9 ACUTE ON CHRONIC CONGESTIVE HEART FAILURE, UNSPECIFIED HEART FAILURE TYPE (H): Primary | ICD-10-CM

## 2024-08-13 DIAGNOSIS — I73.9 PVD (PERIPHERAL VASCULAR DISEASE) (H): ICD-10-CM

## 2024-08-13 DIAGNOSIS — E46 MALNUTRITION, UNSPECIFIED TYPE (H): ICD-10-CM

## 2024-08-13 DIAGNOSIS — E11.59 TYPE 2 DIABETES MELLITUS WITH OTHER CIRCULATORY COMPLICATION, WITH LONG-TERM CURRENT USE OF INSULIN (H): ICD-10-CM

## 2024-08-13 DIAGNOSIS — Z79.4 TYPE 2 DIABETES MELLITUS WITH OTHER CIRCULATORY COMPLICATION, WITH LONG-TERM CURRENT USE OF INSULIN (H): ICD-10-CM

## 2024-08-13 DIAGNOSIS — Z79.4 TYPE 2 DIABETES MELLITUS WITH HYPERGLYCEMIA, WITH LONG-TERM CURRENT USE OF INSULIN (H): ICD-10-CM

## 2024-08-13 DIAGNOSIS — F41.9 ANXIETY: ICD-10-CM

## 2024-08-13 DIAGNOSIS — R33.9 URINE RETENTION: ICD-10-CM

## 2024-08-13 DIAGNOSIS — Z95.5 S/P RIGHT CORONARY ARTERY (RCA) STENT PLACEMENT: Primary | ICD-10-CM

## 2024-08-13 DIAGNOSIS — I21.4 NSTEMI (NON-ST ELEVATED MYOCARDIAL INFARCTION) (H): ICD-10-CM

## 2024-08-13 DIAGNOSIS — G62.9 NEUROPATHY: ICD-10-CM

## 2024-08-13 DIAGNOSIS — I50.21 ACUTE SYSTOLIC CONGESTIVE HEART FAILURE (H): ICD-10-CM

## 2024-08-13 DIAGNOSIS — I10 ESSENTIAL HYPERTENSION: ICD-10-CM

## 2024-08-13 DIAGNOSIS — E11.65 TYPE 2 DIABETES MELLITUS WITH HYPERGLYCEMIA, WITH LONG-TERM CURRENT USE OF INSULIN (H): ICD-10-CM

## 2024-08-13 PROBLEM — I24.9 ACUTE CORONARY SYNDROME (H): Status: ACTIVE | Noted: 2024-08-13

## 2024-08-13 PROBLEM — N17.9 AKI (ACUTE KIDNEY INJURY) (H): Status: ACTIVE | Noted: 2024-08-13

## 2024-08-13 PROBLEM — T17.908A ASPIRATION INTO AIRWAY: Status: ACTIVE | Noted: 2024-08-13

## 2024-08-13 PROBLEM — R40.0 SOMNOLENCE: Status: ACTIVE | Noted: 2024-08-13

## 2024-08-13 LAB
ABO/RH(D): NORMAL
ANION GAP SERPL CALCULATED.3IONS-SCNC: 11 MMOL/L (ref 7–15)
ANTIBODY SCREEN: NEGATIVE
BASE EXCESS BLDA CALC-SCNC: -1.7 MMOL/L (ref -3–3)
BASE EXCESS BLDV CALC-SCNC: 0.1 MMOL/L (ref -3–3)
BASE EXCESS BLDV CALC-SCNC: 0.2 MMOL/L (ref -3–3)
BUN SERPL-MCNC: 49.6 MG/DL (ref 8–23)
CALCIUM SERPL-MCNC: 8.7 MG/DL (ref 8.8–10.4)
CHLORIDE SERPL-SCNC: 104 MMOL/L (ref 98–107)
CREAT SERPL-MCNC: 2.28 MG/DL (ref 0.67–1.17)
EGFRCR SERPLBLD CKD-EPI 2021: 32 ML/MIN/1.73M2
ERYTHROCYTE [DISTWIDTH] IN BLOOD BY AUTOMATED COUNT: 12.9 % (ref 10–15)
GLUCOSE BLDC GLUCOMTR-MCNC: 123 MG/DL (ref 70–99)
GLUCOSE BLDC GLUCOMTR-MCNC: 150 MG/DL (ref 70–99)
GLUCOSE BLDC GLUCOMTR-MCNC: 207 MG/DL (ref 70–99)
GLUCOSE BLDC GLUCOMTR-MCNC: 94 MG/DL (ref 70–99)
GLUCOSE SERPL-MCNC: 142 MG/DL (ref 70–99)
HCO3 BLDA-SCNC: 23 MMOL/L (ref 21–28)
HCO3 BLDV-SCNC: 24 MMOL/L (ref 21–28)
HCO3 BLDV-SCNC: 25 MMOL/L (ref 21–28)
HCO3 SERPL-SCNC: 24 MMOL/L (ref 22–29)
HCT VFR BLD AUTO: 24.8 % (ref 40–53)
HGB BLD-MCNC: 8.5 G/DL (ref 13.3–17.7)
HOLD SPECIMEN: NORMAL
MCH RBC QN AUTO: 28.8 PG (ref 26.5–33)
MCHC RBC AUTO-ENTMCNC: 34.3 G/DL (ref 31.5–36.5)
MCV RBC AUTO: 84 FL (ref 78–100)
NT-PROBNP SERPL-MCNC: ABNORMAL PG/ML (ref 0–900)
O2/TOTAL GAS SETTING VFR VENT: 0 %
OXYHGB MFR BLDA: 96 % (ref 92–100)
OXYHGB MFR BLDV: 56 % (ref 70–75)
OXYHGB MFR BLDV: 73 % (ref 70–75)
PCO2 BLDA: 38 MM HG (ref 35–45)
PCO2 BLDV: 38 MM HG (ref 40–50)
PCO2 BLDV: 46 MM HG (ref 40–50)
PH BLDA: 7.39 [PH] (ref 7.35–7.45)
PH BLDV: 7.36 [PH] (ref 7.32–7.43)
PH BLDV: 7.42 [PH] (ref 7.32–7.43)
PLATELET # BLD AUTO: 352 10E3/UL (ref 150–450)
PO2 BLDA: 92 MM HG (ref 80–105)
PO2 BLDV: 34 MM HG (ref 25–47)
PO2 BLDV: 41 MM HG (ref 25–47)
POTASSIUM SERPL-SCNC: 4.2 MMOL/L (ref 3.4–5.3)
RBC # BLD AUTO: 2.95 10E6/UL (ref 4.4–5.9)
SAO2 % BLDA: 98 % (ref 96–97)
SAO2 % BLDV: 58 % (ref 70–75)
SAO2 % BLDV: 74.9 % (ref 70–75)
SODIUM SERPL-SCNC: 139 MMOL/L (ref 135–145)
SPECIMEN EXPIRATION DATE: NORMAL
TSH SERPL DL<=0.005 MIU/L-ACNC: 1.32 UIU/ML (ref 0.3–4.2)
UFH PPP CHRO-ACNC: 0.3 IU/ML
UFH PPP CHRO-ACNC: 0.36 IU/ML
WBC # BLD AUTO: 9.6 10E3/UL (ref 4–11)

## 2024-08-13 PROCEDURE — 82805 BLOOD GASES W/O2 SATURATION: CPT

## 2024-08-13 PROCEDURE — 250N000011 HC RX IP 250 OP 636: Performed by: HOSPITALIST

## 2024-08-13 PROCEDURE — 210N000001 HC R&B IMCU HEART CARE

## 2024-08-13 PROCEDURE — 250N000011 HC RX IP 250 OP 636: Performed by: INTERNAL MEDICINE

## 2024-08-13 PROCEDURE — 36415 COLL VENOUS BLD VENIPUNCTURE: CPT | Performed by: NURSE PRACTITIONER

## 2024-08-13 PROCEDURE — 82805 BLOOD GASES W/O2 SATURATION: CPT | Performed by: HOSPITALIST

## 2024-08-13 PROCEDURE — 99238 HOSP IP/OBS DSCHRG MGMT 30/<: CPT | Performed by: HOSPITALIST

## 2024-08-13 PROCEDURE — 258N000003 HC RX IP 258 OP 636: Performed by: INTERNAL MEDICINE

## 2024-08-13 PROCEDURE — 4A023N7 MEASUREMENT OF CARDIAC SAMPLING AND PRESSURE, LEFT HEART, PERCUTANEOUS APPROACH: ICD-10-PCS | Performed by: INTERNAL MEDICINE

## 2024-08-13 PROCEDURE — 80048 BASIC METABOLIC PNL TOTAL CA: CPT | Performed by: HOSPITALIST

## 2024-08-13 PROCEDURE — 255N000002 HC RX 255 OP 636: Performed by: INTERNAL MEDICINE

## 2024-08-13 PROCEDURE — C1751 CATH, INF, PER/CENT/MIDLINE: HCPCS | Performed by: INTERNAL MEDICINE

## 2024-08-13 PROCEDURE — 84443 ASSAY THYROID STIM HORMONE: CPT | Performed by: NURSE PRACTITIONER

## 2024-08-13 PROCEDURE — 272N000001 HC OR GENERAL SUPPLY STERILE: Performed by: INTERNAL MEDICINE

## 2024-08-13 PROCEDURE — 250N000013 HC RX MED GY IP 250 OP 250 PS 637: Performed by: HOSPITALIST

## 2024-08-13 PROCEDURE — C1894 INTRO/SHEATH, NON-LASER: HCPCS | Performed by: INTERNAL MEDICINE

## 2024-08-13 PROCEDURE — 36415 COLL VENOUS BLD VENIPUNCTURE: CPT | Performed by: INTERNAL MEDICINE

## 2024-08-13 PROCEDURE — C1769 GUIDE WIRE: HCPCS | Performed by: INTERNAL MEDICINE

## 2024-08-13 PROCEDURE — 999N000111 HC STATISTIC OT IP EVAL DEFER

## 2024-08-13 PROCEDURE — 250N000013 HC RX MED GY IP 250 OP 250 PS 637: Performed by: STUDENT IN AN ORGANIZED HEALTH CARE EDUCATION/TRAINING PROGRAM

## 2024-08-13 PROCEDURE — 250N000013 HC RX MED GY IP 250 OP 250 PS 637: Performed by: INTERNAL MEDICINE

## 2024-08-13 PROCEDURE — 250N000013 HC RX MED GY IP 250 OP 250 PS 637: Performed by: NURSE PRACTITIONER

## 2024-08-13 PROCEDURE — 99233 SBSQ HOSP IP/OBS HIGH 50: CPT | Performed by: INTERNAL MEDICINE

## 2024-08-13 PROCEDURE — 99223 1ST HOSP IP/OBS HIGH 75: CPT | Performed by: STUDENT IN AN ORGANIZED HEALTH CARE EDUCATION/TRAINING PROGRAM

## 2024-08-13 PROCEDURE — B2111ZZ FLUOROSCOPY OF MULTIPLE CORONARY ARTERIES USING LOW OSMOLAR CONTRAST: ICD-10-PCS | Performed by: INTERNAL MEDICINE

## 2024-08-13 PROCEDURE — 85027 COMPLETE CBC AUTOMATED: CPT | Performed by: HOSPITALIST

## 2024-08-13 PROCEDURE — 250N000009 HC RX 250: Performed by: INTERNAL MEDICINE

## 2024-08-13 PROCEDURE — 93460 R&L HRT ART/VENTRICLE ANGIO: CPT | Performed by: INTERNAL MEDICINE

## 2024-08-13 PROCEDURE — 36415 COLL VENOUS BLD VENIPUNCTURE: CPT | Performed by: HOSPITALIST

## 2024-08-13 PROCEDURE — 86900 BLOOD TYPING SEROLOGIC ABO: CPT | Performed by: INTERNAL MEDICINE

## 2024-08-13 PROCEDURE — 250N000012 HC RX MED GY IP 250 OP 636 PS 637: Performed by: NURSE PRACTITIONER

## 2024-08-13 PROCEDURE — 85520 HEPARIN ASSAY: CPT | Performed by: HOSPITALIST

## 2024-08-13 PROCEDURE — 4A023N8 MEASUREMENT OF CARDIAC SAMPLING AND PRESSURE, BILATERAL, PERCUTANEOUS APPROACH: ICD-10-PCS | Performed by: INTERNAL MEDICINE

## 2024-08-13 PROCEDURE — 83880 ASSAY OF NATRIURETIC PEPTIDE: CPT | Performed by: HOSPITALIST

## 2024-08-13 PROCEDURE — 99223 1ST HOSP IP/OBS HIGH 75: CPT | Performed by: INTERNAL MEDICINE

## 2024-08-13 PROCEDURE — 93460 R&L HRT ART/VENTRICLE ANGIO: CPT | Mod: 26 | Performed by: INTERNAL MEDICINE

## 2024-08-13 PROCEDURE — 250N000011 HC RX IP 250 OP 636: Performed by: NURSE PRACTITIONER

## 2024-08-13 RX ORDER — AMOXICILLIN 250 MG
2 CAPSULE ORAL 2 TIMES DAILY PRN
Status: CANCELLED | OUTPATIENT
Start: 2024-08-13

## 2024-08-13 RX ORDER — DEXTROSE MONOHYDRATE 25 G/50ML
25-50 INJECTION, SOLUTION INTRAVENOUS
Status: DISCONTINUED | OUTPATIENT
Start: 2024-08-13 | End: 2024-08-23 | Stop reason: HOSPADM

## 2024-08-13 RX ORDER — NALOXONE HYDROCHLORIDE 0.4 MG/ML
0.4 INJECTION, SOLUTION INTRAMUSCULAR; INTRAVENOUS; SUBCUTANEOUS
Status: ACTIVE | OUTPATIENT
Start: 2024-08-13 | End: 2024-08-14

## 2024-08-13 RX ORDER — NICOTINE POLACRILEX 4 MG
15-30 LOZENGE BUCCAL
Status: CANCELLED | OUTPATIENT
Start: 2024-08-13

## 2024-08-13 RX ORDER — ASPIRIN 81 MG/1
81 TABLET ORAL DAILY
Status: CANCELLED | OUTPATIENT
Start: 2024-08-13

## 2024-08-13 RX ORDER — ONDANSETRON 4 MG/1
4 TABLET, ORALLY DISINTEGRATING ORAL EVERY 6 HOURS PRN
Status: DISCONTINUED | OUTPATIENT
Start: 2024-08-13 | End: 2024-08-23 | Stop reason: HOSPADM

## 2024-08-13 RX ORDER — SODIUM CHLORIDE 9 MG/ML
75 INJECTION, SOLUTION INTRAVENOUS CONTINUOUS
Status: ACTIVE | OUTPATIENT
Start: 2024-08-13 | End: 2024-08-13

## 2024-08-13 RX ORDER — CARVEDILOL 3.12 MG/1
3.12 TABLET ORAL 2 TIMES DAILY WITH MEALS
Status: CANCELLED | OUTPATIENT
Start: 2024-08-13

## 2024-08-13 RX ORDER — ASPIRIN 325 MG
325 TABLET ORAL ONCE
Status: CANCELLED | OUTPATIENT
Start: 2024-08-13 | End: 2024-08-13

## 2024-08-13 RX ORDER — NITROGLYCERIN 5 MG/ML
VIAL (ML) INTRAVENOUS
Status: DISCONTINUED | OUTPATIENT
Start: 2024-08-13 | End: 2024-08-13 | Stop reason: HOSPADM

## 2024-08-13 RX ORDER — HYDRALAZINE HYDROCHLORIDE 10 MG/1
10 TABLET, FILM COATED ORAL 3 TIMES DAILY
Status: DISCONTINUED | OUTPATIENT
Start: 2024-08-13 | End: 2024-08-23 | Stop reason: HOSPADM

## 2024-08-13 RX ORDER — FUROSEMIDE 10 MG/ML
40 INJECTION INTRAMUSCULAR; INTRAVENOUS EVERY 6 HOURS
Status: DISCONTINUED | OUTPATIENT
Start: 2024-08-13 | End: 2024-08-16

## 2024-08-13 RX ORDER — LIDOCAINE 40 MG/G
CREAM TOPICAL
Status: CANCELLED | OUTPATIENT
Start: 2024-08-13

## 2024-08-13 RX ORDER — OXYCODONE HYDROCHLORIDE 5 MG/1
10 TABLET ORAL EVERY 4 HOURS PRN
Status: DISCONTINUED | OUTPATIENT
Start: 2024-08-13 | End: 2024-08-23 | Stop reason: HOSPADM

## 2024-08-13 RX ORDER — SODIUM CHLORIDE 9 MG/ML
INJECTION, SOLUTION INTRAVENOUS CONTINUOUS
Status: CANCELLED | OUTPATIENT
Start: 2024-08-13

## 2024-08-13 RX ORDER — HEPARIN SODIUM 1000 [USP'U]/ML
INJECTION, SOLUTION INTRAVENOUS; SUBCUTANEOUS
Status: DISCONTINUED | OUTPATIENT
Start: 2024-08-13 | End: 2024-08-13 | Stop reason: HOSPADM

## 2024-08-13 RX ORDER — OXYCODONE HYDROCHLORIDE 5 MG/1
5 TABLET ORAL EVERY 4 HOURS PRN
Status: DISCONTINUED | OUTPATIENT
Start: 2024-08-13 | End: 2024-08-23 | Stop reason: HOSPADM

## 2024-08-13 RX ORDER — ATORVASTATIN CALCIUM 40 MG/1
40 TABLET, FILM COATED ORAL EVERY EVENING
Status: CANCELLED | OUTPATIENT
Start: 2024-08-13

## 2024-08-13 RX ORDER — CARVEDILOL 3.12 MG/1
3.12 TABLET ORAL 2 TIMES DAILY WITH MEALS
Status: DISCONTINUED | OUTPATIENT
Start: 2024-08-13 | End: 2024-08-23 | Stop reason: HOSPADM

## 2024-08-13 RX ORDER — AMOXICILLIN 250 MG
2 CAPSULE ORAL 2 TIMES DAILY PRN
Status: DISCONTINUED | OUTPATIENT
Start: 2024-08-13 | End: 2024-08-23 | Stop reason: HOSPADM

## 2024-08-13 RX ORDER — LIDOCAINE 40 MG/G
CREAM TOPICAL
Status: DISCONTINUED | OUTPATIENT
Start: 2024-08-13 | End: 2024-08-23 | Stop reason: HOSPADM

## 2024-08-13 RX ORDER — FUROSEMIDE 10 MG/ML
40 INJECTION INTRAMUSCULAR; INTRAVENOUS EVERY 6 HOURS
Status: CANCELLED | OUTPATIENT
Start: 2024-08-13

## 2024-08-13 RX ORDER — PROCHLORPERAZINE MALEATE 5 MG
10 TABLET ORAL EVERY 6 HOURS PRN
Status: DISCONTINUED | OUTPATIENT
Start: 2024-08-13 | End: 2024-08-23 | Stop reason: HOSPADM

## 2024-08-13 RX ORDER — ASPIRIN 81 MG/1
243 TABLET, CHEWABLE ORAL ONCE
Status: CANCELLED | OUTPATIENT
Start: 2024-08-13

## 2024-08-13 RX ORDER — ONDANSETRON 4 MG/1
4 TABLET, ORALLY DISINTEGRATING ORAL EVERY 6 HOURS PRN
Status: CANCELLED | OUTPATIENT
Start: 2024-08-13

## 2024-08-13 RX ORDER — HEPARIN SODIUM 10000 [USP'U]/100ML
0-5000 INJECTION, SOLUTION INTRAVENOUS CONTINUOUS
Status: DISCONTINUED | OUTPATIENT
Start: 2024-08-13 | End: 2024-08-20

## 2024-08-13 RX ORDER — ACETAMINOPHEN 325 MG/1
650 TABLET ORAL EVERY 4 HOURS PRN
Status: DISCONTINUED | OUTPATIENT
Start: 2024-08-13 | End: 2024-08-23 | Stop reason: HOSPADM

## 2024-08-13 RX ORDER — HEPARIN SODIUM 10000 [USP'U]/100ML
0-5000 INJECTION, SOLUTION INTRAVENOUS CONTINUOUS
Status: CANCELLED | OUTPATIENT
Start: 2024-08-13

## 2024-08-13 RX ORDER — SPIRONOLACTONE 25 MG
12.5 TABLET ORAL DAILY
Status: DISCONTINUED | OUTPATIENT
Start: 2024-08-14 | End: 2024-08-23 | Stop reason: HOSPADM

## 2024-08-13 RX ORDER — ASPIRIN 81 MG/1
243 TABLET, CHEWABLE ORAL ONCE
Status: COMPLETED | OUTPATIENT
Start: 2024-08-13 | End: 2024-08-13

## 2024-08-13 RX ORDER — ISOSORBIDE MONONITRATE 30 MG/1
30 TABLET, EXTENDED RELEASE ORAL DAILY
Status: CANCELLED | OUTPATIENT
Start: 2024-08-14

## 2024-08-13 RX ORDER — LIDOCAINE 40 MG/G
CREAM TOPICAL
Status: DISCONTINUED | OUTPATIENT
Start: 2024-08-13 | End: 2024-08-13 | Stop reason: HOSPADM

## 2024-08-13 RX ORDER — DEXTROSE MONOHYDRATE 25 G/50ML
25-50 INJECTION, SOLUTION INTRAVENOUS
Status: CANCELLED | OUTPATIENT
Start: 2024-08-13

## 2024-08-13 RX ORDER — NICOTINE POLACRILEX 4 MG
15-30 LOZENGE BUCCAL
Status: DISCONTINUED | OUTPATIENT
Start: 2024-08-13 | End: 2024-08-23 | Stop reason: HOSPADM

## 2024-08-13 RX ORDER — PROCHLORPERAZINE 25 MG
25 SUPPOSITORY, RECTAL RECTAL EVERY 12 HOURS PRN
Status: CANCELLED | OUTPATIENT
Start: 2024-08-13

## 2024-08-13 RX ORDER — ONDANSETRON 2 MG/ML
4 INJECTION INTRAMUSCULAR; INTRAVENOUS EVERY 6 HOURS PRN
Status: DISCONTINUED | OUTPATIENT
Start: 2024-08-13 | End: 2024-08-23 | Stop reason: HOSPADM

## 2024-08-13 RX ORDER — FENTANYL CITRATE 50 UG/ML
INJECTION, SOLUTION INTRAMUSCULAR; INTRAVENOUS
Status: DISCONTINUED | OUTPATIENT
Start: 2024-08-13 | End: 2024-08-13 | Stop reason: HOSPADM

## 2024-08-13 RX ORDER — HYDRALAZINE HYDROCHLORIDE 10 MG/1
10 TABLET, FILM COATED ORAL EVERY 8 HOURS SCHEDULED
Status: DISCONTINUED | OUTPATIENT
Start: 2024-08-13 | End: 2024-08-13 | Stop reason: HOSPADM

## 2024-08-13 RX ORDER — FLUMAZENIL 0.1 MG/ML
0.2 INJECTION, SOLUTION INTRAVENOUS
Status: DISCONTINUED | OUTPATIENT
Start: 2024-08-13 | End: 2024-08-13

## 2024-08-13 RX ORDER — NALOXONE HYDROCHLORIDE 0.4 MG/ML
0.2 INJECTION, SOLUTION INTRAMUSCULAR; INTRAVENOUS; SUBCUTANEOUS
Status: ACTIVE | OUTPATIENT
Start: 2024-08-13 | End: 2024-08-14

## 2024-08-13 RX ORDER — AMOXICILLIN 250 MG
1 CAPSULE ORAL 2 TIMES DAILY PRN
Status: DISCONTINUED | OUTPATIENT
Start: 2024-08-13 | End: 2024-08-23 | Stop reason: HOSPADM

## 2024-08-13 RX ORDER — ASPIRIN 325 MG
325 TABLET ORAL ONCE
Status: COMPLETED | OUTPATIENT
Start: 2024-08-13 | End: 2024-08-13

## 2024-08-13 RX ORDER — FENTANYL CITRATE 50 UG/ML
25 INJECTION, SOLUTION INTRAMUSCULAR; INTRAVENOUS
Status: DISCONTINUED | OUTPATIENT
Start: 2024-08-13 | End: 2024-08-13

## 2024-08-13 RX ORDER — IPRATROPIUM BROMIDE AND ALBUTEROL SULFATE 2.5; .5 MG/3ML; MG/3ML
3 SOLUTION RESPIRATORY (INHALATION) EVERY 4 HOURS PRN
Status: DISCONTINUED | OUTPATIENT
Start: 2024-08-13 | End: 2024-08-23 | Stop reason: HOSPADM

## 2024-08-13 RX ORDER — ASPIRIN 81 MG/1
81 TABLET ORAL DAILY
Status: DISCONTINUED | OUTPATIENT
Start: 2024-08-14 | End: 2024-08-23 | Stop reason: HOSPADM

## 2024-08-13 RX ORDER — GABAPENTIN 300 MG/1
300 CAPSULE ORAL 2 TIMES DAILY
Status: CANCELLED | OUTPATIENT
Start: 2024-08-13

## 2024-08-13 RX ORDER — ISOSORBIDE DINITRATE 20 MG/1
20 TABLET ORAL
Status: DISCONTINUED | OUTPATIENT
Start: 2024-08-13 | End: 2024-08-13 | Stop reason: HOSPADM

## 2024-08-13 RX ORDER — ONDANSETRON 2 MG/ML
4 INJECTION INTRAMUSCULAR; INTRAVENOUS EVERY 6 HOURS PRN
Status: CANCELLED | OUTPATIENT
Start: 2024-08-13

## 2024-08-13 RX ORDER — SODIUM CHLORIDE 9 MG/ML
INJECTION, SOLUTION INTRAVENOUS CONTINUOUS
Status: DISCONTINUED | OUTPATIENT
Start: 2024-08-13 | End: 2024-08-13 | Stop reason: HOSPADM

## 2024-08-13 RX ORDER — FENTANYL CITRATE 50 UG/ML
25 INJECTION, SOLUTION INTRAMUSCULAR; INTRAVENOUS
Status: DISCONTINUED | OUTPATIENT
Start: 2024-08-13 | End: 2024-08-13 | Stop reason: HOSPADM

## 2024-08-13 RX ORDER — HEPARIN SODIUM 10000 [USP'U]/100ML
0-5000 INJECTION, SOLUTION INTRAVENOUS CONTINUOUS
Status: DISCONTINUED | OUTPATIENT
Start: 2024-08-13 | End: 2024-08-13

## 2024-08-13 RX ORDER — FENTANYL CITRATE 50 UG/ML
25 INJECTION, SOLUTION INTRAMUSCULAR; INTRAVENOUS
Status: CANCELLED | OUTPATIENT
Start: 2024-08-13

## 2024-08-13 RX ORDER — CEFTRIAXONE 1 G/1
1 INJECTION, POWDER, FOR SOLUTION INTRAMUSCULAR; INTRAVENOUS EVERY 24 HOURS
Status: COMPLETED | OUTPATIENT
Start: 2024-08-13 | End: 2024-08-19

## 2024-08-13 RX ORDER — PROCHLORPERAZINE MALEATE 10 MG
10 TABLET ORAL EVERY 6 HOURS PRN
Status: CANCELLED | OUTPATIENT
Start: 2024-08-13

## 2024-08-13 RX ORDER — PROCHLORPERAZINE 25 MG
25 SUPPOSITORY, RECTAL RECTAL EVERY 12 HOURS PRN
Status: DISCONTINUED | OUTPATIENT
Start: 2024-08-13 | End: 2024-08-23 | Stop reason: HOSPADM

## 2024-08-13 RX ORDER — AMOXICILLIN 250 MG
1 CAPSULE ORAL 2 TIMES DAILY PRN
Status: CANCELLED | OUTPATIENT
Start: 2024-08-13

## 2024-08-13 RX ORDER — HYDRALAZINE HYDROCHLORIDE 20 MG/ML
10 INJECTION INTRAMUSCULAR; INTRAVENOUS
Status: COMPLETED | OUTPATIENT
Start: 2024-08-13 | End: 2024-08-17

## 2024-08-13 RX ORDER — GABAPENTIN 300 MG/1
300 CAPSULE ORAL 2 TIMES DAILY
Status: DISCONTINUED | OUTPATIENT
Start: 2024-08-13 | End: 2024-08-23 | Stop reason: HOSPADM

## 2024-08-13 RX ORDER — IPRATROPIUM BROMIDE AND ALBUTEROL SULFATE 2.5; .5 MG/3ML; MG/3ML
3 SOLUTION RESPIRATORY (INHALATION) EVERY 4 HOURS PRN
Status: CANCELLED | OUTPATIENT
Start: 2024-08-13

## 2024-08-13 RX ORDER — ATROPINE SULFATE 0.1 MG/ML
0.5 INJECTION INTRAVENOUS
Status: DISCONTINUED | OUTPATIENT
Start: 2024-08-13 | End: 2024-08-13

## 2024-08-13 RX ORDER — ATORVASTATIN CALCIUM 40 MG/1
40 TABLET, FILM COATED ORAL EVERY EVENING
Status: DISCONTINUED | OUTPATIENT
Start: 2024-08-13 | End: 2024-08-23 | Stop reason: HOSPADM

## 2024-08-13 RX ADMIN — CEFTRIAXONE SODIUM 1 G: 1 INJECTION, POWDER, FOR SOLUTION INTRAMUSCULAR; INTRAVENOUS at 17:29

## 2024-08-13 RX ADMIN — ASPIRIN 81 MG: 81 TABLET, COATED ORAL at 09:38

## 2024-08-13 RX ADMIN — HEPARIN SODIUM 800 UNITS/HR: 10000 INJECTION, SOLUTION INTRAVENOUS at 21:39

## 2024-08-13 RX ADMIN — NITROGLYCERIN 15 MG: 20 OINTMENT TOPICAL at 17:30

## 2024-08-13 RX ADMIN — CARVEDILOL 3.12 MG: 3.12 TABLET, FILM COATED ORAL at 18:37

## 2024-08-13 RX ADMIN — HEPARIN SODIUM 950 UNITS/HR: 10000 INJECTION, SOLUTION INTRAVENOUS at 13:58

## 2024-08-13 RX ADMIN — FUROSEMIDE 40 MG: 10 INJECTION, SOLUTION INTRAMUSCULAR; INTRAVENOUS at 09:38

## 2024-08-13 RX ADMIN — ASPIRIN 81 MG CHEWABLE TABLET 243 MG: 81 TABLET CHEWABLE at 11:54

## 2024-08-13 RX ADMIN — FUROSEMIDE 40 MG: 10 INJECTION, SOLUTION INTRAMUSCULAR; INTRAVENOUS at 03:44

## 2024-08-13 RX ADMIN — SODIUM CHLORIDE: 9 INJECTION, SOLUTION INTRAVENOUS at 11:49

## 2024-08-13 RX ADMIN — FUROSEMIDE 40 MG: 10 INJECTION, SOLUTION INTRAMUSCULAR; INTRAVENOUS at 17:30

## 2024-08-13 RX ADMIN — ATORVASTATIN CALCIUM 40 MG: 40 TABLET, FILM COATED ORAL at 21:36

## 2024-08-13 RX ADMIN — INSULIN GLARGINE 10 UNITS: 100 INJECTION, SOLUTION SUBCUTANEOUS at 21:39

## 2024-08-13 RX ADMIN — GABAPENTIN 300 MG: 100 CAPSULE ORAL at 21:36

## 2024-08-13 RX ADMIN — GABAPENTIN 300 MG: 300 CAPSULE ORAL at 09:38

## 2024-08-13 RX ADMIN — INSULIN ASPART 2 UNITS: 100 INJECTION, SOLUTION INTRAVENOUS; SUBCUTANEOUS at 21:38

## 2024-08-13 RX ADMIN — HYDRALAZINE HYDROCHLORIDE 10 MG: 10 TABLET ORAL at 21:36

## 2024-08-13 RX ADMIN — CARVEDILOL 3.12 MG: 3.12 TABLET, FILM COATED ORAL at 09:38

## 2024-08-13 ASSESSMENT — ACTIVITIES OF DAILY LIVING (ADL)
ADLS_ACUITY_SCORE: 45
ADLS_ACUITY_SCORE: 45
ADLS_ACUITY_SCORE: 40
ADLS_ACUITY_SCORE: 41
ADLS_ACUITY_SCORE: 45
ADLS_ACUITY_SCORE: 41
ADLS_ACUITY_SCORE: 41
ADLS_ACUITY_SCORE: 45
ADLS_ACUITY_SCORE: 41
ADLS_ACUITY_SCORE: 40
ADLS_ACUITY_SCORE: 41
ADLS_ACUITY_SCORE: 40
ADLS_ACUITY_SCORE: 45
ADLS_ACUITY_SCORE: 35
ADLS_ACUITY_SCORE: 35
ADLS_ACUITY_SCORE: 45
ADLS_ACUITY_SCORE: 35
ADLS_ACUITY_SCORE: 31
ADLS_ACUITY_SCORE: 45

## 2024-08-13 ASSESSMENT — EJECTION FRACTION: EF_VALUE: .19

## 2024-08-13 NOTE — CONSULTS
Thank you, Dr. Quinteros, for asking the [facility] team to see Mr. Abdulaziz Rausch to evaluate       Assessment/Recommendations   Assessment/Plan:  Ischemic CM in setting of DM - plan PET viability scan tomorrow and if viable in LAD consider CV consult for possible CABG. Cont statin, carvedilol, NTG paste, hydralazine, and restart heparin no bolus this evening along with asp. Hold on loop diuretics given RA only 10, but add spironolactone 12.5mg tomorrow. Carotid USG screen   HTN - USG renal arteries pending.          History of Present Illness/Subjective    Mr. Abdulaziz Rausch is a 59 year old male with hx DM, PAD s/p toe amputation, HTN, CKD (Cr 2.28 this Am with [id number]), anemia, adm with CHF and found to have ischemic CM with EF 10-15% by echo.  Right and left heart cath today found severe/critical narrowing of prox/distal RCA, and severe in prox/diagonal LAD, LM 40% and mild dz Circ.  RA 10mmHg; PA 33/12/20; PCWP 15 (not clear if accurate), LVEDP 24, CO 4.82 by jud with PA sat 57.6 and Ao 98%.    Pt in the bed post caht sedated and with limited response due to sedation.  Access sties no bleeding.           Physical Examination Review of Systems   /77   Pulse 86   Temp 98.6  F (37  C) (Axillary)   Resp 16   SpO2 98%   There is no height or weight on file to calculate BMI.  Wt Readings from Last 3 Encounters:   08/12/24 67.8 kg (149 lb 7.6 oz)   10/05/23 62.1 kg (137 lb)   10/05/23 61.7 kg (136 lb)     No intake or output data in the 24 hours ending 08/13/24 1703  General Appearance:   no distress, normal body habitus   ENT/Mouth: membranes moist, no oral lesions or bleeding gums.      EYES:  no scleral icterus, normal conjunctivae   Neck: no carotid bruits or thyromegaly   Chest/Lungs:   lungs are clear to auscultation, no rales or wheezing,  sternal scar, equal chest wall expansion    Cardiovascular:   Regular. Normal first and second heart sounds with no murmurs, rubs, or  gallops; the carotid, radial and posterior tibial pulses are intact, Jugular venous pressure , edema bilaterally    Abdomen:  no organomegaly, masses, bruits, or tenderness; bowel sounds are present   Extremities: no cyanosis or clubbing   Skin: no xanthelasma, warm.    Neurologic: normal  bilateral, no tremors     Psychiatric: alert and oriented x3, calm     Review of Systems - 12 points nega other than above      Medical History  Surgical History Family History Social History   Past Medical History:   Diagnosis Date    Anemia     Depression     Diabetes mellitus, type 2 (H)     Diabetic foot infection (H) 7/1/2023    Diabetic foot ulcer (H)     History of MRSA infection 7/1/2023 Jan 2023 RIGHT foot wound    Hypertension     Non-healing ulcer of left foot (H) 7/1/2023    Osteomyelitis (H)     Past Surgical History:   Procedure Laterality Date    AMPUTATE FOOT Left 7/3/2023    Procedure: partial left fifth ray amputation, skin flap creation and closre, left foot;  Surgeon: Alejandro Mccoy DPM;  Location: [facility] OR    AMPUTATE TOE(S) Right 2/17/2023    Procedure: AMPUTATION, digits two and three right foot;  Surgeon: Ulises Jimenez DPM;  Location: [facility] OR    INCISION AND DRAINAGE FOOT, COMBINED Left 5/23/2023    Procedure: Incision and drainage of abscess left foot with;  Surgeon: Jf Huerta DPM;  Location: [facility] OR    IRRIGATION AND DEBRIDEMENT FOOT, COMBINED Left 5/23/2023    Procedure: debridement and irrigation of wound left foot;  Surgeon: Jf Huerta DPM;  Location: [facility] OR    IRRIGATION AND DEBRIDEMENT TOE, COMBINED Right 1/20/2023    Procedure: IRRIGATION AND DEBRIDEMENT digits 2 and 3 right foot;  Surgeon: Jf Huerta DPM;  Location: [facility] OR    PICC SINGLE LUMEN PLACEMENT  5/25/2023         Family History   Problem Relation Age of Onset    Diabetes Father     Social History     Socioeconomic History    Marital status:      Spouse name:  Not on file    Number of children: Not on file    Years of education: Not on file    Highest education level: Not on file   Occupational History    Not on file   Tobacco Use    Smoking status: Never    Smokeless tobacco: Never   Vaping Use    Vaping status: Never Used   Substance and Sexual Activity    Alcohol use: Never    Drug use: Yes     Types: Methamphetamines     Comment: chronic user    Sexual activity: Not on file   Other Topics Concern    Not on file   Social History Narrative    Not on file     Social Determinants of Health     Financial Resource Strain: High Risk (10/5/2023)    Financial Resource Strain     Within the past 12 months, have you or your family members you live with been unable to get utilities (heat, electricity) when it was really needed?: Yes   Food Insecurity: High Risk (10/5/2023)    Food Insecurity     Within the past 12 months, did you worry that your food would run out before you got money to buy more?: Yes     Within the past 12 months, did the food you bought just not last and you didn t have money to get more?: Yes   Transportation Needs: Low Risk  (10/5/2023)    Transportation Needs     Within the past 12 months, has lack of transportation kept you from medical appointments, getting your medicines, non-medical meetings or appointments, work, or from getting things that you need?: No   Physical Activity: Not on file   Stress: Not on file   Social Connections: Not on file   Interpersonal Safety: Low Risk  (10/5/2023)    Interpersonal Safety     Do you feel physically and emotionally safe where you currently live?: Yes     Within the past 12 months, have you been hit, slapped, kicked or otherwise physically hurt by someone?: No     Within the past 12 months, have you been humiliated or emotionally abused in other ways by your partner or ex-partner?: No   Housing Stability: Low Risk  (10/5/2023)    Housing Stability     Do you have housing? : Yes     Are you worried about losing your  housing?: No          Medications  Allergies   Scheduled Meds:  Current Facility-Administered Medications   Medication Dose Route Frequency Provider Last Rate Last Admin    [START ON 8/14/2024] aspirin EC tablet 81 mg  81 mg Oral Daily Tanna Brice CNP        atorvastatin (LIPITOR) tablet 40 mg  40 mg Oral QPM Tanna Brice CNP        carvedilol (COREG) tablet 3.125 mg  3.125 mg Oral BID w/meals Tanna Brice CNP        cefTRIAXone (ROCEPHIN) 1 g vial to attach to  mL bag for ADULTS or NS 50 mL bag for PEDS  1 g Intravenous Q24H Tanna Brice CNP        furosemide (LASIX) injection 40 mg  40 mg Intravenous Q6H Tanna Brice CNP        gabapentin (NEURONTIN) capsule 300 mg  300 mg Oral BID Tanna Brice CNP        insulin aspart (NovoLOG) injection (RAPID ACTING)  1-7 Units Subcutaneous Q4H [facility] Tanna Brice CNP        insulin glargine (LANTUS PEN) injection 10 Units  10 Units Subcutaneous At Bedtime Tanna Brice CNP        sodium chloride (PF) 0.9% PF flush 3 mL  3 mL Intracatheter Q8H Tanna Brice CNP         Continuous Infusions:  Current Facility-Administered Medications   Medication Dose Route Frequency Provider Last Rate Last Admin    heparin 25,000 units in 0.45% NaCl 250 mL ANTICOAGULANT infusion  0-5,000 Units/hr Intravenous Continuous Tanna Brice CNP   Stopped at 08/13/24 1535    sodium chloride 0.9 % infusion  75 mL/hr Intravenous Continuous Tanna Brice CNP         PRN Meds:.  Current Facility-Administered Medications   Medication Dose Route Frequency Provider Last Rate Last Admin    acetaminophen (TYLENOL) tablet 650 mg  650 mg Oral Q4H PRN Tanna Brice CNP        glucose gel 15-30 g  15-30 g Oral Q15 Min PRN Tanna Brice CNP        Or    dextrose 50 % injection 25-50 mL  25-50 mL Intravenous Q15 Min PRN Tanna Brice CNP        Or     glucagon injection 1 mg  1 mg Subcutaneous Q15 Min PRN Tanna Brice CNP        HOLD:  Metformin and metformin containing medications if patient received IV contrast with acute kidney injury or severe chronic kidney disease (stage IV or stage V; i.e., eGFR less than 30)   Does not apply HOLD Tanna Brice CNP        hydrALAZINE (APRESOLINE) injection 10 mg  10 mg Intravenous Once PRN Tanna Brice CNP        ipratropium - albuterol 0.5 mg/2.5 mg/3 mL (DUONEB) neb solution 3 mL  3 mL Nebulization Q4H PRN Tanna Brice CNP        lidocaine (LMX4) cream   Topical Q1H PRN Tanna Brice CNP        lidocaine 1 % 0.1-1 mL  0.1-1 mL Other Q1H PRN Tanna Brice CNP        naloxone (NARCAN) injection 0.2 mg  0.2 mg Intravenous Q2 Min PRN Tanna Brice CNP        Or    naloxone (NARCAN) injection 0.4 mg  0.4 mg Intravenous Q2 Min PRN Tanna Brice CNP        Or    naloxone (NARCAN) injection 0.2 mg  0.2 mg Intramuscular Q2 Min PRN Tanna Brice CNP        Or    naloxone (NARCAN) injection 0.4 mg  0.4 mg Intramuscular Q2 Min PRN Tanna Brice CNP        ondansetron (ZOFRAN ODT) ODT tab 4 mg  4 mg Oral Q6H PRN Tanna Brice CNP        Or    ondansetron (ZOFRAN) injection 4 mg  4 mg Intravenous Q6H PRN Tanna Brice CNP        oxyCODONE (ROXICODONE) tablet 5 mg  5 mg Oral Q4H PRN Tanna Brice CNP        Or    oxyCODONE (ROXICODONE) tablet 10 mg  10 mg Oral Q4H PRN Tanna Brice CNP        prochlorperazine (COMPAZINE) injection 10 mg  10 mg Intravenous Q6H PRN Tanna Brice CNP        Or    prochlorperazine (COMPAZINE) tablet 10 mg  10 mg Oral Q6H PRN Tanna Brice CNP        Or    prochlorperazine (COMPAZINE) suppository 25 mg  25 mg Rectal Q12H PRN Tanna Brice CNP        senna-docusate (SENOKOT-S/PERICOLACE) 8.6-50 MG per tablet 1 tablet  1  "tablet Oral BID PRN Kalamitsiotis, Tanna C, CNP        Or    senna-docusate (SENOKOT-S/PERICOLACE) 8.6-50 MG per tablet 2 tablet  2 tablet Oral BID PRN Kalamitsiotis, Tanna C, CNP        sodium chloride (PF) 0.9% PF flush 3 mL  3 mL Intracatheter q1 min prn Kalamitsiotis, Tanna C, CNP        No Known Allergies      Lab Results    Chemistry/lipid CBC Cardiac Enzymes/BNP/TSH/INR   Lab Results   Component Value Date    CHOL 145 01/18/2023    HDL 37 (L) 01/18/2023    TRIG 226 (H) 01/18/2023    BUN 49.6 (H) 08/13/2024     08/13/2024    CO2 24 08/13/2024    Lab Results   Component Value Date    WBC 9.6 08/13/2024    HGB 8.5 (L) 08/13/2024    HCT 24.8 (L) 08/13/2024    MCV 84 08/13/2024     08/13/2024    No results found for: \"CKTOTAL\", \"CKMB\", \"TROPONINI\", \"BNP\", \"TSH\", \"INR\"           Scar Lazo MD  Interventional Cardiology  [facility]            "

## 2024-08-13 NOTE — PROGRESS NOTES
[facility]    Medicine Progress Note - Hospitalist Service    Date of Admission:  8/11/2024    Assessment & Plan   Abdulaziz Rausch is a 59 year old male admitted with acute heart failure.  He has borderline tachycardia with an unremarkable blood pressure.  TTE with significant LV dysfunction.  Interval worsening of hypoxia this morning, suspect related to pulmonary edema.  Continue with IV furosemide.  Patient also somnolent this morning.  He does become alert when engaged, respond to questions, and follow commands.  VBG unremarkable. This may be hypoactive delirium vs related to severity of cardiomyopathy.  CBC and BMP pending.  Patient appeared to aspirate as well when taking morning medication.    In consultation with cardiology, working to transfer patient to [facility] for left and right heart cath, coronary angiography.      # Acute coronary syndrome  # Acute systolic heart failure  - diuretics per cardiology  - aspirin  - statin  - beta blocker  - heparin gtt  - BMP, CBC    # Somnolence  # Aspiration  - recommend speech evaluation prior to resuming diet    # CHRISTY    # HTN    # DM  - basal/bolus insulin  - outpatient followup    # Chronic anemia    # PAD    Addendum:  Communicated clinical course directly with receiving hospitalist at [facility].  Discussed concern for aspiration - no fever or leukocytosis; CXR on admission with bilateral infiltrates, though this was considered more likely related to pulmonary edema given his presentation.  Discusses that aspiration may need to be a consideration moving forward.          Diet: Fluid restriction 2000 ML FLUID (and additional linked orders)  NPO per Anesthesia Guidelines for Procedure/Surgery Except for: Meds      Fischer Catheter: Not present  Lines: None     Cardiac Monitoring: ACTIVE order. Indication: Acute decompensated heart failure (48 hours)  Code Status: No CPR- Do NOT Intubate      Clinically Significant Risk Factors                   # Hypertension: Noted on problem list  # Acute heart failure with reduced ejection fraction: last echo with EF <40% and receiving IV diuretics         # DMII: A1C = 10.1 % (Ref range: <5.7 %) within past 6 months, PRESENT ON ADMISSION      # Financial/Environmental Concerns: none               Disposition Plan     Medically Ready for Discharge: Anticipated in 2-4 Days             Satinder Sotelo MD  Hospitalist Service  [facility]  Securely message with [employer] (more info)  Text page via [employer] Paging/Directory   ______________________________________________________________________    Interval History   Denies chest pain or chest pressure.  Endorses dyspnea.    Physical Exam   Vital Signs: Temp: 98.1  F (36.7  C) Temp src: Axillary BP: (!) 152/81 Pulse: 99   Resp: 20 SpO2: 99 % O2 Device: Oxymask Oxygen Delivery: 3 LPM  Weight: 149 lbs 7.55 oz    Gen:  sitting in bed, somnolent, in no acute distress  Neuro: somnolent, twitching when falling sleep; able to arouse and open eyes, responds to questions and follows commands; oriented to self, place, year, month, and reason for hospitalization; becomes more alert when sitting up to take pills, drinks from straw and administers pills independently  CV:  tachycardia, regular rhythm  Pulm: No acute resp distress, limited air movement, crackles at the left base  ENT:  coughing after taking pills with water, able to subsequently swallow a pill with water without immediately coughing, however developed coughing shortly thereafter  GI:  abdomen NTTP  Ext:  bilateral lower extremity edema    Medical Decision Making             Data

## 2024-08-13 NOTE — PLAN OF CARE
Problem: Cardiac Catheterization (Diagnostic/Interventional)  Goal: Absence of Bleeding  Outcome: Progressing     Problem: Cardiac Catheterization (Diagnostic/Interventional)  Goal: Absence of Contrast-Induced Injury  Outcome: Progressing     Problem: Cardiac Catheterization (Diagnostic/Interventional)  Goal: Stable Heart Rate and Rhythm  Outcome: Progressing     Problem: Cardiac Catheterization (Diagnostic/Interventional)  Goal: Absence of Embolism Signs and Symptoms  Outcome: Progressing     Problem: Cardiac Catheterization (Diagnostic/Interventional)  Goal: Anesthesia/Sedation Recovery  Outcome: Progressing     Problem: Cardiac Catheterization (Diagnostic/Interventional)  Goal: Optimal Pain Control and Function  Outcome: Progressing     Problem: Cardiac Catheterization (Diagnostic/Interventional)  Goal: Absence of Vascular Access Complication  Outcome: Progressing  Intervention: Prevent and Manage Access Complications  Recent Flowsheet Documentation  Taken 8/13/2024 1625 by Sana Elizabeth, RN  Activity Management: bedrest     Problem: Cardiac Catheterization (Diagnostic/Interventional)  Goal: Absence of Vascular Access Complication  Intervention: Prevent and Manage Access Complications  Recent Flowsheet Documentation  Taken 8/13/2024 1625 by Sana Elizabeth, RN  Activity Management: bedrest   Goal Outcome Evaluation:       Pt arrived to the unit this evening. Pt lethargic and arouses to repeated stimuli and occasionally to voice. Tele NSR. VSS. Pt on 4L NC, sating well. Lung sounds course crackles. TR band removed at 1830. Primofit in place. Bedrest completed at 1830. Plan to restart Hep gtt this evening. Plan for US this evening/night.

## 2024-08-13 NOTE — PRE-PROCEDURE
GENERAL PRE-PROCEDURE:   Procedure:  Coronary angiogram, right and left heart catheterization  Date/Time:  8/13/2024 11:05 AM    Written consent obtained?: Yes    Risks and benefits: Risks, benefits and alternatives were discussed    Consent given by: Patient's sister Nilda by phone.  Patient states understanding of procedure being performed: Yes (Patient's sister Nilda by phone)    Patient's understanding of procedure matches consent: Yes (Patient's sister Nilda by phone)    Procedure consent matches procedure scheduled: Yes    Expected level of sedation:  Moderate  Appropriately NPO:  Yes  ASA Class:  4 (acute decompensated heart failure, severe cardiomyopathy; EF15%, elevated troponins, IDDM, CHRISTY)  Mallampati  :  Grade 1- soft palate, uvula, tonsillar pillars, and posterior pharyngeal wall visible (edentulous)  Lungs:  Lungs clear with good breath sounds bilaterally  Heart:  Normal heart sounds and rate  History & Physical reviewed:  History and physical reviewed and updates made (see comment)  H&P Comments:  Clinically Significant Risk Factors Present on Admission    Cardiovascular : acute decompensated heart failure, severe cardiomyopathy; EF15%, elevated troponins, IDDM    Fluid & Electrolyte Disorders : Fluid volume excess; undergoing diuresis    Gastroenterology : Not present on admission    Hematology/Oncology : Not present on admission    Nephrology : CHRITSY; in the setting of decompensated heart failure; will minimize contrast    Neurology : Not present on admission    Pulmonology : Hypoxic respiratory failure    Systemic : Not present on admission    Statement of review:  I have reviewed the lab findings, diagnostic data, medications, and the plan for sedation    Patient is a DNR/DNI and he and his sister Nilda understand this will be placed on hold for his procedure.

## 2024-08-13 NOTE — PROGRESS NOTES
HEART CARE CONSULTATON NOTE          Assessment/Recommendations   Assessment  Acute decompensated congestive heart failure with severe reduction in systolic.  LVEF: 15%  NTpBNP: >20,000.  WT acutely up 8 lb.    Acute kidney injury jesika related to low cardiac output.    Elevated Troponin, acute NSTEMI  CT Chest: no coronary calcification in 2023.    Insulin-dependent diabetes     Plan:   Recommend left heart cath, right heart cath and coronary angiogram today.  Possible PCI.  Recommend right heart cath given patient has not responded to IV Lasix need to determine cardiac output see if he would benefit from inotropic agents.  2.  Continue Lasix to 40 mg every 6 hour (increase yesterday without improving urine output)  3.  Start Isordil with hydralazine now.    4. Hold on ACEI and Jardiance due to acute kidney injury  5.  Continue low dose coreg (new medication yesterday), may need to discontinue if low cardiac output noted on right heart cath.     Case discussed with Cath Lab providers.  Case discussed with Dr. Sotelo     Clinically Significant Risk Factors Present on Admission                      # Hypertension: Noted on problem list         # Anemia: based on hgb <11              # DMII: A1C = 10.1 % (Ref range: <5.7 %) within past 6 months           # Financial/Environmental Concerns: none                     History of Present Illness/Subjective    HPI: Abdulaziz Rausch is a 59 year old male diabetes, peripheral vascular disease related to diabetes who presents to [facility] with 3-day history of progressive dyspnea on exertion, lower extremity edema and orthopnea symptoms found to have acute decompensated congestive heart failure.  Patient continues to chest pressure consistent with NSTEMI possibly demand related versus obstructive coronary disease.  Plan for heart cath today.     Patient has not had good response to increasing dose of diuretic therapy.  He is on low-dose carvedilol will not titrate  "due to concern for low output.  Will switch to Isordil and hydralazine for afterload reduction.  Reviewed telemetry demonstrates sinus rhythm.  No sustained ventricular arrhythmias.     Discussed with plan with patient in detail regarding coronary angiogram and heart cath.  He is agreeable for transfer to [facility].  Contacted  Cath Lab to [facility] and discussed case     ECHO: 8/12/2024  1.Left ventricular function is decreased. The ejection fraction is 15-20%  (severely reduced).  2.The left ventricle is mildly dilated.  3.Normal right ventricle size and systolic function.  4.The left atrium is moderate to severely dilated.  5.No hemodynamically significant valvular abnormalities on 2D or color flow  imaging.  6.Right ventricular systolic pressure is elevated, consistent with mild  pulmonary hypertension.  There is no comparison study available.     NM Stress: 10/12/2023    Lexiscan stress ECG negative for ischemia.    The nuclear stress test is abnormal.  There is a small area of nontransmural infarct involving the distal anteroseptal and apical walls with a small area of fely-infarct ischemia in the distal anteroseptal wall.  There is also a large area of fixed defect extending over the basal to distal inferior and inferolateral wall, which is more prominent on rest versus stress imaging and may represent bowel attenuation artifact.    Stress to rest cavity ratio is 1.31.  This is a nonspecific finding but may represent subendocardial ischemia.    The left ventricular ejection fraction at stress is 69%.    The patient is at an intermediate risk of future cardiac ischemic events.    There is no prior study for comparison.      Physical Examination      VITALS: BP (!) 148/83 (BP Location: Left arm)   Pulse 100   Temp 98.2  F (36.8  C) (Oral)   Resp 18   Ht 1.753 m (5' 9\")   Wt 67.8 kg (149 lb 7.6 oz)   SpO2 95%   BMI 22.07 kg/m    BMI: Body mass index is 22.07 kg/m .      Wt Readings from Last 3 " Encounters:   08/12/24 67.8 kg (149 lb 7.6 oz)   10/05/23 62.1 kg (137 lb)   10/05/23 61.7 kg (136 lb)         Intake/Output Summary (Last 24 hours) at 8/12/2024 0856  Last data filed at 8/12/2024 0736      Gross per 24 hour   Intake 852 ml   Output 1575 ml   Net -723 ml      General Appearance:   no distress, normal body habitus   ENT/Mouth: membranes moist, no oral lesions or bleeding gums.      EYES:  no scleral icterus, normal conjunctivae   Neck: no carotid bruits or thyromegaly   Chest/Lungs:   + rales and +  wheezing, no sternal scar, equal chest wall expansion    Cardiovascular:   Regular. Normal first and second heart sounds with no murmurs, rubs, or gallops; the carotid, radial and posterior tibial pulses are intact, Jugular venous pressure normal, mild pitting edema bilaterally    Abdomen:  no organomegaly, masses, bruits, or tenderness; bowel sounds are present   Extremities: no cyanosis or clubbing   Skin: no xanthelasma, warm.    Neurologic: normal  bilateral, no tremors      Psychiatric: alert and oriented x3, calm              Lab Results     Chemistry/lipid CBC Cardiac Enzymes/BNP/TSH/INR        Recent Labs    Lab Test 01/18/23  1448    CHOL 145    HDL 37*    LDL 63    TRIG 226*            Recent Labs    Lab Test 01/18/23  1448    LDL 63             Recent Labs    Lab Test 08/12/24  0652 08/12/24  0550    NA  --  137    POTASSIUM  --  4.3    CHLORIDE  --  105    CO2  --  23    * 298*    BUN  --  32.9*    CR  --  1.69*    GFRESTIMATED  --  46*    AMIRAH  --  8.3*              Recent Labs    Lab Test 08/12/24  0550 08/11/24  1451 09/25/23  1711    CR 1.69* 1.52* 1.07              Recent Labs    Lab Test 08/11/24  1451 10/05/23  1228 07/28/23  1114    A1C 10.1* 12.1* 8.8*                    Recent Labs    Lab Test 08/12/24  0550    WBC 10.5    HGB 9.6*    HCT 28.3*    MCV 86                  Recent Labs    Lab Test 08/12/24  0550 08/11/24  1451 09/25/23  1711    HGB 9.6* 10.7* 11.0*      "No results for input(s): \"TROPONINI\" in the last 98701 hours.       Recent Labs    Lab Test 08/11/24  1451    NTBNPI 21,454*       No results for input(s): \"TSH\" in the last 54203 hours.  No results for input(s): \"INR\" in the last 30732 hours.      Medical History  Surgical History Family History Social History   Past Medical History        Past Medical History:   Diagnosis Date    Anemia      Depression      Diabetes mellitus, type 2 (H)      Diabetic foot infection (H) 7/1/2023    Diabetic foot ulcer (H)      History of MRSA infection 7/1/2023 Jan 2023 RIGHT foot wound    Hypertension      Non-healing ulcer of left foot (H) 7/1/2023    Osteomyelitis (H)          Past Surgical History         Past Surgical History:   Procedure Laterality Date    AMPUTATE FOOT Left 7/3/2023     Procedure: partial left fifth ray amputation, skin flap creation and closre, left foot;  Surgeon: Alejandro Mccoy DPM;  Location: [facility] OR    AMPUTATE TOE(S) Right 2/17/2023     Procedure: AMPUTATION, digits two and three right foot;  Surgeon: Ulises Jimenez DPM;  Location: [facility] [place] OR    INCISION AND DRAINAGE FOOT, COMBINED Left 5/23/2023     Procedure: Incision and drainage of abscess left foot with;  Surgeon: Jf Huerta DPM;  Location: [facility] [place] OR    IRRIGATION AND DEBRIDEMENT FOOT, COMBINED Left 5/23/2023     Procedure: debridement and irrigation of wound left foot;  Surgeon: Jf Huerta DPM;  Location: [facility] [place] OR    IRRIGATION AND DEBRIDEMENT TOE, COMBINED Right 1/20/2023     Procedure: IRRIGATION AND DEBRIDEMENT digits 2 and 3 right foot;  Surgeon: Jf Huerta DPM;  Location: [facility] [place] OR    PICC SINGLE LUMEN PLACEMENT   5/25/2023              Family History         Family History   Problem Relation Age of Onset    Diabetes Father                Social History   Social History            Socioeconomic History    Marital status:        Spouse name: Not on file    Number of " children: Not on file    Years of education: Not on file    Highest education level: Not on file   Occupational History    Not on file   Tobacco Use    Smoking status: Never    Smokeless tobacco: Never   Vaping Use    Vaping status: Never Used   Substance and Sexual Activity    Alcohol use: Never    Drug use: Never    Sexual activity: Not on file   Other Topics Concern    Not on file   Social History Narrative    Not on file      Social Determinants of Health           Financial Resource Strain: High Risk (10/5/2023)     Financial Resource Strain      Within the past 12 months, have you or your family members you live with been unable to get utilities (heat, electricity) when it was really needed?: Yes   Food Insecurity: High Risk (10/5/2023)     Food Insecurity      Within the past 12 months, did you worry that your food would run out before you got money to buy more?: Yes      Within the past 12 months, did the food you bought just not last and you didn t have money to get more?: Yes   Transportation Needs: Low Risk  (10/5/2023)     Transportation Needs      Within the past 12 months, has lack of transportation kept you from medical appointments, getting your medicines, non-medical meetings or appointments, work, or from getting things that you need?: No   Physical Activity: Not on file   Stress: Not on file   Social Connections: Not on file   Interpersonal Safety: Low Risk  (10/5/2023)     Interpersonal Safety      Do you feel physically and emotionally safe where you currently live?: Yes      Within the past 12 months, have you been hit, slapped, kicked or otherwise physically hurt by someone?: No      Within the past 12 months, have you been humiliated or emotionally abused in other ways by your partner or ex-partner?: No   Housing Stability: Low Risk  (10/5/2023)     Housing Stability      Do you have housing? : Yes      Are you worried about losing your housing?: No             Medications  Allergies    Current Outpatient Prescriptions   No current outpatient medications on file.           Allergies   No Known Allergies          Amadeo Quinteros DO

## 2024-08-13 NOTE — Clinical Note
Stent deployed in the distal right coronary artery. Max pressure = 12 maykel. Total duration = 8 seconds.

## 2024-08-13 NOTE — Clinical Note
Stent deployed in the proximal right coronary artery. Max pressure = 14 maykel. Total duration = 6 seconds.

## 2024-08-13 NOTE — Clinical Note
The first balloon was inserted into the right coronary artery and distal right coronary artery.Max pressure = 6 maykel. Total duration = 4 seconds.     Max pressure = 8 maykel. Total duration = 4 seconds.    Balloon reinflated a second time: Max pressure = 8 maykel. Total duration = 4 seconds.

## 2024-08-13 NOTE — PROGRESS NOTES
Right and left heart cath.  Lethargic.  Arouse with voice.  Difficulty understanding procedure.  NP got telephone consent with sister.

## 2024-08-13 NOTE — CONSULTS
[facility]  Consult Note - Hospitalist Service  Date of Admission:  8/13/2024  Consult Requested by: Ric Marx MD  Reason for Consult: Medical co-management    Assessment & Plan   Abdulaziz Rausch is a 59 year old male admitted on 8/13/2024. He has h/o HTN, DM-II who was admitted to  for acute CHF exacerbation. Course was complicated by CHRISTY. He was started on diuresis and was transferred to [place] cardiac cath. Hospitalist service was consulted for medical co-management.     Acute decompensated HFrEF  Elevated troponin, NSTEMI  Mild pulmonary hypertension  -- ProBNP: 42882  -- TTE on 08/12: LVEF 15-20%  -- Heparin gtt, Cardiac cath   -- Lasix, Lipitor, coreg    Acute hypoxic respiratory failure  -- Likely 2/2 CHF, ?aspiration pneumonia  -- Oxygen as needed, wean as able    ?Aspiration pneumonia  -- CXR on 08/11: multifocal patchy opacities present involving b/l lungs including lung bases, Right upper hilar region, and left perihilar region likely representing infecious process. Small b/l pleural effusions.  -- No fever or leukocytosis but pt is more lethargic.   -- SLP eval  -- Will start on ceftriaxone for now. Sputum culture as able    Acute metabolic encephalopathy  -- Multifactorial: severe CHF, NSTEMI, pneumonia, CHRISTY  -- VBG wnl. TSH pending  -- Delirium precaution    CHRISTY on CKD-II  -- Likely 2/2 low perfusion from CHF  -- Avoid Nephrotoxins. Holding ACEI, Metformin, Jardiance  -- Lasix  -- Strict I/O, US-renal pending  -- Nephrology consulted, appreciate recommendations    DM-II  -- A1c: 10.1%  -- Accu-checks, sliding scale, loridt29 units, hypoglycemia protocol. Titrate as indicated    Essential hypertension  -- Monitor vital signs per protocol  -- holding Norvasc. On coreg    Normocytic Anemia, Chronic   -- Iron panel. Monitor cbc  -- No e/o bleeding           Clinically Significant Risk Factors Present on Admission                 # Acute Kidney Injury, unspecified: based  on a >150% or 0.3 mg/dL increase in last creatinine compared to past 90 day average, will monitor renal function  # Hypertension: Noted on problem list  # Chronic heart failure with reduced ejection fraction: last echo with EF <40%   # Anemia: based on hgb <11      # DMII: A1C = 10.1 % (Ref range: <5.7 %) within past 6 months        # Financial/Environmental Concerns:                 Qian Correia MD  Hospitalist Service  Securely message with [employer] (more info)  Text page via [facility] Paging/Directory   ______________________________________________________________________    Chief Complaint   Transferred for cardiac cath    History is obtained from the patient    History of Present Illness   Abdulaziz Rausch is a 59 year old male who  has h/o HTN, DM-II who was admitted to  for acute CHF exacerbation. Course was complicated by CHRISTY. He was started on diuresis and was transferred to [place] cardiac cath. Hospitalist service was consulted for medical co-management.       Past Medical History    Past Medical History:   Diagnosis Date    Anemia     Depression     Diabetes mellitus, type 2 (H)     Diabetic foot infection (H) 7/1/2023    Diabetic foot ulcer (H)     History of MRSA infection 7/1/2023 Jan 2023 RIGHT foot wound    Hypertension     Non-healing ulcer of left foot (H) 7/1/2023    Osteomyelitis (H)        Past Surgical History   Past Surgical History:   Procedure Laterality Date    AMPUTATE FOOT Left 7/3/2023    Procedure: partial left fifth ray amputation, skin flap creation and closre, left foot;  Surgeon: Alejandro Mccoy DPM;  Location: [facility] OR    AMPUTATE TOE(S) Right 2/17/2023    Procedure: AMPUTATION, digits two and three right foot;  Surgeon: Ulises Jimenez DPM;  Location: [facility] OR    INCISION AND DRAINAGE FOOT, COMBINED Left 5/23/2023    Procedure: Incision and drainage of abscess left foot with;  Surgeon: Jf Huerta DPM;  Location: [facility] OR    IRRIGATION AND  DEBRIDEMENT FOOT, COMBINED Left 5/23/2023    Procedure: debridement and irrigation of wound left foot;  Surgeon: Jf Huerta DPM;  Location: [facility] [place]    IRRIGATION AND DEBRIDEMENT TOE, COMBINED Right 1/20/2023    Procedure: IRRIGATION AND DEBRIDEMENT digits 2 and 3 right foot;  Surgeon: Jf Huerta DPM;  Location: [facility] [place] [place]    PICC SINGLE LUMEN PLACEMENT  5/25/2023            Medications   I have reviewed this patient's current medications       Review of Systems    The 10 point Review of Systems is negative other than noted in the HPI or here.      Physical Exam   Vital Signs: Temp: 98.9  F (37.2  C)   BP: 109/65 Pulse: 87     SpO2: 92 % O2 Device: Oxymask Oxygen Delivery: 4 LPM  Weight: 0 lbs 0 oz    GEN: Somnolent. Arousable. oriented. Not in acute distress.  HEENT: Atraumatic, mucous membrane- moist and pink.  Chest: Bilateral air entry.  CVS: S1S2 regular.   Abdomen: Soft. Non-tender, non-distended. No organomegaly. No guarding or rigidity. Bowel sounds active.   Extremities: ++ b/l LE edema.  CNS: No involuntary movements.  Skin: no cyanosis or clubbing.     Medical Decision Making       58 MINUTES SPENT BY ME on the date of service doing chart review, history, exam, documentation & further activities per the note.      Data

## 2024-08-13 NOTE — DISCHARGE SUMMARY
[facility]  Hospitalist Discharge Summary      Date of Admission:  8/11/2024  Date of Discharge:  8/13/2024  Discharging Provider: Satinder Sotelo MD  Discharge Service: Hospitalist Service    Discharge Diagnoses   Acute coronary syndrome  NSTEMI  Acute hypoxic respiratory failure  Decompensated HFrEF  Aspiration  Somnolence  CHRISTY  Hypertension  Diabetes    Clinically Significant Risk Factors     # DMII: A1C = 10.1 % (Ref range: <5.7 %) within past 6 months       Followup for receiving facility:  - ongoing workup for somnolence noted on the morning of transfer  - monitor for aspiration  - recommend speech evaluation prior to resuming diet     Addendum:  BMP showed worsening creatinine.  Communicated this to the RN at the cath lab at [place] who will discuss with the nurse practitioner there.    Follow-ups Needed After Discharge   - outpatient follow up for diabetes and insulin management    Unresulted Labs Ordered in the Past 30 Days of this Admission       Date and Time Order Name Status Description    8/13/2024  6:22 AM Heparin Unfractionated Anti Xa Level In process             Discharge Disposition   Transferred to [place]  Condition at discharge: Guarded    Hospital Course   The patient was admitted with acute hypoxic respiratory failure due to decompensated HFrEF in the setting of acute coronary syndrome.  He was managed with IV furosemide, heparin gtt, aspirin, and beta blocker in consultation with cardiology.  He was transferred to [place] for further cardiac evaluation.    Presentation was complicated by CHRISTY.  On day of discharge, patient developed somnolence, though he was oriented, responsive to questions, and following commands.  VBG was unremarkable.  He also aspirated when taking his morning medications.    Consultations This Hospital Stay   CORE CLINIC EVALUATION IP CONSULT  OCCUPATIONAL THERAPY ADULT IP CONSULT  CARDIOLOGY IP CONSULT  CARE MANAGEMENT / SOCIAL WORK IP  CONSULT  PHARMACY IP CONSULT  CORE CLINIC EVALUATION IP CONSULT  OCCUPATIONAL THERAPY ADULT IP CONSULT    Code Status   No CPR- Do NOT Intubate    Time Spent on this Encounter   I, Satinder Sotelo MD, personally saw the patient today and spent less than or equal to 30 minutes discharging this patient.       Satinder Sotelo MD  [facility]ART [facility]  [address] [address]  Phone: [phone]  Fax: [phone]  ______________________________________________________________________    Physical Exam   Vital Signs: Temp: 98.1  F (36.7  C) Temp src: Axillary BP: (!) 152/81 Pulse: 99   Resp: 20 SpO2: 91 % O2 Device: Oxymask Oxygen Delivery: 4 LPM  Weight: 149 lbs 7.55 oz    See progress note       Primary Care Physician   Daryn Pittman    Discharge Orders   No discharge procedures on file.    Significant Results and Procedures   Most Recent 3 CBC's:  Recent Labs   Lab Test 08/13/24  0949 08/12/24  0550 08/11/24  1451   WBC 9.6 10.5 8.1   HGB 8.5* 9.6* 10.7*   MCV 84 86 83    340 387     Most Recent 3 BMP's:  Recent Labs   Lab Test 08/13/24  0651 08/12/24  2139 08/12/24  1719 08/12/24  0652 08/12/24  0550 08/11/24  1805 08/11/24  1451 09/25/23  1711   NA  --   --   --   --  137  --  140 134*   POTASSIUM  --   --   --   --  4.3  --  4.0 4.8   CHLORIDE  --   --   --   --  105  --  106 103   CO2  --   --   --   --  23  --  26 25   BUN  --   --   --   --  32.9*  --  31.1* 26.3*   CR  --   --   --   --  1.69*  --  1.52* 1.07   ANIONGAP  --   --   --   --  9  --  8 6*   AMIRAH  --   --   --   --  8.3*  --  9.0 8.3*   * 167* 232*   < > 298*   < > 262* 428*    < > = values in this interval not displayed.     Most Recent 3 Troponin's:No lab results found.,   Results for orders placed or performed during the hospital encounter of 08/11/24   Echocardiogram Complete     Value    LVEF  15-20% (severely reduced)    Narrative     [id number]  [id number]  [id number]  [id number]^ANNE^[place] [address]     Name: AUNM HAHN  MRN: [id number]  : 1965  Study Date: 2024 09:57 AM  Age: 59 yrs  Gender: Male  Patient Location: [facility]  Reason For Study: Heart Failure  Ordering Physician: SARAH RODRÍGUEZ  Performed By: WILLI     BSA: 1.8 m2  Height: 69 in  Weight: 149 lb  HR: 96  BP: 148/83 mmHg  ______________________________________________________________________________  Procedure  Complete Echo Adult. Definity ([facility]C #94351-571) given intravenously. No  hemodynamically significant valvular abnormalities on 2D or color flow  imaging. There is no comparison study available.  ______________________________________________________________________________  Interpretation Summary     1.Left ventricular function is decreased. The ejection fraction is 15-20%  (severely reduced).  2.The left ventricle is mildly dilated.  3.Normal right ventricle size and systolic function.  4.The left atrium is moderate to severely dilated.  5.No hemodynamically significant valvular abnormalities on 2D or color flow  imaging.  6.Right ventricular systolic pressure is elevated, consistent with mild  pulmonary hypertension.  There is no comparison study available.  ______________________________________________________________________________  I      WMSI = 2.00     % Normal = 0     X - Cannot   0 -                      (2) - Mildly 2 -          Segments  Size  Interpret    Hyperkinetic 1 - Normal  Hypokinetic  Hypokinetic  1-2     small                                                     7 -          3-5      moderate  3 - Akinetic 4 -          5 -         6 - Akinetic Dyskinetic   6-14    large               Dyskinetic   Aneurysmal  w/scar       w/scar       15-16   diffuse     Left Ventricle  The left ventricle is mildly dilated. Left ventricular function is decreased.  The ejection fraction is 15-20% (severely  reduced). There is normal left  ventricular wall thickness. Grade I or early diastolic dysfunction. No  regional wall motion abnormalities noted.     Right Ventricle  Normal right ventricle size and systolic function. TAPSE is normal, which is  consistent with normal right ventricular systolic function.     Atria  The left atrium is moderate to severely dilated. Right atrial size is normal.  There is no color Doppler evidence of an atrial shunt.     Mitral Valve  Mitral valve leaflets appear normal. There is trace to mild mitral  regurgitation. There is no mitral valve stenosis.     Tricuspid Valve  The tricuspid valve is not well visualized, but is grossly normal. There is  trace to mild tricuspid regurgitation. Right ventricular systolic pressure is  elevated, consistent with mild pulmonary hypertension.     Aortic Valve  Aortic valve leaflets appear normal. There is no evidence of aortic stenosis  or clinically significant aortic regurgitation. The aortic valve is  trileaflet. No aortic regurgitation is present. No aortic stenosis is present.     Pulmonic Valve  The pulmonic valve is not well seen, but is grossly normal. This degree of  valvular regurgitation is within normal limits. There is no pulmonic valvular  stenosis.     Vessels  The aorta root is normal. Normal size ascending aorta. IVC diameter <2.1 cm  collapsing >50% with sniff suggests a normal RA pressure of 3 mmHg.     Pericardium  Trivial anterior pericardial effusion. Large left pleural effusion.     Rhythm  Sinus rhythm was noted.     ______________________________________________________________________________  MMode/2D Measurements & Calculations  IVSd: 0.91 cm  LVIDd: 5.0 cm  LVIDs: 4.6 cm  LVPWd: 1.0 cm  FS: 7.1 %     LV mass(C)d: 176.3 grams  LV mass(C)dI: 96.7 grams/m2  Ao root diam: 2.8 cm  asc Aorta Diam: 3.3 cm  LVOT diam: 2.0 cm  LVOT area: 3.0 cm2  Ao root diam index Ht(cm/m): 1.6  Ao root diam index BSA (cm/m2): 1.5  Asc Ao diam  index BSA (cm/m2): 1.8  Asc Ao diam index Ht(cm/m): 1.9  EF Biplane: 23.7 %  LA Volume Indexed (AL/bp): 65.0 ml/m2     RV Base: 3.9 cm  RWT: 0.42  TAPSE: 2.0 cm     Time Measurements  MM HR: 97.0 BPM     Doppler Measurements & Calculations  MV E max marlon: 85.1 cm/sec  MV A max marlon: 89.1 cm/sec  MV E/A: 0.96  MV max PG: 3.3 mmHg  MV mean P.6 mmHg  MV V2 VTI: 22.3 cm  MVA(VTI): 2.3 cm2  Ao V2 max: 104.2 cm/sec  Ao max P.0 mmHg  Ao V2 mean: 75.7 cm/sec  Ao mean P.6 mmHg  Ao V2 VTI: 19.8 cm  SASHA(I,D): 2.6 cm2  SASHA(V,D): 2.7 cm2  LV V1 max PG: 3.5 mmHg  LV V1 max: 93.4 cm/sec  LV V1 VTI: 17.1 cm  SV(LVOT): 51.9 ml  SI(LVOT): 28.5 ml/m2  PA V2 max: 66.5 cm/sec  PA max P.8 mmHg  PA acc time: 0.08 sec  TR max marlon: 308.4 cm/sec  TR max P.0 mmHg  AV Marlon Ratio (DI): 0.90     SASHA Index (cm2/m2): 1.4  E/E': 19.3  E/E' avg: 15.4  Lateral E/e': 11.2  Medial E/e': 19.6  Peak E' Marlon: 4.4 cm/sec  RV S Marlon: 13.7 cm/sec     ______________________________________________________________________________  Report approved by: Rachel Javier 2024 01:45 PM             Discharge Medications   Current Discharge Medication List        CONTINUE these medications which have NOT CHANGED    Details   acetaminophen (TYLENOL) 325 MG tablet Take 650 mg by mouth every 6 hours as needed for pain      amLODIPine (NORVASC) 2.5 MG tablet Take 1 tablet (2.5 mg) by mouth daily  Qty: 30 tablet, Refills: 6    Associated Diagnoses: Primary hypertension      empagliflozin (JARDIANCE) 25 MG TABS tablet Take 1 tablet (25 mg) by mouth daily  Qty: 90 tablet, Refills: 3    Associated Diagnoses: Type 2 diabetes mellitus with hyperglycemia, with long-term current use of insulin (H); CKD (chronic kidney disease) stage 2, GFR 60-89 ml/min      gabapentin (NEURONTIN) 300 MG capsule Take 300 mg by mouth 2 times daily Given at 0800 and 1600      ibuprofen (ADVIL/MOTRIN) 200 MG tablet Take 200 mg by mouth every 8 hours as needed for pain       insulin glargine (LANTUS SOLOSTAR) 100 UNIT/ML pen Inject 20 Units Subcutaneous At Bedtime  Qty: 15 mL    Comments: If Lantus is not covered by insurance, may substitute Basaglar or Semglee or other insulin glargine product per insurance preference at same dose and frequency.    Associated Diagnoses: Type 2 diabetes mellitus with other circulatory complication, with long-term current use of insulin (H)      insulin lispro (HUMALOG KWIKPEN) 100 UNIT/ML (1 unit dial) KWIKPEN Inject 6 Units Subcutaneous 3 times daily (before meals)  Qty: 15 mL, Refills: 4    Associated Diagnoses: Type 2 diabetes mellitus with other circulatory complication, with long-term current use of insulin (H)      metFORMIN (GLUCOPHAGE) 500 MG tablet Take 2 tablets (1,000 mg) by mouth 2 times daily (with meals)  Qty: 360 tablet, Refills: 3    Associated Diagnoses: Type 2 diabetes mellitus with other circulatory complication, with long-term current use of insulin (H)      alcohol swab prep pads Use to swab area of injection/ella as directed.  Qty: 100 each, Refills: 3    Associated Diagnoses: Type 2 diabetes mellitus without complication, with long-term current use of insulin (H)      blood glucose (NO BRAND SPECIFIED) test strip Use to test blood sugar 4 times daily or as directed. To accompany: Blood Glucose Monitor Brands: per insurance.  Qty: 100 strip, Refills: 6    Associated Diagnoses: Type 2 diabetes mellitus with hyperglycemia, with long-term current use of insulin (H)      blood glucose monitoring (NO BRAND SPECIFIED) meter device kit Use to test blood sugar 4 time daily or as directed. Preferred blood glucose meter OR supplies to accompany: Blood Glucose Monitor Brands: per insurance.  Qty: 1 kit, Refills: 0    Associated Diagnoses: Type 2 diabetes mellitus without complication, with long-term current use of insulin (H)      Continuous Blood Gluc  (FREESTYLE NOAH 2 READER) CHUCK Use to read blood sugars as per 's  instructions.  Qty: 1 each, Refills: 0    Associated Diagnoses: Type 2 diabetes mellitus with other circulatory complication, with long-term current use of insulin (H)      Continuous Blood Gluc Sensor (FREESTYLE NOAH 2 SENSOR) MISC Use 1 sensor every 14 days. Use to read blood sugars per 's instructions.  Qty: 2 each, Refills: 5    Associated Diagnoses: Type 2 diabetes mellitus with hyperglycemia, with long-term current use of insulin (H)      insulin pen needle (31G X 8 MM) 31G X 8 MM miscellaneous Use 4 pen needles daily or as directed.  Qty: 400 each, Refills: 3    Associated Diagnoses: Type 2 diabetes mellitus with other circulatory complication, with long-term current use of insulin (H)      thin (NO BRAND SPECIFIED) lancets Use with lanceting device. To accompany: Blood Glucose Monitor Brands: per insurance.  Qty: 100 each, Refills: 6    Associated Diagnoses: Type 2 diabetes mellitus without complication, with long-term current use of insulin (H)           Allergies   No Known Allergies

## 2024-08-13 NOTE — PROGRESS NOTES
Occupational Therapy: Orders received. Chart reviewed and discussed with care team.? Occupational Therapy pt transferred to [facility]. Will complete orders.

## 2024-08-13 NOTE — INTERVAL H&P NOTE
I have reviewed the surgical (or preoperative) H&P that is linked to this encounter, and examined the patient. There are no significant changes    Clinical Conditions Present on Arrival:  Clinically Significant Risk Factors Present on Admission                      # DMII: A1C = 10.1 % (Ref range: <5.7 %) within past 6 months

## 2024-08-13 NOTE — PLAN OF CARE
Pt A/O VSS on 2L, switched to face mask during night. SR on Tele. Pt tolerating ambulation with SBA, unsteady gait noted. Heparin gtt 950 unit(s)/hr. Pt denies chest pain. Voiding.     Problem: Adult Inpatient Plan of Care  Goal: Optimal Comfort and Wellbeing  Outcome: Progressing     Problem: Heart Failure  Goal: Optimal Cardiac Output  Outcome: Progressing     Problem: Heart Failure  Goal: Improved Oral Intake  Outcome: Progressing

## 2024-08-13 NOTE — Clinical Note
The first balloon was inserted into the right coronary artery and proximal right coronary artery.Max pressure = 14 maykel. Total duration = 6 seconds.

## 2024-08-13 NOTE — PROGRESS NOTES
Patient transferred to [facility] cath lab via [facility].  Report given.  Belongings sent with rig.  Christie Hood RN

## 2024-08-13 NOTE — Clinical Note
The first balloon was inserted into the right coronary artery and distal right coronary artery.Max pressure = 12 maykel. Total duration = 4 seconds.

## 2024-08-13 NOTE — H&P (VIEW-ONLY)
HEART CARE CONSULTATON NOTE          Assessment/Recommendations   Assessment  Acute decompensated congestive heart failure with severe reduction in systolic.  LVEF: 15%  NTpBNP: >20,000.  WT acutely up 8 lb.    Acute kidney injury jesika related to low cardiac output.    Elevated Troponin, acute NSTEMI  CT Chest: no coronary calcification in 2023.    Insulin-dependent diabetes     Plan:   Recommend left heart cath, right heart cath and coronary angiogram today.  Possible PCI.  Recommend right heart cath given patient has not responded to IV Lasix need to determine cardiac output see if he would benefit from inotropic agents.  2.  Continue Lasix to 40 mg every 6 hour (increase yesterday without improving urine output)  3.  Start Isordil with hydralazine now.    4. Hold on ACEI and Jardiance due to acute kidney injury  5.  Continue low dose coreg (new medication yesterday), may need to discontinue if low cardiac output noted on right heart cath.     Case discussed with Cath Lab providers.  Case discussed with Dr. Sotelo     Clinically Significant Risk Factors Present on Admission                      # Hypertension: Noted on problem list         # Anemia: based on hgb <11              # DMII: A1C = 10.1 % (Ref range: <5.7 %) within past 6 months           # Financial/Environmental Concerns: none                     History of Present Illness/Subjective    HPI: Abdulaziz Rausch is a 59 year old male diabetes, peripheral vascular disease related to diabetes who presents to [facility] with 3-day history of progressive dyspnea on exertion, lower extremity edema and orthopnea symptoms found to have acute decompensated congestive heart failure.  Patient continues to chest pressure consistent with NSTEMI possibly demand related versus obstructive coronary disease.  Plan for heart cath today.     Patient has not had good response to increasing dose of diuretic therapy.  He is on low-dose carvedilol will not titrate  "due to concern for low output.  Will switch to Isordil and hydralazine for afterload reduction.  Reviewed telemetry demonstrates sinus rhythm.  No sustained ventricular arrhythmias.     Discussed with plan with patient in detail regarding coronary angiogram and heart cath.  He is agreeable for transfer to [facility].  Contacted  Cath Lab to [facility] and discussed case     ECHO: 8/12/2024  1.Left ventricular function is decreased. The ejection fraction is 15-20%  (severely reduced).  2.The left ventricle is mildly dilated.  3.Normal right ventricle size and systolic function.  4.The left atrium is moderate to severely dilated.  5.No hemodynamically significant valvular abnormalities on 2D or color flow  imaging.  6.Right ventricular systolic pressure is elevated, consistent with mild  pulmonary hypertension.  There is no comparison study available.     NM Stress: 10/12/2023    Lexiscan stress ECG negative for ischemia.    The nuclear stress test is abnormal.  There is a small area of nontransmural infarct involving the distal anteroseptal and apical walls with a small area of fely-infarct ischemia in the distal anteroseptal wall.  There is also a large area of fixed defect extending over the basal to distal inferior and inferolateral wall, which is more prominent on rest versus stress imaging and may represent bowel attenuation artifact.    Stress to rest cavity ratio is 1.31.  This is a nonspecific finding but may represent subendocardial ischemia.    The left ventricular ejection fraction at stress is 69%.    The patient is at an intermediate risk of future cardiac ischemic events.    There is no prior study for comparison.      Physical Examination      VITALS: BP (!) 148/83 (BP Location: Left arm)   Pulse 100   Temp 98.2  F (36.8  C) (Oral)   Resp 18   Ht 1.753 m (5' 9\")   Wt 67.8 kg (149 lb 7.6 oz)   SpO2 95%   BMI 22.07 kg/m    BMI: Body mass index is 22.07 kg/m .      Wt Readings from Last 3 " Encounters:   08/12/24 67.8 kg (149 lb 7.6 oz)   10/05/23 62.1 kg (137 lb)   10/05/23 61.7 kg (136 lb)         Intake/Output Summary (Last 24 hours) at 8/12/2024 0856  Last data filed at 8/12/2024 0736      Gross per 24 hour   Intake 852 ml   Output 1575 ml   Net -723 ml      General Appearance:   no distress, normal body habitus   ENT/Mouth: membranes moist, no oral lesions or bleeding gums.      EYES:  no scleral icterus, normal conjunctivae   Neck: no carotid bruits or thyromegaly   Chest/Lungs:   + rales and +  wheezing, no sternal scar, equal chest wall expansion    Cardiovascular:   Regular. Normal first and second heart sounds with no murmurs, rubs, or gallops; the carotid, radial and posterior tibial pulses are intact, Jugular venous pressure normal, mild pitting edema bilaterally    Abdomen:  no organomegaly, masses, bruits, or tenderness; bowel sounds are present   Extremities: no cyanosis or clubbing   Skin: no xanthelasma, warm.    Neurologic: normal  bilateral, no tremors      Psychiatric: alert and oriented x3, calm              Lab Results     Chemistry/lipid CBC Cardiac Enzymes/BNP/TSH/INR        Recent Labs    Lab Test 01/18/23  1448    CHOL 145    HDL 37*    LDL 63    TRIG 226*            Recent Labs    Lab Test 01/18/23  1448    LDL 63             Recent Labs    Lab Test 08/12/24  0652 08/12/24  0550    NA  --  137    POTASSIUM  --  4.3    CHLORIDE  --  105    CO2  --  23    * 298*    BUN  --  32.9*    CR  --  1.69*    GFRESTIMATED  --  46*    AMIRAH  --  8.3*              Recent Labs    Lab Test 08/12/24  0550 08/11/24  1451 09/25/23  1711    CR 1.69* 1.52* 1.07              Recent Labs    Lab Test 08/11/24  1451 10/05/23  1228 07/28/23  1114    A1C 10.1* 12.1* 8.8*                    Recent Labs    Lab Test 08/12/24  0550    WBC 10.5    HGB 9.6*    HCT 28.3*    MCV 86                  Recent Labs    Lab Test 08/12/24  0550 08/11/24  1451 09/25/23  1711    HGB 9.6* 10.7* 11.0*      "No results for input(s): \"TROPONINI\" in the last 88610 hours.       Recent Labs    Lab Test 08/11/24  1451    NTBNPI 21,454*       No results for input(s): \"TSH\" in the last 59681 hours.  No results for input(s): \"INR\" in the last 52271 hours.      Medical History  Surgical History Family History Social History   Past Medical History        Past Medical History:   Diagnosis Date    Anemia      Depression      Diabetes mellitus, type 2 (H)      Diabetic foot infection (H) 7/1/2023    Diabetic foot ulcer (H)      History of MRSA infection 7/1/2023 Jan 2023 RIGHT foot wound    Hypertension      Non-healing ulcer of left foot (H) 7/1/2023    Osteomyelitis (H)          Past Surgical History         Past Surgical History:   Procedure Laterality Date    AMPUTATE FOOT Left 7/3/2023     Procedure: partial left fifth ray amputation, skin flap creation and closre, left foot;  Surgeon: Alejandro Mccoy DPM;  Location: [facility] OR    AMPUTATE TOE(S) Right 2/17/2023     Procedure: AMPUTATION, digits two and three right foot;  Surgeon: Ulises Jimenez DPM;  [place] [facility]    INCISION AND DRAINAGE FOOT, COMBINED Left 5/23/2023     Procedure: Incision and drainage of abscess left foot with;  Surgeon: Jf Huerta DPM;  [place] [facility] [place]    IRRIGATION AND DEBRIDEMENT FOOT, COMBINED Left 5/23/2023     Procedure: debridement and irrigation of wound left foot;  Surgeon: Jf Huerta DPM;  [place] [facility] [place]    IRRIGATION AND DEBRIDEMENT TOE, COMBINED Right 1/20/2023     Procedure: IRRIGATION AND DEBRIDEMENT digits 2 and 3 right foot;  Surgeon: Jf Huerta DPM;  [place] [facility] [place]    PICC SINGLE LUMEN PLACEMENT   5/25/2023              Family History         Family History   Problem Relation Age of Onset    Diabetes Father                Social History   Social History            Socioeconomic History    Marital status:        Spouse name: Not on file    Number of " children: Not on file    Years of education: Not on file    Highest education level: Not on file   Occupational History    Not on file   Tobacco Use    Smoking status: Never    Smokeless tobacco: Never   Vaping Use    Vaping status: Never Used   Substance and Sexual Activity    Alcohol use: Never    Drug use: Never    Sexual activity: Not on file   Other Topics Concern    Not on file   Social History Narrative    Not on file      Social Determinants of Health           Financial Resource Strain: High Risk (10/5/2023)     Financial Resource Strain      Within the past 12 months, have you or your family members you live with been unable to get utilities (heat, electricity) when it was really needed?: Yes   Food Insecurity: High Risk (10/5/2023)     Food Insecurity      Within the past 12 months, did you worry that your food would run out before you got money to buy more?: Yes      Within the past 12 months, did the food you bought just not last and you didn t have money to get more?: Yes   Transportation Needs: Low Risk  (10/5/2023)     Transportation Needs      Within the past 12 months, has lack of transportation kept you from medical appointments, getting your medicines, non-medical meetings or appointments, work, or from getting things that you need?: No   Physical Activity: Not on file   Stress: Not on file   Social Connections: Not on file   Interpersonal Safety: Low Risk  (10/5/2023)     Interpersonal Safety      Do you feel physically and emotionally safe where you currently live?: Yes      Within the past 12 months, have you been hit, slapped, kicked or otherwise physically hurt by someone?: No      Within the past 12 months, have you been humiliated or emotionally abused in other ways by your partner or ex-partner?: No   Housing Stability: Low Risk  (10/5/2023)     Housing Stability      Do you have housing? : Yes      Are you worried about losing your housing?: No             Medications  Allergies    Current Outpatient Prescriptions   No current outpatient medications on file.           Allergies   No Known Allergies          Amadeo Quinteros DO

## 2024-08-14 ENCOUNTER — APPOINTMENT (OUTPATIENT)
Dept: NUCLEAR MEDICINE | Facility: HOSPITAL | Age: 59
End: 2024-08-14
Attending: INTERNAL MEDICINE
Payer: COMMERCIAL

## 2024-08-14 ENCOUNTER — APPOINTMENT (OUTPATIENT)
Dept: ULTRASOUND IMAGING | Facility: HOSPITAL | Age: 59
End: 2024-08-14
Attending: NURSE PRACTITIONER
Payer: COMMERCIAL

## 2024-08-14 ENCOUNTER — APPOINTMENT (OUTPATIENT)
Dept: CT IMAGING | Facility: HOSPITAL | Age: 59
End: 2024-08-14
Attending: STUDENT IN AN ORGANIZED HEALTH CARE EDUCATION/TRAINING PROGRAM
Payer: COMMERCIAL

## 2024-08-14 ENCOUNTER — APPOINTMENT (OUTPATIENT)
Dept: ULTRASOUND IMAGING | Facility: HOSPITAL | Age: 59
End: 2024-08-14
Attending: INTERNAL MEDICINE
Payer: COMMERCIAL

## 2024-08-14 ENCOUNTER — APPOINTMENT (OUTPATIENT)
Dept: PET IMAGING | Facility: HOSPITAL | Age: 59
End: 2024-08-14
Attending: INTERNAL MEDICINE
Payer: COMMERCIAL

## 2024-08-14 ENCOUNTER — APPOINTMENT (OUTPATIENT)
Dept: OCCUPATIONAL THERAPY | Facility: HOSPITAL | Age: 59
End: 2024-08-14
Attending: NURSE PRACTITIONER
Payer: COMMERCIAL

## 2024-08-14 LAB
ALBUMIN MFR UR ELPH: 17.2 MG/DL
ALBUMIN UR-MCNC: NEGATIVE MG/DL
AMPHETAMINES UR QL SCN: ABNORMAL
ANION GAP SERPL CALCULATED.3IONS-SCNC: 13 MMOL/L (ref 7–15)
APPEARANCE UR: CLEAR
BACTERIA #/AREA URNS HPF: ABNORMAL /HPF
BARBITURATES UR QL SCN: ABNORMAL
BENZODIAZ UR QL SCN: ABNORMAL
BILIRUB UR QL STRIP: NEGATIVE
BUN SERPL-MCNC: 59.6 MG/DL (ref 8–23)
BZE UR QL SCN: ABNORMAL
CALCIUM SERPL-MCNC: 8.5 MG/DL (ref 8.8–10.4)
CANNABINOIDS UR QL SCN: ABNORMAL
CHLORIDE SERPL-SCNC: 101 MMOL/L (ref 98–107)
CHLORIDE UR-SCNC: 110 MMOL/L
COLOR UR AUTO: COLORLESS
CREAT SERPL-MCNC: 2.55 MG/DL (ref 0.67–1.17)
CREAT UR-MCNC: 23.9 MG/DL
CREAT UR-MCNC: 25.1 MG/DL
EGFRCR SERPLBLD CKD-EPI 2021: 28 ML/MIN/1.73M2
ERYTHROCYTE [DISTWIDTH] IN BLOOD BY AUTOMATED COUNT: 12.7 % (ref 10–15)
FENTANYL UR QL: ABNORMAL
GLUCOSE BLDC GLUCOMTR-MCNC: 103 MG/DL (ref 70–99)
GLUCOSE BLDC GLUCOMTR-MCNC: 113 MG/DL (ref 70–99)
GLUCOSE BLDC GLUCOMTR-MCNC: 117 MG/DL (ref 70–99)
GLUCOSE BLDC GLUCOMTR-MCNC: 136 MG/DL (ref 70–99)
GLUCOSE BLDC GLUCOMTR-MCNC: 151 MG/DL (ref 70–99)
GLUCOSE BLDC GLUCOMTR-MCNC: 152 MG/DL (ref 70–99)
GLUCOSE BLDC GLUCOMTR-MCNC: 155 MG/DL (ref 70–99)
GLUCOSE BLDC GLUCOMTR-MCNC: 179 MG/DL (ref 70–99)
GLUCOSE BLDC GLUCOMTR-MCNC: 190 MG/DL (ref 70–99)
GLUCOSE BLDC GLUCOMTR-MCNC: 196 MG/DL (ref 70–99)
GLUCOSE SERPL-MCNC: 143 MG/DL (ref 70–99)
GLUCOSE UR STRIP-MCNC: NEGATIVE MG/DL
HCO3 SERPL-SCNC: 24 MMOL/L (ref 22–29)
HCT VFR BLD AUTO: 27.2 % (ref 40–53)
HGB BLD-MCNC: 8.9 G/DL (ref 13.3–17.7)
HGB UR QL STRIP: NEGATIVE
HYALINE CASTS: 5 /LPF
IRON BINDING CAPACITY (ROCHE): 175 UG/DL (ref 240–430)
IRON SATN MFR SERPL: 15 % (ref 15–46)
IRON SERPL-MCNC: 27 UG/DL (ref 61–157)
KETONES UR STRIP-MCNC: NEGATIVE MG/DL
LEUKOCYTE ESTERASE UR QL STRIP: ABNORMAL
MCH RBC QN AUTO: 28.1 PG (ref 26.5–33)
MCHC RBC AUTO-ENTMCNC: 32.7 G/DL (ref 31.5–36.5)
MCV RBC AUTO: 86 FL (ref 78–100)
MICROALBUMIN UR-MCNC: 79.8 MG/L
MICROALBUMIN/CREAT UR: 333.89 MG/G CR (ref 0–17)
MUCOUS THREADS #/AREA URNS LPF: PRESENT /LPF
NITRATE UR QL: NEGATIVE
OPIATES UR QL SCN: ABNORMAL
PCP QUAL URINE (ROCHE): ABNORMAL
PH UR STRIP: 5 [PH] (ref 5–7)
PLATELET # BLD AUTO: 390 10E3/UL (ref 150–450)
POTASSIUM SERPL-SCNC: 4.4 MMOL/L (ref 3.4–5.3)
POTASSIUM UR-SCNC: 13.3 MMOL/L
PROT/CREAT 24H UR: 0.69 MG/MG CR (ref 0–0.2)
RBC # BLD AUTO: 3.17 10E6/UL (ref 4.4–5.9)
RBC URINE: 2 /HPF
SODIUM SERPL-SCNC: 138 MMOL/L (ref 135–145)
SODIUM UR-SCNC: 115 MMOL/L
SP GR UR STRIP: 1.01 (ref 1–1.03)
SQUAMOUS EPITHELIAL: <1 /HPF
UFH PPP CHRO-ACNC: 0.1 IU/ML
UFH PPP CHRO-ACNC: 0.42 IU/ML
UFH PPP CHRO-ACNC: <0.1 IU/ML
UROBILINOGEN UR STRIP-MCNC: <2 MG/DL
WBC # BLD AUTO: 10.2 10E3/UL (ref 4–11)
WBC URINE: 5 /HPF

## 2024-08-14 PROCEDURE — 93975 VASCULAR STUDY: CPT

## 2024-08-14 PROCEDURE — 80048 BASIC METABOLIC PNL TOTAL CA: CPT | Performed by: NURSE PRACTITIONER

## 2024-08-14 PROCEDURE — 87081 CULTURE SCREEN ONLY: CPT | Performed by: STUDENT IN AN ORGANIZED HEALTH CARE EDUCATION/TRAINING PROGRAM

## 2024-08-14 PROCEDURE — A9500 TC99M SESTAMIBI: HCPCS | Performed by: STUDENT IN AN ORGANIZED HEALTH CARE EDUCATION/TRAINING PROGRAM

## 2024-08-14 PROCEDURE — 250N000013 HC RX MED GY IP 250 OP 250 PS 637: Performed by: STUDENT IN AN ORGANIZED HEALTH CARE EDUCATION/TRAINING PROGRAM

## 2024-08-14 PROCEDURE — 85520 HEPARIN ASSAY: CPT | Performed by: STUDENT IN AN ORGANIZED HEALTH CARE EDUCATION/TRAINING PROGRAM

## 2024-08-14 PROCEDURE — 210N000001 HC R&B IMCU HEART CARE

## 2024-08-14 PROCEDURE — 97535 SELF CARE MNGMENT TRAINING: CPT | Mod: GO

## 2024-08-14 PROCEDURE — A9552 F18 FDG: HCPCS | Performed by: STUDENT IN AN ORGANIZED HEALTH CARE EDUCATION/TRAINING PROGRAM

## 2024-08-14 PROCEDURE — 99222 1ST HOSP IP/OBS MODERATE 55: CPT | Performed by: INTERNAL MEDICINE

## 2024-08-14 PROCEDURE — 36415 COLL VENOUS BLD VENIPUNCTURE: CPT | Performed by: STUDENT IN AN ORGANIZED HEALTH CARE EDUCATION/TRAINING PROGRAM

## 2024-08-14 PROCEDURE — 82570 ASSAY OF URINE CREATININE: CPT | Performed by: INTERNAL MEDICINE

## 2024-08-14 PROCEDURE — 81001 URINALYSIS AUTO W/SCOPE: CPT | Performed by: INTERNAL MEDICINE

## 2024-08-14 PROCEDURE — 250N000011 HC RX IP 250 OP 636: Performed by: NURSE PRACTITIONER

## 2024-08-14 PROCEDURE — 70450 CT HEAD/BRAIN W/O DYE: CPT

## 2024-08-14 PROCEDURE — 93880 EXTRACRANIAL BILAT STUDY: CPT

## 2024-08-14 PROCEDURE — 250N000011 HC RX IP 250 OP 636: Performed by: INTERNAL MEDICINE

## 2024-08-14 PROCEDURE — 83550 IRON BINDING TEST: CPT | Performed by: NURSE PRACTITIONER

## 2024-08-14 PROCEDURE — 84300 ASSAY OF URINE SODIUM: CPT | Performed by: INTERNAL MEDICINE

## 2024-08-14 PROCEDURE — 343N000001 HC RX 343: Performed by: STUDENT IN AN ORGANIZED HEALTH CARE EDUCATION/TRAINING PROGRAM

## 2024-08-14 PROCEDURE — 250N000013 HC RX MED GY IP 250 OP 250 PS 637: Performed by: INTERNAL MEDICINE

## 2024-08-14 PROCEDURE — 84156 ASSAY OF PROTEIN URINE: CPT | Performed by: INTERNAL MEDICINE

## 2024-08-14 PROCEDURE — 80307 DRUG TEST PRSMV CHEM ANLYZR: CPT | Performed by: STUDENT IN AN ORGANIZED HEALTH CARE EDUCATION/TRAINING PROGRAM

## 2024-08-14 PROCEDURE — 85027 COMPLETE CBC AUTOMATED: CPT | Performed by: NURSE PRACTITIONER

## 2024-08-14 PROCEDURE — 97166 OT EVAL MOD COMPLEX 45 MIN: CPT | Mod: GO

## 2024-08-14 PROCEDURE — 250N000013 HC RX MED GY IP 250 OP 250 PS 637: Performed by: NURSE PRACTITIONER

## 2024-08-14 PROCEDURE — 82436 ASSAY OF URINE CHLORIDE: CPT | Performed by: INTERNAL MEDICINE

## 2024-08-14 PROCEDURE — 76770 US EXAM ABDO BACK WALL COMP: CPT

## 2024-08-14 PROCEDURE — 99232 SBSQ HOSP IP/OBS MODERATE 35: CPT | Performed by: STUDENT IN AN ORGANIZED HEALTH CARE EDUCATION/TRAINING PROGRAM

## 2024-08-14 PROCEDURE — 78429 MYOCRD IMG PET 1 STD W/CT: CPT

## 2024-08-14 PROCEDURE — 84133 ASSAY OF URINE POTASSIUM: CPT | Performed by: INTERNAL MEDICINE

## 2024-08-14 RX ORDER — DOXYCYCLINE 100 MG/1
100 CAPSULE ORAL EVERY 12 HOURS SCHEDULED
Status: COMPLETED | OUTPATIENT
Start: 2024-08-14 | End: 2024-08-20

## 2024-08-14 RX ORDER — IODIXANOL 320 MG/ML
INJECTION, SOLUTION INTRAVASCULAR
Status: DISCONTINUED | OUTPATIENT
Start: 2024-08-13 | End: 2024-08-20 | Stop reason: HOSPADM

## 2024-08-14 RX ORDER — FLUDEOXYGLUCOSE F 18 200 MCI/ML
7-18 INJECTION, SOLUTION INTRAVENOUS ONCE
Status: COMPLETED | OUTPATIENT
Start: 2024-08-14 | End: 2024-08-14

## 2024-08-14 RX ADMIN — HYDRALAZINE HYDROCHLORIDE 10 MG: 10 TABLET ORAL at 21:44

## 2024-08-14 RX ADMIN — OXYCODONE HYDROCHLORIDE 5 MG: 5 TABLET ORAL at 23:58

## 2024-08-14 RX ADMIN — ASPIRIN 81 MG: 81 TABLET, COATED ORAL at 09:05

## 2024-08-14 RX ADMIN — INSULIN ASPART 2 UNITS: 100 INJECTION, SOLUTION INTRAVENOUS; SUBCUTANEOUS at 21:50

## 2024-08-14 RX ADMIN — CARVEDILOL 3.12 MG: 3.12 TABLET, FILM COATED ORAL at 09:04

## 2024-08-14 RX ADMIN — SPIRONOLACTONE 12.5 MG: 25 TABLET, FILM COATED ORAL at 09:05

## 2024-08-14 RX ADMIN — FUROSEMIDE 40 MG: 10 INJECTION, SOLUTION INTRAMUSCULAR; INTRAVENOUS at 00:10

## 2024-08-14 RX ADMIN — FUROSEMIDE 40 MG: 10 INJECTION, SOLUTION INTRAMUSCULAR; INTRAVENOUS at 06:07

## 2024-08-14 RX ADMIN — INSULIN ASPART 1 UNITS: 100 INJECTION, SOLUTION INTRAVENOUS; SUBCUTANEOUS at 23:58

## 2024-08-14 RX ADMIN — CEFTRIAXONE SODIUM 1 G: 1 INJECTION, POWDER, FOR SOLUTION INTRAMUSCULAR; INTRAVENOUS at 16:55

## 2024-08-14 RX ADMIN — FLUDEOXYGLUCOSE F 18 15.91 MILLICURIE: 200 INJECTION, SOLUTION INTRAVENOUS at 13:40

## 2024-08-14 RX ADMIN — DOXYCYCLINE HYCLATE 100 MG: 100 CAPSULE ORAL at 16:54

## 2024-08-14 RX ADMIN — HYDRALAZINE HYDROCHLORIDE 10 MG: 10 TABLET ORAL at 09:04

## 2024-08-14 RX ADMIN — INSULIN GLARGINE 10 UNITS: 100 INJECTION, SOLUTION SUBCUTANEOUS at 21:45

## 2024-08-14 RX ADMIN — DOXYCYCLINE HYCLATE 100 MG: 100 CAPSULE ORAL at 21:44

## 2024-08-14 RX ADMIN — ACETAMINOPHEN 650 MG: 325 TABLET ORAL at 21:44

## 2024-08-14 RX ADMIN — HEPARIN SODIUM 1250 UNITS/HR: 10000 INJECTION, SOLUTION INTRAVENOUS at 17:22

## 2024-08-14 RX ADMIN — INSULIN ASPART 1 UNITS: 100 INJECTION, SOLUTION INTRAVENOUS; SUBCUTANEOUS at 12:03

## 2024-08-14 RX ADMIN — NITROGLYCERIN 15 MG: 20 OINTMENT TOPICAL at 09:04

## 2024-08-14 RX ADMIN — CARVEDILOL 3.12 MG: 3.12 TABLET, FILM COATED ORAL at 16:54

## 2024-08-14 RX ADMIN — Medication 31.9 MILLICURIE: at 10:41

## 2024-08-14 RX ADMIN — INSULIN ASPART 1 UNITS: 100 INJECTION, SOLUTION INTRAVENOUS; SUBCUTANEOUS at 00:11

## 2024-08-14 RX ADMIN — ATORVASTATIN CALCIUM 40 MG: 40 TABLET, FILM COATED ORAL at 21:44

## 2024-08-14 ASSESSMENT — ACTIVITIES OF DAILY LIVING (ADL)
DEPENDENT_IADLS:: INDEPENDENT
ADLS_ACUITY_SCORE: 48
ADLS_ACUITY_SCORE: 33
ADLS_ACUITY_SCORE: 48
ADLS_ACUITY_SCORE: 33
ADLS_ACUITY_SCORE: 47
ADLS_ACUITY_SCORE: 33
ADLS_ACUITY_SCORE: 33
ADLS_ACUITY_SCORE: 48
ADLS_ACUITY_SCORE: 45
ADLS_ACUITY_SCORE: 48
ADLS_ACUITY_SCORE: 33
ADLS_ACUITY_SCORE: 45
ADLS_ACUITY_SCORE: 33
ADLS_ACUITY_SCORE: 33
ADLS_ACUITY_SCORE: 48
ADLS_ACUITY_SCORE: 48
ADLS_ACUITY_SCORE: 45

## 2024-08-14 NOTE — PROGRESS NOTES
08/14/24 0745   Appointment Info   Signing Clinician's Name / Credentials (OT) Sheeba Beal OTD OTR/L   Living Environment   People in Home alone   Current Living Arrangements mobile home   Home Accessibility stairs to enter home   Number of Stairs, Main Entrance 2   Stair Railings, Main Entrance railing on right side (ascending)   Living Environment Comments Pt very lethargic during session, not providing home set up   Self-Care   Equipment Currently Used at Home none   Activity/Exercise/Self-Care Comment Reports typically ind with all ADLs and IADLs, no AD   General Information   Onset of Illness/Injury or Date of Surgery 08/13/24   Referring Physician Tanna Brice, CNP   Patient/Family Therapy Goal Statement (OT) None stated   Additional Occupational Profile Info/Pertinent History of Current Problem Abdulaziz Rausch is a 59 year old male admitted on 8/13/2024. He has h/o HTN, DM-II who was admitted to  for acute CHF exacerbation. Course was complicated by CHRISTY. He was started on diuresis and was transferred to [place] cardiac cath.   Existing Precautions/Restrictions cardiac   Limitations/Impairments safety/cognitive   Cognitive Status Examination   Orientation Status person;place   Affect/Mental Status (Cognitive) low arousal/lethargic   Follows Commands delayed response/completion;increased processing time needed;repetition of directions required;physical/tactile prompts required   Cognitive Status Comments Very lethargic throughout session - difficult to accurately assess cognition   Posture   Posture forward head position   Range of Motion Comprehensive   General Range of Motion bilateral upper extremity ROM WFL   Strength Comprehensive (MMT)   Comment, General Manual Muscle Testing (MMT) Assessment Generalized weakness noted   Bed Mobility   Bed Mobility supine-sit   Supine-Sit [place] (Bed Mobility) minimum assist (75% patient effort)   Assistive Device (Bed Mobility) bed rails    Transfers   Transfers sit-stand transfer;toilet transfer   Sit-Stand Transfer   Sit-Stand [place] (Transfers) moderate assist (50% patient effort)   Toilet Transfer   [place] Level (Toilet Transfer) moderate assist (50% patient effort)   Balance   Balance Assessment standing dynamic balance   Standing Balance: Dynamic moderate assist   Activities of Daily Living   BADL Assessment/Intervention lower body dressing;toileting   Lower Body Dressing Assessment/Training   [place] Level (Lower Body Dressing) moderate assist (50% patient effort)   Toileting   [place] Level (Toileting) moderate assist (50% patient effort)   Clinical Impression   Criteria for Skilled Therapeutic Interventions Met (OT) Yes, treatment indicated   OT Diagnosis Decreased ind with ADLs and safety   Influenced by the following impairments S/p heart cath/angio   OT Problem List-Impairments impacting ADL activity tolerance impaired;cognition;balance;mobility;post-surgical precautions;strength   Assessment of Occupational Performance 3-5 Performance Deficits   Identified Performance Deficits dressing, toileting, bathing, activity tolerance, health mgmt   Planned Therapy Interventions (OT) ADL retraining;cognition;bed mobility training;balance training;progressive activity/exercise;risk factor education;home program guidelines;transfer training   Clinical Decision Making Complexity (OT) detailed assessment/moderate complexity   Risk & Benefits of therapy have been explained evaluation/treatment results reviewed;care plan/treatment goals reviewed;risks/benefits reviewed;current/potential barriers reviewed;patient   OT Total Evaluation Time   OT Eval, Moderate Complexity Minutes (02350) 8   OT Goals   Therapy Frequency (OT) Daily   OT Predicted Duration/Target Date for Goal Attainment 08/21/24   OT Goals Cardiac Phase 1;Cognition;Toilet Transfer/Toileting   OT: Toilet Transfer/Toileting Modified independent;toilet transfer;cleaning  and garment management   OT: Cognitive Patient/caregiver will verbalize understanding of cognitive assessment results/recommendations as needed for safe discharge planning   OT: Understanding of cardiac education to maximize quality of life, condition management, and health outcomes Patient;Verbalize   OT: Perform aerobic activity with stable cardiovascular response intermittent;10 minutes   Self-Care/Home Management   Self-Care/Home Mgmt/ADL, Compensatory, Meal Prep Minutes (54418) 10   Symptoms Noted During/After Treatment (Meal Preparation/Planning Training) fatigue;other (see comments)  (lethargy)   Treatment Detail/Skilled Intervention Eval completed, treatment initiated. Provided brief education regarding post angio precautions - pt does not demo carryover, attempting to use R hand to push up during bed mobility and STS despite cueing. Additional STS min A x1 from bedside, demos posterior lean, fxl mob to bathroom min-mod A for balance. Returned to EOB d/t lethargy, able to return to supine CGA, able to reposition self in bed with CGA. RN aware, alarm on, call light in reach.   OT Discharge Planning   OT Plan Angio precautions and education as able, progress ADLs, UE exer/hallway walk - PT ordered to address balance   OT Discharge Recommendation (DC Rec) Transitional Care Facility   OT Rationale for DC Rec Pt currently requires A x 1-2 for all mobility, limited by fatigue/lethargy. Pt disposition may change pending progress - recommend TCU at this time   OT Brief overview of current status Min A STS, min-mod A balance   Total Session Time   Timed Code Treatment Minutes 10   Total Session Time (sum of timed and untimed services) 18

## 2024-08-14 NOTE — PLAN OF CARE
[facility] care 1900 to 0730. A&O x 4. Assist x 2. Tele is sinus rhythm w/ ST depression. Denies pain. 2L O2 via nasal cannula. Lethargic, difficult to arouse. Irritable. Fischer placed, bag below bladder. NPO @ 0000. Call light within reach, able to make needs known. Bed alarm on for safety.    Problem: Cardiac Catheterization (Diagnostic/Interventional)  Goal: Anesthesia/Sedation Recovery  Intervention: Optimize Anesthesia Recovery  Recent Flowsheet Documentation  Taken 8/14/2024 0501 by Caridad Ho RN  Safety Promotion/Fall Prevention:   nonskid shoes/slippers when out of bed   activity supervised   clutter free environment maintained  Taken 8/14/2024 0010 by Caridad Ho RN  Safety Promotion/Fall Prevention:   nonskid shoes/slippers when out of bed   activity supervised   clutter free environment maintained  Taken 8/13/2024 2136 by Caridad Ho RN  Safety Promotion/Fall Prevention:   nonskid shoes/slippers when out of bed   activity supervised   clutter free environment maintained     Problem: Cardiac Catheterization (Diagnostic/Interventional)  Goal: Stable Heart Rate and Rhythm  Intervention: Monitor and Manage Cardiac Rhythm Effect  Recent Flowsheet Documentation  Taken 8/14/2024 0501 by Caridad Ho RN  Fluid/Electrolyte Management: fluids restricted  Taken 8/14/2024 0010 by Caridad Ho RN  Fluid/Electrolyte Management: fluids restricted  Taken 8/13/2024 2136 by Caridad Ho RN  Fluid/Electrolyte Management: fluids restricted

## 2024-08-14 NOTE — CONSULTS
[facility] CONSULTATION    Type of Consult: inpatient  Place of Service: [facility]   Reason for consult: bilateral hydronephrosis, CHRISTY, severe urinary retention  Request for consult by: Dave Benitez MD     History of present illness:   Abdulaziz Rausch is a 59 year old male that was admitted for heart failure. Urology is being consulted for see above. History obtained through patient, and chart review.     Abdulaziz is a 59-year-old male admitted on 8/13/2024.  He has a history of hypertension, diabetes type 2 who was initially admitted to [facility] for acute CHF exacerbation.  His course was further complicated by CHRISTY and he was transferred to [facility] cardiac cath.  Creatinine was noted to be elevated at 1.52 on 8/11/2024.  Renal ultrasound was completed on 8/14/2024 which shows a very distended bladder with a volume of 1.4 L and bilateral moderate dilation of the renal collecting system.  A Fischer catheter since been placed and is very high output.  UA is negative for infection.  Creatinine is elevated at 2.55 today.    Patient is quite somnolent and sleeping during my evaluation.  The patient is able to answer questions appropriately but does often fall back asleep midsentence.  He states that he always always had a weak stream.  He denies prior history of Fischer catheters.  He has no other significant urological history.  He does not take BPH medications.  He currently is tolerating Fischer catheter well without abdominal pain or flank pain.    Past Medical History:  Past Medical History:   Diagnosis Date    Anemia     Depression     Diabetes mellitus, type 2 (H)     Diabetic foot infection (H) 7/1/2023    Diabetic foot ulcer (H)     History of MRSA infection 7/1/2023 Jan 2023 RIGHT foot wound    Hypertension     Non-healing ulcer of left foot (H) 7/1/2023    Osteomyelitis (H)        Past Surgical History:  Past Surgical History:   Procedure Laterality Date    AMPUTATE FOOT  Left 7/3/2023    Procedure: partial left fifth ray amputation, skin flap creation and closre, left foot;  Surgeon: Alejandro Mccoy DPM;  Location: [facility] OR    AMPUTATE TOE(S) Right 2/17/2023    Procedure: AMPUTATION, digits two and three right foot;  Surgeon: Ulises Jimenez DPM;  Location: [facility] OR    CV CORONARY ANGIOGRAM N/A 8/13/2024    Procedure: Coronary Angiogram;  Surgeon: Brandon Alejandro MD;  Location: [facility] CATH LAB CV    CV LEFT HEART CATH N/A 8/13/2024    Procedure: Left Heart Catheterization;  Surgeon: Brandon Alejandro MD;  Location: [facility] CATH LAB CV    CV RIGHT HEART CATH MEASUREMENTS RECORDED N/A 8/13/2024    Procedure: Right Heart Catheterization;  Surgeon: Brandon Alejandro MD;  Location: [facility] LAB CV    INCISION AND DRAINAGE FOOT, COMBINED Left 5/23/2023    Procedure: Incision and drainage of abscess left foot with;  Surgeon: Jf Huerta DPM;  Location: [facility] OR    IRRIGATION AND DEBRIDEMENT FOOT, COMBINED Left 5/23/2023    Procedure: debridement and irrigation of wound left foot;  Surgeon: Jf Huerta DPM;  Location: [facility] OR    IRRIGATION AND DEBRIDEMENT TOE, COMBINED Right 1/20/2023    Procedure: IRRIGATION AND DEBRIDEMENT digits 2 and 3 right foot;  Surgeon: Jf Huerta DPM;  Location: [facility] OR    PICC SINGLE LUMEN PLACEMENT  5/25/2023            Social History:  Social History     Socioeconomic History    Marital status:      Spouse name: Not on file    Number of children: Not on file    Years of education: Not on file    Highest education level: Not on file   Occupational History    Not on file   Tobacco Use    Smoking status: Never    Smokeless tobacco: Never   Vaping Use    Vaping status: Never Used   Substance and Sexual Activity    Alcohol use: Never    Drug use: Yes     Types: Methamphetamines     Comment: chronic user    Sexual activity: Not on file   Other Topics Concern    Not on file   Social History  Narrative    Not on file     Social Determinants of Health     Financial Resource Strain: High Risk (10/5/2023)    Financial Resource Strain     Within the past 12 months, have you or your family members you live with been unable to get utilities (heat, electricity) when it was really needed?: Yes   Food Insecurity: High Risk (10/5/2023)    Food Insecurity     Within the past 12 months, did you worry that your food would run out before you got money to buy more?: Yes     Within the past 12 months, did the food you bought just not last and you didn t have money to get more?: Yes   Transportation Needs: Low Risk  (10/5/2023)    Transportation Needs     Within the past 12 months, has lack of transportation kept you from medical appointments, getting your medicines, non-medical meetings or appointments, work, or from getting things that you need?: No   Physical Activity: Not on file   Stress: Not on file   Social Connections: Not on file   Interpersonal Safety: Low Risk  (10/5/2023)    Interpersonal Safety     Do you feel physically and emotionally safe where you currently live?: Yes     Within the past 12 months, have you been hit, slapped, kicked or otherwise physically hurt by someone?: No     Within the past 12 months, have you been humiliated or emotionally abused in other ways by your partner or ex-partner?: No   Housing Stability: Low Risk  (10/5/2023)    Housing Stability     Do you have housing? : Yes     Are you worried about losing your housing?: No       Medications:  Current Facility-Administered Medications   Medication Dose Route Frequency Provider Last Rate Last Admin    acetaminophen (TYLENOL) tablet 650 mg  650 mg Oral Q4H PRN Tanna Brice CNP        aspirin EC tablet 81 mg  81 mg Oral Daily Tanna Brice CNP   81 mg at 08/14/24 0905    atorvastatin (LIPITOR) tablet 40 mg  40 mg Oral QPM Tanna Brice CNP   40 mg at 08/13/24 2136    carvedilol (COREG) tablet 3.125 mg   3.125 mg Oral BID w/meals Scar Lazo MD   3.125 mg at 08/14/24 0904    cefTRIAXone (ROCEPHIN) 1 g vial to attach to  mL bag for ADULTS or NS 50 mL bag for PEDS  1 g Intravenous Q24H Tanna Brice CNP   1 g at 08/13/24 1729    glucose gel 15-30 g  15-30 g Oral Q15 Min PRN Tanna Brice CNP        Or    dextrose 50 % injection 25-50 mL  25-50 mL Intravenous Q15 Min PRN Tanna Brice CNP        Or    glucagon injection 1 mg  1 mg Subcutaneous Q15 Min PRN Tanna Brice CNP        doxycycline hyclate (VIBRAMYCIN) capsule 100 mg  100 mg Oral Q12H [facility] (08/20) Qian Correia MD        [Held by provider] furosemide (LASIX) injection 40 mg  40 mg Intravenous Q6H Tanna Brice CNP   40 mg at 08/14/24 0607    [Held by provider] gabapentin (NEURONTIN) capsule 300 mg  300 mg Oral BID Tanna Brice CNP   300 mg at 08/13/24 2136    heparin 25,000 units in 0.45% NaCl 250 mL ANTICOAGULANT infusion  0-5,000 Units/hr Intravenous Continuous Scar Lazo MD 9.5 mL/hr at 08/14/24 0609 950 Units/hr at 08/14/24 0609    HOLD:  Metformin and metformin containing medications if patient received IV contrast with acute kidney injury or severe chronic kidney disease (stage IV or stage V; i.e., eGFR less than 30)   Does not apply HOLD Tanna Brice CNP        hydrALAZINE (APRESOLINE) injection 10 mg  10 mg Intravenous Once PRN Tanna Brice CNP        hydrALAZINE (APRESOLINE) tablet 10 mg  10 mg Oral TID Scar Lazo MD   10 mg at 08/14/24 0904    insulin aspart (NovoLOG) injection (RAPID ACTING)  1-7 Units Subcutaneous Q4H [facility] Tanna Brice CNP   1 Units at 08/14/24 0011    insulin glargine (LANTUS PEN) injection 10 Units  10 Units Subcutaneous At Bedtime Tanna Brice CNP   10 Units at 08/13/24 2134    iodixanol (VISIPAQUE 320) injection    Once PRN Brandon Alejandro MD   35 mL at 08/13/24  1620    ipratropium - albuterol 0.5 mg/2.5 mg/3 mL (DUONEB) neb solution 3 mL  3 mL Nebulization Q4H PRN Tanna Brice CNP        lidocaine (LMX4) cream   Topical Q1H PRN Tanna Brice CNP        lidocaine 1 % 0.1-1 mL  0.1-1 mL Other Q1H PRN Tanna Brice CNP        nitroGLYcerin (NITRO-BID) 2 % ointment 15 mg  1 inch Transdermal Q24H Scar Lazo MD   15 mg at 08/14/24 0904    ondansetron (ZOFRAN ODT) ODT tab 4 mg  4 mg Oral Q6H PRN Tanna Brice CNP        Or    ondansetron (ZOFRAN) injection 4 mg  4 mg Intravenous Q6H PRN KalalaynaioTanna petersen CNP        oxyCODONE (ROXICODONE) tablet 5 mg  5 mg Oral Q4H PRN YohanatsTanna fields CBLAKE        Or    oxyCODONE (ROXICODONE) tablet 10 mg  10 mg Oral Q4H PRN Tanna Brice CNP        prochlorperazine (COMPAZINE) injection 10 mg  10 mg Intravenous Q6H PRN PatriciaioTanna petersen CNP        Or    prochlorperazine (COMPAZINE) tablet 10 mg  10 mg Oral Q6H PRN KalamitsioTanna petersen C CNP        Or    prochlorperazine (COMPAZINE) suppository 25 mg  25 mg Rectal Q12H PRN YohanatsRichie fieldsle C CNP        senna-docusate (SENOKOT-S/PERICOLACE) 8.6-50 MG per tablet 1 tablet  1 tablet Oral BID PRN Kalamitsiotis Tanna C, CNP        Or    senna-docusate (SENOKOT-S/PERICOLACE) 8.6-50 MG per tablet 2 tablet  2 tablet Oral BID PRN Kalamitsiotis Tanna C CNP        sodium chloride (PF) 0.9% PF flush 3 mL  3 mL Intracatheter Q8H Tanna Brice CNP   3 mL at 08/14/24 0905    sodium chloride (PF) 0.9% PF flush 3 mL  3 mL Intracatheter q1 min prn Tanna Brice, CNP        spironolactone (ALDACTONE) half-tab 12.5 mg  12.5 mg Oral Daily Scar Lazo MD   12.5 mg at 08/14/24 0905       Allergies:   No Known Allergies    Review of Systems:   A full 12 point review of systems was taken and is negative aside from what is noted above in the HPI    Physical Exam:  Temp:  [97.5  F (36.4   C)-98.9  F (37.2  C)] 97.5  F (36.4  C)  Pulse:  [79-91] 82  Resp:  [15-25] 18  BP: (106-142)/(53-80) 119/53  SpO2:  [92 %-100 %] 93 %  General: NAD, somnolent, sleeping  Head: normocephalic, without abnormality / atraumatic  Abdomen: soft, non tender, non distended. no suprapubic fullness/tenderness. no CVA tenderness noted  Geniturinary: Fischer catheter is in place, patent draining clear yellow urine without significant sediment or clot.  Psychological: alert and oriented, answers questions appropriately      Labs:   Creatinine   Date Value Ref Range Status   08/14/2024 2.55 (H) 0.67 - 1.17 mg/dL Final       Lab Results   Component Value Date    WBC 10.2 08/14/2024     Lab Results   Component Value Date    HGB 8.9 08/14/2024     Lab Results   Component Value Date     08/14/2024       UA RESULTS:  Recent Labs   Lab Test 08/14/24  1002   COLOR Colorless   APPEARANCE Clear   URINEGLC Negative   URINEBILI Negative   URINEKETONE Negative   SG 1.009   UBLD Negative   URINEPH 5.0   PROTEIN Negative   NITRITE Negative   LEUKEST 75 Thuan/uL*   RBCU 2   WBCU 5       Lab Results: personally reviewed     Imaging:  EXAM: US RENAL COMPLETE NON-VASCULAR  LOCATION: [facility]  DATE: 8/14/2024     INDICATION: CHRISTY on CKD  COMPARISON: None.  TECHNIQUE: Routine Bilateral Renal and Bladder Ultrasound.     FINDINGS:     RIGHT KIDNEY: 10.2 x 6.0 x 5.6 cm. Moderate dilatation of the renal collecting system. No focal mass.      LEFT KIDNEY: 10.4 x 5.6 x 4.9 cm. Moderate dilatation of the renal collecting system. No focal mass.      BLADDER: The bladder is very distended with a volume of 1435 mL. The patient was unable to void.     Incidental note of bilateral pleural effusions.                                                                      IMPRESSION:  1.  Moderate bilateral hydronephrosis. This may be secondary to a very distended urinary bladder.    I have personally reviewed the imaging reports  above.     Assessment / Plan : Abdulaziz Rausch is being seen by [facility] for bilateral hydronephrosis, CHRISTY, severe urinary retention.    -59-year-old male is admitted to [facility] for acute CHF exacerbation complicated with CHRISTY.  He was found to have severe urinary retention with bilateral hydronephrosis.  Fischer catheter since been placed.  -Creatinine 2.55 today.  Continue to monitor with drainage.  If creatinine continues to rise tomorrow, would recommend CT scan to evaluate for other causes of hydronephrosis. Otherwise, recommend repeat renal ultrasound in 48 to 72 hours to ensure resolution of hydronephrosis.  -Maintain Fischer catheter for now.  Can consider voiding trial later in treatment course.  -We will follow along.    Thank you for consulting [facility] regarding this patient's care. Please contact us with questions or concerns.     Rupert Pride PA-C  [facility]   [phone]

## 2024-08-14 NOTE — PROGRESS NOTES
[facility]    Medicine Progress Note - Hospitalist Service    Date of Admission:  8/13/2024    Assessment & Plan   Abdulaziz Rausch is a 59 year old male admitted on 8/13/2024. He has h/o HTN, DM-II who was admitted to  for acute CHF exacerbation. Course was complicated by CHRISTY. He was started on diuresis and was transferred to [place] cardiac cath. Hospitalist service was consulted for medical co-management.     Acute decompensated HFrEF  Elevated troponin, NSTEMI, MVCAD  Mild pulmonary hypertension  -- ProBNP: 47942  -- TTE on 08/12: LVEF 15-20%  -- Heparin gtt, Cardiac cath on 08/13: MVCAD  -- Lipitor, coreg. Lasix held per cards  -- PET viability test.  -- CTS consulted    Acute hypoxic respiratory failure  -- Likely 2/2 CHF, ?aspiration pneumonia  -- Oxygen as needed, wean as able    ?Aspiration pneumonia  -- CXR on 08/11: multifocal patchy opacities present involving b/l lungs including lung bases, Right upper hilar region, and left perihilar region likely representing infecious process. Small b/l pleural effusions.  -- No fever or leukocytosis but pt is more lethargic.   -- SLP eval  -- On ceftriaxone. Sputum culture as able    Acute metabolic encephalopathy  -- Multifactorial: severe CHF, NSTEMI, pneumonia, CHRISTY  -- VBG wnl. TSH 1.32  -- Delirium precaution  -- UDS + for amphetamine, negative for opiates.   -- CT-head: no acute changes.  -- Gabapentin held    CHRISTY on CKD-II  -- Likely 2/2 low perfusion from CHF  -- Avoid Nephrotoxins. Holding ACEI, Metformin, Jardiance  -- Lasix  -- Strict I/O, US-renal pending  -- Nephrology consult appreciated    Acute urinary retention  B/l moderate hydronephrosis on ULS  -- rowland.  -- Urology consult appreciated    DM-II  -- A1c: 10.1%  -- Accu-checks, sliding scale, [id number] units, hypoglycemia protocol. Titrate as indicated    Essential hypertension  -- Monitor vital signs per protocol  -- holding Norvasc. On coreg    Normocytic Anemia,  Chronic   AOCD  -- Iron panel reviewed. Monitor cbc  -- No e/o bleeding              Diet: Fluid restriction 2000 ML FLUID  Low Saturated Fat Na <2400 mg    DVT Prophylaxis: heparin gtt  Fischer Catheter: PRESENT, indication: Acute retention or obstruction  Lines: None     Cardiac Monitoring: ACTIVE order. Indication: Post- PCI/Angiogram (24 hours)  Code Status: Full Code      Clinically Significant Risk Factors                 # Acute Kidney Injury, unspecified: based on a >150% or 0.3 mg/dL increase in last creatinine compared to past 90 day average, will monitor renal function  # Hypertension: Noted on problem list  # Acute heart failure with reduced ejection fraction: last echo with EF <40% and receiving IV diuretics         # DMII: A1C = 10.1 % (Ref range: <5.7 %) within past 6 months, PRESENT ON ADMISSION      # Financial/Environmental Concerns: none               Disposition Plan     Medically Ready for Discharge: Anticipated in 2-4 Days             Qian Correia MD  Hospitalist Service  [facility]  Securely message with Embedded Chat (more info)  Text page via [employer] Paging/Directory   ______________________________________________________________________    Interval History   Patient is seen and examined at bedside.   Pt is somnolent but was arousable. Answers questions appropriately. No complaints.  Plan of care discussed with patient. All questions answered. Pt verbalized understanding.     Physical Exam   Vital Signs: Temp: 98.1  F (36.7  C) Temp src: Oral BP: 129/66 Pulse: 83   Resp: 18 SpO2: 97 % O2 Device: None (Room air) Oxygen Delivery: 2 LPM  Weight: 137 lbs 12.6 oz    GEN: Somnolent but arousable and oriented. Not in acute distress.  HEENT: Atraumatic, mucous membrane- moist and pink.  Chest: Bilateral air entry.  CVS: S1S2 regular.   Abdomen: Soft. Non-tender, non-distended. No organomegaly. No guarding or rigidity. Bowel sounds active.   Extremities: No pedal edema.  CNS: No  involuntary movements.  Skin: no cyanosis or clubbing.     Medical Decision Making       49 MINUTES SPENT BY ME on the date of service doing chart review, history, exam, documentation & further activities per the note.      Data

## 2024-08-14 NOTE — PLAN OF CARE
Problem: Pain Acute  Goal: Optimal Pain Control and Function  Intervention: Prevent or Manage Pain  Recent Flowsheet Documentation  Taken 8/14/2024 0900 by Elenita Zhou, RN  Medication Review/Management: medications reviewed     Pt denies pain, on a heparin gtt at 1250 units/hr. Pt lethargic but arouses to voice, pupils are reactive but pinpoint. RR 18 and maintaining O2 sats. Gabapentin held for lethargy. MD updated and head CT ordered. Pt having NM viability study and renal US this afternoon. VSS, rowland remains patent and draining adequate OP. Pt more alert this evening and was able to keep eyes open and eat dinner. Brother and daughter at bedside this evening and updated on plan of care.

## 2024-08-14 NOTE — PROGRESS NOTES
"Imp/plan:  HFrEF due to Ischemic CM in the setting of DM- EF 15-20% by echo 08/11/24, PET viability scan today, [facility] aware and formal consult for possible CABG pending results. Screening Carotid US with <50% stenosis of bilateral internal carotid arteries.  Continue statin, carvedilol, NTG paste, hydralazine, heparin, spironolactone.   HTN - BP consistent at 110s-120s/50s-60s, avoiding extreme hypo or hypertension as per nephrology. Renal artery US pending    Review of Systems: 12 points negative other than above    /53   Pulse 82   Temp 97.5  F (36.4  C) (Oral)   Resp 18   Wt 62.5 kg (137 lb 12.6 oz)   SpO2 93%   BMI 20.35 kg/m    JVP<7cm, carotids normal  Lungs clear  Cor RRR no c,r,m  Abs soft +BS, no mass  Ext no c,c,e    Lab Results   Component Value Date    HGB 8.9 (L) 08/14/2024     Lab Results   Component Value Date     08/14/2024     No results found for: \"CREATININE\"  No components found for: \"K\"    Etienne Garcia  [employer], MS4  08/14/2024 3:22 PM        Scar Lazo MD  Interventional Cardiology   [facility]  [phone]    "

## 2024-08-14 NOTE — PROGRESS NOTES
Infection Prevention Progress Note  8/14/2024      Patient Name: Abdulaziz Rausch [id number]  Admit Date: 8/13/2024    Infection Status as of 8/14/2024 9:38 AM: MRSA  Isolation Status as of 8/14/2024 9:38 AM: Contact     MDRO Discontinuation  Infection Prevention has reviewed this patient's chart per the MDRO D/C Policy and have taken the following action:    Patient requires two consecutive negative cultures from nares prior to continuing discontinuation evaluation. Surveillance culture orders placed, date: 8/14/2024    If you have any questions, please contact Infection Prevention.    Irina Hu, Infection Prevention

## 2024-08-14 NOTE — CONSULTS
RENAL CONSULT NOTE    REQUESTING PHYSICIAN: Qian Correia MD     REASON FOR CONSULT: CHRISTY with underlying CKD    ASSESSMENT/PLAN:  CHRISTY with underlying CKD 2: CHRISTY is likely secondary to cardiorenal physiology/hemodynamic.  Most recent A1c was 10.1 and his CKD is likely secondary to diabetic nephropathy.  Also ATN could be possible from prolonged prerenal state.  He also received contrast from coronary angiogram on 8/13 and it could possibly will contribute but contrast associated nephropathy.  Will start with urine electrolytes and also renal ultrasound.  No indication for dialysis yet but will watch for age as he will likely get aggressive cardiac intervention in next few days.  Strict I's and O's  Avoid nephrotoxic medications  Avoid extreme hypo or hypertension  Watch for indication for dialysis    Electrolytes: Sodium 138, potassium 4.4.  Monitor for now    Acid-base: Bicarb 24.  No need for placement.    HPI: 59 years old male with baseline creatinine 1.1 with EGFR 70 to 80% with previous CHRISTY with creatinine up to 1.5 admitted at [facility] and admission creatinine 1.5 for shortness of breath and found to have ischemic cardiomyopathy with EF of 15 to 20% and sent to [facility] for further investigation.  Nephrology consulted for management of CHRISTY.  He was seen during imaging testing for his cardiac workup.  Sleepy upon encounter.  No history of kidney disease reported.  Denies smoking.  No family history of kidney disease.  Denies taking naproxen and ibuprofen.    REVIEW OF SYSTEMS: a 12 point review of systems was reviewed and negative other than noted in the HPI above.      Past Medical History:   Diagnosis Date    Anemia     Depression     Diabetes mellitus, type 2 (H)     Diabetic foot infection (H) 7/1/2023    Diabetic foot ulcer (H)     History of MRSA infection 7/1/2023 Jan 2023 RIGHT foot wound    Hypertension     Non-healing ulcer of left foot (H) 7/1/2023    Osteomyelitis (H)         Current Facility-Administered Medications   Medication Dose Route Frequency Provider Last Rate Last Admin    acetaminophen (TYLENOL) tablet 650 mg  650 mg Oral Q4H PRN Tanna Brice CNP        aspirin EC tablet 81 mg  81 mg Oral Daily Tanna Brice CNP   81 mg at 08/14/24 0905    atorvastatin (LIPITOR) tablet 40 mg  40 mg Oral QPM Tanna Brice CNP   40 mg at 08/13/24 2136    carvedilol (COREG) tablet 3.125 mg  3.125 mg Oral BID w/meals Scar Lazo MD   3.125 mg at 08/14/24 0904    cefTRIAXone (ROCEPHIN) 1 g vial to attach to  mL bag for ADULTS or NS 50 mL bag for PEDS  1 g Intravenous Q24H Tanna Brice CNP   1 g at 08/13/24 1729    glucose gel 15-30 g  15-30 g Oral Q15 Min PRN Tanna Brice CNP        Or    dextrose 50 % injection 25-50 mL  25-50 mL Intravenous Q15 Min PRN Tanna Brice CNP        Or    glucagon injection 1 mg  1 mg Subcutaneous Q15 Min PRN Tanna Brice CNP        [Held by provider] furosemide (LASIX) injection 40 mg  40 mg Intravenous Q6H Tanna Brice CNP   40 mg at 08/14/24 0607    [Held by provider] gabapentin (NEURONTIN) capsule 300 mg  300 mg Oral BID Tanna Brice CNP   300 mg at 08/13/24 2136    heparin 25,000 units in 0.45% NaCl 250 mL ANTICOAGULANT infusion  0-5,000 Units/hr Intravenous Continuous Scar Lazo MD 9.5 mL/hr at 08/14/24 0609 950 Units/hr at 08/14/24 0609    HOLD:  Metformin and metformin containing medications if patient received IV contrast with acute kidney injury or severe chronic kidney disease (stage IV or stage V; i.e., eGFR less than 30)   Does not apply HOLD Tanna Brice CNP        hydrALAZINE (APRESOLINE) injection 10 mg  10 mg Intravenous Once PRN Kalamitsiotis, Tanna C, CNP        hydrALAZINE (APRESOLINE) tablet 10 mg  10 mg Oral TID Scar Lazo MD   10 mg at 08/14/24 0904    insulin aspart (NovoLOG)  injection (RAPID ACTING)  1-7 Units Subcutaneous Q4H MARKUS Tanna Brice CNP   1 Units at 08/14/24 0011    insulin glargine (LANTUS PEN) injection 10 Units  10 Units Subcutaneous At Bedtime Tanna Brice CNP   10 Units at 08/13/24 2139    ipratropium - albuterol 0.5 mg/2.5 mg/3 mL (DUONEB) neb solution 3 mL  3 mL Nebulization Q4H PRN Tanna Brice CNP        lidocaine (LMX4) cream   Topical Q1H PRN Tanna Brice CNP        lidocaine 1 % 0.1-1 mL  0.1-1 mL Other Q1H PRN Tanna Brice CNP        nitroGLYcerin (NITRO-BID) 2 % ointment 15 mg  1 inch Transdermal Q24H Scar Lazo MD   15 mg at 08/14/24 0904    ondansetron (ZOFRAN ODT) ODT tab 4 mg  4 mg Oral Q6H PRN Tanna Brice CNP        Or    ondansetron (ZOFRAN) injection 4 mg  4 mg Intravenous Q6H PRN Tanna Brice CNP        oxyCODONE (ROXICODONE) tablet 5 mg  5 mg Oral Q4H PRN Tanna Brice CNP        Or    oxyCODONE (ROXICODONE) tablet 10 mg  10 mg Oral Q4H PRN Tanna Brice CNP        prochlorperazine (COMPAZINE) injection 10 mg  10 mg Intravenous Q6H PRN Tanna Brice CNP        Or    prochlorperazine (COMPAZINE) tablet 10 mg  10 mg Oral Q6H PRN Tanna Brice CNP        Or    prochlorperazine (COMPAZINE) suppository 25 mg  25 mg Rectal Q12H PRN Tanna Brice CNP        senna-docusate (SENOKOT-S/PERICOLACE) 8.6-50 MG per tablet 1 tablet  1 tablet Oral BID PRN Tanna Brice CNP        Or    senna-docusate (SENOKOT-S/PERICOLACE) 8.6-50 MG per tablet 2 tablet  2 tablet Oral BID PRN Tanna Brice, CNP        sodium chloride (PF) 0.9% PF flush 3 mL  3 mL Intracatheter Q8H Tanna Brice CNP   3 mL at 08/14/24 0905    sodium chloride (PF) 0.9% PF flush 3 mL  3 mL Intracatheter q1 min prn Tanna Brice CNP        spironolactone (ALDACTONE) half-tab 12.5 mg  12.5 mg Oral Daily Violet  Scar Dallas MD   12.5 mg at 08/14/24 0905       No current outpatient medications on file.      ALLERGIES/SENSITIVITIES:  No Known Allergies  Social History     Tobacco Use    Smoking status: Never    Smokeless tobacco: Never   Vaping Use    Vaping status: Never Used   Substance Use Topics    Alcohol use: Never    Drug use: Yes     Types: Methamphetamines     Comment: chronic user     I have reviewed this patient's family history and updated it with pertinent information if needed.  Family History   Problem Relation Age of Onset    Diabetes Father          PHYSICAL EXAM:  Physical Exam   Temp: 97.5  F (36.4  C) Temp src: Oral BP: 119/53 Pulse: 82   Resp: 18 SpO2: 93 % O2 Device: Nasal cannula Oxygen Delivery: 2 LPM  Vitals:    08/14/24 0501   Weight: 62.5 kg (137 lb 12.6 oz)     Vital Signs with Ranges  Temp:  [97.5  F (36.4  C)-98.9  F (37.2  C)] 97.5  F (36.4  C)  Pulse:  [79-91] 82  Resp:  [15-25] 18  BP: (106-142)/(53-80) 119/53  SpO2:  [88 %-100 %] 93 %  I/O last 3 completed shifts:  In: -   Out: 2350 [Urine:2350]    @TMAXR(24)@    Patient Vitals for the past 72 hrs:   Weight   08/14/24 0501 62.5 kg (137 lb 12.6 oz)       General - A & O x 3, NAD  HEENT - PERRLA, no scleral icterus  Neck - no carotid bruits, no JVD  Respiratory - Lungs CTA bilat without wheeze, rhonchi, rales  Cardiovascular - AP RRR without murmur  Abdomen - soft, BS present, no bruits, no fluid wave  Extremities - well perfused, no edema  Integumentary - intact, good turgor, no rash/lesions  Neurologic - grossly intact  Psych:  Judgement intact, affect WNL  : Catheter present with clear urine in the tubing.    Laboratory:     Recent Labs   Lab 08/14/24  0502 08/13/24  0949 08/12/24  0550 08/11/24  1451   WBC 10.2 9.6 10.5 8.1   RBC 3.17* 2.95* 3.29* 3.71*   HGB 8.9* 8.5* 9.6* 10.7*   HCT 27.2* 24.8* 28.3* 30.9*    352 340 387       Basic Metabolic Panel:  Recent Labs   Lab 08/14/24  0817 08/14/24  0502 08/14/24  0501  08/14/24  0010 08/13/24  2123 08/13/24  1733 08/13/24  1339 08/13/24  0949 08/12/24  0652 08/12/24  0550 08/11/24  1805 08/11/24  1451   NA  --  138  --   --   --   --   --  139  --  137  --  140   POTASSIUM  --  4.4  --   --   --   --   --  4.2  --  4.3  --  4.0   CHLORIDE  --  101  --   --   --   --   --  104  --  105  --  106   CO2  --  24  --   --   --   --   --  24  --  23  --  26   BUN  --  59.6*  --   --   --   --   --  49.6*  --  32.9*  --  31.1*   CR  --  2.55*  --   --   --   --   --  2.28*  --  1.69*  --  1.52*   * 143* 136* 151* 207* 94   < > 142*   < > 298*   < > 262*   AMIRAH  --  8.5*  --   --   --   --   --  8.7*  --  8.3*  --  9.0    < > = values in this interval not displayed.       INRNo lab results found in last 7 days.    Recent Labs   Lab Test 08/14/24  0502 08/13/24  0949   POTASSIUM 4.4 4.2   CHLORIDE 101 104   BUN 59.6* 49.6*      Recent Labs   Lab Test 09/25/23  1711 07/03/23  0741   ALBUMIN 3.3* 3.0*   BILITOTAL 0.3 <0.2   ALT 27 60   AST 18 65*       Personally reviewed today's laboratory studies      Thank you for involving us in the care of this patient. We will continue to follow along with you.      Apryl Peguero MD  [facility]  [phone]

## 2024-08-15 ENCOUNTER — APPOINTMENT (OUTPATIENT)
Dept: PET IMAGING | Facility: HOSPITAL | Age: 59
End: 2024-08-15
Attending: INTERNAL MEDICINE
Payer: COMMERCIAL

## 2024-08-15 ENCOUNTER — APPOINTMENT (OUTPATIENT)
Dept: SPEECH THERAPY | Facility: HOSPITAL | Age: 59
End: 2024-08-15
Attending: NURSE PRACTITIONER
Payer: COMMERCIAL

## 2024-08-15 LAB
ALBUMIN SERPL BCG-MCNC: 2.7 G/DL (ref 3.5–5.2)
ANION GAP SERPL CALCULATED.3IONS-SCNC: 11 MMOL/L (ref 7–15)
BUN SERPL-MCNC: 60.8 MG/DL (ref 8–23)
CALCIUM SERPL-MCNC: 8.2 MG/DL (ref 8.8–10.4)
CHLORIDE SERPL-SCNC: 101 MMOL/L (ref 98–107)
CREAT SERPL-MCNC: 2.42 MG/DL (ref 0.67–1.17)
EGFRCR SERPLBLD CKD-EPI 2021: 30 ML/MIN/1.73M2
ERYTHROCYTE [DISTWIDTH] IN BLOOD BY AUTOMATED COUNT: 12.7 % (ref 10–15)
GLUCOSE BLDC GLUCOMTR-MCNC: 100 MG/DL (ref 70–99)
GLUCOSE BLDC GLUCOMTR-MCNC: 120 MG/DL (ref 70–99)
GLUCOSE BLDC GLUCOMTR-MCNC: 137 MG/DL (ref 70–99)
GLUCOSE BLDC GLUCOMTR-MCNC: 140 MG/DL (ref 70–99)
GLUCOSE BLDC GLUCOMTR-MCNC: 167 MG/DL (ref 70–99)
GLUCOSE SERPL-MCNC: 151 MG/DL (ref 70–99)
HCO3 SERPL-SCNC: 26 MMOL/L (ref 22–29)
HCT VFR BLD AUTO: 24.6 % (ref 40–53)
HGB BLD-MCNC: 8 G/DL (ref 13.3–17.7)
MCH RBC QN AUTO: 28.1 PG (ref 26.5–33)
MCHC RBC AUTO-ENTMCNC: 32.5 G/DL (ref 31.5–36.5)
MCV RBC AUTO: 86 FL (ref 78–100)
PHOSPHATE SERPL-MCNC: 4.3 MG/DL (ref 2.5–4.5)
PLATELET # BLD AUTO: 302 10E3/UL (ref 150–450)
POTASSIUM SERPL-SCNC: 3.8 MMOL/L (ref 3.4–5.3)
RBC # BLD AUTO: 2.85 10E6/UL (ref 4.4–5.9)
SODIUM SERPL-SCNC: 138 MMOL/L (ref 135–145)
UFH PPP CHRO-ACNC: 0.42 IU/ML
WBC # BLD AUTO: 9 10E3/UL (ref 4–11)

## 2024-08-15 PROCEDURE — 258N000001 HC RX 258: Performed by: INTERNAL MEDICINE

## 2024-08-15 PROCEDURE — 250N000013 HC RX MED GY IP 250 OP 250 PS 637

## 2024-08-15 PROCEDURE — 250N000013 HC RX MED GY IP 250 OP 250 PS 637: Performed by: STUDENT IN AN ORGANIZED HEALTH CARE EDUCATION/TRAINING PROGRAM

## 2024-08-15 PROCEDURE — 250N000011 HC RX IP 250 OP 636: Performed by: NURSE PRACTITIONER

## 2024-08-15 PROCEDURE — 99232 SBSQ HOSP IP/OBS MODERATE 35: CPT | Performed by: INTERNAL MEDICINE

## 2024-08-15 PROCEDURE — 250N000013 HC RX MED GY IP 250 OP 250 PS 637: Performed by: NURSE PRACTITIONER

## 2024-08-15 PROCEDURE — 99232 SBSQ HOSP IP/OBS MODERATE 35: CPT | Performed by: STUDENT IN AN ORGANIZED HEALTH CARE EDUCATION/TRAINING PROGRAM

## 2024-08-15 PROCEDURE — 210N000001 HC R&B IMCU HEART CARE

## 2024-08-15 PROCEDURE — 85027 COMPLETE CBC AUTOMATED: CPT | Performed by: NURSE PRACTITIONER

## 2024-08-15 PROCEDURE — 250N000011 HC RX IP 250 OP 636: Performed by: INTERNAL MEDICINE

## 2024-08-15 PROCEDURE — 85520 HEPARIN ASSAY: CPT | Performed by: STUDENT IN AN ORGANIZED HEALTH CARE EDUCATION/TRAINING PROGRAM

## 2024-08-15 PROCEDURE — 78429 MYOCRD IMG PET 1 STD W/CT: CPT | Mod: PI

## 2024-08-15 PROCEDURE — 343N000001 HC RX 343: Performed by: STUDENT IN AN ORGANIZED HEALTH CARE EDUCATION/TRAINING PROGRAM

## 2024-08-15 PROCEDURE — 250N000013 HC RX MED GY IP 250 OP 250 PS 637: Performed by: INTERNAL MEDICINE

## 2024-08-15 PROCEDURE — 36415 COLL VENOUS BLD VENIPUNCTURE: CPT | Performed by: NURSE PRACTITIONER

## 2024-08-15 PROCEDURE — 80069 RENAL FUNCTION PANEL: CPT | Performed by: INTERNAL MEDICINE

## 2024-08-15 PROCEDURE — A9552 F18 FDG: HCPCS | Performed by: STUDENT IN AN ORGANIZED HEALTH CARE EDUCATION/TRAINING PROGRAM

## 2024-08-15 PROCEDURE — 92610 EVALUATE SWALLOWING FUNCTION: CPT | Mod: GN

## 2024-08-15 RX ORDER — LANOLIN ALCOHOL/MO/W.PET/CERES
3 CREAM (GRAM) TOPICAL
Status: DISCONTINUED | OUTPATIENT
Start: 2024-08-15 | End: 2024-08-17

## 2024-08-15 RX ORDER — FLUDEOXYGLUCOSE F 18 200 MCI/ML
15-20 INJECTION, SOLUTION INTRAVENOUS ONCE
Status: COMPLETED | OUTPATIENT
Start: 2024-08-15 | End: 2024-08-15

## 2024-08-15 RX ORDER — DEXTROSE MONOHYDRATE 25 G/50ML
50 INJECTION, SOLUTION INTRAVENOUS ONCE
Status: COMPLETED | OUTPATIENT
Start: 2024-08-15 | End: 2024-08-15

## 2024-08-15 RX ORDER — HYDROXYZINE HYDROCHLORIDE 25 MG/1
25 TABLET, FILM COATED ORAL EVERY 6 HOURS PRN
Status: DISCONTINUED | OUTPATIENT
Start: 2024-08-15 | End: 2024-08-20

## 2024-08-15 RX ADMIN — HEPARIN SODIUM 1250 UNITS/HR: 10000 INJECTION, SOLUTION INTRAVENOUS at 09:07

## 2024-08-15 RX ADMIN — ACETAMINOPHEN 650 MG: 325 TABLET ORAL at 20:42

## 2024-08-15 RX ADMIN — FLUDEOXYGLUCOSE F 18 16.45 MILLICURIE: 200 INJECTION, SOLUTION INTRAVENOUS at 12:41

## 2024-08-15 RX ADMIN — NITROGLYCERIN 15 MG: 20 OINTMENT TOPICAL at 09:09

## 2024-08-15 RX ADMIN — ATORVASTATIN CALCIUM 40 MG: 40 TABLET, FILM COATED ORAL at 20:28

## 2024-08-15 RX ADMIN — HYDRALAZINE HYDROCHLORIDE 10 MG: 10 TABLET ORAL at 20:34

## 2024-08-15 RX ADMIN — INSULIN GLARGINE 10 UNITS: 100 INJECTION, SOLUTION SUBCUTANEOUS at 20:43

## 2024-08-15 RX ADMIN — Medication 3 MG: at 21:56

## 2024-08-15 RX ADMIN — CEFTRIAXONE SODIUM 1 G: 1 INJECTION, POWDER, FOR SOLUTION INTRAMUSCULAR; INTRAVENOUS at 16:26

## 2024-08-15 RX ADMIN — DEXTROSE MONOHYDRATE 50 ML: 25 INJECTION, SOLUTION INTRAVENOUS at 09:05

## 2024-08-15 RX ADMIN — INSULIN ASPART 1 UNITS: 100 INJECTION, SOLUTION INTRAVENOUS; SUBCUTANEOUS at 20:28

## 2024-08-15 RX ADMIN — PROCHLORPERAZINE EDISYLATE 10 MG: 5 INJECTION INTRAMUSCULAR; INTRAVENOUS at 06:32

## 2024-08-15 RX ADMIN — HYDROXYZINE HYDROCHLORIDE 25 MG: 25 TABLET ORAL at 22:51

## 2024-08-15 RX ADMIN — SPIRONOLACTONE 12.5 MG: 25 TABLET, FILM COATED ORAL at 09:09

## 2024-08-15 RX ADMIN — CARVEDILOL 3.12 MG: 3.12 TABLET, FILM COATED ORAL at 18:45

## 2024-08-15 RX ADMIN — DOXYCYCLINE HYCLATE 100 MG: 100 CAPSULE ORAL at 09:09

## 2024-08-15 RX ADMIN — ACETAMINOPHEN 650 MG: 325 TABLET ORAL at 09:56

## 2024-08-15 RX ADMIN — HYDRALAZINE HYDROCHLORIDE 10 MG: 10 TABLET ORAL at 09:09

## 2024-08-15 RX ADMIN — DOXYCYCLINE HYCLATE 100 MG: 100 CAPSULE ORAL at 20:28

## 2024-08-15 RX ADMIN — CARVEDILOL 3.12 MG: 3.12 TABLET, FILM COATED ORAL at 09:09

## 2024-08-15 RX ADMIN — ASPIRIN 81 MG: 81 TABLET, COATED ORAL at 09:09

## 2024-08-15 ASSESSMENT — ACTIVITIES OF DAILY LIVING (ADL)
ADLS_ACUITY_SCORE: 33

## 2024-08-15 NOTE — PROGRESS NOTES
Place of Service:  [facility]     Reason for follow up: Bilateral hydronephrosis, CHRISTY, severe urinary retention    SUBJECTIVE:  Events: no acute events overnight    Patient doing okay this morning.  He denies abdominal pain, flank pain, fevers, chills, nausea, vomiting.  He is tolerating Fischer catheter.    Vitally stable, afebrile.  Creatinine mildly improved from yesterday at 2.42.    OBJECTIVE:  PHYSICAL EXAM:  Temp: 98.8  F (37.1  C) Temp src: Oral BP: 104/51 Pulse: 81   Resp: 18 SpO2: 91 % O2 Device: Nasal cannula Oxygen Delivery: 2 LPM  General: NAD, alert, cooperative  Head: normocephalic, without abnormality / atraumatic  Abdomen: soft, non tender, non distended. no suprapubic fullness, no suprapubic tenderness. no CVA tenderness,   Genitourinary: Fischer catheter is in place, patent draining clear yellow urine without significant sediment or clot.  Psychological: alert and oriented, answers questions appropriately    LABS:  Creatinine   Date Value Ref Range Status   08/15/2024 2.42 (H) 0.67 - 1.17 mg/dL Final     WBC Count   Date Value Ref Range Status   08/15/2024 9.0 4.0 - 11.0 10e3/uL Final     Hemoglobin   Date Value Ref Range Status   08/15/2024 8.0 (L) 13.3 - 17.7 g/dL Final   ]  Platelet Count   Date Value Ref Range Status   08/15/2024 302 150 - 450 10e3/uL Final       UA:  UA RESULTS:  Recent Labs   Lab Test 08/14/24  1002   COLOR Colorless   APPEARANCE Clear   URINEGLC Negative   URINEBILI Negative   URINEKETONE Negative   SG 1.009   UBLD Negative   URINEPH 5.0   PROTEIN Negative   NITRITE Negative   LEUKEST 75 Thuan/uL*   RBCU 2   WBCU 5       Lab Results: personally reviewed.     ASSESSMENT/PLAN:  Abdulaziz Rausch is being seen by [facility] for bilateral hydronephrosis, CHRISTY, severe urinary retention.    -59-year-old male admitted to [facility] for acute CHF exacerbation complicated with CHRISTY.  He was found to have severe urinary retention with bilateral hydronephrosis.   Fischer catheter was placed.  -Creatinine mildly improved today.  Continue to monitor with drainage.  Recommend repeat renal ultrasound in 48 to 72 hours to ensure resolution of hydronephrosis.  -Maintain Fischer catheter for now.  Can consider voiding trial later in treatment course.  -We will continue to follow.      Rupert Pride PA-C  [facility]   [phone]

## 2024-08-15 NOTE — PROGRESS NOTES
"Speech-Language Pathology: Clinical Swallow Evaluation     08/15/24 1400   Appointment Info   Signing Clinician's Name / Credentials (SLP) Luz Elena Justin MA, CCC-SLP   General Information   Onset of Illness/Injury or Date of Surgery 08/13/24   Referring Physician Tanna Brice CNP   Pertinent History of Current Problem Per EMR \"Abdulaziz Rausch is a 59 year old male admitted on 8/13/2024. He has h/o HTN, DM-II who was admitted to  for acute CHF exacerbation. Course was complicated by CHRISTY. He was started on diuresis and was transferred to [place] cardiac cath. Hospitalist service was consulted for medical co-management.\"   General Observations lethargic, his dtr is present and supportive. She reports that pt has a hx of significant meth use and will sleep for a couple days. She reported that he has a tendency to wake up angry. Handoff to RN outside of room.   Type of Evaluation   Type of Evaluation Swallow Evaluation   Oral Motor   Mucosal Quality adequate   Dentition (Oral Motor)   Dentition (Oral Motor) edentulous   Facial Symmetry (Oral Motor)   Facial Symmetry (Oral Motor) WNL   Lip Function (Oral Motor)   Lip Range of Motion (Oral Motor) unable/difficult to assess   Tongue Function (Oral Motor)   Tongue ROM (Oral Motor) unable/difficult to assess   Cough/Swallow/Gag Reflex (Oral Motor)   Volitional Throat Clear/Cough (Oral Motor) unable/difficult to assess   Volitional Swallow (Oral Motor) unable/difficult to assess   Vocal Quality/Secretion Management (Oral Motor)   Vocal Quality (Oral Motor) WFL   Secretion Management (Oral Motor) WNL   General Swallowing Observations   Past History of Dysphagia None per EMR or dtr report. She has not seen him in about a year. She reports that yesterday he was able to eat regular food when alert without issues. RN reports coughing with medication and water this morning.   Respiratory Support nasal cannula   Current Diet/Method of Nutritional Intake (General " Swallowing Observations, NIS) regular diet;thin liquids (level 0)   Swallowing Evaluation Clinical swallow evaluation   Clinical Swallow Evaluation   Clinical Swallow Evaluation Textures Trialed thin liquids;mildly thick liquids;pureed;soft & bite-sized   Clinical Swallow Eval: Thin Liquid Texture Trial   Mode of Presentation, Thin Liquids cup   Volume of Liquid or Food Presented 1 sip   Oral Phase of Swallow premature pharyngeal entry   Pharyngeal Phase of Swallow coughing/choking   Diagnostic Statement Immediate coughing   Clinical Swallow Eval: Mildly Thick Liquids   Mode of Presentation cup;straw   Volume Presented 2oz   Oral Phase WFL   Pharyngeal Phase intact   Diagnostic Statement No overt s/s aspiration   Clinical Swallow Evaluation: Puree Solid Texture Trial   Mode of Presentation, Puree spoon   Volume of Puree Presented x3 small bites   Oral Phase, Puree WFL   Pharyngeal Phase, Puree intact   Diagnostic Statement No overt s/s aspiration   Clinical Swallow Eval: Soft & Bite Sized   Mode of Presentation spoon   Volume Presented x1 bite   Oral Phase impaired mastication;residue in oral cavity   Diagnostic Statement Patient unable to swallow. He continued to chew for about 5 minutes and eventually spit it out with ongoing verbal cues.   Swallowing Recommendations   Diet Consistency Recommendations pureed (level 4);mildly thick liquids (level 2)   Supervision Level for Intake 1:1 supervision needed   Mode of Delivery Recommendations bolus size, small;slow rate of intake   Monitoring/Assistance Required (Eating/Swallowing) stop eating activities when fatigue is present;monitor for cough or change in vocal quality with intake   Recommended Feeding/Eating Techniques (Swallow Eval) maintain upright sitting position for eating   Medication Administration Recommendations, Swallowing (SLP) One at a time, crushed or whole in puree   Instrumental Assessment Recommendations instrumental evaluation not recommended at this  time  (may be warranted pending progression)   General Therapy Interventions   Planned Therapy Interventions Dysphagia Treatment   Dysphagia treatment Modified diet education;Instruction of safe swallow strategies   Clinical Impression   Criteria for Skilled Therapeutic Interventions Met (SLP Eval) Yes, treatment indicated   Risks & Benefits of therapy have been explained evaluation/treatment results reviewed;care plan/treatment goals reviewed;risks/benefits reviewed;participants voiced agreement with care plan;participants included;patient;daughter   Clinical Impression Comments Clinical Swallow Evaluation completed. Patient had s/s aspiration with thin liquids. Oral motor function was difficult to assess d/t limited instruction following. Mastication was prolonged with soft and bite size. Pt was unable to initiate swallow with solid and had to spit out bolus. No concerns with puree or mildly thick liquids. Hyolaryngeal elevation appears present upon visualization and palpation. Recommend diet of Puree and Mildly thick liquids with strategies of alert and upright to 90 degrees. Anticipate improvement with more consistent alertness. SLP to follow for ongoing dysphagia management.   SLP Total Evaluation Time   Eval: oral/pharyngeal swallow function, clinical swallow Minutes (46391) 25   SLP Goals   Therapy Frequency (SLP Eval) 5 times/week   SLP Predicted Duration/Target Date for Goal Attainment 08/22/24   SLP Discharge Planning   SLP Brief overview of current status  Recommend diet of Puree and Mildly thick liquids with strategies of alert and upright to 90 degrees. Anticipate improvement with more consistent alertness. SLP to follow for ongoing dysphagia management.

## 2024-08-15 NOTE — PROGRESS NOTES
PET scan preview with radiology and probable anterior wall is viable and possible infarct in inferior wall. Await final read tomorrow and then consult CV surgery if above confirmed

## 2024-08-15 NOTE — PROGRESS NOTES
[facility]    Medicine Progress Note - Hospitalist Service    Date of Admission:  8/13/2024    Assessment & Plan   Abdulaziz Rausch is a 59 year old male admitted on 8/13/2024. He has h/o HTN, DM-II who was admitted to  for acute CHF exacerbation. Course was complicated by CHRISTY. He was started on diuresis and was transferred to [place] cardiac cath. Hospitalist service was consulted for medical co-management.     Acute decompensated HFrEF  Elevated troponin, NSTEMI, MVCAD  Mild pulmonary hypertension  -- ProBNP: 55598  -- TTE on 08/12: LVEF 15-20%  -- Heparin gtt, Cardiac cath on 08/13: MVCAD  -- Lipitor, coreg. Lasix held per cards  -- Repeat PET viability test pending.  -- CTS consulted    Acute hypoxic respiratory failure  -- Likely 2/2 CHF, ?aspiration pneumonia  -- Oxygen as needed, wean as able    ?Aspiration pneumonia  -- CXR on 08/11: multifocal patchy opacities present involving b/l lungs including lung bases, Right upper hilar region, and left perihilar region likely representing infecious process. Small b/l pleural effusions.  -- No fever or leukocytosis but pt is more lethargic.   -- SLP eval  -- On ceftriaxone. Sputum culture as able    Acute metabolic encephalopathy  -- Multifactorial: severe CHF, NSTEMI, pneumonia, CHRISTY  -- VBG wnl. TSH 1.32  -- Delirium precaution  -- UDS + for amphetamine, negative for opiates.   -- CT-head: no acute changes.  -- Gabapentin held    CHRISTY on CKD-II  -- Likely 2/2 low perfusion from CHF  -- Avoid Nephrotoxins. Holding ACEI, Metformin, Jardiance  -- Lasix  -- Strict I/O, US-renal pending  -- Nephrology consult appreciated    Acute urinary retention  B/l moderate hydronephrosis on ULS  -- [place].  -- Urology consult appreciated  -- Repeat US-renal: No hydronephrosis    DM-II  -- A1c: 10.1%  -- Accu-checks, sliding scale, [id number] units, hypoglycemia protocol. Titrate as indicated    Essential hypertension  -- Monitor vital signs per protocol  --  holding Norvasc. On coreg    Normocytic Anemia, Chronic   AOCD  -- Iron panel reviewed. Monitor cbc  -- No e/o bleeding              Diet: Fluid restriction 2000 ML FLUID  Combination Diet Pureed Diet (level 4); Mildly Thick (level 2); Low Saturated Fat Na <2400mg Diet    DVT Prophylaxis: heparin gtt  Fischer Catheter: PRESENT, indication: Acute retention or obstruction  Lines: None     Cardiac Monitoring: ACTIVE order. Indication: Post- PCI/Angiogram (24 hours)  Code Status: Full Code      Clinically Significant Risk Factors              # Hypoalbuminemia: Lowest albumin = 2.7 g/dL at 8/15/2024  6:21 AM, will monitor as appropriate    # Acute Kidney Injury, unspecified: based on a >150% or 0.3 mg/dL increase in last creatinine compared to past 90 day average, will monitor renal function  # Hypertension: Noted on problem list  # Acute heart failure with reduced ejection fraction: last echo with EF <40% and receiving IV diuretics         # DMII: A1C = 10.1 % (Ref range: <5.7 %) within past 6 months, PRESENT ON ADMISSION        # Financial/Environmental Concerns: none               Disposition Plan     Medically Ready for Discharge: Anticipated in 2-4 Days             Qian Correia MD  Hospitalist Service  [facility]  Securely message with [employer] (more info)  Text page via [employer] Paging/Directory   ______________________________________________________________________    Interval History   Patient is seen and examined at bedside.   Pt is somnolent but was arousable.   Daughter present during encounter.      Physical Exam   Vital Signs: Temp: 98.3  F (36.8  C) Temp src: Oral BP: 115/58 Pulse: 78   Resp: 18 SpO2: 96 % O2 Device: Nasal cannula Oxygen Delivery: 2 LPM  Weight: 138 lbs .13 oz    GEN: Somnolent but arousable and oriented. Not in acute distress.  HEENT: Atraumatic, mucous membrane- moist and pink.  Chest: Bilateral air entry.  CVS: S1S2 regular.   Abdomen: Soft. Non-tender,  non-distended. No organomegaly. No guarding or rigidity. Bowel sounds active.   Extremities: No pedal edema.  CNS: No involuntary movements.  Skin: no cyanosis or clubbing.     Medical Decision Making       48 MINUTES SPENT BY ME on the date of service doing chart review, history, exam, documentation & further activities per the note.      Data

## 2024-08-15 NOTE — PROGRESS NOTES
RENAL PROGRESS NOTE    CC:  CHRISTY    ASSESSMENT & PLAN:   CHRISTY with underlying CKD 2: CHRISTY is likely secondary to cardiorenal physiology/hemodynamic.  Most recent A1c was 10.1 and his CKD is likely secondary to diabetic nephropathy.  Also ATN could be possible from prolonged prerenal state.  He was still positive for amphetamine and could cause ATN.  He was also found to have hydronephrosis and possibly from obstructive uropathy which is now resolved.  He also received contrast from coronary angiogram on 8/13 and it could possibly will contribute but contrast associated nephropathy.    Urine sodium 115 and suspect from diuretic induced.  Renal ultrasound showed left kidneys slightly smaller than the right side.  No indication for dialysis yet but will watch for age as he will likely get aggressive cardiac intervention in next few days.  Strict I's and O's  Avoid nephrotoxic medications  Avoid extreme hypo or hypertension  Watch for indication for dialysis     Electrolytes: Sodium 138, potassium 3.8.  Monitor for now     Acid-base: Bicarb 26.  No need for placement.    SUBJECTIVE: Patient seen and examined at bedside.  Still lethargic.  Brother and daughter at bedside.  Kidney function improving.    OBJECTIVE:  Physical Exam   Temp: 98.2  F (36.8  C) Temp src: Oral BP: (!) 140/77 Pulse: 76   Resp: 18 SpO2: 98 % O2 Device: Nasal cannula Oxygen Delivery: 2 LPM  Vitals:    08/14/24 0501 08/15/24 0616   Weight: 62.5 kg (137 lb 12.6 oz) 62.6 kg (138 lb 0.1 oz)     Vital Signs with Ranges  Temp:  [97.4  F (36.3  C)-98.8  F (37.1  C)] 98.2  F (36.8  C)  Pulse:  [76-86] 76  Resp:  [18] 18  BP: (104-140)/(51-77) 140/77  SpO2:  [91 %-98 %] 98 %  I/O last 3 completed shifts:  In: -   Out: 1900 [Urine:1900]    @TMAXR(24)@    Patient Vitals for the past 72 hrs:   Weight   08/15/24 0616 62.6 kg (138 lb 0.1 oz)   08/14/24 0501 62.5 kg (137 lb 12.6 oz)     Intake/Output Summary (Last 24 hours) at 8/15/2024 1415  Last data filed at  8/14/2024 2119  Gross per 24 hour   Intake --   Output 800 ml   Net -800 ml       PHYSICAL EXAM:  General -lethargic, appears comfortable, NAD  Cardiovascular - Regular rate and rhythm, no rub  Respiratory - Clear to auscultation bilaterally, no crackles or wheezes  Abd: BS present, no guarding or pain with palpation, no ascites  Extremities - No lower extremity edema bilaterally  Skin: dry, intact, no rash, good turgor  Neuro:  Grossly intact, no focal deficits  MSK:  Grossly intact  Psych:  Normal affect    LABORATORY STUDIES:     Recent Labs   Lab 08/15/24  0621 08/14/24  0502 08/13/24  0949 08/12/24  0550 08/11/24  1451   WBC 9.0 10.2 9.6 10.5 8.1   RBC 2.85* 3.17* 2.95* 3.29* 3.71*   HGB 8.0* 8.9* 8.5* 9.6* 10.7*   HCT 24.6* 27.2* 24.8* 28.3* 30.9*    390 352 340 387       Basic Metabolic Panel:  Recent Labs   Lab 08/15/24  1327 08/15/24  1121 08/15/24  1012 08/15/24  0841 08/15/24  0621 08/15/24  0504 08/14/24  0817 08/14/24  0502 08/13/24  1339 08/13/24  0949 08/12/24  0652 08/12/24  0550 08/11/24  1805 08/11/24  1451   NA  --   --   --   --  138  --   --  138  --  139  --  137  --  140   POTASSIUM  --   --   --   --  3.8  --   --  4.4  --  4.2  --  4.3  --  4.0   CHLORIDE  --   --   --   --  101  --   --  101  --  104  --  105  --  106   CO2  --   --   --   --  26  --   --  24  --  24  --  23  --  26   BUN  --   --   --   --  60.8*  --   --  59.6*  --  49.6*  --  32.9*  --  31.1*   CR  --   --   --   --  2.42*  --   --  2.55*  --  2.28*  --  1.69*  --  1.52*   * 140* 167* 140* 151* 137*   < > 143*   < > 142*   < > 298*   < > 262*   AMIRAH  --   --   --   --  8.2*  --   --  8.5*  --  8.7*  --  8.3*  --  9.0    < > = values in this interval not displayed.       INRNo lab results found in last 7 days.     Recent Labs   Lab Test 08/15/24  0621 08/14/24  0502   WBC 9.0 10.2   HGB 8.0* 8.9*    390       Personally reviewed current labs    EXAM: BILATERAL RENAL ARTERY DUPLEX AND RENAL ULTRASOUND      INDICATION: Acute kidney injury     TECHNIQUE: Real-time gray-scale sonographic imaging of the kidneys and bladder was performed including duplex spectral and color Doppler evaluation of the renal arteries.     COMPARISON: Renal artery duplex 8/14/2024     RENAL ULTRASOUND FINDINGS:    RIGHT KIDNEY: Increased parenchymal echogenicity without hydronephrosis, measuring approximately 10.7 x 5.8 x 5.4 cm.     LEFT KIDNEY: Increased parenchymal echogenicity without hydronephrosis, measuring approximately 8.8 x 5.3 x 5.5 cm.     The bladder is decompressed with indwelling Fischer catheter     DUPLEX ARTERIAL ULTRASOUND FINDINGS:     SYSTOLIC VELOCITIES (cm/s):  RIGHT RENAL ARTERY:  Proximal: 60                Mid: 61                 Distal: 45                    LEFT RENAL ARTERY:  Proximal: 49                Mid: 40                 Distal: 32                    Aorta: 86     Arcuate arterial resistive indices range from 0.7-0.8 on the right and 0.7-0.8 on the left.                                                                      IMPRESSION:  1.  Echogenic kidneys. No hydronephrosis.  2.  Negative renal artery duplex.     Apryl Peguero MD  [facility]  [phone]

## 2024-08-15 NOTE — PLAN OF CARE
Problem: Adult Inpatient Plan of Care  Goal: Optimal Comfort and Wellbeing  Outcome: Progressing   Goal Outcome Evaluation:    Had repeat PET scan today. Also had speech therapy consult. Fischer catheter patent. Lethargic, unwilling to arouse to take oral medication at end of shift. This fluctuates, at time is alert and oriented, talking and requesting food and drink. Other times is too lethargic to safely take medications. Speech therapy adjusted diet to reflect this lethargy, thickened liquid and pureed diet.

## 2024-08-15 NOTE — PLAN OF CARE
[facility] care 1900 to 0730. A&O x 4. Assist x 2, team lift. Tele is NSR. 2L O2 via nasal cannula. C/O generalized pain, PRN Tylenol and Oxycodone given (see MAR). C/O nausea, PRN Compazine given (see MAR). Call light within reach, able to make needs known. Bed alarm on for safety.    Problem: Comorbidity Management  Goal: Maintenance of Heart Failure Symptom Control  Intervention: Maintain Heart Failure Management  Recent Flowsheet Documentation  Taken 8/15/2024 0504 by Caridad Ho RN  Medication Review/Management: medications reviewed  Taken 8/14/2024 2358 by Caridad Ho RN  Medication Review/Management: medications reviewed  Taken 8/14/2024 1955 by Caridad Ho RN  Medication Review/Management: medications reviewed     Problem: Heart Failure  Goal: Optimal Coping  Intervention: Support Psychosocial Response  Recent Flowsheet Documentation  Taken 8/15/2024 0504 by Caridad Ho RN  Supportive Measures: active listening utilized  Taken 8/14/2024 2358 by Caridad Ho RN  Supportive Measures: active listening utilized  Taken 8/14/2024 1955 by Caridad Ho RN  Supportive Measures: active listening utilized

## 2024-08-15 NOTE — PROGRESS NOTES
"Imp/plan:  Ischemic CM - viability study complete - awaiting final result - on prelim review apex and inf/base appears nonviable.   Cr today 2.42, nephrology consulted, on asp, atoravstatin carvedilol, hydralazine, NTG paste, spironolactone, await final results PET and CV input. Spoke with [facility] radiology apex is infarcted, he is worried study has discordance in data and agreement to repeat PET this AM.   He is not certain whether he wants CABG vs PCI vs medical therapy alone but not ruled out.   He agreed to PET.  Noted rales on exam bases, defer diuretics to nephrology  CAD - cont aspirin/heparin/carvedilol and NTG paste     Review of Systems: 12 points negative other than above    /69 (BP Location: Left arm)   Pulse 77   Temp 97.4  F (36.3  C) (Oral)   Resp 18   Wt 62.6 kg (138 lb 0.1 oz)   SpO2 98%   BMI 20.38 kg/m    JVP<7cm, carotids normal  Lungs clear  Cor RRR no c,r,m  Abs soft +BS, no mass  Ext no c,c,e    Lab Results   Component Value Date    HGB 8.0 (L) 08/15/2024     Lab Results   Component Value Date     08/15/2024     No results found for: \"CREATININE\"  No components found for: \"K\"          Scar Lazo MD  Interventional Cardiology   [facility]  [phone]    "

## 2024-08-16 ENCOUNTER — APPOINTMENT (OUTPATIENT)
Dept: SPEECH THERAPY | Facility: HOSPITAL | Age: 59
End: 2024-08-16
Attending: STUDENT IN AN ORGANIZED HEALTH CARE EDUCATION/TRAINING PROGRAM
Payer: COMMERCIAL

## 2024-08-16 ENCOUNTER — APPOINTMENT (OUTPATIENT)
Dept: PHYSICAL THERAPY | Facility: HOSPITAL | Age: 59
End: 2024-08-16
Attending: STUDENT IN AN ORGANIZED HEALTH CARE EDUCATION/TRAINING PROGRAM
Payer: COMMERCIAL

## 2024-08-16 LAB
ALBUMIN SERPL BCG-MCNC: 2.5 G/DL (ref 3.5–5.2)
ALBUMIN UR-MCNC: 100 MG/DL
ANION GAP SERPL CALCULATED.3IONS-SCNC: 15 MMOL/L (ref 7–15)
APPEARANCE UR: ABNORMAL
ATRIAL RATE - MUSE: 74 BPM
BACTERIA #/AREA URNS HPF: ABNORMAL /HPF
BILIRUB UR QL STRIP: NEGATIVE
BUN SERPL-MCNC: 51.9 MG/DL (ref 8–23)
CALCIUM SERPL-MCNC: 8.1 MG/DL (ref 8.8–10.4)
CHLORIDE SERPL-SCNC: 103 MMOL/L (ref 98–107)
COLOR UR AUTO: YELLOW
CREAT SERPL-MCNC: 1.84 MG/DL (ref 0.67–1.17)
DIASTOLIC BLOOD PRESSURE - MUSE: NORMAL MMHG
EGFRCR SERPLBLD CKD-EPI 2021: 41 ML/MIN/1.73M2
ERYTHROCYTE [DISTWIDTH] IN BLOOD BY AUTOMATED COUNT: 12.4 % (ref 10–15)
GLUCOSE BLDC GLUCOMTR-MCNC: 107 MG/DL (ref 70–99)
GLUCOSE BLDC GLUCOMTR-MCNC: 119 MG/DL (ref 70–99)
GLUCOSE BLDC GLUCOMTR-MCNC: 144 MG/DL (ref 70–99)
GLUCOSE BLDC GLUCOMTR-MCNC: 162 MG/DL (ref 70–99)
GLUCOSE BLDC GLUCOMTR-MCNC: 204 MG/DL (ref 70–99)
GLUCOSE BLDC GLUCOMTR-MCNC: 268 MG/DL (ref 70–99)
GLUCOSE BLDC GLUCOMTR-MCNC: 305 MG/DL (ref 70–99)
GLUCOSE SERPL-MCNC: 120 MG/DL (ref 70–99)
GLUCOSE UR STRIP-MCNC: 300 MG/DL
HCO3 SERPL-SCNC: 24 MMOL/L (ref 22–29)
HCT VFR BLD AUTO: 23.8 % (ref 40–53)
HGB BLD-MCNC: 8.1 G/DL (ref 13.3–17.7)
HGB UR QL STRIP: ABNORMAL
HYALINE CASTS: 11 /LPF
INTERPRETATION ECG - MUSE: NORMAL
KETONES UR STRIP-MCNC: NEGATIVE MG/DL
LEUKOCYTE ESTERASE UR QL STRIP: ABNORMAL
MCH RBC QN AUTO: 28.8 PG (ref 26.5–33)
MCHC RBC AUTO-ENTMCNC: 34 G/DL (ref 31.5–36.5)
MCV RBC AUTO: 85 FL (ref 78–100)
MUCOUS THREADS #/AREA URNS LPF: PRESENT /LPF
NITRATE UR QL: NEGATIVE
P AXIS - MUSE: 58 DEGREES
PH UR STRIP: 5.5 [PH] (ref 5–7)
PHOSPHATE SERPL-MCNC: 3.8 MG/DL (ref 2.5–4.5)
PLATELET # BLD AUTO: 334 10E3/UL (ref 150–450)
POTASSIUM SERPL-SCNC: 3.9 MMOL/L (ref 3.4–5.3)
PR INTERVAL - MUSE: 140 MS
QRS DURATION - MUSE: 100 MS
QT - MUSE: 426 MS
QTC - MUSE: 472 MS
R AXIS - MUSE: 19 DEGREES
RBC # BLD AUTO: 2.81 10E6/UL (ref 4.4–5.9)
RBC URINE: 50 /HPF
SODIUM SERPL-SCNC: 142 MMOL/L (ref 135–145)
SODIUM UR-SCNC: 35 MMOL/L
SP GR UR STRIP: 1.02 (ref 1–1.03)
SQUAMOUS EPITHELIAL: <1 /HPF
SYSTOLIC BLOOD PRESSURE - MUSE: NORMAL MMHG
T AXIS - MUSE: 267 DEGREES
TROPONIN T SERPL HS-MCNC: 159 NG/L
UFH PPP CHRO-ACNC: 0.39 IU/ML
UROBILINOGEN UR STRIP-MCNC: <2 MG/DL
VENTRICULAR RATE- MUSE: 74 BPM
WBC # BLD AUTO: 7.5 10E3/UL (ref 4–11)
WBC CLUMPS #/AREA URNS HPF: PRESENT /HPF
WBC URINE: 73 /HPF

## 2024-08-16 PROCEDURE — 250N000013 HC RX MED GY IP 250 OP 250 PS 637

## 2024-08-16 PROCEDURE — 250N000013 HC RX MED GY IP 250 OP 250 PS 637: Performed by: INTERNAL MEDICINE

## 2024-08-16 PROCEDURE — 250N000013 HC RX MED GY IP 250 OP 250 PS 637: Performed by: STUDENT IN AN ORGANIZED HEALTH CARE EDUCATION/TRAINING PROGRAM

## 2024-08-16 PROCEDURE — 210N000001 HC R&B IMCU HEART CARE

## 2024-08-16 PROCEDURE — 99222 1ST HOSP IP/OBS MODERATE 55: CPT | Performed by: STUDENT IN AN ORGANIZED HEALTH CARE EDUCATION/TRAINING PROGRAM

## 2024-08-16 PROCEDURE — 84300 ASSAY OF URINE SODIUM: CPT | Performed by: INTERNAL MEDICINE

## 2024-08-16 PROCEDURE — 80069 RENAL FUNCTION PANEL: CPT | Performed by: INTERNAL MEDICINE

## 2024-08-16 PROCEDURE — 250N000011 HC RX IP 250 OP 636: Performed by: NURSE PRACTITIONER

## 2024-08-16 PROCEDURE — 84484 ASSAY OF TROPONIN QUANT: CPT

## 2024-08-16 PROCEDURE — 97162 PT EVAL MOD COMPLEX 30 MIN: CPT | Mod: GP

## 2024-08-16 PROCEDURE — 84133 ASSAY OF URINE POTASSIUM: CPT | Performed by: INTERNAL MEDICINE

## 2024-08-16 PROCEDURE — 250N000013 HC RX MED GY IP 250 OP 250 PS 637: Performed by: NURSE PRACTITIONER

## 2024-08-16 PROCEDURE — 82436 ASSAY OF URINE CHLORIDE: CPT | Performed by: INTERNAL MEDICINE

## 2024-08-16 PROCEDURE — 99233 SBSQ HOSP IP/OBS HIGH 50: CPT | Performed by: STUDENT IN AN ORGANIZED HEALTH CARE EDUCATION/TRAINING PROGRAM

## 2024-08-16 PROCEDURE — 99232 SBSQ HOSP IP/OBS MODERATE 35: CPT | Performed by: INTERNAL MEDICINE

## 2024-08-16 PROCEDURE — 99233 SBSQ HOSP IP/OBS HIGH 50: CPT | Performed by: INTERNAL MEDICINE

## 2024-08-16 PROCEDURE — 93005 ELECTROCARDIOGRAM TRACING: CPT

## 2024-08-16 PROCEDURE — 85027 COMPLETE CBC AUTOMATED: CPT | Performed by: NURSE PRACTITIONER

## 2024-08-16 PROCEDURE — 36415 COLL VENOUS BLD VENIPUNCTURE: CPT

## 2024-08-16 PROCEDURE — 250N000011 HC RX IP 250 OP 636: Performed by: INTERNAL MEDICINE

## 2024-08-16 PROCEDURE — 81001 URINALYSIS AUTO W/SCOPE: CPT | Performed by: INTERNAL MEDICINE

## 2024-08-16 PROCEDURE — 93010 ELECTROCARDIOGRAM REPORT: CPT | Performed by: STUDENT IN AN ORGANIZED HEALTH CARE EDUCATION/TRAINING PROGRAM

## 2024-08-16 PROCEDURE — 36415 COLL VENOUS BLD VENIPUNCTURE: CPT | Performed by: NURSE PRACTITIONER

## 2024-08-16 PROCEDURE — 85520 HEPARIN ASSAY: CPT | Performed by: STUDENT IN AN ORGANIZED HEALTH CARE EDUCATION/TRAINING PROGRAM

## 2024-08-16 PROCEDURE — 92526 ORAL FUNCTION THERAPY: CPT | Mod: GN

## 2024-08-16 RX ORDER — ISOSORBIDE DINITRATE 10 MG/1
10 TABLET ORAL
Status: DISCONTINUED | OUTPATIENT
Start: 2024-08-16 | End: 2024-08-23 | Stop reason: HOSPADM

## 2024-08-16 RX ORDER — FUROSEMIDE 20 MG
20 TABLET ORAL
Status: DISCONTINUED | OUTPATIENT
Start: 2024-08-16 | End: 2024-08-16

## 2024-08-16 RX ORDER — FUROSEMIDE 20 MG
20 TABLET ORAL
Status: DISCONTINUED | OUTPATIENT
Start: 2024-08-16 | End: 2024-08-21

## 2024-08-16 RX ADMIN — INSULIN GLARGINE 10 UNITS: 100 INJECTION, SOLUTION SUBCUTANEOUS at 22:14

## 2024-08-16 RX ADMIN — INSULIN ASPART 3 UNITS: 100 INJECTION, SOLUTION INTRAVENOUS; SUBCUTANEOUS at 20:25

## 2024-08-16 RX ADMIN — HEPARIN SODIUM 1250 UNITS/HR: 10000 INJECTION, SOLUTION INTRAVENOUS at 23:56

## 2024-08-16 RX ADMIN — HYDRALAZINE HYDROCHLORIDE 10 MG: 10 TABLET ORAL at 09:38

## 2024-08-16 RX ADMIN — DOXYCYCLINE HYCLATE 100 MG: 100 CAPSULE ORAL at 19:00

## 2024-08-16 RX ADMIN — INSULIN ASPART 2 UNITS: 100 INJECTION, SOLUTION INTRAVENOUS; SUBCUTANEOUS at 23:44

## 2024-08-16 RX ADMIN — HEPARIN SODIUM 1250 UNITS/HR: 10000 INJECTION, SOLUTION INTRAVENOUS at 03:36

## 2024-08-16 RX ADMIN — CEFTRIAXONE SODIUM 1 G: 1 INJECTION, POWDER, FOR SOLUTION INTRAMUSCULAR; INTRAVENOUS at 17:04

## 2024-08-16 RX ADMIN — INSULIN ASPART 4 UNITS: 100 INJECTION, SOLUTION INTRAVENOUS; SUBCUTANEOUS at 17:03

## 2024-08-16 RX ADMIN — CARVEDILOL 3.12 MG: 3.12 TABLET, FILM COATED ORAL at 09:38

## 2024-08-16 RX ADMIN — HYDRALAZINE HYDROCHLORIDE 10 MG: 10 TABLET ORAL at 20:23

## 2024-08-16 RX ADMIN — ACETAMINOPHEN 650 MG: 325 TABLET ORAL at 19:13

## 2024-08-16 RX ADMIN — ISOSORBIDE DINITRATE 10 MG: 10 TABLET ORAL at 11:50

## 2024-08-16 RX ADMIN — FUROSEMIDE 20 MG: 20 TABLET ORAL at 17:03

## 2024-08-16 RX ADMIN — Medication 3 MG: at 23:50

## 2024-08-16 RX ADMIN — SPIRONOLACTONE 12.5 MG: 25 TABLET, FILM COATED ORAL at 09:38

## 2024-08-16 RX ADMIN — NITROGLYCERIN 15 MG: 20 OINTMENT TOPICAL at 09:37

## 2024-08-16 RX ADMIN — HYDRALAZINE HYDROCHLORIDE 10 MG: 10 TABLET ORAL at 14:32

## 2024-08-16 RX ADMIN — DOXYCYCLINE HYCLATE 100 MG: 100 CAPSULE ORAL at 09:37

## 2024-08-16 RX ADMIN — INSULIN ASPART 1 UNITS: 100 INJECTION, SOLUTION INTRAVENOUS; SUBCUTANEOUS at 11:57

## 2024-08-16 RX ADMIN — HYDROXYZINE HYDROCHLORIDE 25 MG: 25 TABLET ORAL at 19:13

## 2024-08-16 RX ADMIN — ASPIRIN 81 MG: 81 TABLET, COATED ORAL at 09:38

## 2024-08-16 RX ADMIN — ISOSORBIDE DINITRATE 10 MG: 10 TABLET ORAL at 17:04

## 2024-08-16 RX ADMIN — CARVEDILOL 3.12 MG: 3.12 TABLET, FILM COATED ORAL at 17:03

## 2024-08-16 RX ADMIN — ACETAMINOPHEN 650 MG: 325 TABLET ORAL at 09:38

## 2024-08-16 RX ADMIN — HYDROXYZINE HYDROCHLORIDE 25 MG: 25 TABLET ORAL at 05:41

## 2024-08-16 RX ADMIN — HYDROXYZINE HYDROCHLORIDE 25 MG: 25 TABLET ORAL at 11:50

## 2024-08-16 RX ADMIN — INSULIN ASPART 1 UNITS: 100 INJECTION, SOLUTION INTRAVENOUS; SUBCUTANEOUS at 00:17

## 2024-08-16 RX ADMIN — ATORVASTATIN CALCIUM 40 MG: 40 TABLET, FILM COATED ORAL at 19:00

## 2024-08-16 ASSESSMENT — ACTIVITIES OF DAILY LIVING (ADL)
ADLS_ACUITY_SCORE: 32
ADLS_ACUITY_SCORE: 33
ADLS_ACUITY_SCORE: 32
ADLS_ACUITY_SCORE: 32
ADLS_ACUITY_SCORE: 33
ADLS_ACUITY_SCORE: 33
ADLS_ACUITY_SCORE: 32
ADLS_ACUITY_SCORE: 33
ADLS_ACUITY_SCORE: 32
ADLS_ACUITY_SCORE: 33
ADLS_ACUITY_SCORE: 32
ADLS_ACUITY_SCORE: 33

## 2024-08-16 NOTE — PLAN OF CARE
Problem: Pain Acute  Goal: Optimal Pain Control and Function  Outcome: Progressing  Intervention: Prevent or Manage Pain  Recent Flowsheet Documentation  Taken 8/15/2024 1730 by Cher Hollis RN  Medication Review/Management: medications reviewed  Intervention: Optimize Psychosocial Wellbeing  Recent Flowsheet Documentation  Taken 8/15/2024 1730 by Cher Hollis RN  Supportive Measures: active listening utilized     Problem: Heart Failure  Goal: Optimal Coping  Outcome: Progressing  Intervention: Support Psychosocial Response  Recent Flowsheet Documentation  Taken 8/15/2024 1730 by Cher Hollis RN  Supportive Measures: active listening utilized   Goal Outcome Evaluation:       Pt was lethargic at the start of the shift, woke up at 1700 and was able to answer questions appropriately. At 2100, pt is restless, c/o SOB, denies chest pain but was pointing his chest and stated he can't breath. He is on O2 at 2 LPM per nasal cannula, SpO2 94 to 96%. He has non productive cough, lung sounds diminished, crackles. Tylenol given without relief. He is on Heparin drip at 1250 units/hr, next anti XA  will be in AM. HR in the 70's to 80's, NSR with inverted TW, slight ST depression in lead 2. PET scan done today, final result pending per cardiology. Called house officer to assess pt for restlessness and SOB, advised to continue to monitor pt. Melatonin given to help pt sleep.

## 2024-08-16 NOTE — CARE PLAN
Heart Failure Care Map  GOALS TO BE MET BEFORE DISCHARGE:    1. Decrease congestion and/or edema with diuretic therapy to achieve near optimal volume status.     Dyspnea improved: No, further care required to meet this goal. Please explain still has SOB   Edema improved: Yes, satisfactory for discharge.        Last 24 hour I/O:   Intake/Output Summary (Last 24 hours) at 8/15/2024 2217  Last data filed at 8/15/2024 2129  Gross per 24 hour   Intake 511 ml   Output 700 ml   Net -189 ml           Net I/O and Weights since admission:   07/16 2300 - 08/15 2259  In: 511 [P.O.:440; I.V.:71]  Out: 4950 [Urine:4950]  Net: -4439     Vitals:    08/14/24 0501 08/15/24 0616   Weight: 62.5 kg (137 lb 12.6 oz) 62.6 kg (138 lb 0.1 oz)       2.  O2 sats > 90% on room air, or at prior home O2 therapy level.      Able to wean O2 this shift to keep sats above 90%?: No, further care required to meet this goal. Please explain need O2 for SOB   Does patient use Home O2? No          Current oxygenation status:   SpO2: 97 %     O2 Device: Nasal cannula, Oxygen Delivery: 2 LPM    3.  Tolerates ambulation and mobility near baseline.     Ambulation: No, further care required to meet this goal. Please explain pt is weak   Times patient ambulated with staff this shift: 0    Please review the Heart Failure Care Map for additional HF goal outcomes.    Cher Hollis RN  8/15/2024

## 2024-08-16 NOTE — PROGRESS NOTES
08/16/24 1030   Appointment Info   Signing Clinician's Name / Credentials (PT) Fallon Cervantes PT   Living Environment   People in Home alone   Current Living Arrangements mobile home   Home Accessibility stairs to enter home   Number of Stairs, Main Entrance 3   Stair Railings, Main Entrance railing on left side (ascending)   Transportation Anticipated family or friend will provide   Self-Care   Current Activity Tolerance good   Equipment Currently Used at Home none   Fall history within last six months yes   Number of times patient has fallen within last six months 1   Activity/Exercise/Self-Care Comment Indep with ADLs and mobility and does not use any AD.   General Information   Onset of Illness/Injury or Date of Surgery 08/13/24   Referring Physician Qian Correia MD   Patient/Family Therapy Goals Statement (PT) none stated.   Pertinent History of Current Problem (include personal factors and/or comorbidities that impact the POC) Abdulaziz Rausch is a 59 year old male admitted on 8/13/2024. He has h/o HTN, DM-II who was admitted to  for acute CHF exacerbation. Course was complicated by CHRISTY. He was started on diuresis and was transferred to [facility] cardiac cath.   Existing Precautions/Restrictions fall  (hand off to MD,)   Weight-Bearing Status - LLE weight-bearing as tolerated   Weight-Bearing Status - RLE weight-bearing as tolerated   Cognition   Affect/Mental Status (Cognition) WNL   Orientation Status (Cognition) oriented x 4   Follows Commands (Cognition) WFL   Pain Assessment   Patient Currently in Pain   (HA and was not rated. No c/o chest pain or dizziness.)   Range of Motion (ROM)   Range of Motion ROM is WNL   ROM Comment Bilat LEs   Strength (Manual Muscle Testing)   Strength (Manual Muscle Testing) strength is WNL   Strength Comments with bilat DF 3+/5.   Bed Mobility   Comment, (Bed Mobility) Supine>sit with supervision.  No c/o dizziness or chest pain.   Transfers   Comment, (Transfers)  Sit<>stand with FWW with CGA with no LOB.   Gait/Stairs (Locomotion)   [place] Level (Gait) verbal cues;contact guard  (Resting HR 84 and HR after sitting 85.  Resting /73 .  No chest pain or dizziness.)   Assistive Device (Gait) walker, front-wheeled   Distance in Feet (Gait) 3'   Pattern (Gait) step-through   Deviations/Abnormal Patterns (Gait) abdelrahman decreased;gait speed decreased   Balance   Balance Comments CGA with FWW   Sensory Examination   Sensory Perception Comments dec light touch bilat feet.   Clinical Impression   Criteria for Skilled Therapeutic Intervention Yes, treatment indicated   PT Diagnosis (PT) Impaired functional mobility.   Influenced by the following impairments dec bal, dec strength.  dec enduranced.   Functional limitations due to impairments transfers and gait.   Clinical Presentation (PT Evaluation Complexity) evolving   Clinical Presentation Rationale Pt presents medically diagnosed.   Clinical Decision Making (Complexity) moderate complexity   Planned Therapy Interventions (PT) bed mobility training;gait training;stair training;strengthening;transfer training   Risk & Benefits of therapy have been explained evaluation/treatment results reviewed;care plan/treatment goals reviewed;risks/benefits reviewed;patient;participants voiced agreement with care plan   PT Total Evaluation Time   PT Eval, Moderate Complexity Minutes (08888) 25   Physical Therapy Goals   PT Frequency Daily   PT Predicted Duration/Target Date for Goal Attainment 08/23/24   PT Goals Transfers;Bed Mobility;Gait;PT Goal 1;Stairs   PT: Bed Mobility Independent;Supine to/from sit;Rolling   PT: Transfers Modified independent;Sit to/from stand;Bed to/from chair;Assistive device   PT: Gait Minimal assist;Rolling walker;100 feet   PT: Stairs Minimal assist;3 stairs;Rail on left   PT Discharge Planning   PT Plan check status on possible heart surg.   PT Discharge Recommendation (DC Rec) Transitional Care  Facility  (pending progress with gait and possible surg)   PT Rationale for DC Rec Pt did need assist with transfers at this point due to dec bal, Pt did use the FWW.  .   PT Brief overview of current status PT eval.   PT Equipment Needed at Discharge   (He will need a FWW.)   Total Session Time   Total Session Time (sum of timed and untimed services) 25

## 2024-08-16 NOTE — PLAN OF CARE
Problem: Adult Inpatient Plan of Care  Goal: Absence of Hospital-Acquired Illness or Injury  Intervention: Prevent Infection  Recent Flowsheet Documentation  Taken 8/15/2024 2315 by Cher Hollis RN  Infection Prevention:   environmental surveillance performed   rest/sleep promoted    Problem: Adult Inpatient Plan of Care  Goal: Optimal Comfort and Wellbeing  Intervention: Monitor Pain and Promote Comfort  Recent Flowsheet Documentation  Taken 8/15/2024 2354 by Cher Hollis RN  Pain Management Interventions: Provider notified (comment)    Problem: Heart Failure  Goal: Optimal Coping  8/16/2024 0233 by Cher Hollis RN  Outcome: Progressing  8/15/2024 2142 by Cher Hollis RN  Outcome: Progressing  Intervention: Support Psychosocial Response  Recent Flowsheet Documentation  Taken 8/15/2024 2315 by Cher Hollis RN  Supportive Measures: active listening utilized  Taken 8/15/2024 2030 by Cher Hollis RN  Supportive Measures: active listening utilized  Taken 8/15/2024 1730 by Cher Hollis RN  Supportive Measures: active listening utilized  Goal: Optimal Cardiac Output  8/16/2024 0233 by Cher Hollis RN  Outcome: Progressing  8/15/2024 2142 by Cher Hollis RN  Outcome: Progressing  Intervention: Optimize Cardiac Output  Recent Flowsheet Documentation  Taken 8/15/2024 2315 by Cher Hollis RN  Environmental Support: calm environment promoted  Taken 8/15/2024 2030 by Cher Hollis RN  Environmental Support: calm environment promoted  Taken 8/15/2024 1730 by Cher Hollis RN  Environmental Support: calm environment promoted  Goal: Stable Heart Rate and Rhythm  8/16/2024 0233 by Cher Hollis RN  Outcome: Progressing  8/15/2024 2142 by Cher Hollis RN  Outcome: Progressing  Goal: Optimal Functional Ability  8/16/2024 0233 by Cher Hollis RN  Outcome:  Progressing  8/15/2024 2142 by Cher Hollis RN  Outcome: Progressing  Intervention: Optimize Functional Ability  Recent Flowsheet Documentation  Taken 8/15/2024 2315 by Cher Hollis RN  Activity Management:   activity adjusted per tolerance   bedrest  Taken 8/15/2024 2030 by Cher Hollis RN  Activity Management:   activity adjusted per tolerance   bedrest  Taken 8/15/2024 1730 by Cher Hollis RN  Activity Management:   activity adjusted per tolerance   bedrest  Goal: Fluid and Electrolyte Balance  8/16/2024 0233 by Cher Hollis RN  Outcome: Progressing  8/15/2024 2142 by Cher Hollis RN  Outcome: Progressing  Goal: Improved Oral Intake  8/16/2024 0233 by Cher Hollis RN  Outcome: Progressing  8/15/2024 2142 by Cher Hollis RN  Outcome: Progressing  Goal: Effective Oxygenation and Ventilation  8/16/2024 0233 by Cher Hollis RN  Outcome: Progressing  8/15/2024 2142 by Cher Hollis RN  Outcome: Progressing  Intervention: Promote Airway Secretion Clearance  Recent Flowsheet Documentation  Taken 8/15/2024 2315 by Cher Hollis RN  Cough And Deep Breathing: done with encouragement  Activity Management:   activity adjusted per tolerance   bedrest  Taken 8/15/2024 2030 by Cher Hollis RN  Cough And Deep Breathing: done with encouragement  Activity Management:   activity adjusted per tolerance   bedrest  Taken 8/15/2024 1730 by Cher Hollis RN  Activity Management:   activity adjusted per tolerance   bedrest  Intervention: Optimize Oxygenation and Ventilation  Recent Flowsheet Documentation  Taken 8/15/2024 2315 by Cher Hollis RN  Head of Bed (HOB) Positioning: HOB at 20-30 degrees  Taken 8/15/2024 2030 by Cher Hollis RN  Head of Bed (HOB) Positioning: HOB at 20-30 degrees  Taken 8/15/2024 1730 by Cher Hollis RN  Head of Bed (HOB) Positioning: HOB at  20-30 degrees  Goal: Effective Breathing Pattern During Sleep  Outcome: Progressing  Intervention: Monitor and Manage Obstructive Sleep Apnea  Recent Flowsheet Documentation  Taken 8/15/2024 2315 by Cher Hollis RN  Medication Review/Management: medications reviewed  Taken 8/15/2024 2030 by Cher Hollis RN  Medication Review/Management: medications reviewed  Taken 8/15/2024 1730 by Cher Hollis RN  Medication Review/Management: medications reviewed     Goal Outcome Evaluation:       Pt is alert and oriented x 4. He was restless at the start of the shift, c/o SOB and chest pressure in the mid chest then later denied chest pain. Melatonin and Atarax were already given. O2 increased to 3 LPM per  nasal  cannula for comfort, spO2 ranges 97 to 99%. House officer called to assess pt, ordered 12 lead ECG and troponin to r/o cardiac issue. There was no significant changes in the 12 lead ECG, troponin ws 159, pt already sleeping after the blood draw. House officer updated. Will continue to monitor breathing and chest pain. Pt woke up at 0400 and was asking for anti anxiety med, he verbalized it was working. Atarax 25 mg given at 0541.

## 2024-08-16 NOTE — PLAN OF CARE
Problem: Adult Inpatient Plan of Care  Goal: Readiness for Transition of Care  Outcome: Not Progressing   Goal Outcome Evaluation:    Awaiting CV surgery plans. IV heparin infusing. Patient more alert today than yesterday. Eating and drinking well, not coughing when taking pills today. Speech therapist advanced his diet. Lasix to be given later today, after the urine samples are collected and sent to lab.

## 2024-08-16 NOTE — PROGRESS NOTES
RENAL PROGRESS NOTE    CC:  CHRISTY    ASSESSMENT & PLAN:   CHRISTY with underlying CKD 2: CHRISTY is likely secondary to cardiorenal physiology/hemodynamic.  Most recent A1c was 10.1 and his CKD is likely secondary to diabetic nephropathy.  Also ATN could be possible from prolonged prerenal state.  He was still positive for amphetamine and could cause ATN.  He was also found to have hydronephrosis and possibly from obstructive uropathy which is now resolved.  He also received contrast from coronary angiogram on 8/13 and it could possibly will contribute but contrast associated nephropathy.    Urine sodium 115 and suspect from diuretic induced.  Renal ultrasound showed left kidneys slightly smaller than the right side.  No indication for dialysis yet but will watch for age as he will likely get aggressive cardiac intervention in next few days.  Kidney function improving.   Urine output seems improving.   Strict I's and O's  Avoid nephrotoxic medications  Avoid extreme hypo or hypertension  Watch for indication for dialysis     Electrolytes: Sodium 142, potassium 3.9.  Monitor for now     Acid-base: Bicarb 24.  No need for placement.    SUBJECTIVE: Patient seen and examined at bedside.  More awake.  Denies CP, sob.  Kidney function improving.    OBJECTIVE:  Physical Exam   Temp: 99.4  F (37.4  C) Temp src: Oral BP: 115/57 Pulse: 79   Resp: 18 SpO2: 96 % O2 Device: Nasal cannula Oxygen Delivery: 3 LPM  Vitals:    08/14/24 0501 08/15/24 0616 08/16/24 0404   Weight: 62.5 kg (137 lb 12.6 oz) 62.6 kg (138 lb 0.1 oz) 62.5 kg (137 lb 12.6 oz)     Vital Signs with Ranges  Temp:  [97.9  F (36.6  C)-99.4  F (37.4  C)] 99.4  F (37.4  C)  Pulse:  [74-83] 79  Resp:  [18-24] 18  BP: (115-152)/(57-76) 115/57  SpO2:  [95 %-99 %] 96 %  I/O last 3 completed shifts:  In: 613.46 [P.O.:440; I.V.:173.46]  Out: 1100 [Urine:1100]    @TMAXR(24)@    Patient Vitals for the past 72 hrs:   Weight   08/16/24 0404 62.5 kg (137 lb 12.6 oz)   08/15/24 0616  62.6 kg (138 lb 0.1 oz)   08/14/24 0501 62.5 kg (137 lb 12.6 oz)     Intake/Output Summary (Last 24 hours) at 8/15/2024 1415  Last data filed at 8/14/2024 2119  Gross per 24 hour   Intake --   Output 800 ml   Net -800 ml       PHYSICAL EXAM:  General awake, appears comfortable, NAD  Cardiovascular - Regular rate and rhythm, no rub  Respiratory - Clear to auscultation bilaterally, no crackles or wheezes  Abd: BS present, no guarding or pain with palpation, no ascites  Extremities - No lower extremity edema bilaterally  Skin: dry, intact, no rash, good turgor  Neuro:  Grossly intact, no focal deficits  MSK:  Grossly intact  Psych:  Normal affect    LABORATORY STUDIES:     Recent Labs   Lab 08/16/24  0455 08/15/24  0621 08/14/24  0502 08/13/24  0949 08/12/24  0550 08/11/24  1451   WBC 7.5 9.0 10.2 9.6 10.5 8.1   RBC 2.81* 2.85* 3.17* 2.95* 3.29* 3.71*   HGB 8.1* 8.0* 8.9* 8.5* 9.6* 10.7*   HCT 23.8* 24.6* 27.2* 24.8* 28.3* 30.9*    302 390 352 340 387       Basic Metabolic Panel:  Recent Labs   Lab 08/16/24  1154 08/16/24  0749 08/16/24  0455 08/16/24  0358 08/16/24  0012 08/15/24  2013 08/15/24  0841 08/15/24  0621 08/14/24  0817 08/14/24  0502 08/13/24  1339 08/13/24  0949 08/12/24  0652 08/12/24  0550 08/11/24  1805 08/11/24  1451   NA  --   --  142  --   --   --   --  138  --  138  --  139  --  137  --  140   POTASSIUM  --   --  3.9  --   --   --   --  3.8  --  4.4  --  4.2  --  4.3  --  4.0   CHLORIDE  --   --  103  --   --   --   --  101  --  101  --  104  --  105  --  106   CO2  --   --  24  --   --   --   --  26  --  24  --  24  --  23  --  26   BUN  --   --  51.9*  --   --   --   --  60.8*  --  59.6*  --  49.6*  --  32.9*  --  31.1*   CR  --   --  1.84*  --   --   --   --  2.42*  --  2.55*  --  2.28*  --  1.69*  --  1.52*   * 107* 120* 119* 144* 140*   < > 151*   < > 143*   < > 142*   < > 298*   < > 262*   AMIRAH  --   --  8.1*  --   --   --   --  8.2*  --  8.5*  --  8.7*  --  8.3*  --  9.0    < > =  values in this interval not displayed.       INRNo lab results found in last 7 days.     Recent Labs   Lab Test 08/16/24  0455 08/15/24  0621   WBC 7.5 9.0   HGB 8.1* 8.0*    302       Personally reviewed current labs    EXAM: BILATERAL RENAL ARTERY DUPLEX AND RENAL ULTRASOUND     INDICATION: Acute kidney injury     TECHNIQUE: Real-time gray-scale sonographic imaging of the kidneys and bladder was performed including duplex spectral and color Doppler evaluation of the renal arteries.     COMPARISON: Renal artery duplex 8/14/2024     RENAL ULTRASOUND FINDINGS:    RIGHT KIDNEY: Increased parenchymal echogenicity without hydronephrosis, measuring approximately 10.7 x 5.8 x 5.4 cm.     LEFT KIDNEY: Increased parenchymal echogenicity without hydronephrosis, measuring approximately 8.8 x 5.3 x 5.5 cm.     The bladder is decompressed with indwelling Fischer catheter     DUPLEX ARTERIAL ULTRASOUND FINDINGS:     SYSTOLIC VELOCITIES (cm/s):  RIGHT RENAL ARTERY:  Proximal: 60                Mid: 61                 Distal: 45                    LEFT RENAL ARTERY:  Proximal: 49                Mid: 40                 Distal: 32                    Aorta: 86     Arcuate arterial resistive indices range from 0.7-0.8 on the right and 0.7-0.8 on the left.                                                                      IMPRESSION:  1.  Echogenic kidneys. No hydronephrosis.  2.  Negative renal artery duplex.     Apryl Peguero MD  [facility]  [phone]

## 2024-08-16 NOTE — PROGRESS NOTES
[facility]    Medicine Progress Note - Hospitalist Service    Date of Admission:  8/13/2024    Assessment & Plan   Abdulaziz Rausch is a 59 year old male admitted on 8/13/2024. He has h/o HTN, DM-II who was admitted to  for acute CHF exacerbation. Course was complicated by CHRISTY. He was started on diuresis and was transferred to [facility] cardiac cath. Hospitalist service was consulted for medical co-management.       Elevated troponin, NSTEMI, MVCAD  -- Heparin gtt, Cardiac cath on 08/13: MVCAD  -- Lipitor, coreg.   -- Repeat PET showed stunned but viable left ventricular myocardium.  -- CTS consult appreciated: plan pending interdisciplinary conversations.    Acute decompensated HFrEF  Mild pulmonary hypertension  -- ProBNP: 80473  -- Discussed with nephrology. Will restart lasix 20 mg po bid after sending Urine sodium.  -- Strict I/O, daily weight, fluid restriction    Acute hypoxic respiratory failure  -- Likely 2/2 CHF, ?aspiration pneumonia  -- Oxygen as needed, wean as able    ?Aspiration pneumonia  -- CXR on 08/11: multifocal patchy opacities present involving b/l lungs including lung bases, Right upper hilar region, and left perihilar region likely representing infecious process. Small b/l pleural effusions.  -- No fever or leukocytosis but pt is more lethargic.   -- SLP eval  -- On ceftriaxone. Sputum culture as able    Acute metabolic encephalopathy  -- Multifactorial: severe CHF, NSTEMI, pneumonia, CHRISTY  -- VBG wnl. TSH 1.32  -- Delirium precaution  -- UDS + for amphetamine, negative for opiates.   -- CT-head: no acute changes.  -- Gabapentin held    CHRISTY on CKD-II  -- Likely 2/2 low perfusion from CHF  -- Avoid Nephrotoxins. Holding ACEI, Metformin, Jardiance  -- Lasix  -- Strict I/O, US-renal: Echogenic kidneys. No hydronephrosis. Negative renal artery duplex.  -- Nephrology consult appreciated    Acute urinary retention  B/l moderate hydronephrosis on ULS  -- [place].  -- Urology  consult appreciated  -- Repeat US-renal: No hydronephrosis    DM-II  -- A1c: 10.1%  -- Accu-checks, sliding scale, [id number] units, hypoglycemia protocol. Titrate as indicated    Essential hypertension  -- Monitor vital signs per protocol  -- holding Norvasc. On coreg    Normocytic Anemia, Chronic   AOCD  -- Iron panel reviewed. Monitor cbc  -- No e/o bleeding              Diet: Fluid restriction 2000 ML FLUID  Combination Diet Soft and Bite Sized Diet (level 6); Thin Liquids (level 0); Low Saturated Fat Na <2400mg Diet    DVT Prophylaxis: heparin gtt  Fischer Catheter: PRESENT, indication: Acute retention or obstruction  Lines: None     Cardiac Monitoring: ACTIVE order. Indication: Post- PCI/Angiogram (24 hours)  Code Status: Full Code      Clinically Significant Risk Factors              # Hypoalbuminemia: Lowest albumin = 2.5 g/dL at 8/16/2024  4:55 AM, will monitor as appropriate     # Hypertension: Noted on problem list  # Acute heart failure with reduced ejection fraction: last echo with EF <40% and receiving IV diuretics         # DMII: A1C = 10.1 % (Ref range: <5.7 %) within past 6 months, PRESENT ON ADMISSION        # Financial/Environmental Concerns: none               Disposition Plan     Medically Ready for Discharge: Anticipated in 2-4 Days             Qian Correia MD  Hospitalist Service  [facility]  Securely message with [employer] (more info)  Text page via [employer] Paging/Directory   ______________________________________________________________________    Interval History   Patient is seen and examined at bedside.   Pt is more alert today. Has some shortness of breath. No chest pain.  Daughter present during encounter.      Physical Exam   Vital Signs: Temp: 97.5  F (36.4  C) Temp src: Oral BP: 118/60 Pulse: 80   Resp: 22 SpO2: 96 % O2 Device: Nasal cannula Oxygen Delivery: 3 LPM  Weight: 137 lbs 12.6 oz    GEN: Alert and oriented. Not in acute distress.  HEENT: Atraumatic,  mucous membrane- moist and pink.  Chest: Bilateral air entry.  CVS: S1S2 regular.   Abdomen: Soft. Non-tender, non-distended. No organomegaly. No guarding or rigidity. Bowel sounds active.   Extremities: No pedal edema.  CNS: No involuntary movements.  Skin: no cyanosis or clubbing.     Medical Decision Making       49 MINUTES SPENT BY ME on the date of service doing chart review, history, exam, documentation & further activities per the note.      Data

## 2024-08-16 NOTE — PROGRESS NOTES
"Imp/plan:  Ischemic Cardiomyopathy with EF 15-20% awaiting final report, on review last night appears ant wall is viable, though has multiple comorbidities raising concern not candidate for CABG including severe CKD, probable lung disease (CXR concerning for pneumonia on 8/11/24 on antibiotics), discussed with the pt options and risks, benefits, goals and alternatives for CABG vs PCI vs medical therapy alone.   Will plan CV surgery consult today, cont asp,heparin, atorvastatin, carvedilol, hydralzine, change NTG paste to isordil.  If agreement CABG not an option, would consider PCI to RCA and LAD possibly staged next week, starting clopidogrel and wean off heparin.   Defer diuretics to nephrology given Cr, today improved 1.84 down from 2.55.     Review of Systems: 12 points negative other than above    /73 (BP Location: Left arm)   Pulse 82   Temp 98.1  F (36.7  C) (Oral)   Resp 20   Wt 62.5 kg (137 lb 12.6 oz)   SpO2 97%   BMI 20.35 kg/m    JVP<7cm, carotids normal  Lungs bilat rhonchi  Cor RRR no c,r,m  Abs soft +BS, no mass  Ext no c,c,e    Lab Results   Component Value Date    HGB 8.1 (L) 08/16/2024     Lab Results   Component Value Date     08/16/2024     No results found for: \"CREATININE\"  No components found for: \"K\"          Scar Lazo MD  Interventional Cardiology   [facility]  [phone]    "

## 2024-08-16 NOTE — CONSULTS
"      Cardiothoracic Surgery Consult    Date of Service: 8/16/2024    REFERRING CARDIOLOGIST: Dr. Quinteros    REASON FOR CONSULTATION: Consideration for surgical revascularization     HISTORY OF PRESENT ILLNESS: Abdulaziz Rausch is a 59 year old year-old male who has a PMH of PVD s/p multiple tow amputations, HTN, DMII (A1c 10.1), medication non-compliance, chronic anemia, heart failure, CKD, and MRSA who presented to the ED a few days ago for a one week history of chest pain, SOB, and leg swelling. He was ultimately diagnosed with acute congestive heart failure, elevated troponin, and CHRISTY. Subsequent coronary angiogram performed demonstrates severe multi-vessel coronary artery disease as below. Echo with EF 15-20% and mild pulmonary HTN. Preliminary viability study affirms viability in the anterior wall. CV Surgery is consulted for possible coronary artery bypass surgery. Prior to cardiac cath, patient was DNR/DNI.    The patient is not a good historian. Most of the above gleaned from the chart. He is quite lethargic today (reportedly better) and does not give straight answers, although I believe he is oriented. I briefly explained cardiac surgery and PCI. Also talked about the risks, specifically dialysis in his situation. He states he is not opposed to the best option to make him better.    Of note, daughter present for above conversation. She lives in [place], [place] and is largely estranged from her father. She last say him 1.5 years ago. She tries to check in on him via phone every couple of months, generally to no avail. Her only other sibling (brother) is deployed overseas. In a separate conversation, she tells me that her dad is an \"avid\" and daily meth user (typically snorts). Utox confirms presence of amphetamines. She believes his lethargy is related to meth withdrawal and anticipates his agitation building over the next few days. She is not aware of other drugs use. He lives in a trailer home where drug " addicts come and go all day--she says these folks would be his only means of support. She does not believe he will be compliant with medications, appointments, etc post-op. She is quite concerned about his surgery and recovery but is ultimately leaving the decision to him given that she will not have the capacity to care for him.    The patient's coronary artery disease risk factors include HTN, DMII, medication non-compliance, and meth use. He is weak and unsteady on his feet.    PAST MEDICAL HISTORY:   Past Medical History:   Diagnosis Date    Anemia     Depression     Diabetes mellitus, type 2 (H)     Diabetic foot infection (H) 7/1/2023    Diabetic foot ulcer (H)     History of MRSA infection 7/1/2023 Jan 2023 RIGHT foot wound    Hypertension     Non-healing ulcer of left foot (H) 7/1/2023    Osteomyelitis (H)        PAST SURGICAL HISTORY:   Past Surgical History:   Procedure Laterality Date    AMPUTATE FOOT Left 7/3/2023    Procedure: partial left fifth ray amputation, skin flap creation and closre, left foot;  Surgeon: Alejandro Mccoy DPM;  Location: [facility] OR    AMPUTATE TOE(S) Right 2/17/2023    Procedure: AMPUTATION, digits two and three right foot;  Surgeon: Ulises Jimenez DPM;  Location: [facility] OR    CV CORONARY ANGIOGRAM N/A 8/13/2024    Procedure: Coronary Angiogram;  Surgeon: Brandon Alejandro MD;  Location: [facility] CATH LAB CV    CV LEFT HEART CATH N/A 8/13/2024    Procedure: Left Heart Catheterization;  Surgeon: Brandon Alejandro MD;  Location: [facility] CATH LAB CV    CV RIGHT HEART CATH MEASUREMENTS RECORDED N/A 8/13/2024    Procedure: Right Heart Catheterization;  Surgeon: Brandon Alejandro MD;  Location: [facility] LAB CV    INCISION AND DRAINAGE FOOT, COMBINED Left 5/23/2023    Procedure: Incision and drainage of abscess left foot with;  Surgeon: Jf Huerta DPM;  Location: [facility] OR    IRRIGATION AND DEBRIDEMENT FOOT, COMBINED Left 5/23/2023    Procedure:  debridement and irrigation of wound left foot;  Surgeon: Jf Huerta DPM;  Location: [facility] OR    IRRIGATION AND DEBRIDEMENT TOE, COMBINED Right 1/20/2023    Procedure: IRRIGATION AND DEBRIDEMENT digits 2 and 3 right foot;  Surgeon: Jf Huerta DPM;  Location: [facility] OR    PICC SINGLE LUMEN PLACEMENT  5/25/2023            ALLERGIES:   No Known Allergies       CURRENT MEDICATIONS:  Current Facility-Administered Medications   Medication Dose Route Frequency Provider Last Rate Last Admin    acetaminophen (TYLENOL) tablet 650 mg  650 mg Oral Q4H PRN Tanna Brice CNP   650 mg at 08/16/24 0938    aspirin EC tablet 81 mg  81 mg Oral Daily Tanna Brice CNP   81 mg at 08/16/24 0938    atorvastatin (LIPITOR) tablet 40 mg  40 mg Oral QPM Tanna Brice CNP   40 mg at 08/15/24 2028    carvedilol (COREG) tablet 3.125 mg  3.125 mg Oral BID w/meals Scar Lazo MD   3.125 mg at 08/16/24 0938    cefTRIAXone (ROCEPHIN) 1 g vial to attach to  mL bag for ADULTS or NS 50 mL bag for PEDS  1 g Intravenous Q24H Tanna Brice CNP   1 g at 08/15/24 1626    glucose gel 15-30 g  15-30 g Oral Q15 Min PRN Tanna Brice CNP        Or    dextrose 50 % injection 25-50 mL  25-50 mL Intravenous Q15 Min PRN Tanna Brice CNP        Or    glucagon injection 1 mg  1 mg Subcutaneous Q15 Min PRN Tanna Brice CNP        doxycycline hyclate (VIBRAMYCIN) capsule 100 mg  100 mg Oral Q12H [facility] (08/20) Qian Correia MD   100 mg at 08/16/24 0937    [Held by provider] furosemide (LASIX) injection 40 mg  40 mg Intravenous Q6H Tanna Brice CNP   40 mg at 08/14/24 0607    [Held by provider] gabapentin (NEURONTIN) capsule 300 mg  300 mg Oral BID Tanna Brice CNP   300 mg at 08/13/24 2136    heparin 25,000 units in 0.45% NaCl 250 mL ANTICOAGULANT infusion  0-5,000 Units/hr Intravenous Continuous Scar Lazo,  MD 12.5 mL/hr at 08/16/24 0541 1,250 Units/hr at 08/16/24 0541    HOLD:  Metformin and metformin containing medications if patient received IV contrast with acute kidney injury or severe chronic kidney disease (stage IV or stage V; i.e., eGFR less than 30)   Does not apply HOLD Tanna Brice CNP        hydrALAZINE (APRESOLINE) injection 10 mg  10 mg Intravenous Once PRN Tanna Brice CNP        hydrALAZINE (APRESOLINE) tablet 10 mg  10 mg Oral TID Scar Lazo MD   10 mg at 08/16/24 0938    hydrOXYzine HCl (ATARAX) tablet 25 mg  25 mg Oral Q6H PRN Nicole Lindquist MD   25 mg at 08/16/24 1150    insulin aspart (NovoLOG) injection (RAPID ACTING)  1-7 Units Subcutaneous Q4H MARKUS Tanna Brice CNP   1 Units at 08/16/24 1157    insulin glargine (LANTUS PEN) injection 10 Units  10 Units Subcutaneous At Bedtime Tanna Brice CNP   10 Units at 08/15/24 2043    iodixanol (VISIPAQUE 320) injection    Once PRN Brandon Alejandro MD   35 mL at 08/13/24 1620    ipratropium - albuterol 0.5 mg/2.5 mg/3 mL (DUONEB) neb solution 3 mL  3 mL Nebulization Q4H PRN Tanna Brice CNP        isosorbide dinitrate (ISORDIL) tablet 10 mg  10 mg Oral TID Scar Lazo MD   10 mg at 08/16/24 1150    lidocaine (LMX4) cream   Topical Q1H PRN Tanna Brice CNP        lidocaine 1 % 0.1-1 mL  0.1-1 mL Other Q1H PRN Tanna Briec CNP        melatonin tablet 3 mg  3 mg Oral At Bedtime PRN Gerardo Terry MD   3 mg at 08/15/24 2156    ondansetron (ZOFRAN ODT) ODT tab 4 mg  4 mg Oral Q6H PRN Tanna Brice CNP        Or    ondansetron (ZOFRAN) injection 4 mg  4 mg Intravenous Q6H PRN Tanna Brice CNP        oxyCODONE (ROXICODONE) tablet 5 mg  5 mg Oral Q4H PRN Tanna Brice CNP   5 mg at 08/14/24 4306    Or    oxyCODONE (ROXICODONE) tablet 10 mg  10 mg Oral Q4H PRN Tanna Brice CNP        prochlorperazine  (COMPAZINE) injection 10 mg  10 mg Intravenous Q6H PRN Kalamitsiotis, Tanna C, CNP   10 mg at 08/15/24 0632    Or    prochlorperazine (COMPAZINE) tablet 10 mg  10 mg Oral Q6H PRN Kalamitsiotis, Tanna C, CNP        Or    prochlorperazine (COMPAZINE) suppository 25 mg  25 mg Rectal Q12H PRN Kalamitsiotis, Tanna C, CNP        senna-docusate (SENOKOT-S/PERICOLACE) 8.6-50 MG per tablet 1 tablet  1 tablet Oral BID PRN Kalamitsiotis, Tanna C, CNP        Or    senna-docusate (SENOKOT-S/PERICOLACE) 8.6-50 MG per tablet 2 tablet  2 tablet Oral BID PRN Kalamitsiotis, Tanna C, CNP        sodium chloride (PF) 0.9% PF flush 3 mL  3 mL Intracatheter Q8H Kalamitsiotis, Tanna C, CNP   3 mL at 08/16/24 0938    sodium chloride (PF) 0.9% PF flush 3 mL  3 mL Intracatheter q1 min prn Kalamitsiotis, Tanna C, CNP        spironolactone (ALDACTONE) half-tab 12.5 mg  12.5 mg Oral Daily Scar Lazo MD   12.5 mg at 08/16/24 0938         FAMILY HISTORY:   Family History   Problem Relation Age of Onset    Diabetes Father          SOCIAL HISTORY:   Social History     Socioeconomic History    Marital status:      Spouse name: Not on file    Number of children: Not on file    Years of education: Not on file    Highest education level: Not on file   Occupational History    Not on file   Tobacco Use    Smoking status: Never    Smokeless tobacco: Never   Vaping Use    Vaping status: Never Used   Substance and Sexual Activity    Alcohol use: Never    Drug use: Yes     Types: Methamphetamines     Comment: chronic user    Sexual activity: Not on file   Other Topics Concern    Not on file   Social History Narrative    Not on file     Social Determinants of Health     Financial Resource Strain: High Risk (10/5/2023)    Financial Resource Strain     Within the past 12 months, have you or your family members you live with been unable to get utilities (heat, electricity) when it was really needed?: Yes   Food Insecurity: High Risk  (10/5/2023)    Food Insecurity     Within the past 12 months, did you worry that your food would run out before you got money to buy more?: Yes     Within the past 12 months, did the food you bought just not last and you didn t have money to get more?: Yes   Transportation Needs: Low Risk  (10/5/2023)    Transportation Needs     Within the past 12 months, has lack of transportation kept you from medical appointments, getting your medicines, non-medical meetings or appointments, work, or from getting things that you need?: No   Physical Activity: Not on file   Stress: Not on file   Social Connections: Not on file   Interpersonal Safety: Low Risk  (10/5/2023)    Interpersonal Safety     Do you feel physically and emotionally safe where you currently live?: Yes     Within the past 12 months, have you been hit, slapped, kicked or otherwise physically hurt by someone?: No     Within the past 12 months, have you been humiliated or emotionally abused in other ways by your partner or ex-partner?: No   Housing Stability: Low Risk  (10/5/2023)    Housing Stability     Do you have housing? : Yes     Are you worried about losing your housing?: No       REVIEW OF SYSTEMS:  CONSTITUTIONAL: No weight loss, fever   SKIN: No rash  CARDIOVASCULAR: No current chest pain or chest discomfort. No palpitations or edema.   RESPIRATORY: No shortness of breath or cough.  GASTROINTESTINAL: No nausea, vomiting or diarrhea. No abdominal pain.  NEUROLOGICAL: No headache, dizziness, syncope  HEMATOLOGIC: No bleeding or bruising.     PHYSICAL EXAMINATION:   Vitals: /57   Pulse 79   Temp 99.4  F (37.4  C) (Oral)   Resp 18   Wt 62.5 kg (137 lb 12.6 oz)   SpO2 96%   BMI 20.35 kg/m    GENERAL:  Cachectic and ill-appearing  CARDIOVASCULAR: RRR on monitor.  RESPIRATIONS: Non-labored breathing on 3L NC.  ABDOMEN: Soft, non-distended, non-tender  EXTREMITIES: Several toe amputations on bilateral feet  NEUROLOGIC: Intact and symmetric with no  "focal deficits.   PSYCHIATRIC: Alert and oriented x3, lethargic    LABORATORY STUDIES:   Lab Results   Component Value Date    WBC 7.5 08/16/2024    HGB 8.1 (L) 08/16/2024    HCT 23.8 (L) 08/16/2024    MCV 85 08/16/2024     08/16/2024      Lab Results   Component Value Date    BUN 51.9 (H) 08/16/2024     08/16/2024    CO2 24 08/16/2024     No results found for: \"HGBA1C\"    EKG 8/16/2024:   Sinus rhythm   Possible Left atrial enlargement   T wave abnormality, consider lateral ischemia   Prolonged QT   Abnormal ECG     CARDIAC CATHETERIZATION  8/13/2024:   Multivessel coronary artery disease involving the proximal to mid left anterior descending artery, large diagonal branch, and proximal and distal, dominant right coronary artery.  Moderate nonobstructive disease elsewhere.  Mildly elevated right and moderately elevated left-sided filling pressures.  No pulmonary hypertension.  Normal cardiac output and index.    TRANSTHORACIC ECHOCARDIOGRAM 8/12/2024:   1.Left ventricular function is decreased. The ejection fraction is 15-20% (severely reduced).  2.The left ventricle is mildly dilated.  3.Normal right ventricle size and systolic function.  4.The left atrium is moderate to severely dilated.  5.No hemodynamically significant valvular abnormalities on 2D or color flow imaging.  6.Right ventricular systolic pressure is elevated, consistent with mild pulmonary hypertension.  There is no comparison study available.    CAROTID ULTRASOUND 08/14/2024:  1.  Mild plaque formation, velocities consistent with less than 50% stenosis in the right internal carotid artery.  2.  Mild plaque formation, velocities consistent with less than 50% stenosis in the left internal carotid artery.  3.  Flow within the vertebral arteries is antegrade.    CXR:  Heart and mediastinal size are normal. Multifocal patchy opacities present involving the bilateral lungs including the lung bases, right upper hilar region, and left perihilar " region, likely representing infectious process. There are small   bilateral pleural effusions. No pneumothorax.    IMPRESSION AND PLAN: Abdulaziz Rausch is a 59 year old with recent NSTEMI and schemic cardiomyopathy.  Echocardiography demonstrates left ventricular systolic dysfunction} with an LVEF of 15-20%. After review of the above information, we will have to further assess his candidacy for bypass surgery.     - Further interdisciplinary conversations to be had regarding best approach for patient's CAD.  - Will update when plan available    - Remainder of cares per primary team.       Thank you very much for this referral.       Patient and plan discussed with attending.      STS RISK:        _______  Jo Packer PA-C  Cardiothoracic Surgery  [phone]

## 2024-08-16 NOTE — PROGRESS NOTES
Care Management Follow Up    Length of Stay (days): 3    Expected Discharge Date: 08/19/2024    Anticipated Discharge Plan:   to be determined    Transportation: Anticipate Family/friend    PT Recommendations: Transitional Care Facility (pending progress with gait and possible surg)  OT Recommendations:  Transitional Care Facility     Barriers to Discharge: medical stability    Prior Living Situation: apartment with other (see comments), friend(s) (roommate)     Patient/Spokesperson Updated: updated patient at bedside    Additional Information:  Chart reviewed.   SW met with at bedside. He was awake and conversive. Introduced self, CM role. Patient resides in a mobile home. Collateral information was provided by daughter upon initial assessment. Disposition uncertain at this, awaiting medical progression. TCU is recommended at this time. TCU placement not yet discussed with pt at this time. May benefit from a TCU with a chemical dependency program.   SW addressed patient's chemical dependency concerns and positive tox screen. Patient states he has been using drugs since he was 18 yrs old. Has had a period of sobriety in the past. Discussed his goals for treatment / recovery. Unsure patient's desire for this and motivation for recovery.   Patient has a daughter, brother and sister who have been visiting, however have not been involved.     Sona Urbina, SAMAN

## 2024-08-16 NOTE — PROGRESS NOTES
Place of Service:  [facility]     Reason for follow up: Bilateral hydronephrosis, CHRISTY, severe urinary retention    SUBJECTIVE:  Events: no acute events overnight    Patient is doing well this morning.  He has no specific complaints.  He is tolerating Fischer catheter.    He is vitally stable and afebrile.  Creatinine continues to improve at 1.84 from 2.42 yesterday.    OBJECTIVE:  PHYSICAL EXAM:  Temp: 98.1  F (36.7  C) Temp src: Oral BP: 128/73 Pulse: 82   Resp: 20 SpO2: 97 % O2 Device: Nasal cannula Oxygen Delivery: 3 LPM  General: NAD, alert, cooperative  Head: normocephalic, without abnormality / atraumatic  Abdomen: soft, non tender, non distended. no suprapubic fullness, no suprapubic tenderness. no CVA tenderness,   Genitourinary: Fischer catheter is in place, patent draining clear yellow urine without significant sediment or clot.  Psychological: alert and oriented, answers questions appropriately    LABS:  Creatinine   Date Value Ref Range Status   08/16/2024 1.84 (H) 0.67 - 1.17 mg/dL Final     WBC Count   Date Value Ref Range Status   08/16/2024 7.5 4.0 - 11.0 10e3/uL Final     Hemoglobin   Date Value Ref Range Status   08/16/2024 8.1 (L) 13.3 - 17.7 g/dL Final   ]  Platelet Count   Date Value Ref Range Status   08/16/2024 334 150 - 450 10e3/uL Final       UA:  UA RESULTS:  Recent Labs   Lab Test 08/14/24  1002   COLOR Colorless   APPEARANCE Clear   URINEGLC Negative   URINEBILI Negative   URINEKETONE Negative   SG 1.009   UBLD Negative   URINEPH 5.0   PROTEIN Negative   NITRITE Negative   LEUKEST 75 Thuan/uL*   RBCU 2   WBCU 5       Lab Results: personally reviewed.     ASSESSMENT/PLAN:  Abdulaziz Rausch is being seen by [facility] for bilateral hydronephrosis, CHRISTY, severe urinary retention.    -59-year-old male admitted to [facility] for acute CHF exacerbation complicated with CHRISTY.  He was found to have severe urinary retention with bilateral hydronephrosis.  Fischer catheter was  placed.  -Creatinine continues to improve.  Repeat renal ultrasound shows resolution of hydronephrosis.  -Okay to perform voiding trial closer to discharge.  Will defer to primary and nephrology team.  -We will arrange for outpatient follow-up.  Urology will sign off at this time.    Rupert Pride PA-C  [facility]   [phone]

## 2024-08-17 ENCOUNTER — APPOINTMENT (OUTPATIENT)
Dept: SPEECH THERAPY | Facility: HOSPITAL | Age: 59
End: 2024-08-17
Attending: STUDENT IN AN ORGANIZED HEALTH CARE EDUCATION/TRAINING PROGRAM
Payer: COMMERCIAL

## 2024-08-17 ENCOUNTER — APPOINTMENT (OUTPATIENT)
Dept: PHYSICAL THERAPY | Facility: HOSPITAL | Age: 59
End: 2024-08-17
Attending: STUDENT IN AN ORGANIZED HEALTH CARE EDUCATION/TRAINING PROGRAM
Payer: COMMERCIAL

## 2024-08-17 LAB
ANION GAP SERPL CALCULATED.3IONS-SCNC: 13 MMOL/L (ref 7–15)
BACTERIA SPEC CULT: NORMAL
BUN SERPL-MCNC: 58.3 MG/DL (ref 8–23)
CALCIUM SERPL-MCNC: 8.4 MG/DL (ref 8.8–10.4)
CHLORIDE SERPL-SCNC: 102 MMOL/L (ref 98–107)
CHLORIDE UR-SCNC: <20 MMOL/L
CREAT SERPL-MCNC: 2.03 MG/DL (ref 0.67–1.17)
EGFRCR SERPLBLD CKD-EPI 2021: 37 ML/MIN/1.73M2
ERYTHROCYTE [DISTWIDTH] IN BLOOD BY AUTOMATED COUNT: 12.5 % (ref 10–15)
GLUCOSE BLDC GLUCOMTR-MCNC: 112 MG/DL (ref 70–99)
GLUCOSE BLDC GLUCOMTR-MCNC: 133 MG/DL (ref 70–99)
GLUCOSE BLDC GLUCOMTR-MCNC: 140 MG/DL (ref 70–99)
GLUCOSE BLDC GLUCOMTR-MCNC: 157 MG/DL (ref 70–99)
GLUCOSE BLDC GLUCOMTR-MCNC: 193 MG/DL (ref 70–99)
GLUCOSE SERPL-MCNC: 132 MG/DL (ref 70–99)
HCO3 SERPL-SCNC: 24 MMOL/L (ref 22–29)
HCT VFR BLD AUTO: 23.6 % (ref 40–53)
HGB BLD-MCNC: 8.1 G/DL (ref 13.3–17.7)
MCH RBC QN AUTO: 28.9 PG (ref 26.5–33)
MCHC RBC AUTO-ENTMCNC: 34.3 G/DL (ref 31.5–36.5)
MCV RBC AUTO: 84 FL (ref 78–100)
PLATELET # BLD AUTO: 384 10E3/UL (ref 150–450)
POTASSIUM SERPL-SCNC: 4 MMOL/L (ref 3.4–5.3)
POTASSIUM UR-SCNC: 38.8 MMOL/L
RBC # BLD AUTO: 2.8 10E6/UL (ref 4.4–5.9)
SODIUM SERPL-SCNC: 139 MMOL/L (ref 135–145)
UFH PPP CHRO-ACNC: 0.41 IU/ML
WBC # BLD AUTO: 9.9 10E3/UL (ref 4–11)

## 2024-08-17 PROCEDURE — 85520 HEPARIN ASSAY: CPT | Performed by: STUDENT IN AN ORGANIZED HEALTH CARE EDUCATION/TRAINING PROGRAM

## 2024-08-17 PROCEDURE — 250N000013 HC RX MED GY IP 250 OP 250 PS 637: Performed by: STUDENT IN AN ORGANIZED HEALTH CARE EDUCATION/TRAINING PROGRAM

## 2024-08-17 PROCEDURE — 92526 ORAL FUNCTION THERAPY: CPT | Mod: GN

## 2024-08-17 PROCEDURE — 85027 COMPLETE CBC AUTOMATED: CPT | Performed by: NURSE PRACTITIONER

## 2024-08-17 PROCEDURE — 250N000011 HC RX IP 250 OP 636: Performed by: INTERNAL MEDICINE

## 2024-08-17 PROCEDURE — 97116 GAIT TRAINING THERAPY: CPT | Mod: GP

## 2024-08-17 PROCEDURE — 250N000011 HC RX IP 250 OP 636: Performed by: STUDENT IN AN ORGANIZED HEALTH CARE EDUCATION/TRAINING PROGRAM

## 2024-08-17 PROCEDURE — 210N000001 HC R&B IMCU HEART CARE

## 2024-08-17 PROCEDURE — 250N000013 HC RX MED GY IP 250 OP 250 PS 637: Performed by: NURSE PRACTITIONER

## 2024-08-17 PROCEDURE — 250N000013 HC RX MED GY IP 250 OP 250 PS 637: Performed by: INTERNAL MEDICINE

## 2024-08-17 PROCEDURE — 36415 COLL VENOUS BLD VENIPUNCTURE: CPT | Performed by: NURSE PRACTITIONER

## 2024-08-17 PROCEDURE — 99233 SBSQ HOSP IP/OBS HIGH 50: CPT | Performed by: INTERNAL MEDICINE

## 2024-08-17 PROCEDURE — 99233 SBSQ HOSP IP/OBS HIGH 50: CPT | Performed by: STUDENT IN AN ORGANIZED HEALTH CARE EDUCATION/TRAINING PROGRAM

## 2024-08-17 PROCEDURE — 97110 THERAPEUTIC EXERCISES: CPT | Mod: GP

## 2024-08-17 PROCEDURE — 80048 BASIC METABOLIC PNL TOTAL CA: CPT | Performed by: NURSE PRACTITIONER

## 2024-08-17 PROCEDURE — 99232 SBSQ HOSP IP/OBS MODERATE 35: CPT | Performed by: INTERNAL MEDICINE

## 2024-08-17 RX ADMIN — ISOSORBIDE DINITRATE 10 MG: 10 TABLET ORAL at 16:30

## 2024-08-17 RX ADMIN — HYDRALAZINE HYDROCHLORIDE 10 MG: 10 TABLET ORAL at 09:17

## 2024-08-17 RX ADMIN — Medication 10 MG: at 20:15

## 2024-08-17 RX ADMIN — CEFTRIAXONE SODIUM 1 G: 1 INJECTION, POWDER, FOR SOLUTION INTRAMUSCULAR; INTRAVENOUS at 16:32

## 2024-08-17 RX ADMIN — SPIRONOLACTONE 12.5 MG: 25 TABLET, FILM COATED ORAL at 09:17

## 2024-08-17 RX ADMIN — HYDRALAZINE HYDROCHLORIDE 10 MG: 10 TABLET ORAL at 20:17

## 2024-08-17 RX ADMIN — HYDROXYZINE HYDROCHLORIDE 25 MG: 25 TABLET ORAL at 21:03

## 2024-08-17 RX ADMIN — ASPIRIN 81 MG: 81 TABLET, COATED ORAL at 09:17

## 2024-08-17 RX ADMIN — ISOSORBIDE DINITRATE 10 MG: 10 TABLET ORAL at 09:17

## 2024-08-17 RX ADMIN — CARVEDILOL 3.12 MG: 3.12 TABLET, FILM COATED ORAL at 09:17

## 2024-08-17 RX ADMIN — DOXYCYCLINE HYCLATE 100 MG: 100 CAPSULE ORAL at 09:16

## 2024-08-17 RX ADMIN — ISOSORBIDE DINITRATE 10 MG: 10 TABLET ORAL at 13:41

## 2024-08-17 RX ADMIN — HYDROXYZINE HYDROCHLORIDE 25 MG: 25 TABLET ORAL at 09:16

## 2024-08-17 RX ADMIN — ATORVASTATIN CALCIUM 40 MG: 40 TABLET, FILM COATED ORAL at 19:29

## 2024-08-17 RX ADMIN — INSULIN ASPART 1 UNITS: 100 INJECTION, SOLUTION INTRAVENOUS; SUBCUTANEOUS at 03:44

## 2024-08-17 RX ADMIN — ACETAMINOPHEN 650 MG: 325 TABLET ORAL at 09:17

## 2024-08-17 RX ADMIN — INSULIN GLARGINE 10 UNITS: 100 INJECTION, SOLUTION SUBCUTANEOUS at 21:03

## 2024-08-17 RX ADMIN — HYDRALAZINE HYDROCHLORIDE 10 MG: 10 TABLET ORAL at 14:35

## 2024-08-17 RX ADMIN — HEPARIN SODIUM 1250 UNITS/HR: 10000 INJECTION, SOLUTION INTRAVENOUS at 20:14

## 2024-08-17 RX ADMIN — CARVEDILOL 3.12 MG: 3.12 TABLET, FILM COATED ORAL at 17:43

## 2024-08-17 RX ADMIN — HYDROXYZINE HYDROCHLORIDE 25 MG: 25 TABLET ORAL at 00:43

## 2024-08-17 RX ADMIN — DOXYCYCLINE HYCLATE 100 MG: 100 CAPSULE ORAL at 19:29

## 2024-08-17 ASSESSMENT — ACTIVITIES OF DAILY LIVING (ADL)
ADLS_ACUITY_SCORE: 32
ADLS_ACUITY_SCORE: 28
ADLS_ACUITY_SCORE: 32
ADLS_ACUITY_SCORE: 32
ADLS_ACUITY_SCORE: 28
ADLS_ACUITY_SCORE: 32
ADLS_ACUITY_SCORE: 28
ADLS_ACUITY_SCORE: 28
ADLS_ACUITY_SCORE: 32
ADLS_ACUITY_SCORE: 32

## 2024-08-17 NOTE — PROGRESS NOTES
RENAL PROGRESS NOTE    CC:  CHRISTY    ASSESSMENT & PLAN:   CHRISTY with underlying CKD 2: CHRISTY is likely secondary to cardiorenal physiology/hemodynamic.  Most recent A1c was 10.1 and his CKD is likely secondary to diabetic nephropathy.  Also ATN could be possible from prolonged prerenal state.  He was still positive for amphetamine and could cause ATN.  He was also found to have hydronephrosis and possibly from obstructive uropathy which is now resolved.  He also received contrast from coronary angiogram on 8/13 and it could possibly will contribute but contrast associated nephropathy.    Urine sodium 115 and suspect from diuretic induced.  Renal ultrasound showed left kidneys slightly smaller than the right side.  No indication for dialysis yet but will watch for age as he will likely get aggressive cardiac intervention in next few days.  Kidney function worse today.  Suspect from hemodynamic.  Urine output seems improving.   Strict I's and O's  Avoid nephrotoxic medications  Avoid extreme hypo or hypertension  Watch for indication for dialysis  Agree with holding furosemide.     Electrolytes: Sodium 139, potassium 4.0.  Monitor for now     Acid-base: Bicarb 24.  No need for placement.    SUBJECTIVE: Patient seen and examined at bedside.  More awake.  Denies CP, sob.      OBJECTIVE:  Physical Exam   Temp: 98  F (36.7  C) Temp src: Oral BP: 94/50 Pulse: 74   Resp: 18 SpO2: 91 % O2 Device: None (Room air)    Vitals:    08/15/24 0616 08/16/24 0404 08/17/24 0332   Weight: 62.6 kg (138 lb 0.1 oz) 62.5 kg (137 lb 12.6 oz) 62.6 kg (138 lb)     Vital Signs with Ranges  Temp:  [97.5  F (36.4  C)-98.4  F (36.9  C)] 98  F (36.7  C)  Pulse:  [74-83] 74  Resp:  [18-22] 18  BP: ()/(50-68) 94/50  SpO2:  [91 %-99 %] 91 %  I/O last 3 completed shifts:  In: 890 [P.O.:670; I.V.:220]  Out: 800 [Urine:800]    @TMAXR(24)@    Patient Vitals for the past 72 hrs:   Weight   08/17/24 0332 62.6 kg (138 lb)   08/16/24 0404 62.5 kg (137 lb  12.6 oz)   08/15/24 0616 62.6 kg (138 lb 0.1 oz)     Intake/Output Summary (Last 24 hours) at 8/15/2024 1415  Last data filed at 8/14/2024 2119  Gross per 24 hour   Intake --   Output 800 ml   Net -800 ml       PHYSICAL EXAM:  General awake, appears comfortable, NAD  Cardiovascular - Regular rate and rhythm, no rub  Respiratory - Clear to auscultation bilaterally, no crackles or wheezes  Abd: BS present, no guarding or pain with palpation, no ascites  Extremities - No lower extremity edema bilaterally  Skin: dry, intact, no rash, good turgor  Neuro:  Grossly intact, no focal deficits  MSK:  Grossly intact  Psych:  Normal affect    LABORATORY STUDIES:     Recent Labs   Lab 08/17/24  0511 08/16/24  0455 08/15/24  0621 08/14/24  0502 08/13/24  0949 08/12/24  0550   WBC 9.9 7.5 9.0 10.2 9.6 10.5   RBC 2.80* 2.81* 2.85* 3.17* 2.95* 3.29*   HGB 8.1* 8.1* 8.0* 8.9* 8.5* 9.6*   HCT 23.6* 23.8* 24.6* 27.2* 24.8* 28.3*    334 302 390 352 340       Basic Metabolic Panel:  Recent Labs   Lab 08/17/24  1209 08/17/24  0801 08/17/24  0511 08/17/24  0340 08/16/24  2338 08/16/24 2024 08/16/24  0749 08/16/24  0455 08/15/24  0841 08/15/24  0621 08/14/24  0817 08/14/24  0502 08/13/24  1339 08/13/24  0949 08/12/24  0652 08/12/24  0550   NA  --   --  139  --   --   --   --  142  --  138  --  138  --  139  --  137   POTASSIUM  --   --  4.0  --   --   --   --  3.9  --  3.8  --  4.4  --  4.2  --  4.3   CHLORIDE  --   --  102  --   --   --   --  103  --  101  --  101  --  104  --  105   CO2  --   --  24  --   --   --   --  24  --  26  --  24  --  24  --  23   BUN  --   --  58.3*  --   --   --   --  51.9*  --  60.8*  --  59.6*  --  49.6*  --  32.9*   CR  --   --  2.03*  --   --   --   --  1.84*  --  2.42*  --  2.55*  --  2.28*  --  1.69*   * 112* 132* 140* 204* 268*   < > 120*   < > 151*   < > 143*   < > 142*   < > 298*   AMIRAH  --   --  8.4*  --   --   --   --  8.1*  --  8.2*  --  8.5*  --  8.7*  --  8.3*    < > = values in this  interval not displayed.       INRNo lab results found in last 7 days.     Recent Labs   Lab Test 08/17/24  0511 08/16/24  0455   WBC 9.9 7.5   HGB 8.1* 8.1*    334       Personally reviewed current labs    EXAM: BILATERAL RENAL ARTERY DUPLEX AND RENAL ULTRASOUND     INDICATION: Acute kidney injury     TECHNIQUE: Real-time gray-scale sonographic imaging of the kidneys and bladder was performed including duplex spectral and color Doppler evaluation of the renal arteries.     COMPARISON: Renal artery duplex 8/14/2024     RENAL ULTRASOUND FINDINGS:    RIGHT KIDNEY: Increased parenchymal echogenicity without hydronephrosis, measuring approximately 10.7 x 5.8 x 5.4 cm.     LEFT KIDNEY: Increased parenchymal echogenicity without hydronephrosis, measuring approximately 8.8 x 5.3 x 5.5 cm.     The bladder is decompressed with indwelling Fischer catheter     DUPLEX ARTERIAL ULTRASOUND FINDINGS:     SYSTOLIC VELOCITIES (cm/s):  RIGHT RENAL ARTERY:  Proximal: 60                Mid: 61                 Distal: 45                    LEFT RENAL ARTERY:  Proximal: 49                Mid: 40                 Distal: 32                    Aorta: 86     Arcuate arterial resistive indices range from 0.7-0.8 on the right and 0.7-0.8 on the left.                                                                      IMPRESSION:  1.  Echogenic kidneys. No hydronephrosis.  2.  Negative renal artery duplex.     Apryl Peguero MD  [facility]  [phone]

## 2024-08-17 NOTE — PROGRESS NOTES
Care Management Follow Up    Length of Stay (days): 4    Expected Discharge Date: 08/19/2024     Concerns to be Addressed:       Patient plan of care discussed at interdisciplinary rounds: Yes    Anticipated Discharge Disposition:  Tcu or low-income housing     Anticipated Discharge Services:  PT/OT at Tcu or low- income housing  Anticipated Discharge DME:  none    Patient/family educated on Medicare website which has current facility and service quality ratings:  yes  Education Provided on the Discharge Plan: yes   Patient/Family in Agreement with the Plan: yes per sisters request     Referrals Placed by CM/SW:  3 Tcu referrals  Private pay costs discussed: Not applicable    Additional Information:      Met with patient and his sister at bedside. Sister Nilda would like out help finding either TCU for the patient or even low income housing in the [place] are where she lives and the patient also has [facility] benefits. Referral sent to [facility]CU in [place] via Communication tab and free text fax # [phone].    CM please contact sister tomorrow for updates.    Devi Galloway RN

## 2024-08-17 NOTE — PROGRESS NOTES
[facility]    Medicine Progress Note - Hospitalist Service    Date of Admission:  8/13/2024    Assessment & Plan   Abdulaziz Rausch is a 59 year old male admitted on 8/13/2024. He has h/o HTN, DM-II who was admitted to  for acute CHF exacerbation. Course was complicated by CHRISTY. He was started on diuresis and was transferred to [place] cardiac cath. Hospitalist service was consulted for medical co-management.       Elevated troponin, NSTEMI, MVCAD  -- Heparin gtt, Cardiac cath on 08/13: MVCAD  -- ASA, Lipitor, coreg.   -- Repeat PET showed stunned but viable left ventricular myocardium.  -- CTS consult appreciated: High risk for surgical interventions.  -- 08/17: Cardiology consult appreciated- PCI with LV support, timing TBD. Start Plavix.    Acute decompensated HFrEF  Mild pulmonary hypertension  -- ProBNP: 13758  -- Strict I/O, daily weight, fluid restriction  -- 08/17: Gave lasis 20 mg po on 08/16 after discussing with nephrology. Lasix held this morning due to worsening of renal function. Defer diuretics to nephrology and cards.    Acute hypoxic respiratory failure-resolved  -- Likely 2/2 CHF, ?aspiration pneumonia  -- Oxygen as needed, wean as able    ?Aspiration pneumonia  -- CXR on 08/11: multifocal patchy opacities present involving b/l lungs including lung bases, Right upper hilar region, and left perihilar region likely representing infecious process. Small b/l pleural effusions.  -- No fever or leukocytosis but pt is more lethargic.   -- SLP eval  -- On ceftriaxone 5/7 and doxycycline 4/7 to complete 7 day course. Sputum culture as able  -- H/o MRSA OM. Negative MRSA swab on 08/14.    Acute metabolic encephalopathy- improving  -- Multifactorial: severe CHF, NSTEMI, pneumonia, CHRISTY  -- VBG wnl. TSH 1.32  -- Delirium precaution  -- UDS + for amphetamine, negative for opiates.   -- CT-head: no acute changes.  -- Gabapentin held    CHRISTY on CKD-II  -- Likely 2/2 low perfusion from  CHF  -- Avoid Nephrotoxins. Holding ACEI, Metformin, Jardiance  -- Strict I/O, US-renal: Echogenic kidneys. No hydronephrosis. Negative renal artery duplex.  -- Nephrology consult appreciated  -- 08/17: Gave lasis 20 mg po on 08/16 after discussing with nephrology. Lasix held this morning due to worsening of renal function. Defer diuretics to nephrology and cards.    Acute urinary retention  B/l moderate hydronephrosis on ULS-resolved  -- rowland.  -- Urology consult appreciated: TOV prior discharge.  -- Repeat US-renal: No hydronephrosis    DM-II  -- A1c: 10.1%  -- Accu-checks, sliding scale, [id number] units, hypoglycemia protocol. Titrate as indicated    Essential hypertension  -- Monitor vital signs per protocol  -- holding Norvasc. On coreg    Normocytic Anemia, Chronic   AOCD  -- Iron panel reviewed. Monitor cbc  -- No e/o bleeding              Diet: Fluid restriction 2000 ML FLUID  Combination Diet Regular Diet; Low Saturated Fat Na <2400mg Diet    DVT Prophylaxis: heparin gtt  Rowland Catheter: PRESENT, indication: Acute retention or obstruction  Lines: None     Cardiac Monitoring: ACTIVE order. Indication: Post- PCI/Angiogram (24 hours)  Code Status: Full Code      Clinically Significant Risk Factors              # Hypoalbuminemia: Lowest albumin = 2.5 g/dL at 8/16/2024  4:55 AM, will monitor as appropriate     # Hypertension: Noted on problem list  # Chronic heart failure with reduced ejection fraction: last echo with EF <40%         # DMII: A1C = 10.1 % (Ref range: <5.7 %) within past 6 months         # Financial/Environmental Concerns: none               Disposition Plan     Medically Ready for Discharge: Anticipated in 2-4 Days             Qian Correia MD  Hospitalist Service  [facility]  Securely message with [employer] (more info)  Text page via [employer] Paging/Directory   ______________________________________________________________________    Interval History   Patient is seen and  examined at bedside.   I was informed by RN that pt wanted to go home AMA. I had detailed discussion about his medical condition, plan of care, and the risk of leaving AMA. Pt agreed to stay and proceed with care.      Physical Exam   Vital Signs: Temp: 98  F (36.7  C) Temp src: Oral BP: 94/50 Pulse: 74   Resp: 18 SpO2: 91 % O2 Device: None (Room air)    Weight: 138 lbs 0 oz    GEN: Alert and oriented. Not in acute distress.  HEENT: Atraumatic, mucous membrane- moist and pink.  Chest: Bilateral air entry.  CVS: S1S2 regular.   Abdomen: Soft. Non-tender, non-distended. No organomegaly. No guarding or rigidity. Bowel sounds active.   Extremities: No pedal edema.  CNS: No involuntary movements.  Skin: no cyanosis or clubbing.     Medical Decision Making       55 MINUTES SPENT BY ME on the date of service doing chart review, history, exam, documentation & further activities per the note.      Data

## 2024-08-17 NOTE — PLAN OF CARE
Problem: Cardiac Catheterization (Diagnostic/Interventional)  Goal: Absence of Bleeding  Outcome: Progressing  Goal: Absence of Contrast-Induced Injury  Outcome: Progressing  Goal: Stable Heart Rate and Rhythm  Outcome: Progressing  Goal: Absence of Embolism Signs and Symptoms  Outcome: Progressing  Goal: Anesthesia/Sedation Recovery  Outcome: Progressing  Goal: Optimal Pain Control and Function  Outcome: Progressing  Goal: Absence of Vascular Access Complication  Outcome: Progressing  Intervention: Prevent and Manage Access Complications  Recent Flowsheet Documentation  Taken 8/16/2024 1700 by Huey Zimmerman RN  Activity Management:   activity adjusted per tolerance   ambulated outside room     Problem: Comorbidity Management  Goal: Blood Glucose Levels Within Targeted Range  Outcome: Progressing  Goal: Maintenance of Heart Failure Symptom Control  Outcome: Progressing   Goal Outcome Evaluation:             PT was talking with daughter and was very restless and anxious.  Writer took pt for walk and this seemed to help for a while.  Pt later became very restless and anxious  PRN hydroxyzine and apap were given and pt calmed down and went to bed.      Pt denies pain nausea sob.  Writer weaned pt off oxygen.  Pt is currently on RA.     PT was able to eat dinner and happier with new diet of bite sized pieces.

## 2024-08-17 NOTE — PROGRESS NOTES
[facility]  Cardiac Electrophysiology  [address]  [place] [address]   Office: [phone]  Fax: [phone]     Cardiology Progress Note    Patient: Abdulaziz Rausch   : 1965       Assessment/Recommendations     Severe multivessel CAD - large area of LAD stunning though viability via cardiac PET   - appreciate CTS consultation - he is high surgical risk  - PCI with LV support, timing to be determined  - continue carvedilol 3.125mg twice daily  - continue aspirin 81mg daily  - start clopidogrel 75mg daily  - continue atorvastatin 40mg daily    Acute systolic heart failure related to ischemic cardiomyopathy - LVEF 15-20% by 2024 TTE.  Stress LVEF 69% by 2023 MPI.  - continue carvedilol  - continue isordil 10mg three times daily  - continue hydralazine 10mg three times daily  - continue spironolactone 12.5mg daily    Aspiration pneumonia - on antibiotics  CHRISTY on CKD  MRSA toe osteomyelitis with amputations  Anemia  HTN  DM  Methamphetamine abuse         Interval Events   Cr 2.03 (1.84), Hgb ~stable 8.1.  He feels fair, and denies chest pain or dyspnea       Physical Examination  Review of Systems   VITALS: /61 (BP Location: Right arm)   Pulse 77   Temp 98.2  F (36.8  C) (Oral)   Resp 18   Wt 62.6 kg (138 lb)   SpO2 96%   BMI 20.38 kg/m    Wt Readings from Last 3 Encounters:   24 62.6 kg (138 lb)   24 67.8 kg (149 lb 7.6 oz)   10/05/23 62.1 kg (137 lb)       Intake/Output Summary (Last 24 hours) at 2024 0812  Last data filed at 2024 0616  Gross per 24 hour   Intake 890 ml   Output 800 ml   Net 90 ml     CONSTITUTIONAL: no distress  EYES:  Conjunctivae pink, sclerae clear.    E/N/T:  Oral mucosa pink, moist mucous membranes  RESPIRATORY:  Respiratory effort is normal  CARDIOVASCULAR:  normal S1 and S2  GASTROINTESTINAL:  Abdomen without masses or tenderness  EXTREMITIES:  No clubbing or cyanosis.    MUSCULOSKELETAL:  Overall  grossly normal muscle strength  SKIN:  Overall, skin warm and dry, no lesions.  NEURO/PSYCH:  Oriented x 3 with normal affect.   Constitutional:  No weight loss or loss of appetite    Cardiovascular: As detailed above  Respiratory: No cough  Genitourinary: No trouble urinating           Medical History  Surgical History   Past Medical History:   Diagnosis Date    Anemia     Depression     Diabetes mellitus, type 2 (H)     Diabetic foot infection (H) 7/1/2023    Diabetic foot ulcer (H)     History of MRSA infection 7/1/2023 Jan 2023 RIGHT foot wound    Hypertension     Non-healing ulcer of left foot (H) 7/1/2023    Osteomyelitis (H)     Past Surgical History:   Procedure Laterality Date    AMPUTATE FOOT Left 7/3/2023    Procedure: partial left fifth ray amputation, skin flap creation and closre, left foot;  Surgeon: Alejandro Mccoy DPM;  Location: [facility] OR    AMPUTATE TOE(S) Right 2/17/2023    Procedure: AMPUTATION, digits two and three right foot;  Surgeon: Ulises Jimenez DPM;  Location: [facility] OR    CV CORONARY ANGIOGRAM N/A 8/13/2024    Procedure: Coronary Angiogram;  Surgeon: Brandon Alejandro MD;  Location: [facility] CATH LAB CV    CV LEFT HEART CATH N/A 8/13/2024    Procedure: Left Heart Catheterization;  Surgeon: Brandon Alejandro MD;  Location: [facility] CATH LAB CV    CV RIGHT HEART CATH MEASUREMENTS RECORDED N/A 8/13/2024    Procedure: Right Heart Catheterization;  Surgeon: Brandon Alejandro MD;  Location: [facility] CATH LAB CV    INCISION AND DRAINAGE FOOT, COMBINED Left 5/23/2023    Procedure: Incision and drainage of abscess left foot with;  Surgeon: Jf Huerta DPM;  Location: [facility] OR    IRRIGATION AND DEBRIDEMENT FOOT, COMBINED Left 5/23/2023    Procedure: debridement and irrigation of wound left foot;  Surgeon: Jf Huerta DPM;  Location: [facility] OR    IRRIGATION AND DEBRIDEMENT TOE, COMBINED Right 1/20/2023    Procedure: IRRIGATION AND DEBRIDEMENT digits 2 and  3 right foot;  Surgeon: Jf Huerta DPM;  Location: [facility] OR    PICC SINGLE LUMEN PLACEMENT  5/25/2023              Family History Social History   Family History   Problem Relation Age of Onset    Diabetes Father         Social History     Tobacco Use    Smoking status: Never    Smokeless tobacco: Never   Vaping Use    Vaping status: Never Used   Substance Use Topics    Alcohol use: Never    Drug use: Yes     Types: Methamphetamines     Comment: chronic user         Medications  Allergies     Current Facility-Administered Medications:     acetaminophen (TYLENOL) tablet 650 mg, 650 mg, Oral, Q4H PRN, Tanna Brice CNP, 650 mg at 08/16/24 1913    aspirin EC tablet 81 mg, 81 mg, Oral, Daily, Tanna Brice CNP, 81 mg at 08/16/24 0938    atorvastatin (LIPITOR) tablet 40 mg, 40 mg, Oral, QPM, Tanna Brice CNP, 40 mg at 08/16/24 1900    carvedilol (COREG) tablet 3.125 mg, 3.125 mg, Oral, BID w/meals, Scar Lazo MD, 3.125 mg at 08/16/24 1703    cefTRIAXone (ROCEPHIN) 1 g vial to attach to  mL bag for ADULTS or NS 50 mL bag for PEDS, 1 g, Intravenous, Q24H, Tanna Brice CNP, 1 g at 08/16/24 1704    glucose gel 15-30 g, 15-30 g, Oral, Q15 Min PRN **OR** dextrose 50 % injection 25-50 mL, 25-50 mL, Intravenous, Q15 Min PRN **OR** glucagon injection 1 mg, 1 mg, Subcutaneous, Q15 Min PRN, Tanna Brice, CNP    doxycycline hyclate (VIBRAMYCIN) capsule 100 mg, 100 mg, Oral, Q12H MARKUS (08/20), Qian Correia MD, 100 mg at 08/16/24 1900    furosemide (LASIX) tablet 20 mg, 20 mg, Oral, BID, Qian Correia MD, 20 mg at 08/16/24 1703    [Held by provider] gabapentin (NEURONTIN) capsule 300 mg, 300 mg, Oral, BID, Tanna Brice CNP, 300 mg at 08/13/24 2136    heparin 25,000 units in 0.45% NaCl 250 mL ANTICOAGULANT infusion, 0-5,000 Units/hr, Intravenous, Continuous, Scar Lazo MD, Last Rate: 12.5 mL/hr at 08/17/24  0542, 1,250 Units/hr at 08/17/24 0542    HOLD:  Metformin and metformin containing medications if patient received IV contrast with acute kidney injury or severe chronic kidney disease (stage IV or stage V; i.e., eGFR less than 30), , Does not apply, HOLD, Tanna Brice CNP    hydrALAZINE (APRESOLINE) injection 10 mg, 10 mg, Intravenous, Once PRN, Tanna Brice CNP    hydrALAZINE (APRESOLINE) tablet 10 mg, 10 mg, Oral, TID, Scar Lazo MD, 10 mg at 08/16/24 2023    hydrOXYzine HCl (ATARAX) tablet 25 mg, 25 mg, Oral, Q6H PRN, Nicole Lindquist MD, 25 mg at 08/17/24 0043    insulin aspart (NovoLOG) injection (RAPID ACTING), 1-7 Units, Subcutaneous, Q4H MARKUS, Tanna Brice CNP, 1 Units at 08/17/24 0344    insulin glargine (LANTUS PEN) injection 10 Units, 10 Units, Subcutaneous, At Bedtime, Tanna Brice CNP, 10 Units at 08/16/24 2214    iodixanol (VISIPAQUE 320) injection, , , Once PRN, Brandon Alejandro MD, 35 mL at 08/13/24 1620    ipratropium - albuterol 0.5 mg/2.5 mg/3 mL (DUONEB) neb solution 3 mL, 3 mL, Nebulization, Q4H PRN, Tanna Brice CNP    isosorbide dinitrate (ISORDIL) tablet 10 mg, 10 mg, Oral, TID, Scar Lazo MD, 10 mg at 08/16/24 1704    lidocaine (LMX4) cream, , Topical, Q1H PRN, Tanna Brice CNP    lidocaine 1 % 0.1-1 mL, 0.1-1 mL, Other, Q1H PRN, Tanna Brice CNP    melatonin tablet 3 mg, 3 mg, Oral, At Bedtime PRN, Gerardo Terry MD, 3 mg at 08/16/24 1930    ondansetron (ZOFRAN ODT) ODT tab 4 mg, 4 mg, Oral, Q6H PRN **OR** ondansetron (ZOFRAN) injection 4 mg, 4 mg, Intravenous, Q6H PRN, Tanna Brice CNP    oxyCODONE (ROXICODONE) tablet 5 mg, 5 mg, Oral, Q4H PRN, 5 mg at 08/14/24 5786 **OR** oxyCODONE (ROXICODONE) tablet 10 mg, 10 mg, Oral, Q4H PRN, Tanna Brice CNP    prochlorperazine (COMPAZINE) injection 10 mg, 10 mg, Intravenous, Q6H PRN, 10 mg at 08/15/24 8253  "**OR** prochlorperazine (COMPAZINE) tablet 10 mg, 10 mg, Oral, Q6H PRN **OR** prochlorperazine (COMPAZINE) suppository 25 mg, 25 mg, Rectal, Q12H PRN, Tanna Brice, CNP    senna-docusate (SENOKOT-S/PERICOLACE) 8.6-50 MG per tablet 1 tablet, 1 tablet, Oral, BID PRN **OR** senna-docusate (SENOKOT-S/PERICOLACE) 8.6-50 MG per tablet 2 tablet, 2 tablet, Oral, BID PRN, Tanna Brice, CNP    sodium chloride (PF) 0.9% PF flush 3 mL, 3 mL, Intracatheter, Q8H, Tanna Brice CNP, 3 mL at 08/17/24 0002    sodium chloride (PF) 0.9% PF flush 3 mL, 3 mL, Intracatheter, q1 min prn, Tanna Brice, CNP    spironolactone (ALDACTONE) half-tab 12.5 mg, 12.5 mg, Oral, Daily, Scar Lazo MD, 12.5 mg at 08/16/24 0938   No Known Allergies       Lab Results    Chemistry CBC Cardiac Enzymes/BNP/TSH/INR   Recent Labs   Lab Test 08/17/24  0801 08/17/24  0511   NA  --  139   POTASSIUM  --  4.0   CHLORIDE  --  102   CO2  --  24   * 132*   BUN  --  58.3*   CR  --  2.03*   GFRESTIMATED  --  37*   AMIRAH  --  8.4*     Recent Labs   Lab Test 08/17/24  0511 08/16/24  0455 08/15/24  0621   CR 2.03* 1.84* 2.42*          Recent Labs   Lab Test 08/17/24  0511   WBC 9.9   HGB 8.1*   HCT 23.6*   MCV 84        Recent Labs   Lab Test 08/17/24  0511 08/16/24  0455 08/15/24  0621   HGB 8.1* 8.1* 8.0*    No results for input(s): \"TROPONINI\" in the last 59278 hours.  Recent Labs   Lab Test 08/13/24  0949 08/11/24  1451   NTBNPI 17,082* 21,454*     Recent Labs   Lab Test 08/13/24  1901   TSH 1.32     No results for input(s): \"INR\" in the last 04355 hours.         Mario Heredia MD  8/17/2024  8:12 AM    "

## 2024-08-17 NOTE — PLAN OF CARE
Problem: Adult Inpatient Plan of Care  Goal: Readiness for Transition of Care  Outcome: Not Progressing   Goal Outcome Evaluation:    At start of shift patient was wanting to discharge AMA. MD came and met with him, as well as cardiologist, he is now agreeing to stay. Fischer catheter in place and heparin drip infusing, anti-XA lab redraw in the morning. Family visiting at the bedside, supportive. He did not sleep overnight or during day shift, restless, he is requesting sleeping medication. MD ordered melatonin 10mg at bedtime to start tonight.

## 2024-08-17 NOTE — PLAN OF CARE
Problem: Comorbidity Management  Goal: Blood Glucose Levels Within Targeted Range  Outcome: Not Progressing  Intervention: Monitor and Manage Glycemia  Recent Flowsheet Documentation  Taken 8/16/2024 2355 by Nicole Barakat RN  Medication Review/Management: medications reviewed     Problem: Comorbidity Management  Goal: Blood Glucose Levels Within Targeted Range  Intervention: Monitor and Manage Glycemia  Recent Flowsheet Documentation  Taken 8/16/2024 2355 by Nicole Barakat RN  Medication Review/Management: medications reviewed     Problem: Adult Inpatient Plan of Care  Goal: Plan of Care Review  Description: The Plan of Care Review/Shift note should be completed every shift.  The Outcome Evaluation is a brief statement about your assessment that the patient is improving, declining, or no change.  This information will be displayed automatically on your shift  note.  Outcome: Progressing     Problem: Cardiac Catheterization (Diagnostic/Interventional)  Goal: Stable Heart Rate and Rhythm  Outcome: Progressing  Intervention: Monitor and Manage Cardiac Rhythm Effect  Recent Flowsheet Documentation  Taken 8/16/2024 2355 by Nicole Barakat RN  Fluid/Electrolyte Management: fluids restricted     Problem: Cardiac Catheterization (Diagnostic/Interventional)  Goal: Stable Heart Rate and Rhythm  Intervention: Monitor and Manage Cardiac Rhythm Effect  Recent Flowsheet Documentation  Taken 8/16/2024 2355 by Nicole Barakat RN  Fluid/Electrolyte Management: fluids restricted     Problem: Pain Acute  Goal: Optimal Pain Control and Function  Outcome: Progressing  Intervention: Prevent or Manage Pain  Recent Flowsheet Documentation  Taken 8/16/2024 2355 by Nicole Barakat RN  Sensory Stimulation Regulation:   lighting decreased   quiet environment promoted   care clustered  Medication Review/Management: medications reviewed  Intervention: Optimize Psychosocial Wellbeing  Recent Flowsheet Documentation  Taken  8/16/2024 2355 by Nicole Barakat RN  Supportive Measures: active listening utilized     Problem: Pain Acute  Goal: Optimal Pain Control and Function  Intervention: Prevent or Manage Pain  Recent Flowsheet Documentation  Taken 8/16/2024 2355 by Nicole Barakat RN  Sensory Stimulation Regulation:   lighting decreased   quiet environment promoted   care clustered  Medication Review/Management: medications reviewed     Problem: Pain Acute  Goal: Optimal Pain Control and Function  Intervention: Optimize Psychosocial Wellbeing  Recent Flowsheet Documentation  Taken 8/16/2024 2355 by Nicole Barakat RN  Supportive Measures: active listening utilized     Problem: Heart Failure  Goal: Optimal Coping  Outcome: Progressing  Intervention: Support Psychosocial Response  Recent Flowsheet Documentation  Taken 8/16/2024 2355 by Nicole Barakat RN  Supportive Measures: active listening utilized     Problem: Heart Failure  Goal: Optimal Coping  Intervention: Support Psychosocial Response  Recent Flowsheet Documentation  Taken 8/16/2024 2355 by Nicole Barakat RN  Supportive Measures: active listening utilized     Problem: Heart Failure  Goal: Effective Oxygenation and Ventilation  Outcome: Progressing  Intervention: Promote Airway Secretion Clearance  Recent Flowsheet Documentation  Taken 8/16/2024 2355 by Nicole Barakat RN  Cough And Deep Breathing: done with encouragement  Activity Management: activity adjusted per tolerance  Intervention: Optimize Oxygenation and Ventilation  Recent Flowsheet Documentation  Taken 8/16/2024 2355 by Nicole Barakat RN  Head of Bed (HOB) Positioning: HOB at 30-45 degrees     Problem: Heart Failure  Goal: Effective Oxygenation and Ventilation  Intervention: Promote Airway Secretion Clearance  Recent Flowsheet Documentation  Taken 8/16/2024 2355 by Nicole Barakat RN  Cough And Deep Breathing: done with encouragement  Activity Management: activity adjusted per tolerance      Problem: Heart Failure  Goal: Effective Oxygenation and Ventilation  Intervention: Optimize Oxygenation and Ventilation  Recent Flowsheet Documentation  Taken 8/16/2024 2355 by Nicole Barakat RN  Head of Bed (HOB) Positioning: HOB at 30-45 degrees     Problem: Heart Failure  Goal: Fluid and Electrolyte Balance  Intervention: Monitor and Manage Fluid and Electrolyte Balance  Recent Flowsheet Documentation  Taken 8/16/2024 2355 by Nicole Barakat RN  Fluid/Electrolyte Management: fluids restricted     Problem: Heart Failure  Goal: Optimal Cardiac Output  Intervention: Optimize Cardiac Output  Recent Flowsheet Documentation  Taken 8/16/2024 2355 by Nicole Barakat RN  Environmental Support:   calm environment promoted   distractions minimized     Problem: Heart Failure  Goal: Optimal Cardiac Output  Intervention: Optimize Cardiac Output  Recent Flowsheet Documentation  Taken 8/16/2024 2355 by Nicole Barakat RN  Environmental Support:   calm environment promoted   distractions minimized     Problem: Comorbidity Management  Goal: Maintenance of Heart Failure Symptom Control  Intervention: Maintain Heart Failure Management  Recent Flowsheet Documentation  Taken 8/16/2024 2355 by Nicole Barakat RN  Medication Review/Management: medications reviewed   Goal Outcome Evaluation:       Patient here with SOB/CP, no chest pain at this time. Patient is having SOB, lungs are clear with 02 sat's 92-97% on room air. Heart rhythm=NSR.. patient was given Melatonin 3 mg's at 2350 for sleep, patient still unable to sleep do to SOB, Atarax 25 mg's given at 0043 and will monitor. Patient is on Heparin gtt at 1250units/hr. Patient is a little unsteady on feet, assist of 1 with walker and gait belt. Patient's blood sugar 204 at midnight with 2 units Novolog given. Patient is back up in chair at this time.

## 2024-08-18 ENCOUNTER — APPOINTMENT (OUTPATIENT)
Dept: SPEECH THERAPY | Facility: HOSPITAL | Age: 59
End: 2024-08-18
Attending: STUDENT IN AN ORGANIZED HEALTH CARE EDUCATION/TRAINING PROGRAM
Payer: COMMERCIAL

## 2024-08-18 ENCOUNTER — APPOINTMENT (OUTPATIENT)
Dept: OCCUPATIONAL THERAPY | Facility: HOSPITAL | Age: 59
End: 2024-08-18
Attending: STUDENT IN AN ORGANIZED HEALTH CARE EDUCATION/TRAINING PROGRAM
Payer: COMMERCIAL

## 2024-08-18 ENCOUNTER — APPOINTMENT (OUTPATIENT)
Dept: PHYSICAL THERAPY | Facility: HOSPITAL | Age: 59
End: 2024-08-18
Attending: STUDENT IN AN ORGANIZED HEALTH CARE EDUCATION/TRAINING PROGRAM
Payer: COMMERCIAL

## 2024-08-18 LAB
ANION GAP SERPL CALCULATED.3IONS-SCNC: 13 MMOL/L (ref 7–15)
BUN SERPL-MCNC: 52.6 MG/DL (ref 8–23)
CALCIUM SERPL-MCNC: 8.6 MG/DL (ref 8.8–10.4)
CHLORIDE SERPL-SCNC: 103 MMOL/L (ref 98–107)
CREAT SERPL-MCNC: 1.91 MG/DL (ref 0.67–1.17)
EGFRCR SERPLBLD CKD-EPI 2021: 39 ML/MIN/1.73M2
ERYTHROCYTE [DISTWIDTH] IN BLOOD BY AUTOMATED COUNT: 12.5 % (ref 10–15)
GLUCOSE BLDC GLUCOMTR-MCNC: 130 MG/DL (ref 70–99)
GLUCOSE BLDC GLUCOMTR-MCNC: 135 MG/DL (ref 70–99)
GLUCOSE BLDC GLUCOMTR-MCNC: 138 MG/DL (ref 70–99)
GLUCOSE BLDC GLUCOMTR-MCNC: 169 MG/DL (ref 70–99)
GLUCOSE SERPL-MCNC: 92 MG/DL (ref 70–99)
HCO3 SERPL-SCNC: 23 MMOL/L (ref 22–29)
HCT VFR BLD AUTO: 23 % (ref 40–53)
HGB BLD-MCNC: 8 G/DL (ref 13.3–17.7)
MCH RBC QN AUTO: 29.1 PG (ref 26.5–33)
MCHC RBC AUTO-ENTMCNC: 34.8 G/DL (ref 31.5–36.5)
MCV RBC AUTO: 84 FL (ref 78–100)
PLATELET # BLD AUTO: 362 10E3/UL (ref 150–450)
POTASSIUM SERPL-SCNC: 4.1 MMOL/L (ref 3.4–5.3)
RBC # BLD AUTO: 2.75 10E6/UL (ref 4.4–5.9)
SODIUM SERPL-SCNC: 139 MMOL/L (ref 135–145)
UFH PPP CHRO-ACNC: 0.3 IU/ML
UFH PPP CHRO-ACNC: 0.39 IU/ML
UFH PPP CHRO-ACNC: 0.52 IU/ML
WBC # BLD AUTO: 9.4 10E3/UL (ref 4–11)

## 2024-08-18 PROCEDURE — 258N000003 HC RX IP 258 OP 636: Performed by: INTERNAL MEDICINE

## 2024-08-18 PROCEDURE — 85014 HEMATOCRIT: CPT | Performed by: NURSE PRACTITIONER

## 2024-08-18 PROCEDURE — 250N000011 HC RX IP 250 OP 636: Performed by: INTERNAL MEDICINE

## 2024-08-18 PROCEDURE — 97110 THERAPEUTIC EXERCISES: CPT | Mod: GO

## 2024-08-18 PROCEDURE — 85520 HEPARIN ASSAY: CPT | Performed by: STUDENT IN AN ORGANIZED HEALTH CARE EDUCATION/TRAINING PROGRAM

## 2024-08-18 PROCEDURE — 250N000011 HC RX IP 250 OP 636: Performed by: STUDENT IN AN ORGANIZED HEALTH CARE EDUCATION/TRAINING PROGRAM

## 2024-08-18 PROCEDURE — 92526 ORAL FUNCTION THERAPY: CPT | Mod: GN

## 2024-08-18 PROCEDURE — 210N000001 HC R&B IMCU HEART CARE

## 2024-08-18 PROCEDURE — 80048 BASIC METABOLIC PNL TOTAL CA: CPT | Performed by: NURSE PRACTITIONER

## 2024-08-18 PROCEDURE — 250N000013 HC RX MED GY IP 250 OP 250 PS 637: Performed by: NURSE PRACTITIONER

## 2024-08-18 PROCEDURE — 97110 THERAPEUTIC EXERCISES: CPT | Mod: GP

## 2024-08-18 PROCEDURE — 36415 COLL VENOUS BLD VENIPUNCTURE: CPT | Performed by: STUDENT IN AN ORGANIZED HEALTH CARE EDUCATION/TRAINING PROGRAM

## 2024-08-18 PROCEDURE — 250N000013 HC RX MED GY IP 250 OP 250 PS 637: Performed by: STUDENT IN AN ORGANIZED HEALTH CARE EDUCATION/TRAINING PROGRAM

## 2024-08-18 PROCEDURE — 99233 SBSQ HOSP IP/OBS HIGH 50: CPT | Performed by: INTERNAL MEDICINE

## 2024-08-18 PROCEDURE — 97116 GAIT TRAINING THERAPY: CPT | Mod: GP

## 2024-08-18 PROCEDURE — 99232 SBSQ HOSP IP/OBS MODERATE 35: CPT | Performed by: INTERNAL MEDICINE

## 2024-08-18 PROCEDURE — 99232 SBSQ HOSP IP/OBS MODERATE 35: CPT | Performed by: STUDENT IN AN ORGANIZED HEALTH CARE EDUCATION/TRAINING PROGRAM

## 2024-08-18 PROCEDURE — 250N000013 HC RX MED GY IP 250 OP 250 PS 637: Performed by: INTERNAL MEDICINE

## 2024-08-18 PROCEDURE — 97535 SELF CARE MNGMENT TRAINING: CPT | Mod: GO

## 2024-08-18 RX ORDER — SODIUM CHLORIDE, SODIUM LACTATE, POTASSIUM CHLORIDE, CALCIUM CHLORIDE 600; 310; 30; 20 MG/100ML; MG/100ML; MG/100ML; MG/100ML
INJECTION, SOLUTION INTRAVENOUS CONTINUOUS
Status: DISCONTINUED | OUTPATIENT
Start: 2024-08-19 | End: 2024-08-21

## 2024-08-18 RX ADMIN — ASPIRIN 81 MG: 81 TABLET, COATED ORAL at 08:23

## 2024-08-18 RX ADMIN — SODIUM CHLORIDE, POTASSIUM CHLORIDE, SODIUM LACTATE AND CALCIUM CHLORIDE 100 ML/HR: 600; 310; 30; 20 INJECTION, SOLUTION INTRAVENOUS at 23:49

## 2024-08-18 RX ADMIN — ACETAMINOPHEN 650 MG: 325 TABLET ORAL at 08:23

## 2024-08-18 RX ADMIN — DOXYCYCLINE HYCLATE 100 MG: 100 CAPSULE ORAL at 08:24

## 2024-08-18 RX ADMIN — HEPARIN SODIUM 1050 UNITS/HR: 10000 INJECTION, SOLUTION INTRAVENOUS at 18:33

## 2024-08-18 RX ADMIN — HYDROXYZINE HYDROCHLORIDE 25 MG: 25 TABLET ORAL at 21:33

## 2024-08-18 RX ADMIN — INSULIN GLARGINE 10 UNITS: 100 INJECTION, SOLUTION SUBCUTANEOUS at 21:34

## 2024-08-18 RX ADMIN — ATORVASTATIN CALCIUM 40 MG: 40 TABLET, FILM COATED ORAL at 20:18

## 2024-08-18 RX ADMIN — Medication 10 MG: at 20:18

## 2024-08-18 RX ADMIN — CEFTRIAXONE SODIUM 1 G: 1 INJECTION, POWDER, FOR SOLUTION INTRAMUSCULAR; INTRAVENOUS at 17:26

## 2024-08-18 RX ADMIN — CARVEDILOL 3.12 MG: 3.12 TABLET, FILM COATED ORAL at 17:26

## 2024-08-18 RX ADMIN — SPIRONOLACTONE 12.5 MG: 25 TABLET, FILM COATED ORAL at 08:24

## 2024-08-18 RX ADMIN — CARVEDILOL 3.12 MG: 3.12 TABLET, FILM COATED ORAL at 08:24

## 2024-08-18 RX ADMIN — HYDRALAZINE HYDROCHLORIDE 10 MG: 10 TABLET ORAL at 08:24

## 2024-08-18 RX ADMIN — HYDROXYZINE HYDROCHLORIDE 25 MG: 25 TABLET ORAL at 08:23

## 2024-08-18 RX ADMIN — ISOSORBIDE DINITRATE 10 MG: 10 TABLET ORAL at 08:23

## 2024-08-18 RX ADMIN — HYDRALAZINE HYDROCHLORIDE 10 MG: 10 TABLET ORAL at 13:57

## 2024-08-18 RX ADMIN — ISOSORBIDE DINITRATE 10 MG: 10 TABLET ORAL at 11:54

## 2024-08-18 RX ADMIN — ISOSORBIDE DINITRATE 10 MG: 10 TABLET ORAL at 17:26

## 2024-08-18 RX ADMIN — DOXYCYCLINE HYCLATE 100 MG: 100 CAPSULE ORAL at 20:18

## 2024-08-18 RX ADMIN — HYDRALAZINE HYDROCHLORIDE 10 MG: 10 TABLET ORAL at 20:18

## 2024-08-18 ASSESSMENT — ACTIVITIES OF DAILY LIVING (ADL)
ADLS_ACUITY_SCORE: 32
ADLS_ACUITY_SCORE: 28
ADLS_ACUITY_SCORE: 32
ADLS_ACUITY_SCORE: 28
ADLS_ACUITY_SCORE: 32
ADLS_ACUITY_SCORE: 28
ADLS_ACUITY_SCORE: 32
ADLS_ACUITY_SCORE: 28

## 2024-08-18 NOTE — PLAN OF CARE
Problem: Adult Inpatient Plan of Care  Goal: Readiness for Transition of Care  Outcome: Not Progressing   Goal Outcome Evaluation:    Patient to be NPO at midnight with LR fluids to run. Angiogram ordered and expected to be performed tomorrow. Prep for the angiogram to occur this evening. Restless at times, again did not sleep much at all last night. He is tired but unable to rest. He has expressed anxiety regarding the angiogram procedure.

## 2024-08-18 NOTE — PROGRESS NOTES
RENAL PROGRESS NOTE    CC:  CHRISTY    ASSESSMENT & PLAN:   CHRISTY with underlying CKD 2: CHRISTY is likely secondary to cardiorenal physiology/hemodynamic.  Most recent A1c was 10.1 and his CKD is likely secondary to diabetic nephropathy.  Also ATN could be possible from prolonged prerenal state.  He was still positive for amphetamine and could cause ATN.  He was also found to have hydronephrosis and possibly from obstructive uropathy which is now resolved.  He also received contrast from coronary angiogram on 8/13 and it could possibly will contribute but contrast associated nephropathy.    Urine sodium 115 and suspect from diuretic induced.  Renal ultrasound showed left kidneys slightly smaller than the right side.  No indication for dialysis yet but will watch for age as he will likely get aggressive cardiac intervention in next few days.  Kidney function improved today.  Suspect from hemodynamic.  Urine output seems improving.   Have angiogram tomorrow and there will be another insults for his CHRISTY.    Strict I's and O's  Avoid nephrotoxic medications  Avoid extreme hypo or hypertension  Watch for indication for dialysis  Agree with holding furosemide.  Start LR at 100 mL/h when he is n.p.o.  Okay to give furosemide IV for volume overload for angiogram.     Electrolytes: Sodium 139, potassium 4.1.  Monitor for now     Acid-base: Bicarb 23.  No need for placement.    SUBJECTIVE: Patient seen and examined at bedside.  More awake.  Denies CP, sob.      OBJECTIVE:  Physical Exam   Temp: 98  F (36.7  C) Temp src: Oral BP: 106/60 Pulse: 71   Resp: 18 SpO2: 94 % O2 Device: None (Room air)    Vitals:    08/16/24 0404 08/17/24 0332 08/18/24 0431   Weight: 62.5 kg (137 lb 12.6 oz) 62.6 kg (138 lb) 62.9 kg (138 lb 11.2 oz)     Vital Signs with Ranges  Temp:  [97.9  F (36.6  C)-98.3  F (36.8  C)] 98  F (36.7  C)  Pulse:  [71-83] 71  Resp:  [16-20] 18  BP: (105-139)/(55-74) 106/60  SpO2:  [92 %-97 %] 94 %  I/O last 3 completed  shifts:  In: 730 [P.O.:730]  Out: 500 [Urine:500]    @TMAXR(24)@    Patient Vitals for the past 72 hrs:   Weight   08/18/24 0431 62.9 kg (138 lb 11.2 oz)   08/17/24 0332 62.6 kg (138 lb)   08/16/24 0404 62.5 kg (137 lb 12.6 oz)     Intake/Output Summary (Last 24 hours) at 8/15/2024 1415  Last data filed at 8/14/2024 2119  Gross per 24 hour   Intake --   Output 800 ml   Net -800 ml       PHYSICAL EXAM:  General awake, appears comfortable, NAD  Cardiovascular - Regular rate and rhythm, no rub  Respiratory - Clear to auscultation bilaterally, no crackles or wheezes  Abd: BS present, no guarding or pain with palpation, no ascites  Extremities - No lower extremity edema bilaterally  Skin: dry, intact, no rash, good turgor  Neuro:  Grossly intact, no focal deficits  MSK:  Grossly intact  Psych:  Normal affect    LABORATORY STUDIES:     Recent Labs   Lab 08/18/24  0513 08/17/24  0511 08/16/24  0455 08/15/24  0621 08/14/24  0502 08/13/24  0949   WBC 9.4 9.9 7.5 9.0 10.2 9.6   RBC 2.75* 2.80* 2.81* 2.85* 3.17* 2.95*   HGB 8.0* 8.1* 8.1* 8.0* 8.9* 8.5*   HCT 23.0* 23.6* 23.8* 24.6* 27.2* 24.8*    384 334 302 390 352       Basic Metabolic Panel:  Recent Labs   Lab 08/18/24  1111 08/18/24  0730 08/18/24  0513 08/17/24  2102 08/17/24  1713 08/17/24  1209 08/17/24  0801 08/17/24  0511 08/16/24  0749 08/16/24  0455 08/15/24  0841 08/15/24  0621 08/14/24  0817 08/14/24  0502 08/13/24  1339 08/13/24  0949   NA  --   --  139  --   --   --   --  139  --  142  --  138  --  138  --  139   POTASSIUM  --   --  4.1  --   --   --   --  4.0  --  3.9  --  3.8  --  4.4  --  4.2   CHLORIDE  --   --  103  --   --   --   --  102  --  103  --  101  --  101  --  104   CO2  --   --  23  --   --   --   --  24  --  24  --  26  --  24  --  24   BUN  --   --  52.6*  --   --   --   --  58.3*  --  51.9*  --  60.8*  --  59.6*  --  49.6*   CR  --   --  1.91*  --   --   --   --  2.03*  --  1.84*  --  2.42*  --  2.55*  --  2.28*   * 130* 92 193*  157* 133*   < > 132*   < > 120*   < > 151*   < > 143*   < > 142*   AMIRAH  --   --  8.6*  --   --   --   --  8.4*  --  8.1*  --  8.2*  --  8.5*  --  8.7*    < > = values in this interval not displayed.       INRNo lab results found in last 7 days.     Recent Labs   Lab Test 08/18/24  0513 08/17/24  0511   WBC 9.4 9.9   HGB 8.0* 8.1*    384       Personally reviewed current labs    EXAM: BILATERAL RENAL ARTERY DUPLEX AND RENAL ULTRASOUND     INDICATION: Acute kidney injury     TECHNIQUE: Real-time gray-scale sonographic imaging of the kidneys and bladder was performed including duplex spectral and color Doppler evaluation of the renal arteries.     COMPARISON: Renal artery duplex 8/14/2024     RENAL ULTRASOUND FINDINGS:    RIGHT KIDNEY: Increased parenchymal echogenicity without hydronephrosis, measuring approximately 10.7 x 5.8 x 5.4 cm.     LEFT KIDNEY: Increased parenchymal echogenicity without hydronephrosis, measuring approximately 8.8 x 5.3 x 5.5 cm.     The bladder is decompressed with indwelling Fischer catheter     DUPLEX ARTERIAL ULTRASOUND FINDINGS:     SYSTOLIC VELOCITIES (cm/s):  RIGHT RENAL ARTERY:  Proximal: 60                Mid: 61                 Distal: 45                    LEFT RENAL ARTERY:  Proximal: 49                Mid: 40                 Distal: 32                    Aorta: 86     Arcuate arterial resistive indices range from 0.7-0.8 on the right and 0.7-0.8 on the left.                                                                      IMPRESSION:  1.  Echogenic kidneys. No hydronephrosis.  2.  Negative renal artery duplex.     Apryl Peguero MD  [facility]  [phone]

## 2024-08-18 NOTE — PROGRESS NOTES
[facility]  Cardiac Electrophysiology  [address]  [place] [address]   Office: [phone]  Fax: [phone]     Cardiology Progress Note    Patient: Abdulaziz Rausch   : 1965       Assessment/Recommendations     Severe multivessel CAD - large area of LAD stunning though viability via cardiac PET   - appreciate CTS consultation  - PCI with LV support, timing to be determined - will make NPO past midnight for possible case on Monday  - continue carvedilol 3.125mg twice daily  - continue aspirin 81mg daily  - start clopidogrel 75mg daily  - continue atorvastatin 40mg daily     Acute systolic heart failure related to ischemic cardiomyopathy - LVEF 15-20% by 2024 TTE, LVEDP 24mmHg at 2024 coronary angiography.  Stress LVEF 69% by 2023 MPI.   - continue carvedilol  - continue isordil 10mg three times daily  - continue hydralazine 10mg three times daily  - continue spironolactone 12.5mg daily     Aspiration pneumonia - on antibiotics  CHRISTY on CKD  MRSA toe osteomyelitis with amputations  Anemia  HTN  DM  Methamphetamine abuse         Interval Events   Cr 1.91 (2.03,1.84), Hgb ~stable 8.0.  He feels fair, and denies chest pain or dyspnea       Physical Examination  Review of Systems   VITALS: /70 (BP Location: Left arm)   Pulse 83   Temp 98.3  F (36.8  C) (Oral)   Resp 18   Wt 62.9 kg (138 lb 11.2 oz)   SpO2 92%   BMI 20.48 kg/m    Wt Readings from Last 3 Encounters:   24 62.9 kg (138 lb 11.2 oz)   24 67.8 kg (149 lb 7.6 oz)   10/05/23 62.1 kg (137 lb)       Intake/Output Summary (Last 24 hours) at 2024 4018  Last data filed at 2024 0435  Gross per 24 hour   Intake 730 ml   Output 500 ml   Net 230 ml     CONSTITUTIONAL: no distress  EYES:  Conjunctivae pink, sclerae clear.    E/N/T:  Oral mucosa pink, moist mucous membranes  RESPIRATORY:  Respiratory effort is normal  CARDIOVASCULAR:  normal S1 and S2  GASTROINTESTINAL:   AVS reviewed.   Abdomen without masses or tenderness  EXTREMITIES:  No clubbing or cyanosis.    MUSCULOSKELETAL:  Overall grossly normal muscle strength  SKIN:  Overall, skin warm and dry, no lesions.  NEURO/PSYCH:  somewhat somnolent today, though awake and appropriately interactive    Constitutional:  No weight loss or loss of appetite    Cardiovascular: As detailed above  Respiratory: No cough  Genitourinary: No trouble urinating           Medical History  Surgical History   Past Medical History:   Diagnosis Date    Anemia     Depression     Diabetes mellitus, type 2 (H)     Diabetic foot infection (H) 7/1/2023    Diabetic foot ulcer (H)     History of MRSA infection 7/1/2023 Jan 2023 RIGHT foot wound    Hypertension     Non-healing ulcer of left foot (H) 7/1/2023    Osteomyelitis (H)     Past Surgical History:   Procedure Laterality Date    AMPUTATE FOOT Left 7/3/2023    Procedure: partial left fifth ray amputation, skin flap creation and closre, left foot;  Surgeon: Alejandro Mccoy DPM;  Location: [facility] OR    AMPUTATE TOE(S) Right 2/17/2023    Procedure: AMPUTATION, digits two and three right foot;  Surgeon: Ulises Jimenez DPM;  Location: [facility] OR    CV CORONARY ANGIOGRAM N/A 8/13/2024    Procedure: Coronary Angiogram;  Surgeon: Brandon Alejandro MD;  Location: [facility] CATH LAB CV    CV LEFT HEART CATH N/A 8/13/2024    Procedure: Left Heart Catheterization;  Surgeon: Brandon Alejandro MD;  Location: [facility] CATH LAB CV    CV RIGHT HEART CATH MEASUREMENTS RECORDED N/A 8/13/2024    Procedure: Right Heart Catheterization;  Surgeon: Brandon Alejandro MD;  Location: [facility] CATH LAB CV    INCISION AND DRAINAGE FOOT, COMBINED Left 5/23/2023    Procedure: Incision and drainage of abscess left foot with;  Surgeon: Jf Huerta DPM;  Location: [facility] OR    IRRIGATION AND DEBRIDEMENT FOOT, COMBINED Left 5/23/2023    Procedure: debridement and irrigation of wound left foot;  Surgeon: Jf Huerta  ENEIDA;  Location: [facility] OR    IRRIGATION AND DEBRIDEMENT TOE, COMBINED Right 1/20/2023    Procedure: IRRIGATION AND DEBRIDEMENT digits 2 and 3 right foot;  Surgeon: Jf Huerta DPM;  Location: [facility] OR    PICC SINGLE LUMEN PLACEMENT  5/25/2023              Family History Social History   Family History   Problem Relation Age of Onset    Diabetes Father         Social History     Tobacco Use    Smoking status: Never    Smokeless tobacco: Never   Vaping Use    Vaping status: Never Used   Substance Use Topics    Alcohol use: Never    Drug use: Yes     Types: Methamphetamines     Comment: chronic user         Medications  Allergies     Current Facility-Administered Medications:     acetaminophen (TYLENOL) tablet 650 mg, 650 mg, Oral, Q4H PRN, YohanatsioRichie petersenle C, CNP, 650 mg at 08/17/24 0917    aspirin EC tablet 81 mg, 81 mg, Oral, Daily, Richie Bricele C, CNP, 81 mg at 08/17/24 0917    atorvastatin (LIPITOR) tablet 40 mg, 40 mg, Oral, QPM, Tanna Brice CNP, 40 mg at 08/17/24 1929    carvedilol (COREG) tablet 3.125 mg, 3.125 mg, Oral, BID w/meals, Scar Lazo MD, 3.125 mg at 08/17/24 1743    cefTRIAXone (ROCEPHIN) 1 g vial to attach to  mL bag for ADULTS or NS 50 mL bag for PEDS, 1 g, Intravenous, Q24H, Qian Correia MD, 1 g at 08/17/24 1632    glucose gel 15-30 g, 15-30 g, Oral, Q15 Min PRN **OR** dextrose 50 % injection 25-50 mL, 25-50 mL, Intravenous, Q15 Min PRN **OR** glucagon injection 1 mg, 1 mg, Subcutaneous, Q15 Min PRN, Tanna Brice CNP    doxycycline hyclate (VIBRAMYCIN) capsule 100 mg, 100 mg, Oral, Q12H MARKUS (08/20), Qian Correia MD, 100 mg at 08/17/24 1929    [Held by provider] furosemide (LASIX) tablet 20 mg, 20 mg, Oral, BID, Qian Correia MD, 20 mg at 08/16/24 1703    [Held by provider] gabapentin (NEURONTIN) capsule 300 mg, 300 mg, Oral, BID, Tanna Brice, CNP, 300 mg at 08/13/24 4944    heparin 25,000  units in 0.45% NaCl 250 mL ANTICOAGULANT infusion, 0-5,000 Units/hr, Intravenous, Continuous, Scar Lazo MD, Paused at 08/18/24 0716    HOLD:  Metformin and metformin containing medications if patient received IV contrast with acute kidney injury or severe chronic kidney disease (stage IV or stage V; i.e., eGFR less than 30), , Does not apply, HOLD, Tanna Brice CNP    hydrALAZINE (APRESOLINE) tablet 10 mg, 10 mg, Oral, TID, Scar Lazo MD, 10 mg at 08/17/24 2017    hydrOXYzine HCl (ATARAX) tablet 25 mg, 25 mg, Oral, Q6H PRN, Nicole Lindquist MD, 25 mg at 08/17/24 2103    insulin aspart (NovoLOG) injection (RAPID ACTING), 1-7 Units, Subcutaneous, TID AC, Qian Correia MD    insulin aspart (NovoLOG) injection (RAPID ACTING), 1-5 Units, Subcutaneous, At Bedtime, Qian Correia MD    insulin glargine (LANTUS PEN) injection 10 Units, 10 Units, Subcutaneous, At Bedtime, Tanna Brice CNP, 10 Units at 08/17/24 2103    iodixanol (VISIPAQUE 320) injection, , , Once PRN, Brandon Alejandro MD, 35 mL at 08/13/24 1620    ipratropium - albuterol 0.5 mg/2.5 mg/3 mL (DUONEB) neb solution 3 mL, 3 mL, Nebulization, Q4H PRN, Tanna Brice CNP    isosorbide dinitrate (ISORDIL) tablet 10 mg, 10 mg, Oral, TID, Scar Lazo MD, 10 mg at 08/17/24 1630    lidocaine (LMX4) cream, , Topical, Q1H PRN, Tanna Brice CNP    lidocaine 1 % 0.1-1 mL, 0.1-1 mL, Other, Q1H PRN, Tanna Brice CNP    melatonin tablet 10 mg, 10 mg, Oral, At Bedtime, Qian Correia MD, 10 mg at 08/17/24 2015    ondansetron (ZOFRAN ODT) ODT tab 4 mg, 4 mg, Oral, Q6H PRN **OR** ondansetron (ZOFRAN) injection 4 mg, 4 mg, Intravenous, Q6H PRN, Tanna Brice, CNP    oxyCODONE (ROXICODONE) tablet 5 mg, 5 mg, Oral, Q4H PRN, 5 mg at 08/14/24 3271 **OR** oxyCODONE (ROXICODONE) tablet 10 mg, 10 mg, Oral, Q4H PRN, Tanna Brice, CNP    prochlorperazine  "(COMPAZINE) injection 10 mg, 10 mg, Intravenous, Q6H PRN, 10 mg at 08/15/24 0632 **OR** prochlorperazine (COMPAZINE) tablet 10 mg, 10 mg, Oral, Q6H PRN **OR** prochlorperazine (COMPAZINE) suppository 25 mg, 25 mg, Rectal, Q12H PRN, Tanna Brice CNP    senna-docusate (SENOKOT-S/PERICOLACE) 8.6-50 MG per tablet 1 tablet, 1 tablet, Oral, BID PRN **OR** senna-docusate (SENOKOT-S/PERICOLACE) 8.6-50 MG per tablet 2 tablet, 2 tablet, Oral, BID PRN, Tanna Brice CNP    sodium chloride (PF) 0.9% PF flush 3 mL, 3 mL, Intracatheter, Q8H, Tanna Brice CNP, 3 mL at 08/17/24 1632    sodium chloride (PF) 0.9% PF flush 3 mL, 3 mL, Intracatheter, q1 min prn, Tanna Brice, CNP    spironolactone (ALDACTONE) half-tab 12.5 mg, 12.5 mg, Oral, Daily, Scar Lazo MD, 12.5 mg at 08/17/24 0917   No Known Allergies       Lab Results    Chemistry CBC Cardiac Enzymes/BNP/TSH/INR   Recent Labs   Lab Test 08/18/24  0730 08/18/24  0513   NA  --  139   POTASSIUM  --  4.1   CHLORIDE  --  103   CO2  --  23   * 92   BUN  --  52.6*   CR  --  1.91*   GFRESTIMATED  --  39*   AMIRAH  --  8.6*     Recent Labs   Lab Test 08/18/24  0513 08/17/24  0511 08/16/24  0455   CR 1.91* 2.03* 1.84*          Recent Labs   Lab Test 08/18/24  0513   WBC 9.4   HGB 8.0*   HCT 23.0*   MCV 84        Recent Labs   Lab Test 08/18/24  0513 08/17/24  0511 08/16/24  0455   HGB 8.0* 8.1* 8.1*    No results for input(s): \"TROPONINI\" in the last 61159 hours.  Recent Labs   Lab Test 08/13/24  0949 08/11/24  1451   NTBNPI 17,082* 21,454*     Recent Labs   Lab Test 08/13/24  1901   TSH 1.32     No results for input(s): \"INR\" in the last 55227 hours.         Mario Heredia MD  8/18/2024  7:53 AM    "

## 2024-08-18 NOTE — PLAN OF CARE
Problem: Cardiac Catheterization (Diagnostic/Interventional)  Goal: Absence of Bleeding  Outcome: Progressing  Goal: Absence of Contrast-Induced Injury  Outcome: Progressing  Goal: Stable Heart Rate and Rhythm  Outcome: Progressing  Intervention: Monitor and Manage Cardiac Rhythm Effect  Recent Flowsheet Documentation  Taken 8/17/2024 1647 by Huey Zimmerman RN  Fluid/Electrolyte Management: fluids restricted  Goal: Absence of Embolism Signs and Symptoms  Outcome: Progressing  Goal: Anesthesia/Sedation Recovery  Outcome: Progressing  Goal: Optimal Pain Control and Function  Outcome: Progressing  Goal: Absence of Vascular Access Complication  Outcome: Progressing     Problem: Heart Failure  Goal: Optimal Coping  Outcome: Progressing  Goal: Optimal Cardiac Output  Outcome: Progressing  Goal: Stable Heart Rate and Rhythm  Outcome: Progressing  Goal: Optimal Functional Ability  Outcome: Progressing  Goal: Fluid and Electrolyte Balance  Outcome: Progressing  Intervention: Monitor and Manage Fluid and Electrolyte Balance  Recent Flowsheet Documentation  Taken 8/17/2024 1647 by uHey Zimmerman RN  Fluid/Electrolyte Management: fluids restricted  Goal: Improved Oral Intake  Outcome: Progressing  Goal: Effective Oxygenation and Ventilation  Outcome: Progressing  Goal: Effective Breathing Pattern During Sleep  Outcome: Progressing   Goal Outcome Evaluation:         PT has heparin infusing running with recheck in a.m.    Pt has been mostly calm and cooperative.  Late this evening he reports feeling anxious and was given prn hydroxyzine and scheduled melatonin.      Pt has little output from rowland.  200 on day shift and 250 this evening shift.      PT did have small soft bm this evening.

## 2024-08-18 NOTE — PROGRESS NOTES
Care Management Follow Up    Length of Stay (days): 5    Expected Discharge Date: 08/19/2024    Anticipated Discharge Plan:   transitional care     Transportation: TBD    PT Recommendations: Transitional Care Facility  OT Recommendations:  Transitional Care Facility     Barriers to Discharge: medical stability, placement    Prior Living Situation: apartment with other (see comments), friend(s) (roommate)     Patient/Spokesperson Updated: Yes. Who? Nilda Cruz    Additional Information:  LURDES reviewed chart. MATT on 8/17 indicates Nilda cruz requests an update from CM today. LURDES called and spoke with Nilda. Discussed limited updates related to discharge planning at this time. Nilda requests additional TCU referrals are sent to [facility] and [facility]. Referral sent to [facility], although it appears to be an NATHAN based on information found in epic. Unable to find [facility]. Will need to further clarify with Nilda.     Referral pending at [facility] in [place] (sent in communication tab). CM to follow up on Monday.    JACQUI Miramontes

## 2024-08-18 NOTE — PROGRESS NOTES
Speech Language Therapy Discharge Summary    Reason for therapy discharge:    All goals and outcomes met, no further needs identified.    Progress towards therapy goal(s). See goals on Care Plan in [facility] electronic health record for goal details.  Goals met    Therapy recommendation(s):    No further therapy is recommended. Continue Regular textures and Thin liquids. Alert and Upright to 90 degrees for all intake.

## 2024-08-18 NOTE — PROGRESS NOTES
[facility]    Medicine Progress Note - Hospitalist Service    Date of Admission:  8/13/2024    Assessment & Plan   Abdulaziz Rausch is a 59 year old male admitted on 8/13/2024. He has h/o HTN, DM-II who was admitted to  for acute CHF exacerbation. Course was complicated by CHRISTY. He was started on diuresis and was transferred to [facility] cardiac cath. Hospitalist service was consulted for medical co-management.     -- Cardiac cath on 08/13: MVCAD  -- CABG deemed high risk  -- PCI planned for 08/19. NPO after midnight.   -- On Iv Abx for aspiration pneumonia  -- CHRISTY improving off of lasix. Nephrology following  -- Hydronephrosis resolved. Urology- TOV prior discharge. OP follow up.      Elevated troponin, NSTEMI, MVCAD  -- Heparin gtt, Cardiac cath on 08/13: MVCAD  -- ASA, Plavix, Lipitor, coreg.   -- Repeat PET showed stunned but viable left ventricular myocardium.  -- CTS consult appreciated: High risk for surgical interventions.  -- 08/18: Cardiology consult appreciated- PCI with LV support possibly on 08/19.    Acute decompensated HFrEF  Mild pulmonary hypertension  -- ProBNP: 49975  -- Strict I/O, daily weight, fluid restriction  -- 08/17: Gave lasis 20 mg po on 08/16 after discussing with nephrology. Lasix held this morning due to worsening of renal function. Defer diuretics to nephrology and cards.  -- 08/18: No rales on exam. Continue to monitor off of lasix.    Acute hypoxic respiratory failure-resolved  -- Likely 2/2 CHF, ?aspiration pneumonia    ?Aspiration pneumonia  -- CXR on 08/11: multifocal patchy opacities present involving b/l lungs including lung bases, Right upper hilar region, and left perihilar region likely representing infecious process. Small b/l pleural effusions.  -- No fever or leukocytosis but pt is more lethargic.   -- SLP eval  -- On ceftriaxone 6/7 and doxycycline 5/7 to complete 7 day course. Sputum culture as able  -- H/o MRSA OM. Negative MRSA swab on  08/14.    Acute metabolic encephalopathy- resolved.  -- Multifactorial: severe CHF, NSTEMI, pneumonia, CHRISTY  -- VBG wnl. TSH 1.32  -- Delirium precaution  -- UDS + for amphetamine, negative for opiates.   -- CT-head: no acute changes.  -- Gabapentin held    CHRISTY on CKD-II  -- Likely 2/2 low perfusion from CHF  -- Avoid Nephrotoxins. Holding ACEI, Metformin, Jardiance  -- Strict I/O, US-renal: Echogenic kidneys. No hydronephrosis. Negative renal artery duplex.  -- Nephrology consult appreciated  -- 08/17: Gave lasis 20 mg po on 08/16 after discussing with nephrology. Lasix held this morning due to worsening of renal function. Defer diuretics to nephrology and cards.  -- 08/18: Cr improving. Continue to hold lasix    Acute urinary retention  B/l moderate hydronephrosis on ULS-resolved  -- rowland.  -- Urology consult appreciated: TOV prior discharge.  -- Repeat US-renal: No hydronephrosis    DM-II  -- A1c: 10.1%  -- Accu-checks, sliding scale, [id number] units, hypoglycemia protocol. Titrate as indicated    Essential hypertension  -- Monitor vital signs per protocol  -- holding Norvasc. On coreg    Normocytic Anemia, Chronic   AOCD  -- Iron panel reviewed. Monitor cbc  -- No e/o bleeding              Diet: Fluid restriction 2000 ML FLUID  Combination Diet Regular Diet; Low Saturated Fat Na <2400mg Diet  NPO for Medical/Clinical Reasons Except for: Meds    DVT Prophylaxis: heparin gtt  Rowland Catheter: PRESENT, indication: Acute retention or obstruction  Lines: None     Cardiac Monitoring: ACTIVE order. Indication: Post- PCI/Angiogram (24 hours)  Code Status: Full Code      Clinically Significant Risk Factors              # Hypoalbuminemia: Lowest albumin = 2.5 g/dL at 8/16/2024  4:55 AM, will monitor as appropriate     # Hypertension: Noted on problem list  # Chronic heart failure with reduced ejection fraction: last echo with EF <40%         # DMII: A1C = 10.1 % (Ref range: <5.7 %) within past 6 months         #  Financial/Environmental Concerns: none               Disposition Plan     Medically Ready for Discharge: Anticipated in 2-4 Days             Qian Correia MD  Hospitalist Service  [facility]  Securely message with Dm (more info)  Text page via [employer] Paging/Directory   ______________________________________________________________________    Interval History   Patient is seen and examined at bedside.   No complaints. Working with OT during my encounter. Had discussion regarding his labs, current treatments and next steps. He verbalized understanding and agreed to plan of care.      Physical Exam   Vital Signs: Temp: 98.3  F (36.8  C) Temp src: Oral BP: 129/70 Pulse: 83   Resp: 18 SpO2: 92 % O2 Device: None (Room air)    Weight: 138 lbs 11.2 oz    GEN: Alert and oriented. Not in acute distress.  HEENT: Atraumatic, mucous membrane- moist and pink.  Chest: Bilateral air entry.  CVS: S1S2 regular.   Abdomen: Soft. Non-tender, non-distended. No organomegaly. No guarding or rigidity. Bowel sounds active.   Extremities: No pedal edema.  CNS: No involuntary movements.  Skin: no cyanosis or clubbing.     Medical Decision Making       48 MINUTES SPENT BY ME on the date of service doing chart review, history, exam, documentation & further activities per the note.      Data

## 2024-08-19 ENCOUNTER — APPOINTMENT (OUTPATIENT)
Dept: PHYSICAL THERAPY | Facility: HOSPITAL | Age: 59
End: 2024-08-19
Attending: STUDENT IN AN ORGANIZED HEALTH CARE EDUCATION/TRAINING PROGRAM
Payer: COMMERCIAL

## 2024-08-19 LAB
ABO/RH(D): NORMAL
ANION GAP SERPL CALCULATED.3IONS-SCNC: 13 MMOL/L (ref 7–15)
ANTIBODY SCREEN: NEGATIVE
BUN SERPL-MCNC: 47.6 MG/DL (ref 8–23)
CALCIUM SERPL-MCNC: 8.6 MG/DL (ref 8.8–10.4)
CHLORIDE SERPL-SCNC: 103 MMOL/L (ref 98–107)
CREAT SERPL-MCNC: 1.89 MG/DL (ref 0.67–1.17)
EGFRCR SERPLBLD CKD-EPI 2021: 40 ML/MIN/1.73M2
ERYTHROCYTE [DISTWIDTH] IN BLOOD BY AUTOMATED COUNT: 12.4 % (ref 10–15)
GLUCOSE BLDC GLUCOMTR-MCNC: 107 MG/DL (ref 70–99)
GLUCOSE BLDC GLUCOMTR-MCNC: 109 MG/DL (ref 70–99)
GLUCOSE BLDC GLUCOMTR-MCNC: 119 MG/DL (ref 70–99)
GLUCOSE BLDC GLUCOMTR-MCNC: 120 MG/DL (ref 70–99)
GLUCOSE SERPL-MCNC: 132 MG/DL (ref 70–99)
HCO3 SERPL-SCNC: 23 MMOL/L (ref 22–29)
HCT VFR BLD AUTO: 24.7 % (ref 40–53)
HGB BLD-MCNC: 8.4 G/DL (ref 13.3–17.7)
MCH RBC QN AUTO: 28.9 PG (ref 26.5–33)
MCHC RBC AUTO-ENTMCNC: 34 G/DL (ref 31.5–36.5)
MCV RBC AUTO: 85 FL (ref 78–100)
PLATELET # BLD AUTO: 379 10E3/UL (ref 150–450)
POTASSIUM SERPL-SCNC: 4.2 MMOL/L (ref 3.4–5.3)
RBC # BLD AUTO: 2.91 10E6/UL (ref 4.4–5.9)
SODIUM SERPL-SCNC: 139 MMOL/L (ref 135–145)
SPECIMEN EXPIRATION DATE: NORMAL
UFH PPP CHRO-ACNC: 0.35 IU/ML
WBC # BLD AUTO: 9.7 10E3/UL (ref 4–11)

## 2024-08-19 PROCEDURE — 250N000011 HC RX IP 250 OP 636: Performed by: NURSE PRACTITIONER

## 2024-08-19 PROCEDURE — 99232 SBSQ HOSP IP/OBS MODERATE 35: CPT | Performed by: INTERNAL MEDICINE

## 2024-08-19 PROCEDURE — 250N000013 HC RX MED GY IP 250 OP 250 PS 637: Performed by: INTERNAL MEDICINE

## 2024-08-19 PROCEDURE — 36415 COLL VENOUS BLD VENIPUNCTURE: CPT | Performed by: NURSE PRACTITIONER

## 2024-08-19 PROCEDURE — 250N000013 HC RX MED GY IP 250 OP 250 PS 637: Performed by: NURSE PRACTITIONER

## 2024-08-19 PROCEDURE — 210N000001 HC R&B IMCU HEART CARE

## 2024-08-19 PROCEDURE — 86923 COMPATIBILITY TEST ELECTRIC: CPT | Performed by: NURSE PRACTITIONER

## 2024-08-19 PROCEDURE — 250N000013 HC RX MED GY IP 250 OP 250 PS 637: Performed by: STUDENT IN AN ORGANIZED HEALTH CARE EDUCATION/TRAINING PROGRAM

## 2024-08-19 PROCEDURE — 85027 COMPLETE CBC AUTOMATED: CPT | Performed by: NURSE PRACTITIONER

## 2024-08-19 PROCEDURE — 97116 GAIT TRAINING THERAPY: CPT | Mod: GP

## 2024-08-19 PROCEDURE — 80048 BASIC METABOLIC PNL TOTAL CA: CPT | Performed by: NURSE PRACTITIONER

## 2024-08-19 PROCEDURE — 250N000011 HC RX IP 250 OP 636: Performed by: STUDENT IN AN ORGANIZED HEALTH CARE EDUCATION/TRAINING PROGRAM

## 2024-08-19 PROCEDURE — 250N000011 HC RX IP 250 OP 636: Performed by: INTERNAL MEDICINE

## 2024-08-19 PROCEDURE — 86900 BLOOD TYPING SEROLOGIC ABO: CPT | Performed by: NURSE PRACTITIONER

## 2024-08-19 PROCEDURE — 99233 SBSQ HOSP IP/OBS HIGH 50: CPT | Performed by: INTERNAL MEDICINE

## 2024-08-19 PROCEDURE — 97110 THERAPEUTIC EXERCISES: CPT | Mod: GP

## 2024-08-19 PROCEDURE — 85520 HEPARIN ASSAY: CPT | Performed by: STUDENT IN AN ORGANIZED HEALTH CARE EDUCATION/TRAINING PROGRAM

## 2024-08-19 RX ORDER — NALOXONE HYDROCHLORIDE 0.4 MG/ML
0.2 INJECTION, SOLUTION INTRAMUSCULAR; INTRAVENOUS; SUBCUTANEOUS
Status: DISCONTINUED | OUTPATIENT
Start: 2024-08-19 | End: 2024-08-23 | Stop reason: HOSPADM

## 2024-08-19 RX ORDER — ASPIRIN 81 MG/1
243 TABLET, CHEWABLE ORAL ONCE
Status: COMPLETED | OUTPATIENT
Start: 2024-08-19 | End: 2024-08-19

## 2024-08-19 RX ORDER — ASPIRIN 325 MG
325 TABLET ORAL ONCE
Status: COMPLETED | OUTPATIENT
Start: 2024-08-19 | End: 2024-08-19

## 2024-08-19 RX ORDER — NALOXONE HYDROCHLORIDE 0.4 MG/ML
0.4 INJECTION, SOLUTION INTRAMUSCULAR; INTRAVENOUS; SUBCUTANEOUS
Status: DISCONTINUED | OUTPATIENT
Start: 2024-08-19 | End: 2024-08-23 | Stop reason: HOSPADM

## 2024-08-19 RX ORDER — LIDOCAINE 40 MG/G
CREAM TOPICAL
Status: DISCONTINUED | OUTPATIENT
Start: 2024-08-19 | End: 2024-08-20 | Stop reason: HOSPADM

## 2024-08-19 RX ORDER — POLYETHYLENE GLYCOL 3350 17 G/17G
17 POWDER, FOR SOLUTION ORAL DAILY PRN
Status: DISCONTINUED | OUTPATIENT
Start: 2024-08-19 | End: 2024-08-23 | Stop reason: HOSPADM

## 2024-08-19 RX ADMIN — ISOSORBIDE DINITRATE 10 MG: 10 TABLET ORAL at 08:01

## 2024-08-19 RX ADMIN — DOXYCYCLINE HYCLATE 100 MG: 100 CAPSULE ORAL at 08:02

## 2024-08-19 RX ADMIN — Medication 10 MG: at 20:34

## 2024-08-19 RX ADMIN — ASPIRIN 325 MG ORAL TABLET 325 MG: 325 PILL ORAL at 08:01

## 2024-08-19 RX ADMIN — HYDRALAZINE HYDROCHLORIDE 10 MG: 10 TABLET ORAL at 08:02

## 2024-08-19 RX ADMIN — SPIRONOLACTONE 12.5 MG: 25 TABLET, FILM COATED ORAL at 08:03

## 2024-08-19 RX ADMIN — INSULIN GLARGINE 10 UNITS: 100 INJECTION, SOLUTION SUBCUTANEOUS at 20:34

## 2024-08-19 RX ADMIN — CARVEDILOL 3.12 MG: 3.12 TABLET, FILM COATED ORAL at 08:19

## 2024-08-19 RX ADMIN — SENNOSIDES AND DOCUSATE SODIUM 2 TABLET: 8.6; 5 TABLET ORAL at 21:58

## 2024-08-19 RX ADMIN — CARVEDILOL 3.12 MG: 3.12 TABLET, FILM COATED ORAL at 18:07

## 2024-08-19 RX ADMIN — ONDANSETRON 4 MG: 4 TABLET, ORALLY DISINTEGRATING ORAL at 21:58

## 2024-08-19 RX ADMIN — CEFTRIAXONE SODIUM 1 G: 1 INJECTION, POWDER, FOR SOLUTION INTRAMUSCULAR; INTRAVENOUS at 18:01

## 2024-08-19 RX ADMIN — HYDROXYZINE HYDROCHLORIDE 25 MG: 25 TABLET ORAL at 04:58

## 2024-08-19 RX ADMIN — HYDROXYZINE HYDROCHLORIDE 25 MG: 25 TABLET ORAL at 20:34

## 2024-08-19 RX ADMIN — POLYETHYLENE GLYCOL 3350 17 G: 17 POWDER, FOR SOLUTION ORAL at 18:13

## 2024-08-19 RX ADMIN — SENNOSIDES AND DOCUSATE SODIUM 2 TABLET: 8.6; 5 TABLET ORAL at 11:22

## 2024-08-19 RX ADMIN — HYDRALAZINE HYDROCHLORIDE 10 MG: 10 TABLET ORAL at 20:34

## 2024-08-19 RX ADMIN — HYDROXYZINE HYDROCHLORIDE 25 MG: 25 TABLET ORAL at 14:06

## 2024-08-19 RX ADMIN — HEPARIN SODIUM 1050 UNITS/HR: 10000 INJECTION, SOLUTION INTRAVENOUS at 17:59

## 2024-08-19 RX ADMIN — ISOSORBIDE DINITRATE 10 MG: 10 TABLET ORAL at 18:07

## 2024-08-19 RX ADMIN — DOXYCYCLINE HYCLATE 100 MG: 100 CAPSULE ORAL at 20:34

## 2024-08-19 RX ADMIN — ISOSORBIDE DINITRATE 10 MG: 10 TABLET ORAL at 11:21

## 2024-08-19 RX ADMIN — ATORVASTATIN CALCIUM 40 MG: 40 TABLET, FILM COATED ORAL at 20:34

## 2024-08-19 RX ADMIN — HYDRALAZINE HYDROCHLORIDE 10 MG: 10 TABLET ORAL at 14:05

## 2024-08-19 ASSESSMENT — ACTIVITIES OF DAILY LIVING (ADL)
ADLS_ACUITY_SCORE: 32
ADLS_ACUITY_SCORE: 29
ADLS_ACUITY_SCORE: 32
ADLS_ACUITY_SCORE: 32
ADLS_ACUITY_SCORE: 29
ADLS_ACUITY_SCORE: 32
ADLS_ACUITY_SCORE: 29
ADLS_ACUITY_SCORE: 32
ADLS_ACUITY_SCORE: 29

## 2024-08-19 NOTE — PROGRESS NOTES
HEART CARE NOTE          Assessment/Recommendations     1. Severe HFrEF c/b severe ADHF  Assessment / Plan  Mildly hypervolemic on physical exam - diuresis on hold in anticipation of staged PCI; continue to monitor renal function/repeat BMP, UOP and hemodynamics closely    Patient is high risk for adverse cardiac events 2/2 sever systolic dysfunction, renal dysfunction, CAD and elevated NTproBNP, DM2  GDMT as detailed below    Current Pharmacotherapy AHA Guideline-Directed Medical Therapy   Lisinopril - on hold given renal dysfuntion Lisinopril 20 mg twice daily   Carvedilol 3.125 mg twice daily Carvedilol 25 mg twice daily   Spironolactone 12.5 mg Spironolactone 25 mg once daily   Hydralazine 10 mg three times daily Hydralazine 100 mg three times daily   Isosorbide dinitrate 10 mg three times daily Isosorbide dinitrate 40 mg three times daily   SGLT2 inhibitor:Dapagliflozin/Empagliflozin - not started Dapagliflozin or Empagliflozin 10 mg daily     2. Severe multivessel CAD c/b NSTEMI  Assessment / Plan  PET significant for viability; Not considered CABG candidate by CTS - plan for high risk PCI this admission  Denies chest pain or anginal equivalents     3. CHRISTY on CKD  Assessment / Plan  CRS; diuresis on hold in anticipation of coronary angiogram above; continue to monitor UOP and renal function closely    4. DM2  Assessment / Plan  Management per primary team - currently on ISS    5. HTN  Assessment / Plan  Titrate oral afterload reduction as tolerated    Plan of care discussed on August 19, 2024 with patient at bedside, and primary team overseeing patient's care;      History of Present Illness/Subjective    Mr. Abdulaziz Rausch is a 59 year old male with a PMHx significant for (per Epic notation) diabetes, peripheral vascular disease related to diabetes who presents to [facility] with 3-day history of progressive dyspnea on exertion, lower extremity edema and orthopnea symptoms found to have acute  decompensated congestive heart failure and symptoms consistent with ACS    Today, Mr. Rausch denies acute cardiac events or complaints; Management plan as detailed above    ECG: personally reviewed; normal sinus rhythm, nonspecific ST and T waves changes.    ECHO (personnaly Reviewed on 8/19/24):   1.Left ventricular function is decreased. The ejection fraction is 15-20%  (severely reduced).  2.The left ventricle is mildly dilated.  3.Normal right ventricle size and systolic function.  4.The left atrium is moderate to severely dilated.  5.No hemodynamically significant valvular abnormalities on 2D or color flow  imaging.  6.Right ventricular systolic pressure is elevated, consistent with mild  pulmonary hypertension.  There is no comparison study available.    Telemetry: personally reviewed August 19, 2024; notable for sinus rhythm     Lab results: personally reviewed August 19, 2024; notable for CHRISTY on CKD    Medical history and pertinent documents reviewed in Care Everywhere please where applicable see details above        Physical Examination Review of Systems   BP (!) 146/71 (BP Location: Left arm, Patient Position: Supine, Cuff Size: Adult Regular)   Pulse 81   Temp 98.2  F (36.8  C) (Oral)   Resp 21   Wt 63.2 kg (139 lb 6.4 oz)   SpO2 95%   BMI 20.59 kg/m    Body mass index is 20.59 kg/m .  Wt Readings from Last 3 Encounters:   08/19/24 63.2 kg (139 lb 6.4 oz)   08/12/24 67.8 kg (149 lb 7.6 oz)   10/05/23 62.1 kg (137 lb)     General Appearance:   no distress, normal body habitus   ENT/Mouth: membranes moist, no oral lesions or bleeding gums.      EYES:  no scleral icterus, normal conjunctivae   Neck: no carotid bruits or thyromegaly   Chest/Lungs:   lungs are clear to auscultation, no rales or wheezing, equal chest wall expansion    Cardiovascular:   Regular. Normal first and second heart sounds with no murmurs, rubs, or gallops; the carotid, radial and posterior tibial pulses are intact, no JVD and  mild LE edema bilaterally    Abdomen:  no organomegaly, masses, bruits, or tenderness; bowel sounds are present   Extremities: no cyanosis or clubbing   Skin: no xanthelasma, warm.    Neurologic: NAD     Psychiatric: NAD    A complete 10 systems ROS was reviewed  And is negative except what is listed in the HPI.          Medical History  Surgical History Family History Social History   Past Medical History:   Diagnosis Date    Anemia     Depression     Diabetes mellitus, type 2 (H)     Diabetic foot infection (H) 7/1/2023    Diabetic foot ulcer (H)     History of MRSA infection 7/1/2023 Jan 2023 RIGHT foot wound    Hypertension     Non-healing ulcer of left foot (H) 7/1/2023    Osteomyelitis (H)     Past Surgical History:   Procedure Laterality Date    AMPUTATE FOOT Left 7/3/2023    Procedure: partial left fifth ray amputation, skin flap creation and closre, left foot;  Surgeon: Alejandro Mccoy DPM;  Location: [facility] OR    AMPUTATE TOE(S) Right 2/17/2023    Procedure: AMPUTATION, digits two and three right foot;  Surgeon: Ulises Jimenez DPM;  Location: [facility] OR    CV CORONARY ANGIOGRAM N/A 8/13/2024    Procedure: Coronary Angiogram;  Surgeon: Brandon Alejandro MD;  Location: [facility] CATH LAB CV    CV LEFT HEART CATH N/A 8/13/2024    Procedure: Left Heart Catheterization;  Surgeon: Brandon Alejandro MD;  Location: [facility] LAB CV    CV RIGHT HEART CATH MEASUREMENTS RECORDED N/A 8/13/2024    Procedure: Right Heart Catheterization;  Surgeon: Brandon Alejandro MD;  Location: [facility] CATH LAB CV    INCISION AND DRAINAGE FOOT, COMBINED Left 5/23/2023    Procedure: Incision and drainage of abscess left foot with;  Surgeon: Jf Huerta DPM;  Location: [facility] OR    IRRIGATION AND DEBRIDEMENT FOOT, COMBINED Left 5/23/2023    Procedure: debridement and irrigation of wound left foot;  Surgeon: Jf Huerta DPM;  Location: [facility] OR    IRRIGATION AND DEBRIDEMENT TOE, COMBINED  Right 1/20/2023    Procedure: IRRIGATION AND DEBRIDEMENT digits 2 and 3 right foot;  Surgeon: Jf Huerta DPM;  Location: [facility] OR    [facility] SINGLE LUMEN PLACEMENT  5/25/2023         no family history of premature coronary artery disease Social History     Socioeconomic History    Marital status:      Spouse name: Not on file    Number of children: Not on file    Years of education: Not on file    Highest education level: Not on file   Occupational History    Not on file   Tobacco Use    Smoking status: Never    Smokeless tobacco: Never   Vaping Use    Vaping status: Never Used   Substance and Sexual Activity    Alcohol use: Never    Drug use: Yes     Types: Methamphetamines     Comment: chronic user    Sexual activity: Not on file   Other Topics Concern    Not on file   Social History Narrative    Not on file     Social Determinants of Health     Financial Resource Strain: High Risk (10/5/2023)    Financial Resource Strain     Within the past 12 months, have you or your family members you live with been unable to get utilities (heat, electricity) when it was really needed?: Yes   Food Insecurity: High Risk (10/5/2023)    Food Insecurity     Within the past 12 months, did you worry that your food would run out before you got money to buy more?: Yes     Within the past 12 months, did the food you bought just not last and you didn t have money to get more?: Yes   Transportation Needs: Low Risk  (10/5/2023)    Transportation Needs     Within the past 12 months, has lack of transportation kept you from medical appointments, getting your medicines, non-medical meetings or appointments, work, or from getting things that you need?: No   Physical Activity: Not on file   Stress: Not on file   Social Connections: Not on file   Interpersonal Safety: Low Risk  (10/5/2023)    Interpersonal Safety     Do you feel physically and emotionally safe where you currently live?: Yes     Within the past 12 months, have  "you been hit, slapped, kicked or otherwise physically hurt by someone?: No     Within the past 12 months, have you been humiliated or emotionally abused in other ways by your partner or ex-partner?: No   Housing Stability: Low Risk  (10/5/2023)    Housing Stability     Do you have housing? : Yes     Are you worried about losing your housing?: No           Lab Results    Chemistry/lipid CBC Cardiac Enzymes/BNP/TSH/INR   Lab Results   Component Value Date    CHOL 145 01/18/2023    HDL 37 (L) 01/18/2023    TRIG 226 (H) 01/18/2023    BUN 47.6 (H) 08/19/2024     08/19/2024    CO2 23 08/19/2024    Lab Results   Component Value Date    WBC 9.7 08/19/2024    HGB 8.4 (L) 08/19/2024    HCT 24.7 (L) 08/19/2024    MCV 85 08/19/2024     08/19/2024    Lab Results   Component Value Date    TSH 1.32 08/13/2024     No results found for: \"CKTOTAL\", \"CKMB\", \"TROPONINI\"       Weight:    Wt Readings from Last 3 Encounters:   08/19/24 63.2 kg (139 lb 6.4 oz)   08/12/24 67.8 kg (149 lb 7.6 oz)   10/05/23 62.1 kg (137 lb)       Allergies  No Known Allergies      Surgical History  Past Surgical History:   Procedure Laterality Date    AMPUTATE FOOT Left 7/3/2023    Procedure: partial left fifth ray amputation, skin flap creation and closre, left foot;  Surgeon: Alejandro Mccoy DPM;  Location: [facility] OR    AMPUTATE TOE(S) Right 2/17/2023    Procedure: AMPUTATION, digits two and three right foot;  Surgeon: Ulises Jimenez DPM;  Location: [facility] OR    CV CORONARY ANGIOGRAM N/A 8/13/2024    Procedure: Coronary Angiogram;  Surgeon: Brandon Alejandro MD;  Location: [facility] CATH LAB CV    CV LEFT HEART CATH N/A 8/13/2024    Procedure: Left Heart Catheterization;  Surgeon: Brandon Alejandro MD;  Location: [facility] CATH LAB CV    CV RIGHT HEART CATH MEASUREMENTS RECORDED N/A 8/13/2024    Procedure: Right Heart Catheterization;  Surgeon: Brandon Alejandro MD;  Location: [facility] CATH LAB CV    INCISION AND DRAINAGE FOOT, " COMBINED Left 5/23/2023    Procedure: Incision and drainage of abscess left foot with;  Surgeon: Jf Huerta DPM;  [place] [facility] [place]    IRRIGATION AND DEBRIDEMENT FOOT, COMBINED Left 5/23/2023    Procedure: debridement and irrigation of wound left foot;  Surgeon: Jf Huerta DPM;  Location: [facility] [place]    IRRIGATION AND DEBRIDEMENT TOE, COMBINED Right 1/20/2023    Procedure: IRRIGATION AND DEBRIDEMENT digits 2 and 3 right foot;  Surgeon: Jf Huerta DPM;  Location: [facility]    PICC SINGLE LUMEN PLACEMENT  5/25/2023            Social History  Tobacco:   History   Smoking Status    Never   Smokeless Tobacco    Never    Alcohol:   Social History    Substance and Sexual Activity      Alcohol use: Never   Illicit Drugs:   History   Drug Use    Types: Methamphetamines     Comment: chronic user       Family History  Family History   Problem Relation Age of Onset    Diabetes Father           Geovanny Griffin MD on 8/19/2024      cc: Daryn Pittman

## 2024-08-19 NOTE — PROGRESS NOTES
RENAL PROGRESS NOTE    ASSESSMENT & PLAN:   59 years old male with baseline creatinine 1.1 with EGFR 70 to 80% with previous CHRISTY with creatinine up to 1.5 admitted at [facility] and admission creatinine 1.5 for shortness of breath and found to have ischemic cardiomyopathy with EF of 15 to 20% and sent to [facility] for further investigation.  Nephrology is following for CHRISTY.    CHRISTY with underlying CKD 2: CHRISTY is likely multifactorial secondary to cardiorenal physiology/hemodynamic.  Also ATN could be possible from prolonged prerenal state.  He was still positive for amphetamine and could cause ATN.  He was also found to have hydronephrosis and possibly from obstructive uropathy which is now resolved.  He also received contrast from coronary angiogram on 8/13 and it could possibly will contribute but contrast associated nephropathy.    Urine sodium 115 and suspect from diuretic induced.  Renal ultrasound showed left kidneys slightly smaller than the right side.  Serum creatinine peaked at 2.55 mg/dL on 08/14.  Subsequent patient's renal function improved, serum creatinine stalled at 1.9 mg/dL in last 2 days.  Urinary output in nonoliguric range.On Spironolactone 12.5 mg daily.  Recs:  No indication for dialysis yet but the patient is at high risk for another insults due to contrast. PCI is rescheduled for tomorrow.   Start LR at 100 mL/h when he is n.p.o.  Strict I's and O's  Avoid nephrotoxic medications  Avoid extreme hypo or hypertension  Watch for indication for dialysis  Agree with holding furosemide.      Stage II CKD-Most recent A1c was 10.1% and his CKD is likely secondary to diabetic kidney disease. Baseline serum creatinine 1.1-1.3mg/dl.      Electrolytes/Acid-base: no pressing issues.    Acute decompensated congestive heart failure with preserved ejection fraction- Net 4.6L negative since admission. Weight is stable. Breathing comfortable on RA but wet crackles appreciated on exam.  On  Spironolactone 12.5 mg daily.  Recs:  Continue to hold diuretics fr now given plans for angiogram but Okay to give furosemide IV for volume overload if needed  Daily weight  Strict I/O    Aspiration pneumonia -receiving IV antibiotics.    Acute urinary retention with bilateral moderate hydronephrosis status post Fischer catheter placement.  Patient was seen by urology during this admission.    Poorly controlled diabetes mellitus-on insulin.  Management per primary team.      CAD/NSTEMI-Cardiology is planning  to proceed with PCI on 08/20.  On heparin drip, aspirin, Plavix Lipitor and Coreg        We will follow.      SUBJECTIVE:   The patient was seen at the bedside and events reviewed with nursing.  No acute events overnight.  Patient accompanied by sister at the bedside.  Patient said that he is feeling better, reports improved shortness of breath with exertion and lower extremity edema.  Patient complaining of orthopnea and cough. Reports constipation for last 6-7 days.  PCI is rescheduled for tomorrow.   Updated on labs. All questions answered.    OBJECTIVE:  Physical Exam   Temp: 98.5  F (36.9  C) Temp src: Oral BP: 135/70 Pulse: 84   Resp: 20 SpO2: 92 % O2 Device: None (Room air)    Vitals:    08/17/24 0332 08/18/24 0431 08/19/24 0501   Weight: 62.6 kg (138 lb) 62.9 kg (138 lb 11.2 oz) 63.2 kg (139 lb 6.4 oz)     Vital Signs with Ranges  Temp:  [97.9  F (36.6  C)-98.5  F (36.9  C)] 98.5  F (36.9  C)  Pulse:  [71-84] 84  Resp:  [18-21] 20  BP: (106-146)/(55-71) 135/70  SpO2:  [92 %-98 %] 92 %  I/O last 3 completed shifts:  In: 1106 [P.O.:550; I.V.:556]  Out: 925 [Urine:925]    @TMAXR(24)@    Patient Vitals for the past 72 hrs:   Weight   08/19/24 0501 63.2 kg (139 lb 6.4 oz)   08/18/24 0431 62.9 kg (138 lb 11.2 oz)   08/17/24 0332 62.6 kg (138 lb)     Intake/Output Summary (Last 24 hours) at 8/15/2024 1415  Last data filed at 8/14/2024 2119  Gross per 24 hour   Intake --   Output 800 ml   Net -800 ml        PHYSICAL EXAM:  General awake, appears comfortable, forgetful, NAD, cachectic, chronically ill looking.  Cardiovascular - Regular rate and rhythm, no rub  Respiratory - + wet crackles in the lower posterior fields b/l   Abd: BS present, no guarding or pain with palpation, no ascites  Extremities - 2+ pedal and pretibial  lower extremity edema bilaterally  : Fischer Catheter in place, making yellow-colored urine.  Skin: dry, intact, no rash, good turgor  Neuro:  Grossly intact, no focal deficits  MSK:  Grossly intact  Psych:  Flat affect    LABORATORY STUDIES:     Recent Labs   Lab 08/19/24  0557 08/18/24  0513 08/17/24  0511 08/16/24  0455 08/15/24  0621 08/14/24  0502   WBC 9.7 9.4 9.9 7.5 9.0 10.2   RBC 2.91* 2.75* 2.80* 2.81* 2.85* 3.17*   HGB 8.4* 8.0* 8.1* 8.1* 8.0* 8.9*   HCT 24.7* 23.0* 23.6* 23.8* 24.6* 27.2*    362 384 334 302 390       Basic Metabolic Panel:  Recent Labs   Lab 08/19/24  0822 08/19/24  0557 08/18/24  2036 08/18/24  1705 08/18/24  1111 08/18/24  0730 08/18/24  0513 08/17/24  0801 08/17/24  0511 08/16/24  0749 08/16/24  0455 08/15/24  0841 08/15/24  0621 08/14/24  0817 08/14/24  0502   NA  --  139  --   --   --   --  139  --  139  --  142  --  138  --  138   POTASSIUM  --  4.2  --   --   --   --  4.1  --  4.0  --  3.9  --  3.8  --  4.4   CHLORIDE  --  103  --   --   --   --  103  --  102  --  103  --  101  --  101   CO2  --  23  --   --   --   --  23  --  24  --  24  --  26  --  24   BUN  --  47.6*  --   --   --   --  52.6*  --  58.3*  --  51.9*  --  60.8*  --  59.6*   CR  --  1.89*  --   --   --   --  1.91*  --  2.03*  --  1.84*  --  2.42*  --  2.55*   * 132* 169* 138* 135* 130* 92   < > 132*   < > 120*   < > 151*   < > 143*   AMIRAH  --  8.6*  --   --   --   --  8.6*  --  8.4*  --  8.1*  --  8.2*  --  8.5*    < > = values in this interval not displayed.       INRNo lab results found in last 7 days.     Recent Labs   Lab Test 08/19/24  0557 08/18/24  0513   WBC 9.7 9.4   HGB  8.4* 8.0*    362       Personally reviewed current labs    EXAM: BILATERAL RENAL ARTERY DUPLEX AND RENAL ULTRASOUND     INDICATION: Acute kidney injury     TECHNIQUE: Real-time gray-scale sonographic imaging of the kidneys and bladder was performed including duplex spectral and color Doppler evaluation of the renal arteries.     COMPARISON: Renal artery duplex 8/14/2024     RENAL ULTRASOUND FINDINGS:    RIGHT KIDNEY: Increased parenchymal echogenicity without hydronephrosis, measuring approximately 10.7 x 5.8 x 5.4 cm.     LEFT KIDNEY: Increased parenchymal echogenicity without hydronephrosis, measuring approximately 8.8 x 5.3 x 5.5 cm.     The bladder is decompressed with indwelling Fischer catheter     DUPLEX ARTERIAL ULTRASOUND FINDINGS:     SYSTOLIC VELOCITIES (cm/s):  RIGHT RENAL ARTERY:  Proximal: 60                Mid: 61                 Distal: 45                    LEFT RENAL ARTERY:  Proximal: 49                Mid: 40                 Distal: 32                    Aorta: 86     Arcuate arterial resistive indices range from 0.7-0.8 on the right and 0.7-0.8 on the left.                                                                      IMPRESSION:  1.  Echogenic kidneys. No hydronephrosis.  2.  Negative renal artery duplex.      Yamileth Sawyer MD  [facility]  [address], [address]  [place] [address]  Phone# [phone]  Fax# [phone]

## 2024-08-19 NOTE — PROVIDER NOTIFICATION
Dr. Kelle carrizales paged at 0506 & 0518, do to patient having SOB, crackles in lower lung lobes posterior and weight up.

## 2024-08-19 NOTE — PLAN OF CARE
Problem: Cardiac Catheterization (Diagnostic/Interventional)  Goal: Absence of Bleeding  Outcome: Progressing  Goal: Absence of Contrast-Induced Injury  Outcome: Progressing  Goal: Stable Heart Rate and Rhythm  Outcome: Progressing  Goal: Absence of Embolism Signs and Symptoms  Outcome: Progressing  Goal: Anesthesia/Sedation Recovery  Outcome: Progressing  Goal: Optimal Pain Control and Function  Outcome: Progressing  Goal: Absence of Vascular Access Complication  Outcome: Progressing     Problem: Heart Failure  Goal: Optimal Coping  Outcome: Progressing  Goal: Optimal Cardiac Output  Outcome: Progressing  Goal: Stable Heart Rate and Rhythm  Outcome: Progressing  Goal: Optimal Functional Ability  Outcome: Progressing  Goal: Fluid and Electrolyte Balance  Outcome: Progressing  Goal: Improved Oral Intake  Outcome: Progressing  Goal: Effective Oxygenation and Ventilation  Outcome: Progressing  Goal: Effective Breathing Pattern During Sleep  Outcome: Progressing   Goal Outcome Evaluation:         Pt will be NPO at midnight for possible CV procedure tomorrow.   Pt denies pain nausea sob.   PT has infrequent dry non productive cough.      Pt is on heparin infusion with recheck in a.m.

## 2024-08-19 NOTE — PLAN OF CARE
Problem: Adult Inpatient Plan of Care  Goal: Plan of Care Review  Description: The Plan of Care Review/Shift note should be completed every shift.  The Outcome Evaluation is a brief statement about your assessment that the patient is improving, declining, or no change.  This information will be displayed automatically on your shift  note.  Outcome: Progressing     Problem: Cardiac Catheterization (Diagnostic/Interventional)  Goal: Absence of Bleeding  Outcome: Progressing     Problem: Pain Acute  Goal: Optimal Pain Control and Function  Outcome: Progressing  Intervention: Prevent or Manage Pain  Recent Flowsheet Documentation  Taken 8/19/2024 0000 by Nicole Barakat RN  Sensory Stimulation Regulation:   lighting decreased   quiet environment promoted  Medication Review/Management: medications reviewed  Intervention: Optimize Psychosocial Wellbeing  Recent Flowsheet Documentation  Taken 8/19/2024 0000 by Nicole Barakat RN  Supportive Measures: active listening utilized     Problem: Pain Acute  Goal: Optimal Pain Control and Function  Intervention: Prevent or Manage Pain  Recent Flowsheet Documentation  Taken 8/19/2024 0000 by Nicole Barakat RN  Sensory Stimulation Regulation:   lighting decreased   quiet environment promoted  Medication Review/Management: medications reviewed     Problem: Pain Acute  Goal: Optimal Pain Control and Function  Intervention: Optimize Psychosocial Wellbeing  Recent Flowsheet Documentation  Taken 8/19/2024 0000 by Nicole Barakat RN  Supportive Measures: active listening utilized     Problem: Heart Failure  Goal: Optimal Coping  Outcome: Progressing  Intervention: Support Psychosocial Response  Recent Flowsheet Documentation  Taken 8/19/2024 0000 by Nicole Barakat RN  Supportive Measures: active listening utilized     Problem: Heart Failure  Goal: Optimal Coping  Intervention: Support Psychosocial Response  Recent Flowsheet Documentation  Taken 8/19/2024 0000 by Yuval  LOLA Rivera  Supportive Measures: active listening utilized     Problem: Comorbidity Management  Goal: Blood Glucose Levels Within Targeted Range  Outcome: Progressing  Intervention: Monitor and Manage Glycemia  Recent Flowsheet Documentation  Taken 8/19/2024 0000 by Nicole Barakat RN  Medication Review/Management: medications reviewed     Problem: Comorbidity Management  Goal: Blood Glucose Levels Within Targeted Range  Intervention: Monitor and Manage Glycemia  Recent Flowsheet Documentation  Taken 8/19/2024 0000 by Nicole Barakat RN  Medication Review/Management: medications reviewed     Problem: Comorbidity Management  Goal: Maintenance of Heart Failure Symptom Control  Outcome: Progressing  Intervention: Maintain Heart Failure Management  Recent Flowsheet Documentation  Taken 8/19/2024 0000 by Nicole Barakat RN  Medication Review/Management: medications reviewed     Problem: Comorbidity Management  Goal: Maintenance of Heart Failure Symptom Control  Intervention: Maintain Heart Failure Management  Recent Flowsheet Documentation  Taken 8/19/2024 0000 by Nicole Barakat RN  Medication Review/Management: medications reviewed   Goal Outcome Evaluation:       Patient is anxious about having a procedure, unable to sleep, this writer offered something to help him sleep and refused. Lactated Ringers infusing. Patient denies pain. Rhythm=NSR, 02 sat's 94% on room air. Heparin gtt infusing, next xa at 0600. Plan is possible stent placement not scheduled.

## 2024-08-19 NOTE — PLAN OF CARE
Goal Outcome Evaluation:      Plan of Care Reviewed With: patient      Heart Failure Care Map  GOALS TO BE MET BEFORE DISCHARGE:    1. Decrease congestion and/or edema with diuretic therapy to achieve near optimal volume status.     Dyspnea improved: No, further care required to meet this goal. Please explain Pt still reporting SOB at rest   Edema improved: No, further care required to meet this goal. Please explain Still +2 to +3 edema in lower extremities        Last 24 hour I/O:   Intake/Output Summary (Last 24 hours) at 8/19/2024 1440  Last data filed at 8/19/2024 0556  Gross per 24 hour   Intake 556 ml   Output 600 ml   Net -44 ml           Net I/O and Weights since admission:   07/20 1500 - 08/19 1459  In: 3339.46 [P.O.:2390; I.V.:949.46]  Out: 7575 [Urine:7575]  Net: -4235.54     Vitals:    08/14/24 0501 08/15/24 0616 08/16/24 0404 08/17/24 0332   Weight: 62.5 kg (137 lb 12.6 oz) 62.6 kg (138 lb 0.1 oz) 62.5 kg (137 lb 12.6 oz) 62.6 kg (138 lb)    08/18/24 0431 08/19/24 0501   Weight: 62.9 kg (138 lb 11.2 oz) 63.2 kg (139 lb 6.4 oz)       2.  O2 sats > 90% on room air, or at prior home O2 therapy level.      Able to wean O2 this shift to keep sats above 90%?: Yes, satisfactory for discharge.   Does patient use Home O2? No          Current oxygenation status:   SpO2: 95 %     O2 Device: None (Room air),      3.  Tolerates ambulation and mobility near baseline.     Ambulation: Yes, satisfactory for discharge.   Times patient ambulated with staff this shift: 1    Please review the Heart Failure Care Map for additional HF goal outcomes.    Wei Walls RN  8/19/2024        Pt is alert and able to make needs known, but is intermittently disoriented to time/situation. Given PRN atarax for anxiety, and PRN senna to stimulate a BM. Family at bedside and supportive. Pt denies pain, is on RA, and SBA. Fischer in place and draining.     Heparin running per orders, 1050 units/hr. Last anti-xa in range, 0.35, recheck in  deana Walls

## 2024-08-19 NOTE — PLAN OF CARE
Problem: Adult Inpatient Plan of Care  Goal: Plan of Care Review  Description: The Plan of Care Review/Shift note should be completed every shift.  The Outcome Evaluation is a brief statement about your assessment that the patient is improving, declining, or no change.  This information will be displayed automatically on your shift  note.  Outcome: Progressing   Goal Outcome Evaluation:  Patient alert and oriented x3, seems forgetful and at times disoriented to the situation.  Applied bed alarm for safety.  Patient understands he is scheduled for angiogram tomorrow at 1230.  VSS telemetry NSR.  Patient has no complaints of pain or shortness of breath.  Patient complaining of constipation.  Given 2 Senna this morning, and miralax with dinner to see if patient is able to have a bm.  Patient continues on Heparin gtt 1050 units/hr, next antixa in AM

## 2024-08-19 NOTE — SIGNIFICANT EVENT
Dr. Kelle Cobb came to see patient, noted patient having trouble breathing, new order placed to stop LR fluid's.

## 2024-08-19 NOTE — SIGNIFICANT EVENT
Significant Event Note    Time of event: 5:45 AM August 19, 2024    Description of event:  Per nursing staff, patient developed SOB while on IVF.  Chart reviewed. Patient was placed on IVF for anticipating angiogram today. Lasix has been on hold since two days ago for CHRISTY. After the lasix was on hold, creatinine has been improving.   Patient has received  ml/hr for about 5 hours since last midnight.  Patient was seen by the bedside. He reports feeling some SOB. His oxygen saturation is 91-95% on room air. Fine crackles at the lung base, no wheezing. Some bilateral leg edema.     Plan:  - Hold LR infusion  - Will hold lasix for now given anticipated IV contrast today. Will consider if he develops  hypoxia.   - Day team and nephrology to evaluate further      Discussed with: bedside nurse      Chuy Cobb MD  Cross covering hospitalist

## 2024-08-19 NOTE — PROGRESS NOTES
[facility]    PROGRESS NOTE - Hospitalist Service    ASSESSMENT AND PLAN     Active Problems:    Type 2 diabetes mellitus with hyperglycemia, with long-term current use of insulin (H)    Essential hypertension    PVD (peripheral vascular disease) (H24)    NSTEMI (non-ST elevated myocardial infarction) (H)    Pulmonary hypertension (H)    Acute systolic congestive heart failure (H)    Abdulaziz Rausch is a 59 year old male admitted on 8/13/2024. He has h/o HTN, DM-II who was admitted to  for acute CHF exacerbation. Course was complicated by CHRISTY. He was started on diuresis and was transferred to [place] cardiac cath. Hospitalist service was consulted for medical co-management.      -- On Iv Abx for aspiration pneumonia  -- CHRISTY improving off of lasix. Nephrology following  -- Hydronephrosis resolved. Urology- TOV prior discharge. OP follow up.        Elevated troponin, NSTEMI, MVCAD  -- Heparin gtt, Cardiac cath on 08/13: MVCAD  -- ASA, Plavix, Lipitor, coreg.   -- Repeat PET showed stunned but viable left ventricular myocardium.  -- CTS consult appreciated: High risk for surgical interventions.  -- 08/18: Cardiology consult appreciated- PCI with LV support possibly on 08/19.  -- PCI planned for 08/19-on hold.-Cardiology team planning to discuss complex intervention and plan moving forward.     Acute decompensated HFrEF  Mild pulmonary hypertension  -- ProBNP: 61347  -- Strict I/O, daily weight, fluid restriction  -- 08/17: Gave lasis 20 mg po on 08/16 after discussing with nephrology. Lasix held due to worsening of renal function. Defer diuretics to nephrology and cards.  -- 08/18: No rales on exam. Continue to monitor off of lasix.  --Lasix currently on hold due to planned angiogram/PCI.  Defer to cardiology and nephrology.     Acute hypoxic respiratory failure-resolved  -- Likely 2/2 CHF +/- aspiration pneumonia     ?Aspiration pneumonia  -- CXR on 08/11: multifocal patchy opacities  present involving b/l lungs including lung bases, Right upper hilar region, and left perihilar region likely representing infecious process. Small b/l pleural effusions.  -- No fever or leukocytosis but pt is more lethargic.   -- SLP eval  -- On ceftriaxone 7/7 and doxycycline 6/7 to complete 7 day course. Sputum culture as able  -- H/o MRSA OM. Negative MRSA swab on 08/14.     Acute metabolic encephalopathy- resolved.  -- Multifactorial: severe CHF, NSTEMI, pneumonia, CHRISTY  -- VBG wnl. TSH 1.32  -- Delirium precaution  -- UDS + for amphetamine, negative for opiates.   -- CT-head: no acute changes.  -- Gabapentin held     CHRISTY on CKD-II  -- Likely 2/2 low perfusion from CHF  -- Avoid Nephrotoxins. Holding ACEI, Metformin, Jardiance  -- Strict I/O, US-renal: Echogenic kidneys. No hydronephrosis. Negative renal artery duplex.  -- Nephrology consult appreciated  -- 08/17: Gave lasis 20 mg po on 08/16 after discussing with nephrology. Lasix held this morning due to worsening of renal function. Defer diuretics to nephrology and cards.  -- Follow up further nephro recs       Acute urinary retention  B/l moderate hydronephrosis on ULS-resolved  -- rowland placed 8/14.  -- Urology consult appreciated: SHARMILAV prior discharge.  -- Repeat US-renal: No hydronephrosis     DM-II  -- A1c: 10.1%  -- Accu-checks, sliding scale, [id number] units, hypoglycemia protocol. Titrate as indicated     Essential hypertension  -- Monitor vital signs per protocol  -- holding Norvasc. On coreg     Normocytic Anemia, Chronic   AOCD  -- Iron panel reviewed. Monitor cbc  -- No e/o bleeding     Barriers to discharge: Cardiology recommendations, PCI, improvement in creatinine    Anticipated length of stay: 3 days    Medically Ready for Discharge: Anticipated in 2-4 Days    Clinically Significant Risk Factors              # Hypoalbuminemia: Lowest albumin = 2.5 g/dL at 8/16/2024  4:55 AM, will monitor as appropriate     # Hypertension: Noted on problem list  #  Chronic heart failure with reduced ejection fraction: last echo with EF <40%         # DMII: A1C = 10.1 % (Ref range: <5.7 %) within past 6 months       # Financial/Environmental Concerns: none      Subjective:  Patient sitting edge of bed.  States he feels like he cannot breathe.  Not requiring any oxygen.  No crackles on exam.    PHYSICAL EXAM  Temp:  [97.9  F (36.6  C)-98.5  F (36.9  C)] 97.9  F (36.6  C)  Pulse:  [76-84] 76  Resp:  [20-21] 20  BP: (108-146)/(55-71) 113/62  SpO2:  [92 %-98 %] 92 %  Wt Readings from Last 1 Encounters:   08/19/24 63.2 kg (139 lb 6.4 oz)       Intake/Output Summary (Last 24 hours) at 8/19/2024 1158  Last data filed at 8/19/2024 0556  Gross per 24 hour   Intake 756 ml   Output 750 ml   Net 6 ml      Body mass index is 20.59 kg/m .    GENRL: Alert and answering questions appropriately. Not in acute distress. Sitting at edge of bed  CHEST: Clear to auscultation bilaterally. No wheezes or crackles. Breathing easily   HEART: Regular rate   EXTRM: No pedal edema  NEURO: Following commands and moving extremities.    PSYCH: flat affect and mood.   INTGM: No skin rash    Medical Decision Making       35 MINUTES SPENT BY ME on the date of service doing chart review, history, exam, documentation & further activities per the note.      PERTINENT LABS/IMAGING:  Results for orders placed or performed during the hospital encounter of 08/13/24   US Renal Complete Non-Vascular    Impression    IMPRESSION:  1.  Moderate bilateral hydronephrosis. This may be secondary to a very distended urinary bladder.   US Carotid Bilateral    Impression    IMPRESSION:  1.  Mild plaque formation, velocities consistent with less than 50% stenosis in the right internal carotid artery.  2.  Mild plaque formation, velocities consistent with less than 50% stenosis in the left internal carotid artery.  3.  Flow within the vertebral arteries is antegrade.       PET Cardiac Viability    Impression     IMPRESSION:    Nondiagnostic examination warrants repeat FDG PET/CT.   CT Head w/o Contrast    Impression    IMPRESSION:   1.  Negative for acute intracranial hemorrhage, hydrocephalus or transcortical infarct.    2.  Brain atrophy, presumed sequela of chronic microvascular ischemic disease.   US Renal Complete w Arterial Duplex    Impression    IMPRESSION:  1.  Echogenic kidneys. No hydronephrosis.  2.  Negative renal artery duplex.    PET Cardiac Viability    Impression    IMPRESSION:    Findings suggesting stunned, but viable left ventricular myocardium.     Most Recent 3 CBC's:  Recent Labs   Lab Test 08/19/24  0557 08/18/24  0513 08/17/24  0511   WBC 9.7 9.4 9.9   HGB 8.4* 8.0* 8.1*   MCV 85 84 84    362 384     Most Recent 3 BMP's:  Recent Labs   Lab Test 08/19/24 0822 08/19/24 0557 08/18/24 2036 08/18/24  0730 08/18/24  0513 08/17/24  0801 08/17/24  0511   NA  --  139  --   --  139  --  139   POTASSIUM  --  4.2  --   --  4.1  --  4.0   CHLORIDE  --  103  --   --  103  --  102   CO2  --  23  --   --  23  --  24   BUN  --  47.6*  --   --  52.6*  --  58.3*   CR  --  1.89*  --   --  1.91*  --  2.03*   ANIONGAP  --  13  --   --  13  --  13   AMIRAH  --  8.6*  --   --  8.6*  --  8.4*   * 132* 169*   < > 92   < > 132*    < > = values in this interval not displayed.     Most Recent 2 LFT's:  Recent Labs   Lab Test 09/25/23  1711 07/03/23  0741   AST 18 65*   ALT 27 60   ALKPHOS 131* 295*   BILITOTAL 0.3 <0.2       Recent Labs   Lab Test 01/18/23  1448   CHOL 145   HDL 37*   LDL 63   TRIG 226*     Recent Labs   Lab Test 01/18/23  1448   LDL 63     Recent Labs   Lab Test 08/19/24  0822 08/19/24  0557   NA  --  139   POTASSIUM  --  4.2   CHLORIDE  --  103   CO2  --  23   * 132*   BUN  --  47.6*   CR  --  1.89*   GFRESTIMATED  --  40*   AMIRAH  --  8.6*     Recent Labs   Lab Test 08/11/24  1451 10/05/23  1228 07/28/23  1114   A1C 10.1* 12.1* 8.8*     Recent Labs   Lab Test 08/19/24  0557 08/18/24  0513  "08/17/24  0511   HGB 8.4* 8.0* 8.1*     No results for input(s): \"TROPONINI\" in the last 70021 hours.  Recent Labs   Lab Test 08/13/24  0949 08/11/24  1451   NTBNPI 17,082* 21,454*     Recent Labs   Lab Test 08/13/24  1901   TSH 1.32     No results for input(s): \"INR\" in the last 09541 hours.    Sujatha Purdy, DO  Hospitalist Service  [facility]      "

## 2024-08-20 LAB
ANION GAP SERPL CALCULATED.3IONS-SCNC: 14 MMOL/L (ref 7–15)
BLD PROD TYP BPU: NORMAL
BLOOD COMPONENT TYPE: NORMAL
BUN SERPL-MCNC: 44 MG/DL (ref 8–23)
CALCIUM SERPL-MCNC: 8.4 MG/DL (ref 8.8–10.4)
CHLORIDE SERPL-SCNC: 106 MMOL/L (ref 98–107)
CODING SYSTEM: NORMAL
CREAT SERPL-MCNC: 1.79 MG/DL (ref 0.67–1.17)
CROSSMATCH: NORMAL
EGFRCR SERPLBLD CKD-EPI 2021: 43 ML/MIN/1.73M2
ERYTHROCYTE [DISTWIDTH] IN BLOOD BY AUTOMATED COUNT: 12.7 % (ref 10–15)
FERRITIN SERPL-MCNC: 306 NG/ML (ref 31–409)
GLUCOSE BLDC GLUCOMTR-MCNC: 110 MG/DL (ref 70–99)
GLUCOSE BLDC GLUCOMTR-MCNC: 111 MG/DL (ref 70–99)
GLUCOSE BLDC GLUCOMTR-MCNC: 118 MG/DL (ref 70–99)
GLUCOSE BLDC GLUCOMTR-MCNC: 180 MG/DL (ref 70–99)
GLUCOSE BLDC GLUCOMTR-MCNC: 208 MG/DL (ref 70–99)
GLUCOSE BLDC GLUCOMTR-MCNC: 55 MG/DL (ref 70–99)
GLUCOSE BLDC GLUCOMTR-MCNC: 58 MG/DL (ref 70–99)
GLUCOSE SERPL-MCNC: 66 MG/DL (ref 70–99)
HCO3 SERPL-SCNC: 23 MMOL/L (ref 22–29)
HCT VFR BLD AUTO: 22.1 % (ref 40–53)
HGB BLD-MCNC: 7.5 G/DL (ref 13.3–17.7)
ISSUE DATE AND TIME: NORMAL
MCH RBC QN AUTO: 28.8 PG (ref 26.5–33)
MCHC RBC AUTO-ENTMCNC: 33.9 G/DL (ref 31.5–36.5)
MCV RBC AUTO: 85 FL (ref 78–100)
PLATELET # BLD AUTO: 383 10E3/UL (ref 150–450)
POTASSIUM SERPL-SCNC: 4.1 MMOL/L (ref 3.4–5.3)
RBC # BLD AUTO: 2.6 10E6/UL (ref 4.4–5.9)
SODIUM SERPL-SCNC: 143 MMOL/L (ref 135–145)
UFH PPP CHRO-ACNC: 0.38 IU/ML
UNIT ABO/RH: NORMAL
UNIT NUMBER: NORMAL
UNIT STATUS: NORMAL
UNIT TYPE ISBT: 6200
WBC # BLD AUTO: 10.4 10E3/UL (ref 4–11)

## 2024-08-20 PROCEDURE — 250N000013 HC RX MED GY IP 250 OP 250 PS 637: Performed by: NURSE PRACTITIONER

## 2024-08-20 PROCEDURE — 250N000009 HC RX 250: Performed by: INTERNAL MEDICINE

## 2024-08-20 PROCEDURE — 99152 MOD SED SAME PHYS/QHP 5/>YRS: CPT | Performed by: INTERNAL MEDICINE

## 2024-08-20 PROCEDURE — 210N000001 HC R&B IMCU HEART CARE

## 2024-08-20 PROCEDURE — 93005 ELECTROCARDIOGRAM TRACING: CPT

## 2024-08-20 PROCEDURE — C1760 CLOSURE DEV, VASC: HCPCS | Performed by: INTERNAL MEDICINE

## 2024-08-20 PROCEDURE — 272N000001 HC OR GENERAL SUPPLY STERILE: Performed by: INTERNAL MEDICINE

## 2024-08-20 PROCEDURE — P9016 RBC LEUKOCYTES REDUCED: HCPCS | Performed by: NURSE PRACTITIONER

## 2024-08-20 PROCEDURE — 250N000013 HC RX MED GY IP 250 OP 250 PS 637: Performed by: INTERNAL MEDICINE

## 2024-08-20 PROCEDURE — C9600 PERC DRUG-EL COR STENT SING: HCPCS | Mod: RC | Performed by: INTERNAL MEDICINE

## 2024-08-20 PROCEDURE — C1887 CATHETER, GUIDING: HCPCS | Performed by: INTERNAL MEDICINE

## 2024-08-20 PROCEDURE — C1874 STENT, COATED/COV W/DEL SYS: HCPCS | Performed by: INTERNAL MEDICINE

## 2024-08-20 PROCEDURE — 92928 PRQ TCAT PLMT NTRAC ST 1 LES: CPT | Performed by: INTERNAL MEDICINE

## 2024-08-20 PROCEDURE — 255N000002 HC RX 255 OP 636: Performed by: INTERNAL MEDICINE

## 2024-08-20 PROCEDURE — 33968 REMOVE AORTIC ASSIST DEVICE: CPT | Mod: 59 | Performed by: INTERNAL MEDICINE

## 2024-08-20 PROCEDURE — C1769 GUIDE WIRE: HCPCS | Performed by: INTERNAL MEDICINE

## 2024-08-20 PROCEDURE — 250N000011 HC RX IP 250 OP 636: Performed by: INTERNAL MEDICINE

## 2024-08-20 PROCEDURE — 258N000001 HC RX 258: Performed by: NURSE PRACTITIONER

## 2024-08-20 PROCEDURE — 027035Z DILATION OF CORONARY ARTERY, ONE ARTERY WITH TWO DRUG-ELUTING INTRALUMINAL DEVICES, PERCUTANEOUS APPROACH: ICD-10-PCS | Performed by: INTERNAL MEDICINE

## 2024-08-20 PROCEDURE — 99232 SBSQ HOSP IP/OBS MODERATE 35: CPT | Performed by: INTERNAL MEDICINE

## 2024-08-20 PROCEDURE — 93005 ELECTROCARDIOGRAM TRACING: CPT | Performed by: INTERNAL MEDICINE

## 2024-08-20 PROCEDURE — 33967 INSERT I-AORT PERCUT DEVICE: CPT | Performed by: INTERNAL MEDICINE

## 2024-08-20 PROCEDURE — 5A02210 ASSISTANCE WITH CARDIAC OUTPUT USING BALLOON PUMP, CONTINUOUS: ICD-10-PCS | Performed by: INTERNAL MEDICINE

## 2024-08-20 PROCEDURE — C1725 CATH, TRANSLUMIN NON-LASER: HCPCS | Performed by: INTERNAL MEDICINE

## 2024-08-20 PROCEDURE — 33967 INSERT I-AORT PERCUT DEVICE: CPT | Mod: 51 | Performed by: INTERNAL MEDICINE

## 2024-08-20 PROCEDURE — 250N000013 HC RX MED GY IP 250 OP 250 PS 637: Performed by: STUDENT IN AN ORGANIZED HEALTH CARE EDUCATION/TRAINING PROGRAM

## 2024-08-20 PROCEDURE — 258N000003 HC RX IP 258 OP 636: Performed by: INTERNAL MEDICINE

## 2024-08-20 PROCEDURE — C1894 INTRO/SHEATH, NON-LASER: HCPCS | Performed by: INTERNAL MEDICINE

## 2024-08-20 PROCEDURE — 36415 COLL VENOUS BLD VENIPUNCTURE: CPT | Performed by: NURSE PRACTITIONER

## 2024-08-20 PROCEDURE — 82728 ASSAY OF FERRITIN: CPT | Performed by: INTERNAL MEDICINE

## 2024-08-20 PROCEDURE — B2111ZZ FLUOROSCOPY OF MULTIPLE CORONARY ARTERIES USING LOW OSMOLAR CONTRAST: ICD-10-PCS | Performed by: INTERNAL MEDICINE

## 2024-08-20 PROCEDURE — 85014 HEMATOCRIT: CPT | Performed by: NURSE PRACTITIONER

## 2024-08-20 PROCEDURE — 85520 HEPARIN ASSAY: CPT | Performed by: STUDENT IN AN ORGANIZED HEALTH CARE EDUCATION/TRAINING PROGRAM

## 2024-08-20 PROCEDURE — 99233 SBSQ HOSP IP/OBS HIGH 50: CPT | Performed by: INTERNAL MEDICINE

## 2024-08-20 PROCEDURE — 99223 1ST HOSP IP/OBS HIGH 75: CPT | Mod: 25 | Performed by: INTERNAL MEDICINE

## 2024-08-20 PROCEDURE — 80048 BASIC METABOLIC PNL TOTAL CA: CPT | Performed by: NURSE PRACTITIONER

## 2024-08-20 PROCEDURE — 99153 MOD SED SAME PHYS/QHP EA: CPT | Performed by: INTERNAL MEDICINE

## 2024-08-20 DEVICE — STENT CORONARY DES SYNERGY XD MR US 3.50X24MM H7493941824350: Type: IMPLANTABLE DEVICE | Site: CORONARY | Status: FUNCTIONAL

## 2024-08-20 DEVICE — STENT CORONARY DES SYNERGY XD MR US 3.00X20MM H7493941820300: Type: IMPLANTABLE DEVICE | Site: CORONARY | Status: FUNCTIONAL

## 2024-08-20 RX ORDER — NALOXONE HYDROCHLORIDE 0.4 MG/ML
0.2 INJECTION, SOLUTION INTRAMUSCULAR; INTRAVENOUS; SUBCUTANEOUS
Status: DISCONTINUED | OUTPATIENT
Start: 2024-08-20 | End: 2024-08-20

## 2024-08-20 RX ORDER — CLOPIDOGREL BISULFATE 75 MG/1
75 TABLET ORAL DAILY
Qty: 90 TABLET | Refills: 3 | Status: SHIPPED | OUTPATIENT
Start: 2024-08-21

## 2024-08-20 RX ORDER — SODIUM CHLORIDE 9 MG/ML
INJECTION, SOLUTION INTRAVENOUS CONTINUOUS
Status: ACTIVE | OUTPATIENT
Start: 2024-08-20 | End: 2024-08-20

## 2024-08-20 RX ORDER — HEPARIN SODIUM 1000 [USP'U]/ML
INJECTION, SOLUTION INTRAVENOUS; SUBCUTANEOUS
Status: DISCONTINUED | OUTPATIENT
Start: 2024-08-20 | End: 2024-08-20 | Stop reason: HOSPADM

## 2024-08-20 RX ORDER — ASPIRIN 81 MG/1
81 TABLET ORAL DAILY
Qty: 30 TABLET | Refills: 3 | Status: SHIPPED | OUTPATIENT
Start: 2024-08-21

## 2024-08-20 RX ORDER — NITROGLYCERIN 5 MG/ML
VIAL (ML) INTRAVENOUS
Status: DISCONTINUED | OUTPATIENT
Start: 2024-08-20 | End: 2024-08-20 | Stop reason: HOSPADM

## 2024-08-20 RX ORDER — CLOPIDOGREL BISULFATE 75 MG/1
75 TABLET ORAL DAILY
Status: DISCONTINUED | OUTPATIENT
Start: 2024-08-21 | End: 2024-08-23 | Stop reason: HOSPADM

## 2024-08-20 RX ORDER — HYDROXYZINE HYDROCHLORIDE 50 MG/1
50 TABLET, FILM COATED ORAL EVERY 6 HOURS PRN
Status: DISCONTINUED | OUTPATIENT
Start: 2024-08-20 | End: 2024-08-23 | Stop reason: HOSPADM

## 2024-08-20 RX ORDER — CLOPIDOGREL BISULFATE 75 MG/1
TABLET ORAL
Status: DISCONTINUED | OUTPATIENT
Start: 2024-08-20 | End: 2024-08-20 | Stop reason: HOSPADM

## 2024-08-20 RX ORDER — METOPROLOL TARTRATE 1 MG/ML
5 INJECTION, SOLUTION INTRAVENOUS
Status: DISCONTINUED | OUTPATIENT
Start: 2024-08-20 | End: 2024-08-23 | Stop reason: HOSPADM

## 2024-08-20 RX ORDER — FENTANYL CITRATE 50 UG/ML
25 INJECTION, SOLUTION INTRAMUSCULAR; INTRAVENOUS
Status: DISCONTINUED | OUTPATIENT
Start: 2024-08-20 | End: 2024-08-20

## 2024-08-20 RX ORDER — ATROPINE SULFATE 0.1 MG/ML
0.5 INJECTION INTRAVENOUS
Status: ACTIVE | OUTPATIENT
Start: 2024-08-20 | End: 2024-08-20

## 2024-08-20 RX ORDER — FENTANYL CITRATE 50 UG/ML
INJECTION, SOLUTION INTRAMUSCULAR; INTRAVENOUS
Status: DISCONTINUED | OUTPATIENT
Start: 2024-08-20 | End: 2024-08-20 | Stop reason: HOSPADM

## 2024-08-20 RX ORDER — ATORVASTATIN CALCIUM 40 MG/1
40 TABLET, FILM COATED ORAL DAILY
Qty: 90 TABLET | Refills: 3 | Status: SHIPPED | OUTPATIENT
Start: 2024-08-20

## 2024-08-20 RX ORDER — FENTANYL CITRATE 50 UG/ML
25 INJECTION, SOLUTION INTRAMUSCULAR; INTRAVENOUS
Status: DISCONTINUED | OUTPATIENT
Start: 2024-08-20 | End: 2024-08-23 | Stop reason: HOSPADM

## 2024-08-20 RX ORDER — FLUMAZENIL 0.1 MG/ML
0.2 INJECTION, SOLUTION INTRAVENOUS
Status: ACTIVE | OUTPATIENT
Start: 2024-08-20 | End: 2024-08-20

## 2024-08-20 RX ORDER — ONDANSETRON 4 MG/1
4 TABLET, ORALLY DISINTEGRATING ORAL EVERY 6 HOURS PRN
Status: DISCONTINUED | OUTPATIENT
Start: 2024-08-20 | End: 2024-08-20

## 2024-08-20 RX ORDER — ONDANSETRON 2 MG/ML
4 INJECTION INTRAMUSCULAR; INTRAVENOUS EVERY 6 HOURS PRN
Status: DISCONTINUED | OUTPATIENT
Start: 2024-08-20 | End: 2024-08-20

## 2024-08-20 RX ORDER — CLOPIDOGREL 300 MG/1
600 TABLET, FILM COATED ORAL ONCE
Status: DISCONTINUED | OUTPATIENT
Start: 2024-08-20 | End: 2024-08-20

## 2024-08-20 RX ORDER — NITROGLYCERIN 0.4 MG/1
0.4 TABLET SUBLINGUAL EVERY 5 MIN PRN
Status: DISCONTINUED | OUTPATIENT
Start: 2024-08-20 | End: 2024-08-23 | Stop reason: HOSPADM

## 2024-08-20 RX ORDER — NALOXONE HYDROCHLORIDE 0.4 MG/ML
0.4 INJECTION, SOLUTION INTRAMUSCULAR; INTRAVENOUS; SUBCUTANEOUS
Status: DISCONTINUED | OUTPATIENT
Start: 2024-08-20 | End: 2024-08-20

## 2024-08-20 RX ORDER — HYDRALAZINE HYDROCHLORIDE 20 MG/ML
10 INJECTION INTRAMUSCULAR; INTRAVENOUS EVERY 4 HOURS PRN
Status: DISCONTINUED | OUTPATIENT
Start: 2024-08-20 | End: 2024-08-23 | Stop reason: HOSPADM

## 2024-08-20 RX ADMIN — ISOSORBIDE DINITRATE 10 MG: 10 TABLET ORAL at 17:11

## 2024-08-20 RX ADMIN — DEXTROSE MONOHYDRATE 25 ML: 25 INJECTION, SOLUTION INTRAVENOUS at 08:43

## 2024-08-20 RX ADMIN — HYDROXYZINE HYDROCHLORIDE 50 MG: 50 TABLET, FILM COATED ORAL at 19:41

## 2024-08-20 RX ADMIN — ISOSORBIDE DINITRATE 10 MG: 10 TABLET ORAL at 09:21

## 2024-08-20 RX ADMIN — ATORVASTATIN CALCIUM 40 MG: 40 TABLET, FILM COATED ORAL at 19:43

## 2024-08-20 RX ADMIN — HYDRALAZINE HYDROCHLORIDE 10 MG: 10 TABLET ORAL at 09:20

## 2024-08-20 RX ADMIN — DOXYCYCLINE HYCLATE 100 MG: 100 CAPSULE ORAL at 09:19

## 2024-08-20 RX ADMIN — ASPIRIN 81 MG: 81 TABLET, COATED ORAL at 09:20

## 2024-08-20 RX ADMIN — CARVEDILOL 3.12 MG: 3.12 TABLET, FILM COATED ORAL at 17:11

## 2024-08-20 RX ADMIN — SODIUM CHLORIDE: 9 INJECTION, SOLUTION INTRAVENOUS at 15:02

## 2024-08-20 RX ADMIN — DEXTROSE 30 G: 15 GEL ORAL at 08:07

## 2024-08-20 RX ADMIN — DOXYCYCLINE HYCLATE 100 MG: 100 CAPSULE ORAL at 19:43

## 2024-08-20 RX ADMIN — Medication 10 MG: at 21:10

## 2024-08-20 RX ADMIN — INSULIN GLARGINE 10 UNITS: 100 INJECTION, SOLUTION SUBCUTANEOUS at 21:09

## 2024-08-20 RX ADMIN — HYDROXYZINE HYDROCHLORIDE 25 MG: 25 TABLET ORAL at 16:38

## 2024-08-20 RX ADMIN — HYDRALAZINE HYDROCHLORIDE 10 MG: 10 TABLET ORAL at 21:10

## 2024-08-20 RX ADMIN — ISOSORBIDE DINITRATE 10 MG: 10 TABLET ORAL at 12:17

## 2024-08-20 RX ADMIN — HYDRALAZINE HYDROCHLORIDE 10 MG: 10 TABLET ORAL at 16:08

## 2024-08-20 RX ADMIN — SPIRONOLACTONE 12.5 MG: 25 TABLET, FILM COATED ORAL at 09:20

## 2024-08-20 RX ADMIN — CARVEDILOL 3.12 MG: 3.12 TABLET, FILM COATED ORAL at 09:20

## 2024-08-20 ASSESSMENT — ACTIVITIES OF DAILY LIVING (ADL)
ADLS_ACUITY_SCORE: 29
ADLS_ACUITY_SCORE: 29
ADLS_ACUITY_SCORE: 32
ADLS_ACUITY_SCORE: 29
ADLS_ACUITY_SCORE: 33
ADLS_ACUITY_SCORE: 29
ADLS_ACUITY_SCORE: 32
ADLS_ACUITY_SCORE: 29
ADLS_ACUITY_SCORE: 29
ADLS_ACUITY_SCORE: 33
ADLS_ACUITY_SCORE: 33
ADLS_ACUITY_SCORE: 29
ADLS_ACUITY_SCORE: 32
ADLS_ACUITY_SCORE: 29
ADLS_ACUITY_SCORE: 32
ADLS_ACUITY_SCORE: 29

## 2024-08-20 NOTE — PLAN OF CARE
Problem: Heart Failure  Goal: Optimal Coping  Outcome: Progressing  Intervention: Support Psychosocial Response  Recent Flowsheet Documentation  Taken 8/20/2024 0400 by Silvio Biswas RN  Supportive Measures: active listening utilized  Taken 8/20/2024 0000 by Silvio Biswas RN  Supportive Measures: active listening utilized  Taken 8/19/2024 2000 by Silvio Biswas RN  Supportive Measures: active listening utilized  Goal: Optimal Cardiac Output  Outcome: Progressing  Intervention: Optimize Cardiac Output  Recent Flowsheet Documentation  Taken 8/20/2024 0400 by Silvio Biswas RN  Environmental Support:   calm environment promoted   distractions minimized  Taken 8/20/2024 0000 by Silvio Biswas RN  Environmental Support:   calm environment promoted   distractions minimized  Taken 8/19/2024 2000 by Silvio Biswas RN  Environmental Support:   calm environment promoted   distractions minimized  Goal: Stable Heart Rate and Rhythm  Outcome: Progressing  Goal: Optimal Functional Ability  Outcome: Progressing  Intervention: Optimize Functional Ability  Recent Flowsheet Documentation  Taken 8/20/2024 0400 by Silvio Biswas RN  Activity Management:   activity adjusted per tolerance   activity encouraged  Taken 8/20/2024 0000 by Silvio Biswas RN  Activity Management:   activity adjusted per tolerance   activity encouraged  Taken 8/19/2024 2000 by Silvio Biswas RN  Activity Management:   activity adjusted per tolerance   activity encouraged  Goal: Fluid and Electrolyte Balance  Outcome: Progressing  Goal: Improved Oral Intake  Outcome: Progressing  Goal: Effective Oxygenation and Ventilation  Outcome: Progressing  Intervention: Promote Airway Secretion Clearance  Recent Flowsheet Documentation  Taken 8/20/2024 0400 by Silvio Biswas RN  Activity Management:   activity adjusted per tolerance   activity encouraged  Taken 8/20/2024 0000 by  Silvio Biswas RN  Activity Management:   activity adjusted per tolerance   activity encouraged  Taken 8/19/2024 2000 by Silvio Biswas RN  Activity Management:   activity adjusted per tolerance   activity encouraged  Intervention: Optimize Oxygenation and Ventilation  Recent Flowsheet Documentation  Taken 8/20/2024 0400 by Silvio Biswas RN  Head of Bed (HOB) Positioning: HOB at 20-30 degrees  Taken 8/20/2024 0000 by Silvio Biswas RN  Head of Bed (HOB) Positioning: HOB at 20-30 degrees  Taken 8/19/2024 2200 by Silvio Biswas RN  Head of Bed (HOB) Positioning: HOB at 30-45 degrees  Taken 8/19/2024 2141 by Silvio Biswas RN  Head of Bed (HOB) Positioning: HOB at 45 degrees  Taken 8/19/2024 2000 by Silvio Biswas RN  Head of Bed (HOB) Positioning: HOB at 20-30 degrees  Goal: Effective Breathing Pattern During Sleep  Outcome: Progressing  Intervention: Monitor and Manage Obstructive Sleep Apnea  Recent Flowsheet Documentation  Taken 8/20/2024 0400 by Silvio Biswas RN  Medication Review/Management:   medications reviewed   high-risk medications identified  Taken 8/20/2024 0000 by Silvio Biswas RN  Medication Review/Management:   medications reviewed   high-risk medications identified  Taken 8/19/2024 2000 by Silvio Biswas RN  Medication Review/Management:   medications reviewed   high-risk medications identified   Goal Outcome Evaluation:    Heart Failure Care Map  GOALS TO BE MET BEFORE DISCHARGE:    1. Decrease congestion and/or edema with diuretic therapy to achieve near optimal volume status.     Dyspnea improved: No, further care required to meet this goal. Please explain Presents SILVEIRA with ambulation to toilet.   Edema improved: No, further care required to meet this goal. Please explain Continues to present LE edema.        Last 24 hour I/O:   Intake/Output Summary (Last 24 hours) at 8/20/2024 0574  Last data filed at  8/20/2024 0300  Gross per 24 hour   Intake 1009 ml   Output 725 ml   Net 284 ml           Net I/O and Weights since admission:   07/21 0700 - 08/20 0659  In: 3792.46 [P.O.:2390; I.V.:1402.46]  Out: 8300 [Urine:8300]  Net: -4507.54     Vitals:    08/14/24 0501 08/15/24 0616 08/16/24 0404 08/17/24 0332   Weight: 62.5 kg (137 lb 12.6 oz) 62.6 kg (138 lb 0.1 oz) 62.5 kg (137 lb 12.6 oz) 62.6 kg (138 lb)    08/18/24 0431 08/19/24 0501 08/20/24 0400   Weight: 62.9 kg (138 lb 11.2 oz) 63.2 kg (139 lb 6.4 oz) 63.5 kg (139 lb 14.4 oz)       2.  O2 sats > 90% on room air, or at prior home O2 therapy level.      Able to wean O2 this shift to keep sats above 90%?: Yes, satisfactory for discharge.   Does patient use Home O2? No          Current oxygenation status:   SpO2: 94 %     O2 Device: None (Room air),      3.  Tolerates ambulation and mobility near baseline.     Ambulation: No, further care required to meet this goal. Please explain Remained in room for duration of HS/NOC.     Times patient ambulated with staff this shift: 0.  NPO since MN for pending cath lab today.  VSS.  Presents intermittent anxiety.  Treated with PRN PO Atarax.  No further interventions.     Please review the Heart Failure Care Map for additional HF goal outcomes.    Silvio Biswas, LOLA  8/20/2024

## 2024-08-20 NOTE — CONSULTS
INTERVENTIONAL CARDIOLOGY INPATIENT CONSULT NOTE    [facility]   [address], [place] [address]   [email or web address]   OFFICE: [phone]          Impression and Plan     Assessment:  Non-ST elevation myocardial infarction in the setting of severe multivessel coronary artery disease  Acute decompensated heart failure with reduced ejection fraction, due to ischemic cardiomyopathy  Hypertension, hyperlipidemia, and type 2 diabetes mellitus (HgbA1c 10.1)   Acute on chronic kidney injury, improving - multifactorial including urinary retention and HFrEF  Aspiration pneumonia, resolved with antibiotic therapy  Acute metabolic encephalopathy, resolved  Acute on chronic normocytic anemia -multifactorial, no evidence of bleeding  Peripheral vascular disease  Diabetic foot ulcers with prior history of osteomyelitis post amputation of right and left toes   Methamphetamine abuse, chronic user    Plan: We had a long discussion with Mr. Rausch and his daughter Devora regarding therapeutic options for patient's multivessel coronary artery disease.  We discussed high risk percutaneous intervention of the right coronary artery with staged intervention to the LAD-diagonal bifurcation at a future date.  Patient assures me he will be compliant on dual antiplatelet therapy - we discussed higher risk of an adverse outcome including death if he is not compliant with DAPT post PCI.  He is to move in with his sister with more family support post discharge and is motivated to quit use of methamphetamines.  We also discussed medical therapy (patient deemed to be poor candidate for surgical revascularization).  Patient and family understand risk and benefits, and would like to proceed with PCI.        History of Present Illness      Mr. Abdulaziz Rausch is a 59 year old male with complex past medical history as above.  Briefly patient was recently hospitalized for non-ST elevation  myocardial infarction and acute decompensated heart failure with reduced ejection fraction.  Coronary angiogram was notable for severe multivessel coronary artery disease.  Cardiothoracic surgery was consulted and he was deemed to be a poor candidate for surgical revascularization.  Percutaneous revascularization versus medical therapy is now being considered, with some concern for medication compliance due to patient's chronic methamphetamine use.  Today, Abdulaziz reports dyspnea with minimal exertion.  Denies chest pain. Other than noted above, Mr. Rausch denies any  light headedness/dizziness, pre-syncope, syncope, lower extremity swelling, palpitations, paroxysmal nocturnal dyspnea (PND), or orthopnea.      Review of Systems:  Further review of systems is otherwise negative/noncontributory (based on review of medical record (admission H&P) and 13 point review of systems reviewed. Pertinent positives noted).    Cardiac Diagnostics     Electrocardiogram  (personally reviewed): Sinus, lateral T wave inversions, prolonged QT    Cath  (personally reviewed):   Multivessel coronary artery disease involving the proximal to mid left anterior descending artery, large diagonal branch, and proximal and distal, dominant right coronary artery.  Moderate nonobstructive disease elsewhere.  Mildly elevated right and moderately elevated left-sided filling pressures.  No pulmonary hypertension.  Normal cardiac output and index.    TTE (personally reviewed): Mildly dilated LV with severe LV dysfunction with estimated EF of 20%, normal RV size and function, mild pulmonary hypertension, and no significant valvular disease      Cardiac PET: Findings suggesting stunned, but viable left ventricular myocardium.      Medical History  Surgical History Family History Social History   Past Medical History:   Diagnosis Date    Anemia     Depression     Diabetes mellitus, type 2 (H)     Diabetic foot infection (H) 7/1/2023    Diabetic foot  ulcer (H)     History of MRSA infection 7/1/2023 Jan 2023 RIGHT foot wound    Hypertension     Non-healing ulcer of left foot (H) 7/1/2023    Osteomyelitis (H)      Past Surgical History:   Procedure Laterality Date    AMPUTATE FOOT Left 7/3/2023    Procedure: partial left fifth ray amputation, skin flap creation and closre, left foot;  Surgeon: Alejandro Mccoy DPM;  Location: [facility] OR    AMPUTATE TOE(S) Right 2/17/2023    Procedure: AMPUTATION, digits two and three right foot;  Surgeon: Ulises Jimenez DPM;  Location: [facility] OR    CV CORONARY ANGIOGRAM N/A 8/13/2024    Procedure: Coronary Angiogram;  Surgeon: Brandon Alejandro MD;  Location: [facility] CATH LAB CV    CV LEFT HEART CATH N/A 8/13/2024    Procedure: Left Heart Catheterization;  Surgeon: Brandon Alejandro MD;  Location: [facility] CATH LAB CV    CV RIGHT HEART CATH MEASUREMENTS RECORDED N/A 8/13/2024    Procedure: Right Heart Catheterization;  Surgeon: Brandon Alejandro MD;  Location: [facility] LAB CV    INCISION AND DRAINAGE FOOT, COMBINED Left 5/23/2023    Procedure: Incision and drainage of abscess left foot with;  Surgeon: Jf Huerta DPM;  Location: [facility] OR    IRRIGATION AND DEBRIDEMENT FOOT, COMBINED Left 5/23/2023    Procedure: debridement and irrigation of wound left foot;  Surgeon: Jf Huerta DPM;  Location: [facility] OR    IRRIGATION AND DEBRIDEMENT TOE, COMBINED Right 1/20/2023    Procedure: IRRIGATION AND DEBRIDEMENT digits 2 and 3 right foot;  Surgeon: Jf Huerta DPM;  Location: [facility] OR    PICC SINGLE LUMEN PLACEMENT  5/25/2023          Family History   Problem Relation Age of Onset    Diabetes Father            Social History     Socioeconomic History    Marital status:      Spouse name: Not on file    Number of children: Not on file    Years of education: Not on file    Highest education level: Not on file   Occupational History    Not on file   Tobacco Use    Smoking  status: Never    Smokeless tobacco: Never   Vaping Use    Vaping status: Never Used   Substance and Sexual Activity    Alcohol use: Never    Drug use: Yes     Types: Methamphetamines     Comment: chronic user    Sexual activity: Not on file   Other Topics Concern    Not on file   Social History Narrative    Not on file     Social Determinants of Health     Financial Resource Strain: High Risk (10/5/2023)    Financial Resource Strain     Within the past 12 months, have you or your family members you live with been unable to get utilities (heat, electricity) when it was really needed?: Yes   Food Insecurity: High Risk (10/5/2023)    Food Insecurity     Within the past 12 months, did you worry that your food would run out before you got money to buy more?: Yes     Within the past 12 months, did the food you bought just not last and you didn t have money to get more?: Yes   Transportation Needs: Low Risk  (10/5/2023)    Transportation Needs     Within the past 12 months, has lack of transportation kept you from medical appointments, getting your medicines, non-medical meetings or appointments, work, or from getting things that you need?: No   Physical Activity: Not on file   Stress: Not on file   Social Connections: Not on file   Interpersonal Safety: Low Risk  (10/5/2023)    Interpersonal Safety     Do you feel physically and emotionally safe where you currently live?: Yes     Within the past 12 months, have you been hit, slapped, kicked or otherwise physically hurt by someone?: No     Within the past 12 months, have you been humiliated or emotionally abused in other ways by your partner or ex-partner?: No   Housing Stability: Low Risk  (10/5/2023)    Housing Stability     Do you have housing? : Yes     Are you worried about losing your housing?: No             Physical Examination   VITALS: /75 (BP Location: Left arm)   Pulse 77   Temp 98  F (36.7  C) (Oral)   Resp 18   Wt 63.5 kg (139 lb 14.4 oz)   SpO2  90%   BMI 20.66 kg/m    BMI: Body mass index is 20.66 kg/m .  Wt Readings from Last 3 Encounters:   08/20/24 63.5 kg (139 lb 14.4 oz)   08/12/24 67.8 kg (149 lb 7.6 oz)   10/05/23 62.1 kg (137 lb)       Intake/Output Summary (Last 24 hours) at 8/20/2024 1305  Last data filed at 8/20/2024 0843  Gross per 24 hour   Intake 478 ml   Output 725 ml   Net -247 ml       General: pleasant male. No acute distress.   HEENT: JVP   mildly elevated   Lungs: clear to auscultation  COR:  regular rhythm, no murmurs   Abd: soft, nt, nd  Extrem: trace  edema         Non-cardiac Imaging Studies Reviewed      Renal US 8/14:    1.  Echogenic kidneys. No hydronephrosis.  2.  Negative renal artery duplex.     CT Head:   1.  Negative for acute intracranial hemorrhage, hydrocephalus or transcortical infarct.    2.  Brain atrophy, presumed sequela of chronic microvascular ischemic disease.    Carotid US:   1.  Mild plaque formation, velocities consistent with less than 50% stenosis in the right internal carotid artery.  2.  Mild plaque formation, velocities consistent with less than 50% stenosis in the left internal carotid artery.  3.  Flow within the vertebral arteries is antegrade.      Chest x-ray:   Heart and mediastinal size are normal. Multifocal patchy opacities present involving the bilateral lungs including the lung bases, right upper hilar region, and left perihilar region, likely representing infectious process. There are small   bilateral pleural effusions. No pneumothorax.       Lab Results Reviewed    Chemistry/lipid CBC Cardiac Enzymes/BNP/TSH/INR   Recent Labs   Lab Test 01/18/23  1448   CHOL 145   HDL 37*   LDL 63   TRIG 226*     Recent Labs   Lab Test 01/18/23  1448   LDL 63     Recent Labs   Lab Test 08/20/24  1140 08/20/24  0800 08/20/24  0507   NA  --   --  143   POTASSIUM  --   --  4.1   CHLORIDE  --   --  106   CO2  --   --  23   *   < > 66*   BUN  --   --  44.0*   CR  --   --  1.79*   GFRESTIMATED  --   --   "43*   AMIRAH  --   --  8.4*    < > = values in this interval not displayed.     Recent Labs   Lab Test 08/20/24  0507 08/19/24  0557 08/18/24  0513   CR 1.79* 1.89* 1.91*     Recent Labs   Lab Test 08/11/24  1451 10/05/23  1228 07/28/23  1114   A1C 10.1* 12.1* 8.8*          Recent Labs   Lab Test 08/20/24  0507   WBC 10.4   HGB 7.5*   HCT 22.1*   MCV 85        Recent Labs   Lab Test 08/20/24  0507 08/19/24  0557 08/18/24  0513   HGB 7.5* 8.4* 8.0*    No results for input(s): \"TROPONINI\" in the last 90974 hours.  Recent Labs   Lab Test 08/13/24  0949 08/11/24  1451   NTBNPI 17,082* 21,454*     Recent Labs   Lab Test 08/13/24  1901   TSH 1.32     No results for input(s): \"INR\" in the last 26836 hours.        Current Inpatient Scheduled Medications   Scheduled Meds:  Current Facility-Administered Medications   Medication Dose Route Frequency Provider Last Rate Last Admin    aspirin EC tablet 81 mg  81 mg Oral Daily Tanna Brice CNP   81 mg at 08/20/24 0920    atorvastatin (LIPITOR) tablet 40 mg  40 mg Oral QPM Tanna Brice CNP   40 mg at 08/19/24 2034    carvedilol (COREG) tablet 3.125 mg  3.125 mg Oral BID w/meals Scar Lazo MD   3.125 mg at 08/20/24 0920    doxycycline hyclate (VIBRAMYCIN) capsule 100 mg  100 mg Oral Q12H [facility] (08/20) Qian Correia MD   100 mg at 08/20/24 0919    [Held by provider] furosemide (LASIX) tablet 20 mg  20 mg Oral BID Qian Correia MD   20 mg at 08/16/24 1703    [Held by provider] gabapentin (NEURONTIN) capsule 300 mg  300 mg Oral BID Tanna Brice CNP   300 mg at 08/13/24 2136    hydrALAZINE (APRESOLINE) tablet 10 mg  10 mg Oral TID Scar Lazo MD   10 mg at 08/20/24 0920    insulin aspart (NovoLOG) injection (RAPID ACTING)  1-7 Units Subcutaneous TID AC Qian Correia MD        insulin aspart (NovoLOG) injection (RAPID ACTING)  1-5 Units Subcutaneous At Bedtime Qian Correia MD        insulin glargine " (LANTUS PEN) injection 10 Units  10 Units Subcutaneous At Bedtime Tanna Brice CNP   10 Units at 08/19/24 2034    isosorbide dinitrate (ISORDIL) tablet 10 mg  10 mg Oral TID Scar Lazo MD   10 mg at 08/20/24 1217    melatonin tablet 10 mg  10 mg Oral At Bedtime Qian Correia MD   10 mg at 08/19/24 2034    sodium chloride (PF) 0.9% PF flush 3 mL  3 mL Intracatheter Q8H Tanna Brice CNP   3 mL at 08/19/24 2346    sodium chloride (PF) 0.9% PF flush 3 mL  3 mL Intracatheter Q8H Tanna Brice CNP   3 mL at 08/20/24 0921    spironolactone (ALDACTONE) half-tab 12.5 mg  12.5 mg Oral Daily Scar Lazo MD   12.5 mg at 08/20/24 0920     Continuous Infusions:  Current Facility-Administered Medications   Medication Dose Route Frequency Provider Last Rate Last Admin    Give 1/2 (50%) dose of long acting insulin. Insulin glargine (LANTUS, TOUJEO) or insulin detemir (LEVEMIR) the morning of the cardiac procedure   Does not apply Continuous PRN Tanna Brice CNP        Give 1/2 (50%) of the usual morning dose of intermediate acting insulin NPH (N) the morning of the cardiac procedure. PHARMACIST to enter an order for 1/2 (50%) of the usual morning dose of intermediate acting insulin NPH (N).   Does not apply Continuous PRN Tanna Brice CNP        heparin 25,000 units in 0.45% NaCl 250 mL ANTICOAGULANT infusion  0-5,000 Units/hr Intravenous Continuous Scar Lazo MD 10.5 mL/hr at 08/20/24 0623 1,050 Units/hr at 08/20/24 0623    [Held by provider] lactated ringers infusion   Intravenous Continuous Apryl Peguero MD   Stopped at 08/19/24 0650    Patient is NOT allergic to contrast dye   Does not apply DOES NOT GO TO Tanna Hernandez CNP        Patient may administer subcutaneous insulin via a patient's personal insulin infusion pump prior to the cath lab procedure and prior to patient sedation medications being given IF the  provider approves. Nurse to document the doses/rates the patient is receiving prior to the procedure. Once sedation medications are given, the patient will no longer independently control the personal insulin infusion pump.   Does not apply Continuous PRN Tanna Brice CNP           No current outpatient medications on file.          Medications Prior to Admission   Prior to Admission medications    Medication Sig Start Date End Date Taking? Authorizing Provider   acetaminophen (TYLENOL) 325 MG tablet Take 650 mg by mouth every 6 hours as needed for pain 6/1/23   Reported, Patient   alcohol swab prep pads Use to swab area of injection/ella as directed. 8/26/21   Daryn Pittman MD   amLODIPine (NORVASC) 2.5 MG tablet Take 1 tablet (2.5 mg) by mouth daily 10/5/23   Bonnie Cruz MD   blood glucose (NO BRAND SPECIFIED) test strip Use to test blood sugar 4 times daily or as directed. To accompany: Blood Glucose Monitor Brands: per insurance. 10/5/23   Daryn Pittman MD   blood glucose monitoring (NO BRAND SPECIFIED) meter device kit Use to test blood sugar 4 time daily or as directed. Preferred blood glucose meter OR supplies to accompany: Blood Glucose Monitor Brands: per insurance. 8/26/21   Daryn Pittman MD   Continuous Blood Gluc  (FREESTYLE NOAH 2 READER) CHUCK Use to read blood sugars as per 's instructions. 4/17/23   Daryn Pittman MD   Continuous Blood Gluc Sensor (FREESTYLE NOAH 2 SENSOR) MISC Use 1 sensor every 14 days. Use to read blood sugars per 's instructions. 10/5/23   Daryn Pittman MD   empagliflozin (JARDIANCE) 25 MG TABS tablet Take 1 tablet (25 mg) by mouth daily 10/5/23   Daryn Pittman MD   gabapentin (NEURONTIN) 300 MG capsule Take 300 mg by mouth 2 times daily Given at 0800 and 1600    Unknown, Entered By History   ibuprofen (ADVIL/MOTRIN) 200 MG tablet Take 200 mg by mouth every 8 hours as needed for pain    Unknown, Entered By History    insulin glargine (LANTUS SOLOSTAR) 100 UNIT/ML pen Inject 20 Units Subcutaneous At Bedtime 5/26/23   Florentino Mccoy MD   insulin lispro (HUMALOG KWIKPEN) 100 UNIT/ML (1 unit dial) KWIKPEN Inject 6 Units Subcutaneous 3 times daily (before meals) 4/17/23   Daryn Pittman MD   insulin pen needle (31G X 8 MM) 31G X 8 MM miscellaneous Use 4 pen needles daily or as directed. 2/13/23   Miguel Ryan MD   metFORMIN (GLUCOPHAGE) 500 MG tablet Take 2 tablets (1,000 mg) by mouth 2 times daily (with meals) 10/5/23   Daryn Pittman MD   thin (NO BRAND SPECIFIED) lancets Use with lanceting device. To accompany: Blood Glucose Monitor Brands: per insurance. 8/26/21   Daryn Pittman MD              Clinically Significant Risk Factors              # Hypoalbuminemia: Lowest albumin = 2.5 g/dL at 8/16/2024  4:55 AM, will monitor as appropriate     # Hypertension: Noted on problem list  # Chronic heart failure with reduced ejection fraction: last echo with EF <40%         # DMII: A1C = 10.1 % (Ref range: <5.7 %) within past 6 months       # Financial/Environmental Concerns: none      Brandon Alejandro MD   Interventional Cardiology

## 2024-08-20 NOTE — PLAN OF CARE
Problem: Cardiac Catheterization (Diagnostic/Interventional)  Goal: Absence of Bleeding  Outcome: Progressing  Goal: Absence of Contrast-Induced Injury  Outcome: Progressing  Goal: Stable Heart Rate and Rhythm  Outcome: Progressing  Goal: Absence of Embolism Signs and Symptoms  Outcome: Progressing  Goal: Anesthesia/Sedation Recovery  Outcome: Progressing  Goal: Optimal Pain Control and Function  Outcome: Progressing  Goal: Absence of Vascular Access Complication  Outcome: Progressing     Problem: Pain Acute  Goal: Optimal Pain Control and Function  Outcome: Progressing   Goal Outcome Evaluation:       Pt blood glucose was 55 this a.m.  PT was awake and alert  writer gave oral glucose gel and recheck was 58 writer gave iv dextrose and recheck was 108.   MD was updated per protocol      PT was started on 1 unit of blood prior to leaving for cath lab.   Pt hgb was 7.5.

## 2024-08-20 NOTE — PROGRESS NOTES
[facility]    PROGRESS NOTE - Hospitalist Service    ASSESSMENT AND PLAN     Active Problems:    Type 2 diabetes mellitus with hyperglycemia, with long-term current use of insulin (H)    Essential hypertension    PVD (peripheral vascular disease) (H24)    NSTEMI (non-ST elevated myocardial infarction) (H)    Pulmonary hypertension (H)    Acute systolic congestive heart failure (H)    Abdulaziz Rausch is a 59 year old male admitted on 8/13/2024. He has h/o HTN, DM-II who was admitted to  for acute CHF exacerbation. Course was complicated by CHRISTY. He was started on diuresis and was transferred to [place] cardiac cath. Hospitalist service was consulted for medical co-management.      Multivessel coronary disease  NSTEMI  -- Heparin gtt, Cardiac cath on 08/13: MVCAD  -- ASA, Plavix, Lipitor, coreg.   -- Repeat PET showed stunned but viable left ventricular myocardium.  -- CTS consult appreciated: High risk for surgical interventions.  -- 08/18: Cardiology consult appreciated- PCI with LV support possibly on 08/19.  -- PCI initially planned for 08/19-currently on hold.-Cardiology team planning to discuss complex intervention and plan moving forward.     Acute decompensated HFrEF  Mild pulmonary hypertension  -- ProBNP: 27095  -- Strict I/O, daily weight, fluid restriction  -- 08/17: Gave lasis 20 mg po on 08/16 after discussing with nephrology. Lasix held due to worsening of renal function. Defer diuretics to nephrology and cards.  -- 08/18: No rales on exam. Continue to monitor off of lasix.  --Lasix currently on hold due to planned angiogram/PCI.  Defer to cardiology and nephrology.     Acute hypoxic respiratory failure-resolved  -- Likely 2/2 CHF +/- aspiration pneumonia     ?Aspiration pneumonia  -- CXR on 08/11: multifocal patchy opacities present involving b/l lungs including lung bases, Right upper hilar region, and left perihilar region likely representing infecious process. Small b/l  pleural effusions.  -- Completed 7 days ceftriaxone.  Last day of doxycycline 7-day course 8/20   -- H/o MRSA OM. Negative MRSA swab on 08/14.     Acute metabolic encephalopathy- resolved.  -- Multifactorial: severe CHF, NSTEMI, pneumonia, CHRISTY  -- VBG wnl. TSH 1.32  -- Delirium precaution  -- UDS + for amphetamine, negative for opiates.   -- CT-head: no acute changes.  -- Gabapentin held     CHRISTY on CKD-II  -- Likely 2/2 low perfusion from CHF  --Baseline creatinine of 1.1.  Creatinine improving and at 1.79 on 8/20  -- Avoid Nephrotoxins. Holding ACEI, Metformin, Jardiance  -- Strict I/O, US-renal: Echogenic kidneys. No hydronephrosis. Negative renal artery duplex.  -- 08/17: Gave lasis 20 mg po on 08/16 after discussing with nephrology. Lasix held this morning due to worsening of renal function.   -Nephrology agreeable to holding Lasix at this time  -- Follow up further nephro recs       Acute urinary retention  B/l moderate hydronephrosis on ULS-resolved  -- rowland placed 8/14.  -- Urology consult appreciated: TOV prior discharge.  -- Repeat US-renal: No hydronephrosis     DM-II  -- A1c: 10.1%  -- Accu-checks, sliding scale, [id number] units, hypoglycemia protocol. Titrate as indicated     Essential hypertension  -- Monitor vital signs per protocol  -- holding Norvasc. On coreg     Normocytic Anemia, Chronic   AOCD  -- Iron panel reviewed. Monitor cbc  -- No e/o bleeding     Barriers to discharge: Cardiology recommendations regarding timing for PCI.  Improvement in creatinine    Anticipated length of stay: 2 days to TCU    Medically Ready for Discharge: Anticipated in 2-4 Days    Subjective:  Patient resting in bed.  No questions today.  No chest pain or shortness of breath.    PHYSICAL EXAM  Temp:  [97.6  F (36.4  C)-98.1  F (36.7  C)] 98  F (36.7  C)  Pulse:  [77-80] 77  Resp:  [16-20] 18  BP: (112-150)/(57-76) 127/75  SpO2:  [90 %-95 %] 90 %  Wt Readings from Last 1 Encounters:   08/20/24 63.5 kg (139 lb 14.4 oz)        Intake/Output Summary (Last 24 hours) at 8/20/2024 1257  Last data filed at 8/20/2024 0843  Gross per 24 hour   Intake 478 ml   Output 725 ml   Net -247 ml      Body mass index is 20.66 kg/m .    GENRL: Alert and answering questions appropriately. Not in acute distress. Sitting at edge of bed  CHEST: Clear to auscultation bilaterally. No wheezes or crackles. Breathing easily   HEART: Regular rate   EXTRM: No pedal edema  NEURO: Following commands and moving extremities.    PSYCH: flat affect and mood.   INTGM: No skin rash    Medical Decision Making       35 MINUTES SPENT BY ME on the date of service doing chart review, history, exam, documentation & further activities per the note.      PERTINENT LABS/IMAGING:  Results for orders placed or performed during the hospital encounter of 08/13/24   US Renal Complete Non-Vascular    Impression    IMPRESSION:  1.  Moderate bilateral hydronephrosis. This may be secondary to a very distended urinary bladder.   US Carotid Bilateral    Impression    IMPRESSION:  1.  Mild plaque formation, velocities consistent with less than 50% stenosis in the right internal carotid artery.  2.  Mild plaque formation, velocities consistent with less than 50% stenosis in the left internal carotid artery.  3.  Flow within the vertebral arteries is antegrade.       PET Cardiac Viability    Impression    IMPRESSION:    Nondiagnostic examination warrants repeat FDG PET/CT.   CT Head w/o Contrast    Impression    IMPRESSION:   1.  Negative for acute intracranial hemorrhage, hydrocephalus or transcortical infarct.    2.  Brain atrophy, presumed sequela of chronic microvascular ischemic disease.   US Renal Complete w Arterial Duplex    Impression    IMPRESSION:  1.  Echogenic kidneys. No hydronephrosis.  2.  Negative renal artery duplex.    PET Cardiac Viability    Impression    IMPRESSION:    Findings suggesting stunned, but viable left ventricular myocardium.     Most Recent 3 CBC's:  Recent  "Labs   Lab Test 08/20/24  0507 08/19/24  0557 08/18/24  0513   WBC 10.4 9.7 9.4   HGB 7.5* 8.4* 8.0*   MCV 85 85 84    379 362     Most Recent 3 BMP's:  Recent Labs   Lab Test 08/20/24  1140 08/20/24  0912 08/20/24  0830 08/20/24  0800 08/20/24  0507 08/19/24  0822 08/19/24  0557 08/18/24  0730 08/18/24  0513   NA  --   --   --   --  143  --  139  --  139   POTASSIUM  --   --   --   --  4.1  --  4.2  --  4.1   CHLORIDE  --   --   --   --  106  --  103  --  103   CO2  --   --   --   --  23  --  23  --  23   BUN  --   --   --   --  44.0*  --  47.6*  --  52.6*   CR  --   --   --   --  1.79*  --  1.89*  --  1.91*   ANIONGAP  --   --   --   --  14  --  13  --  13   AMIRAH  --   --   --   --  8.4*  --  8.6*  --  8.6*   * 208* 58*   < > 66*   < > 132*   < > 92    < > = values in this interval not displayed.     Most Recent 2 LFT's:  Recent Labs   Lab Test 09/25/23  1711 07/03/23  0741   AST 18 65*   ALT 27 60   ALKPHOS 131* 295*   BILITOTAL 0.3 <0.2       Recent Labs   Lab Test 01/18/23  1448   CHOL 145   HDL 37*   LDL 63   TRIG 226*     Recent Labs   Lab Test 01/18/23  1448   LDL 63     Recent Labs   Lab Test 08/20/24  1140 08/20/24  0800 08/20/24  0507   NA  --   --  143   POTASSIUM  --   --  4.1   CHLORIDE  --   --  106   CO2  --   --  23   *   < > 66*   BUN  --   --  44.0*   CR  --   --  1.79*   GFRESTIMATED  --   --  43*   AMIRAH  --   --  8.4*    < > = values in this interval not displayed.     Recent Labs   Lab Test 08/11/24  1451 10/05/23  1228 07/28/23  1114   A1C 10.1* 12.1* 8.8*     Recent Labs   Lab Test 08/20/24  0507 08/19/24  0557 08/18/24  0513   HGB 7.5* 8.4* 8.0*     No results for input(s): \"TROPONINI\" in the last 37160 hours.  Recent Labs   Lab Test 08/13/24  0949 08/11/24  1451   NTBNPI 17,082* 21,454*     Recent Labs   Lab Test 08/13/24  1901   TSH 1.32     No results for input(s): \"INR\" in the last 63854 hours.    Sujatha Purdy, DO  Hospitalist Service  [facility] " [facility]

## 2024-08-20 NOTE — PLAN OF CARE
Heart Failure Care Map  GOALS TO BE MET BEFORE DISCHARGE:    1. Decrease congestion and/or edema with diuretic therapy to achieve near optimal volume status.     Dyspnea improved: No, further care required to meet this goal. Please explain pt still reports SOB and appears to be using accessory muscles to breathe   Edema improved: No, further care required to meet this goal. Please explain edema still noted in lower extremities        Last 24 hour I/O:   Intake/Output Summary (Last 24 hours) at 8/20/2024 1800  Last data filed at 8/20/2024 1608  Gross per 24 hour   Intake 517 ml   Output 875 ml   Net -358 ml           Net I/O and Weights since admission:   07/21 2300 - 08/20 2259  In: 4209.46 [P.O.:2415; I.V.:1402.46]  Out: 8775 [Urine:8775]  Net: -4565.54     Vitals:    08/14/24 0501 08/15/24 0616 08/16/24 0404 08/17/24 0332   Weight: 62.5 kg (137 lb 12.6 oz) 62.6 kg (138 lb 0.1 oz) 62.5 kg (137 lb 12.6 oz) 62.6 kg (138 lb)    08/18/24 0431 08/19/24 0501 08/20/24 0400   Weight: 62.9 kg (138 lb 11.2 oz) 63.2 kg (139 lb 6.4 oz) 63.5 kg (139 lb 14.4 oz)       2.  O2 sats > 90% on room air, or at prior home O2 therapy level.      Able to wean O2 this shift to keep sats above 90%?:NO, pt still on 1L NC and reports SOB   Does patient use Home O2? No          Current oxygenation status:   SpO2: 97 %     O2 Device: Nasal cannula, Oxygen Delivery: 1 LPM    3.  Tolerates ambulation and mobility near baseline.     Ambulation: No, further care required to meet this goal. Please explain pt was in bed for most of the shift following angiogram.    Times patient ambulated with staff this shift: 2    Please review the Heart Failure Care Map for additional HF goal outcomes.       Pt A/Ox4 w/ intermittent confusion, NSR, on RA while awake, 2L NC while sleeping, denies pain. Fischer in place.   Pt arrived to unit around 1500 from angiogram. Right femoral site with small amount of drainage on dressing, no hematoma. Right radial site, no  bleeding no hematoma. TR band removed, pt remains in bed. Pt sits upright for all meals.     Contact precautions maintained. Pt had negative MRSA swab on 8/14, wrote a note to the provider requesting another swab in order to potentially remove the contact precautions.     Pt reported being anxious and appeared restless, PRN atarax given. Pt continued to appear anxiety, paged MD. MD increased atarax dose to 50 mg, dose given, pt appeared much more calm and relaxed.       Gloria Naidu RN  8/20/2024

## 2024-08-20 NOTE — PRE-PROCEDURE
GENERAL PRE-PROCEDURE:   Procedure:  Coronary angiogram, right and left heart catheterization  Date/Time:  8/13/2024 11:05 AM    Written consent obtained?: Yes    Risks and benefits: Risks, benefits and alternatives were discussed    Consent given by: Patient's sister Nilda by phone.  Patient states understanding of procedure being performed: Yes (Patient's sister Nilda by phone)    Patient's understanding of procedure matches consent: Yes (Patient's sister Nilda by phone)    Procedure consent matches procedure scheduled: Yes    Expected level of sedation:  Moderate  Appropriately NPO:  Yes  ASA Class:  4 (multi-vessel CAD, acute decompensated heart failure, severe cardiomyopathy; EF15%, elevated troponins, IDDM, CHRISTY, methamphetamine abuse)  Mallampati  :  Grade 1- soft palate, uvula, tonsillar pillars, and posterior pharyngeal wall visible (edentulous)  Lungs:  Lungs clear with good breath sounds bilaterally  Heart:  Normal heart sounds and rate  History & Physical reviewed:  History and physical reviewed and updates made (see comment)  H&P Comments:  Clinically Significant Risk Factors Present on Admission    Cardiovascular : severe multi-vessel CAD, acute decompensated heart failure, severe cardiomyopathy; EF15%, elevated troponins, IDDM    Fluid & Electrolyte Disorders : Fluid volume excess; undergoing diuresis    Gastroenterology : Not present on admission    Hematology/Oncology : Not present on admission    Nephrology : CHRISTY; in the setting of decompensated heart failure, improving, will minimize contrast    Neurology : Not present on admission    Pulmonology : Hypoxic respiratory failure; improving    Systemic : Methamphetamine abuse    Statement of review:  I have reviewed the lab findings, diagnostic data, medications, and the plan for sedation    Patient is a DNR/DNI and he and his sister Nilda understand this will be placed on hold for his procedure.

## 2024-08-20 NOTE — PROGRESS NOTES
RENAL PROGRESS NOTE    ASSESSMENT & PLAN:   59 years old male with baseline creatinine 1.1 with EGFR 70 to 80% with previous CHRISTY with creatinine up to 1.5 admitted at [facility] and admission creatinine 1.5 for shortness of breath and found to have ischemic cardiomyopathy with EF of 15 to 20% and sent to [facility] for further investigation.  Nephrology is following for CHRISTY.    CHRISTY with underlying CKD 2: CHRISTY is likely multifactorial secondary to cardiorenal physiology/hemodynamic.  Also ATN could be possible from prolonged prerenal state.  He was still positive for amphetamine and could cause ATN.  He was also found to have hydronephrosis and possibly from obstructive uropathy which is now resolved.  He also received contrast from coronary angiogram on 8/13 and it could possibly will contribute but contrast associated nephropathy.    Urine sodium 115 and suspect from diuretic induced.  Renal ultrasound showed left kidneys slightly smaller than the right side.  Serum creatinine peaked at 2.55 mg/dL on 08/14.  Patient's renal function continues to improve, serum creatinine is trending down to 1.79 mg/dL this am.  Urinary output in nonoliguric range. On Spironolactone 12.5 mg daily.   Recs:  No indication for dialysis yet but the patient is at high risk for another insults due to contrast. PCI is scheduled for today  Strict I's and O's  Avoid nephrotoxic medications  Avoid extreme hypo or hypertension  Watch for indication for dialysis  Agree with holding furosemide.      Stage II CKD-Most recent A1c was 10.1% and his CKD is likely secondary to diabetic kidney disease. Baseline serum creatinine 1.1-1.3mg/dl.      Electrolytes/Acid-base: no pressing issues.    Acute decompensated congestive heart failure with preserved ejection fraction-   2D ECHO 08/11 showed LVEF of 15-20%.  Net 4.5L negative since admission. Weight is stable. Breathing comfortable on RA but wet crackles appreciated on exam.  On  Spironolactone 12.5 mg daily.   Recs:  Continue to hold diuretics fr now given plans for angiogram but Okay to give furosemide IV for volume overload if needed  Daily weight  Strict I/O    Hypertension-BP is acceptable. On Coreg 3.125 mg BID, Isordil 30 mg TID, Hydralazine 10 mg TID.  Recommend no changes  Monitor bP closely, avoid hypo and hypertension.     Aspiration pneumonia -on PO doxycycline.    Acute urinary retention with bilateral moderate hydronephrosis status post Fischer catheter placement.  Patient was seen by urology during this admission.    Poorly controlled diabetes mellitus-on insulin.  Management per primary team.      CAD/NSTEMI-Cardiology is planning  to proceed with PCI on 08/20.  On heparin drip, aspirin, Plavix, Isodril, Lipitor and Coreg      We will follow.      SUBJECTIVE:   The patient was seen at the bedside and events reviewed with nursing.  No acute events overnight.  Patient states that he is feeling the same, complaining of orthopnea and cough. Reports constipation for last 6-7 days.  He is NPO. PCI is rescheduled for today.  Updated on labs. All questions answered.    OBJECTIVE:  Physical Exam   Temp: 97.9  F (36.6  C) Temp src: Oral BP: (!) 150/76 Pulse: 79   Resp: 18 SpO2: 92 % O2 Device: None (Room air)    Vitals:    08/18/24 0431 08/19/24 0501 08/20/24 0400   Weight: 62.9 kg (138 lb 11.2 oz) 63.2 kg (139 lb 6.4 oz) 63.5 kg (139 lb 14.4 oz)     Vital Signs with Ranges  Temp:  [97.6  F (36.4  C)-98.1  F (36.7  C)] 97.9  F (36.6  C)  Pulse:  [76-80] 79  Resp:  [16-20] 18  BP: (112-150)/(57-76) 150/76  SpO2:  [91 %-95 %] 92 %  I/O last 3 completed shifts:  In: 453 [I.V.:453]  Out: 725 [Urine:725]    @TMAXR(24)@    Patient Vitals for the past 72 hrs:   Weight   08/20/24 0400 63.5 kg (139 lb 14.4 oz)   08/19/24 0501 63.2 kg (139 lb 6.4 oz)   08/18/24 0431 62.9 kg (138 lb 11.2 oz)     Intake/Output Summary (Last 24 hours) at 8/15/2024 1415  Last data filed at 8/14/2024 2119  Gross per 24  hour   Intake --   Output 800 ml   Net -800 ml       PHYSICAL EXAM:  General awake, appears comfortable, forgetful, NAD, cachectic, chronically ill looking.  Cardiovascular - Regular rate and rhythm, no rub  Respiratory - improved wet crackles in the lower posterior fields b/l   Abd: BS present, no guarding or pain with palpation, no ascites  Extremities - improved pedal and pretibial  lower extremity edema bilaterally  : Fischer Catheter in place, making yellow-colored urine.  Skin: dry, intact, no rash, good turgor  Neuro:  Grossly intact, no focal deficits  MSK:  Grossly intact  Psych:  Flat affect    LABORATORY STUDIES:     Recent Labs   Lab 08/20/24  0507 08/19/24  0557 08/18/24  0513 08/17/24  0511 08/16/24  0455 08/15/24  0621   WBC 10.4 9.7 9.4 9.9 7.5 9.0   RBC 2.60* 2.91* 2.75* 2.80* 2.81* 2.85*   HGB 7.5* 8.4* 8.0* 8.1* 8.1* 8.0*   HCT 22.1* 24.7* 23.0* 23.6* 23.8* 24.6*    379 362 384 334 302       Basic Metabolic Panel:  Recent Labs   Lab 08/20/24  0912 08/20/24  0830 08/20/24  0800 08/20/24  0507 08/19/24  2146 08/19/24  1640 08/19/24  0822 08/19/24  0557 08/18/24  0730 08/18/24  0513 08/17/24  0801 08/17/24  0511 08/16/24  0749 08/16/24  0455 08/15/24  0841 08/15/24  0621   NA  --   --   --  143  --   --   --  139  --  139  --  139  --  142  --  138   POTASSIUM  --   --   --  4.1  --   --   --  4.2  --  4.1  --  4.0  --  3.9  --  3.8   CHLORIDE  --   --   --  106  --   --   --  103  --  103  --  102  --  103  --  101   CO2  --   --   --  23  --   --   --  23  --  23  --  24  --  24  --  26   BUN  --   --   --  44.0*  --   --   --  47.6*  --  52.6*  --  58.3*  --  51.9*  --  60.8*   CR  --   --   --  1.79*  --   --   --  1.89*  --  1.91*  --  2.03*  --  1.84*  --  2.42*   * 58* 55* 66* 109* 120*   < > 132*   < > 92   < > 132*   < > 120*   < > 151*   AMIRAH  --   --   --  8.4*  --   --   --  8.6*  --  8.6*  --  8.4*  --  8.1*  --  8.2*    < > = values in this interval not displayed.        INRNo lab results found in last 7 days.     Recent Labs   Lab Test 08/20/24  0507 08/19/24  0557   WBC 10.4 9.7   HGB 7.5* 8.4*    379       Personally reviewed current labs    EXAM: BILATERAL RENAL ARTERY DUPLEX AND RENAL ULTRASOUND     INDICATION: Acute kidney injury     TECHNIQUE: Real-time gray-scale sonographic imaging of the kidneys and bladder was performed including duplex spectral and color Doppler evaluation of the renal arteries.     COMPARISON: Renal artery duplex 8/14/2024     RENAL ULTRASOUND FINDINGS:    RIGHT KIDNEY: Increased parenchymal echogenicity without hydronephrosis, measuring approximately 10.7 x 5.8 x 5.4 cm.     LEFT KIDNEY: Increased parenchymal echogenicity without hydronephrosis, measuring approximately 8.8 x 5.3 x 5.5 cm.     The bladder is decompressed with indwelling Fischer catheter     DUPLEX ARTERIAL ULTRASOUND FINDINGS:     SYSTOLIC VELOCITIES (cm/s):  RIGHT RENAL ARTERY:  Proximal: 60                Mid: 61                 Distal: 45                    LEFT RENAL ARTERY:  Proximal: 49                Mid: 40                 Distal: 32                    Aorta: 86     Arcuate arterial resistive indices range from 0.7-0.8 on the right and 0.7-0.8 on the left.                                                                      IMPRESSION:  1.  Echogenic kidneys. No hydronephrosis.  2.  Negative renal artery duplex.      Yamileth Sawyer MD  [facility]  [address] [place] [address], [address]  [place] [address]  Phone# [phone]  Fax# [phone]

## 2024-08-20 NOTE — PROGRESS NOTES
HEART CARE NOTE          Assessment/Recommendations   1. Severe HFrEF c/b severe ADHF  Assessment / Plan  Renal function steadily improving - continue to hold loop diuretic; continue to monitor renal function/repeat BMP, UOP and hemodynamics closely    Patient is high risk for adverse cardiac events 2/2 sever systolic dysfunction, renal dysfunction, CAD and elevated NTproBNP, DM2  GDMT as detailed below     Current Pharmacotherapy AHA Guideline-Directed Medical Therapy   Lisinopril - on hold given renal dysfuntion Lisinopril 20 mg twice daily   Carvedilol 3.125 mg twice daily Carvedilol 25 mg twice daily   Spironolactone 12.5 mg Spironolactone 25 mg once daily   Hydralazine 10 mg three times daily Hydralazine 100 mg three times daily   Isosorbide dinitrate 10 mg three times daily Isosorbide dinitrate 40 mg three times daily   SGLT2 inhibitor:Dapagliflozin/Empagliflozin - not started Dapagliflozin or Empagliflozin 10 mg daily      2. Severe multivessel CAD c/b NSTEMI  Assessment / Plan  PET significant for viability; Not considered CABG candidate by CTS - plan for high risk PCI this admission  Denies chest pain or anginal equivalents      3. CHRISTY on CKD  Assessment / Plan  CRS; diuresis on hold in anticipation of coronary angiogram above; continue to monitor UOP and renal function closely     4. DM2  Assessment / Plan  Management per primary team - currently on ISS     5. HTN  Assessment / Plan  Titrate oral afterload reduction as tolerated      History of Present Illness/Subjective    Mr. Abdulaziz Rausch is a 59 year old male with a PMHx significant for (per Epic notation) diabetes, peripheral vascular disease related to diabetes who presents to [facility] with 3-day history of progressive dyspnea on exertion, lower extremity edema and orthopnea symptoms found to have acute decompensated congestive heart failure and symptoms consistent with ACS     Today, Mr. Rausch denies acute cardiac events or  complaints; Management plan as detailed above     ECG: personally reviewed; normal sinus rhythm, nonspecific ST and T waves changes.     ECHO (personnaly Reviewed on 8/19/24):   1.Left ventricular function is decreased. The ejection fraction is 15-20%  (severely reduced).  2.The left ventricle is mildly dilated.  3.Normal right ventricle size and systolic function.  4.The left atrium is moderate to severely dilated.  5.No hemodynamically significant valvular abnormalities on 2D or color flow  imaging.  6.Right ventricular systolic pressure is elevated, consistent with mild  pulmonary hypertension.  There is no comparison study available.    Telemetry: personally reviewed August 20, 2024; notable for sinus     Lab results: personally reviewed August 20, 2024; notable for resolving CHRISTY    Medical history and pertinent documents reviewed in Care Everywhere please where applicable see details above        Physical Examination Review of Systems   /69 (BP Location: Left arm, Cuff Size: Adult Regular)   Pulse 80   Temp 98.1  F (36.7  C) (Oral)   Resp 16   Wt 63.5 kg (139 lb 14.4 oz)   SpO2 94%   BMI 20.66 kg/m    Body mass index is 20.66 kg/m .  Wt Readings from Last 3 Encounters:   08/20/24 63.5 kg (139 lb 14.4 oz)   08/12/24 67.8 kg (149 lb 7.6 oz)   10/05/23 62.1 kg (137 lb)     General Appearance:   no distress, normal body habitus   ENT/Mouth: membranes moist, no oral lesions or bleeding gums.      EYES:  no scleral icterus, normal conjunctivae   Neck: no carotid bruits or thyromegaly   Chest/Lungs:   lungs are clear to auscultation, no rales or wheezing, equal chest wall expansion    Cardiovascular:   Regular. Normal first and second heart sounds with no murmurs, rubs, or gallops; the carotid, radial and posterior tibial pulses are intact, no JVD and trace LE edema bilaterally    Abdomen:  no organomegaly, masses, bruits, or tenderness; bowel sounds are present   Extremities: no cyanosis or clubbing    Skin: no xanthelasma, warm.    Neurologic: NAD     Psychiatric: NAD     A complete 10 systems ROS was reviewed  And is negative except what is listed in the HPI.          Medical History  Surgical History Family History Social History   Past Medical History:   Diagnosis Date    Anemia     Depression     Diabetes mellitus, type 2 (H)     Diabetic foot infection (H) 7/1/2023    Diabetic foot ulcer (H)     History of MRSA infection 7/1/2023 Jan 2023 RIGHT foot wound    Hypertension     Non-healing ulcer of left foot (H) 7/1/2023    Osteomyelitis (H)     Past Surgical History:   Procedure Laterality Date    AMPUTATE FOOT Left 7/3/2023    Procedure: partial left fifth ray amputation, skin flap creation and closre, left foot;  Surgeon: Alejandro Mccoy DPM;  Location: [facility] OR    AMPUTATE TOE(S) Right 2/17/2023    Procedure: AMPUTATION, digits two and three right foot;  Surgeon: Ulises Jimenez DPM;  Location: [facility] OR    CV CORONARY ANGIOGRAM N/A 8/13/2024    Procedure: Coronary Angiogram;  Surgeon: Brandon Alejandro MD;  Location: [facility] CATH LAB CV    CV LEFT HEART CATH N/A 8/13/2024    Procedure: Left Heart Catheterization;  Surgeon: Brandon Alejandro MD;  Location: [facility] CATH LAB CV    CV RIGHT HEART CATH MEASUREMENTS RECORDED N/A 8/13/2024    Procedure: Right Heart Catheterization;  Surgeon: Brandon Alejandro MD;  Location: [facility] CATH LAB CV    INCISION AND DRAINAGE FOOT, COMBINED Left 5/23/2023    Procedure: Incision and drainage of abscess left foot with;  Surgeon: Jf Huerta DPM;  Location: [facility] OR    IRRIGATION AND DEBRIDEMENT FOOT, COMBINED Left 5/23/2023    Procedure: debridement and irrigation of wound left foot;  Surgeon: Jf Huerta DPM;  Location: [facility] OR    IRRIGATION AND DEBRIDEMENT TOE, COMBINED Right 1/20/2023    Procedure: IRRIGATION AND DEBRIDEMENT digits 2 and 3 right foot;  Surgeon: Jf Huerta DPM;  Location: [facility] OR    PICC  SINGLE LUMEN PLACEMENT  5/25/2023         no family history of premature coronary artery disease Social History     Socioeconomic History    Marital status:      Spouse name: Not on file    Number of children: Not on file    Years of education: Not on file    Highest education level: Not on file   Occupational History    Not on file   Tobacco Use    Smoking status: Never    Smokeless tobacco: Never   Vaping Use    Vaping status: Never Used   Substance and Sexual Activity    Alcohol use: Never    Drug use: Yes     Types: Methamphetamines     Comment: chronic user    Sexual activity: Not on file   Other Topics Concern    Not on file   Social History Narrative    Not on file     Social Determinants of Health     Financial Resource Strain: High Risk (10/5/2023)    Financial Resource Strain     Within the past 12 months, have you or your family members you live with been unable to get utilities (heat, electricity) when it was really needed?: Yes   Food Insecurity: High Risk (10/5/2023)    Food Insecurity     Within the past 12 months, did you worry that your food would run out before you got money to buy more?: Yes     Within the past 12 months, did the food you bought just not last and you didn t have money to get more?: Yes   Transportation Needs: Low Risk  (10/5/2023)    Transportation Needs     Within the past 12 months, has lack of transportation kept you from medical appointments, getting your medicines, non-medical meetings or appointments, work, or from getting things that you need?: No   Physical Activity: Not on file   Stress: Not on file   Social Connections: Not on file   Interpersonal Safety: Low Risk  (10/5/2023)    Interpersonal Safety     Do you feel physically and emotionally safe where you currently live?: Yes     Within the past 12 months, have you been hit, slapped, kicked or otherwise physically hurt by someone?: No     Within the past 12 months, have you been humiliated or emotionally abused  "in other ways by your partner or ex-partner?: No   Housing Stability: Low Risk  (10/5/2023)    Housing Stability     Do you have housing? : Yes     Are you worried about losing your housing?: No           Lab Results    Chemistry/lipid CBC Cardiac Enzymes/BNP/TSH/INR   Lab Results   Component Value Date    CHOL 145 01/18/2023    HDL 37 (L) 01/18/2023    TRIG 226 (H) 01/18/2023    BUN 44.0 (H) 08/20/2024     08/20/2024    CO2 23 08/20/2024    Lab Results   Component Value Date    WBC 10.4 08/20/2024    HGB 7.5 (L) 08/20/2024    HCT 22.1 (L) 08/20/2024    MCV 85 08/20/2024     08/20/2024    Lab Results   Component Value Date    TSH 1.32 08/13/2024     No results found for: \"CKTOTAL\", \"CKMB\", \"TROPONINI\"       Weight:    Wt Readings from Last 3 Encounters:   08/20/24 63.5 kg (139 lb 14.4 oz)   08/12/24 67.8 kg (149 lb 7.6 oz)   10/05/23 62.1 kg (137 lb)       Allergies  No Known Allergies      Surgical History  Past Surgical History:   Procedure Laterality Date    AMPUTATE FOOT Left 7/3/2023    Procedure: partial left fifth ray amputation, skin flap creation and closre, left foot;  Surgeon: Alejandro Mccoy DPM;  Location: [facility] OR    AMPUTATE TOE(S) Right 2/17/2023    Procedure: AMPUTATION, digits two and three right foot;  Surgeon: Ulises Jimenez DPM;  Location: [facility] OR    CV CORONARY ANGIOGRAM N/A 8/13/2024    Procedure: Coronary Angiogram;  Surgeon: Brandon Alejandro MD;  Location: [facility] CATH LAB CV    CV LEFT HEART CATH N/A 8/13/2024    Procedure: Left Heart Catheterization;  Surgeon: Brandon Alejandro MD;  Location: [facility] CATH LAB CV    CV RIGHT HEART CATH MEASUREMENTS RECORDED N/A 8/13/2024    Procedure: Right Heart Catheterization;  Surgeon: Brandon Alejandro MD;  Location: [facility] CATH LAB CV    INCISION AND DRAINAGE FOOT, COMBINED Left 5/23/2023    Procedure: Incision and drainage of abscess left foot with;  Surgeon: Jf Huerta DPM;  Location: [facility] OR    " IRRIGATION AND DEBRIDEMENT FOOT, COMBINED Left 5/23/2023    Procedure: debridement and irrigation of wound left foot;  Surgeon: Jf Huerta DPM;  Location: [facility] OR    IRRIGATION AND DEBRIDEMENT TOE, COMBINED Right 1/20/2023    Procedure: IRRIGATION AND DEBRIDEMENT digits 2 and 3 right foot;  Surgeon: Jf Huerta DPM;  Location: [facility] OR    PICC SINGLE LUMEN PLACEMENT  5/25/2023            Social History  Tobacco:   History   Smoking Status    Never   Smokeless Tobacco    Never    Alcohol:   Social History    Substance and Sexual Activity      Alcohol use: Never   Illicit Drugs:   History   Drug Use    Types: Methamphetamines     Comment: chronic user       Family History  Family History   Problem Relation Age of Onset    Diabetes Father           Geovanny Griffin MD on 8/20/2024      cc: Daryn Pittman

## 2024-08-20 NOTE — PLAN OF CARE
Goal Outcome Evaluation:      Plan of Care Reviewed With: patient, child          Outcome Evaluation: If not accepted to TCU, will plan to go to his sister's home in [place].

## 2024-08-20 NOTE — PROGRESS NOTES
Care Management Follow Up    Length of Stay (days): 7    Expected Discharge Date: 08/21/2024    Anticipated Discharge Plan:   Transitional care (TCU) is recommended for continued therapy and skilled nursing.    Transportation: Confirmed; family (sister or daughter)    PT Recommendations: Transitional Care Facility, Per plan established by the PT  OT Recommendations:  Transitional Care Facility     Barriers to Discharge: Clearance for discharge (anemia noted); placement    Prior Living Situation: apartment with other (see comments), friend(s) (roommate)     Patient/Spokesperson Updated: Yes. Who? Patient; daughter    Additional Information:  Patient lives in a mobile home with a roommate and is independent at baseline. Transitional care (TCU) is recommended for continued therapy and skilled nursing. Family would prefer somewhere near [facility] and referrals have been made to [facility] (declined, does not accept insurance), [facility] and [facility] Will fax referral to [facility] in [place] at [phone] (they request a fax be sent to this number and nursing will pass on to admissions. Phone number for [facility] is [phone].  Consider referrals to [facility] or [facility] if unable to accept in [facility].  11:48 AM: Awaiting response from referrals made. Note patient was able to ambulate 600 feet with therapy yesterday. Met with patient and his daughter Devora at the bedside. If patient is not accepted to TCU, they would prefer a discharge to home with patient's sister in [place]. Discussed that patient would need to set up a primary care provider as soon as possible but once that is arranged, they could get home care if needed. Long range goal is to sell the mobile home and move patient to a more supportive environment such as assisted living. They DO NOT want to consider a TCU in the [facility] area as they would like to move patient from the [facility] where he has some unsupportive  connections.   Devora anticipates patient's sister Nilda will provide transport. Devora has to go home to [place] tonight but may be able to transport as well when she returns.   1:48 PM:  [facility] [place] has declined patient for admission. Awaiting response from [facility] admissions (family would be agreeable to [facility] or any other near [place]).     Carmen Oliver RN

## 2024-08-21 ENCOUNTER — APPOINTMENT (OUTPATIENT)
Dept: OCCUPATIONAL THERAPY | Facility: HOSPITAL | Age: 59
End: 2024-08-21
Attending: INTERNAL MEDICINE
Payer: COMMERCIAL

## 2024-08-21 LAB
ANION GAP SERPL CALCULATED.3IONS-SCNC: 15 MMOL/L (ref 7–15)
ATRIAL RATE - MUSE: 76 BPM
ATRIAL RATE - MUSE: 82 BPM
BUN SERPL-MCNC: 39.8 MG/DL (ref 8–23)
CALCIUM SERPL-MCNC: 8.6 MG/DL (ref 8.8–10.4)
CHLORIDE SERPL-SCNC: 104 MMOL/L (ref 98–107)
CREAT SERPL-MCNC: 1.8 MG/DL (ref 0.67–1.17)
DIASTOLIC BLOOD PRESSURE - MUSE: NORMAL MMHG
DIASTOLIC BLOOD PRESSURE - MUSE: NORMAL MMHG
EGFRCR SERPLBLD CKD-EPI 2021: 42 ML/MIN/1.73M2
ERYTHROCYTE [DISTWIDTH] IN BLOOD BY AUTOMATED COUNT: 12.9 % (ref 10–15)
GLUCOSE BLDC GLUCOMTR-MCNC: 112 MG/DL (ref 70–99)
GLUCOSE BLDC GLUCOMTR-MCNC: 74 MG/DL (ref 70–99)
GLUCOSE BLDC GLUCOMTR-MCNC: 74 MG/DL (ref 70–99)
GLUCOSE BLDC GLUCOMTR-MCNC: 93 MG/DL (ref 70–99)
GLUCOSE BLDC GLUCOMTR-MCNC: 95 MG/DL (ref 70–99)
GLUCOSE SERPL-MCNC: 63 MG/DL (ref 70–99)
HCO3 SERPL-SCNC: 20 MMOL/L (ref 22–29)
HCT VFR BLD AUTO: 28 % (ref 40–53)
HGB BLD-MCNC: 9.6 G/DL (ref 13.3–17.7)
INTERPRETATION ECG - MUSE: NORMAL
INTERPRETATION ECG - MUSE: NORMAL
MCH RBC QN AUTO: 29.2 PG (ref 26.5–33)
MCHC RBC AUTO-ENTMCNC: 34.3 G/DL (ref 31.5–36.5)
MCV RBC AUTO: 85 FL (ref 78–100)
MRSA DNA SPEC QL NAA+PROBE: NEGATIVE
P AXIS - MUSE: 72 DEGREES
P AXIS - MUSE: 75 DEGREES
PLATELET # BLD AUTO: 462 10E3/UL (ref 150–450)
POTASSIUM SERPL-SCNC: 4.2 MMOL/L (ref 3.4–5.3)
PR INTERVAL - MUSE: 122 MS
PR INTERVAL - MUSE: 144 MS
QRS DURATION - MUSE: 94 MS
QRS DURATION - MUSE: 98 MS
QT - MUSE: 404 MS
QT - MUSE: 428 MS
QTC - MUSE: 472 MS
QTC - MUSE: 481 MS
R AXIS - MUSE: 47 DEGREES
R AXIS - MUSE: 61 DEGREES
RBC # BLD AUTO: 3.29 10E6/UL (ref 4.4–5.9)
SA TARGET DNA: NEGATIVE
SODIUM SERPL-SCNC: 139 MMOL/L (ref 135–145)
SYSTOLIC BLOOD PRESSURE - MUSE: NORMAL MMHG
SYSTOLIC BLOOD PRESSURE - MUSE: NORMAL MMHG
T AXIS - MUSE: -27 DEGREES
T AXIS - MUSE: -68 DEGREES
VENTRICULAR RATE- MUSE: 76 BPM
VENTRICULAR RATE- MUSE: 82 BPM
WBC # BLD AUTO: 12.1 10E3/UL (ref 4–11)

## 2024-08-21 PROCEDURE — 250N000013 HC RX MED GY IP 250 OP 250 PS 637: Performed by: INTERNAL MEDICINE

## 2024-08-21 PROCEDURE — 99232 SBSQ HOSP IP/OBS MODERATE 35: CPT | Performed by: INTERNAL MEDICINE

## 2024-08-21 PROCEDURE — 99233 SBSQ HOSP IP/OBS HIGH 50: CPT | Performed by: INTERNAL MEDICINE

## 2024-08-21 PROCEDURE — 87640 STAPH A DNA AMP PROBE: CPT | Performed by: INTERNAL MEDICINE

## 2024-08-21 PROCEDURE — 93010 ELECTROCARDIOGRAM REPORT: CPT | Performed by: STUDENT IN AN ORGANIZED HEALTH CARE EDUCATION/TRAINING PROGRAM

## 2024-08-21 PROCEDURE — 93005 ELECTROCARDIOGRAM TRACING: CPT

## 2024-08-21 PROCEDURE — 97110 THERAPEUTIC EXERCISES: CPT | Mod: GO

## 2024-08-21 PROCEDURE — 80048 BASIC METABOLIC PNL TOTAL CA: CPT | Performed by: INTERNAL MEDICINE

## 2024-08-21 PROCEDURE — 250N000011 HC RX IP 250 OP 636: Performed by: INTERNAL MEDICINE

## 2024-08-21 PROCEDURE — 85027 COMPLETE CBC AUTOMATED: CPT | Performed by: INTERNAL MEDICINE

## 2024-08-21 PROCEDURE — 2894A ECG 12-LEAD WITH MUSE (LHE): CPT | Performed by: STUDENT IN AN ORGANIZED HEALTH CARE EDUCATION/TRAINING PROGRAM

## 2024-08-21 PROCEDURE — 210N000001 HC R&B IMCU HEART CARE

## 2024-08-21 PROCEDURE — 97535 SELF CARE MNGMENT TRAINING: CPT | Mod: GO

## 2024-08-21 PROCEDURE — 36415 COLL VENOUS BLD VENIPUNCTURE: CPT | Performed by: INTERNAL MEDICINE

## 2024-08-21 RX ORDER — MULTIVITAMIN,THERAPEUTIC
1 TABLET ORAL DAILY
Status: DISCONTINUED | OUTPATIENT
Start: 2024-08-21 | End: 2024-08-23 | Stop reason: HOSPADM

## 2024-08-21 RX ORDER — FUROSEMIDE 20 MG
20 TABLET ORAL
Status: DISCONTINUED | OUTPATIENT
Start: 2024-08-22 | End: 2024-08-23 | Stop reason: HOSPADM

## 2024-08-21 RX ADMIN — SENNOSIDES AND DOCUSATE SODIUM 2 TABLET: 8.6; 5 TABLET ORAL at 20:39

## 2024-08-21 RX ADMIN — CLOPIDOGREL BISULFATE 75 MG: 75 TABLET ORAL at 10:05

## 2024-08-21 RX ADMIN — ISOSORBIDE DINITRATE 10 MG: 10 TABLET ORAL at 11:48

## 2024-08-21 RX ADMIN — HYDRALAZINE HYDROCHLORIDE 10 MG: 10 TABLET ORAL at 08:11

## 2024-08-21 RX ADMIN — HYDROXYZINE HYDROCHLORIDE 50 MG: 50 TABLET, FILM COATED ORAL at 11:58

## 2024-08-21 RX ADMIN — ISOSORBIDE DINITRATE 10 MG: 10 TABLET ORAL at 17:34

## 2024-08-21 RX ADMIN — ISOSORBIDE DINITRATE 10 MG: 10 TABLET ORAL at 08:11

## 2024-08-21 RX ADMIN — THIAMINE HCL TAB 100 MG 100 MG: 100 TAB at 16:15

## 2024-08-21 RX ADMIN — THERA TABS 1 TABLET: TAB at 16:15

## 2024-08-21 RX ADMIN — CARVEDILOL 3.12 MG: 3.12 TABLET, FILM COATED ORAL at 08:12

## 2024-08-21 RX ADMIN — POLYETHYLENE GLYCOL 3350 17 G: 17 POWDER, FOR SOLUTION ORAL at 10:05

## 2024-08-21 RX ADMIN — ASPIRIN 81 MG: 81 TABLET, COATED ORAL at 08:12

## 2024-08-21 RX ADMIN — HYDRALAZINE HYDROCHLORIDE 10 MG: 10 TABLET ORAL at 20:25

## 2024-08-21 RX ADMIN — ONDANSETRON 4 MG: 4 TABLET, ORALLY DISINTEGRATING ORAL at 08:22

## 2024-08-21 RX ADMIN — ATORVASTATIN CALCIUM 40 MG: 40 TABLET, FILM COATED ORAL at 20:25

## 2024-08-21 RX ADMIN — CARVEDILOL 3.12 MG: 3.12 TABLET, FILM COATED ORAL at 17:35

## 2024-08-21 RX ADMIN — HYDRALAZINE HYDROCHLORIDE 10 MG: 10 TABLET ORAL at 13:58

## 2024-08-21 RX ADMIN — SPIRONOLACTONE 12.5 MG: 25 TABLET, FILM COATED ORAL at 08:12

## 2024-08-21 RX ADMIN — SENNOSIDES AND DOCUSATE SODIUM 2 TABLET: 8.6; 5 TABLET ORAL at 10:05

## 2024-08-21 RX ADMIN — Medication 10 MG: at 20:25

## 2024-08-21 ASSESSMENT — ACTIVITIES OF DAILY LIVING (ADL)
ADLS_ACUITY_SCORE: 32
ADLS_ACUITY_SCORE: 33
ADLS_ACUITY_SCORE: 32
ADLS_ACUITY_SCORE: 33
ADLS_ACUITY_SCORE: 32

## 2024-08-21 NOTE — PLAN OF CARE
Patient is A/O and can have brief moments of confusion, but is redirectable.  Patient denied pain this shift.  At the start of the shift, patient reported feeling nauseous PRN sublingual Zofran given and helped.  In the 1200 hour patient became anxious, PRN Atarax was given, and patient took a nap.  Patient has not had a BM since the 17th, PRN bowel medications given.  Patient received Angio via right radial and femoral yesterday, multivessel found.  Right radial is WDL, right femoral has spotting that has not increased in size from past documentation.  Patient has very low appetite.  He did not eat any breakfast and only a few bites of lunch.  Encouraging him to drink juice and eat fruit.    Tele: NSR    Heart Failure Care Map  GOALS TO BE MET BEFORE DISCHARGE:    1. Decrease congestion and/or edema with diuretic therapy to achieve near optimal volume status.     Dyspnea improved: Yes, satisfactory for discharge.   Edema improved: No, further care required to meet this goal. Please explain +1 to 2 edema from legs to feet, received PO diuretics.         Last 24 hour I/O:   Intake/Output Summary (Last 24 hours) at 8/21/2024 1324  Last data filed at 8/21/2024 1203  Gross per 24 hour   Intake 1155 ml   Output 1205 ml   Net -50 ml           Net I/O and Weights since admission:   07/22 1500 - 08/21 1459  In: 4972.46 [P.O.:2815; I.V.:1765.46]  Out: 9505 [Urine:9505]  Net: -4532.54     Vitals:    08/14/24 0501 08/15/24 0616 08/16/24 0404 08/17/24 0332   Weight: 62.5 kg (137 lb 12.6 oz) 62.6 kg (138 lb 0.1 oz) 62.5 kg (137 lb 12.6 oz) 62.6 kg (138 lb)    08/18/24 0431 08/19/24 0501 08/20/24 0400 08/21/24 0708   Weight: 62.9 kg (138 lb 11.2 oz) 63.2 kg (139 lb 6.4 oz) 63.5 kg (139 lb 14.4 oz) 62.1 kg (136 lb 14.5 oz)       2.  O2 sats > 90% on room air, or at prior home O2 therapy level.      Able to wean O2 this shift to keep sats above 90%?: Yes, satisfactory for discharge.   Does patient use Home O2? No          Current  oxygenation status:   SpO2: 91 %     O2 Device: None (Room air), Oxygen Delivery: 2 LPM    3.  Tolerates ambulation and mobility near baseline.     Ambulation: No, further care required to meet this goal. Please explain patient has refused to ambulate beyond his room.    Times patient ambulated with staff this shift: 2    Please review the Heart Failure Care Map for additional HF goal outcomes.    Isidro Vargas RN  8/21/2024      Problem: Gas Exchange Impaired  Goal: Optimal Gas Exchange  Outcome: Progressing     Problem: Fall Injury Risk  Goal: Absence of Fall and Fall-Related Injury  Outcome: Progressing  Intervention: Identify and Manage Contributors  Recent Flowsheet Documentation  Taken 8/21/2024 1203 by Isidro Vargas, RN  Medication Review/Management: medications reviewed  Taken 8/21/2024 0800 by Isidro Vargas, RN  Medication Review/Management: medications reviewed  Intervention: Promote Injury-Free Environment  Recent Flowsheet Documentation  Taken 8/21/2024 1203 by Isidro Vargas, RN  Safety Promotion/Fall Prevention:   clutter free environment maintained   increased rounding and observation   increase visualization of patient   lighting adjusted   nonskid shoes/slippers when out of bed   patient and family education   room door open   room organization consistent   safety round/check completed  Taken 8/21/2024 0800 by Isidro Vargas, RN  Safety Promotion/Fall Prevention:   clutter free environment maintained   increased rounding and observation   increase visualization of patient   lighting adjusted   nonskid shoes/slippers when out of bed   patient and family education   room door open   room organization consistent   safety round/check completed     Problem: Heart Failure  Goal: Optimal Coping  Outcome: Progressing  Intervention: Support Psychosocial Response  Recent Flowsheet Documentation  Taken 8/21/2024 1203 by Isidro aVrgas, RN  Supportive Measures:   active listening utilized    goal-setting facilitated  Taken 8/21/2024 0800 by Isidro Vargas RN  Supportive Measures:   active listening utilized   goal-setting facilitated  Goal: Optimal Cardiac Output  Outcome: Progressing  Intervention: Optimize Cardiac Output  Recent Flowsheet Documentation  Taken 8/21/2024 1203 by Isidro Vargas RN  Environmental Support:   calm environment promoted   distractions minimized  Taken 8/21/2024 0800 by Isidro Vargas RN  Environmental Support:   calm environment promoted   distractions minimized  Goal: Stable Heart Rate and Rhythm  Outcome: Progressing  Goal: Optimal Functional Ability  Outcome: Progressing  Intervention: Optimize Functional Ability  Recent Flowsheet Documentation  Taken 8/21/2024 1203 by Isidro Vargas RN  Activity Management: back to bed  Taken 8/21/2024 0800 by Isidro Vargas RN  Activity Management:   ambulated in room   up in chair  Goal: Fluid and Electrolyte Balance  Outcome: Progressing  Goal: Improved Oral Intake  Outcome: Progressing  Goal: Effective Oxygenation and Ventilation  Outcome: Progressing  Intervention: Promote Airway Secretion Clearance  Recent Flowsheet Documentation  Taken 8/21/2024 1203 by Isidro Vargas RN  Cough And Deep Breathing: done independently per patient  Activity Management: back to bed  Taken 8/21/2024 0800 by Isidro Vargas RN  Cough And Deep Breathing: done with encouragement  Activity Management:   ambulated in room   up in chair  Goal: Effective Breathing Pattern During Sleep  Outcome: Progressing  Intervention: Monitor and Manage Obstructive Sleep Apnea  Recent Flowsheet Documentation  Taken 8/21/2024 1203 by Isidro Vargas RN  Medication Review/Management: medications reviewed  Taken 8/21/2024 0800 by Isidro Vargas RN  Medication Review/Management: medications reviewed     Problem: Cardiac Catheterization (Diagnostic/Interventional)  Goal: Absence of Bleeding  Outcome: Progressing  Goal: Absence of Contrast-Induced  Injury  Outcome: Progressing  Goal: Stable Heart Rate and Rhythm  Outcome: Progressing  Goal: Absence of Embolism Signs and Symptoms  Outcome: Progressing  Intervention: Prevent or Manage Embolism  Recent Flowsheet Documentation  Taken 8/21/2024 1203 by Isidro Vargas RN  VTE Prevention/Management: SCDs off (sequential compression devices)  Taken 8/21/2024 0800 by Isidro Vargas RN  VTE Prevention/Management: SCDs off (sequential compression devices)  Goal: Anesthesia/Sedation Recovery  Outcome: Progressing  Intervention: Optimize Anesthesia Recovery  Recent Flowsheet Documentation  Taken 8/21/2024 1203 by Isidro Vargas RN  Safety Promotion/Fall Prevention:   clutter free environment maintained   increased rounding and observation   increase visualization of patient   lighting adjusted   nonskid shoes/slippers when out of bed   patient and family education   room door open   room organization consistent   safety round/check completed  Reorientation Measures:   calendar in view   clock in view  Taken 8/21/2024 0800 by Isidro Vargas RN  Safety Promotion/Fall Prevention:   clutter free environment maintained   increased rounding and observation   increase visualization of patient   lighting adjusted   nonskid shoes/slippers when out of bed   patient and family education   room door open   room organization consistent   safety round/check completed  Reorientation Measures:   calendar in view   clock in view  Goal: Optimal Pain Control and Function  Outcome: Progressing  Goal: Absence of Vascular Access Complication  Outcome: Progressing  Intervention: Prevent and Manage Access Complications  Recent Flowsheet Documentation  Taken 8/21/2024 1203 by Isidro Vargas RN  Activity Management: back to bed  Taken 8/21/2024 0800 by Isidro Vargas RN  Activity Management:   ambulated in room   up in chair     Patrick Vargas RN

## 2024-08-21 NOTE — PROGRESS NOTES
HEART CARE NOTE          Assessment/Recommendations     1. Severe HFrEF c/b severe ADHF  Assessment / Plan  Plan to resume oral diuretic regimen tomorrow; continue to monitor renal function/repeat BMP, UOP and hemodynamics closely    Patient is high risk for adverse cardiac events 2/2 severe systolic dysfunction, renal dysfunction, CAD and elevated NTproBNP, DM2  GDMT as detailed below     Current Pharmacotherapy AHA Guideline-Directed Medical Therapy   Lisinopril - on hold given renal dysfuntion Lisinopril 20 mg twice daily   Carvedilol 3.125 mg twice daily Carvedilol 25 mg twice daily   Spironolactone 12.5 mg Spironolactone 25 mg once daily   Hydralazine 10 mg three times daily Hydralazine 100 mg three times daily   Isosorbide dinitrate 10 mg three times daily Isosorbide dinitrate 40 mg three times daily   SGLT2 inhibitor:Dapagliflozin/Empagliflozin - not started Dapagliflozin or Empagliflozin 10 mg daily      2. Severe multivessel CAD c/b NSTEMI  Assessment / Plan  PET significant for viability; Not considered CABG candidate by CTS - s/p impella supported high risk PCI to RCA - possible staged PCI in the next 1-2 months   Denies chest pain or anginal equivalents   Continue ASA, atorvastatin, carvedilol, clopidogrel     3. CHRISTY on CKD  Assessment / Plan  CRS; diuresis on hold in anticipation of coronary angiogram above; continue to monitor UOP and renal function closely     4. DM2  Assessment / Plan  Management per primary team - currently on ISS     5. HTN  Assessment / Plan  Titrate oral afterload reduction as tolerated    Plan of care discussed on August 21, 2024 with patient at bedside, and primary team overseeing patient's care      History of Present Illness/Subjective    Mr. Abdulaziz Rausch is a 59 year old male with a PMHx significant for (per Epic notation) diabetes, peripheral vascular disease related to diabetes who presents to [facility] with 3-day history of progressive dyspnea on exertion,  lower extremity edema and orthopnea symptoms found to have acute decompensated congestive heart failure and symptoms consistent with ACS     Today, Mr. Rausch denies acute cardiac events or complaints; Management plan as detailed above     ECG: personally reviewed; normal sinus rhythm, nonspecific ST and T waves changes.     ECHO (personnaly Reviewed on 8/19/24):   1.Left ventricular function is decreased. The ejection fraction is 15-20%  (severely reduced).  2.The left ventricle is mildly dilated.  3.Normal right ventricle size and systolic function.  4.The left atrium is moderate to severely dilated.  5.No hemodynamically significant valvular abnormalities on 2D or color flow  imaging.  6.Right ventricular systolic pressure is elevated, consistent with mild  pulmonary hypertension.  There is no comparison study available.    Telemetry: personally reviewed August 21, 2024; notable for sinus      Lab results: personally reviewed August 21, 2024; notable for stable to improved renal function    Medical history and pertinent documents reviewed in Care Everywhere please where applicable see details above        Physical Examination Review of Systems   /64 (BP Location: Left arm)   Pulse 80   Temp 98  F (36.7  C) (Oral)   Resp 16   Wt 63.5 kg (139 lb 14.4 oz)   SpO2 92%   BMI 20.66 kg/m    Body mass index is 20.66 kg/m .  Wt Readings from Last 3 Encounters:   08/20/24 63.5 kg (139 lb 14.4 oz)   08/12/24 67.8 kg (149 lb 7.6 oz)   10/05/23 62.1 kg (137 lb)     General Appearance:   no distress, normal body habitus   ENT/Mouth: membranes moist, no oral lesions or bleeding gums.      EYES:  no scleral icterus, normal conjunctivae   Neck: no carotid bruits or thyromegaly   Chest/Lungs:   lungs are clear to auscultation, no rales or wheezing, equal chest wall expansion    Cardiovascular:   Regular. Normal first and second heart sounds with no murmurs, rubs, or gallops; the carotid, radial and posterior tibial  pulses are intact, no JVD and mild LE edema bilaterally    Abdomen:  no organomegaly, masses, bruits, or tenderness; bowel sounds are present   Extremities: no cyanosis or clubbing   Skin: no xanthelasma, warm.    Neurologic: NAD     Psychiatric: NAD    A complete 10 systems ROS was reviewed  And is negative except what is listed in the HPI.          Medical History  Surgical History Family History Social History   Past Medical History:   Diagnosis Date    Anemia     Depression     Diabetes mellitus, type 2 (H)     Diabetic foot infection (H) 7/1/2023    Diabetic foot ulcer (H)     History of MRSA infection 7/1/2023 Jan 2023 RIGHT foot wound    Hypertension     Non-healing ulcer of left foot (H) 7/1/2023    Osteomyelitis (H)     Past Surgical History:   Procedure Laterality Date    AMPUTATE FOOT Left 7/3/2023    Procedure: partial left fifth ray amputation, skin flap creation and closre, left foot;  Surgeon: Alejandro Mccoy DPM;  Location: [facility] OR    AMPUTATE TOE(S) Right 2/17/2023    Procedure: AMPUTATION, digits two and three right foot;  Surgeon: Ulises Jimenez DPM;  Location: [facility] OR    CV CORONARY ANGIOGRAM N/A 8/13/2024    Procedure: Coronary Angiogram;  Surgeon: Brandon Alejandro MD;  Location: [facility] CATH LAB CV    CV LEFT HEART CATH N/A 8/13/2024    Procedure: Left Heart Catheterization;  Surgeon: Brandon Alejandro MD;  Location: [facility] CATH LAB CV    CV RIGHT HEART CATH MEASUREMENTS RECORDED N/A 8/13/2024    Procedure: Right Heart Catheterization;  Surgeon: Brandon Alejandro MD;  Location: [facility] CATH LAB CV    INCISION AND DRAINAGE FOOT, COMBINED Left 5/23/2023    Procedure: Incision and drainage of abscess left foot with;  Surgeon: Jf Huerta DPM;  Location: [facility] OR    IRRIGATION AND DEBRIDEMENT FOOT, COMBINED Left 5/23/2023    Procedure: debridement and irrigation of wound left foot;  Surgeon: Jf Huerta DPM;  Location: [facility] OR    IRRIGATION AND  DEBRIDEMENT TOE, COMBINED Right 1/20/2023    Procedure: IRRIGATION AND DEBRIDEMENT digits 2 and 3 right foot;  Surgeon: Jf Huerta DPM;  Location: [facility] OR    [facility] SINGLE LUMEN PLACEMENT  5/25/2023         no family history of premature coronary artery disease Social History     Socioeconomic History    Marital status:      Spouse name: Not on file    Number of children: Not on file    Years of education: Not on file    Highest education level: Not on file   Occupational History    Not on file   Tobacco Use    Smoking status: Never    Smokeless tobacco: Never   Vaping Use    Vaping status: Never Used   Substance and Sexual Activity    Alcohol use: Never    Drug use: Yes     Types: Methamphetamines     Comment: chronic user    Sexual activity: Not on file   Other Topics Concern    Not on file   Social History Narrative    Not on file     Social Determinants of Health     Financial Resource Strain: High Risk (10/5/2023)    Financial Resource Strain     Within the past 12 months, have you or your family members you live with been unable to get utilities (heat, electricity) when it was really needed?: Yes   Food Insecurity: High Risk (10/5/2023)    Food Insecurity     Within the past 12 months, did you worry that your food would run out before you got money to buy more?: Yes     Within the past 12 months, did the food you bought just not last and you didn t have money to get more?: Yes   Transportation Needs: Low Risk  (10/5/2023)    Transportation Needs     Within the past 12 months, has lack of transportation kept you from medical appointments, getting your medicines, non-medical meetings or appointments, work, or from getting things that you need?: No   Physical Activity: Not on file   Stress: Not on file   Social Connections: Not on file   Interpersonal Safety: Low Risk  (10/5/2023)    Interpersonal Safety     Do you feel physically and emotionally safe where you currently live?: Yes     Within  "the past 12 months, have you been hit, slapped, kicked or otherwise physically hurt by someone?: No     Within the past 12 months, have you been humiliated or emotionally abused in other ways by your partner or ex-partner?: No   Housing Stability: Low Risk  (10/5/2023)    Housing Stability     Do you have housing? : Yes     Are you worried about losing your housing?: No           Lab Results    Chemistry/lipid CBC Cardiac Enzymes/BNP/TSH/INR   Lab Results   Component Value Date    CHOL 145 01/18/2023    HDL 37 (L) 01/18/2023    TRIG 226 (H) 01/18/2023    BUN 39.8 (H) 08/21/2024     08/21/2024    CO2 20 (L) 08/21/2024    Lab Results   Component Value Date    WBC 12.1 (H) 08/21/2024    HGB 9.6 (L) 08/21/2024    HCT 28.0 (L) 08/21/2024    MCV 85 08/21/2024     (H) 08/21/2024    Lab Results   Component Value Date    TSH 1.32 08/13/2024     No results found for: \"CKTOTAL\", \"CKMB\", \"TROPONINI\"       Weight:    Wt Readings from Last 3 Encounters:   08/20/24 63.5 kg (139 lb 14.4 oz)   08/12/24 67.8 kg (149 lb 7.6 oz)   10/05/23 62.1 kg (137 lb)       Allergies  No Known Allergies      Surgical History  Past Surgical History:   Procedure Laterality Date    AMPUTATE FOOT Left 7/3/2023    Procedure: partial left fifth ray amputation, skin flap creation and closre, left foot;  Surgeon: Alejandro Mccoy DPM;  Location: [facility] OR    AMPUTATE TOE(S) Right 2/17/2023    Procedure: AMPUTATION, digits two and three right foot;  Surgeon: Ulises Jimenez DPM;  Location: [facility] Main OR    CV CORONARY ANGIOGRAM N/A 8/13/2024    Procedure: Coronary Angiogram;  Surgeon: Brandon Alejandro MD;  Location: [facility] CATH LAB CV    CV LEFT HEART CATH N/A 8/13/2024    Procedure: Left Heart Catheterization;  Surgeon: Brandon Alejandro MD;  Location: [facility] CATH LAB CV    CV RIGHT HEART CATH MEASUREMENTS RECORDED N/A 8/13/2024    Procedure: Right Heart Catheterization;  Surgeon: Brandon Alejandro MD;  Location: [facility]" LAB CV    INCISION AND DRAINAGE FOOT, COMBINED Left 5/23/2023    Procedure: Incision and drainage of abscess left foot with;  Surgeon: Jf Huerta DPM;  Location: [facility] OR    IRRIGATION AND DEBRIDEMENT FOOT, COMBINED Left 5/23/2023    Procedure: debridement and irrigation of wound left foot;  Surgeon: Jf Huerta DPM;  Location: [facility] OR    IRRIGATION AND DEBRIDEMENT TOE, COMBINED Right 1/20/2023    Procedure: IRRIGATION AND DEBRIDEMENT digits 2 and 3 right foot;  Surgeon: Jf Huerta DPM;  Location: [facility] OR    PICC SINGLE LUMEN PLACEMENT  5/25/2023            Social History  Tobacco:   History   Smoking Status    Never   Smokeless Tobacco    Never    Alcohol:   Social History    Substance and Sexual Activity      Alcohol use: Never   Illicit Drugs:   History   Drug Use    Types: Methamphetamines     Comment: chronic user       Family History  Family History   Problem Relation Age of Onset    Diabetes Father           Geovanny Griffin MD on 8/21/2024      cc: Daryn Pittman

## 2024-08-21 NOTE — CONSULTS
CLINICAL NUTRITION SERVICES - ASSESSMENT NOTE     Nutrition Prescription    RECOMMENDATIONS FOR MDs/PROVIDERS TO ORDER:  Multivitamin with minerals and thiamine d/t malnutrition dx    Malnutrition Status:    Moderate in acute illness    Recommendations already ordered by Registered Dietitian (RD):  Glucerna TID w/meals  Magic cup pm snack  Gel plus am snack    Future/Additional Recommendations:  Monitor intake, weight, labs, supplement tolerance  Provide diet education with pt prior to discharge     REASON FOR ASSESSMENT  Abdulaziz Rausch is a/an 59 year old male assessed by the dietitian for LOS and Provider Order - Dietitian to see for Heart Healthy Diet education     HPI: 59 year old male with h/o HTN, DM-II, methamphetamine abuse  who was admitted for acute CHF exacerbation. Course was complicated by CHRISTY.    S/p PCI on 08/20     NUTRITION HISTORY  Met with pt, sleeping prior to visit, RN in room and encouraged pt to wake up d/t poor po intake. Pt not very talkative, mostly answers with 1 word answers. Eats 3 meals per day (candy bars for all 3 meals), snacks but doesn't state on what. Previously on a puree diet with thickened liquids, upgraded to regular textures. Does not have any teeth, declined need for modified textures. Has not drank supplements in the past but agrees to Glucerna, will send with meals as pt not eating very much. Will also send magic cup and Gel+ for snacks. Pt feels #. Does not follow any special diet at home. Left handouts for a heart healthy consistent carb diet in room, will need to discuss when pt more alert. Per RN, pt's sister will be here tomorrow, going to her house on discharge, will discuss with her also.     CURRENT NUTRITION ORDERS  Diet: 1800 mL Fluid Restriction and Low Saturated Fat/2400 mg Sodium  Intake/Tolerance: variable, 0-100% of meals per flowsheets 0% of breakfast and 25% of lunch today    LABS  Labs reviewed  Glucose   A1C  "10.1    MEDICATIONS  Medications reviewed    ANTHROPOMETRICS  Height: 5'9\"  Most Recent Weight: 62.1 kg (136 lb 14.5 oz)    IBW: 72.7 kg  BMI: Normal BMI  Weight History:   Wt Readings from Last 20 Encounters:   08/21/24 62.1 kg (136 lb 14.5 oz)   08/12/24 67.8 kg (149 lb 7.6 oz)   10/05/23 62.1 kg (137 lb)   10/05/23 61.7 kg (136 lb)   09/25/23 65.8 kg (145 lb)   07/07/23 64.4 kg (142 lb)   07/01/23 63.5 kg (140 lb)   05/23/23 64.8 kg (142 lb 13.7 oz)   04/17/23 63.1 kg (139 lb 2 oz)   02/17/23 61 kg (134 lb 7.7 oz)   02/15/23 64.1 kg (141 lb 5 oz)   02/10/23 59 kg (130 lb)   01/27/23 59 kg (130 lb)   01/26/23 59 kg (130 lb)   01/19/23 59 kg (130 lb 1.1 oz)   01/18/23 62.4 kg (137 lb 9.1 oz)   08/26/21 61.8 kg (136 lb 4.8 oz)     Date/Time Weight Weight Method   08/21/24 0708 62.1 kg (136 lb 14.5 oz) Bed scale   08/20/24 0400 63.5 kg (139 lb 14.4 oz) Standing scale   08/19/24 0501 63.2 kg (139 lb 6.4 oz) Standing scale   08/18/24 0431 62.9 kg (138 lb 11.2 oz) Standing scale   08/17/24 0332 62.6 kg (138 lb) Standing scale   08/16/24 0404 62.5 kg (137 lb 12.6 oz) Bed scale   08/15/24 0616 62.6 kg (138 lb 0.1 oz) Bed scale   08/14/24 0501 62.5 kg (137 lb 12.6 oz) Bed scale     Dosing Weight: 62.1 kg    ASSESSED NUTRITION NEEDS  Estimated Energy Needs: 7612-5077 kcals/day (25 - 30 kcals/kg)  Justification: Maintenance  Estimated Protein Needs: 50-62 grams protein/day (0.8 - 1 grams of pro/kg)  Justification: CKD  Estimated Fluid Needs: 1800 mL/day ()   Justification: On a fluid restriction    PHYSICAL FINDINGS  See malnutrition section below.    MALNUTRITION:  % Weight Loss:  Weight loss does not meet criteria for malnutrition recent weight loss likely related to fluid  % Intake:  </= 50% for >/= 5 days (severe malnutrition)  Subcutaneous Fat Loss:  Orbital region mild depletion and Upper arm region mild depletion  Muscle Loss:  Temporal region mild depletion, Clavicle bone region moderate depletion, and Dorsal hand " region mild depletion  Fluid Retention:  Mild ankles, trace legs/feet    Malnutrition Diagnosis: Moderate malnutrition  In Context of:  Acute illness or injury    NUTRITION DIAGNOSIS  Malnutrition related to poor appetite as evidenced by po intake </= 50% for >/= 5 days, loss of muscle and fat, fluid retention       INTERVENTIONS  Implementation  Nutrition Education: Unable to complete due to pt lethargic, not engaged. Left handouts on heart healthy consistent carb diet and foods that contain carbohydrates in room. Will attempt education tomorrow with pt and sister   Medical food supplement therapy     Goals  Maintain weight  Patient to consume % of nutritionally adequate meals three times per day, or the equivalent with supplements/snacks.  Verbalize understanding of diet     Monitoring/Evaluation  Progress toward goals will be monitored and evaluated per protocol.

## 2024-08-21 NOTE — PLAN OF CARE
Problem: Gas Exchange Impaired  Goal: Optimal Gas Exchange  Outcome: Progressing     Problem: Fall Injury Risk  Goal: Absence of Fall and Fall-Related Injury  Outcome: Progressing  Intervention: Identify and Manage Contributors  Recent Flowsheet Documentation  Taken 8/21/2024 0017 by Sujatha Lind RN  Medication Review/Management: medications reviewed  Intervention: Promote Injury-Free Environment  Recent Flowsheet Documentation  Taken 8/21/2024 0017 by Sujatha Lind RN  Safety Promotion/Fall Prevention:   assistive device/personal items within reach   clutter free environment maintained   nonskid shoes/slippers when out of bed   patient and family education   safety round/check completed   lighting adjusted     Problem: Heart Failure  Goal: Optimal Coping  Outcome: Progressing  Goal: Stable Heart Rate and Rhythm  Outcome: Progressing  Goal: Effective Oxygenation and Ventilation  Outcome: Progressing  Intervention: Promote Airway Secretion Clearance  Recent Flowsheet Documentation  Taken 8/21/2024 0412 by Sujatha Lind RN  Activity Management:   ambulated to bathroom   back to bed  Goal: Effective Breathing Pattern During Sleep  Outcome: Progressing  Intervention: Monitor and Manage Obstructive Sleep Apnea  Recent Flowsheet Documentation  Taken 8/21/2024 0017 by Sujatha Lind RN  Medication Review/Management: medications reviewed     Problem: Cardiac Catheterization (Diagnostic/Interventional)  Goal: Absence of Bleeding  Outcome: Progressing  Goal: Stable Heart Rate and Rhythm  Outcome: Progressing  Intervention: Monitor and Manage Cardiac Rhythm Effect  Recent Flowsheet Documentation  Taken 8/21/2024 0017 by Sujatha Lind RN  Fluid/Electrolyte Management: fluids restricted  Goal: Optimal Pain Control and Function  Outcome: Progressing  Goal: Absence of Vascular Access Complication  Outcome: Progressing  Intervention: Prevent and Manage Access Complications  Recent Flowsheet  Documentation  Taken 8/21/2024 0412 by Sujatha Lind, RN  Activity Management:   ambulated to bathroom   back to bed   Goal Outcome Evaluation:         Patient denied pain and was able to sleep for most of the night. Tele NSR. Patient is a little forgetful at times about [place]; bed alarm on. Blood sugar with labs this morning was 63; treated with apple juice with x2 sugar; recheck 15 minutes later was 95. Radial and groin sites WDL. Patient currently labored breathing, SILVEIRA and requiring 2L oxygen. Barriers remaining are IV diuresis and weaning oxygen.       Vitals:    08/20/24 2131 08/20/24 2132 08/20/24 2351 08/21/24 0313   BP:   134/68 131/64   BP Location:   Left arm Left arm   Patient Position:       Cuff Size:       Pulse:   86 80   Resp:   18 16   Temp:   97.3  F (36.3  C) 98  F (36.7  C)   TempSrc:   Oral Oral   SpO2: (!) 89% 91% 93% 92%   Weight:             Heart Failure Care Map  GOALS TO BE MET BEFORE DISCHARGE:    1. Decrease congestion and/or edema with diuretic therapy to achieve near optimal volume status.     Dyspnea improved: No, further care required to meet this goal. Please explain Continue Diuresis   Edema improved: No, further care required to meet this goal. Please explain Continue Diuresis        Last 24 hour I/O:   Intake/Output Summary (Last 24 hours) at 8/21/2024 0611  Last data filed at 8/21/2024 0520  Gross per 24 hour   Intake 1000 ml   Output 905 ml   Net 95 ml           Net I/O and Weights since admission:   07/22 0700 - 08/21 0659  In: 4792.46 [P.O.:2635; I.V.:1765.46]  Out: 9205 [Urine:9205]  Net: -4412.54     Vitals:    08/14/24 0501 08/15/24 0616 08/16/24 0404 08/17/24 0332   Weight: 62.5 kg (137 lb 12.6 oz) 62.6 kg (138 lb 0.1 oz) 62.5 kg (137 lb 12.6 oz) 62.6 kg (138 lb)    08/18/24 0431 08/19/24 0501 08/20/24 0400   Weight: 62.9 kg (138 lb 11.2 oz) 63.2 kg (139 lb 6.4 oz) 63.5 kg (139 lb 14.4 oz)       2.  O2 sats > 90% on room air, or at prior home O2 therapy level.       Able to wean O2 this shift to keep sats above 90%?: No, further care required to meet this goal. Please explain still requires 2L while asleep   Does patient use Home O2? No          Current oxygenation status:   SpO2: 92 %     O2 Device: None (Room air), Oxygen Delivery: 2 LPM    3.  Tolerates ambulation and mobility near baseline.     Ambulation: Yes, satisfactory for discharge.   Times patient ambulated with staff this shift: 3 within room    Please review the Heart Failure Care Map for additional HF goal outcomes.    Sujatha Lind RN  8/21/2024

## 2024-08-21 NOTE — PROGRESS NOTES
RENAL PROGRESS NOTE    ASSESSMENT & PLAN:   59 years old male with baseline creatinine 1.1 with EGFR 70 to 80% with previous CHRISTY with creatinine up to 1.5 admitted at [facility] and admission creatinine 1.5 for shortness of breath and found to have ischemic cardiomyopathy with EF of 15 to 20% and sent to [facility] for further investigation.  Nephrology is following for CHRISTY.    CHRISTY with underlying CKD 2: CHRISTY is likely multifactorial secondary to cardiorenal physiology/hemodynamic.  Also ATN could be possible from prolonged prerenal state.  He was still positive for amphetamine and could cause ATN.  He was also found to have hydronephrosis and possibly from obstructive uropathy which is now resolved.  He also received contrast from coronary angiogram on 8/13 and it could possibly will contribute but contrast associated nephropathy.    Urine sodium 115 and suspect from diuretic induced.  Renal ultrasound with duplex showed echogenic kidneys, left kidneys slightly smaller than the right side.  Serum creatinine peaked at 2.55 mg/dL on 08/14.  Subsequently patient's renal function improved, serum creatinine stalled at 1.8 mg/dL in last two days.  Urinary output in nonoliguric range. On Spironolactone 12.5 mg daily.   Recs:  No indication for dialysis yet but the patient is at high risk for another insults due to contrast received on 08/20.   Monitor renal panel daily while inpatient  Monitor UOP  Agree with holding furosemide.  Avoid nephrotoxic medications  Avoid extreme hypo or hypertension  My clinic will schedule a follow up in 2-3 weeks after discharge.      Stage II CKD-Most recent A1c was 10.1% and his CKD is likely secondary to diabetic kidney disease. Baseline serum creatinine 1.1-1.3mg/dl.      Electrolytes/Acid-base status: no pressing issues.    Acute decompensated congestive heart failure with preserved ejection fraction-   2D ECHO 08/11 showed LVEF of 15-20%.  Net 4.4L negative since  admission. Weight is trending down this am. Breathing comfortable on RA but wet crackles appreciated on exam.  On Spironolactone 12.5 mg daily.   Recs:  Continue to hold diuretics for now given poor oral intake  Daily weight  Strict I/O    Hypertension-BP was acceptable overnight. BP is elevated this am prior to antihypertensive meds were given. On Coreg 3.125 mg BID, Isordil 30 mg TID, Hydralazine 10 mg TID.  Recommend no changes  Monitor BP closely, avoid hypo and hypertension.     Aspiration pneumonia -on PO doxycycline.    Acute urinary retention with bilateral moderate hydronephrosis status post Fischer catheter placement.  Patient was seen by urology during this admission.    Poorly controlled diabetes mellitus-on insulin.  Management per primary team.      CAD/NSTEMI-S/p PCI on 08/20  with ASHLEY stents placement in the proximal and distal RCA.   Staged, percutaneous coronary artery intervention of the LAD-diagonal bifurcation can be considered in 4 to 6 weeks, if the patient remains compliant on dual antiplatelet therapy during follow-up clinic visits.   On aspirin, Plavix, Isodril, Lipitor and Coreg.    Constipation-on bowel regimen prn. Management as er primary team.     Methamphetamine abuse-chronic user.    Discussed with hospitalist .      We will follow.      SUBJECTIVE:   The patient was seen at the bedside and events reviewed with nursing.  Underwent PCI with ASHLEY stents placement in the proximal and distal RCA.   No acute events overnight.  Patient states that he is feeling slightly better, reports improved orthopnea, dyspnea and cough. Had nausea this am which improved after antiemetic medications.Admits poor appetite attributing to constipation, last BM was on 08/17.   Updated on labs. All questions answered.    OBJECTIVE:  Physical Exam   Temp: 98  F (36.7  C) Temp src: Oral BP: (!) 163/85 Pulse: 84   Resp: 16 SpO2: 94 % O2 Device: None (Room air) Oxygen Delivery: 2 LPM  Vitals:    08/19/24  0501 08/20/24 0400 08/21/24 0708   Weight: 63.2 kg (139 lb 6.4 oz) 63.5 kg (139 lb 14.4 oz) 62.1 kg (136 lb 14.5 oz)     Vital Signs with Ranges  Temp:  [97.3  F (36.3  C)-98.2  F (36.8  C)] 98  F (36.7  C)  Pulse:  [73-86] 84  Resp:  [14-18] 16  BP: (113-163)/(61-85) 163/85  SpO2:  [89 %-97 %] 94 %  I/O last 3 completed shifts:  In: 1000 [P.O.:245; I.V.:363]  Out: 905 [Urine:905]    @TMAXR(24)@    Patient Vitals for the past 72 hrs:   Weight   08/21/24 0708 62.1 kg (136 lb 14.5 oz)   08/20/24 0400 63.5 kg (139 lb 14.4 oz)   08/19/24 0501 63.2 kg (139 lb 6.4 oz)     Intake/Output Summary (Last 24 hours) at 8/15/2024 1415  Last data filed at 8/14/2024 2119  Gross per 24 hour   Intake --   Output 800 ml   Net -800 ml       PHYSICAL EXAM:  General awake, appears comfortable, forgetful, NAD, cachectic, chronically ill looking, sitting up in a chair  Cardiovascular - Regular rate and rhythm, no rub  Respiratory - improved wet crackles in the lower posterior fields b/l   Abd: BS present, no guarding or pain with palpation, no ascites  Extremities - improved pedal and pretibial  lower extremity edema bilaterally  : Fischer Catheter in place, making yellow-colored urine.  Skin: dry, intact, no rash, good turgor  Neuro:  Grossly intact, no focal deficits  MSK:  Grossly intact  Psych:  Flat affect    LABORATORY STUDIES:     Recent Labs   Lab 08/21/24  0435 08/20/24  0507 08/19/24  0557 08/18/24  0513 08/17/24  0511 08/16/24  0455   WBC 12.1* 10.4 9.7 9.4 9.9 7.5   RBC 3.29* 2.60* 2.91* 2.75* 2.80* 2.81*   HGB 9.6* 7.5* 8.4* 8.0* 8.1* 8.1*   HCT 28.0* 22.1* 24.7* 23.0* 23.6* 23.8*   * 383 379 362 384 334       Basic Metabolic Panel:  Recent Labs   Lab 08/21/24  0808 08/21/24  0540 08/21/24  0435 08/20/24  2106 08/20/24  1644 08/20/24  1504 08/20/24  0800 08/20/24  0507 08/19/24  0822 08/19/24  0557 08/18/24  0730 08/18/24  0513 08/17/24  0801 08/17/24  0511 08/16/24  0749 08/16/24  0455   NA  --   --  139  --   --   --    --  143  --  139  --  139  --  139  --  142   POTASSIUM  --   --  4.2  --   --   --   --  4.1  --  4.2  --  4.1  --  4.0  --  3.9   CHLORIDE  --   --  104  --   --   --   --  106  --  103  --  103  --  102  --  103   CO2  --   --  20*  --   --   --   --  23  --  23  --  23  --  24  --  24   BUN  --   --  39.8*  --   --   --   --  44.0*  --  47.6*  --  52.6*  --  58.3*  --  51.9*   CR  --   --  1.80*  --   --   --   --  1.79*  --  1.89*  --  1.91*  --  2.03*  --  1.84*   GLC 74 95 63* 110* 111* 118*   < > 66*   < > 132*   < > 92   < > 132*   < > 120*   AMIRAH  --   --  8.6*  --   --   --   --  8.4*  --  8.6*  --  8.6*  --  8.4*  --  8.1*    < > = values in this interval not displayed.       INRNo lab results found in last 7 days.     Recent Labs   Lab Test 08/21/24  0435 08/20/24  0507   WBC 12.1* 10.4   HGB 9.6* 7.5*   * 383       Personally reviewed current labs    EXAM: BILATERAL RENAL ARTERY DUPLEX AND RENAL ULTRASOUND     INDICATION: Acute kidney injury     TECHNIQUE: Real-time gray-scale sonographic imaging of the kidneys and bladder was performed including duplex spectral and color Doppler evaluation of the renal arteries.     COMPARISON: Renal artery duplex 8/14/2024     RENAL ULTRASOUND FINDINGS:    RIGHT KIDNEY: Increased parenchymal echogenicity without hydronephrosis, measuring approximately 10.7 x 5.8 x 5.4 cm.     LEFT KIDNEY: Increased parenchymal echogenicity without hydronephrosis, measuring approximately 8.8 x 5.3 x 5.5 cm.     The bladder is decompressed with indwelling Fischer catheter     DUPLEX ARTERIAL ULTRASOUND FINDINGS:     SYSTOLIC VELOCITIES (cm/s):  RIGHT RENAL ARTERY:  Proximal: 60                Mid: 61                 Distal: 45                    LEFT RENAL ARTERY:  Proximal: 49                Mid: 40                 Distal: 32                    Aorta: 86     Arcuate arterial resistive indices range from 0.7-0.8 on the right and 0.7-0.8 on the left.                                                                       IMPRESSION:  1.  Echogenic kidneys. No hydronephrosis.  2.  Negative renal artery duplex.      Yamileth Sawyer MD  [facility]  [address] [place] [address], [address]  [place] [address]  Phone# [phone]  Fax# [phone]

## 2024-08-21 NOTE — PROGRESS NOTES
[facility]    PROGRESS NOTE - Hospitalist Service    Assessment and Plan  Patient is new to me, Abdulaziz CORAZON Rausch is a 59 year old male admitted on 8/13/2024. He has h/o HTN, DM-II who was admitted to  for acute CHF exacerbation. Course was complicated by CHRISTY. He was started on diuresis and was transferred to [place] cardiac cath. Hospitalist service was consulted for medical co-management.      Multivessel coronary disease  NSTEMI  -Started initially on heparin gtt, Cardiac cath on 08/13: MVCAD  -  ASA, Plavix, Lipitor, coreg.   - Repeat PET showed stunned but viable left ventricular myocardium.  - CTS consult appreciated: High risk for surgical interventions.  - 08/18: Cardiology consult appreciated- PCI with LV support possibly on 08/19.  - PCI initially planned for 08/19-currently on hold.-Cardiology team planning to discuss complex intervention and plan moving forward.     Acute decompensated HFrEF  Mild pulmonary hypertension  -- ProBNP: 08031  -- Strict I/O, daily weight, fluid restriction  -- 08/17: Gave lasis 20 mg po on 08/16 after discussing with nephrology. Lasix held due to worsening of renal function. Defer diuretics to nephrology and cards.  -- 08/18: No rales on exam. Continue to monitor off of lasix.  --Lasix currently on hold due to planned angiogram/PCI.  Defer to cardiology and nephrology.     Acute hypoxic respiratory failure-resolved  -- Likely 2/2 CHF +/- aspiration pneumonia     ?Aspiration pneumonia  -- CXR on 08/11: multifocal patchy opacities present involving b/l lungs including lung bases, Right upper hilar region, and left perihilar region likely representing infecious process. Small b/l pleural effusions.  -- Completed 7 days ceftriaxone.  Last day of doxycycline 7-day course 8/20   -- H/o MRSA OM. Negative MRSA swab on 08/14.     Acute metabolic encephalopathy- resolved.  -- Multifactorial: severe CHF, NSTEMI, pneumonia, CHRISTY  -- VBG wnl. TSH 1.32  --  Delirium precaution  -- UDS + for amphetamine, negative for opiates.   -- CT-head: no acute changes.  -- Gabapentin held     CHRISTY on CKD-II  -- Likely 2/2 low perfusion from CHF  --Baseline creatinine of 1.1.  Creatinine improving and at 1.79 on 8/20  -- Avoid Nephrotoxins. Holding ACEI, Metformin, Jardiance  -- Strict I/O, US-renal: Echogenic kidneys. No hydronephrosis. Negative renal artery duplex.  -- 08/17: Gave lasis 20 mg po on 08/16 after discussing with nephrology. Lasix held this morning due to worsening of renal function.   -Nephrology agreeable to holding Lasix at this time  -- Follow up further nephro recs       Acute urinary retention  B/l moderate hydronephrosis on ULS-resolved  -- rowland placed 8/14.  -- Urology consult appreciated: TOV prior discharge.  -- Repeat US-renal: No hydronephrosis     DM-II  -- A1c: 10.1%  -- Accu-checks, sliding scale, [id number] units, hypoglycemia protocol.   -Patient has hypoglycemia this morning secondary to poor oral intake.    -Decrease insulin dose to 5 units    Essential hypertension  -- Monitor vital signs per protocol  -- holding Norvasc. On coreg     Normocytic Anemia, Chronic   AOCD  -- Iron panel reviewed. Monitor cbc  -- No e/o bleeding  -Continue to monitor CBC     50 MINUTES SPENT BY ME on the date of service doing chart review, history, exam, documentation & further activities per the note    Active Problems:    Type 2 diabetes mellitus with hyperglycemia, with long-term current use of insulin (H)    Essential hypertension    PVD (peripheral vascular disease) (H24)    NSTEMI (non-ST elevated myocardial infarction) (H)    Pulmonary hypertension (H)    Acute systolic congestive heart failure (H)      VTE prophylaxis:  Pneumatic Compression Devices  DIET: Orders Placed This Encounter      Low Saturated Fat Na <2400 mg      Disposition/Barriers to discharge: Monitor renal function, adjusting insulin dose  Code Status: Full Code    Subjective:  Abdulaziz is feeling  about the same today,    PHYSICAL EXAM  Vitals:    08/19/24 0501 08/20/24 0400 08/21/24 0708   Weight: 63.2 kg (139 lb 6.4 oz) 63.5 kg (139 lb 14.4 oz) 62.1 kg (136 lb 14.5 oz)     B/P:129/58 T:98.6 P:82 R:20     Intake/Output Summary (Last 24 hours) at 8/21/2024 1532  Last data filed at 8/21/2024 1400  Gross per 24 hour   Intake 1415 ml   Output 730 ml   Net 685 ml      Body mass index is 20.22 kg/m .    Constitutional: awake, alert, cooperative, no apparent distress, and appears stated age.  Respiratory: Decreased breath sounds at lung bases, rhonchi, no wheeze.  Cardiovascular: Normal apical impulse, regular rate and rhythm, normal S1 and S2, no S3 or S4, and no murmur noted  GI: No scars, normal bowel sounds, soft, non-distended, non-tender, no masses palpated, no hepatosplenomegally  Skin: no bruising or bleeding and normal skin color, texture, turgor  Musculoskeletal: There is no redness, warmth, or swelling of the joints.  Full range of motion noted.  no lower extremity pitting edema present  Neurologic: Awake, alert, oriented to name, place and time.  Cranial nerves II-XII are grossly intact.  Motor is 5 out of 5 bilaterally.   Sensory is intact.    Neuropsychiatric: Appropriate with examiner      PERTINENT LABS/IMAGING:    I have personally reviewed the following data over the past 24 hrs:    12.1 (H)  \   9.6 (L)   / 462 (H)     139 104 39.8 (H) /  74   4.2 20 (L) 1.80 (H) \     Ferritin:  N/A % Retic:  N/A LDH:  N/A       Imaging results reviewed over the past 24 hrs:   No results found for this or any previous visit (from the past 24 hour(s)).    Discussed with patient, family, nephrology, nursing staff and discharge planner    Pieter Aviles MD  [facility]  [phone]

## 2024-08-21 NOTE — PROGRESS NOTES
Care Management Follow Up    Length of Stay (days): 8    Expected Discharge Date: 08/22/2024    Anticipated Discharge Plan:   Transitional care (TCU) is recommended for continued therapy and skilled nursing. However, if no accepting facility, will stay with sister in [facility] while seeking supportive living environment (such as [facility]).     Transportation: Confirmed; family (sister or daughter)    PT Recommendations: Transitional Care Facility, Per plan established by the PT  OT Recommendations:  Transitional Care Facility     Barriers to Discharge: Clearance for discharge;     Prior Living Situation: apartment with other (see comments), friend(s) (roommate)     Patient/Spokesperson Updated: Yes. Who? Patient; daughter Devora  on 8/20/2024    Additional Information:  Patient lives in a mobile home with a roommate and is independent at baseline. However, family is planning to sell the mobile home and will be moving patient to an assisted living or other supportive housing. Transitional care (TCU) is recommended for continued therapy and skilled nursing. Family would prefer somewhere near [facility] where sister Nilda lives.   Note patient was able to ambulate 600 feet with therapy on 8/19/2024.  If patient is not accepted to TCU in the [place] or [facility], family would prefer a discharge to home with patient's sister in [place]. Discussed that patient would need to set up a primary care provider as soon as possible but once that is arranged, they could get home care if needed. Long range goal is to sell the mobile home and move patient to a more supportive environment such as assisted living. They DO NOT want to consider a TCU in the [facility] as they would like to move patient from the [facility] where he has some unsupportive connections.   Devora anticipates patient's sister Nilda will provide transport. Devora lives in [place] but may be able to transport as well if she returns to the [facility] by the time patient is  ready for discharge.     [facility]; Declined, cannot meet needs  [place] Declined, does not accept patient's insurance  [facility] TCUs; Declined at this point, no beds    Carmen Oliver RN

## 2024-08-22 ENCOUNTER — APPOINTMENT (OUTPATIENT)
Dept: PHYSICAL THERAPY | Facility: HOSPITAL | Age: 59
End: 2024-08-22
Attending: STUDENT IN AN ORGANIZED HEALTH CARE EDUCATION/TRAINING PROGRAM
Payer: COMMERCIAL

## 2024-08-22 LAB
ANION GAP SERPL CALCULATED.3IONS-SCNC: 13 MMOL/L (ref 7–15)
BUN SERPL-MCNC: 34 MG/DL (ref 8–23)
CALCIUM SERPL-MCNC: 8.3 MG/DL (ref 8.8–10.4)
CHLORIDE SERPL-SCNC: 107 MMOL/L (ref 98–107)
CREAT SERPL-MCNC: 1.83 MG/DL (ref 0.67–1.17)
CYSTATIN C (ROCHE): 1.8 MG/L (ref 0.6–1)
EGFRCR SERPLBLD CKD-EPI 2021: 41 ML/MIN/1.73M2
ERYTHROCYTE [DISTWIDTH] IN BLOOD BY AUTOMATED COUNT: 12.9 % (ref 10–15)
GFR/BSA.PRED SERPLBLD CYS-BASED-ARV: 35 ML/MIN/1.73M2
GLUCOSE BLDC GLUCOMTR-MCNC: 123 MG/DL (ref 70–99)
GLUCOSE BLDC GLUCOMTR-MCNC: 178 MG/DL (ref 70–99)
GLUCOSE BLDC GLUCOMTR-MCNC: 182 MG/DL (ref 70–99)
GLUCOSE BLDC GLUCOMTR-MCNC: 216 MG/DL (ref 70–99)
GLUCOSE BLDC GLUCOMTR-MCNC: 89 MG/DL (ref 70–99)
GLUCOSE SERPL-MCNC: 121 MG/DL (ref 70–99)
HCO3 SERPL-SCNC: 20 MMOL/L (ref 22–29)
HCT VFR BLD AUTO: 27.3 % (ref 40–53)
HGB BLD-MCNC: 9.2 G/DL (ref 13.3–17.7)
MCH RBC QN AUTO: 28.5 PG (ref 26.5–33)
MCHC RBC AUTO-ENTMCNC: 33.7 G/DL (ref 31.5–36.5)
MCV RBC AUTO: 85 FL (ref 78–100)
PLATELET # BLD AUTO: 462 10E3/UL (ref 150–450)
POTASSIUM SERPL-SCNC: 4.5 MMOL/L (ref 3.4–5.3)
RBC # BLD AUTO: 3.23 10E6/UL (ref 4.4–5.9)
SODIUM SERPL-SCNC: 140 MMOL/L (ref 135–145)
WBC # BLD AUTO: 10.8 10E3/UL (ref 4–11)

## 2024-08-22 PROCEDURE — 250N000013 HC RX MED GY IP 250 OP 250 PS 637: Performed by: INTERNAL MEDICINE

## 2024-08-22 PROCEDURE — 99232 SBSQ HOSP IP/OBS MODERATE 35: CPT | Performed by: INTERNAL MEDICINE

## 2024-08-22 PROCEDURE — 210N000001 HC R&B IMCU HEART CARE

## 2024-08-22 PROCEDURE — 97116 GAIT TRAINING THERAPY: CPT | Mod: GP

## 2024-08-22 PROCEDURE — 36415 COLL VENOUS BLD VENIPUNCTURE: CPT | Performed by: INTERNAL MEDICINE

## 2024-08-22 PROCEDURE — 85027 COMPLETE CBC AUTOMATED: CPT | Performed by: INTERNAL MEDICINE

## 2024-08-22 PROCEDURE — 97110 THERAPEUTIC EXERCISES: CPT | Mod: GP

## 2024-08-22 PROCEDURE — 99233 SBSQ HOSP IP/OBS HIGH 50: CPT | Performed by: INTERNAL MEDICINE

## 2024-08-22 PROCEDURE — 80048 BASIC METABOLIC PNL TOTAL CA: CPT | Performed by: INTERNAL MEDICINE

## 2024-08-22 PROCEDURE — 82610 CYSTATIN C: CPT | Performed by: INTERNAL MEDICINE

## 2024-08-22 RX ADMIN — HYDROXYZINE HYDROCHLORIDE 50 MG: 50 TABLET, FILM COATED ORAL at 04:32

## 2024-08-22 RX ADMIN — ISOSORBIDE DINITRATE 10 MG: 10 TABLET ORAL at 17:09

## 2024-08-22 RX ADMIN — Medication 10 MG: at 21:25

## 2024-08-22 RX ADMIN — CARVEDILOL 3.12 MG: 3.12 TABLET, FILM COATED ORAL at 17:09

## 2024-08-22 RX ADMIN — HYDRALAZINE HYDROCHLORIDE 10 MG: 10 TABLET ORAL at 20:19

## 2024-08-22 RX ADMIN — ISOSORBIDE DINITRATE 10 MG: 10 TABLET ORAL at 11:35

## 2024-08-22 RX ADMIN — ASPIRIN 81 MG: 81 TABLET, COATED ORAL at 08:36

## 2024-08-22 RX ADMIN — HYDROXYZINE HYDROCHLORIDE 50 MG: 50 TABLET, FILM COATED ORAL at 23:56

## 2024-08-22 RX ADMIN — THERA TABS 1 TABLET: TAB at 08:37

## 2024-08-22 RX ADMIN — HYDRALAZINE HYDROCHLORIDE 10 MG: 10 TABLET ORAL at 14:31

## 2024-08-22 RX ADMIN — ATORVASTATIN CALCIUM 40 MG: 40 TABLET, FILM COATED ORAL at 20:18

## 2024-08-22 RX ADMIN — HYDRALAZINE HYDROCHLORIDE 10 MG: 10 TABLET ORAL at 08:35

## 2024-08-22 RX ADMIN — FUROSEMIDE 20 MG: 20 TABLET ORAL at 17:10

## 2024-08-22 RX ADMIN — FUROSEMIDE 20 MG: 20 TABLET ORAL at 08:35

## 2024-08-22 RX ADMIN — CARVEDILOL 3.12 MG: 3.12 TABLET, FILM COATED ORAL at 08:36

## 2024-08-22 RX ADMIN — ISOSORBIDE DINITRATE 10 MG: 10 TABLET ORAL at 08:36

## 2024-08-22 RX ADMIN — HYDROXYZINE HYDROCHLORIDE 50 MG: 50 TABLET, FILM COATED ORAL at 14:31

## 2024-08-22 RX ADMIN — SPIRONOLACTONE 12.5 MG: 25 TABLET, FILM COATED ORAL at 08:35

## 2024-08-22 RX ADMIN — THIAMINE HCL TAB 100 MG 100 MG: 100 TAB at 08:37

## 2024-08-22 RX ADMIN — CLOPIDOGREL BISULFATE 75 MG: 75 TABLET ORAL at 08:36

## 2024-08-22 ASSESSMENT — ACTIVITIES OF DAILY LIVING (ADL)
ADLS_ACUITY_SCORE: 37
ADLS_ACUITY_SCORE: 37
ADLS_ACUITY_SCORE: 32
ADLS_ACUITY_SCORE: 33
ADLS_ACUITY_SCORE: 33
ADLS_ACUITY_SCORE: 37
ADLS_ACUITY_SCORE: 33
ADLS_ACUITY_SCORE: 37
ADLS_ACUITY_SCORE: 32
ADLS_ACUITY_SCORE: 37
ADLS_ACUITY_SCORE: 33
ADLS_ACUITY_SCORE: 37
ADLS_ACUITY_SCORE: 33
ADLS_ACUITY_SCORE: 37
ADLS_ACUITY_SCORE: 32
ADLS_ACUITY_SCORE: 37
ADLS_ACUITY_SCORE: 37

## 2024-08-22 NOTE — PROGRESS NOTES
Care Management Follow Up    Length of Stay (days): 9    Expected Discharge Date: 08/23/2024    Anticipated Discharge Plan:  Move in with his sister in [place]. She is willing to assist him with ADLs and IADLs until they are able to sell his home and he moves into an care home.     Transportation: Confirmed Family/friend - sister Nilda    PT Recommendations: Transitional Care Facility  OT Recommendations:  Transitional Care Facility     Barriers to Discharge: medical stability    Prior Living Situation: apartment with other (see comments), friend(s) (roommate)     Patient/Spokesperson Updated: Yes. Who? Patient and sister Nilda    Additional Information:  CM went to pt room to discuss discharge planning and pt told CM that he is going to stay with his sister Nilda. He then called Nilda and put her on speaker so we could all talk. We discussed that I have been working on finding a TCU in [place] and have not had any luck. Nilda told CM that the plan is for him to come and stay with her until they can sell his home and move him into an [facility]. She is willing to help with his ADLs and IADLs. She declines the need for home care set up and is just requesting a walker and glucometer at discharge. She is aware that his discharge maybe tomorrow and she will be able to transport him at time of discharge. CM then contacted PT to see if they have ordered a walker and they told CM they will order one tomorrow. CM also messaged provider to see if we could order a glucometer for at discharge.     RNCM to follow for medical progression, recommendations, and final discharge plan.      Lianet Ahuja RN

## 2024-08-22 NOTE — PLAN OF CARE
Patient is A/O, can have intermittent forgetfulness but is redirectable.  Patient has been VSS.  Fischer removed in 1000 hour and patient is on a void trial.  Patient restarted on PO diuretics.  Nutrition visited patient and his sister to review good eating practice.  Anticipated discharge to sister's home in [facility].    Patient transferred from room 315 to room 313.  Patient reported anxiety and difficulty breathing at 1430.  PRN atarax and scheduled hydralazine given.  Vitals are within stable range.    Tele: NSR    Problem: Gas Exchange Impaired  Goal: Optimal Gas Exchange  Outcome: Progressing     Problem: Fall Injury Risk  Goal: Absence of Fall and Fall-Related Injury  Outcome: Progressing  Intervention: Identify and Manage Contributors  Recent Flowsheet Documentation  Taken 8/22/2024 1134 by Isidro Vargas, RN  Medication Review/Management: medications reviewed  Taken 8/22/2024 0852 by Isidro Vargas, RN  Medication Review/Management: medications reviewed  Intervention: Promote Injury-Free Environment  Recent Flowsheet Documentation  Taken 8/22/2024 1134 by Isidro Vargas, RN  Safety Promotion/Fall Prevention:   activity supervised   assistive device/personal items within reach   clutter free environment maintained   lighting adjusted   mobility aid in reach   nonskid shoes/slippers when out of bed   patient and family education   room organization consistent   safety round/check completed   supervised activity   treat reversible contributory factors   treat underlying cause  Taken 8/22/2024 0852 by Isidro Vargas, RN  Safety Promotion/Fall Prevention:   activity supervised   assistive device/personal items within reach   clutter free environment maintained   lighting adjusted   mobility aid in reach   nonskid shoes/slippers when out of bed   patient and family education   room organization consistent   safety round/check completed   supervised activity   treat reversible contributory factors    treat underlying cause     Problem: Heart Failure  Goal: Optimal Coping  Outcome: Progressing  Intervention: Support Psychosocial Response  Recent Flowsheet Documentation  Taken 8/22/2024 1134 by Isidro Vargas RN  Supportive Measures:   active listening utilized   verbalization of feelings encouraged  Taken 8/22/2024 0852 by Isidro Vargas RN  Supportive Measures:   active listening utilized   verbalization of feelings encouraged  Goal: Optimal Cardiac Output  Outcome: Progressing  Intervention: Optimize Cardiac Output  Recent Flowsheet Documentation  Taken 8/22/2024 1134 by Isidro Vargas RN  Environmental Support: calm environment promoted  Taken 8/22/2024 0852 by Isidro Vargas RN  Environmental Support: calm environment promoted  Goal: Stable Heart Rate and Rhythm  Outcome: Progressing  Goal: Optimal Functional Ability  Outcome: Progressing  Intervention: Optimize Functional Ability  Recent Flowsheet Documentation  Taken 8/22/2024 1127 by Isidro Vargas RN  Activity Management: activity adjusted per tolerance  Taken 8/22/2024 0851 by Isidro Vargas RN  Activity Management: activity adjusted per tolerance  Goal: Fluid and Electrolyte Balance  Outcome: Progressing  Goal: Improved Oral Intake  Outcome: Progressing  Goal: Effective Oxygenation and Ventilation  Outcome: Progressing  Intervention: Promote Airway Secretion Clearance  Recent Flowsheet Documentation  Taken 8/22/2024 1134 by Isidro Vargas RN  Cough And Deep Breathing: done independently per patient  Taken 8/22/2024 1127 by Isidro Vargas, RN  Activity Management: activity adjusted per tolerance  Taken 8/22/2024 0852 by Isidro Vargas RN  Cough And Deep Breathing: done independently per patient  Taken 8/22/2024 0851 by Isidro Vargas, RN  Activity Management: activity adjusted per tolerance  Goal: Effective Breathing Pattern During Sleep  Outcome: Progressing  Intervention: Monitor and Manage Obstructive Sleep Apnea  Recent  Flowsheet Documentation  Taken 8/22/2024 1134 by Isidro Vargas, RN  Medication Review/Management: medications reviewed  Taken 8/22/2024 0852 by Isidro Vargas, RN  Medication Review/Management: medications reviewed     Problem: Cardiac Catheterization (Diagnostic/Interventional)  Goal: Absence of Bleeding  Outcome: Progressing  Goal: Absence of Contrast-Induced Injury  Outcome: Progressing  Goal: Stable Heart Rate and Rhythm  Outcome: Progressing  Goal: Absence of Embolism Signs and Symptoms  Outcome: Progressing  Intervention: Prevent or Manage Embolism  Recent Flowsheet Documentation  Taken 8/22/2024 1134 by Isidro Vargas, RN  VTE Prevention/Management: SCDs off (sequential compression devices)  Taken 8/22/2024 0852 by Isidro Vargas RN  VTE Prevention/Management: SCDs off (sequential compression devices)  Goal: Anesthesia/Sedation Recovery  Outcome: Progressing  Intervention: Optimize Anesthesia Recovery  Recent Flowsheet Documentation  Taken 8/22/2024 1134 by Isidro Vargas, RN  Safety Promotion/Fall Prevention:   activity supervised   assistive device/personal items within reach   clutter free environment maintained   lighting adjusted   mobility aid in reach   nonskid shoes/slippers when out of bed   patient and family education   room organization consistent   safety round/check completed   supervised activity   treat reversible contributory factors   treat underlying cause  Reorientation Measures:   calendar in view   clock in view  Taken 8/22/2024 0852 by Isidro Vargas, RN  Safety Promotion/Fall Prevention:   activity supervised   assistive device/personal items within reach   clutter free environment maintained   lighting adjusted   mobility aid in reach   nonskid shoes/slippers when out of bed   patient and family education   room organization consistent   safety round/check completed   supervised activity   treat reversible contributory factors   treat underlying cause  Reorientation  Measures:   calendar in view   clock in view  Goal: Optimal Pain Control and Function  Outcome: Progressing  Goal: Absence of Vascular Access Complication  Outcome: Progressing  Intervention: Prevent and Manage Access Complications  Recent Flowsheet Documentation  Taken 8/22/2024 1127 by Isidro Vargas, RN  Activity Management: activity adjusted per tolerance  Taken 8/22/2024 0851 by Isidro Vargas, RN  Activity Management: activity adjusted per tolerance    Patrick Vargas RN

## 2024-08-22 NOTE — PLAN OF CARE
Problem: Heart Failure  Goal: Optimal Coping  Intervention: Support Psychosocial Response  Recent Flowsheet Documentation  Taken 8/21/2024 2030 by Cher Hollis RN  Supportive Measures:   active listening utilized   goal-setting facilitated  Taken 8/21/2024 1600 by Cher Hollis RN  Supportive Measures:   active listening utilized   goal-setting facilitated  Goal: Optimal Cardiac Output  Intervention: Optimize Cardiac Output  Recent Flowsheet Documentation  Taken 8/21/2024 2030 by Cher Hollis RN  Environmental Support:   calm environment promoted   distractions minimized  Taken 8/21/2024 1600 by Cher Hollis RN  Environmental Support:   calm environment promoted   distractions minimized  Goal: Optimal Functional Ability  Intervention: Optimize Functional Ability  Recent Flowsheet Documentation  Taken 8/21/2024 2030 by Cher Hollis RN  Activity Management: activity adjusted per tolerance  Taken 8/21/2024 1600 by Cher Hollis RN  Activity Management: activity adjusted per tolerance  Goal: Effective Oxygenation and Ventilation  Intervention: Promote Airway Secretion Clearance  Recent Flowsheet Documentation  Taken 8/21/2024 2030 by Cher Hollis RN  Cough And Deep Breathing: done independently per patient  Activity Management: activity adjusted per tolerance  Taken 8/21/2024 1600 by Cher Hollis RN  Cough And Deep Breathing: done independently per patient  Activity Management: activity adjusted per tolerance  Intervention: Optimize Oxygenation and Ventilation  Recent Flowsheet Documentation  Taken 8/21/2024 2030 by Cher Hollis RN  Head of Bed (HOB) Positioning: HOB at 20 degrees  Taken 8/21/2024 1750 by Cher Hollis RN  Head of Bed (HOB) Positioning: HOB at 30 degrees  Taken 8/21/2024 1600 by Cher Hollis RN  Head of Bed (HOB) Positioning: HOB at 20 degrees  Goal: Effective Breathing Pattern During  Sleep  Intervention: Monitor and Manage Obstructive Sleep Apnea  Recent Flowsheet Documentation  Taken 8/21/2024 2030 by Cher Hollis, RN  Medication Review/Management: medications reviewed  Taken 8/21/2024 1600 by Cher Hollis RN  Medication Review/Management: medications reviewed   Goal Outcome Evaluation:       Pt is alert and oriented x 4, forgetful at times. Lung sounds clear, diminished, on RA. Has occasional productive cough.SOB with activity was noted, and when he was in bed moving around. Supplemental O2 at 2 LPM per nasal cannula applied for comfort. HR in the 70's to 80's, NSR with inverted TW. Pt has difficulty swallowing pills, meds were crushed and given with apple sauce or pudding. He does not have appetite. Rowland in place, rowland cares and HS cares done.

## 2024-08-22 NOTE — PROGRESS NOTES
[facility]    PROGRESS NOTE - Hospitalist Service    Assessment and Plan  59 year old male admitted on 8/13/2024. He has h/o HTN, DM-II who was admitted to [facility] for acute CHF exacerbation. Course was complicated by CHRISTY. He was started on diuresis and was transferred to [facility] cardiac cath. Hospitalist service was consulted for medical co-management.      Multivessel coronary disease  NSTEMI  -Started initially on heparin gtt, Cardiac cath on 08/13: MVCAD  -  ASA, Plavix, Lipitor, coreg.   - Repeat PET showed stunned but viable left ventricular myocardium.  - CTS consult appreciated: High risk for surgical interventions.  - 08/18: Cardiology consult appreciated- PCI with LV support possibly on 08/19.  - PCI initially planned for 08/19-currently on hold.  -Cardiology team planning to discuss complex intervention and plan moving forward.     Acute decompensated HFrEF  Mild pulmonary hypertension  -- ProBNP: 89914  -- Strict I/O, daily weight, fluid restriction  -- 08/17: Gave lasis 20 mg po on 08/16 after discussing with nephrology. Lasix held due to worsening of renal function. Defer diuretics to nephrology and cards.  -- 08/18: No rales on exam. Continue to monitor off of lasix.  --Lasix currently on hold due to planned angiogram/PCI.  Defer to cardiology and nephrology.     Acute hypoxic respiratory failure-resolved  -- Likely 2/2 CHF +/- aspiration pneumonia     ?Aspiration pneumonia  -- CXR on 08/11: multifocal patchy opacities present involving b/l lungs including lung bases, Right upper hilar region, and left perihilar region likely representing infecious process. Small b/l pleural effusions.  -- Completed 7 days ceftriaxone.  Last day of doxycycline 7-day course 8/20   -- H/o MRSA OM. Negative MRSA swab on 08/14.  -Discontinue contact precautions     Acute metabolic encephalopathy- resolved.  -- Multifactorial: severe CHF, NSTEMI, pneumonia, CHRISTY  -- VBG wnl. TSH 1.32  -- Delirium  precaution  -- UDS + for amphetamine, negative for opiates.   -- CT-head: no acute changes.  -- Gabapentin held     CHRISTY on CKD-II  -- Likely 2/2 low perfusion from CHF  --Baseline creatinine of 1.1.  Creatinine improving and at 1.79 on 8/20  -- Avoid Nephrotoxins. Holding ACEI, Metformin, Jardiance  -- Strict I/O, US-renal: Echogenic kidneys. No hydronephrosis. Negative renal artery duplex.  -- 08/17: Gave lasis 20 mg po on 08/16 after discussing with nephrology. Lasix held this morning due to worsening of renal function.   -Nephrology agreeable to holding Lasix at this time  -- Follow up further nephro recs       Acute urinary retention  B/l moderate hydronephrosis on ULS-resolved  -- rowland placed 8/14.  -- Urology consult appreciated: TOV prior discharge.  -- Repeat US-renal: No hydronephrosis  -Discontinue Rowland and voiding trial today     DM-II  -- A1c: 10.1%  -- Accu-checks, sliding scale, [id number] units, hypoglycemia protocol.   -Patient has hypoglycemia this morning secondary to poor oral intake.    -Decrease insulin dose to 5 units     Essential hypertension  -- Monitor vital signs per protocol  -- holding Norvasc. On coreg     Normocytic Anemia, Chronic   AOCD  -- Iron panel reviewed. Monitor cbc  -- No e/o bleeding  -Continue to monitor CBC    40 MINUTES SPENT BY ME on the date of service doing chart review, history, exam, documentation & further activities per the note    Active Problems:    Type 2 diabetes mellitus with hyperglycemia, with long-term current use of insulin (H)    Essential hypertension    PVD (peripheral vascular disease) (H24)    NSTEMI (non-ST elevated myocardial infarction) (H)    Pulmonary hypertension (H)    Acute systolic congestive heart failure (H)      VTE prophylaxis:  Pneumatic Compression Devices  DIET: Orders Placed This Encounter      Low Saturated Fat Na <2400 mg      Disposition/Barriers to discharge: TCU placement, monitor renal function, voiding trial and cardiology  clearance  Code Status: Full Code    Subjective:  Abdulaziz is feeling about the same today, no acute significant events overnight, wants to go home with his sister if no TCU is available    PHYSICAL EXAM  Vitals:    08/20/24 0400 08/21/24 0708 08/22/24 0450   Weight: 63.5 kg (139 lb 14.4 oz) 62.1 kg (136 lb 14.5 oz) 63.7 kg (140 lb 6.4 oz)     B/P:125/66 T:98.4 P:83 R:19     Intake/Output Summary (Last 24 hours) at 8/22/2024 1618  Last data filed at 8/22/2024 1247  Gross per 24 hour   Intake 1080 ml   Output 675 ml   Net 405 ml      Body mass index is 20.73 kg/m .    Constitutional: awake, alert, cooperative, no apparent distress, and appears stated age  Respiratory: No increased work of breathing, good air exchange, clear to auscultation bilaterally, no crackles or wheezing  Cardiovascular: Normal apical impulse, regular rate and rhythm, normal S1 and S2, no S3 or S4, and no murmur noted  GI: No scars, normal bowel sounds, soft, non-distended, non-tender, no masses palpated, no hepatosplenomegally  Skin: no bruising or bleeding and normal skin color, texture, turgor  Musculoskeletal: There is no redness, warmth, or swelling of the joints.  Full range of motion noted.  no lower extremity pitting edema present  Neurologic: Awake, alert, oriented to name, place and time.  Cranial nerves II-XII are grossly intact.  Motor is 5 out of 5 bilaterally.   Sensory is intact.    Neuropsychiatric: Appropriate with examiner      PERTINENT LABS/IMAGING:    I have personally reviewed the following data over the past 24 hrs:    10.8  \   9.2 (L)   / 462 (H)     140 107 34.0 (H) /  178 (H)   4.5 20 (L) 1.83 (H) \       Imaging results reviewed over the past 24 hrs:   No results found for this or any previous visit (from the past 24 hour(s)).    Discussed with patient, family, cardiology, nursing staff and discharge planner    Pieter Aviles MD  [facility] Medicine Service  [phone]

## 2024-08-22 NOTE — PROGRESS NOTES
NUTRITION EDUCATION      REASON FOR ASSESSMENT:  Consult: Dietitian to see for Heart Healthy Diet education     NUTRITION HISTORY:  Information obtained from sister    Met with pt's sister outside of pt's room. Plan is for pt to live with her until he is able to sell his trailer home and move into assisted living. Discussed heart healthy diet and carb counting. She had looked over the written materials left in pt's room yesterday. She also went grocery shopping and tried to buy heart healthy items. Does not think pt was eating regular meals (per discussion yesterday with pt was eating candy bars for meals), and understands getting him to just eat meals is a step in the right direction. Sister did not feel it would be beneficial to discuss diet with pt. Did say pt drank the Glucerna supplement he received earlier today.    CURRENT DIET:  Low saturated fat Na <2400 mg 1800 mL FR    NUTRITION DIAGNOSIS:  Food- and nutrition-related knowledge deficit R/t heart disease as evidenced by the need for a heart healthy diet.    INTERVENTIONS:    Nutrition Prescription: Low saturated fat Na <2400 mg 1800 mL FR    Implementation (done with pt's sister):      *  Nutrition Education (Content):   A)  Provided handout heart healthy consistent carb diet and foods that contain carbohydrates   B)  Discussed foods allowed and foods to avoid      *  Nutrition Education (Application):      *  Diet Education - refer to Education Flowsheet    Goals:      *  Patient's sister will verbalize understanding of diet       *  All of the above goals met during the education session    Follow Up/Monitoring:      *  Recommended Out-Patient Nutrition Referral, if further diet instructions are needed

## 2024-08-22 NOTE — PLAN OF CARE
Problem: Adult Inpatient Plan of Care  Goal: Absence of Hospital-Acquired Illness or Injury  Intervention: Identify and Manage Fall Risk  Recent Flowsheet Documentation  Taken 8/22/2024 1603 by Anya Felton RN  Safety Promotion/Fall Prevention:   activity supervised   lighting adjusted   mobility aid in reach   nonskid shoes/slippers when out of bed   patient and family education   room organization consistent   supervised activity  Intervention: Prevent Infection  Recent Flowsheet Documentation  Taken 8/22/2024 1603 by Anya Felton RN  Infection Prevention: cohorting utilized   Goal Outcome Evaluation:             Up ambulated in cohn, walked to restroom, had a bowel movement and voided, then went and sat in chair for dinner

## 2024-08-22 NOTE — PLAN OF CARE
Heart Failure Care Map  GOALS TO BE MET BEFORE DISCHARGE:    1. Decrease congestion and/or edema with diuretic therapy to achieve near optimal volume status.     Dyspnea improved: Yes, satisfactory for discharge.   Edema improved: Yes, satisfactory for discharge.        Last 24 hour I/O:   Intake/Output Summary (Last 24 hours) at 8/22/2024 0522  Last data filed at 8/22/2024 0410  Gross per 24 hour   Intake 1010 ml   Output 800 ml   Net 210 ml           Net I/O and Weights since admission:   07/23 0700 - 08/22 0659  In: 5802.46 [P.O.:3645; I.V.:1765.46]  Out: 19592 [Urine:73750]  Net: -4202.54     Vitals:    08/14/24 0501 08/15/24 0616 08/16/24 0404 08/17/24 0332   Weight: 62.5 kg (137 lb 12.6 oz) 62.6 kg (138 lb 0.1 oz) 62.5 kg (137 lb 12.6 oz) 62.6 kg (138 lb)    08/18/24 0431 08/19/24 0501 08/20/24 0400 08/21/24 0708   Weight: 62.9 kg (138 lb 11.2 oz) 63.2 kg (139 lb 6.4 oz) 63.5 kg (139 lb 14.4 oz) 62.1 kg (136 lb 14.5 oz)    08/22/24 0450   Weight: 63.7 kg (140 lb 6.4 oz)       2.  O2 sats > 90% on room air, or at prior home O2 therapy level.      Able to wean O2 this shift to keep sats above 90%?: Yes, satisfactory for discharge.   Does patient use Home O2? No          Current oxygenation status:   SpO2: 91 %     O2 Device: None (Room air), Oxygen Delivery: 2 LPM    3.  Tolerates ambulation and mobility near baseline.     Ambulation: No, further care required to meet this goal. Please explain still with generalized weakness, but was able to ambulate with walker and 1 assist to nursing station and back to room.    Times patient ambulated with staff this shift: 2      Pt repeatedly asks about discharge planning; educated regarding discharge planning and finding a safe place to go, monitoring blood sugar levels and adjusting insulin and monitoring kidney function level. Pt is noted to be confused intermittently. Hydroxyzine 50mg PO given to help with anxiety with moderate effect.     Please review the Heart  Failure Care Map for additional HF goal outcomes.    Seymour Reece RN  8/22/2024

## 2024-08-22 NOTE — CARE PLAN
Heart Failure Care Map  GOALS TO BE MET BEFORE DISCHARGE:    1. Decrease congestion and/or edema with diuretic therapy to achieve near optimal volume status.     Dyspnea improved: No, further care required to meet this goal. Please explain still has SOB with activity   Edema improved: Yes, satisfactory for discharge.        Last 24 hour I/O:   Intake/Output Summary (Last 24 hours) at 8/21/2024 2122  Last data filed at 8/21/2024 2103  Gross per 24 hour   Intake 1263 ml   Output 980 ml   Net 283 ml           Net I/O and Weights since admission:   07/22 2300 - 08/21 2259  In: 5572.46 [P.O.:3415; I.V.:1765.46]  Out: 9755 [Urine:9755]  Net: -4182.54     Vitals:    08/14/24 0501 08/15/24 0616 08/16/24 0404 08/17/24 0332   Weight: 62.5 kg (137 lb 12.6 oz) 62.6 kg (138 lb 0.1 oz) 62.5 kg (137 lb 12.6 oz) 62.6 kg (138 lb)    08/18/24 0431 08/19/24 0501 08/20/24 0400 08/21/24 0708   Weight: 62.9 kg (138 lb 11.2 oz) 63.2 kg (139 lb 6.4 oz) 63.5 kg (139 lb 14.4 oz) 62.1 kg (136 lb 14.5 oz)       2.  O2 sats > 90% on room air, or at prior home O2 therapy level.      Able to wean O2 this shift to keep sats above 90%?: No, further care required to meet this goal. Please explain SOB with activity noted   Does patient use Home O2? No          Current oxygenation status:   SpO2: 94 %     O2 Device: None (Room air), Oxygen Delivery: 2 LPM    3.  Tolerates ambulation and mobility near baseline.     Ambulation: No, further care required to meet this goal. Please explain pt feels tired and wants to take a nap in between  cares.   Times patient ambulated with staff this shift: 0    Please review the Heart Failure Care Map for additional HF goal outcomes.    Cher Hollis RN  8/21/2024

## 2024-08-22 NOTE — PROGRESS NOTES
HEART CARE NOTE          Assessment/Recommendations   1. Severe HFrEF c/b severe ADHF  Assessment / Plan  Resume oral diuretic regimen; continue to monitor renal function/repeat BMP, UOP and hemodynamics closely    Patient is high risk for adverse cardiac events 2/2 severe systolic dysfunction, renal dysfunction, CAD and elevated NTproBNP, DM2  GDMT as detailed below     Current Pharmacotherapy AHA Guideline-Directed Medical Therapy   Lisinopril - on hold given renal dysfuntion Lisinopril 20 mg twice daily   Carvedilol 3.125 mg twice daily Carvedilol 25 mg twice daily   Spironolactone 12.5 mg Spironolactone 25 mg once daily   Hydralazine 10 mg three times daily Hydralazine 100 mg three times daily   Isosorbide dinitrate 10 mg three times daily Isosorbide dinitrate 40 mg three times daily   SGLT2 inhibitor:Dapagliflozin/Empagliflozin - not started Dapagliflozin or Empagliflozin 10 mg daily      2. Severe multivessel CAD c/b NSTEMI  Assessment / Plan  PET significant for viability; Not considered CABG candidate by CTS - s/p impella supported high risk PCI to RCA - possible staged PCI in the next 1-2 months   Denies chest pain or anginal equivalents   Continue ASA, atorvastatin, carvedilol, clopidogrel     3. CHRISTY on CKD  Assessment / Plan  CRS; diuresis on hold in anticipation of coronary angiogram above; continue to monitor UOP and renal function closely     4. DM2  Assessment / Plan  Management per primary team - currently on ISS     5. HTN  Assessment / Plan  Titrate oral afterload reduction as tolerated    Plan of care discussed on August 22, 2024 with patient and sister at bedside, and primary team overseeing patient's care      History of Present Illness/Subjective    Mr. Abdulaziz Rausch is a 59 year old male with a PMHx significant for (per Epic notation) diabetes, peripheral vascular disease related to diabetes who presents to [facility] with 3-day history of progressive dyspnea on exertion, lower  extremity edema and orthopnea symptoms found to have acute decompensated congestive heart failure and symptoms consistent with ACS     Today, Mr. Rausch denies acute cardiac events or complaints; Management plan as detailed above     ECG: personally reviewed; normal sinus rhythm, nonspecific ST and T waves changes.     ECHO (personnaly Reviewed on 8/19/24):   1.Left ventricular function is decreased. The ejection fraction is 15-20%  (severely reduced).  2.The left ventricle is mildly dilated.  3.Normal right ventricle size and systolic function.  4.The left atrium is moderate to severely dilated.  5.No hemodynamically significant valvular abnormalities on 2D or color flow  imaging.  6.Right ventricular systolic pressure is elevated, consistent with mild  pulmonary hypertension.  There is no comparison study available.    Telemetry: personally reviewed August 22, 2024; notable for sinus rhythm     Lab results: personally reviewed August 22, 2024; notable for stable renal function    Medical history and pertinent documents reviewed in Care Everywhere please where applicable see details above        Physical Examination Review of Systems   /61 (BP Location: Left arm)   Pulse 81   Temp 98  F (36.7  C) (Oral)   Resp 18   Wt 63.7 kg (140 lb 6.4 oz)   SpO2 91%   BMI 20.73 kg/m    Body mass index is 20.73 kg/m .  Wt Readings from Last 3 Encounters:   08/22/24 63.7 kg (140 lb 6.4 oz)   08/12/24 67.8 kg (149 lb 7.6 oz)   10/05/23 62.1 kg (137 lb)     General Appearance:   no distress, normal body habitus   ENT/Mouth: membranes moist, no oral lesions or bleeding gums.      EYES:  no scleral icterus, normal conjunctivae   Neck: no carotid bruits or thyromegaly   Chest/Lungs:   lungs are clear to auscultation, no rales or wheezing, equal chest wall expansion    Cardiovascular:   Regular. Normal first and second heart sounds with no murmurs, rubs, or gallops; the carotid, radial and posterior tibial pulses are  intact, no JVD and mild LE edema bilaterally    Abdomen:  no organomegaly, masses, bruits, or tenderness; bowel sounds are present   Extremities: no cyanosis or clubbing   Skin: no xanthelasma, warm.    Neurologic: NAD     Psychiatric: alert and oriented x3, calm     A complete 10 systems ROS was reviewed  And is negative except what is listed in the HPI.          Medical History  Surgical History Family History Social History   Past Medical History:   Diagnosis Date    Anemia     Depression     Diabetes mellitus, type 2 (H)     Diabetic foot infection (H) 7/1/2023    Diabetic foot ulcer (H)     History of MRSA infection 7/1/2023 Jan 2023 RIGHT foot wound    Hypertension     Non-healing ulcer of left foot (H) 7/1/2023    Osteomyelitis (H)     Past Surgical History:   Procedure Laterality Date    AMPUTATE FOOT Left 7/3/2023    Procedure: partial left fifth ray amputation, skin flap creation and closre, left foot;  Surgeon: Alejandro Mccoy DPM;  Location: [facility] OR    AMPUTATE TOE(S) Right 2/17/2023    Procedure: AMPUTATION, digits two and three right foot;  Surgeon: Ulises Jimenez DPM;  Location: [facility] OR    CV CORONARY ANGIOGRAM N/A 8/13/2024    Procedure: Coronary Angiogram;  Surgeon: Brandon Alejandro MD;  Location: [facility] CATH LAB CV    CV CORONARY ANGIOGRAM N/A 8/20/2024    Procedure: Coronary Angiogram;  Surgeon: Brandon Alejandro MD;  Location: [facility] LAB CV    CV INTRA AORTIC BALLOON N/A 8/20/2024    Procedure: Intra aortic Balloon Pump Insertion;  Surgeon: Brandon Alejandro MD;  Location: [facility] LAB CV    CV LEFT HEART CATH N/A 8/13/2024    Procedure: Left Heart Catheterization;  Surgeon: Brandon Alejandro MD;  Location: [facility] CATH LAB CV    CV PCI N/A 8/20/2024    Procedure: Percutaneous Coronary Intervention;  Surgeon: Brandon Alejandro MD;  Location: [facility] LAB     CV RIGHT HEART CATH MEASUREMENTS RECORDED N/A 8/13/2024    Procedure: Right Heart  Catheterization;  Surgeon: Brandon Alejandro MD;  Location: [facility] CATH LAB CV    INCISION AND DRAINAGE FOOT, COMBINED Left 5/23/2023    Procedure: Incision and drainage of abscess left foot with;  Surgeon: Jf Huerta DPM;  Location: [facility] OR    IRRIGATION AND DEBRIDEMENT FOOT, COMBINED Left 5/23/2023    Procedure: debridement and irrigation of wound left foot;  Surgeon: Jf Huerta DPM;  Location: [facility] OR    IRRIGATION AND DEBRIDEMENT TOE, COMBINED Right 1/20/2023    Procedure: IRRIGATION AND DEBRIDEMENT digits 2 and 3 right foot;  Surgeon: Jf Huerta DPM;  Location: [facility] OR    PICC SINGLE LUMEN PLACEMENT  5/25/2023         no family history of premature coronary artery disease Social History     Socioeconomic History    Marital status:      Spouse name: Not on file    Number of children: Not on file    Years of education: Not on file    Highest education level: Not on file   Occupational History    Not on file   Tobacco Use    Smoking status: Never    Smokeless tobacco: Never   Vaping Use    Vaping status: Never Used   Substance and Sexual Activity    Alcohol use: Never    Drug use: Yes     Types: Methamphetamines     Comment: chronic user    Sexual activity: Not on file   Other Topics Concern    Not on file   Social History Narrative    Not on file     Social Determinants of Health     Financial Resource Strain: High Risk (10/5/2023)    Financial Resource Strain     Within the past 12 months, have you or your family members you live with been unable to get utilities (heat, electricity) when it was really needed?: Yes   Food Insecurity: High Risk (10/5/2023)    Food Insecurity     Within the past 12 months, did you worry that your food would run out before you got money to buy more?: Yes     Within the past 12 months, did the food you bought just not last and you didn t have money to get more?: Yes   Transportation Needs: Low Risk  (10/5/2023)    Transportation  "Needs     Within the past 12 months, has lack of transportation kept you from medical appointments, getting your medicines, non-medical meetings or appointments, work, or from getting things that you need?: No   Physical Activity: Not on file   Stress: Not on file   Social Connections: Not on file   Interpersonal Safety: Low Risk  (10/5/2023)    Interpersonal Safety     Do you feel physically and emotionally safe where you currently live?: Yes     Within the past 12 months, have you been hit, slapped, kicked or otherwise physically hurt by someone?: No     Within the past 12 months, have you been humiliated or emotionally abused in other ways by your partner or ex-partner?: No   Housing Stability: Low Risk  (10/5/2023)    Housing Stability     Do you have housing? : Yes     Are you worried about losing your housing?: No           Lab Results    Chemistry/lipid CBC Cardiac Enzymes/BNP/TSH/INR   Lab Results   Component Value Date    CHOL 145 01/18/2023    HDL 37 (L) 01/18/2023    TRIG 226 (H) 01/18/2023    BUN 34.0 (H) 08/22/2024     08/22/2024    CO2 20 (L) 08/22/2024    Lab Results   Component Value Date    WBC 10.8 08/22/2024    HGB 9.2 (L) 08/22/2024    HCT 27.3 (L) 08/22/2024    MCV 85 08/22/2024     (H) 08/22/2024    Lab Results   Component Value Date    TSH 1.32 08/13/2024     No results found for: \"CKTOTAL\", \"CKMB\", \"TROPONINI\"       Weight:    Wt Readings from Last 3 Encounters:   08/22/24 63.7 kg (140 lb 6.4 oz)   08/12/24 67.8 kg (149 lb 7.6 oz)   10/05/23 62.1 kg (137 lb)       Allergies  No Known Allergies      Surgical History  Past Surgical History:   Procedure Laterality Date    AMPUTATE FOOT Left 7/3/2023    Procedure: partial left fifth ray amputation, skin flap creation and closre, left foot;  Surgeon: Alejandro Mccoy DPM;  Location: [facility] OR    AMPUTATE TOE(S) Right 2/17/2023    Procedure: AMPUTATION, digits two and three right foot;  Surgeon: Ulises Jimenez DPM;  Location: [facility] OR    CV CORONARY ANGIOGRAM N/A 8/13/2024    Procedure: Coronary Angiogram;  Surgeon: Brandon Alejandro MD;  Location: [facility] CATH LAB CV    CV CORONARY ANGIOGRAM N/A 8/20/2024    Procedure: Coronary Angiogram;  Surgeon: Brandon Alejandro MD;  Location: [facility] LAB CV    CV INTRA AORTIC BALLOON N/A 8/20/2024    Procedure: Intra aortic Balloon Pump Insertion;  Surgeon: Brandon Alejandro MD;  Location: [facility] CATH LAB CV    CV LEFT HEART CATH N/A 8/13/2024    Procedure: Left Heart Catheterization;  Surgeon: Brandon Alejandro MD;  Location: [facility] CATH LAB CV    CV PCI N/A 8/20/2024    Procedure: Percutaneous Coronary Intervention;  Surgeon: Brandon Alejandro MD;  Location: [facility] LAB CV    CV RIGHT HEART CATH MEASUREMENTS RECORDED N/A 8/13/2024    Procedure: Right Heart Catheterization;  Surgeon: Brandon Alejandro MD;  Location: [facility] CV    INCISION AND DRAINAGE FOOT, COMBINED Left 5/23/2023    Procedure: Incision and drainage of abscess left foot with;  Surgeon: Jf Huerta DPM;  Location: [facility] OR    IRRIGATION AND DEBRIDEMENT FOOT, COMBINED Left 5/23/2023    Procedure: debridement and irrigation of wound left foot;  Surgeon: Jf Huerta DPM;  Location: [facility] OR    IRRIGATION AND DEBRIDEMENT TOE, COMBINED Right 1/20/2023    Procedure: IRRIGATION AND DEBRIDEMENT digits 2 and 3 right foot;  Surgeon: Jf Huerta DPM;  Location: [facility] OR    PICC SINGLE LUMEN PLACEMENT  5/25/2023            Social History  Tobacco:   History   Smoking Status    Never   Smokeless Tobacco    Never    Alcohol:   Social History    Substance and Sexual Activity      Alcohol use: Never   Illicit Drugs:   History   Drug Use    Types: Methamphetamines     Comment: chronic user       Family History  Family History   Problem Relation Age of Onset    Diabetes Father           Geovanny Griffin MD on 8/22/2024      cc: Daryn Pittman

## 2024-08-22 NOTE — PROGRESS NOTES
RENAL PROGRESS NOTE    ASSESSMENT & PLAN:   59 years old male with baseline creatinine 1.1 with EGFR 70 to 80% with previous CHRISTY with creatinine up to 1.5 admitted at [facility][1] and admission creatinine 1.5 for shortness of breath and found to have ischemic cardiomyopathy with EF of 15 to 20% and sent to [facility] for further investigation.  Nephrology is following for CHRISTY.    CHRISTY with underlying CKD 2: CHRISTY is likely multifactorial secondary to cardiorenal physiology/hemodynamic.  Also ATN could be possible from prolonged prerenal state.  He was still positive for amphetamine and could cause ATN.  He was also found to have hydronephrosis and possibly from obstructive uropathy which is now resolved.  He also received contrast from coronary angiogram on 8/13 and it could possibly will contribute but contrast associated nephropathy.    Urine sodium 115 and suspect from diuretic induced.  Renal ultrasound with duplex showed echogenic kidneys, left kidneys slightly smaller than the right side.  Serum creatinine peaked at 2.55 mg/dL on 08/14.  Subsequently patient's renal function improved, serum creatinine stalled at 1.8 mg/dL in last three days.  Patient's renal function could be overestimated given significant sarcopenia on exam.   Urinary output in nonoliguric range.   On Spironolactone 12.5 mg daily.   Restarted on Lasix 20 mg BID with holding parameters this am  Recs:  No indication for dialysis.  Check Cystatin C with GFR.  Ok to discharge from renal stand point. The patient is scheduled for follow up with me on 09/16/24 at 10 am.   Monitor renal panel daily while inpatient  Monitor UOP  Agree with holding furosemide.  Avoid nephrotoxic medications  Avoid extreme hypo or hypertension        Stage II CKD-Most recent A1c was 10.1% and his CKD is likely secondary to diabetic kidney disease. Baseline serum creatinine 1.1-1.3mg/dl.      Electrolytes- no pressing issues.    Metabolic acidosis-Mild. CO2  is 20. No indications for replacement. Trend.    Acute decompensated congestive heart failure with preserved ejection fraction-   2D ECHO 08/11 showed LVEF of 15-20%.  Net 4.0L negative since admission. Weight is trending up this am.   Breathing comfortable on RA  On Spironolactone 12.5 mg daily.   Restarted on Lasix 20 mg BID with holding parameters this am  Recs:  Daily weight  Strict I/O    Hypertension-BP is acceptable. On Coreg 3.125 mg BID, Isordil 30 mg TID, Hydralazine 10 mg TID.  Recommend no changes  Monitor BP closely, avoid hypo and hypertension.     Aspiration pneumonia -on PO doxycycline.    Acute urinary retention with bilateral moderate hydronephrosis status post Fischer catheter placement.  Repeat Renal US showed resolved hydronephrosis. Fischer catheter removed this am. Urology is following.    Poorly controlled diabetes mellitus-on insulin.  Management per primary team.      CAD/NSTEMI-S/p PCI on 08/20  with ASHLEY stents placement in the proximal and distal RCA.   Staged, percutaneous coronary artery intervention of the LAD-diagonal bifurcation can be considered in 4 to 6 weeks, if the patient remains compliant on dual antiplatelet therapy during follow-up clinic visits.   On aspirin, Plavix, Isodril, Lipitor and Coreg.    Constipation-on bowel regimen prn. Management as er primary team.     Methamphetamine abuse-chronic user.    Disposition:    We will follow.      SUBJECTIVE:   The patient was seen at the bedside and events reviewed with nursing. Patient remians intermittently confused.  No acute events overnight.  Patient states that he is feeling tired after ambulating in the hallway with PT. Patient reports much improved orthopnea, dyspnea and cough.Admits poor appetite attributing to constipation, last BM was on 08/18.   He has not urinate since Fischer catheter was removed this am.   Updated on labs. All questions answered.    OBJECTIVE:  Physical Exam   Temp: 98.1  F (36.7  C) Temp src: Oral BP:  133/72 Pulse: 80   Resp: 18 SpO2: 91 % O2 Device: None (Room air) Oxygen Delivery: 2 LPM  Vitals:    08/20/24 0400 08/21/24 0708 08/22/24 0450   Weight: 63.5 kg (139 lb 14.4 oz) 62.1 kg (136 lb 14.5 oz) 63.7 kg (140 lb 6.4 oz)     Vital Signs with Ranges  Temp:  [97.9  F (36.6  C)-98.6  F (37  C)] 98.1  F (36.7  C)  Pulse:  [75-83] 80  Resp:  [18-22] 18  BP: (118-137)/(58-73) 133/72  SpO2:  [91 %-95 %] 91 %  I/O last 3 completed shifts:  In: 1010 [P.O.:1010]  Out: 800 [Urine:800]    @TMAXR(24)@    Patient Vitals for the past 72 hrs:   Weight   08/22/24 0450 63.7 kg (140 lb 6.4 oz)   08/21/24 0708 62.1 kg (136 lb 14.5 oz)   08/20/24 0400 63.5 kg (139 lb 14.4 oz)     Intake/Output Summary (Last 24 hours) at 8/15/2024 1415  Last data filed at 8/14/2024 2119  Gross per 24 hour   Intake --   Output 800 ml   Net -800 ml       PHYSICAL EXAM:  General: NAD, cachectic, chronically ill looking  Cardiovascular - Regular rate and rhythm, no rub  Respiratory - CTA b/l  Abd: BS present, no guarding or pain with palpation, no ascites  Extremities - improved pedal and pretibial  lower extremity edema bilaterally  : no Fischer Catheter in place  Skin: dry, intact, no rash, good turgor  Neuro:  Grossly intact, no focal deficits  MSK:  Grossly intact, + sarcopenia  Psych:  Flat affect    LABORATORY STUDIES:     Recent Labs   Lab 08/22/24  0510 08/21/24  0435 08/20/24  0507 08/19/24  0557 08/18/24  0513 08/17/24  0511   WBC 10.8 12.1* 10.4 9.7 9.4 9.9   RBC 3.23* 3.29* 2.60* 2.91* 2.75* 2.80*   HGB 9.2* 9.6* 7.5* 8.4* 8.0* 8.1*   HCT 27.3* 28.0* 22.1* 24.7* 23.0* 23.6*   * 462* 383 379 362 384       Basic Metabolic Panel:  Recent Labs   Lab 08/22/24  0703 08/22/24  0510 08/22/24  0200 08/21/24  2033 08/21/24  1637 08/21/24  1147 08/21/24  0540 08/21/24  0435 08/20/24  0800 08/20/24  0507 08/19/24  0822 08/19/24  0557 08/18/24  0730 08/18/24  0513 08/17/24  0801 08/17/24  0511   NA  --  140  --   --   --   --   --  139  --  143   --  139  --  139  --  139   POTASSIUM  --  4.5  --   --   --   --   --  4.2  --  4.1  --  4.2  --  4.1  --  4.0   CHLORIDE  --  107  --   --   --   --   --  104  --  106  --  103  --  103  --  102   CO2  --  20*  --   --   --   --   --  20*  --  23  --  23  --  23  --  24   BUN  --  34.0*  --   --   --   --   --  39.8*  --  44.0*  --  47.6*  --  52.6*  --  58.3*   CR  --  1.83*  --   --   --   --   --  1.80*  --  1.79*  --  1.89*  --  1.91*  --  2.03*   * 121* 89 112* 93 74   < > 63*   < > 66*   < > 132*   < > 92   < > 132*   AMIRAH  --  8.3*  --   --   --   --   --  8.6*  --  8.4*  --  8.6*  --  8.6*  --  8.4*    < > = values in this interval not displayed.       INRNo lab results found in last 7 days.     Recent Labs   Lab Test 08/22/24  0510 08/21/24  0435   WBC 10.8 12.1*   HGB 9.2* 9.6*   * 462*       Personally reviewed current labs    EXAM: BILATERAL RENAL ARTERY DUPLEX AND RENAL ULTRASOUND     INDICATION: Acute kidney injury     TECHNIQUE: Real-time gray-scale sonographic imaging of the kidneys and bladder was performed including duplex spectral and color Doppler evaluation of the renal arteries.     COMPARISON: Renal artery duplex 8/14/2024     RENAL ULTRASOUND FINDINGS:    RIGHT KIDNEY: Increased parenchymal echogenicity without hydronephrosis, measuring approximately 10.7 x 5.8 x 5.4 cm.     LEFT KIDNEY: Increased parenchymal echogenicity without hydronephrosis, measuring approximately 8.8 x 5.3 x 5.5 cm.     The bladder is decompressed with indwelling Fischer catheter     DUPLEX ARTERIAL ULTRASOUND FINDINGS:     SYSTOLIC VELOCITIES (cm/s):  RIGHT RENAL ARTERY:  Proximal: 60                Mid: 61                 Distal: 45                    LEFT RENAL ARTERY:  Proximal: 49                Mid: 40                 Distal: 32                    Aorta: 86     Arcuate arterial resistive indices range from 0.7-0.8 on the right and 0.7-0.8 on the left.                                                                       IMPRESSION:  1.  Echogenic kidneys. No hydronephrosis.  2.  Negative renal artery duplex.      Yamileth Sawyer MD  [facility]  [address] [place] [address], [address]  [place] [address]  Phone# [phone]  Fax# [phone]

## 2024-08-23 ENCOUNTER — TELEPHONE (OUTPATIENT)
Dept: PHARMACY | Facility: HOSPITAL | Age: 59
End: 2024-08-23
Payer: COMMERCIAL

## 2024-08-23 ENCOUNTER — APPOINTMENT (OUTPATIENT)
Dept: PHYSICAL THERAPY | Facility: HOSPITAL | Age: 59
End: 2024-08-23
Attending: STUDENT IN AN ORGANIZED HEALTH CARE EDUCATION/TRAINING PROGRAM
Payer: COMMERCIAL

## 2024-08-23 VITALS
DIASTOLIC BLOOD PRESSURE: 70 MMHG | BODY MASS INDEX: 20.69 KG/M2 | OXYGEN SATURATION: 96 % | RESPIRATION RATE: 18 BRPM | SYSTOLIC BLOOD PRESSURE: 143 MMHG | TEMPERATURE: 98.8 F | HEART RATE: 79 BPM | WEIGHT: 140.1 LBS

## 2024-08-23 LAB
ANION GAP SERPL CALCULATED.3IONS-SCNC: 9 MMOL/L (ref 7–15)
BUN SERPL-MCNC: 34.7 MG/DL (ref 8–23)
CALCIUM SERPL-MCNC: 8.3 MG/DL (ref 8.8–10.4)
CHLORIDE SERPL-SCNC: 106 MMOL/L (ref 98–107)
CREAT SERPL-MCNC: 1.9 MG/DL (ref 0.67–1.17)
EGFRCR SERPLBLD CKD-EPI 2021: 40 ML/MIN/1.73M2
ERYTHROCYTE [DISTWIDTH] IN BLOOD BY AUTOMATED COUNT: 12.9 % (ref 10–15)
GLUCOSE BLDC GLUCOMTR-MCNC: 150 MG/DL (ref 70–99)
GLUCOSE BLDC GLUCOMTR-MCNC: 162 MG/DL (ref 70–99)
GLUCOSE SERPL-MCNC: 134 MG/DL (ref 70–99)
HCO3 SERPL-SCNC: 23 MMOL/L (ref 22–29)
HCT VFR BLD AUTO: 27 % (ref 40–53)
HGB BLD-MCNC: 9 G/DL (ref 13.3–17.7)
MCH RBC QN AUTO: 28.5 PG (ref 26.5–33)
MCHC RBC AUTO-ENTMCNC: 33.3 G/DL (ref 31.5–36.5)
MCV RBC AUTO: 85 FL (ref 78–100)
PLATELET # BLD AUTO: 466 10E3/UL (ref 150–450)
POTASSIUM SERPL-SCNC: 4.5 MMOL/L (ref 3.4–5.3)
RBC # BLD AUTO: 3.16 10E6/UL (ref 4.4–5.9)
SODIUM SERPL-SCNC: 138 MMOL/L (ref 135–145)
WBC # BLD AUTO: 10.9 10E3/UL (ref 4–11)

## 2024-08-23 PROCEDURE — 85027 COMPLETE CBC AUTOMATED: CPT | Performed by: INTERNAL MEDICINE

## 2024-08-23 PROCEDURE — 99232 SBSQ HOSP IP/OBS MODERATE 35: CPT | Performed by: INTERNAL MEDICINE

## 2024-08-23 PROCEDURE — 250N000013 HC RX MED GY IP 250 OP 250 PS 637: Performed by: INTERNAL MEDICINE

## 2024-08-23 PROCEDURE — 97116 GAIT TRAINING THERAPY: CPT | Mod: GP

## 2024-08-23 PROCEDURE — 99239 HOSP IP/OBS DSCHRG MGMT >30: CPT | Performed by: INTERNAL MEDICINE

## 2024-08-23 PROCEDURE — 36415 COLL VENOUS BLD VENIPUNCTURE: CPT | Performed by: INTERNAL MEDICINE

## 2024-08-23 PROCEDURE — 97530 THERAPEUTIC ACTIVITIES: CPT | Mod: GP

## 2024-08-23 PROCEDURE — 97110 THERAPEUTIC EXERCISES: CPT | Mod: GP

## 2024-08-23 PROCEDURE — 99233 SBSQ HOSP IP/OBS HIGH 50: CPT | Performed by: INTERNAL MEDICINE

## 2024-08-23 PROCEDURE — 82374 ASSAY BLOOD CARBON DIOXIDE: CPT | Performed by: INTERNAL MEDICINE

## 2024-08-23 RX ORDER — GLUCOSAMINE HCL/CHONDROITIN SU 500-400 MG
CAPSULE ORAL
Qty: 100 EACH | Refills: 3 | Status: SHIPPED | OUTPATIENT
Start: 2024-08-23

## 2024-08-23 RX ORDER — BLOOD-GLUCOSE METER
1 EACH MISCELLANEOUS
Qty: 1 KIT | Refills: 0 | Status: SHIPPED | OUTPATIENT
Start: 2024-08-23

## 2024-08-23 RX ORDER — GABAPENTIN 100 MG/1
100 CAPSULE ORAL 3 TIMES DAILY
Qty: 270 CAPSULE | Refills: 0 | Status: SHIPPED | OUTPATIENT
Start: 2024-08-23 | End: 2024-11-21

## 2024-08-23 RX ORDER — SPIRONOLACTONE 25 MG/1
12.5 TABLET ORAL DAILY
Qty: 45 TABLET | Refills: 0 | Status: SHIPPED | OUTPATIENT
Start: 2024-08-24 | End: 2024-11-22

## 2024-08-23 RX ORDER — BLOOD-GLUCOSE CONTROL, NORMAL
EACH MISCELLANEOUS
Qty: 1 EACH | Refills: 3 | Status: SHIPPED | OUTPATIENT
Start: 2024-08-23

## 2024-08-23 RX ORDER — CARVEDILOL 3.12 MG/1
3.12 TABLET ORAL 2 TIMES DAILY WITH MEALS
Qty: 180 TABLET | Refills: 0 | Status: SHIPPED | OUTPATIENT
Start: 2024-08-23 | End: 2024-11-21

## 2024-08-23 RX ORDER — LANOLIN ALCOHOL/MO/W.PET/CERES
100 CREAM (GRAM) TOPICAL DAILY
Qty: 90 TABLET | Refills: 0 | Status: SHIPPED | OUTPATIENT
Start: 2024-08-24 | End: 2024-11-22

## 2024-08-23 RX ORDER — HYDRALAZINE HYDROCHLORIDE 10 MG/1
10 TABLET, FILM COATED ORAL 3 TIMES DAILY
Qty: 270 TABLET | Refills: 0 | Status: SHIPPED | OUTPATIENT
Start: 2024-08-23 | End: 2024-11-21

## 2024-08-23 RX ORDER — ISOSORBIDE DINITRATE 10 MG/1
10 TABLET ORAL
Qty: 270 TABLET | Refills: 0 | Status: SHIPPED | OUTPATIENT
Start: 2024-08-23 | End: 2024-08-23

## 2024-08-23 RX ORDER — BLOOD SUGAR DIAGNOSTIC
STRIP MISCELLANEOUS
Qty: 100 STRIP | Refills: 6 | Status: SHIPPED | OUTPATIENT
Start: 2024-08-23

## 2024-08-23 RX ORDER — FUROSEMIDE 20 MG
20 TABLET ORAL
Qty: 180 TABLET | Refills: 0 | Status: SHIPPED | OUTPATIENT
Start: 2024-08-23 | End: 2024-11-21

## 2024-08-23 RX ORDER — HYDROXYZINE HYDROCHLORIDE 25 MG/1
25 TABLET, FILM COATED ORAL EVERY 8 HOURS PRN
Qty: 30 TABLET | Refills: 0 | Status: SHIPPED | OUTPATIENT
Start: 2024-08-23

## 2024-08-23 RX ORDER — INSULIN GLARGINE 100 [IU]/ML
10 INJECTION, SOLUTION SUBCUTANEOUS AT BEDTIME
Status: SHIPPED
Start: 2024-08-23

## 2024-08-23 RX ORDER — ISOSORBIDE MONONITRATE 30 MG/1
30 TABLET, EXTENDED RELEASE ORAL DAILY
Qty: 90 TABLET | Refills: 0 | Status: SHIPPED | OUTPATIENT
Start: 2024-08-23 | End: 2024-11-21

## 2024-08-23 RX ORDER — LANCETS
EACH MISCELLANEOUS
Qty: 102 EACH | Refills: 6 | Status: SHIPPED | OUTPATIENT
Start: 2024-08-23

## 2024-08-23 RX ADMIN — ASPIRIN 81 MG: 81 TABLET, COATED ORAL at 08:04

## 2024-08-23 RX ADMIN — ISOSORBIDE DINITRATE 10 MG: 10 TABLET ORAL at 08:04

## 2024-08-23 RX ADMIN — FUROSEMIDE 20 MG: 20 TABLET ORAL at 08:04

## 2024-08-23 RX ADMIN — THIAMINE HCL TAB 100 MG 100 MG: 100 TAB at 08:04

## 2024-08-23 RX ADMIN — CARVEDILOL 3.12 MG: 3.12 TABLET, FILM COATED ORAL at 08:04

## 2024-08-23 RX ADMIN — THERA TABS 1 TABLET: TAB at 08:04

## 2024-08-23 RX ADMIN — HYDRALAZINE HYDROCHLORIDE 10 MG: 10 TABLET ORAL at 08:04

## 2024-08-23 RX ADMIN — CLOPIDOGREL BISULFATE 75 MG: 75 TABLET ORAL at 08:04

## 2024-08-23 RX ADMIN — SPIRONOLACTONE 12.5 MG: 25 TABLET, FILM COATED ORAL at 08:04

## 2024-08-23 ASSESSMENT — ACTIVITIES OF DAILY LIVING (ADL)
ADLS_ACUITY_SCORE: 36
ADLS_ACUITY_SCORE: 32
ADLS_ACUITY_SCORE: 36

## 2024-08-23 NOTE — DISCHARGE SUMMARY
[facility]  Hospitalist Discharge Summary      Date of Admission:  8/13/2024  Date of Discharge:  8/23/2024  Discharging Provider: Pieter Aviles MD  Discharge Service: Hospitalist Service    Discharge Diagnoses   Multivessel coronary artery disease with NSTEMI  Acute on chronic CHF, systolic dysfunction  Mild pulmonary hypertension  Acute respiratory failure with hypoxia, improved  Aspiration pneumonia, improved  Acute metabolic encephalopathy, resolved   Acute kidney injury  Chronic kidney disease stage III  Acute urine retention, discharged on Fischer cath  Hypertension  Anemia of chronic illness  Moderate malnutrition  History of drug abuse   Noncompliant    Clinically Significant Risk Factors     # DMII: A1C = 10.1 % (Ref range: <5.7 %) within past 6 months  # Moderate Malnutrition: based on nutrition assessment      Follow-ups Needed After Discharge   Follow-up Appointments     Follow-up and recommended labs and tests       Follow up with primary care provider, Daryn Pittman, within 7 days for   hospital follow- up.  The following labs/tests are recommended: BMP in 3-4   days .    Follow up with Cardiology , nephrology and urology as scheduled as   scheduled            Unresulted Labs Ordered in the Past 30 Days of this Admission       No orders found from 7/14/2024 to 8/14/2024.        These results will be followed up by nephrology    Discharge Disposition   Discharged to home  Condition at discharge: Stable    Hospital Course   59 year old male admitted on 8/13/2024. He has h/o HTN, DM-II who was admitted to  for acute CHF exacerbation. Course was complicated by CHRISTY. He was started on diuresis and was transferred to [facility] cardiac cath. Hospitalist service was consulted for medical co-management.      Multivessel coronary disease  NSTEMI  -Started initially on heparin gtt, Cardiac cath on 08/13: MVCAD  -  ASA, Plavix, Lipitor, coreg.   - Repeat PET showed stunned but viable left  ventricular myocardium.  - CTS consult appreciated: High risk for surgical interventions.  - 08/18: Cardiology consult appreciated  - s/p coronary angiogram  -Cardiology team planning to discuss complex intervention and plan moving forwar if patient is compliant with treatment and medications.  -Continue Imdur on discharge  -Follow-up with cardiology as scheduled.     Acute decompensated HFrEF  Mild pulmonary hypertension  -- ProBNP: 52585  -- Strict I/O, daily weight, fluid restriction  -- 08/17: Gave lasis 20 mg po on 08/16 after discussing with nephrology. Lasix held due to worsening of renal function. Defer diuretics to nephrology and cards.  -- 08/18: No rales on exam. Continue to monitor off of lasix.  --Lasix was on hold due to planned angiogram/PCI.   -Restarted on Lasix afterwards  -Continue Lasix on discharge.     Acute hypoxic respiratory failure-resolved  -- Likely 2/2 CHF +/- aspiration pneumonia  -Resolved  -Patient weaned off oxygen.     Aspiration pneumonia  -- CXR on 08/11: multifocal patchy opacities present involving b/l lungs including lung bases, Right upper hilar region, and left perihilar region likely representing infecious process. Small b/l pleural effusions.  -- Completed 7 days ceftriaxone.  Last day of doxycycline 7-day course 8/20   -- H/o MRSA OM. Negative MRSA swab on 08/14.  -Discontinue contact precautions  -Patient finished course of antibiotic during hospitalization     Acute metabolic encephalopathy- resolved.  -- Multifactorial: severe CHF, NSTEMI, pneumonia, CHRISTY  -- VBG wnl. TSH 1.32  -- Delirium precaution  -- UDS + for amphetamine, negative for opiates.   -- CT-head: no acute changes.  -- Gabapentin held on admission, restart at lower dose on discharge because of renal function      CHRISTY on CKD-II  -- Likely 2/2 low perfusion from CHF  --Baseline creatinine of 1.1.  Creatinine improving and at 1.79 on 8/20  -- Avoid Nephrotoxins. Holding ACEI, Metformin, Jardiance  -- Strict  I/O, US-renal: Echogenic kidneys. No hydronephrosis. Negative renal artery duplex.  -- 08/17: Gave lasis 20 mg po on 08/16 after discussing with nephrology. Lasix held this morning due to worsening of renal function.   -Nephrology agreeable to holding Lasix at this time  - restarted on Lasix as per nephrology  -Monitor BMP as outpatient  -Follow-up with nephrology as scheduled.     Acute urinary retention  B/l moderate hydronephrosis on ULS-resolved  -- rowland placed 8/14.  -- Urology consult appreciated: TOV prior discharge.  -- Repeat US-renal: No hydronephrosis  -Discontinue Rowland and voiding trial but patient failed on 8/23/2024  -Replace Rowland with follow-up with urology as outpatient.  -Continue Flomax     DM-II  -- A1c: 10.1%  -- Accu-checks, sliding scale, tapsif97 units, hypoglycemia protocol.   -Patient has hypoglycemia this morning secondary to poor oral intake.    -Decrease insulin dose to 10 units  -Oral intake has been improving  -Continue to monitor blood glucose as outpatient, prescription was sent for glucometer with strips     Essential hypertension  -- Monitor vital signs per protocol  -- holding Norvasc, discontinued on discharge.   -On coreg  -Add hydralazine by cardiologist because of CHF with reduced EF     Normocytic Anemia, Chronic   AOCD  -- Iron panel reviewed. Monitor cbc  -- No e/o bleeding  -Continue to monitor CBC    History of drug abuse  -Urine drug screen is positive for amphetamine    Noncompliance  -Discussed with patient the importance of taking medications    Weakness and deconditioning  -PT/OT evaluation  -Patient needs TCU but unable to find accepting facility for him  -Patient's sister agreed to take patient home but declined home care.    Discussed with patient, family, cardiology, nephrology, nursing staff and discharge planner    Consultations This Hospital Stay   CORE CLINIC EVALUATION IP CONSULT  OCCUPATIONAL THERAPY ADULT IP CONSULT  CARDIOLOGY IP CONSULT  HOSPITALIST IP  CONSULT  NEPHROLOGY IP CONSULT  PHARMACY IP CONSULT  SPEECH LANGUAGE PATH ADULT IP CONSULT  CARDIOTHORACIC SURGERY IP CONSULT  CARDIAC REHAB IP CONSULT  CARDIOTHORACIC SURGERY IP CONSULT  UROLOGY IP CONSULT  CARE MANAGEMENT / SOCIAL WORK IP CONSULT  PHYSICAL THERAPY ADULT IP CONSULT  NUTRITION SERVICES ADULT IP CONSULT  CARDIAC REHAB IP CONSULT  SMOKING CESSATION PROGRAM IP CONSULT  SMOKING CESSATION PROGRAM IP CONSULT    Code Status   Full Code    Time Spent on this Encounter   I, Pieter Aviles MD, personally saw the patient today and spent greater than 30 minutes discharging this patient.       Pieter Aviles MD  [address] [address]  Phone: [phone]  Fax: [phone]  ______________________________________________________________________    Physical Exam   Vital Signs: Temp: 98.8  F (37.1  C) Temp src: Oral BP: (!) 143/70 Pulse: 79   Resp: 18 SpO2: 96 % O2 Device: None (Room air)    Weight: 140 lbs 1.6 oz  Constitutional: awake, alert, cooperative, no apparent distress, and appears stated age  Respiratory: No increased work of breathing, good air exchange, clear to auscultation bilaterally, no crackles or wheezing  Cardiovascular: Normal apical impulse, regular rate and rhythm, normal S1 and S2, no S3 or S4, and no murmur noted  GI: No scars, normal bowel sounds, soft, non-distended, non-tender, no masses palpated, no hepatosplenomegally  Skin: no bruising or bleeding and normal skin color, texture, turgor  Musculoskeletal: There is no redness, warmth, or swelling of the joints.  Full range of motion noted.  no lower extremity pitting edema present  Neurologic: Awake, alert, oriented to name, place and time.  Cranial nerves II-XII are grossly intact.  Motor is 5 out of 5 bilaterally.   Sensory is intact.    Neuropsychiatric: Appropriate with examiner          Primary Care Physician   Daryn Pittman    Discharge Orders      Lipid Profile     Schedule Lipid profile in 4 weeks     Ambulatory CARDIAC REHAB REFERRAL      Ambulatory Cardiologist Referral      Follow-Up with Cardiology Ambulatory [facility] Referral      Adult Urology  Referral      Follow-Up with Cardiology      Primary Care - Care Coordination Referral      Reason for your hospital stay    CHF, CHRISTY     Activity    Your activity upon discharge: activity as tolerated     Follow-up and recommended labs and tests     Follow up with primary care provider, Daryn Pittman, within 7 days for hospital follow- up.  The following labs/tests are recommended: BMP in 3-4 days .    Follow up with Cardiology , nephrology and urology as scheduled as scheduled     Walker Order     Diet    Follow this diet upon discharge: Current Diet:Orders Placed This Encounter           Fluid restriction 1800 ML FLUID      Snacks/Supplements Adult: Glucerna; With Meals      Snacks/Supplements Adult: Magic Cup; Between Meals      Snacks/Supplements Adult: Gelatein Plus; Between Meals      Low Saturated Fat Na <2400 mg       Significant Results and Procedures   Most Recent 3 CBC's:  Recent Labs   Lab Test 08/23/24  0557 08/22/24  0510 08/21/24  0435   WBC 10.9 10.8 12.1*   HGB 9.0* 9.2* 9.6*   MCV 85 85 85   * 462* 462*     Most Recent 3 BMP's:  Recent Labs   Lab Test 08/23/24  1140 08/23/24  0759 08/23/24  0557 08/22/24  0703 08/22/24  0510 08/21/24  0540 08/21/24  0435   NA  --   --  138  --  140  --  139   POTASSIUM  --   --  4.5  --  4.5  --  4.2   CHLORIDE  --   --  106  --  107  --  104   CO2  --   --  23  --  20*  --  20*   BUN  --   --  34.7*  --  34.0*  --  39.8*   CR  --   --  1.90*  --  1.83*  --  1.80*   ANIONGAP  --   --  9  --  13  --  15   AMIRAH  --   --  8.3*  --  8.3*  --  8.6*   * 162* 134*   < > 121*   < > 63*    < > = values in this interval not displayed.     Most Recent 2 LFT's:  Recent Labs   Lab Test 09/25/23  1711 07/03/23  0741   AST 18 65*   ALT 27 60   ALKPHOS 131*  295*   BILITOTAL 0.3 <0.2   ,   Results for orders placed or performed during the hospital encounter of 08/13/24   US Renal Complete Non-Vascular    Narrative    EXAM: US RENAL COMPLETE NON-VASCULAR  LOCATION: [facility]  DATE: 8/14/2024    INDICATION: CHRISTY on CKD  COMPARISON: None.  TECHNIQUE: Routine Bilateral Renal and Bladder Ultrasound.    FINDINGS:    RIGHT KIDNEY: 10.2 x 6.0 x 5.6 cm. Moderate dilatation of the renal collecting system. No focal mass.     LEFT KIDNEY: 10.4 x 5.6 x 4.9 cm. Moderate dilatation of the renal collecting system. No focal mass.     BLADDER: The bladder is very distended with a volume of 1435 mL. The patient was unable to void.    Incidental note of bilateral pleural effusions.      Impression    IMPRESSION:  1.  Moderate bilateral hydronephrosis. This may be secondary to a very distended urinary bladder.   US Carotid Bilateral    Narrative    EXAM: US CAROTID BILATERAL  LOCATION: [facility]  DATE: 08/14/2024    INDICATION: Pre-CABG.  COMPARISON: None.  TECHNIQUE: Duplex exam performed utilizing 2D gray-scale imaging, Doppler interrogation with color-flow and spectral waveform analysis. The percent diameter stenosis is determined using Updated Recommendations for Carotid Stenosis Interpretation Criteria   from [employer] [facility].    FINDINGS:    RIGHT: Mild plaque at the bifurcation. The peak systolic velocity in the ICA is less than 180 cm/sec, consistent with less than 50% stenosis. Normal velocities in the ECA. Antegrade flow within the vertebral artery.     LEFT: Mild plaque at the bifurcation. The peak systolic velocity in the ICA is less than 180 cm/sec, consistent with less than 50% stenosis. Normal velocities in the ECA. Antegrade flow within the vertebral artery.    VELOCITY CHART:  CCA Prox  Right: 119/28 cm/s   Left: 116/20 cm/s  CCA Mid  Right: 118/27 cm/s   Left: 127/25 cm/s  CCA Dist  Right: 104/26 cm/s   Left: 136/28  cm/s  Bulb: 90/25 cm/s   Left: 107/17 cm/s  ICA Prox  Right: 105/28 cm/s   Left: 111/36 cm/s  ICA Mid Right: 129/36 cm/s   Left: 84/32 cm/s  ICA Dist  Right: 99/32 cm/s   Left: 42/13 cm/s  ECA:   Right: 195/16 cm/s   Left: 171/19 cm/s  Vert: 56/14 cm/s   Left: 59/20 cm/s  ICA/CCA PSV Ratio   Right: 1.24   Left: 0.82      Impression    IMPRESSION:  1.  Mild plaque formation, velocities consistent with less than 50% stenosis in the right internal carotid artery.  2.  Mild plaque formation, velocities consistent with less than 50% stenosis in the left internal carotid artery.  3.  Flow within the vertebral arteries is antegrade.       PET Cardiac Viability    Narrative    EXAM: PET CARDIAC VIABILITY  LOCATION: [facility] [place]  DATE: 8/14/2024    INDICATION: Ischemic cardiomyopathy. Cardiac viability evaluation.  COMPARISON: None available at time of dictation  TECHNIQUE: Serum glucose level 152 mg/dL. 60 minutes post intravenous administration of 15.9 mCi of F-18 FDG, dedicated 3D PET imaging was performed through the chest/heart utilizing attenuation correction with concurrent axial CT and PET/CT image   fusion. Dose reduction techniques were used.    FINDINGS: There is a large portion of the anterior left ventricular wall which does not have significant FDG uptake suggesting nonviable myocardium in this region. Given this area appears normal on the rest portion of the exam, this is likely artifactual   in nature and warrants a repeat FDG PET/CT evaluation.       Impression    IMPRESSION:    Nondiagnostic examination warrants repeat FDG PET/CT.   CT Head w/o Contrast    Narrative    EXAM: CT HEAD W/O CONTRAST  LOCATION: [facility] [place]  DATE: 8/14/2024    INDICATION: lethargic, weak  COMPARISON: None.  TECHNIQUE: Routine CT Head without IV contrast. Multiplanar reformats. Dose reduction techniques were used.    FINDINGS:  INTRACRANIAL CONTENTS: No intracranial hemorrhage,  extraaxial collection, or mass effect.  No CT evidence of acute infarct. Focal regions of presumed post infarct encephalomalacia in the bilateral centrum semiovale/corona radiata white matter. Mild   generalized volume loss. No hydrocephalus.     VISUALIZED ORBITS/SINUSES/MASTOIDS: No intraorbital abnormality. No paranasal sinus mucosal disease. No middle ear or mastoid effusion.    BONES/SOFT TISSUES: No acute abnormality.      Impression    IMPRESSION:   1.  Negative for acute intracranial hemorrhage, hydrocephalus or transcortical infarct.    2.  Brain atrophy, presumed sequela of chronic microvascular ischemic disease.   US Renal Complete w Arterial Duplex    Narrative    [facility]  LOCATION: [facility]  DATE: 8/14/2024    EXAM: BILATERAL RENAL ARTERY DUPLEX AND RENAL ULTRASOUND    INDICATION: Acute kidney injury    TECHNIQUE: Real-time gray-scale sonographic imaging of the kidneys and bladder was performed including duplex spectral and color Doppler evaluation of the renal arteries.    COMPARISON: Renal artery duplex 8/14/2024    RENAL ULTRASOUND FINDINGS:    RIGHT KIDNEY: Increased parenchymal echogenicity without hydronephrosis, measuring approximately 10.7 x 5.8 x 5.4 cm.    LEFT KIDNEY: Increased parenchymal echogenicity without hydronephrosis, measuring approximately 8.8 x 5.3 x 5.5 cm.    The bladder is decompressed with indwelling Fischer catheter    DUPLEX ARTERIAL ULTRASOUND FINDINGS:    SYSTOLIC VELOCITIES (cm/s):  RIGHT RENAL ARTERY:  Proximal: 60                Mid: 61                 Distal: 45                    LEFT RENAL ARTERY:  Proximal: 49                Mid: 40                 Distal: 32                    Aorta: 86    Arcuate arterial resistive indices range from 0.7-0.8 on the right and 0.7-0.8 on the left.      Impression    IMPRESSION:  1.  Echogenic kidneys. No hydronephrosis.  2.  Negative renal artery duplex.    PET Cardiac Viability    Narrative     EXAM: PET CARDIAC VIABILITY  LOCATION: [facility]  DATE: 2024    INDICATION: Ischemic cardiomyopathy. Cardiac viability evaluation.  COMPARISON: FDG PET CARDIAC VIABILITY dated 2024  TECHNIQUE: Serum glucose level 167 mg/dL. 60 minutes post intravenous administration of 16.45 mCi of F-18 FDG, dedicated 3D PET imaging was performed through the chest/heart utilizing attenuation correction with concurrent axial CT and PET/CT image   fusion. Dose reduction techniques were used.    FINDINGS: The areas of defects seen on the rest portion of the examination resolved on the FDG PET/CT portion of the exam suggesting stunned, but viable myocardium.       Impression    IMPRESSION:    Findings suggesting stunned, but viable left ventricular myocardium.   Cardiac Catheterization    Narrative    CARDIAC CATHETERIZATION AND INTERVENTION REPORT    PATIENT NAME:  Abdulaziz Rausch          MEDICAL RECORD NUMBER: [id number]  : 1965       PROCEDURE DATE: 2024        CONCLUSIONS:   Multivessel coronary artery disease involving the proximal to mid left   anterior descending artery, large diagonal branch, and proximal and   distal, dominant right coronary artery.  Moderate nonobstructive disease   elsewhere.  Mildly elevated right and moderately elevated left-sided filling   pressures.  No pulmonary hypertension.  Normal cardiac output and index.      RECOMMENDATIONS:   Usual postprocedure cares, please see orders.  Recommend cardiothoracic surgery consultation for evaluation of surgical   revascularization.  If patient is not a surgical candidate, high risk, LV   supported , percutaneous revascularization is an option once mental   status, renal function, and anemia improve- but patient has to demonstrate   compliance with dual antiplatelet therapy (concern given polysubstance   abuse).  Aggressive risk factor modification for secondary prevention.  Defer further management of  patient's cardiomyopathy to the inpatient   cardiology service.    PROCEDURE PERFORMED:   Selective right and left coronary angiography      PRE-OP DIAGNOSIS:  Cardiomyopathy      POST-OP DIAGNOSIS:   Cardiomyopathy  Multivessel coronary artery disease    PROCEDURALISTS: Brandon Alejandro MD      DIAGNOSTIC PROCEDURAL DETAILS:   Signed, informed consent was obtained. The patient was placed on the table   and prepped and draped in the usual fashion. Time out and site   verification performed.   Access: Right radial artery, right brachial vein after exchange of   previously placed IV.  Catheters: JL 3.5, JR4, [place]  Hemostasis: TR band    PROCEDURAL MEDICATIONS:  Conscious sedation - midazolam and fentanyl, please see procedure log for   dosing.   Local anesthesia - 1% lidocaine   Procedural anticoagulation - 75 units/kg of heparin     CONTRAST VOLUME: 35 mL Visipaque  BLOOD LOSS: minimal   FLUIDS: None   SPECIMENS: None  COMPLICATIONS: None    FINDINGS:    Right Heart Catheterization         RA: 10 mmHg         RV: 36/6/10 mmHg (s/d/ed)         PA: 33/12/20 mmHg (s/d/m)         PCWP: 15 mmHg (unclear if accurate)         CO 4.82 L/min (Jake)  CI:  2.64 L/min/m2             Left Heart Catheterization    LVEDP 24 mmHg  No aortic valve stenosis    Coronary Angiography:  LEFT MAIN: Normal caliber vessel that bifurcates into left anterior and   left circumflex arteries.  The proximal left main has mild disease   followed by 30 to 40% calcific stenosis in the midportion and mild disease   distally.  LEFT ANTERIOR DESCENDING: Large vessel that gives rise to a small diagonal   1 branch, a large diagonal 2 branch and multiple septal perforators.  The   LAD tapers into a small vessel distally.  The proximal to mid LAD has   diffuse 70% stenosis followed by mild disease distally.  Diagonal 1 branch   has mild disease.  The large diagonal 2 branch has 70% stenosis in the   proximal portion followed by mild disease  distally.  LEFT CIRCUMFLEX: Nondominant vessel with mild diffuse disease.  RIGHT CORONARY ARTERY: Large dominant vessel that gives rise to a large   posterior descending artery and posterolateral system.  The proximal right   coronary artery has a 95% stenosis followed by mild disease in the   midportion and a 95% stenosis in the distal portion.  The posterior   descending artery has mild disease proximally with a 60% stenosis in the   midportion and mild disease distally.  The posterolateral branch has mild   diffuse disease.    Please do not hesitate to contact me if you have any questions or   concerns. I was present for the procedure in its entirety. Moderate   conscious sedation at level 3-4 with fentanyl and midazolam was   administered, and I spent continuous face to face time with the patient   which encompassed the duration of the procedure.  Please refer to the   sedation flow sheet for additional information.  I have reviewed and agree   with the documentation provided in the RN sedation flow sheet as outlined.   I concluded sedation and recovery was monitored by the trained independent   observer. I attest that I have ordered all medications administered,   equipment used, and tests performed during the procedure.    Brandon Alejandro MD  Interventional Cardiology    Cardiac Catheterization    Narrative    CARDIAC CATHETERIZATION AND INTERVENTION REPORT    PATIENT NAME:  Abdulaziz Rausch          MEDICAL RECORD NUMBER: [id number]  : 1965       PROCEDURE DATE: 2024        CONCLUSIONS: Successful, intra-aortic balloon pump supported percutaneous   coronary artery intervention of the proximal and distal right coronary   artery with a 3.5 x 24 and a 3.0 x 20 mm Synergy XD drug-eluting stents.      RECOMMENDATIONS:   Usual postprocedure cares, please see orders.  Dual antiplatelet therapy for minimum of 1 year.  Staged, percutaneous coronary artery intervention of the LAD-diagonal    bifurcation can be considered in 4 to 6 weeks, if the patient remains   compliant on dual antiplatelet therapy during follow-up clinic visits.  Aggressive risk factor modification for secondary prevention.    PROCEDURE PERFORMED:   Intra-aortic balloon pump placement and removal  Staged, percutaneous coronary artery intervention of the proximal and   distal right coronary artery    PRE-OP DIAGNOSIS:  Non-ST elevation MI  Multivessel coronary artery disease  Ischemic cardiomyopathy    POST-OP DIAGNOSIS:   Non-ST elevation MI  Multivessel coronary artery disease  Ischemic cardiomyopathy    PROCEDURALISTS: Brandon Alejandro MD      DIAGNOSTIC PROCEDURAL DETAILS:   Signed, informed consent was obtained. The patient was placed on the table   and prepped and draped in the usual fashion. Time out and site   verification performed.   Access: Right radial artery for percutaneous intervention, right common   femoral artery using ultrasound guidance and micropuncture technique for   intra-aortic balloon pump placement  Catheters: AL 0.75  Hemostasis: TR band for right radial artery, 8 Persian Angio-Seal for right   common femoral artery    PROCEDURAL MEDICATIONS:  Conscious sedation - midazolam and fentanyl, please see procedure log for   dosing.   Local anesthesia - 1% lidocaine   Procedural anticoagulation - 100 units/kg of heparin, patient was   preloaded with Plavix preprocedure.    CONTRAST VOLUME: 30 mL Visipaque  BLOOD LOSS: minimal   FLUIDS: None   SPECIMENS: None  COMPLICATIONS: None    FINDINGS:    INTERVENTIONAL DETAILS:   Lesion # 1 : 95% stenosis of the proximal right coronary artery  Lesion # 1 : 90% stenosis of the distal right coronary artery    Right coronary artery was engaged using an AL 0.75 guide catheter that   provide adequate coaptation and support.  The RCA lesions were traversed   with a Runthrough wire and were predilated with a 2.5 mm balloon.  The   distal right coronary artery lesion was treated  with a 3.0 x 20 mm Synergy   XD drug-eluting stent that was postdilated with a 3.0 mm NC balloon at   high atmospheres.  Proximal right coronary artery lesion was treated with   a 3.5 x 24 mm Synergy XD drug-eluting stent that was postdilated with a   3.5 mm NC balloon at high atmospheres.  Pre-intervention the proximal   lesion length was 20 mm with MEENAKSHI-3 flow and distal lesion was 16 mm with   MEENAKSHI-3 flow.  Postintervention there is 0% residual stenosis within   treated segments with MEENAKSHI-3 flow.    Please do not hesitate to contact me if you have any questions or   concerns. I was present for the procedure in its entirety. Moderate   conscious sedation at level 3-4 with fentanyl and midazolam was   administered, and I spent continuous face to face time with the patient   which encompassed the duration of the procedure.  Please refer to the   sedation flow sheet for additional information.  I have reviewed and agree   with the documentation provided in the RN sedation flow sheet as outlined.   I concluded sedation and recovery was monitored by the trained independent   observer. I attest that I have ordered all medications administered,   equipment used, and tests performed during the procedure.    Brandon Alejandro MD  Interventional Cardiology        Discharge Medications   Discharge Medication List as of 8/23/2024 11:22 AM        START taking these medications    Details   !! alcohol swab prep pads Use to swab area of injection/ella as directed.Disp-100 each, [id number]      aspirin 81 MG EC tablet Take 1 tablet (81 mg) by mouth daily Start tomorrow., Disp-30 tablet, R-3, E-Prescribe      atorvastatin (LIPITOR) 40 MG tablet Take 1 tablet (40 mg) by mouth daily, Disp-90 tablet, R-3, E-Prescribe      !! blood glucose (ACCU-CHEK SMARTVIEW) test strip Use to test blood sugar 4 times daily., Disp-100 strip, R-6, E-Prescribe      blood glucose calibration (ACCU-CHEK SMARTVIEW CONTROL) solution Use to calibrate  blood glucose monitor as directed., Disp-1 each, R-3, E-Prescribe      !! blood glucose monitoring (ACCU-CHEK FASTCLIX) lancets Use to test blood sugar 4 times daily or as directed., Disp-102 each, R-6, E-Prescribe      Blood Glucose Monitoring Suppl (ACCU-CHEK GUIDE ME) w/Device KIT 1 Device 4 times daily (before meals and nightly)., Disp-1 kit, R-0, E-Prescribe      carvedilol (COREG) 3.125 MG tablet Take 1 tablet (3.125 mg) by mouth 2 times daily (with meals)., Disp-180 tablet, R-0, E-Prescribe      clopidogrel (PLAVIX) 75 MG tablet Take 1 tablet (75 mg) by mouth daily Dose to start tomorrow., Disp-90 tablet, R-3, E-Prescribe      furosemide (LASIX) 20 MG tablet Take 1 tablet (20 mg) by mouth 2 times daily., Disp-180 tablet, R-0, E-Prescribe      hydrALAZINE (APRESOLINE) 10 MG tablet Take 1 tablet (10 mg) by mouth 3 times daily., Disp-270 tablet, R-0, E-Prescribe      hydrOXYzine HCl (ATARAX) 25 MG tablet Take 1 tablet (25 mg) by mouth every 8 hours as needed for itching or anxiety., Disp-30 tablet, R-0, E-Prescribe      isosorbide mononitrate (IMDUR) 30 MG 24 hr tablet Take 1 tablet (30 mg) by mouth daily., Disp-90 tablet, R-0, E-Prescribe      spironolactone (ALDACTONE) 25 MG tablet Take 0.5 tablets (12.5 mg) by mouth daily., Disp-45 tablet, R-0, E-Prescribe      thiamine (B-1) 100 MG tablet Take 1 tablet (100 mg) by mouth daily., Disp-90 tablet, R-0, E-Prescribe       !! - Potential duplicate medications found. Please discuss with provider.        CONTINUE these medications which have CHANGED    Details   gabapentin (NEURONTIN) 100 MG capsule Take 1 capsule (100 mg) by mouth 3 times daily., Disp-270 capsule, R-0, E-Prescribe      insulin glargine (LANTUS SOLOSTAR) 100 UNIT/ML pen Inject 10 Units subcutaneously at bedtime., No Print OutIf Lantus is not covered by insurance, may substitute Basaglar or Semglee or other insulin glargine product per insurance preference at same dose and frequency.              CONTINUE these medications which have NOT CHANGED    Details   acetaminophen (TYLENOL) 325 MG tablet Take 650 mg by mouth every 6 hours as needed for pain, Historical      !! alcohol swab prep pads Use to swab area of injection/ella as directed.Disp-100 each, [id number]      !! blood glucose (NO BRAND SPECIFIED) test strip Use to test blood sugar 4 times daily or as directed. To accompany: Blood Glucose Monitor Brands: per insurance., Disp-100 strip, R-6, E-Prescribe      blood glucose monitoring (NO BRAND SPECIFIED) meter device kit Use to test blood sugar 4 time daily or as directed. Preferred blood glucose meter OR supplies to accompany: Blood Glucose Monitor Brands: per insurance.Disp-1 kit, [id number]      Continuous Blood Gluc  (FREESTYLE NOAH 2 READER) CHUCK Use to read blood sugars as per 's instructions., Disp-1 each, R-0, E-Prescribe      Continuous Blood Gluc Sensor (FREESTYLE NOAH 2 SENSOR) MISC Use 1 sensor every 14 days. Use to read blood sugars per 's instructions., Disp-2 each, R-5, E-Prescribe      empagliflozin (JARDIANCE) 25 MG TABS tablet Take 1 tablet (25 mg) by mouth daily, Disp-90 tablet, R-3, E-Prescribe      insulin lispro (HUMALOG KWIKPEN) 100 UNIT/ML (1 unit dial) KWIKPEN Inject 6 Units Subcutaneous 3 times daily (before meals), Disp-15 mL, R-4, Historical      insulin pen needle (31G X 8 MM) 31G X 8 MM miscellaneous Use 4 pen needles daily or as directed.Disp-400 each, [id number]      !! thin (NO BRAND SPECIFIED) lancets Use with lanceting device. To accompany: Blood Glucose Monitor Brands: per insurance., Disp-100 each, R-6, E-Prescribe       !! - Potential duplicate medications found. Please discuss with provider.        STOP taking these medications       amLODIPine (NORVASC) 2.5 MG tablet Comments:   Reason for Stopping:         ibuprofen (ADVIL/MOTRIN) 200 MG tablet Comments:   Reason for Stopping:         isosorbide dinitrate  (ISORDIL) 10 MG tablet Comments:   Reason for Stopping:         metFORMIN (GLUCOPHAGE) 500 MG tablet Comments:   Reason for Stopping:             Allergies   No Known Allergies

## 2024-08-23 NOTE — PROGRESS NOTES
RENAL PROGRESS NOTE    ASSESSMENT & PLAN:   59 years old male with baseline creatinine 1.1 with EGFR 70 to 80% with previous CHRISTY with creatinine up to 1.5 admitted at [facility] and admission creatinine 1.5 for shortness of breath and found to have ischemic cardiomyopathy with EF of 15 to 20% and sent to [facility] for further investigation.  Nephrology is following for CHRISTY.    CHRISTY with underlying CKD 2: CHRISTY is likely multifactorial secondary to cardiorenal physiology/hemodynamic.  Also ATN could be possible from prolonged prerenal state.  He was still positive for amphetamine and could cause ATN.  He was also found to have hydronephrosis and possibly from obstructive uropathy which is now resolved.  He also received contrast from coronary angiogram on 8/13 and it could possibly will contribute but contrast associated nephropathy.    Urine sodium 115 and suspect from diuretic induced.  Renal ultrasound with duplex showed echogenic kidneys, left kidneys slightly smaller than the right side.  Serum creatinine peaked at 2.55 mg/dL on 08/14.  Subsequently patient's renal function improved, serum creatinine stalled at 1.8-1.9 mg/dL since 08/18.  Cystatin C 1.8 mgdl and correlating eGFR 35 ml/min.  Urinary output in nonoliguric range.   On Spironolactone 12.5 mg daily and Lasix 20 mg BID   Recs:  No indication for dialysis.  Ok to discharge from renal stand point. The patient is scheduled for follow up with me on 09/16/24 at 10 am.   Patient should have follow up renal panel in 2 weeks at PCP clinic.    Stage II CKD-Most recent A1c was 10.1% and his CKD is likely secondary to diabetic kidney disease. Baseline serum creatinine 1.1-1.3mg/dl.      Electrolytes- no pressing issues.    Metabolic acidosis-Mild. Resolved on today labs. No indications for replacement. Trend.    Acute decompensated congestive heart failure with preserved ejection fraction-   2D ECHO 08/11 showed LVEF of 15-20%.  Net 4.0L negative  since admission. Weight is trending up this am.   Breathing comfortable on RA  Recommend to continue Spironolactone 12.5 mg daily and Lasix 20 mg BID on discharge  I advised the patient to follow low sodium diet    Hypertension-BP is acceptable. On Coreg 3.125 mg BID, Isordil 30 mg TID, Hydralazine 10 mg TID.  Recommend no changes      Aspiration pneumonia -on PO doxycycline.    Acute urinary retention with bilateral moderate hydronephrosis status post Fischer catheter placement.  Repeat Renal US showed resolved hydronephrosis.   Fischer catheter removed 08/22.  600 ml on bladder scan this am. Likely will need Fischer catheter to be reinserted prior to discharge.  Urology is following    Poorly controlled diabetes mellitus-on insulin.  Management per primary team.      CAD/NSTEMI-S/p PCI on 08/20  with ASHLEY stents placement in the proximal and distal RCA.   Staged, percutaneous coronary artery intervention of the LAD-diagonal bifurcation can be considered in 4 to 6 weeks, if the patient remains compliant on dual antiplatelet therapy during follow-up clinic visits.   On aspirin, Plavix, Isodril, Lipitor and Coreg.    Constipation-on bowel regimen prn. Management as er primary team.     Methamphetamine abuse-chronic user.    Disposition:the plain is to discharge home with sister today    Discussed with .    Yamileth Sawyer MD  [facility], [address], [address]  [place] [address]  Phone# [phone]  Fax# [phone]      SUBJECTIVE:   The patient was seen at the bedside and events reviewed with nursing. Patient remians intermittently confused.  No acute events overnight.  600 ml on bladder scan today.   Patient states that he is feeling the same, no new complaints.   Updated on labs. All questions answered.    OBJECTIVE:  Physical Exam   Temp: 98.8  F (37.1  C) Temp src: Oral BP: (!) 143/70 Pulse: 79   Resp: 18 SpO2: 96 % O2 Device: None (Room air)    Vitals:    08/21/24 0708  08/22/24 0450 08/23/24 0500   Weight: 62.1 kg (136 lb 14.5 oz) 63.7 kg (140 lb 6.4 oz) 63.5 kg (140 lb 1.6 oz)     Vital Signs with Ranges  Temp:  [97.8  F (36.6  C)-98.8  F (37.1  C)] 98.8  F (37.1  C)  Pulse:  [72-83] 79  Resp:  [18-20] 18  BP: (110-143)/(61-81) 143/70  SpO2:  [92 %-96 %] 96 %  I/O last 3 completed shifts:  In: 870 [P.O.:870]  Out: 325 [Urine:325]    @TMAXR(24)@    Patient Vitals for the past 72 hrs:   Weight   08/23/24 0500 63.5 kg (140 lb 1.6 oz)   08/22/24 0450 63.7 kg (140 lb 6.4 oz)   08/21/24 0708 62.1 kg (136 lb 14.5 oz)     Intake/Output Summary (Last 24 hours) at 8/15/2024 1415  Last data filed at 8/14/2024 2119  Gross per 24 hour   Intake --   Output 800 ml   Net -800 ml       PHYSICAL EXAM:  General: NAD, cachectic, chronically ill looking  Cardiovascular - Regular rate and rhythm, no rub  Respiratory - CTA b/l  Abd: BS present, no guarding or pain with palpation, no ascites  Extremities - improved pedal and pretibial  lower extremity edema bilaterally  : no Fischer Catheter in place  Skin: dry, intact, no rash, good turgor  Neuro:  Grossly intact, no focal deficits  MSK:  Grossly intact, + sarcopenia  Psych:  Flat affect    LABORATORY STUDIES:     Recent Labs   Lab 08/23/24  0557 08/22/24  0510 08/21/24  0435 08/20/24  0507 08/19/24  0557 08/18/24  0513   WBC 10.9 10.8 12.1* 10.4 9.7 9.4   RBC 3.16* 3.23* 3.29* 2.60* 2.91* 2.75*   HGB 9.0* 9.2* 9.6* 7.5* 8.4* 8.0*   HCT 27.0* 27.3* 28.0* 22.1* 24.7* 23.0*   * 462* 462* 383 379 362       Basic Metabolic Panel:  Recent Labs   Lab 08/23/24  0759 08/23/24  0557 08/22/24  2110 08/22/24  1702 08/22/24  1133 08/22/24  0703 08/22/24  0510 08/21/24  0540 08/21/24  0435 08/20/24  0800 08/20/24  0507 08/19/24  0822 08/19/24  0557 08/18/24  0730 08/18/24  0513   NA  --  138  --   --   --   --  140  --  139  --  143  --  139  --  139   POTASSIUM  --  4.5  --   --   --   --  4.5  --  4.2  --  4.1  --  4.2  --  4.1   CHLORIDE  --  106  --   --    --   --  107  --  104  --  106  --  103  --  103   CO2  --  23  --   --   --   --  20*  --  20*  --  23  --  23  --  23   BUN  --  34.7*  --   --   --   --  34.0*  --  39.8*  --  44.0*  --  47.6*  --  52.6*   CR  --  1.90*  --   --   --   --  1.83*  --  1.80*  --  1.79*  --  1.89*  --  1.91*   * 134* 182* 216* 178* 123* 121*   < > 63*   < > 66*   < > 132*   < > 92   AMIRAH  --  8.3*  --   --   --   --  8.3*  --  8.6*  --  8.4*  --  8.6*  --  8.6*    < > = values in this interval not displayed.       INRNo lab results found in last 7 days.     Recent Labs   Lab Test 08/23/24  0557 08/22/24  0510   WBC 10.9 10.8   HGB 9.0* 9.2*   * 462*       Personally reviewed current labs    EXAM: BILATERAL RENAL ARTERY DUPLEX AND RENAL ULTRASOUND     INDICATION: Acute kidney injury     TECHNIQUE: Real-time gray-scale sonographic imaging of the kidneys and bladder was performed including duplex spectral and color Doppler evaluation of the renal arteries.     COMPARISON: Renal artery duplex 8/14/2024     RENAL ULTRASOUND FINDINGS:    RIGHT KIDNEY: Increased parenchymal echogenicity without hydronephrosis, measuring approximately 10.7 x 5.8 x 5.4 cm.     LEFT KIDNEY: Increased parenchymal echogenicity without hydronephrosis, measuring approximately 8.8 x 5.3 x 5.5 cm.     The bladder is decompressed with indwelling Fischer catheter     DUPLEX ARTERIAL ULTRASOUND FINDINGS:     SYSTOLIC VELOCITIES (cm/s):  RIGHT RENAL ARTERY:  Proximal: 60                Mid: 61                 Distal: 45                    LEFT RENAL ARTERY:  Proximal: 49                Mid: 40                 Distal: 32                    Aorta: 86     Arcuate arterial resistive indices range from 0.7-0.8 on the right and 0.7-0.8 on the left.                                                                      IMPRESSION:  1.  Echogenic kidneys. No hydronephrosis.  2.  Negative renal artery duplex.      Yamileth Sawyer MD  [facility]  [address], [address]  [place] [address]  Phone# [phone]  Fax# [phone]

## 2024-08-23 NOTE — PROGRESS NOTES
HEART CARE NOTE          Assessment/Recommendations     1. Severe HFrEF c/b severe ADHF  Assessment / Plan  Tolerating oral diuretic regimen - no changes at this time; continue to monitor renal function/repeat BMP, UOP and hemodynamics closely    Patient is high risk for adverse cardiac events 2/2 severe systolic dysfunction, renal dysfunction, CAD and elevated NTproBNP, DM2  GDMT as detailed below     Current Pharmacotherapy AHA Guideline-Directed Medical Therapy   Lisinopril - on hold given renal dysfuntion Lisinopril 20 mg twice daily   Carvedilol 3.125 mg twice daily Carvedilol 25 mg twice daily   Spironolactone 12.5 mg Spironolactone 25 mg once daily   Hydralazine 10 mg three times daily Hydralazine 100 mg three times daily   Isosorbide dinitrate 10 mg three times daily Isosorbide dinitrate 40 mg three times daily   SGLT2 inhibitor:Dapagliflozin/Empagliflozin - not started Dapagliflozin or Empagliflozin 10 mg daily      2. Severe multivessel CAD c/b NSTEMI  Assessment / Plan  PET significant for viability; Not considered CABG candidate by CTS - s/p impella supported high risk PCI to RCA - possible staged PCI in the next 1-2 months   Denies chest pain or anginal equivalents   Continue ASA, atorvastatin, carvedilol, clopidogrel     3. CHRISTY on CKD  Assessment / Plan  CRS; diuresis on hold in anticipation of coronary angiogram above; continue to monitor UOP and renal function closely     4. DM2  Assessment / Plan  Management per primary team - currently on ISS     5. HTN  Assessment / Plan  Titrate oral afterload reduction as tolerated      Plan of care discussed on August 23, 2024 with patient at bedside, and primary team overseeing patient's care     Cardiology team will sign-off for now. Please do not hesitate to consult us again if new questions or concerns arise. Follow-up appointment will be arranged by CORE/HF clinic.         History of Present Illness/Subjective    Mr. Abdulaziz Rausch is a 59 year old  male with a PMHx significant for (per Epic notation) diabetes, peripheral vascular disease related to diabetes who presents to [facility] with 3-day history of progressive dyspnea on exertion, lower extremity edema and orthopnea symptoms found to have acute decompensated congestive heart failure and symptoms consistent with ACS     Today, Mr. Rausch denies acute cardiac events or complaints; Management plan as detailed above     ECG: personally reviewed; normal sinus rhythm, nonspecific ST and T waves changes.     ECHO (personnaly Reviewed on 8/19/24):   1.Left ventricular function is decreased. The ejection fraction is 15-20%  (severely reduced).  2.The left ventricle is mildly dilated.  3.Normal right ventricle size and systolic function.  4.The left atrium is moderate to severely dilated.  5.No hemodynamically significant valvular abnormalities on 2D or color flow  imaging.  6.Right ventricular systolic pressure is elevated, consistent with mild  pulmonary hypertension.  There is no comparison study available.    Telemetry: personally reviewed August 23, 2024; notable for sinus rhythm     Lab results: personally reviewed August 23, 2024; notable for CKD     Medical history and pertinent documents reviewed in Care Everywhere please where applicable see details above        Physical Examination Review of Systems   /81 (BP Location: Left arm)   Pulse 74   Temp 98.4  F (36.9  C) (Oral)   Resp 18   Wt 63.5 kg (140 lb 1.6 oz)   SpO2 95%   BMI 20.69 kg/m    Body mass index is 20.69 kg/m .  Wt Readings from Last 3 Encounters:   08/23/24 63.5 kg (140 lb 1.6 oz)   08/12/24 67.8 kg (149 lb 7.6 oz)   10/05/23 62.1 kg (137 lb)     General Appearance:   no distress, normal body habitus   ENT/Mouth: membranes moist, no oral lesions or bleeding gums.      EYES:  no scleral icterus, normal conjunctivae   Neck: no carotid bruits or thyromegaly   Chest/Lungs:   lungs are clear to auscultation, no rales or  wheezing, equal chest wall expansion    Cardiovascular:   Regular. Normal first and second heart sounds with no murmurs, rubs, or gallops; the carotid, radial and posterior tibial pulses are intact, no JVD , mild LE edema bilaterally    Abdomen:  no organomegaly, masses, bruits, or tenderness; bowel sounds are present   Extremities: no cyanosis or clubbing   Skin: no xanthelasma, warm.    Neurologic: NAD     Psychiatric: alert and oriented x3, calm     A complete 10 systems ROS was reviewed  And is negative except what is listed in the HPI.          Medical History  Surgical History Family History Social History   Past Medical History:   Diagnosis Date    Anemia     Depression     Diabetes mellitus, type 2 (H)     Diabetic foot infection (H) 7/1/2023    Diabetic foot ulcer (H)     History of MRSA infection 7/1/2023 Jan 2023 RIGHT foot wound    Hypertension     Non-healing ulcer of left foot (H) 7/1/2023    Osteomyelitis (H)     Past Surgical History:   Procedure Laterality Date    AMPUTATE FOOT Left 7/3/2023    Procedure: partial left fifth ray amputation, skin flap creation and closre, left foot;  Surgeon: Alejandro Mccoy DPM;  Location: [facility] OR    AMPUTATE TOE(S) Right 2/17/2023    Procedure: AMPUTATION, digits two and three right foot;  Surgeon: Ulises Jimenez DPM;  Location: [facility] OR    CV CORONARY ANGIOGRAM N/A 8/13/2024    Procedure: Coronary Angiogram;  Surgeon: Brandon Alejandro MD;  Location: [facility] CATH LAB CV    CV CORONARY ANGIOGRAM N/A 8/20/2024    Procedure: Coronary Angiogram;  Surgeon: Brandon Alejandro MD;  Location: [facility] LAB CV    CV INTRA AORTIC BALLOON N/A 8/20/2024    Procedure: Intra aortic Balloon Pump Insertion;  Surgeon: Brandon Alejandro MD;  Location: [facility] LAB CV    CV LEFT HEART CATH N/A 8/13/2024    Procedure: Left Heart Catheterization;  Surgeon: Brandon Alejandro MD;  Location: [facility] LAB CV    CV PCI N/A 8/20/2024    Procedure:  Percutaneous Coronary Intervention;  Surgeon: Brandon Alejandro MD;  Location: [facility] CATH LAB CV    CV RIGHT HEART CATH MEASUREMENTS RECORDED N/A 8/13/2024    Procedure: Right Heart Catheterization;  Surgeon: Brandon Alejandro MD;  Location: [facility] CATH LAB CV    INCISION AND DRAINAGE FOOT, COMBINED Left 5/23/2023    Procedure: Incision and drainage of abscess left foot with;  Surgeon: fJ Huerta DPM;  Location: [facility] OR    IRRIGATION AND DEBRIDEMENT FOOT, COMBINED Left 5/23/2023    Procedure: debridement and irrigation of wound left foot;  Surgeon: Jf Huerta DPM;  Location: [facility] OR    IRRIGATION AND DEBRIDEMENT TOE, COMBINED Right 1/20/2023    Procedure: IRRIGATION AND DEBRIDEMENT digits 2 and 3 right foot;  Surgeon: Jf Huerta DPM;  Location: [facility] OR    PICC SINGLE LUMEN PLACEMENT  5/25/2023         no family history of premature coronary artery disease Social History     Socioeconomic History    Marital status:      Spouse name: Not on file    Number of children: Not on file    Years of education: Not on file    Highest education level: Not on file   Occupational History    Not on file   Tobacco Use    Smoking status: Never    Smokeless tobacco: Never   Vaping Use    Vaping status: Never Used   Substance and Sexual Activity    Alcohol use: Never    Drug use: Yes     Types: Methamphetamines     Comment: chronic user    Sexual activity: Not on file   Other Topics Concern    Not on file   Social History Narrative    Not on file     Social Determinants of Health     Financial Resource Strain: High Risk (10/5/2023)    Financial Resource Strain     Within the past 12 months, have you or your family members you live with been unable to get utilities (heat, electricity) when it was really needed?: Yes   Food Insecurity: High Risk (10/5/2023)    Food Insecurity     Within the past 12 months, did you worry that your food would run out before you got money to buy  "more?: Yes     Within the past 12 months, did the food you bought just not last and you didn t have money to get more?: Yes   Transportation Needs: Low Risk  (10/5/2023)    Transportation Needs     Within the past 12 months, has lack of transportation kept you from medical appointments, getting your medicines, non-medical meetings or appointments, work, or from getting things that you need?: No   Physical Activity: Not on file   Stress: Not on file   Social Connections: Not on file   Interpersonal Safety: Low Risk  (10/5/2023)    Interpersonal Safety     Do you feel physically and emotionally safe where you currently live?: Yes     Within the past 12 months, have you been hit, slapped, kicked or otherwise physically hurt by someone?: No     Within the past 12 months, have you been humiliated or emotionally abused in other ways by your partner or ex-partner?: No   Housing Stability: Low Risk  (10/5/2023)    Housing Stability     Do you have housing? : Yes     Are you worried about losing your housing?: No           Lab Results    Chemistry/lipid CBC Cardiac Enzymes/BNP/TSH/INR   Lab Results   Component Value Date    CHOL 145 01/18/2023    HDL 37 (L) 01/18/2023    TRIG 226 (H) 01/18/2023    BUN 34.0 (H) 08/22/2024     08/22/2024    CO2 20 (L) 08/22/2024    Lab Results   Component Value Date    WBC 10.8 08/22/2024    HGB 9.2 (L) 08/22/2024    HCT 27.3 (L) 08/22/2024    MCV 85 08/22/2024     (H) 08/22/2024    Lab Results   Component Value Date    TSH 1.32 08/13/2024     No results found for: \"CKTOTAL\", \"CKMB\", \"TROPONINI\"       Weight:    Wt Readings from Last 3 Encounters:   08/23/24 63.5 kg (140 lb 1.6 oz)   08/12/24 67.8 kg (149 lb 7.6 oz)   10/05/23 62.1 kg (137 lb)       Allergies  No Known Allergies      Surgical History  Past Surgical History:   Procedure Laterality Date    AMPUTATE FOOT Left 7/3/2023    Procedure: partial left fifth ray amputation, skin flap creation and closre, left foot;  Surgeon: " Alejandro Mccoy DPM;  Location: [facility] OR    AMPUTATE TOE(S) Right 2/17/2023    Procedure: AMPUTATION, digits two and three right foot;  Surgeon: Ulises Jimenez DPM;  Location: [facility] OR    CV CORONARY ANGIOGRAM N/A 8/13/2024    Procedure: Coronary Angiogram;  Surgeon: Brandon Alejandro MD;  Location: [facility] CATH LAB CV    CV CORONARY ANGIOGRAM N/A 8/20/2024    Procedure: Coronary Angiogram;  Surgeon: Brandon Alejandro MD;  Location: [facility] CATH LAB CV    CV INTRA AORTIC BALLOON N/A 8/20/2024    Procedure: Intra aortic Balloon Pump Insertion;  Surgeon: Brandon Alejandro MD;  Location: [facility] CATH LAB CV    CV LEFT HEART CATH N/A 8/13/2024    Procedure: Left Heart Catheterization;  Surgeon: Brandon Alejandro MD;  Location: [facility] CATH LAB CV    CV PCI N/A 8/20/2024    Procedure: Percutaneous Coronary Intervention;  Surgeon: Brandon Alejandro MD;  Location: [facility] CATH LAB CV    CV RIGHT HEART CATH MEASUREMENTS RECORDED N/A 8/13/2024    Procedure: Right Heart Catheterization;  Surgeon: Brandon Alejandro MD;  Location: [facility] LAB CV    INCISION AND DRAINAGE FOOT, COMBINED Left 5/23/2023    Procedure: Incision and drainage of abscess left foot with;  Surgeon: Jf Huerta DPM;  Location: [facility] OR    IRRIGATION AND DEBRIDEMENT FOOT, COMBINED Left 5/23/2023    Procedure: debridement and irrigation of wound left foot;  Surgeon: Jf Huerta DPM;  Location: [facility] OR    IRRIGATION AND DEBRIDEMENT TOE, COMBINED Right 1/20/2023    Procedure: IRRIGATION AND DEBRIDEMENT digits 2 and 3 right foot;  Surgeon: Jf Huerta DPM;  Location: [facility] OR    PICC SINGLE LUMEN PLACEMENT  5/25/2023            Social History  Tobacco:   History   Smoking Status    Never   Smokeless Tobacco    Never    Alcohol:   Social History    Substance and Sexual Activity      Alcohol use: Never   Illicit Drugs:   History   Drug Use    Types: Methamphetamines     Comment: chronic user        Family History  Family History   Problem Relation Age of Onset    Diabetes Father           Geovanny Griffin MD on 8/23/2024      cc: Daryn Pittman

## 2024-08-23 NOTE — PLAN OF CARE
Problem: Heart Failure  Goal: Optimal Coping  Outcome: Progressing     Problem: Heart Failure  Goal: Optimal Cardiac Output  Outcome: Progressing     Problem: Heart Failure  Goal: Stable Heart Rate and Rhythm  Outcome: Progressing     Problem: Cardiac Catheterization (Diagnostic/Interventional)  Goal: Absence of Bleeding  Outcome: Progressing   Goal Outcome Evaluation:             A & O,  very forgetful. VSS on RA. Sinus rhythm. Denies pain. Fischer was removed yesterday, pt voiding. Hydroxyzine given for anxiety per pt request. 1800FR.

## 2024-08-23 NOTE — PLAN OF CARE
Physical Therapy Discharge Summary    Reason for therapy discharge:    Discharged to home with home therapy.    Progress towards therapy goal(s). See goals on Care Plan in [facility] electronic health record for goal details.  Goals partially met.  Barriers to achieving goals:   Pt is making progress towards the goals.    Therapy recommendation(s):    Continued therapy is recommended.  Rationale/Recommendations:  TCU is recommended.  This was declined.  Pt to go live with his sister.  Pt should have assist with all mobility and use the FWW.  Home PT is recommended.  .

## 2024-08-23 NOTE — PLAN OF CARE
Occupational Therapy Discharge Summary    Reason for therapy discharge:    Discharged to home with home therapy.    Progress towards therapy goal(s). See goals on Care Plan in [facility] electronic health record for goal details.  Goals partially met.  Barriers to achieving goals:   discharge from facility.    Therapy recommendation(s):    Continued therapy is recommended.  Rationale/Recommendations:  maximize ADL IND with continued OT. Recommended TCU but pt's sister elected to have pt move in with her until [facility]P can be secured.

## 2024-08-23 NOTE — PROGRESS NOTES
Care Management Discharge Note    Discharge Date: 08/23/2024       Discharge Disposition:  Going to live with sister Nilda until they can sell his home and find [facility] placement. Pt and Sister Nilda decline the therapy recommendation for TCU. Sister is willing to take full responsibility for pt and feels she can assist him with needed ADLs and IADLs. And does not want CM to order home care as she feels she can work on these needs with the pt herself.    Discharge Services:  follow up with PCP    Discharge DME:  Walker, glucometer    Discharge Transportation: sister Nilda to provide    Private pay costs discussed: Not applicable    Does the patient's insurance plan have a 3 day qualifying hospital stay waiver?  No    PAS Confirmation Code:    Patient/family educated on Medicare website which has current facility and service quality ratings:      Education Provided on the Discharge Plan:    Persons Notified of Discharge Plans: yes  Patient/Family in Agreement with the Plan:      Handoff Referral Completed: Yes    Additional Information:  Pt adequate for discharge. Therapy recommendation is TCU however, Pt and Sister Nilda decline the therapy recommendation for TCU.  is willing to take full responsibility for pt and feels she can assist him with needed ADLs and IADLs. And does not want CM to order home care as she feels she can work on these needs with the pt herself. Discharge orders placed. No further CM needs at this time. CM will sign off.    Lianet Ahuja RN

## 2024-08-23 NOTE — PLAN OF CARE
Problem: Adult Inpatient Plan of Care  Goal: Absence of Hospital-Acquired Illness or Injury  Intervention: Identify and Manage Fall Risk  Recent Flowsheet Documentation  Taken 8/23/2024 0800 by Denice Jones RN  Safety Promotion/Fall Prevention: activity supervised     Problem: Cardiac Catheterization (Diagnostic/Interventional)  Goal: Anesthesia/Sedation Recovery  Intervention: Optimize Anesthesia Recovery  Recent Flowsheet Documentation  Taken 8/23/2024 0800 by Denice Jones RN  Safety Promotion/Fall Prevention: activity supervised  Goal: Absence of Vascular Access Complication  Intervention: Prevent and Manage Access Complications  Recent Flowsheet Documentation  Taken 8/23/2024 0800 by Denice Jones RN  Activity Management: up in chair     Problem: Cardiac Catheterization (Diagnostic/Interventional)  Goal: Absence of Bleeding  Outcome: Adequate for Care Transition  Goal: Absence of Contrast-Induced Injury  Outcome: Adequate for Care Transition  Goal: Stable Heart Rate and Rhythm  Outcome: Adequate for Care Transition  Goal: Absence of Embolism Signs and Symptoms  Outcome: Adequate for Care Transition  Goal: Anesthesia/Sedation Recovery  Outcome: Adequate for Care Transition  Intervention: Optimize Anesthesia Recovery  Recent Flowsheet Documentation  Taken 8/23/2024 0800 by Denice Jones RN  Safety Promotion/Fall Prevention: activity supervised  Goal: Optimal Pain Control and Function  Outcome: Adequate for Care Transition  Goal: Absence of Vascular Access Complication  Outcome: Adequate for Care Transition  Intervention: Prevent and Manage Access Complications  Recent Flowsheet Documentation  Taken 8/23/2024 0800 by Denice Jones RN  Activity Management: up in chair     Problem: Pain Acute  Goal: Optimal Pain Control and Function  Intervention: Prevent or Manage Pain  Recent Flowsheet Documentation  Taken 8/23/2024 0800 by Denice Jones RN  Medication Review/Management: medications reviewed      Problem: Heart Failure  Goal: Optimal Functional Ability  Intervention: Optimize Functional Ability  Recent Flowsheet Documentation  Taken 8/23/2024 0800 by Denice Jones RN  Activity Management: up in chair  Goal: Effective Oxygenation and Ventilation  Intervention: Promote Airway Secretion Clearance  Recent Flowsheet Documentation  Taken 8/23/2024 0800 by Denice Jones RN  Activity Management: up in chair  Goal: Effective Breathing Pattern During Sleep  Intervention: Monitor and Manage Obstructive Sleep Apnea  Recent Flowsheet Documentation  Taken 8/23/2024 0800 by Denice Jones RN  Medication Review/Management: medications reviewed     Problem: Heart Failure  Goal: Optimal Coping  Outcome: Adequate for Care Transition  Goal: Optimal Cardiac Output  Outcome: Adequate for Care Transition  Goal: Stable Heart Rate and Rhythm  Outcome: Adequate for Care Transition  Goal: Optimal Functional Ability  Outcome: Adequate for Care Transition  Intervention: Optimize Functional Ability  Recent Flowsheet Documentation  Taken 8/23/2024 0800 by Denice Jones RN  Activity Management: up in chair  Goal: Fluid and Electrolyte Balance  Outcome: Adequate for Care Transition  Goal: Improved Oral Intake  Outcome: Adequate for Care Transition  Goal: Effective Oxygenation and Ventilation  Outcome: Adequate for Care Transition  Intervention: Promote Airway Secretion Clearance  Recent Flowsheet Documentation  Taken 8/23/2024 0800 by Denice Jones RN  Activity Management: up in chair  Goal: Effective Breathing Pattern During Sleep  Outcome: Adequate for Care Transition  Intervention: Monitor and Manage Obstructive Sleep Apnea  Recent Flowsheet Documentation  Taken 8/23/2024 0800 by Denice Jones RN  Medication Review/Management: medications reviewed     Problem: Comorbidity Management  Goal: Blood Glucose Levels Within Targeted Range  Intervention: Monitor and Manage Glycemia  Recent Flowsheet Documentation  Taken 8/23/2024  0800 by Denice Jones RN  Medication Review/Management: medications reviewed  Goal: Maintenance of Heart Failure Symptom Control  Intervention: Maintain Heart Failure Management  Recent Flowsheet Documentation  Taken 8/23/2024 0800 by Denice Jones RN  Medication Review/Management: medications reviewed     Problem: Gas Exchange Impaired  Goal: Optimal Gas Exchange  Outcome: Adequate for Care Transition     Problem: Fall Injury Risk  Goal: Absence of Fall and Fall-Related Injury  Outcome: Adequate for Care Transition  Intervention: Identify and Manage Contributors  Recent Flowsheet Documentation  Taken 8/23/2024 0800 by Denice Jones RN  Medication Review/Management: medications reviewed  Intervention: Promote Injury-Free Environment  Recent Flowsheet Documentation  Taken 8/23/2024 0800 by Denice Jones RN  Safety Promotion/Fall Prevention: activity supervised   Goal Outcome Evaluation:    Bladder scan after void 617.  Placed rowland.  Follow up with urology.

## 2024-08-26 ENCOUNTER — PATIENT OUTREACH (OUTPATIENT)
Dept: CARE COORDINATION | Facility: CLINIC | Age: 59
End: 2024-08-26
Payer: COMMERCIAL

## 2024-08-26 DIAGNOSIS — Z09 HOSPITAL DISCHARGE FOLLOW-UP: ICD-10-CM

## 2024-08-26 NOTE — PROGRESS NOTES
Clinic Care Coordination Contact    Situation: Patient chart reviewed by care coordinator.    Background: TCM follow up call. Patient was hospitalized from 8/13/24 to 8/23/24 for treatment of an acute CHF exacerbation. Patient discharged to his sister Nilda's house as he was not eligible for TCU and SW unable to find home care.     Assessment: [facility] RN spoke with patient's sister Nilda today. She stated patient has decided he will most likely move to an assisted living in [place], [place] he will be closer to family. Nilda stated she has contacted the [facility] where patient is currently receiving services regarding a waiver. She also contacted the assisted living facility in the [place] to determine if they have availability. She stated she was going to contact [facility] in [place] to see if patient can establish care with a PCP there. If so, she will call and cancel her patient's appointment with Dr. Pittman on 9/6/24. Nilda said she would ensure patient gets to his appointment with Cardiology at [facility] on Thursday of this week.   Plan/Recommendations: Patient was not enrolled in Care Coordination as she will be most likely transfering his care to [facility]. Nilda and patient have [facility]'s direct phone number if needing additional assistance.     David Myhre, RN  [facility] RN  [phone]

## 2024-08-26 NOTE — PROGRESS NOTES
Sister Nilda called requesting information on county assistance and assisted livings. Gave her the number for [facility] per her request. Says they will contact the half-way they have in mind for patient and go from there

## 2024-08-28 ENCOUNTER — TELEPHONE (OUTPATIENT)
Dept: FAMILY MEDICINE | Facility: CLINIC | Age: 59
End: 2024-08-28
Payer: COMMERCIAL

## 2024-08-28 NOTE — TELEPHONE ENCOUNTER
MTM referral from: Transitions of Care (recent hospital discharge or ED visit)    MTM referral outreach attempt #2 on August 28, 2024 at 11:20 AM      Outcome: Patient not reachable after several attempts, sent Patient[employer]oc[employer] message    Use [facility]  for the carrier/Plan on the flowsheet      [employer] Message Sent    See Mike  [facility]   [phone]

## 2024-08-29 ENCOUNTER — TELEPHONE (OUTPATIENT)
Dept: CARE COORDINATION | Facility: CLINIC | Age: 59
End: 2024-08-29

## 2024-08-29 NOTE — TELEPHONE ENCOUNTER
DANIAL Arriaga Dr. received a call from patient's sister Nilda. She stated patient is establishing care with [facility] in [place], [place]. His hospital follow up with you was cancelled per her request.     Thanks,     Dave Myhre, RN   [facility] RN

## 2024-12-14 ENCOUNTER — HEALTH MAINTENANCE LETTER (OUTPATIENT)
Age: 59
End: 2024-12-14

## 2025-03-16 ENCOUNTER — HEALTH MAINTENANCE LETTER (OUTPATIENT)
Age: 60
End: 2025-03-16

## 2025-06-29 ENCOUNTER — HEALTH MAINTENANCE LETTER (OUTPATIENT)
Age: 60
End: 2025-06-29

## (undated) DEVICE — DRSG ABD TNDRSRB WET PRUF 8IN X 10IN STRL  9194A

## (undated) DEVICE — SOL WATER IRRIG 1000ML BOTTLE 2F7114

## (undated) DEVICE — GLOVE SURG PI ULTRA TOUCH M SZ 8-1/2 LF

## (undated) DEVICE — GLOVE BIOGEL PI INDICATOR 8.0 LF 41680

## (undated) DEVICE — DRSG GAUZE 4X4" TRAY 6939

## (undated) DEVICE — SU VICRYL 3-0 PS-1 18" UND J683

## (undated) DEVICE — GOWN XXL 9575

## (undated) DEVICE — SPECIMEN CULTURETTE DBL SWAB 220109

## (undated) DEVICE — SLEEVE TR BAND RADIAL COMPRESSION DEVICE 24CM TRB24-REG

## (undated) DEVICE — SYR 10ML LL W/O NDL 302995

## (undated) DEVICE — SHTH INTRO 0.021IN ID 6FR DIA

## (undated) DEVICE — PREP POVIDONE-IODINE 10% SOLUTION 4OZ BOTTLE MDS093944

## (undated) DEVICE — PLATE GROUNDING ADULT W/CORD 9165L

## (undated) DEVICE — ESU PENCIL SMOKE EVAC W/ROCKER SWITCH 0703-047-000

## (undated) DEVICE — DRAPE POUCH IRR 1016

## (undated) DEVICE — PREP SKIN SCRUB TRAY 4461A

## (undated) DEVICE — DRSG GAUZE 4X4" TRAY

## (undated) DEVICE — INTRO MICRO MINI STICK 4FR STIFF NITINOL 45-753

## (undated) DEVICE — SU MONOCRYL 3-0 PS-2 18" UND MCP497G

## (undated) DEVICE — CUSTOM PACK CORONARY SAN5BCRHEA

## (undated) DEVICE — BANDAGE ELASTIC VELCRO 3IN REB3013

## (undated) DEVICE — SUCTION MANIFOLD NEPTUNE 2 SYS 1 PORT 702-025-000

## (undated) DEVICE — KIT HAND CONTROL ACIST 014644 AR-P54

## (undated) DEVICE — BLADE KNIFE SURG 15 371115

## (undated) DEVICE — CATH ANGIO JUDKINS R4 6FRX100CM INFINITI 534621T

## (undated) DEVICE — CATH BALLOON NC EMERGE 3.00X15MM H7493926715300

## (undated) DEVICE — CATH BALLOON EMERGE 2.5X15MM H7493918915250

## (undated) DEVICE — RAD CLOSURE ANGIOSEAL 8FR  610131

## (undated) DEVICE — ESU GROUND PAD UNIVERSAL W/O CORD

## (undated) DEVICE — GLOVE BIOGEL PI MICRO SZ 8.0 48580

## (undated) DEVICE — CATH PULM ART 7FR X 110CM, SWA

## (undated) DEVICE — SYR ANGIOGRAPHY MULTIUSE KIT ACIST 014612

## (undated) DEVICE — SU ETHILON 3-0 PS-2 18" 1669H

## (undated) DEVICE — CATH ANGIO JUDKINS JL3.5 6FRX100CM INFINITI 534618T

## (undated) DEVICE — BONE CLEANING TIP INTERPULSE  0210-010-000

## (undated) DEVICE — IMM LIMB ELEVATOR DC40-0203

## (undated) DEVICE — GLOVE BIOGEL PI MICRO INDICATOR UNDERGLOVE SZ 8.0 48980

## (undated) DEVICE — Device

## (undated) DEVICE — BANDAGE ESMARK 4 X 3 YARDS STL 23578-143

## (undated) DEVICE — INTRO SHEATH 7FRX10CM PINNACLE RSS702

## (undated) DEVICE — DRSG KERLIX 4 1/2"X4YDS ROLL 6715

## (undated) DEVICE — INTRO MICRO MINI STICK 4FR STD NITINOL

## (undated) DEVICE — CUSTOM PACK LOWER EXTREMITY SOP5BLEHEA

## (undated) DEVICE — SOL NACL 0.9% IRRIG 1000ML BOTTLE 2F7124

## (undated) DEVICE — BNDG ELASTIC 3"X5YDS UNSTERILE 6611-30

## (undated) DEVICE — GUIDEWIRE STRT .025 SCN M001491001

## (undated) DEVICE — DRAPE STERI TOWEL LG 1010

## (undated) DEVICE — TRAY PREP DRY SKIN SCRUB 067

## (undated) DEVICE — BLADE KNIFE SURG 10 371110

## (undated) DEVICE — BANDAGE ELASTIC VELCRO 4IN REB3014

## (undated) DEVICE — DRSG XEROFORM 1X8"

## (undated) DEVICE — PACK EXTREMITY SOP15EXFSD

## (undated) DEVICE — DRSG ABDOMINAL 07 1/2X8" 7197D

## (undated) DEVICE — NDL 25GA 1.5" 305127

## (undated) DEVICE — LINEN TOWEL PACK X5 5464

## (undated) DEVICE — CAST PADDING 6" STERILE 9046S

## (undated) DEVICE — PREP POVIDONE-IODINE 7.5% SCRUB 4OZ BOTTLE MDS093945

## (undated) DEVICE — CUFF TOURN 18IN STRL DISP

## (undated) DEVICE — BLADE SAW SAGITTAL 25.5X9.5X.4MM FINE LINVATEC 5023-138

## (undated) DEVICE — BANDAGE STRETCH GAUZE 4IN 2247

## (undated) DEVICE — CATH BALLOON IABP 7.5FRX50ML INTRA-AORTIC 0684-00-0576-01U

## (undated) DEVICE — DEVICE INFLATION SYR W/ HEMOSTASIS VALVE 12IN EXT IN4904

## (undated) DEVICE — CATH BALLOON NC EMERGE 3.50X20MM H7493926720350

## (undated) DEVICE — PREP PAD ALCOHOL 2PLY MED STRL APP102A

## (undated) DEVICE — ELECTRODE DEFIB CADENCE 22550R

## (undated) DEVICE — GUIDEWIRE VASC 0.014INX180CM RUNTHROUGH 25-1011

## (undated) DEVICE — MANIFOLD KIT ANGIO AUTOMATED 014613

## (undated) DEVICE — BNDG ELASTIC 4"X5YDS UNSTERILE 6611-40

## (undated) DEVICE — CATH GUIDING 6FR AL .75 LA6AL75

## (undated) DEVICE — NEEDLE HYPO 18X1-1/2 SAFETY 305918

## (undated) DEVICE — SU PROLENE 3-0 PS-2 18" 8687H

## (undated) DEVICE — SUCTION TIP YANKAUER W/O VENT K86

## (undated) DEVICE — BNDG KLING 4" 2236

## (undated) DEVICE — VALVE HEMOSTASIS .096" COPILOT MECH 1003331

## (undated) DEVICE — SU VICRYL+ 3-0 27IN SH UND VCP416H

## (undated) DEVICE — SU ETHILON 4-0 PS-2 18" BLACK 1667H

## (undated) DEVICE — INTRO SHEATH 9FRX10CM PINNACLE RSS902

## (undated) DEVICE — GOWN LG DISP 9515

## (undated) DEVICE — DRSG ADAPTIC 3X8" 6113

## (undated) DEVICE — DRAPE SHEET REV FOLD 3/4 9349

## (undated) DEVICE — GLOVE BIOGEL PI ORTHOPRO SZ 7.5 47675

## (undated) DEVICE — SUCTION IRR SYSTEM W/O TIP INTERPULSE HANDPIECE 0210-100-000

## (undated) DEVICE — SYR 10ML FINGER CONTROL W/O NDL 309695

## (undated) DEVICE — DRSG ADAPTIC 3X3" 6112

## (undated) DEVICE — SOL NACL 0.9% INJ 1000ML BAG 2B1324X

## (undated) RX ORDER — PROPOFOL 10 MG/ML
INJECTION, EMULSION INTRAVENOUS
Status: DISPENSED
Start: 2023-01-20

## (undated) RX ORDER — CEFAZOLIN SODIUM 1 G/3ML
INJECTION, POWDER, FOR SOLUTION INTRAMUSCULAR; INTRAVENOUS
Status: DISPENSED
Start: 2023-07-03

## (undated) RX ORDER — BUPIVACAINE HYDROCHLORIDE 5 MG/ML
INJECTION, SOLUTION PERINEURAL
Status: DISPENSED
Start: 2023-02-17

## (undated) RX ORDER — PROPOFOL 10 MG/ML
INJECTION, EMULSION INTRAVENOUS
Status: DISPENSED
Start: 2023-07-03

## (undated) RX ORDER — HEPARIN SODIUM 10000 [USP'U]/100ML
INJECTION, SOLUTION INTRAVENOUS
Status: DISPENSED
Start: 2024-08-13

## (undated) RX ORDER — PROPOFOL 10 MG/ML
INJECTION, EMULSION INTRAVENOUS
Status: DISPENSED
Start: 2023-05-23

## (undated) RX ORDER — FENTANYL CITRATE 50 UG/ML
INJECTION, SOLUTION INTRAMUSCULAR; INTRAVENOUS
Status: DISPENSED
Start: 2023-01-20

## (undated) RX ORDER — LIDOCAINE HYDROCHLORIDE 20 MG/ML
INJECTION, SOLUTION INFILTRATION; PERINEURAL
Status: DISPENSED
Start: 2023-01-20

## (undated) RX ORDER — FENTANYL CITRATE 50 UG/ML
INJECTION, SOLUTION INTRAMUSCULAR; INTRAVENOUS
Status: DISPENSED
Start: 2024-08-20

## (undated) RX ORDER — FENTANYL CITRATE 50 UG/ML
INJECTION, SOLUTION INTRAMUSCULAR; INTRAVENOUS
Status: DISPENSED
Start: 2023-07-03

## (undated) RX ORDER — GINSENG 100 MG
CAPSULE ORAL
Status: DISPENSED
Start: 2023-05-23

## (undated) RX ORDER — BUPIVACAINE HYDROCHLORIDE 5 MG/ML
INJECTION, SOLUTION EPIDURAL; INTRACAUDAL
Status: DISPENSED
Start: 2023-02-17

## (undated) RX ORDER — FENTANYL CITRATE 50 UG/ML
INJECTION, SOLUTION INTRAMUSCULAR; INTRAVENOUS
Status: DISPENSED
Start: 2023-05-23

## (undated) RX ORDER — BUPIVACAINE HYDROCHLORIDE 5 MG/ML
INJECTION, SOLUTION EPIDURAL; INTRACAUDAL
Status: DISPENSED
Start: 2023-05-23

## (undated) RX ORDER — BUPIVACAINE HYDROCHLORIDE 5 MG/ML
INJECTION, SOLUTION EPIDURAL; INTRACAUDAL
Status: DISPENSED
Start: 2023-01-20

## (undated) RX ORDER — ASPIRIN 81 MG/1
TABLET, CHEWABLE ORAL
Status: DISPENSED
Start: 2024-08-13

## (undated) RX ORDER — GINSENG 100 MG
CAPSULE ORAL
Status: DISPENSED
Start: 2023-01-20

## (undated) RX ORDER — ONDANSETRON 2 MG/ML
INJECTION INTRAMUSCULAR; INTRAVENOUS
Status: DISPENSED
Start: 2023-07-03

## (undated) RX ORDER — CLOPIDOGREL 300 MG/1
TABLET, FILM COATED ORAL
Status: DISPENSED
Start: 2024-08-20

## (undated) RX ORDER — LIDOCAINE HYDROCHLORIDE 20 MG/ML
INJECTION, SOLUTION INFILTRATION; PERINEURAL
Status: DISPENSED
Start: 2023-05-23

## (undated) RX ORDER — FENTANYL CITRATE 50 UG/ML
INJECTION, SOLUTION INTRAMUSCULAR; INTRAVENOUS
Status: DISPENSED
Start: 2024-08-13